# Patient Record
Sex: FEMALE | Race: WHITE | NOT HISPANIC OR LATINO | Employment: OTHER | URBAN - METROPOLITAN AREA
[De-identification: names, ages, dates, MRNs, and addresses within clinical notes are randomized per-mention and may not be internally consistent; named-entity substitution may affect disease eponyms.]

---

## 2017-10-16 ENCOUNTER — HOSPITAL ENCOUNTER (EMERGENCY)
Facility: HOSPITAL | Age: 72
Discharge: LEFT BEFORE DISCHARGE | End: 2017-10-16
Attending: EMERGENCY MEDICINE
Payer: COMMERCIAL

## 2017-10-16 ENCOUNTER — APPOINTMENT (EMERGENCY)
Dept: RADIOLOGY | Facility: HOSPITAL | Age: 72
End: 2017-10-16
Payer: COMMERCIAL

## 2017-10-16 ENCOUNTER — APPOINTMENT (EMERGENCY)
Dept: CT IMAGING | Facility: HOSPITAL | Age: 72
End: 2017-10-16
Payer: COMMERCIAL

## 2017-10-16 VITALS
SYSTOLIC BLOOD PRESSURE: 132 MMHG | HEART RATE: 84 BPM | WEIGHT: 177.69 LBS | TEMPERATURE: 98.8 F | OXYGEN SATURATION: 98 % | DIASTOLIC BLOOD PRESSURE: 67 MMHG | RESPIRATION RATE: 20 BRPM

## 2017-10-16 DIAGNOSIS — R79.89 ELEVATED LACTIC ACID LEVEL: ICD-10-CM

## 2017-10-16 DIAGNOSIS — D69.6 THROMBOCYTOPENIA (HCC): ICD-10-CM

## 2017-10-16 DIAGNOSIS — E16.2 HYPOGLYCEMIA: ICD-10-CM

## 2017-10-16 DIAGNOSIS — N39.0 UTI (URINARY TRACT INFECTION): ICD-10-CM

## 2017-10-16 DIAGNOSIS — D64.9 ANEMIA, UNSPECIFIED TYPE: Primary | ICD-10-CM

## 2017-10-16 DIAGNOSIS — R63.4 UNINTENTIONAL WEIGHT LOSS: ICD-10-CM

## 2017-10-16 LAB
ALBUMIN SERPL BCP-MCNC: 2.9 G/DL (ref 3.5–5)
ALP SERPL-CCNC: 80 U/L (ref 46–116)
ALT SERPL W P-5'-P-CCNC: 40 U/L (ref 12–78)
AMPHETAMINES SERPL QL SCN: NEGATIVE
ANION GAP SERPL CALCULATED.3IONS-SCNC: 11 MMOL/L (ref 4–13)
AST SERPL W P-5'-P-CCNC: 41 U/L (ref 5–45)
BACTERIA UR QL AUTO: ABNORMAL /HPF
BARBITURATES UR QL: NEGATIVE
BASOPHILS # BLD AUTO: 0.04 THOUSANDS/ΜL (ref 0–0.1)
BASOPHILS NFR BLD AUTO: 1 % (ref 0–1)
BENZODIAZ UR QL: NEGATIVE
BILIRUB SERPL-MCNC: 0.8 MG/DL (ref 0.2–1)
BILIRUB UR QL STRIP: NEGATIVE
BUN SERPL-MCNC: 15 MG/DL (ref 5–25)
CALCIUM SERPL-MCNC: 9.8 MG/DL (ref 8.3–10.1)
CHLORIDE SERPL-SCNC: 105 MMOL/L (ref 100–108)
CLARITY UR: ABNORMAL
CO2 SERPL-SCNC: 25 MMOL/L (ref 21–32)
COCAINE UR QL: NEGATIVE
COLOR UR: YELLOW
CREAT SERPL-MCNC: 0.78 MG/DL (ref 0.6–1.3)
EOSINOPHIL # BLD AUTO: 0.25 THOUSAND/ΜL (ref 0–0.61)
EOSINOPHIL NFR BLD AUTO: 4 % (ref 0–6)
ERYTHROCYTE [DISTWIDTH] IN BLOOD BY AUTOMATED COUNT: 18.1 % (ref 11.6–15.1)
ETHANOL SERPL-MCNC: <3 MG/DL (ref 0–3)
GFR SERPL CREATININE-BSD FRML MDRD: 76 ML/MIN/1.73SQ M
GLUCOSE SERPL-MCNC: 154 MG/DL (ref 65–140)
GLUCOSE SERPL-MCNC: 173 MG/DL (ref 65–140)
GLUCOSE SERPL-MCNC: 190 MG/DL (ref 65–140)
GLUCOSE SERPL-MCNC: 199 MG/DL (ref 65–140)
GLUCOSE SERPL-MCNC: 40 MG/DL (ref 65–140)
GLUCOSE SERPL-MCNC: 45 MG/DL (ref 65–140)
GLUCOSE UR STRIP-MCNC: NEGATIVE MG/DL
HCT VFR BLD AUTO: 30.8 % (ref 34.8–46.1)
HGB BLD-MCNC: 9.2 G/DL (ref 11.5–15.4)
HGB UR QL STRIP.AUTO: ABNORMAL
HOLD SPECIMEN: NORMAL
KETONES UR STRIP-MCNC: NEGATIVE MG/DL
LACTATE SERPL-SCNC: 3.4 MMOL/L (ref 0.5–2)
LACTATE SERPL-SCNC: 3.5 MMOL/L (ref 0.5–2)
LEUKOCYTE ESTERASE UR QL STRIP: ABNORMAL
LIPASE SERPL-CCNC: 155 U/L (ref 73–393)
LYMPHOCYTES # BLD AUTO: 1.01 THOUSANDS/ΜL (ref 0.6–4.47)
LYMPHOCYTES NFR BLD AUTO: 17 % (ref 14–44)
MCH RBC QN AUTO: 26.1 PG (ref 26.8–34.3)
MCHC RBC AUTO-ENTMCNC: 29.9 G/DL (ref 31.4–37.4)
MCV RBC AUTO: 87 FL (ref 82–98)
METHADONE UR QL: NEGATIVE
MONOCYTES # BLD AUTO: 0.65 THOUSAND/ΜL (ref 0.17–1.22)
MONOCYTES NFR BLD AUTO: 11 % (ref 4–12)
NEUTROPHILS # BLD AUTO: 4.17 THOUSANDS/ΜL (ref 1.85–7.62)
NEUTS SEG NFR BLD AUTO: 67 % (ref 43–75)
NITRITE UR QL STRIP: NEGATIVE
NON-SQ EPI CELLS URNS QL MICRO: ABNORMAL /HPF
OPIATES UR QL SCN: NEGATIVE
PCP UR QL: NEGATIVE
PH UR STRIP.AUTO: 6.5 [PH] (ref 4.5–8)
PLATELET # BLD AUTO: 101 THOUSANDS/UL (ref 149–390)
PMV BLD AUTO: 12.5 FL (ref 8.9–12.7)
POTASSIUM SERPL-SCNC: 3.7 MMOL/L (ref 3.5–5.3)
PROT SERPL-MCNC: 7.1 G/DL (ref 6.4–8.2)
PROT UR STRIP-MCNC: NEGATIVE MG/DL
RBC # BLD AUTO: 3.53 MILLION/UL (ref 3.81–5.12)
RBC #/AREA URNS AUTO: ABNORMAL /HPF
SODIUM SERPL-SCNC: 141 MMOL/L (ref 136–145)
SP GR UR STRIP.AUTO: <=1.005 (ref 1–1.03)
THC UR QL: NEGATIVE
UROBILINOGEN UR QL STRIP.AUTO: 2 E.U./DL
WBC # BLD AUTO: 6.12 THOUSAND/UL (ref 4.31–10.16)
WBC #/AREA URNS AUTO: ABNORMAL /HPF

## 2017-10-16 PROCEDURE — 83605 ASSAY OF LACTIC ACID: CPT | Performed by: EMERGENCY MEDICINE

## 2017-10-16 PROCEDURE — 96361 HYDRATE IV INFUSION ADD-ON: CPT

## 2017-10-16 PROCEDURE — 82948 REAGENT STRIP/BLOOD GLUCOSE: CPT

## 2017-10-16 PROCEDURE — 80307 DRUG TEST PRSMV CHEM ANLYZR: CPT | Performed by: EMERGENCY MEDICINE

## 2017-10-16 PROCEDURE — 80053 COMPREHEN METABOLIC PANEL: CPT | Performed by: EMERGENCY MEDICINE

## 2017-10-16 PROCEDURE — 96374 THER/PROPH/DIAG INJ IV PUSH: CPT

## 2017-10-16 PROCEDURE — 71020 HB CHEST X-RAY 2VW FRONTAL&LATL: CPT

## 2017-10-16 PROCEDURE — 99285 EMERGENCY DEPT VISIT HI MDM: CPT

## 2017-10-16 PROCEDURE — 36415 COLL VENOUS BLD VENIPUNCTURE: CPT | Performed by: EMERGENCY MEDICINE

## 2017-10-16 PROCEDURE — 81001 URINALYSIS AUTO W/SCOPE: CPT | Performed by: EMERGENCY MEDICINE

## 2017-10-16 PROCEDURE — 80320 DRUG SCREEN QUANTALCOHOLS: CPT | Performed by: EMERGENCY MEDICINE

## 2017-10-16 PROCEDURE — 93005 ELECTROCARDIOGRAM TRACING: CPT | Performed by: EMERGENCY MEDICINE

## 2017-10-16 PROCEDURE — 70450 CT HEAD/BRAIN W/O DYE: CPT

## 2017-10-16 PROCEDURE — 83690 ASSAY OF LIPASE: CPT | Performed by: EMERGENCY MEDICINE

## 2017-10-16 PROCEDURE — 85025 COMPLETE CBC W/AUTO DIFF WBC: CPT | Performed by: EMERGENCY MEDICINE

## 2017-10-16 RX ORDER — SIMVASTATIN 40 MG
40 TABLET ORAL
COMMUNITY
End: 2019-05-31 | Stop reason: HOSPADM

## 2017-10-16 RX ORDER — DEXTROSE MONOHYDRATE 25 G/50ML
25 INJECTION, SOLUTION INTRAVENOUS AS NEEDED
Status: DISCONTINUED | OUTPATIENT
Start: 2017-10-16 | End: 2017-10-16 | Stop reason: HOSPADM

## 2017-10-16 RX ORDER — ALBUTEROL SULFATE 2.5 MG/3ML
2.5 SOLUTION RESPIRATORY (INHALATION) 3 TIMES DAILY PRN
COMMUNITY
End: 2019-06-01

## 2017-10-16 RX ORDER — BUPROPION HYDROCHLORIDE 300 MG/1
300 TABLET ORAL EVERY MORNING
COMMUNITY
End: 2019-07-08 | Stop reason: HOSPADM

## 2017-10-16 RX ORDER — LISINOPRIL 2.5 MG/1
2.5 TABLET ORAL EVERY MORNING
COMMUNITY
End: 2019-05-31 | Stop reason: HOSPADM

## 2017-10-16 RX ORDER — PANTOPRAZOLE SODIUM 40 MG/1
40 TABLET, DELAYED RELEASE ORAL DAILY
COMMUNITY
End: 2019-06-01

## 2017-10-16 RX ORDER — SULFAMETHOXAZOLE AND TRIMETHOPRIM 800; 160 MG/1; MG/1
1 TABLET ORAL ONCE
Status: COMPLETED | OUTPATIENT
Start: 2017-10-16 | End: 2017-10-16

## 2017-10-16 RX ORDER — ROPINIROLE 0.25 MG/1
0.5 TABLET, FILM COATED ORAL ONCE
Status: COMPLETED | OUTPATIENT
Start: 2017-10-16 | End: 2017-10-16

## 2017-10-16 RX ORDER — SODIUM CHLORIDE 9 MG/ML
1000 INJECTION, SOLUTION INTRAVENOUS CONTINUOUS
Status: DISCONTINUED | OUTPATIENT
Start: 2017-10-16 | End: 2017-10-16 | Stop reason: HOSPADM

## 2017-10-16 RX ORDER — MONTELUKAST SODIUM 10 MG/1
10 TABLET ORAL
COMMUNITY
End: 2019-06-01

## 2017-10-16 RX ORDER — SULFAMETHOXAZOLE AND TRIMETHOPRIM 800; 160 MG/1; MG/1
1 TABLET ORAL 2 TIMES DAILY
Qty: 14 TABLET | Refills: 0 | Status: SHIPPED | OUTPATIENT
Start: 2017-10-16 | End: 2017-10-23

## 2017-10-16 RX ORDER — MELATONIN
1000 DAILY
COMMUNITY
End: 2019-05-31 | Stop reason: HOSPADM

## 2017-10-16 RX ORDER — ROPINIROLE 0.5 MG/1
0.5 TABLET, FILM COATED ORAL 2 TIMES DAILY
COMMUNITY
End: 2018-09-02 | Stop reason: HOSPADM

## 2017-10-16 RX ORDER — DEXTROSE MONOHYDRATE 25 G/50ML
INJECTION, SOLUTION INTRAVENOUS
Status: COMPLETED
Start: 2017-10-16 | End: 2017-10-16

## 2017-10-16 RX ADMIN — SODIUM CHLORIDE 1000 ML/HR: 0.9 INJECTION, SOLUTION INTRAVENOUS at 15:10

## 2017-10-16 RX ADMIN — DEXTROSE MONOHYDRATE 25 ML: 25 INJECTION, SOLUTION INTRAVENOUS at 14:46

## 2017-10-16 RX ADMIN — SULFAMETHOXAZOLE AND TRIMETHOPRIM 1 TABLET: 800; 160 TABLET ORAL at 17:54

## 2017-10-16 RX ADMIN — ROPINIROLE 0.5 MG: 0.25 TABLET, FILM COATED ORAL at 18:03

## 2017-10-16 NOTE — ED NOTES
Speech clear and pt  Skin less diaphoretic at this time  Pt  Had additional gingerale and multiple adonis crackers        Jon Powers RN  10/16/17 5909

## 2017-10-16 NOTE — DISCHARGE INSTRUCTIONS

## 2017-10-16 NOTE — ED NOTES
Pt rang call bell multiple times asking for anti-anxiety medication  Dr Culver [place] aware        Bernarda Eisenberg, RN  10/16/17 9940

## 2017-10-16 NOTE — ED NOTES
Pt  Became very diaphoretic and speech became slurred  Blood sugar checked and it was 40  Pt  Given 2 orange juices right away  Pt  Able to drink them well  IV access established and Dr Jaden Shay was soon at bedside        Joleen Salas RN  10/16/17 9929

## 2017-10-16 NOTE — ED PROVIDER NOTES
History  Chief Complaint   Patient presents with    Weakness - Generalized     Pt was on phone w/ PCP and stated "I'm going to overdose on pills if you don't find out what's wrong with me" Police was called, pt currently denies SI/HI  C/o weakness and dizziness  Lost 60lb over the past year   Psychiatric Evaluation     Patient is a 71-year-old female who presents today via ambulance after making suicidal threats over the phone to her PCPs office  Patient states that she has been losing weight unintentionally, approximately 60 lb over the last year, under family doctor told her that she needs to have testing done because she probably has cancer somewhere  She has not had these tests done, she called her family doctor today and made a comment to someone over the phone that I am going to overdose on pills if you do not tell me with wrong with me    The police and EMS were called and patient was brought into the ED  She is currently denying HI/SI  States I just said that because I was mad  She is complaining of generalized weakness but also states to me that she will not stay in the hospital for further workup and will not be admitted  History provided by:  Patient  Psychiatric Evaluation   Presenting symptoms: suicidal threats    Presenting symptoms: no suicidal thoughts    Degree of incapacity (severity): Unable to specify  Onset quality:  Gradual  Timing:  Constant  Context: stressful life event (Has been losing weight, and was told that she probably has cancer )    Relieved by:  None tried  Worsened by:  Nothing  Ineffective treatments:  None tried  Associated symptoms: fatigue, irritability and weight change ( 60 lb weight loss over 1 year    Unintentional)    Associated symptoms: no abdominal pain, no anhedonia, no anxiety, no appetite change, no chest pain, no decreased need for sleep, not distractible, no euphoric mood, no feelings of worthlessness, no headaches, no hypersomnia, no hyperventilation, no insomnia, no poor judgment and no school problems    Risk factors: hx of mental illness    Risk factors: no family hx of mental illness, no family violence (depression), no hx of suicide attempts, no neurological disease and no recent psychiatric admission        Prior to Admission Medications   Prescriptions Last Dose Informant Patient Reported? Taking?    albuterol (2 5 mg/3 mL) 0 083 % nebulizer solution   Yes Yes   Sig: Take 2 5 mg by nebulization 3 (three) times a day as needed for wheezing   aspirin 81 MG tablet   Yes Yes   Sig: Take 81 mg by mouth daily   buPROPion (WELLBUTRIN XL) 300 mg 24 hr tablet   Yes Yes   Sig: Take 300 mg by mouth every morning   cholecalciferol (VITAMIN D3) 1,000 units tablet   Yes Yes   Sig: Take 1,000 Units by mouth daily   fluticasone-salmeterol (ADVAIR HFA) 115-21 MCG/ACT inhaler   Yes Yes   Sig: Inhale 2 puffs 2 (two) times a day   insulin detemir (LEVEMIR) 100 units/mL subcutaneous injection   Yes Yes   Sig: Inject 40 Units under the skin 2 (two) times a day   insulin lispro (HUMALOG KWIKPEN) 100 Units/mL SOPN   Yes Yes   Sig: Inject 17 Units under the skin 3 (three) times a day with meals   ipratropium (ATROVENT) 0 02 % nebulizer solution   Yes Yes   Sig: Take 0 5 mg by nebulization every 6 (six) hours as needed for wheezing or shortness of breath   lisinopril (ZESTRIL) 2 5 mg tablet   Yes Yes   Sig: Take 2 5 mg by mouth every morning   metFORMIN (GLUCOPHAGE) 1000 MG tablet   Yes Yes   Sig: Take 1,000 mg by mouth 2 (two) times a day with meals   montelukast (SINGULAIR) 10 mg tablet   Yes Yes   Sig: Take 10 mg by mouth daily at bedtime   pantoprazole (PROTONIX) 40 mg tablet   Yes Yes   Sig: Take 40 mg by mouth daily   rOPINIRole (REQUIP) 0 5 mg tablet   Yes Yes   Sig: Take 0 5 mg by mouth 2 (two) times a day   simvastatin (ZOCOR) 40 mg tablet   Yes Yes   Sig: Take 40 mg by mouth daily at bedtime      Facility-Administered Medications: None       Past Medical History:   Diagnosis Date    Diabetes mellitus ([facility] [address] )     Neuropathy        Past Surgical History:   Procedure Laterality Date    BACK SURGERY      CHOLECYSTECTOMY      HERNIA REPAIR      TUMOR REMOVAL         History reviewed  No pertinent family history  I have reviewed and agree with the history as documented  Social History   Substance Use Topics    Smoking status: Never Smoker    Smokeless tobacco: Not on file    Alcohol use No        Review of Systems   Constitutional: Positive for fatigue, irritability and unexpected weight change  Negative for appetite change, chills and fever  HENT: Negative for congestion, ear pain, facial swelling, nosebleeds, rhinorrhea and sore throat  Eyes: Negative for discharge and redness  Respiratory: Negative for cough, chest tightness and shortness of breath  Cardiovascular: Negative for chest pain, palpitations and leg swelling  Gastrointestinal: Negative for abdominal pain, blood in stool, constipation, diarrhea, nausea and vomiting  Endocrine: Negative for polydipsia, polyphagia and polyuria  Genitourinary: Negative for difficulty urinating, dysuria, flank pain, frequency and hematuria  Musculoskeletal: Negative for arthralgias, myalgias and neck stiffness  Skin: Positive for pallor  Negative for rash and wound  Allergic/Immunologic: Negative for immunocompromised state  Neurological: Positive for weakness (generalized)  Negative for dizziness, speech difficulty, light-headedness, numbness and headaches  Hematological: Negative for adenopathy  Does not bruise/bleed easily  Psychiatric/Behavioral: Negative for suicidal ideas  The patient is not nervous/anxious and does not have insomnia  All other systems reviewed and are negative        Physical Exam  ED Triage Vitals   Temperature Pulse Respirations Blood Pressure SpO2   10/16/17 1418 10/16/17 1418 10/16/17 1418 10/16/17 1418 10/16/17 1418   98 8 °F (37 1 °C) 84 18 (!) 191/76 100 % Temp Source Heart Rate Source Patient Position - Orthostatic VS BP Location FiO2 (%)   10/16/17 1418 10/16/17 1418 10/16/17 1418 10/16/17 1418 --   Oral Monitor Sitting Right arm       Pain Score       10/16/17 1500       No Pain           Physical Exam   Constitutional: She is oriented to person, place, and time  She appears well-developed and well-nourished  HENT:   Head: Normocephalic and atraumatic  Eyes: Pupils are equal, round, and reactive to light  Neck: Normal range of motion  Neck supple  Cardiovascular: Normal rate and regular rhythm  Pulmonary/Chest: Effort normal and breath sounds normal    Abdominal: Soft  Bowel sounds are normal    Musculoskeletal: Normal range of motion  Neurological: She is alert and oriented to person, place, and time  Skin: Skin is warm and dry  There is pallor  Psychiatric: She has a normal mood and affect  Thought content normal    Nursing note and vitals reviewed        ED Medications  Medications   sodium chloride 0 9 % infusion (1,000 mL/hr Intravenous New Bag 10/16/17 1510)   dextrose 50 % IV solution 25 mL (25 mL Intravenous Given 10/16/17 1446)       Diagnostic Studies  Labs Reviewed   CBC AND DIFFERENTIAL - Abnormal        Result Value Ref Range Status    RBC 3 53 (*) 3 81 - 5 12 Million/uL Final    Hemoglobin 9 2 (*) 11 5 - 15 4 g/dL Final    Hematocrit 30 8 (*) 34 8 - 46 1 % Final    MCH 26 1 (*) 26 8 - 34 3 pg Final    MCHC 29 9 (*) 31 4 - 37 4 g/dL Final    RDW 18 1 (*) 11 6 - 15 1 % Final    Platelets 171 (*) 769 - 390 Thousands/uL Final    WBC 6 12  4 31 - 10 16 Thousand/uL Final    MCV 87  82 - 98 fL Final    MPV 12 5  8 9 - 12 7 fL Final    Neutrophils Relative 67  43 - 75 % Final    Lymphocytes Relative 17  14 - 44 % Final    Monocytes Relative 11  4 - 12 % Final    Eosinophils Relative 4  0 - 6 % Final    Basophils Relative 1  0 - 1 % Final    Neutrophils Absolute 4 17  1 85 - 7 62 Thousands/µL Final    Lymphocytes Absolute 1 01  0 60 - 4 47 Thousands/µL Final    Monocytes Absolute 0 65  0 17 - 1 22 Thousand/µL Final    Eosinophils Absolute 0 25  0 00 - 0 61 Thousand/µL Final    Basophils Absolute 0 04  0 00 - 0 10 Thousands/µL Final   COMPREHENSIVE METABOLIC PANEL - Abnormal     Glucose 45 (*) 65 - 140 mg/dL Final    Comment:   If the patient is fasting, the ADA then defines impaired fasting glucose as > 100 mg/dL and diabetes as > or equal to 123 mg/dL  Specimen collection should occur prior to Sulfasalazine administration due to the potential for falsely depressed results  Specimen collection should occur prior to Sulfapyridine administration due to the potential for falsely elevated results  Albumin 2 9 (*) 3 5 - 5 0 g/dL Final    Sodium 141  136 - 145 mmol/L Final    Potassium 3 7  3 5 - 5 3 mmol/L Final    Chloride 105  100 - 108 mmol/L Final    CO2 25  21 - 32 mmol/L Final    Anion Gap 11  4 - 13 mmol/L Final    BUN 15  5 - 25 mg/dL Final    Creatinine 0 78  0 60 - 1 30 mg/dL Final    Comment: Standardized to IDMS reference method    Calcium 9 8  8 3 - 10 1 mg/dL Final    AST 41  5 - 45 U/L Final    Comment:   Specimen collection should occur prior to Sulfasalazine administration due to the potential for falsely depressed results  ALT 40  12 - 78 U/L Final    Comment:   Specimen collection should occur prior to Sulfasalazine administration due to the potential for falsely depressed results  Alkaline Phosphatase 80  46 - 116 U/L Final    Total Protein 7 1  6 4 - 8 2 g/dL Final    Total Bilirubin 0 80  0 20 - 1 00 mg/dL Final    eGFR 76  ml/min/1 73sq m Final    Narrative:     National Kidney Disease Education Program recommendations are as follows:  GFR calculation is accurate only with a steady state creatinine  Chronic Kidney disease less than 60 ml/min/1 73 sq  meters  Kidney failure less than 15 ml/min/1 73 sq  meters     LACTIC ACID, PLASMA - Abnormal     LACTIC ACID 3 4 (*) 0 5 - 2 0 mmol/L Final    Narrative:     Result may be elevated if tourniquet was used during collection  POCT GLUCOSE - Abnormal     POC Glucose 40 (*) 65 - 140 mg/dl Final   POCT GLUCOSE - Abnormal     POC Glucose 190 (*) 65 - 140 mg/dl Final   POCT GLUCOSE - Abnormal     POC Glucose 173 (*) 65 - 140 mg/dl Final   LIPASE - Normal    Lipase 155  73 - 393 u/L Final   MEDICAL ALCOHOL - Normal    Ethanol Lvl <3  0 - 3 mg/dL Final   RAINBOW DRAW    Narrative: The following orders were created for panel order [place] draw  Procedure                               Abnormality         Status                     ---------                               -----------         ------                     Mary Jane Gregg Top on UUJA[[id number]]                            Final result               Green / Black tube on BHNY[[id number]]                        Final result                 Please view results for these tests on the individual orders  URINALYSIS WITH MICROSCOPIC   RAPID DRUG SCREEN, URINE   LACTIC ACID, PLASMA   LIGHT BLUE TOP   GREEN / BLACK TUBE ON HOLD    Extra Tube Hold for add-ons  Final    Comment: Auto resulted  XR chest 2 views   Final Result      No active pulmonary disease  Workstation performed: [id number]         CT head wo contrast   Final Result      No acute intracranial abnormality           Workstation performed: [id number]             Procedures  ECG 12 Lead Documentation  Date/Time: 10/16/2017 3:46 PM  Performed by: Ady Elder  Authorized by: Ady Elder     Indications / Diagnosis:  Weakness  ECG reviewed by me, the ED Provider: yes    Patient location:  ED  Previous ECG:     Previous ECG:  Unavailable  Quality:     Tracing quality:  Limited by artifact  Rate:     ECG rate:  77    ECG rate assessment: normal    Rhythm:     Rhythm: sinus rhythm    Ectopy:     Ectopy: none    QRS:     QRS axis:  Normal  Conduction:     Conduction: normal    ST segments:     ST segments:  Normal  T waves:     T waves: normal            Phone Contacts  ED Phone Contact    ED Course  ED Course                                MDM  Number of Diagnoses or Management Options  Anemia, unspecified type: new and requires workup  Hypoglycemia: new and requires workup  Unintentional weight loss: new and requires workup  Diagnosis management comments: 3:34 PM  Patient is a 76-year-old female who presents today with unintentional weight loss over 1 year, after having made suicidal threats to to someone at her PCPs office  Upon arrival her blood sugar was 40 she was slightly pale and diaphoretic improved to 190 after some juice and an amp of dextrose  Patient is being extremely difficult is currently refusing admission for workup of her unintentional weight loss for 1 year  My main concern right now with regards to her discharge is her current state of mental health after threatening to OD on pills if her PCP doesn't figure out whats wrong with her  I have ordered some lab work including a CBC CMP lactate, alcohol level, urine drug screen, UA  And imaging studies including a x-ray of the chest and a CT of the head  I have also consulted crisis to meet with the patient  3:51 PM  Collected consent to view the Care everywhere records from [facility] with the patient  She has been seen and evaluated by Neurosurgery and Neurology for the abnormality, which it appears the end result was that she was diagnosed with diabetic neuropathy  I again asked her about the suicidal threat she made today she again states that she did not mean it that she can contract for safety, that  She will not do anything to harm herself  She was just frustrated  Her blood sugar was also low (40) when she arrived here and there was no prehopsital glucose taken  Unsure if this factored into her mental state at that time    Will wait for crisis eval   UA and RUDS still pending too      4:57 PM  Signed out to Dr Dea De Los Santos for final disposition after urine and crisis eval  Amount and/or Complexity of Data Reviewed  Clinical lab tests: ordered and reviewed  Tests in the radiology section of CPT®: ordered and reviewed  Tests in the medicine section of CPT®: ordered and reviewed  Decide to obtain previous medical records or to obtain history from someone other than the patient: yes  Review and summarize past medical records: yes  Discuss the patient with other providers: yes  Independent visualization of images, tracings, or specimens: yes    Risk of Complications, Morbidity, and/or Mortality  Presenting problems: high  Diagnostic procedures: high  Management options: high      CritCare Time    Disposition  Final diagnoses:   Anemia, unspecified type   Hypoglycemia   Unintentional weight loss     ED Disposition     None      Follow-up Information    None       Patient's Medications   Discharge Prescriptions    No medications on file     No discharge procedures on file      ED Provider  Electronically Signed by       Chaz Art DO  10/16/17 6294

## 2017-10-17 NOTE — ED NOTES
Pt awake and alert, no distress noted, no other questions upon d/c     April LUCIANO Wesley, RN  10/16/17 6794

## 2017-10-18 ENCOUNTER — APPOINTMENT (INPATIENT)
Dept: CT IMAGING | Facility: HOSPITAL | Age: 72
DRG: 811 | End: 2017-10-18
Payer: COMMERCIAL

## 2017-10-18 ENCOUNTER — HOSPITAL ENCOUNTER (INPATIENT)
Facility: HOSPITAL | Age: 72
LOS: 2 days | Discharge: HOME HEALTH | DRG: 811 | End: 2017-10-20
Attending: EMERGENCY MEDICINE | Admitting: FAMILY MEDICINE
Payer: COMMERCIAL

## 2017-10-18 DIAGNOSIS — K74.60 CIRRHOSIS OF LIVER (HCC): ICD-10-CM

## 2017-10-18 DIAGNOSIS — R53.1 WEAKNESS GENERALIZED: Primary | ICD-10-CM

## 2017-10-18 DIAGNOSIS — D61.818 PANCYTOPENIA (HCC): Chronic | ICD-10-CM

## 2017-10-18 DIAGNOSIS — D64.9 SYMPTOMATIC ANEMIA: ICD-10-CM

## 2017-10-18 DIAGNOSIS — R71.8 SCHISTOCYTES ON PERIPHERAL BLOOD SMEAR: ICD-10-CM

## 2017-10-18 DIAGNOSIS — R77.8 ELEVATED TROPONIN: ICD-10-CM

## 2017-10-18 DIAGNOSIS — N39.0 URINARY TRACT INFECTION: ICD-10-CM

## 2017-10-18 PROBLEM — E87.2 LACTIC ACIDOSIS: Chronic | Status: ACTIVE | Noted: 2017-10-18

## 2017-10-18 PROBLEM — F31.9 BIPOLAR 1 DISORDER (HCC): Chronic | Status: ACTIVE | Noted: 2017-10-18

## 2017-10-18 PROBLEM — R55 NEAR SYNCOPE: Status: ACTIVE | Noted: 2017-10-18

## 2017-10-18 PROBLEM — E87.20 LACTIC ACIDOSIS: Chronic | Status: ACTIVE | Noted: 2017-10-18

## 2017-10-18 PROBLEM — I21.4 NSTEMI (NON-ST ELEVATED MYOCARDIAL INFARCTION) (HCC): Status: ACTIVE | Noted: 2017-10-18

## 2017-10-18 PROBLEM — J45.909 ASTHMA: Chronic | Status: ACTIVE | Noted: 2017-10-18

## 2017-10-18 LAB
ALBUMIN SERPL BCP-MCNC: 3 G/DL (ref 3.5–5)
ALP SERPL-CCNC: 72 U/L (ref 46–116)
ALT SERPL W P-5'-P-CCNC: 42 U/L (ref 12–78)
ANION GAP SERPL CALCULATED.3IONS-SCNC: 12 MMOL/L (ref 4–13)
APTT PPP: 39 SECONDS (ref 23–35)
AST SERPL W P-5'-P-CCNC: 38 U/L (ref 5–45)
BACTERIA UR QL AUTO: ABNORMAL /HPF
BASOPHILS # BLD AUTO: 0.06 THOUSANDS/ΜL (ref 0–0.1)
BASOPHILS NFR BLD AUTO: 2 % (ref 0–1)
BILIRUB SERPL-MCNC: 1.2 MG/DL (ref 0.2–1)
BILIRUB UR QL STRIP: NEGATIVE
BLD SMEAR INTERP: NORMAL
BUN SERPL-MCNC: 14 MG/DL (ref 5–25)
CALCIUM SERPL-MCNC: 9.2 MG/DL (ref 8.3–10.1)
CHLORIDE SERPL-SCNC: 102 MMOL/L (ref 100–108)
CLARITY UR: CLEAR
CLARITY, POC: CLEAR
CO2 SERPL-SCNC: 22 MMOL/L (ref 21–32)
COLOR UR: YELLOW
COLOR, POC: YELLOW
CREAT SERPL-MCNC: 0.95 MG/DL (ref 0.6–1.3)
DAT POLY-SP REAG RBC QL: NEGATIVE
EOSINOPHIL # BLD AUTO: 0.14 THOUSAND/ΜL (ref 0–0.61)
EOSINOPHIL NFR BLD AUTO: 4 % (ref 0–6)
ERYTHROCYTE [DISTWIDTH] IN BLOOD BY AUTOMATED COUNT: 18.2 % (ref 11.6–15.1)
EXT BILIRUBIN, UA: ABNORMAL
EXT BLOOD URINE: ABNORMAL
EXT GLUCOSE, UA: ABNORMAL
EXT KETONES: ABNORMAL
EXT NITRITE, UA: ABNORMAL
EXT PH, UA: 8
EXT PROTEIN, UA: ABNORMAL
EXT SPECIFIC GRAVITY, UA: 1
EXT UROBILINOGEN: ABNORMAL
FIBRINOGEN PPP-MCNC: 295 MG/DL (ref 227–495)
GFR SERPL CREATININE-BSD FRML MDRD: 60 ML/MIN/1.73SQ M
GLUCOSE SERPL-MCNC: 110 MG/DL (ref 65–140)
GLUCOSE SERPL-MCNC: 184 MG/DL (ref 65–140)
GLUCOSE SERPL-MCNC: 219 MG/DL (ref 65–140)
GLUCOSE SERPL-MCNC: 273 MG/DL (ref 65–140)
GLUCOSE UR STRIP-MCNC: NEGATIVE MG/DL
HCT VFR BLD AUTO: 29.2 % (ref 34.8–46.1)
HGB BLD-MCNC: 8.8 G/DL (ref 11.5–15.4)
HGB RETIC QN AUTO: 27.7 PG (ref 30–38.3)
HGB UR QL STRIP.AUTO: ABNORMAL
IMM RETICS NFR: 15.8 % (ref 0–14)
INR PPP: 1.28 (ref 0.86–1.16)
KETONES UR STRIP-MCNC: NEGATIVE MG/DL
LACTATE SERPL-SCNC: 2.3 MMOL/L (ref 0.5–2)
LACTATE SERPL-SCNC: 2.9 MMOL/L (ref 0.5–2)
LACTATE SERPL-SCNC: 3.5 MMOL/L (ref 0.5–2)
LACTATE SERPL-SCNC: 3.9 MMOL/L (ref 0.5–2)
LEUKOCYTE ESTERASE UR QL STRIP: ABNORMAL
LYMPHOCYTES # BLD AUTO: 0.82 THOUSANDS/ΜL (ref 0.6–4.47)
LYMPHOCYTES NFR BLD AUTO: 24 % (ref 14–44)
MCH RBC QN AUTO: 25.8 PG (ref 26.8–34.3)
MCHC RBC AUTO-ENTMCNC: 30.1 G/DL (ref 31.4–37.4)
MCV RBC AUTO: 86 FL (ref 82–98)
MONOCYTES # BLD AUTO: 0.31 THOUSAND/ΜL (ref 0.17–1.22)
MONOCYTES NFR BLD AUTO: 9 % (ref 4–12)
NEUTROPHILS # BLD AUTO: 2.09 THOUSANDS/ΜL (ref 1.85–7.62)
NEUTS SEG NFR BLD AUTO: 61 % (ref 43–75)
NITRITE UR QL STRIP: NEGATIVE
NON-SQ EPI CELLS URNS QL MICRO: ABNORMAL /HPF
NT-PROBNP SERPL-MCNC: 231 PG/ML
PH UR STRIP.AUTO: 7 [PH] (ref 4.5–8)
PLATELET # BLD AUTO: 84 THOUSANDS/UL (ref 149–390)
PMV BLD AUTO: 12.3 FL (ref 8.9–12.7)
POTASSIUM SERPL-SCNC: 4 MMOL/L (ref 3.5–5.3)
PROT SERPL-MCNC: 6.9 G/DL (ref 6.4–8.2)
PROT UR STRIP-MCNC: NEGATIVE MG/DL
PROTHROMBIN TIME: 16.4 SECONDS (ref 12.1–14.4)
RBC # BLD AUTO: 3.41 MILLION/UL (ref 3.81–5.12)
RBC #/AREA URNS AUTO: ABNORMAL /HPF
RETICS # AUTO: ABNORMAL 10*3/UL (ref 14097–95744)
RETICS # CALC: 2.16 % (ref 0.37–1.87)
SODIUM SERPL-SCNC: 136 MMOL/L (ref 136–145)
SP GR UR STRIP.AUTO: <=1.005 (ref 1–1.03)
TROPONIN I SERPL-MCNC: 0.1 NG/ML
TROPONIN I SERPL-MCNC: 0.13 NG/ML
TROPONIN I SERPL-MCNC: 0.15 NG/ML
UROBILINOGEN UR QL STRIP.AUTO: 1 E.U./DL
WBC # BLD AUTO: 3.42 THOUSAND/UL (ref 4.31–10.16)
WBC # BLD EST: ABNORMAL 10*3/UL
WBC #/AREA URNS AUTO: ABNORMAL /HPF

## 2017-10-18 PROCEDURE — 85384 FIBRINOGEN ACTIVITY: CPT | Performed by: PHYSICIAN ASSISTANT

## 2017-10-18 PROCEDURE — 85046 RETICYTE/HGB CONCENTRATE: CPT | Performed by: PHYSICIAN ASSISTANT

## 2017-10-18 PROCEDURE — 86038 ANTINUCLEAR ANTIBODIES: CPT | Performed by: PHYSICIAN ASSISTANT

## 2017-10-18 PROCEDURE — 83615 LACTATE (LD) (LDH) ENZYME: CPT | Performed by: PHYSICIAN ASSISTANT

## 2017-10-18 PROCEDURE — 82948 REAGENT STRIP/BLOOD GLUCOSE: CPT

## 2017-10-18 PROCEDURE — 80053 COMPREHEN METABOLIC PANEL: CPT | Performed by: PHYSICIAN ASSISTANT

## 2017-10-18 PROCEDURE — 81001 URINALYSIS AUTO W/SCOPE: CPT | Performed by: PHYSICIAN ASSISTANT

## 2017-10-18 PROCEDURE — 85610 PROTHROMBIN TIME: CPT | Performed by: PHYSICIAN ASSISTANT

## 2017-10-18 PROCEDURE — 93005 ELECTROCARDIOGRAM TRACING: CPT

## 2017-10-18 PROCEDURE — 85730 THROMBOPLASTIN TIME PARTIAL: CPT | Performed by: PHYSICIAN ASSISTANT

## 2017-10-18 PROCEDURE — 74176 CT ABD & PELVIS W/O CONTRAST: CPT

## 2017-10-18 PROCEDURE — 84484 ASSAY OF TROPONIN QUANT: CPT | Performed by: PHYSICIAN ASSISTANT

## 2017-10-18 PROCEDURE — 83880 ASSAY OF NATRIURETIC PEPTIDE: CPT | Performed by: PHYSICIAN ASSISTANT

## 2017-10-18 PROCEDURE — 83010 ASSAY OF HAPTOGLOBIN QUANT: CPT | Performed by: PHYSICIAN ASSISTANT

## 2017-10-18 PROCEDURE — 83625 ASSAY OF LDH ENZYMES: CPT | Performed by: PHYSICIAN ASSISTANT

## 2017-10-18 PROCEDURE — 87040 BLOOD CULTURE FOR BACTERIA: CPT | Performed by: PHYSICIAN ASSISTANT

## 2017-10-18 PROCEDURE — 36415 COLL VENOUS BLD VENIPUNCTURE: CPT | Performed by: PHYSICIAN ASSISTANT

## 2017-10-18 PROCEDURE — 86880 COOMBS TEST DIRECT: CPT | Performed by: PHYSICIAN ASSISTANT

## 2017-10-18 PROCEDURE — 83605 ASSAY OF LACTIC ACID: CPT | Performed by: PHYSICIAN ASSISTANT

## 2017-10-18 PROCEDURE — 93005 ELECTROCARDIOGRAM TRACING: CPT | Performed by: PHYSICIAN ASSISTANT

## 2017-10-18 PROCEDURE — 99285 EMERGENCY DEPT VISIT HI MDM: CPT

## 2017-10-18 PROCEDURE — 81002 URINALYSIS NONAUTO W/O SCOPE: CPT | Performed by: PHYSICIAN ASSISTANT

## 2017-10-18 PROCEDURE — 85025 COMPLETE CBC W/AUTO DIFF WBC: CPT | Performed by: PHYSICIAN ASSISTANT

## 2017-10-18 PROCEDURE — 96374 THER/PROPH/DIAG INJ IV PUSH: CPT

## 2017-10-18 PROCEDURE — 96361 HYDRATE IV INFUSION ADD-ON: CPT

## 2017-10-18 RX ORDER — MELATONIN
1000 DAILY
Status: DISCONTINUED | OUTPATIENT
Start: 2017-10-19 | End: 2017-10-20 | Stop reason: HOSPADM

## 2017-10-18 RX ORDER — BUPROPION HYDROCHLORIDE 150 MG/1
300 TABLET ORAL EVERY MORNING
Status: DISCONTINUED | OUTPATIENT
Start: 2017-10-19 | End: 2017-10-20 | Stop reason: HOSPADM

## 2017-10-18 RX ORDER — ALBUTEROL SULFATE 2.5 MG/3ML
2.5 SOLUTION RESPIRATORY (INHALATION) EVERY 6 HOURS PRN
Status: DISCONTINUED | OUTPATIENT
Start: 2017-10-18 | End: 2017-10-20 | Stop reason: HOSPADM

## 2017-10-18 RX ORDER — LISINOPRIL 2.5 MG/1
2.5 TABLET ORAL EVERY MORNING
Status: DISCONTINUED | OUTPATIENT
Start: 2017-10-19 | End: 2017-10-20 | Stop reason: HOSPADM

## 2017-10-18 RX ORDER — ONDANSETRON 2 MG/ML
4 INJECTION INTRAMUSCULAR; INTRAVENOUS EVERY 6 HOURS PRN
Status: DISCONTINUED | OUTPATIENT
Start: 2017-10-18 | End: 2017-10-20 | Stop reason: HOSPADM

## 2017-10-18 RX ORDER — PANTOPRAZOLE SODIUM 40 MG/1
40 TABLET, DELAYED RELEASE ORAL DAILY
Status: DISCONTINUED | OUTPATIENT
Start: 2017-10-19 | End: 2017-10-20 | Stop reason: HOSPADM

## 2017-10-18 RX ORDER — CALCIUM CARBONATE 200(500)MG
1000 TABLET,CHEWABLE ORAL DAILY PRN
Status: DISCONTINUED | OUTPATIENT
Start: 2017-10-18 | End: 2017-10-20 | Stop reason: HOSPADM

## 2017-10-18 RX ORDER — ALBUTEROL SULFATE 2.5 MG/3ML
2.5 SOLUTION RESPIRATORY (INHALATION) 3 TIMES DAILY PRN
Status: DISCONTINUED | OUTPATIENT
Start: 2017-10-18 | End: 2017-10-18

## 2017-10-18 RX ORDER — ROPINIROLE 0.25 MG/1
0.5 TABLET, FILM COATED ORAL ONCE
Status: COMPLETED | OUTPATIENT
Start: 2017-10-18 | End: 2017-10-18

## 2017-10-18 RX ORDER — ROPINIROLE 0.25 MG/1
0.5 TABLET, FILM COATED ORAL 2 TIMES DAILY
Status: DISCONTINUED | OUTPATIENT
Start: 2017-10-18 | End: 2017-10-20 | Stop reason: HOSPADM

## 2017-10-18 RX ORDER — ONDANSETRON 2 MG/ML
4 INJECTION INTRAMUSCULAR; INTRAVENOUS ONCE
Status: COMPLETED | OUTPATIENT
Start: 2017-10-18 | End: 2017-10-18

## 2017-10-18 RX ORDER — SODIUM CHLORIDE 9 MG/ML
100 INJECTION, SOLUTION INTRAVENOUS CONTINUOUS
Status: DISCONTINUED | OUTPATIENT
Start: 2017-10-18 | End: 2017-10-20 | Stop reason: HOSPADM

## 2017-10-18 RX ORDER — ASPIRIN 81 MG/1
81 TABLET, CHEWABLE ORAL DAILY
Status: DISCONTINUED | OUTPATIENT
Start: 2017-10-19 | End: 2017-10-20 | Stop reason: HOSPADM

## 2017-10-18 RX ORDER — MONTELUKAST SODIUM 10 MG/1
10 TABLET ORAL
Status: DISCONTINUED | OUTPATIENT
Start: 2017-10-18 | End: 2017-10-20 | Stop reason: HOSPADM

## 2017-10-18 RX ORDER — DOCUSATE SODIUM 100 MG/1
100 CAPSULE, LIQUID FILLED ORAL 2 TIMES DAILY
Status: DISCONTINUED | OUTPATIENT
Start: 2017-10-18 | End: 2017-10-20 | Stop reason: HOSPADM

## 2017-10-18 RX ORDER — ACETAMINOPHEN 325 MG/1
650 TABLET ORAL EVERY 6 HOURS PRN
Status: DISCONTINUED | OUTPATIENT
Start: 2017-10-18 | End: 2017-10-20 | Stop reason: HOSPADM

## 2017-10-18 RX ORDER — PRAVASTATIN SODIUM 80 MG/1
80 TABLET ORAL
Status: DISCONTINUED | OUTPATIENT
Start: 2017-10-18 | End: 2017-10-20 | Stop reason: HOSPADM

## 2017-10-18 RX ADMIN — INSULIN LISPRO 4 UNITS: 100 INJECTION, SOLUTION INTRAVENOUS; SUBCUTANEOUS at 22:19

## 2017-10-18 RX ADMIN — SODIUM CHLORIDE 1000 ML: 0.9 INJECTION, SOLUTION INTRAVENOUS at 22:11

## 2017-10-18 RX ADMIN — ONDANSETRON 4 MG: 2 INJECTION INTRAMUSCULAR; INTRAVENOUS at 15:08

## 2017-10-18 RX ADMIN — ROPINIROLE 0.5 MG: 0.25 TABLET, FILM COATED ORAL at 16:30

## 2017-10-18 RX ADMIN — IOHEXOL 50 ML: 240 INJECTION, SOLUTION INTRATHECAL; INTRAVASCULAR; INTRAVENOUS; ORAL at 21:42

## 2017-10-18 RX ADMIN — MONTELUKAST SODIUM 10 MG: 10 TABLET, FILM COATED ORAL at 22:10

## 2017-10-18 RX ADMIN — SODIUM CHLORIDE 1000 ML: 0.9 INJECTION, SOLUTION INTRAVENOUS at 16:30

## 2017-10-18 RX ADMIN — INSULIN DETEMIR 40 UNITS: 100 INJECTION, SOLUTION SUBCUTANEOUS at 22:19

## 2017-10-18 RX ADMIN — PRAVASTATIN SODIUM 80 MG: 80 TABLET ORAL at 22:10

## 2017-10-18 RX ADMIN — ROPINIROLE 0.5 MG: 0.25 TABLET, FILM COATED ORAL at 22:10

## 2017-10-18 RX ADMIN — SODIUM CHLORIDE 100 ML/HR: 0.9 INJECTION, SOLUTION INTRAVENOUS at 22:11

## 2017-10-18 RX ADMIN — FLUTICASONE PROPIONATE AND SALMETEROL 1 PUFF: 50; 250 POWDER RESPIRATORY (INHALATION) at 22:14

## 2017-10-18 NOTE — H&P
History and Physical - [facility] Internal Medicine    Patient Information: Ruth Cavanaugh 67 y o  female MRN: [id number]  Unit/Bed#: -Doc Encounter: [id number]  Admitting Physician: Genaro Sanchez PA-C  PCP: Kylah Reed MD  Date of Admission:  10/18/17    Assessment/Plan:    Hospital Problem List:     Principal Problem:    Symptomatic anemia  Active Problems:    Diabetes mellitus ([place] [address] )    Hyperlipidemia    Asthma    NSTEMI (non-ST elevated myocardial infarction) ([place] [address] )    Lactic acidosis    Pancytopenia ([place] [address] )    Bipolar 1 disorder ([place] [address] )      Plan for the Primary Problem(s):  · Symptomatic anemia-hemoglobin/hematocrit decreased from 9 2-8 8 in 2 days without evidence of active hemorrhaging concerning for gastrointestinal bleeding concerned for mesenteric ischemia given complaint of abdominal pain need to rule out hemolytic anemia possible MDS given all lines have decreased significantly  · Occult stool, iron panel, hemolysis smear, haptoglobin, LDH, LEV  · Obtain CT scan abdomen/pelvis with oral contrast for left lower abdominal tenderness to palpation rule out GI bleeding  · Telemetry, orthostatics BP, continue 1 L normal saline bolus then continue maintenance fluids at IVF normal saline at 125 mL/HR  · Consider hematology/oncology consultation if significant results return    ·   NSTEMI (non-ST elevated myocardial infarction) (HCC)-denies chest pain, shortness of breath no personal history of MI or significant family history  · Obtain echocardiogram  · Trended troponin, telemetry, EKG p r n  chest pain    ·   Lactic acidosis-3 9 persistently elevated prior to admission decreased to 2 9 following 1 L normal saline  · Initiate normal saline bolus 1 L then continue maintenance fluids at 125 mL/HR  · Trend lactic acid discontinue if less than 2 0    ·   Pancytopenia (HCC)-hemoglobin/hematocrit 8 8/29 2 leukopenia 3 42, thrombocytopenia 84,000 no previous hematological disorders no active bleeding at this time need to Patient admits to last fall being 3 months ago without cranial involvement and bilateral lower extremity weakness for which she has been seeing a neurologist [facility] Dr Jacquelin Mancilla who believed ambulation difficulties likely due to diabetic neuropathy following 3 brain MRI negative results  Patient also admits to a remote history of hematuria and was seen by outpatient nephrologist to requested patient to perform a for which she refused  Patient denied recurrence of hematuria dysuria, abdominal pain, buttock easy a, hemoptysis, hematemesis, melena  Patient admits to occasional right flank pain and nausea with a 60 lb weight loss for over 3 months  Patient denies fevers or chills, recent illnesses, shortness of breath, palpitations, chest pain, abdominal pain, nausea vomiting or diarrhea  Patient denies personal history of cancer, MI/arrhythmias, TIA or CVA  Of note, patient was recently seen in the emergency department on 10/16/2017 with hypoglycemia of 45 chest x-ray was performed negative for acute pulmonary disease and head CT was negative for acute intracranial pathology  Patient was also found to have worsening anemia was recommended for admission however patient refused and signed AMA prior to discharge from emergency department  In the emergency department, patient was found to be hemodynamically stable with worsening anemia 8 8/29 7 of thrombocytopenia of 84 and leukopenia of 3 42 elevated  total bilirubin 1 2 initial troponin slightly elevated at 0 15 lactic acid significant for 3 9 and UA positive for blood trace and trace leukocytes INR was also noted to be 1 28  The us patient was admitted to medical-surgical floor for further evaluation for symptomatic anemia    Review of Systems:    Review of Systems   Constitutional: Positive for activity change, appetite change, fatigue and unexpected weight change  Negative for chills, diaphoresis and fever     HENT: Negative for congestion, mouth sores, postnasal drip, rhinorrhea, sinus pressure, sore throat and tinnitus  Eyes: Negative for photophobia, pain, redness and visual disturbance  Respiratory: Negative for cough, choking, chest tightness, shortness of breath and wheezing  Cardiovascular: Negative for chest pain, palpitations and leg swelling  Gastrointestinal: Positive for nausea  Negative for abdominal pain, constipation, diarrhea, rectal pain and vomiting  Genitourinary: Positive for flank pain  Negative for difficulty urinating, dysuria, frequency, hematuria and urgency  Musculoskeletal: Negative for arthralgias, myalgias and neck pain  Skin: Negative for color change, pallor and wound  Neurological: Positive for dizziness, weakness and light-headedness  Negative for tremors, seizures, syncope, facial asymmetry, speech difficulty, numbness and headaches  Psychiatric/Behavioral: Positive for decreased concentration  Negative for confusion  Past Medical and Surgical History:     Past Medical History:   Diagnosis Date    Asthma     Diabetes mellitus ([facility] [address] )     Hyperlipidemia     Neuropathy        Past Surgical History:   Procedure Laterality Date    BACK SURGERY      CHOLECYSTECTOMY      HERNIA REPAIR      TUMOR REMOVAL         Meds/Allergies:    Prior to Admission medications    Medication Sig Start Date End Date Taking?  Authorizing Provider   albuterol (2 5 mg/3 mL) 0 083 % nebulizer solution Take 2 5 mg by nebulization 3 (three) times a day as needed for wheezing    Historical Provider, MD   aspirin 81 MG tablet Take 81 mg by mouth daily    Historical Provider, MD   buPROPion (WELLBUTRIN XL) 300 mg 24 hr tablet Take 300 mg by mouth every morning    Historical Provider, MD   cholecalciferol (VITAMIN D3) 1,000 units tablet Take 1,000 Units by mouth daily    Historical Provider, MD   fluticasone-salmeterol (ADVAIR HFA) 115-21 MCG/ACT inhaler Inhale 2 puffs 2 (two) times a day    Historical Provider, MD   insulin reviewed  No pertinent family history  Physical Exam:     Vitals:   Blood Pressure: 148/66 (10/18/17 1757)  Pulse: 81 (10/18/17 1757)  Temperature: 97 6 °F (36 4 °C) (10/18/17 1757)  Temp Source: Oral (10/18/17 1757)  Respirations: 22 (10/18/17 1757)  Height: 5' (152 4 cm) (10/18/17 1757)  Weight - Scale: 77 5 kg (170 lb 13 7 oz) (10/18/17 1757)  SpO2: 99 % (10/18/17 1757)    Physical Exam   Constitutional: She appears well-developed  No distress  Lying fairly comfortable in bed in no acute distress, obese   HENT:   Head: Normocephalic and atraumatic  Mouth/Throat: Oropharynx is clear and moist  No oropharyngeal exudate  Eyes: Conjunctivae and EOM are normal  Pupils are equal, round, and reactive to light  Right eye exhibits no discharge  Left eye exhibits no discharge  Neck: Normal range of motion  JVD present  No tracheal deviation present  No thyromegaly present  Abdominal: Soft  Bowel sounds are normal  She exhibits no distension and no mass  There is tenderness  There is guarding  There is no rebound  Lymphadenopathy:     She has no cervical adenopathy  Skin: She is not diaphoretic  Additional Data:     Lab Results: I have personally reviewed pertinent reports          Results from last 7 days  Lab Units 10/18/17  1517   WBC Thousand/uL 3 42*   HEMOGLOBIN g/dL 8 8*   HEMATOCRIT % 29 2*   PLATELETS Thousands/uL 84*   NEUTROS PCT % 61   LYMPHS PCT % 24   MONOS PCT % 9   EOS PCT % 4       Results from last 7 days  Lab Units 10/18/17  1517   SODIUM mmol/L 136   POTASSIUM mmol/L 4 0   CHLORIDE mmol/L 102   CO2 mmol/L 22   BUN mg/dL 14   CREATININE mg/dL 0 95   CALCIUM mg/dL 9 2   TOTAL PROTEIN g/dL 6 9   BILIRUBIN TOTAL mg/dL 1 20*   ALK PHOS U/L 72   ALT U/L 42   AST U/L 38   GLUCOSE RANDOM mg/dL 184*       Results from last 7 days  Lab Units 10/18/17  1517   INR  1 28*       Imaging: I have personally reviewed pertinent films in PACS    Xr Chest 2 Views    Result Date: 10/16/2017  Narrative: CHEST INDICATION:  Weakness  COMPARISON:  None VIEWS:  Frontal and lateral projections IMAGES:  2 FINDINGS:     Cardiomediastinal silhouette appears unremarkable  Subsegmental atelectasis is noted in the anterior right mid chest  The lungs are otherwise clear  No pneumothorax or pleural effusion  Visualized osseous structures appear within normal limits for the patient's age  Impression: No active pulmonary disease  Workstation performed: [id number]     Ct Head Wo Contrast    Result Date: 10/16/2017  Narrative: CT BRAIN - WITHOUT CONTRAST INDICATION:  Change in mental status COMPARISON:  None  TECHNIQUE:  CT examination of the brain was performed  In addition to axial images, coronal reformatted images were created and submitted for interpretation  Radiation dose length product (DLP) for this visit:  1022 mGy-cm   This examination, like all CT scans performed in the [facility], was performed utilizing techniques to minimize radiation dose exposure, including the use of iterative reconstruction and automated exposure control  IMAGE QUALITY:  Diagnostic  FINDINGS:  PARENCHYMA:  No intracranial mass, mass effect or midline shift  No CT signs of acute infarction  There is no parenchymal hemorrhage  VENTRICLES AND EXTRA-AXIAL SPACES:  Age-appropriate volume loss is noted  No hydrocephalus  VISUALIZED ORBITS AND PARANASAL SINUSES:  Unremarkable  CALVARIUM AND EXTRACRANIAL SOFT TISSUES:   Normal      Impression: No acute intracranial abnormality  Workstation performed: [id number]       EKG, Pathology, and Other Studies Reviewed on Admission:   · EKG:  Sinus rhythm without ST elevation or depression some T-wave flattening in 3 HR 84  · Chest x-ray 10/16-no acute pulmonary disease  · Head CT 10/16-no acute intracranial abnormalities    Allscripts / Epic Records Reviewed: Yes     ** Please Note: This note has been constructed using a voice recognition system   **

## 2017-10-18 NOTE — ED PROVIDER NOTES
History  Chief Complaint   Patient presents with    Weakness - Generalized     generalized weakness, pt here 2days ago for same but left AMA, pt would like to be admitted today     71-year-old female presents to the emergency department with complaints of weakness  States she has had generalized weakness over the past several days and was seen in the emergency department 2 days ago for the same  States that she sign out against medical advice divot day after being found to have a ear lyubov tract infection  States she has only been taking half of her dose of antibiotics due to nausea  Denies fevers at home  States she had 1 episode of chest pain last night which resolved on its own after a short period time  Denies recurrence of the symptoms  No shortness of breath presently  History provided by:  Patient   used: No        Prior to Admission Medications   Prescriptions Last Dose Informant Patient Reported? Taking?    albuterol (2 5 mg/3 mL) 0 083 % nebulizer solution   Yes No   Sig: Take 2 5 mg by nebulization 3 (three) times a day as needed for wheezing   aspirin 81 MG tablet   Yes No   Sig: Take 81 mg by mouth daily   buPROPion (WELLBUTRIN XL) 300 mg 24 hr tablet   Yes No   Sig: Take 300 mg by mouth every morning   cholecalciferol (VITAMIN D3) 1,000 units tablet   Yes No   Sig: Take 1,000 Units by mouth daily   fluticasone-salmeterol (ADVAIR HFA) 115-21 MCG/ACT inhaler   Yes No   Sig: Inhale 2 puffs 2 (two) times a day   insulin detemir (LEVEMIR) 100 units/mL subcutaneous injection   Yes No   Sig: Inject 40 Units under the skin 2 (two) times a day   insulin lispro (HUMALOG KWIKPEN) 100 Units/mL SOPN   Yes No   Sig: Inject 17 Units under the skin 3 (three) times a day with meals   ipratropium (ATROVENT) 0 02 % nebulizer solution   Yes No   Sig: Take 0 5 mg by nebulization every 6 (six) hours as needed for wheezing or shortness of breath   lisinopril (ZESTRIL) 2 5 mg tablet   Yes No Sig: Take 2 5 mg by mouth every morning   metFORMIN (GLUCOPHAGE) 1000 MG tablet   Yes No   Sig: Take 1,000 mg by mouth 2 (two) times a day with meals   montelukast (SINGULAIR) 10 mg tablet   Yes No   Sig: Take 10 mg by mouth daily at bedtime   pantoprazole (PROTONIX) 40 mg tablet   Yes No   Sig: Take 40 mg by mouth daily   rOPINIRole (REQUIP) 0 5 mg tablet   Yes No   Sig: Take 0 5 mg by mouth 2 (two) times a day   simvastatin (ZOCOR) 40 mg tablet   Yes No   Sig: Take 40 mg by mouth daily at bedtime   sulfamethoxazole-trimethoprim (BACTRIM DS) 800-160 mg per tablet   No No   Sig: Take 1 tablet by mouth 2 (two) times a day for 7 days      Facility-Administered Medications: None       Past Medical History:   Diagnosis Date    Asthma     Diabetes mellitus ([facility] [address] )     Hyperlipidemia     Neuropathy        Past Surgical History:   Procedure Laterality Date    BACK SURGERY      CHOLECYSTECTOMY      HERNIA REPAIR      TUMOR REMOVAL         History reviewed  No pertinent family history  I have reviewed and agree with the history as documented  Social History   Substance Use Topics    Smoking status: Never Smoker    Smokeless tobacco: Never Used    Alcohol use No        Review of Systems   Constitutional: Negative for activity change, appetite change, chills and fever  HENT: Negative for congestion, dental problem, drooling, ear discharge, ear pain, mouth sores, nosebleeds, rhinorrhea, sore throat and trouble swallowing  Eyes: Negative for pain, discharge and itching  Respiratory: Negative for cough, chest tightness, shortness of breath and wheezing  Cardiovascular: Negative for chest pain and palpitations  Gastrointestinal: Negative for abdominal pain, blood in stool, constipation, diarrhea, nausea and vomiting  Endocrine: Negative for cold intolerance and heat intolerance  Genitourinary: Negative for difficulty urinating, dysuria, flank pain, frequency and urgency     Skin: Negative for rash and wound  Allergic/Immunologic: Negative for food allergies and immunocompromised state  Neurological: Positive for weakness  Negative for dizziness, seizures, syncope, numbness and headaches  Psychiatric/Behavioral: Negative for agitation, behavioral problems and confusion  Physical Exam  ED Triage Vitals   Temperature Pulse Respirations Blood Pressure SpO2   10/18/17 1505 10/18/17 1431 10/18/17 1431 10/18/17 1431 10/18/17 1431   98 6 °F (37 °C) 85 20 136/64 97 %      Temp Source Heart Rate Source Patient Position - Orthostatic VS BP Location FiO2 (%)   10/18/17 1505 10/18/17 1431 10/18/17 1431 10/18/17 1431 --   Oral Monitor Lying Right arm       Pain Score       10/18/17 1431       No Pain           Physical Exam   Constitutional: She is oriented to person, place, and time  She appears well-developed and well-nourished  No distress  HENT:   Head: Normocephalic and atraumatic  Right Ear: External ear normal    Left Ear: External ear normal    Mouth/Throat: Oropharynx is clear and moist  No oropharyngeal exudate  Eyes: Conjunctivae and EOM are normal  Pupils are equal, round, and reactive to light  Neck: No JVD present  No tracheal deviation present  Cardiovascular: Normal rate and regular rhythm  Exam reveals no gallop and no friction rub  Murmur heard  Pulmonary/Chest: Effort normal and breath sounds normal  No respiratory distress  She has no wheezes  She has no rales  She exhibits no tenderness  Abdominal: Soft  Bowel sounds are normal  She exhibits no distension  There is no tenderness  There is no guarding  Genitourinary: Rectal exam shows guaiac negative stool  Musculoskeletal: Normal range of motion  She exhibits no edema, tenderness or deformity  1-2+ edema in the lower extremities bilaterally  Lymphadenopathy:     She has no cervical adenopathy  Neurological: She is alert and oriented to person, place, and time  Skin: Skin is warm and dry  No rash noted   She is not diaphoretic  No erythema  Psychiatric: She has a normal mood and affect  Her behavior is normal    Nursing note and vitals reviewed        ED Medications  Medications   insulin lispro (HumaLOG) 100 units/mL subcutaneous injection 17 Units (not administered)   insulin detemir (LEVEMIR) subcutaneous injection 40 Units (40 Units Subcutaneous Given 10/18/17 2219)   pantoprazole (PROTONIX) EC tablet 40 mg (not administered)   buPROPion (WELLBUTRIN XL) 24 hr tablet 300 mg (not administered)   aspirin chewable tablet 81 mg (not administered)   rOPINIRole (REQUIP) tablet 0 5 mg (0 5 mg Oral Given 10/18/17 2210)   pravastatin (PRAVACHOL) tablet 80 mg (80 mg Oral Given 10/18/17 2210)   montelukast (SINGULAIR) tablet 10 mg (10 mg Oral Given 10/18/17 2210)   cholecalciferol (VITAMIN D3) tablet 1,000 Units (not administered)   lisinopril (ZESTRIL) tablet 2 5 mg (not administered)   sodium chloride 0 9 % infusion (100 mL/hr Intravenous New Bag 10/18/17 2211)   docusate sodium (COLACE) capsule 100 mg (100 mg Oral Not Given 10/18/17 2210)   ondansetron (ZOFRAN) injection 4 mg (not administered)   calcium carbonate (TUMS) chewable tablet 1,000 mg (not administered)   insulin lispro (HumaLOG) 100 units/mL subcutaneous injection 1-6 Units (4 Units Subcutaneous Given 10/18/17 2219)   acetaminophen (TYLENOL) tablet 650 mg (not administered)   sodium chloride 0 9 % bolus 1,000 mL (1,000 mL Intravenous New Bag 10/18/17 2211)   albuterol inhalation solution 2 5 mg (not administered)   fluticasone-salmeterol (ADVAIR) 250-50 mcg/dose inhaler 1 puff (1 puff Inhalation Given 10/18/17 2214)   ondansetron (ZOFRAN) injection 4 mg (4 mg Intravenous Given 10/18/17 1508)   sodium chloride 0 9 % bolus 1,000 mL (1,000 mL Intravenous New Bag 10/18/17 1630)   rOPINIRole (REQUIP) tablet 0 5 mg (0 5 mg Oral Given 10/18/17 1630)   iohexol (OMNIPAQUE) 240 MG/ML solution 50 mL (50 mL Oral Given 10/18/17 5287)       Diagnostic Studies  Labs Reviewed   CBC AND DIFFERENTIAL - Abnormal        Result Value Ref Range Status    WBC 3 42 (*) 4 31 - 10 16 Thousand/uL Final    RBC 3 41 (*) 3 81 - 5 12 Million/uL Final    Hemoglobin 8 8 (*) 11 5 - 15 4 g/dL Final    Hematocrit 29 2 (*) 34 8 - 46 1 % Final    MCH 25 8 (*) 26 8 - 34 3 pg Final    MCHC 30 1 (*) 31 4 - 37 4 g/dL Final    RDW 18 2 (*) 11 6 - 15 1 % Final    Platelets 84 (*) 128 - 390 Thousands/uL Final    Basophils Relative 2 (*) 0 - 1 % Final    MCV 86  82 - 98 fL Final    MPV 12 3  8 9 - 12 7 fL Final    Neutrophils Relative 61  43 - 75 % Final    Lymphocytes Relative 24  14 - 44 % Final    Monocytes Relative 9  4 - 12 % Final    Eosinophils Relative 4  0 - 6 % Final    Neutrophils Absolute 2 09  1 85 - 7 62 Thousands/µL Final    Lymphocytes Absolute 0 82  0 60 - 4 47 Thousands/µL Final    Monocytes Absolute 0 31  0 17 - 1 22 Thousand/µL Final    Eosinophils Absolute 0 14  0 00 - 0 61 Thousand/µL Final    Basophils Absolute 0 06  0 00 - 0 10 Thousands/µL Final   COMPREHENSIVE METABOLIC PANEL - Abnormal     Glucose 184 (*) 65 - 140 mg/dL Final    Comment:   If the patient is fasting, the ADA then defines impaired fasting glucose as > 100 mg/dL and diabetes as > or equal to 123 mg/dL  Specimen collection should occur prior to Sulfasalazine administration due to the potential for falsely depressed results  Specimen collection should occur prior to Sulfapyridine administration due to the potential for falsely elevated results      Albumin 3 0 (*) 3 5 - 5 0 g/dL Final    Total Bilirubin 1 20 (*) 0 20 - 1 00 mg/dL Final    Comment: Verified by Repeat Analysis    Sodium 136  136 - 145 mmol/L Final    Potassium 4 0  3 5 - 5 3 mmol/L Final    Chloride 102  100 - 108 mmol/L Final    CO2 22  21 - 32 mmol/L Final    Anion Gap 12  4 - 13 mmol/L Final    BUN 14  5 - 25 mg/dL Final    Creatinine 0 95  0 60 - 1 30 mg/dL Final    Comment: Standardized to IDMS reference method    Calcium 9 2  8 3 - 10 1 mg/dL Final    AST 38  5 - 45 U/L Final    Comment:   Specimen collection should occur prior to Sulfasalazine administration due to the potential for falsely depressed results  ALT 42  12 - 78 U/L Final    Comment:   Specimen collection should occur prior to Sulfasalazine administration due to the potential for falsely depressed results  Alkaline Phosphatase 72  46 - 116 U/L Final    Total Protein 6 9  6 4 - 8 2 g/dL Final    eGFR 60  ml/min/1 73sq m Final    Narrative:     National Kidney Disease Education Program recommendations are as follows:  GFR calculation is accurate only with a steady state creatinine  Chronic Kidney disease less than 60 ml/min/1 73 sq  meters  Kidney failure less than 15 ml/min/1 73 sq  meters  TROPONIN I - Abnormal     Troponin I 0 15 (*) <=0 04 ng/mL Final    Comment:  Results indicate test should be repeated on new specimen collected within 4-6 hours of the original     Narrative:     [employer] Chemistry analyzer 99% cutoff is > 0 04 ng/mL in network labs    o cTnI 99% cutoff is useful only when applied to patients in the clinical setting of myocardial ischemia  o cTnI 99% cutoff should be interpreted in the context of clinical history, ECG findings and possibly cardiac imaging to establish correct diagnosis  o cTnI 99% cutoff may be suggestive but clearly not indicative of a coronary event without the clinical setting of myocardial ischemia  NT-BNP PRO (BRAIN NATRIURETIC PEPTIDE) - Abnormal     NT-proBNP 231 (*) <125 pg/mL Final   PROTIME-INR - Abnormal     Protime 16 4 (*) 12 1 - 14 4 seconds Final    INR 1 28 (*) 0 86 - 1 16 Final   APTT - Abnormal     PTT 39 (*) 23 - 35 seconds Final    Narrative: Therapeutic Heparin Range = 60-90 seconds   LACTIC ACID, PLASMA - Abnormal     LACTIC ACID 3 9 (*) 0 5 - 2 0 mmol/L Final    Narrative:     Result may be elevated if tourniquet was used during collection     UA W REFLEX TO MICROSCOPIC WITH REFLEX TO CULTURE - Abnormal     Leukocytes, UA Trace (*) Negative Final    Blood, UA Trace-Intact (*) Negative Final    Color, UA Yellow   Final    Clarity, UA Clear   Final    Specific Gravity, UA <=1 005  1 003 - 1 030 Final    pH, UA 7 0  4 5 - 8 0 Final    Nitrite, UA Negative  Negative Final    Protein, UA Negative  Negative mg/dl Final    Glucose, UA Negative  Negative mg/dl Final    Ketones, UA Negative  Negative mg/dl Final    Urobilinogen, UA 1 0  0 2, 1 0 E U /dl E U /dl Final    Bilirubin, UA Negative  Negative Final   URINE MICROSCOPIC - Abnormal     WBC, UA 1-2 (*) None Seen, 0-5, 5-55, 5-65 /hpf Final    RBC, UA None Seen  None Seen, 0-5 /hpf Final    Epithelial Cells Occasional  None Seen, Occasional /hpf Final    Bacteria, UA None Seen  None Seen, Occasional /hpf Final   POCT URINALYSIS DIPSTICK - Abnormal     Color, UA yellow   Final    Clarity, UA clear   Final    EXT Glucose, UA neg   Final    EXT Bilirubin, UA (Ref: Negative) neg   Final    EXT Ketones, UA (Ref: Negative) neg   Final    EXT Spec Grav, UA 1 000   Final    EXT Blood, UA (Ref: Negative) hemolyzed trace   Final    EXT pH, UA 8 0   Final    EXT Protein, UA (Ref: Negative) neg   Final    EXT Urobilinogen, UA (Ref: 0 2- 1 0) neg   Final    EXT Leukocytes, UA (Ref: Negative) trace   Final    EXT Nitrite, UA (Ref: Negative) neg   Final   POCT GLUCOSE - Abnormal     POC Glucose 219 (*) 65 - 140 mg/dl Final   BLOOD CULTURE   BLOOD CULTURE       CT abdomen pelvis wo contrast    (Results Pending)       Procedures  ECG 12 Lead Documentation  Date/Time: 10/18/2017 4:21 PM  Performed by: Manuelito Dyson  Authorized by: Idalia Flaherty     Indications / Diagnosis:  Weaknesss  ECG reviewed by me, the ED Provider: yes    Patient location:  ED  Previous ECG:     Previous ECG:  Compared to current    Comparison ECG info:  10/16/17  Interpretation:     Interpretation: normal    Rate:     ECG rate:  84    ECG rate assessment: normal    Rhythm:     Rhythm: sinus rhythm    Ectopy:     Ectopy: none    QRS: QRS axis:  Normal    QRS intervals:  Normal  Conduction:     Conduction: abnormal      Abnormal conduction comment:  Sinus arrythmia  ST segments:     ST segments:  Normal  T waves:     T waves: normal              Phone Contacts  ED Phone Contact    ED Course  ED Course                          Initial Sepsis Screening     [address] Name 10/18/17 9590                Is the patient's history suggestive of a new or worsening infection? (!)  Yes (Proceed)  -GR        Suspected source of infection urinary tract infection  -GR        Are two or more of the following signs & symptoms of infection both present and new to the patient? No  -GR        Indicate SIRS criteria Leukopenia (WBC < 4000 IJL)  -GR        If the answer is yes to both questions, suspicion of sepsis is present          If severe sepsis is present AND tissue hypoperfusion perists in the hour after fluid resuscitation or lactate > 4, the patient meets criteria for SEPTIC SHOCK          Are any of the following organ dysfunction criteria present within 6 hours of suspected infection and SIRS criteria that are NOT considered to be chronic conditions?         Organ dysfunction          Date of presentation of severe sepsis          Time of presentation of severe sepsis          Tissue hypoperfusion persists in the hour after crystalloid fluid administration, evidenced, by either:          Was hypotension present within one hour of the conclusion of crystalloid fluid administration?           Date of presentation of septic shock          Time of presentation of septic shock            User Key  (r) = Recorded By, (t) = Taken By, (c) = Cosigned By    Initials Name Provider Type    Robert Barros PA-C Physician Assistant                  MDM  Number of Diagnoses or Management Options  Elevated troponin:   Urinary tract infection:   Weakness generalized:   Diagnosis management comments: Differential diagnosis includes but not limited to:  Urinary tract infection, electrolyte abnormality, dysrhythmia, cardiomegaly, acute coronary syndrome, congestive heart failure  Doubt sepsis  Elevated lactic acid possibly due to metformin  Amount and/or Complexity of Data Reviewed  Clinical lab tests: ordered and reviewed  Tests in the radiology section of CPT®: reviewed (CT of the head and x-ray of the chest reviewed from 10/16/2017 )  Review and summarize past medical records: yes  Discuss the patient with other providers: yes      CritCare Time    Disposition  Final diagnoses:   Weakness generalized   Elevated troponin   Urinary tract infection     ED Disposition     ED Disposition Condition Comment    Admit  Case was discussed with JAKE and the patient's admission status was agreed to be Admission Status: inpatient status to the service of Dr Valeria Camarillo           Follow-up Information    None       Current Discharge Medication List      CONTINUE these medications which have NOT CHANGED    Details   albuterol (2 5 mg/3 mL) 0 083 % nebulizer solution Take 2 5 mg by nebulization 3 (three) times a day as needed for wheezing      aspirin 81 MG tablet Take 81 mg by mouth daily      buPROPion (WELLBUTRIN XL) 300 mg 24 hr tablet Take 300 mg by mouth every morning      cholecalciferol (VITAMIN D3) 1,000 units tablet Take 1,000 Units by mouth daily      fluticasone-salmeterol (ADVAIR HFA) 115-21 MCG/ACT inhaler Inhale 2 puffs 2 (two) times a day      insulin detemir (LEVEMIR) 100 units/mL subcutaneous injection Inject 40 Units under the skin 2 (two) times a day      insulin lispro (HUMALOG KWIKPEN) 100 Units/mL SOPN Inject 17 Units under the skin 3 (three) times a day with meals      ipratropium (ATROVENT) 0 02 % nebulizer solution Take 0 5 mg by nebulization every 6 (six) hours as needed for wheezing or shortness of breath      lisinopril (ZESTRIL) 2 5 mg tablet Take 2 5 mg by mouth every morning      metFORMIN (GLUCOPHAGE) 1000 MG tablet Take 1,000 mg by mouth 2 (two) times a day with meals      montelukast (SINGULAIR) 10 mg tablet Take 10 mg by mouth daily at bedtime      pantoprazole (PROTONIX) 40 mg tablet Take 40 mg by mouth daily      rOPINIRole (REQUIP) 0 5 mg tablet Take 0 5 mg by mouth 2 (two) times a day      simvastatin (ZOCOR) 40 mg tablet Take 40 mg by mouth daily at bedtime      sulfamethoxazole-trimethoprim (BACTRIM DS) 800-160 mg per tablet Take 1 tablet by mouth 2 (two) times a day for 7 days  Qty: 14 tablet, Refills: 0           No discharge procedures on file      ED Provider  Electronically Signed by       Niko Adame PA-C  10/18/17 6815

## 2017-10-19 ENCOUNTER — APPOINTMENT (INPATIENT)
Dept: NON INVASIVE DIAGNOSTICS | Facility: HOSPITAL | Age: 72
DRG: 811 | End: 2017-10-19
Payer: COMMERCIAL

## 2017-10-19 PROBLEM — K74.60 CIRRHOSIS OF LIVER (HCC): Chronic | Status: ACTIVE | Noted: 2017-10-19

## 2017-10-19 LAB
ALBUMIN SERPL BCP-MCNC: 2.4 G/DL (ref 3.5–5)
ALP SERPL-CCNC: 60 U/L (ref 46–116)
ALT SERPL W P-5'-P-CCNC: 37 U/L (ref 12–78)
ANION GAP SERPL CALCULATED.3IONS-SCNC: 7 MMOL/L (ref 4–13)
AST SERPL W P-5'-P-CCNC: 33 U/L (ref 5–45)
ATRIAL RATE: 77 BPM
ATRIAL RATE: 84 BPM
ATRIAL RATE: 87 BPM
BASOPHILS # BLD AUTO: 0.05 THOUSANDS/ΜL (ref 0–0.1)
BASOPHILS NFR BLD AUTO: 2 % (ref 0–1)
BILIRUB SERPL-MCNC: 0.7 MG/DL (ref 0.2–1)
BUN SERPL-MCNC: 13 MG/DL (ref 5–25)
CALCIUM SERPL-MCNC: 7.9 MG/DL (ref 8.3–10.1)
CHLORIDE SERPL-SCNC: 110 MMOL/L (ref 100–108)
CO2 SERPL-SCNC: 22 MMOL/L (ref 21–32)
CREAT SERPL-MCNC: 0.71 MG/DL (ref 0.6–1.3)
EOSINOPHIL # BLD AUTO: 0.18 THOUSAND/ΜL (ref 0–0.61)
EOSINOPHIL NFR BLD AUTO: 6 % (ref 0–6)
ERYTHROCYTE [DISTWIDTH] IN BLOOD BY AUTOMATED COUNT: 18.4 % (ref 11.6–15.1)
EST. AVERAGE GLUCOSE BLD GHB EST-MCNC: 148 MG/DL
FERRITIN SERPL-MCNC: 8 NG/ML (ref 8–388)
GFR SERPL CREATININE-BSD FRML MDRD: 85 ML/MIN/1.73SQ M
GLUCOSE SERPL-MCNC: 185 MG/DL (ref 65–140)
GLUCOSE SERPL-MCNC: 188 MG/DL (ref 65–140)
GLUCOSE SERPL-MCNC: 196 MG/DL (ref 65–140)
GLUCOSE SERPL-MCNC: 205 MG/DL (ref 65–140)
GLUCOSE SERPL-MCNC: 265 MG/DL (ref 65–140)
GLUCOSE SERPL-MCNC: 329 MG/DL (ref 65–140)
HBA1C MFR BLD: 6.8 % (ref 4.2–6.3)
HCT VFR BLD AUTO: 25.1 % (ref 34.8–46.1)
HGB BLD-MCNC: 7.6 G/DL (ref 11.5–15.4)
IRON SATN MFR SERPL: 8 %
IRON SERPL-MCNC: 29 UG/DL (ref 50–170)
LACTATE SERPL-SCNC: 1.7 MMOL/L (ref 0.5–2)
LYMPHOCYTES # BLD AUTO: 0.69 THOUSANDS/ΜL (ref 0.6–4.47)
LYMPHOCYTES NFR BLD AUTO: 24 % (ref 14–44)
MAGNESIUM SERPL-MCNC: 1.6 MG/DL (ref 1.6–2.6)
MCH RBC QN AUTO: 26.2 PG (ref 26.8–34.3)
MCHC RBC AUTO-ENTMCNC: 30.3 G/DL (ref 31.4–37.4)
MCV RBC AUTO: 87 FL (ref 82–98)
MONOCYTES # BLD AUTO: 0.33 THOUSAND/ΜL (ref 0.17–1.22)
MONOCYTES NFR BLD AUTO: 11 % (ref 4–12)
NEUTROPHILS # BLD AUTO: 1.65 THOUSANDS/ΜL (ref 1.85–7.62)
NEUTS SEG NFR BLD AUTO: 57 % (ref 43–75)
P AXIS: 40 DEGREES
P AXIS: 46 DEGREES
P AXIS: 65 DEGREES
PLATELET # BLD AUTO: 76 THOUSANDS/UL (ref 149–390)
PMV BLD AUTO: 12.3 FL (ref 8.9–12.7)
POTASSIUM SERPL-SCNC: 3.8 MMOL/L (ref 3.5–5.3)
PR INTERVAL: 162 MS
PR INTERVAL: 164 MS
PR INTERVAL: 170 MS
PROT SERPL-MCNC: 5.8 G/DL (ref 6.4–8.2)
QRS AXIS: 41 DEGREES
QRS AXIS: 47 DEGREES
QRS AXIS: 55 DEGREES
QRSD INTERVAL: 78 MS
QRSD INTERVAL: 82 MS
QRSD INTERVAL: 86 MS
QT INTERVAL: 370 MS
QT INTERVAL: 374 MS
QT INTERVAL: 380 MS
QTC INTERVAL: 418 MS
QTC INTERVAL: 441 MS
QTC INTERVAL: 457 MS
RBC # BLD AUTO: 2.9 MILLION/UL (ref 3.81–5.12)
SODIUM SERPL-SCNC: 139 MMOL/L (ref 136–145)
T WAVE AXIS: 42 DEGREES
T WAVE AXIS: 48 DEGREES
T WAVE AXIS: 68 DEGREES
TIBC SERPL-MCNC: 366 UG/DL (ref 250–450)
VENTRICULAR RATE: 77 BPM
VENTRICULAR RATE: 84 BPM
VENTRICULAR RATE: 87 BPM
WBC # BLD AUTO: 2.9 THOUSAND/UL (ref 4.31–10.16)

## 2017-10-19 PROCEDURE — 86705 HEP B CORE ANTIBODY IGM: CPT | Performed by: PHYSICIAN ASSISTANT

## 2017-10-19 PROCEDURE — 83550 IRON BINDING TEST: CPT | Performed by: PHYSICIAN ASSISTANT

## 2017-10-19 PROCEDURE — 83036 HEMOGLOBIN GLYCOSYLATED A1C: CPT | Performed by: PHYSICIAN ASSISTANT

## 2017-10-19 PROCEDURE — 85025 COMPLETE CBC W/AUTO DIFF WBC: CPT | Performed by: PHYSICIAN ASSISTANT

## 2017-10-19 PROCEDURE — 83605 ASSAY OF LACTIC ACID: CPT | Performed by: PHYSICIAN ASSISTANT

## 2017-10-19 PROCEDURE — 83540 ASSAY OF IRON: CPT | Performed by: PHYSICIAN ASSISTANT

## 2017-10-19 PROCEDURE — 86803 HEPATITIS C AB TEST: CPT | Performed by: PHYSICIAN ASSISTANT

## 2017-10-19 PROCEDURE — 82948 REAGENT STRIP/BLOOD GLUCOSE: CPT

## 2017-10-19 PROCEDURE — 80053 COMPREHEN METABOLIC PANEL: CPT | Performed by: PHYSICIAN ASSISTANT

## 2017-10-19 PROCEDURE — 82607 VITAMIN B-12: CPT | Performed by: PHYSICIAN ASSISTANT

## 2017-10-19 PROCEDURE — 83735 ASSAY OF MAGNESIUM: CPT | Performed by: PHYSICIAN ASSISTANT

## 2017-10-19 PROCEDURE — 87340 HEPATITIS B SURFACE AG IA: CPT | Performed by: PHYSICIAN ASSISTANT

## 2017-10-19 PROCEDURE — 82728 ASSAY OF FERRITIN: CPT | Performed by: PHYSICIAN ASSISTANT

## 2017-10-19 PROCEDURE — 86704 HEP B CORE ANTIBODY TOTAL: CPT | Performed by: PHYSICIAN ASSISTANT

## 2017-10-19 RX ORDER — DICYCLOMINE HYDROCHLORIDE 10 MG/1
10 CAPSULE ORAL
Status: DISCONTINUED | OUTPATIENT
Start: 2017-10-19 | End: 2017-10-20 | Stop reason: HOSPADM

## 2017-10-19 RX ORDER — FERROUS SULFATE 325(65) MG
325 TABLET ORAL 2 TIMES DAILY WITH MEALS
Status: DISCONTINUED | OUTPATIENT
Start: 2017-10-19 | End: 2017-10-20 | Stop reason: HOSPADM

## 2017-10-19 RX ADMIN — DICYCLOMINE HYDROCHLORIDE 10 MG: 10 CAPSULE ORAL at 16:44

## 2017-10-19 RX ADMIN — ROPINIROLE 0.5 MG: 0.25 TABLET, FILM COATED ORAL at 16:44

## 2017-10-19 RX ADMIN — INSULIN LISPRO 1 UNITS: 100 INJECTION, SOLUTION INTRAVENOUS; SUBCUTANEOUS at 08:15

## 2017-10-19 RX ADMIN — INSULIN DETEMIR 40 UNITS: 100 INJECTION, SOLUTION SUBCUTANEOUS at 21:10

## 2017-10-19 RX ADMIN — ROPINIROLE 0.5 MG: 0.25 TABLET, FILM COATED ORAL at 08:13

## 2017-10-19 RX ADMIN — INSULIN LISPRO 1 UNITS: 100 INJECTION, SOLUTION INTRAVENOUS; SUBCUTANEOUS at 21:11

## 2017-10-19 RX ADMIN — BUPROPION HYDROCHLORIDE 300 MG: 150 TABLET, FILM COATED, EXTENDED RELEASE ORAL at 08:13

## 2017-10-19 RX ADMIN — DOCUSATE SODIUM 100 MG: 100 CAPSULE, LIQUID FILLED ORAL at 16:44

## 2017-10-19 RX ADMIN — FLUTICASONE PROPIONATE AND SALMETEROL 1 PUFF: 50; 250 POWDER RESPIRATORY (INHALATION) at 21:12

## 2017-10-19 RX ADMIN — INSULIN DETEMIR 40 UNITS: 100 INJECTION, SOLUTION SUBCUTANEOUS at 08:15

## 2017-10-19 RX ADMIN — CHOLECALCIFEROL TAB 25 MCG (1000 UNIT) 1000 UNITS: 25 TAB at 08:14

## 2017-10-19 RX ADMIN — INSULIN LISPRO 17 UNITS: 100 INJECTION, SOLUTION INTRAVENOUS; SUBCUTANEOUS at 13:05

## 2017-10-19 RX ADMIN — MONTELUKAST SODIUM 10 MG: 10 TABLET, FILM COATED ORAL at 21:11

## 2017-10-19 RX ADMIN — INSULIN LISPRO 17 UNITS: 100 INJECTION, SOLUTION INTRAVENOUS; SUBCUTANEOUS at 16:45

## 2017-10-19 RX ADMIN — INSULIN LISPRO 2 UNITS: 100 INJECTION, SOLUTION INTRAVENOUS; SUBCUTANEOUS at 13:05

## 2017-10-19 RX ADMIN — ASPIRIN 81 MG 81 MG: 81 TABLET ORAL at 08:14

## 2017-10-19 RX ADMIN — PRAVASTATIN SODIUM 80 MG: 80 TABLET ORAL at 16:44

## 2017-10-19 RX ADMIN — PANTOPRAZOLE SODIUM 40 MG: 40 TABLET, DELAYED RELEASE ORAL at 08:14

## 2017-10-19 RX ADMIN — LISINOPRIL 2.5 MG: 2.5 TABLET ORAL at 08:13

## 2017-10-19 RX ADMIN — INSULIN LISPRO 17 UNITS: 100 INJECTION, SOLUTION INTRAVENOUS; SUBCUTANEOUS at 08:14

## 2017-10-19 RX ADMIN — INSULIN LISPRO 3 UNITS: 100 INJECTION, SOLUTION INTRAVENOUS; SUBCUTANEOUS at 16:45

## 2017-10-19 RX ADMIN — FLUTICASONE PROPIONATE AND SALMETEROL 1 PUFF: 50; 250 POWDER RESPIRATORY (INHALATION) at 08:14

## 2017-10-19 RX ADMIN — FERROUS SULFATE TAB 325 MG (65 MG ELEMENTAL FE) 325 MG: 325 (65 FE) TAB at 16:44

## 2017-10-19 NOTE — CASE MANAGEMENT
Initial Clinical Review    Admission: Date/Time/Statement: 10/18/17 @ 1709     Orders Placed This Encounter   Procedures    Inpatient Admission (expected length of stay for this patient is greater than two midnights)     Standing Status:   Standing     Number of Occurrences:   1     Order Specific Question:   Admitting Physician     Answer:   Candice Edward [[id number]]     Order Specific Question:   Level of Care     Answer:   Med Surg [[id number]]     Order Specific Question:   Estimated length of stay     Answer:   More than 2 Midnights     Order Specific Question:   Certification     Answer:   I certify that inpatient services are medically necessary for this patient for a duration of greater than two midnights  See H&P and MD Progress Notes for additional information about the patient's course of treatment  ED: Date/Time/Mode of Arrival:   ED Arrival Information     Expected Arrival Acuity Means of Arrival Escorted By Service Admission Type    - 10/18/2017 14:01 Urgent Ambulance [facility] General Medicine Urgent    Arrival Complaint    weakness          Chief Complaint:   Chief Complaint   Patient presents with    Weakness - Generalized     generalized weakness, pt here 2days ago for same but left AMA, pt would like to be admitted today       History of Illness: 50-year-old female presents to the emergency department with complaints of weakness  States she has had generalized weakness over the past several days and was seen in the emergency department 2 days ago for the same  States that she sign out against medical advice 2 days after being found to have a ear lyubov tract infection  States she has only been taking half of her dose of antibiotics due to nausea  Denies fevers at home  States she had 1 episode of chest pain last night which resolved on its own after a short period time  Denies recurrence of the symptoms   No shortness of breath presently        ED Vital Signs:   ED Triage Vitals Temperature Pulse Respirations Blood Pressure SpO2   10/18/17 1505 10/18/17 1431 10/18/17 1431 10/18/17 1431 10/18/17 1431   98 6 °F (37 °C) 85 20 136/64 97 %      Temp Source Heart Rate Source Patient Position - Orthostatic VS BP Location FiO2 (%)   10/18/17 1505 10/18/17 1431 10/18/17 1431 10/18/17 1431 --   Oral Monitor Lying Right arm       Pain Score       10/18/17 1431       No Pain        Wt Readings from Last 1 Encounters:   10/18/17 77 5 kg (170 lb 13 7 oz)     Abnormal Labs/Diagnostic Test Results: Lactic Acid 2 9, Troponin 0 15  WBC Thousand/uL 3 42*   HEMOGLOBIN g/dL 8 8*   HEMATOCRIT % 29 2*   PLATELETS Thousands/uL 84*     BILIRUBIN TOTAL mg/dL 1 20*   GLUCOSE RANDOM mg/dL 184*     UA w Reflex to Microscopic w Reflex to Culture [[id number]] (Abnormal)   Lab Status: Final result   10/18/2017 Specimen: Urine from Urine, Clean Catch    Color, UA   yellow    Clarity, UA   clear    Specific Gravity, UA <=1 005 1 003 - 1 030    pH, UA 7 0 4 5 - 8 0    Leukocytes, UA Trace (A) Negative    Nitrite, UA Negative Negative    Protein, UA Negative Negative mg/dl    Glucose, UA Negative Negative mg/dl    Ketones, UA Negative Negative mg/dl    Urobilinogen, UA 1 0 0 2, 1 0 E U /dl E U /dl    Bilirubin, UA Negative Negative    Blood, UA Trace-Intact (A) Negative   Urine Microscopic [[id number]] (Abnormal)   Lab Status: Final result Specimen: Urine from Urine, Clean Catch    RBC, UA None Seen None Seen, 0-5 /hpf    WBC, UA 1-2 (A) None Seen, 0-5, 5-55, 5-65 /hpf    Epithelial Cells Occasional None Seen, Occasional /hpf    Bacteria, UA None Seen          ED Treatment:   Medication Administration from 10/18/2017 1401 to 10/18/2017 1757       Date/Time Order Dose Route Action Action by Comments     10/18/2017 1508 ondansetron (ZOFRAN) injection 4 mg 4 mg Intravenous Given Laura Hoang RN      10/18/2017 1630 sodium chloride 0 9 % bolus 1,000 mL 1,000 mL Intravenous New Bag Spencer Lazo RN      10/18/2017 4997 rOPINIRole (REQUIP) tablet 0 5 mg 0 5 mg Oral Given Bulmaro Arriaza RN         Physical Exam   Constitutional: She is oriented to person, place, and time  She appears well-developed and well-nourished  No distress  Cardiovascular: Normal rate and regular rhythm  Exam reveals no gallop and no friction rub  Murmur heard  Pulmonary/Chest: Effort normal and breath sounds normal  No respiratory distress  She has no wheezes  She has no rales  She exhibits no tenderness  Musculoskeletal: Normal range of motion  She exhibits no edema, tenderness or deformity  1-2+ edema in the lower extremities bilaterally  Past Medical/Surgical History: Active Ambulatory Problems     Diagnosis Date Noted    No Active Ambulatory Problems     Resolved Ambulatory Problems     Diagnosis Date Noted    No Resolved Ambulatory Problems     Past Medical History:   Diagnosis Date    Asthma     Diabetes mellitus ([facility] )     Hyperlipidemia     Neuropathy        Admitting Diagnosis: Weakness generalized [R53 1]  Weakness [R53 1]  Urinary tract infection [N39 0]  Elevated troponin [R74 8]    Age/Sex: 67 y o  female    Assessment/Plan:     Hospital Problem List:      Principal Problem:    Symptomatic anemia  Active Problems:    Diabetes mellitus ([facility] )    Hyperlipidemia    Asthma    NSTEMI (non-ST elevated myocardial infarction) ([facility])    Lactic acidosis    Pancytopenia ([facility])    Bipolar 1 disorder ([facility])        Plan for the Primary Problem(s):  · Symptomatic anemia-hemoglobin/hematocrit decreased from 9 2-8 8 in 2 days without evidence of active hemorrhaging concerning for gastrointestinal bleeding concerned for mesenteric ischemia given complaint of abdominal pain need to rule out hemolytic anemia possible MDS given all lines have decreased significantly  ? Occult stool, iron panel, hemolysis smear, haptoglobin, LDH, LEV  ?  Obtain CT scan abdomen/pelvis with oral contrast for left lower abdominal tenderness to palpation rule out GI bleeding  ? Telemetry, orthostatics BP, continue 1 L normal saline bolus then continue maintenance fluids at IVF normal saline at 125 mL/HR  ? Consider hematology/oncology consultation if significant results return     ·   NSTEMI (non-ST elevated myocardial infarction) (HCC)-denies chest pain, shortness of breath no personal history of MI or significant family history  ? Obtain echocardiogram  ? Trended troponin, telemetry, EKG p r n  chest pain     ·   Lactic acidosis-3 9 persistently elevated prior to admission decreased to 2 9 following 1 L normal saline  ? Initiate normal saline bolus 1 L then continue maintenance fluids at 125 mL/HR  ? Trend lactic acid discontinue if less than 2 0     ·   Pancytopenia (HCC)-hemoglobin/hematocrit 8 8/29 2 leukopenia 3 42, thrombocytopenia 84,000 no previous hematological disorders no active bleeding at this time need to be concern for MDS? ? Occult stool, iron panel, hemolysis smear, haptoglobin, LDH, LEV     Plan for Additional Problems:   ·   Diabetes mellitus ([facility] ) type 2 -somewhat uncontrolled hemoglobin A1c 8 0  ? Continue home medication Levemir 40 units b i d , lispro 17 units t i d  with meals, insulin sliding scale, hypoglycemic protocol  ? Obtain hemoglobin A1c  ? Continue home medication-Requip  ·   Hyperlipidemia-fairly stable  ? Continue home medication pravastatin  ·   Asthma-stable respiratory status  ? Continue Singulair, Advair p r n  Albuterol  · Hypertension-stable BP  ? Continue home medication lisinopril  · Bipolar 1 disorder-erratic behavior denies hallucinations or delusions no psychosis noted denies SI/HI  ? Continue home medication-Wellbutrin, Requip     VTE Prophylaxis: Low VTE risk  / sequential compression device   Code Status:  Level 3-DNR/DNI-discussed the patient at the bedside    POLST: POLST information provided but not completed     Anticipated Length of Stay:  Patient will be admitted on an Inpatient basis with an anticipated length of stay of at least 2 midnights     Justification for Hospital Stay:  Near syncope with lactic acidosis and elevated troponin likely NSTEMI type 2     Admission Orders:  Inpatient/Tele  Continuous Cardiac Monitoring  Serial Cardiac Enzymes q6h x 3   Bilateral Sequential Compression Device  OT/PT Consult  Echocardiogram  Lactic Acid q2h  Orthostatic Vital Signs  SSI  NSS @ 100    Scheduled Meds:   aspirin 81 mg Oral Daily   buPROPion 300 mg Oral QAM   cholecalciferol 1,000 Units Oral Daily   docusate sodium 100 mg Oral BID   fluticasone-salmeterol 1 puff Inhalation Q12H [address]   insulin detemir 40 Units Subcutaneous Q12H [address]   insulin lispro 1-6 Units Subcutaneous 4x Daily (with meals and at bedtime)   insulin lispro 17 Units Subcutaneous TID With Meals   lisinopril 2 5 mg Oral QAM   montelukast 10 mg Oral HS   pantoprazole 40 mg Oral Daily   pravastatin 80 mg Oral Daily With Dinner   rOPINIRole 0 5 mg Oral BID

## 2017-10-19 NOTE — ASSESSMENT & PLAN NOTE
Assessment:  Patient hemoglobin 8 8 on admission and has dropped to 7 6  Further laboratory evaluation indicates the patient is iron deficient as well as there are some schistocytes noted on peripheral smear  Fecal occult blood testing is pending  At this point the patient continues to state that she only wishes to know if she has cancer and she does not want any further treatment otherwise and she does not want to treat the cancer if she does have it  At 1st she was refusing IV access as well as telemetry as well as further laboratory testing  I discussed that 1st diagnosis that she had cancer we would have to do further testing before that she would be a speak to oncologist and she appeared more able to have an IV reinserted as well as for further blood work to be performed  Plan:  Start iron supplementation with ferrous sulfate 325 mg twice a day with meals orally  Follow up with final LDH, haptoglobin, and LEV testing  Consult Hematology given the appearance of hemolytic anemia on peripheral smear  Patient refusing blood transfusion at this time therefore will hold off on blood consent  Discussed risks and benefits of not receiving blood transfusion and patient is adamantly still refusing

## 2017-10-19 NOTE — ASSESSMENT & PLAN NOTE
Assessment:  Hemoglobin continued to decrease as well as patient has leukopenia and thrombocytopenia  Unknown cause this time, however there is concern for MDS or some other type of blood dyscrasia given significant weight loss  Plan:  Hematology consultation pending  Follow up with current labs as well as trend CBC    Patient is refusing any blood transfusions

## 2017-10-19 NOTE — PLAN OF CARE
CARDIOVASCULAR - ADULT     Absence of cardiac dysrhythmias or at baseline rhythm Progressing        METABOLIC, FLUID AND ELECTROLYTES - ADULT     Electrolytes maintained within normal limits Progressing     Fluid balance maintained Progressing     Glucose maintained within target range Progressing        Nutrition/Hydration-ADULT     Nutrient/Hydration intake appropriate for improving, restoring or maintaining nutritional needs Progressing        Potential for Falls     Patient will remain free of falls Progressing

## 2017-10-19 NOTE — ASSESSMENT & PLAN NOTE
Assessment:  According the patient's new diagnosis found on CT scan of the abdomen on admission  She reports that she has not had a drink in roughly 30 years, undisclosed amount of alcohol consumption prior to that  She is unknown of any hepatitis status or testing had been done prior  Plan:  Consult Gastroenterology  Check chronic hepatitis panel  Discussed case with GI as patient is refusing colonoscopy or endoscopy, but is open to further blood testing

## 2017-10-19 NOTE — CONSULTS
Consultation -  Hematology/Oncology   Dawson Bergman 67 y o  female MRN: [id number]  Unit/Bed#: -01 Encounter: [id number]    Physician Requesting Consult: Daniele Pineda MD  Reason for Consult: Symptomatic Anemia    HPI: Dawson Bergman is a 67y o  year old female with a history of DM, HTN, HLD, asthma, Bipolar disorder who was admitted for several month history of fatigue and weakness  Assessment/Plan:   #1 Pancytopenia,unclear etiology  #2 New diagnosis cirrhosis w/ portal hypertension (this admission)  -Admission hemoglobin 8 8 with subsequent drop to 7 6 o/ 24 H    -Iron studies show Iron deficiency, recommend  200 mg IV Venofer if she is in agreement, otherwise po iron supplementation adequate   -Admission WBC 3 4 and platelets 91T  -CT shows splenomegaly and cirrhosis with portal hypertension  - GI has been consulted and work up is also in process for GIB  -Awaiting FOBT, LDH and AFP  Schistocytes on smear but meghna negative  Haptoglobin pending    -If GI work up negative, recommend bone marrow biopsy given leukopenia and thrombocytopenia for evaluation of ? MDS/leukemia   -Thrombocytopenia may be 2/2 cirrhosis and hx of ETOH use    -Transfuse to keep hemoglobin >7g/dL, platelets >02Q or sooner if symptomatic (although she stated to me she does not want a transfusion, even for symptom control)  We will continue to follow this admission  Please contact us if you have any questions regarding this consult  Thank you for this consult  Past Medical History:   Diagnosis Date    Asthma     [facility] mellitus ([facility] )     Hyperlipidemia     Neuropathy      History reviewed  No pertinent family history    Social History     Social History    Marital status: Single     Spouse name: N/A    Number of children: N/A    Years of education: N/A     Social History Main Topics    Smoking status: Never Smoker    Smokeless tobacco: Never Used    Alcohol use No    Drug use: No    Sexual activity: Not Asked Other Topics Concern    None     Social History Narrative    None     Allergies   Allergen Reactions    Abilify [Aripiprazole]     Celebrex [Celecoxib]     Codeine     Darvon [Propoxyphene]     Ibuprofen     Latuda [Lurasidone]     Nitrofurantoin     Penicillins     Ziprasidone      Subjective:    Review of Systems   Constitutional: Positive for chills and fatigue  Negative for fever  HENT:   Negative for trouble swallowing  Respiratory: Negative for chest tightness, cough, hemoptysis and shortness of breath  Gastrointestinal: Negative for abdominal distention, abdominal pain, blood in stool, constipation, nausea and vomiting  Genitourinary: Negative for hematuria  Hematological: Negative for adenopathy  Does not bruise/bleed easily       Objective:  /61   Pulse 83   Temp 98 3 °F (36 8 °C) (Oral)   Resp 18   Ht 5' (1 524 m)   Wt 77 5 kg (170 lb 13 7 oz)   SpO2 95%   BMI 33 37 kg/m²       Current Facility-Administered Medications:     acetaminophen (TYLENOL) tablet 650 mg, 650 mg, Oral, Q6H PRN, Penny Waters PA-C    albuterol inhalation solution 2 5 mg, 2 5 mg, Nebulization, Q6H PRN, Neo Norris MD    aspirin chewable tablet 81 mg, 81 mg, Oral, Daily, Penny Waters PA-C, 81 mg at 10/19/17 0814    buPROPion (WELLBUTRIN XL) 24 hr tablet 300 mg, 300 mg, Oral, QAM, SHAQ Souza-C, 300 mg at 10/19/17 0813    calcium carbonate (TUMS) chewable tablet 1,000 mg, 1,000 mg, Oral, Daily PRN, Margy Santos PA-C    cholecalciferol (VITAMIN D3) tablet 1,000 Units, 1,000 Units, Oral, Daily, Margy Santos PA-C, 1,000 Units at 10/19/17 6829    docusate sodium (COLACE) capsule 100 mg, 100 mg, Oral, BID, Penny Waters PA-C    fluticasone-salmeterol (ADVAIR) 250-50 mcg/dose inhaler 1 puff, 1 puff, Inhalation, Q12H JEREMIAH, Penny Waters PA-C, 1 puff at 10/19/17 0814    insulin detemir (LEVEMIR) subcutaneous injection 40 Units, 40 Units, Subcutaneous, Q12H [address], Penny Waters PA-C, 40 Units at 10/19/17 0815    insulin lispro (HumaLOG) 100 units/mL subcutaneous injection 1-6 Units, 1-6 Units, Subcutaneous, 4x Daily (with meals and at bedtime), 2 Units at 10/19/17 1305 **AND** Fingerstick Glucose (POCT), , , 4 times day, Penny Waters PA-C    insulin lispro (HumaLOG) 100 units/mL subcutaneous injection 17 Units, 17 Units, Subcutaneous, TID With Meals, Reed Quintero PA-C, 17 Units at 10/19/17 1305    lisinopril (ZESTRIL) tablet 2 5 mg, 2 5 mg, Oral, QAM, SHAQ Souza-C, 2 5 mg at 10/19/17 0813    montelukast (SINGULAIR) tablet 10 mg, 10 mg, Oral, HS, SHAQ Souza-C, 10 mg at 10/18/17 2210    ondansetron (ZOFRAN) injection 4 mg, 4 mg, Intravenous, Q6H PRN, SHAQ Souza-HOLLY    pantoprazole (PROTONIX) EC tablet 40 mg, 40 mg, Oral, Daily, SHAQ Souza-C, 40 mg at 10/19/17 0092    pravastatin (PRAVACHOL) tablet 80 mg, 80 mg, Oral, Daily With Dinner, SHAQ Souza-C, 80 mg at 10/18/17 2210    rOPINIRole (REQUIP) tablet 0 5 mg, 0 5 mg, Oral, BID, Penny Waters PA-C, 0 5 mg at 10/19/17 0813    sodium chloride 0 9 % infusion, 100 mL/hr, Intravenous, Continuous, Reed Quintero PA-C, Stopped at 10/19/17 1144    Recent Labs      10/16/17   1442  10/18/17   1517  10/19/17   0544   WBC  6 12  3 42*  2 90*   HGB  9 2*  8 8*  7 6*   PLT  101*  84*  76*   MCV  87  86  87   RDW  18 1*  18 2*  18 4*   CREATININE  0 78  0 95  0 71   AST  41  38  33   ALT  40  42  37     Laboratory studies were reviewed    Imaging:   -10/16 CT A/P:   Cirrhosis with portal hypertension including recanalization of the umbilical vein, mild perihepatic ascites, and splenomegaly  Midline and right-sided ventral hernias  The midline hernia contains venous collaterals  Spleen is enlarged measuring 14 5 cm craniocaudal dimension    -10/16 CT Head without contrast:   No acute intracranial abnormality  Pathology: None    Physical Exam   Constitutional: She is oriented to person, place, and time     HENT:   Mouth/Throat: Oropharynx is clear and moist    Eyes: Conjunctivae and EOM are normal  No scleral icterus  Cardiovascular: Normal rate and regular rhythm  Pulmonary/Chest: Effort normal and breath sounds normal  No respiratory distress  She has no wheezes  She exhibits no tenderness  Abdominal: Soft  She exhibits no distension  There is no tenderness  Musculoskeletal: She exhibits no edema or tenderness  Lymphadenopathy:     She has no cervical adenopathy  Neurological: She is alert and oriented to person, place, and time  Skin: Skin is warm and dry  There is pallor  Code Status: Level 3 - DNAR and DNI    Counseling / Coordination of Care  Total floor / unit time spent today 30 minutes  Greater than 50% of total time was spent with the patient and / or family counseling and / or coordination of care

## 2017-10-19 NOTE — CONSULTS
Consultation - 126 [facility]  Faizadeyisrolan Bora 67 y o  female MRN: [id number]  Unit/Bed#: -01 Encounter: [id number]        Inpatient consult to gastroenterology  Consult performed by: Jg Fall ordered by: Hai Ding          Reason for Consult / Principal Problem: symptomatic anemia, cirrhosis    ASSESSMENT and PLAN:    Principal Problem:    Symptomatic anemia  Active Problems:    Diabetes mellitus ([place] [address] )    Hyperlipidemia    Asthma    NSTEMI (non-ST elevated myocardial infarction) ([place] [address] )    Lactic acidosis    Pancytopenia ([place] [address] )    Bipolar 1 disorder ([place] [address] )    #1  Symptomatic iron deficiency anemia with significant weight loss: Hgb trending down from 9 2 to 7 6  Iron deficient at 29  BUN normal  Likely multifactorial due to underlying chronic disease, possible hematologic disorder, and cirrhosis but rule should out GI blood loss from AVM, PUD, or malignancy  -Continue to monitor Hgg  Patient stating to me that she does not want blood transfusions    -I had an extensive conversation with the patient in regards to the recommendation to have an EGD and colonoscopy performed to investigate her anemia and weight loss further  I discussed the possibility of having a colon cancer that could explain her symptoms  She is adamantly refusing an procedures at this time despite knowing the underlying risk of not having them performed to investigate her symptoms further, specifically to look for malignancy    -I asked the patient to let us know if she changes her mind  #2  Newly diagnosed cirrhosis with portal HTN as evidence by splenomegaly and mild ascites: LFTs WNL  INR elevated at 1 28  Platelets 76  Currently pancytopenic    -Check an LEV, AMA, ASMA, ceruloplasmin, alpha-1 antitrypsin, hepatitis panel  -AFP for HCC screening   -Continue to monitor MELD labs  -I discussed esophageal varices with the patient and the risk of bleeding if left untreated   She is refusing an EGD at this time despite knowing this risk    -Outpatient follow up     #3  LLQ Tenderness: CT normal, no signs of diverticulitis however it was without contrast  -Continue to monitor  -Bentyl as needed  -Consider repeat imaging with contrast    -Ultimately would also recommend colonoscopy due to pain as well however patient is refusing as above      -------------------------------------------------------------------------------------------------------------------    HPI:  This is a 13-year-old female with a history of diabetes, hyperlipidemia, asthma, and neuropathy who originally presented to the ER due to generalized weakness and a few near-syncopal episodes  Overall the patient is reporting a 60 lb weight loss in the last 3 months  She states that she has been having occasional left lower quadrant pain with palpation  She denies any nausea, vomiting, constipation, bright red blood, or black tarry stools  She reports that she has diarrhea on a frequent basis  She denies any fevers or chills at home  She admits to a remote history of significant alcohol abuse  She states that about 30 to 35 years ago there was a span of about 5 years where she would drink persistently for about 12 hours a day 7 days a week  She reports that she has not had any alcohol since that time  She denies any history of IV drug use or history of hepatitis B or C  She does report a history of hepatitis B in her sister  She reports that her last colonoscopy was at least 10 years ago  REVIEW OF SYSTEMS:    CONSTITUTIONAL: Denies any fever, chills, or rigors  Good appetite, recent weight loss  HEENT: No earache or tinnitus  Denies hearing loss or visual disturbances  CARDIOVASCULAR: No chest pain or palpitations  RESPIRATORY: Denies any cough, hemoptysis, shortness of breath or dyspnea on exertion  GASTROINTESTINAL: As noted in the History of Present Illness  GENITOURINARY: No problems with urination   Denies any hematuria or dysuria  NEUROLOGIC: No vertigo, denies headaches  +dizziness  MUSCULOSKELETAL: Denies any muscle or joint pain  SKIN: Denies skin rashes or itching  ENDOCRINE: Denies excessive thirst  Denies intolerance to heat or cold  PSYCHOSOCIAL: Denies depression or anxiety  Denies any recent memory loss  Historical Information   Past Medical History:   Diagnosis Date    Asthma     Diabetes mellitus ([facility] [address] )     Hyperlipidemia     Neuropathy      Past Surgical History:   Procedure Laterality Date    BACK SURGERY      CHOLECYSTECTOMY      HERNIA REPAIR      TUMOR REMOVAL       Social History   History   Alcohol Use No     History   Drug Use No     History   Smoking Status    Never Smoker   Smokeless Tobacco    Never Used     History reviewed  No pertinent family history      Meds/Allergies     Prescriptions Prior to Admission   Medication    albuterol (2 5 mg/3 mL) 0 083 % nebulizer solution    aspirin 81 MG tablet    buPROPion (WELLBUTRIN XL) 300 mg 24 hr tablet    cholecalciferol (VITAMIN D3) 1,000 units tablet    fluticasone-salmeterol (ADVAIR HFA) 115-21 MCG/ACT inhaler    insulin detemir (LEVEMIR) 100 units/mL subcutaneous injection    insulin lispro (HUMALOG KWIKPEN) 100 Units/mL SOPN    ipratropium (ATROVENT) 0 02 % nebulizer solution    lisinopril (ZESTRIL) 2 5 mg tablet    metFORMIN (GLUCOPHAGE) 1000 MG tablet    montelukast (SINGULAIR) 10 mg tablet    pantoprazole (PROTONIX) 40 mg tablet    rOPINIRole (REQUIP) 0 5 mg tablet    simvastatin (ZOCOR) 40 mg tablet    sulfamethoxazole-trimethoprim (BACTRIM DS) 800-160 mg per tablet     Current Facility-Administered Medications   Medication Dose Route Frequency    acetaminophen (TYLENOL) tablet 650 mg  650 mg Oral Q6H PRN    albuterol inhalation solution 2 5 mg  2 5 mg Nebulization Q6H PRN    aspirin chewable tablet 81 mg  81 mg Oral Daily    buPROPion (WELLBUTRIN XL) 24 hr tablet 300 mg  300 mg Oral QAM    calcium carbonate (TUMS) chewable tablet 1,000 mg  1,000 mg Oral Daily PRN    cholecalciferol (VITAMIN D3) tablet 1,000 Units  1,000 Units Oral Daily    docusate sodium (COLACE) capsule 100 mg  100 mg Oral BID    fluticasone-salmeterol (ADVAIR) 250-50 mcg/dose inhaler 1 puff  1 puff Inhalation Q12H [address]    insulin detemir (LEVEMIR) subcutaneous injection 40 Units  40 Units Subcutaneous Q12H [address]    insulin lispro (HumaLOG) 100 units/mL subcutaneous injection 1-6 Units  1-6 Units Subcutaneous 4x Daily (with meals and at bedtime)    insulin lispro (HumaLOG) 100 units/mL subcutaneous injection 17 Units  17 Units Subcutaneous TID With Meals    lisinopril (ZESTRIL) tablet 2 5 mg  2 5 mg Oral QAM    montelukast (SINGULAIR) tablet 10 mg  10 mg Oral HS    ondansetron (ZOFRAN) injection 4 mg  4 mg Intravenous Q6H PRN    pantoprazole (PROTONIX) EC tablet 40 mg  40 mg Oral Daily    pravastatin (PRAVACHOL) tablet 80 mg  80 mg Oral Daily With Dinner    rOPINIRole (REQUIP) tablet 0 5 mg  0 5 mg Oral BID    sodium chloride 0 9 % infusion  100 mL/hr Intravenous Continuous       Allergies   Allergen Reactions    Abilify [Aripiprazole]     Celebrex [Celecoxib]     Codeine     Darvon [Propoxyphene]     Ibuprofen     Latuda [Lurasidone]     Nitrofurantoin     Penicillins     Ziprasidone            Objective     Blood pressure 128/61, pulse 83, temperature 98 3 °F (36 8 °C), temperature source Oral, resp  rate 18, height 5' (1 524 m), weight 77 5 kg (170 lb 13 7 oz), SpO2 95 %        Intake/Output Summary (Last 24 hours) at 10/19/17 1422  Last data filed at 10/19/17 1358   Gross per 24 hour   Intake             1895 ml   Output              225 ml   Net             1670 ml         PHYSICAL EXAM:      General Appearance:   Alert, cooperative, no distress, appears stated age    HEENT:   Normocephalic, atraumatic, anicteric, no oropharyngeal thrush present, no dentition   Neck:  Supple, symmetrical, trachea midline, no adenopathy;    thyroid: no enlargement/tenderness/nodules; no carotid  bruit or JVD    Lungs:   Clear to auscultation bilaterally; no rales, rhonchi or wheezing; respirations unlabored    Heart[de-identified]   S1 and S2 normal; regular rate and rhythm; no murmur, rub, or gallop  Abdomen:   Soft, left lower quadrant tenderness, non-distended; normal bowel sounds; no masses, no organomegaly    Genitalia:   Deferred    Rectal:   Deferred    Extremities:  No cyanosis, clubbing or edema    Pulses:  2+ and symmetric all extremities    Skin:  Skin color, texture, turgor normal, no rashes or lesions    Lymph nodes:  No palpable cervical, axillary or inguinal lymphadenopathy        Lab Results:     Results from last 7 days  Lab Units 10/19/17  0544   WBC Thousand/uL 2 90*   HEMOGLOBIN g/dL 7 6*   HEMATOCRIT % 25 1*   PLATELETS Thousands/uL 76*   NEUTROS PCT % 57   LYMPHS PCT % 24   MONOS PCT % 11   EOS PCT % 6       Results from last 7 days  Lab Units 10/19/17  0544   SODIUM mmol/L 139   POTASSIUM mmol/L 3 8   CHLORIDE mmol/L 110*   CO2 mmol/L 22   BUN mg/dL 13   CREATININE mg/dL 0 71   CALCIUM mg/dL 7 9*   TOTAL PROTEIN g/dL 5 8*   BILIRUBIN TOTAL mg/dL 0 70   ALK PHOS U/L 60   ALT U/L 37   AST U/L 33   GLUCOSE RANDOM mg/dL 205*       Results from last 7 days  Lab Units 10/18/17  1517   INR  1 28*       Results from last 7 days  Lab Units 10/16/17  1442   LIPASE u/L 155       Imaging Studies: I have personally reviewed pertinent imaging studies  Ct Abdomen Pelvis Wo Contrast    Result Date: 10/19/2017  Impression: 1  Cirrhosis with portal hypertension including recanalization of the umbilical vein, mild perihepatic ascites, and splenomegaly  2   Midline and right-sided ventral hernias  The midline hernia contains venous collaterals  3   Diverticulosis coli without CT evidence of diverticulitis  ##sigslh##sigslh Workstation performed: [id number]           Patient was seen and examined by Dr Jeferson Graham  All bowers medical decisions were made by Dr Jeferson Graham   Thank you for allowing us to participate in the care of this present patient  We will follow-up with you closely

## 2017-10-19 NOTE — ASSESSMENT & PLAN NOTE
Assessment:  Blood glucose has been greater than 200 over the last 24 hours    Plan:  Continue Levemir 40 units subcutaneous every 12 hours, increase Humalog to 20 units 3 times daily with meals, continue sliding scale at current algorithm

## 2017-10-19 NOTE — PROGRESS NOTES
[facility]  Progress Note - Gregory Mike 67 y o  female MRN: [id number]    Unit/Bed#: -01 Encounter: [id number]        * Symptomatic anemia   Assessment & Plan      Assessment:  Patient hemoglobin 8 8 on admission and has dropped to 7 6  Further laboratory evaluation indicates the patient is iron deficient as well as there are some schistocytes noted on peripheral smear  Fecal occult blood testing is pending  At this point the patient continues to state that she only wishes to know if she has cancer and she does not want any further treatment otherwise and she does not want to treat the cancer if she does have it  At 1st she was refusing IV access as well as telemetry as well as further laboratory testing  I discussed that 1st diagnosis that she had cancer we would have to do further testing before that she would be a speak to oncologist and she appeared more able to have an IV reinserted as well as for further blood work to be performed  Plan:  Start iron supplementation with ferrous sulfate 325 mg twice a day with meals orally  Follow up with final LDH, haptoglobin, and LEV testing  Consult Hematology given the appearance of hemolytic anemia on peripheral smear  Patient refusing blood transfusion at this time therefore will hold off on blood consent  Discussed risks and benefits of not receiving blood transfusion and patient is adamantly still refusing  Cirrhosis of liver [facility])   Assessment & Plan      Assessment:  According the patient's new diagnosis found on CT scan of the abdomen on admission  She reports that she has not had a drink in roughly 30 years, undisclosed amount of alcohol consumption prior to that  She is unknown of any hepatitis status or testing had been done prior  Plan:  Consult Gastroenterology  Check chronic hepatitis panel  Discussed case with GI as patient is refusing colonoscopy or endoscopy, but is open to further blood testing  Pancytopenia ([facility] [address] )   Assessment & Plan      Assessment:  Hemoglobin continued to decrease as well as patient has leukopenia and thrombocytopenia  Unknown cause this time, however there is concern for MDS or some other type of blood dyscrasia given significant weight loss  Plan:  Hematology consultation pending  Follow up with current labs as well as trend CBC  Patient is refusing any blood transfusions        NSTEMI (non-ST elevated myocardial infarction) [facility])   Assessment & Plan      Assessment:  No complaints of chest pain at this time, likely secondary to anemia and possible stressors  Plan:  Echocardiogram pending  Troponin has been trending down  No events on telemetry  Patient has currently refused telemetry therefore has been removed at the patient's request   Discussed risks and benefits concerning us not being able to monitor her heart rate and rhythm real time  Diabetes mellitus ([facility] )   Assessment & Plan      Assessment:  Blood glucose has been greater than 200 over the last 24 hours    Plan:  Continue Levemir 40 units subcutaneous every 12 hours, increase Humalog to 20 units 3 times daily with meals, continue sliding scale at current algorithm        Bipolar 1 disorder [facility])   Assessment & Plan      Assessment:  Patient showing no signs of omar, however affect is slightly labile during our conversation when she would speak about her sister  from cancer roughly 1 5 years earlier  She denies any suicidal homicidal ideations  Plan:  Continue Wellbutrin at this time with close monitoring given this medication can cause omar  VTE Pharmacologic Prophylaxis:   Pharmacologic: Pharmacologic VTE Prophylaxis contraindicated due to Symptomatic anemia, questionable GI bleeding, questionable hemolytic anemia  Mechanical VTE Prophylaxis in Place: No    Patient Centered Rounds: I have performed bedside rounds with nursing staff today      Discussions with Specialists or Other Care Team Provider:  Discussed with GI, Oncology, nursing, case management    Education and Discussions with Family / Patient:  Discussed with patient    Time Spent for Care: 30 minutes  More than 50% of total time spent on counseling and coordination of care as described above  Current Length of Stay: 1 day(s)    Current Patient Status: Inpatient   Certification Statement: The patient will continue to require additional inpatient hospital stay due to Ongoing workup for anemia, GI consultation, Hematology consultation    Discharge Plan:  Ongoing at this time, will likely need extended hospital stay    Code Status: Level 3 - DNAR and DNI      Subjective:   Was alerted buddy nursing staff the patient was acutely distressed and wanting to remove her IV in telemetry therefore I went to see the patient  Patient has been stating that all I wanted to know was if I had cancer or not  She states that she does not want any heart monitor, peripheral IV site, or any further blood work  I discussed that in order to diagnose her cancer fully that we would have to do further blood testing and at 1st she was refusing and wanted to speak to a hematologist   I reached out to the Hematology/Oncology specialist on call who stated that she would need further workup to confirm a diagnosis of cancer before they would complete a consultation which was completely reasonable  I then discussed with this with the patient who was then more amicable to having an IV site as well as further blood work, however she still refused telemetry  She is refusing any treatment should she come to have a diagnosis of any type of malignancy as well as she is refusing any and all blood transfusions this time  As per my discussion with the gastroenterology physician assistant the patient is also refusing any procedures such as EGD or colonoscopy at this time    She is still reporting dizziness, however she states that she will only be dizzy due to her diabetic neuropathy throughout her whole body  Patient is opening to visiting nurses the at discharge when that is planned  Objective:     Vitals:   Temp (24hrs), Av 2 °F (36 8 °C), Min:97 6 °F (36 4 °C), Max:98 6 °F (37 °C)    HR:  [64-88] 83  Resp:  [18-22] 18  BP: (114-163)/(54-85) 128/61  SpO2:  [93 %-100 %] 95 %  Body mass index is 33 37 kg/m²  Input and Output Summary (last 24 hours): Intake/Output Summary (Last 24 hours) at 10/19/17 1501  Last data filed at 10/19/17 1358   Gross per 24 hour   Intake             1895 ml   Output              225 ml   Net             1670 ml       Physical Exam:     Physical Exam   Constitutional: She is oriented to person, place, and time  Vital signs are normal  She appears well-developed and well-nourished  Non-toxic appearance  She has a sickly appearance  No distress  Appears older than stated age   HENT:   Head: Normocephalic and atraumatic  Mouth/Throat: Abnormal dentition  Eyes: Conjunctivae and EOM are normal  Pupils are equal, round, and reactive to light  No scleral icterus  Neck: Neck supple  Cardiovascular: Normal rate, regular rhythm, S1 normal, S2 normal, normal heart sounds and intact distal pulses  Exam reveals no S3 and no S4  No murmur heard  Pulmonary/Chest: Effort normal and breath sounds normal  No accessory muscle usage  No respiratory distress  She has no decreased breath sounds  She has no wheezes  She has no rhonchi  She has no rales  She exhibits no tenderness  Abdominal: Soft  Bowel sounds are normal  She exhibits no distension and no mass  There is no tenderness  There is no rigidity, no rebound and no guarding  Neurological: She is alert and oriented to person, place, and time  Skin: Skin is warm and dry  Psychiatric: Her behavior is normal  Her mood appears anxious  Her affect is labile  Her speech is rapid and/or pressured  She expresses no homicidal and no suicidal ideation   She expresses no suicidal plans and no homicidal plans  Additional Data:     Labs:      Results from last 7 days  Lab Units 10/19/17  0544   WBC Thousand/uL 2 90*   HEMOGLOBIN g/dL 7 6*   HEMATOCRIT % 25 1*   PLATELETS Thousands/uL 76*   NEUTROS PCT % 57   LYMPHS PCT % 24   MONOS PCT % 11   EOS PCT % 6       Results from last 7 days  Lab Units 10/19/17  0544   SODIUM mmol/L 139   POTASSIUM mmol/L 3 8   CHLORIDE mmol/L 110*   CO2 mmol/L 22   BUN mg/dL 13   CREATININE mg/dL 0 71   CALCIUM mg/dL 7 9*   TOTAL PROTEIN g/dL 5 8*   BILIRUBIN TOTAL mg/dL 0 70   ALK PHOS U/L 60   ALT U/L 37   AST U/L 33   GLUCOSE RANDOM mg/dL 205*       Results from last 7 days  Lab Units 10/18/17  1517   INR  1 28*       * I Have Reviewed All Lab Data Listed Above  * Additional Pertinent Lab Tests Reviewed: [facility] Admission Reviewed    Imaging:    Imaging Reports Reviewed Today Include: CT abdomen, pelvis - cirrhosis, portal HTN  Imaging Personally Reviewed by Myself Includes:  None    Recent Cultures (last 7 days):           Last 24 Hours Medication List:     aspirin 81 mg Oral Daily   buPROPion 300 mg Oral QAM   cholecalciferol 1,000 Units Oral Daily   dicyclomine 10 mg Oral TID AC   docusate sodium 100 mg Oral BID   ferrous sulfate 325 mg Oral BID With Meals   fluticasone-salmeterol 1 puff Inhalation Q12H [address]   insulin detemir 40 Units Subcutaneous Q12H [address]   insulin lispro 1-6 Units Subcutaneous 4x Daily (with meals and at bedtime)   insulin lispro 17 Units Subcutaneous TID With Meals   lisinopril 2 5 mg Oral QAM   montelukast 10 mg Oral HS   pantoprazole 40 mg Oral Daily   pravastatin 80 mg Oral Daily With Dinner   rOPINIRole 0 5 mg Oral BID        Today, Patient Was Seen By: Kylee Flower PA-C    ** Please Note: Dictation voice to text software may have been used in the creation of this document   **

## 2017-10-19 NOTE — ASSESSMENT & PLAN NOTE
Assessment:  Patient showing no signs of omar, however affect is slightly labile during our conversation when she would speak about her sister  from cancer roughly 1 5 years earlier  She denies any suicidal homicidal ideations  Plan:  Continue Wellbutrin at this time with close monitoring given this medication can cause omar

## 2017-10-19 NOTE — ASSESSMENT & PLAN NOTE
Assessment:  No complaints of chest pain at this time, likely secondary to anemia and possible stressors  Plan:  Echocardiogram pending  Troponin has been trending down  No events on telemetry  Patient has currently refused telemetry therefore has been removed at the patient's request   Discussed risks and benefits concerning us not being able to monitor her heart rate and rhythm real time

## 2017-10-20 VITALS
SYSTOLIC BLOOD PRESSURE: 141 MMHG | BODY MASS INDEX: 33.54 KG/M2 | HEIGHT: 60 IN | WEIGHT: 170.86 LBS | RESPIRATION RATE: 18 BRPM | TEMPERATURE: 98 F | DIASTOLIC BLOOD PRESSURE: 60 MMHG | OXYGEN SATURATION: 96 % | HEART RATE: 82 BPM

## 2017-10-20 LAB
ALBUMIN SERPL BCP-MCNC: 2.5 G/DL (ref 3.5–5)
ALP SERPL-CCNC: 64 U/L (ref 46–116)
ALT SERPL W P-5'-P-CCNC: 38 U/L (ref 12–78)
ANION GAP SERPL CALCULATED.3IONS-SCNC: 7 MMOL/L (ref 4–13)
AST SERPL W P-5'-P-CCNC: 34 U/L (ref 5–45)
BASOPHILS # BLD AUTO: 0.05 THOUSANDS/ΜL (ref 0–0.1)
BASOPHILS NFR BLD AUTO: 2 % (ref 0–1)
BILIRUB SERPL-MCNC: 0.7 MG/DL (ref 0.2–1)
BUN SERPL-MCNC: 11 MG/DL (ref 5–25)
CALCIUM SERPL-MCNC: 8.3 MG/DL (ref 8.3–10.1)
CHLORIDE SERPL-SCNC: 112 MMOL/L (ref 100–108)
CO2 SERPL-SCNC: 22 MMOL/L (ref 21–32)
CREAT SERPL-MCNC: 0.59 MG/DL (ref 0.6–1.3)
EOSINOPHIL # BLD AUTO: 0.25 THOUSAND/ΜL (ref 0–0.61)
EOSINOPHIL NFR BLD AUTO: 7 % (ref 0–6)
ERYTHROCYTE [DISTWIDTH] IN BLOOD BY AUTOMATED COUNT: 18.2 % (ref 11.6–15.1)
GFR SERPL CREATININE-BSD FRML MDRD: 91 ML/MIN/1.73SQ M
GLUCOSE SERPL-MCNC: 113 MG/DL (ref 65–140)
GLUCOSE SERPL-MCNC: 123 MG/DL (ref 65–140)
GLUCOSE SERPL-MCNC: 147 MG/DL (ref 65–140)
GLUCOSE SERPL-MCNC: 230 MG/DL (ref 65–140)
GLUCOSE SERPL-MCNC: 301 MG/DL (ref 65–140)
GLUCOSE SERPL-MCNC: 352 MG/DL (ref 65–140)
HBV CORE AB SER QL: NORMAL
HBV CORE IGM SER QL: NORMAL
HBV SURFACE AG SER QL: NORMAL
HCT VFR BLD AUTO: 27.6 % (ref 34.8–46.1)
HCV AB SER QL: NORMAL
HGB BLD-MCNC: 8.1 G/DL (ref 11.5–15.4)
LACTATE SERPL-SCNC: 1.3 MMOL/L (ref 0.5–2)
LYMPHOCYTES # BLD AUTO: 0.77 THOUSANDS/ΜL (ref 0.6–4.47)
LYMPHOCYTES NFR BLD AUTO: 23 % (ref 14–44)
MCH RBC QN AUTO: 25.6 PG (ref 26.8–34.3)
MCHC RBC AUTO-ENTMCNC: 29.3 G/DL (ref 31.4–37.4)
MCV RBC AUTO: 87 FL (ref 82–98)
MONOCYTES # BLD AUTO: 0.34 THOUSAND/ΜL (ref 0.17–1.22)
MONOCYTES NFR BLD AUTO: 10 % (ref 4–12)
NEUTROPHILS # BLD AUTO: 2.01 THOUSANDS/ΜL (ref 1.85–7.62)
NEUTS SEG NFR BLD AUTO: 58 % (ref 43–75)
PLATELET # BLD AUTO: 85 THOUSANDS/UL (ref 149–390)
PMV BLD AUTO: 12 FL (ref 8.9–12.7)
POTASSIUM SERPL-SCNC: 3.7 MMOL/L (ref 3.5–5.3)
PROT SERPL-MCNC: 6.2 G/DL (ref 6.4–8.2)
RBC # BLD AUTO: 3.17 MILLION/UL (ref 3.81–5.12)
RYE IGE QN: NEGATIVE
SODIUM SERPL-SCNC: 141 MMOL/L (ref 136–145)
VIT B12 SERPL-MCNC: 506 PG/ML (ref 100–900)
WBC # BLD AUTO: 3.42 THOUSAND/UL (ref 4.31–10.16)

## 2017-10-20 PROCEDURE — 82103 ALPHA-1-ANTITRYPSIN TOTAL: CPT | Performed by: PHYSICIAN ASSISTANT

## 2017-10-20 PROCEDURE — G8978 MOBILITY CURRENT STATUS: HCPCS

## 2017-10-20 PROCEDURE — 80053 COMPREHEN METABOLIC PANEL: CPT | Performed by: PHYSICIAN ASSISTANT

## 2017-10-20 PROCEDURE — G8987 SELF CARE CURRENT STATUS: HCPCS

## 2017-10-20 PROCEDURE — 82390 ASSAY OF CERULOPLASMIN: CPT | Performed by: PHYSICIAN ASSISTANT

## 2017-10-20 PROCEDURE — 82948 REAGENT STRIP/BLOOD GLUCOSE: CPT

## 2017-10-20 PROCEDURE — 97116 GAIT TRAINING THERAPY: CPT

## 2017-10-20 PROCEDURE — 85025 COMPLETE CBC W/AUTO DIFF WBC: CPT | Performed by: PHYSICIAN ASSISTANT

## 2017-10-20 PROCEDURE — 86038 ANTINUCLEAR ANTIBODIES: CPT | Performed by: PHYSICIAN ASSISTANT

## 2017-10-20 PROCEDURE — G8979 MOBILITY GOAL STATUS: HCPCS

## 2017-10-20 PROCEDURE — 97163 PT EVAL HIGH COMPLEX 45 MIN: CPT

## 2017-10-20 PROCEDURE — 83605 ASSAY OF LACTIC ACID: CPT | Performed by: PHYSICIAN ASSISTANT

## 2017-10-20 PROCEDURE — G8988 SELF CARE GOAL STATUS: HCPCS

## 2017-10-20 PROCEDURE — 82105 ALPHA-FETOPROTEIN SERUM: CPT | Performed by: PHYSICIAN ASSISTANT

## 2017-10-20 PROCEDURE — 86235 NUCLEAR ANTIGEN ANTIBODY: CPT | Performed by: PHYSICIAN ASSISTANT

## 2017-10-20 PROCEDURE — 86256 FLUORESCENT ANTIBODY TITER: CPT | Performed by: PHYSICIAN ASSISTANT

## 2017-10-20 PROCEDURE — 97167 OT EVAL HIGH COMPLEX 60 MIN: CPT

## 2017-10-20 RX ORDER — FERROUS SULFATE 325(65) MG
325 TABLET ORAL 2 TIMES DAILY WITH MEALS
Qty: 60 TABLET | Refills: 0 | Status: SHIPPED | OUTPATIENT
Start: 2017-10-20 | End: 2018-12-27

## 2017-10-20 RX ORDER — LORAZEPAM 2 MG/ML
1 INJECTION INTRAMUSCULAR ONCE
Status: COMPLETED | OUTPATIENT
Start: 2017-10-20 | End: 2017-10-20

## 2017-10-20 RX ADMIN — INSULIN LISPRO 6 UNITS: 100 INJECTION, SOLUTION INTRAVENOUS; SUBCUTANEOUS at 16:39

## 2017-10-20 RX ADMIN — FERROUS SULFATE TAB 325 MG (65 MG ELEMENTAL FE) 325 MG: 325 (65 FE) TAB at 08:42

## 2017-10-20 RX ADMIN — ROPINIROLE 0.5 MG: 0.25 TABLET, FILM COATED ORAL at 08:30

## 2017-10-20 RX ADMIN — LORAZEPAM 1 MG: 2 INJECTION INTRAMUSCULAR; INTRAVENOUS at 02:33

## 2017-10-20 RX ADMIN — DICYCLOMINE HYDROCHLORIDE 10 MG: 10 CAPSULE ORAL at 06:20

## 2017-10-20 RX ADMIN — DOCUSATE SODIUM 100 MG: 100 CAPSULE, LIQUID FILLED ORAL at 17:26

## 2017-10-20 RX ADMIN — DICYCLOMINE HYDROCHLORIDE 10 MG: 10 CAPSULE ORAL at 16:01

## 2017-10-20 RX ADMIN — DOCUSATE SODIUM 100 MG: 100 CAPSULE, LIQUID FILLED ORAL at 08:31

## 2017-10-20 RX ADMIN — ROPINIROLE 0.5 MG: 0.25 TABLET, FILM COATED ORAL at 17:26

## 2017-10-20 RX ADMIN — INSULIN DETEMIR 40 UNITS: 100 INJECTION, SOLUTION SUBCUTANEOUS at 08:42

## 2017-10-20 RX ADMIN — BUPROPION HYDROCHLORIDE 300 MG: 150 TABLET, FILM COATED, EXTENDED RELEASE ORAL at 08:30

## 2017-10-20 RX ADMIN — FERROUS SULFATE TAB 325 MG (65 MG ELEMENTAL FE) 325 MG: 325 (65 FE) TAB at 16:01

## 2017-10-20 RX ADMIN — INSULIN LISPRO 17 UNITS: 100 INJECTION, SOLUTION INTRAVENOUS; SUBCUTANEOUS at 12:58

## 2017-10-20 RX ADMIN — PANTOPRAZOLE SODIUM 40 MG: 40 TABLET, DELAYED RELEASE ORAL at 08:31

## 2017-10-20 RX ADMIN — FLUTICASONE PROPIONATE AND SALMETEROL 1 PUFF: 50; 250 POWDER RESPIRATORY (INHALATION) at 08:46

## 2017-10-20 RX ADMIN — ACETAMINOPHEN 650 MG: 325 TABLET ORAL at 06:20

## 2017-10-20 RX ADMIN — ASPIRIN 81 MG 81 MG: 81 TABLET ORAL at 08:32

## 2017-10-20 RX ADMIN — INSULIN LISPRO 17 UNITS: 100 INJECTION, SOLUTION INTRAVENOUS; SUBCUTANEOUS at 16:39

## 2017-10-20 RX ADMIN — ONDANSETRON 4 MG: 2 INJECTION INTRAMUSCULAR; INTRAVENOUS at 08:08

## 2017-10-20 RX ADMIN — CHOLECALCIFEROL TAB 25 MCG (1000 UNIT) 1000 UNITS: 25 TAB at 08:31

## 2017-10-20 RX ADMIN — IRON SUCROSE 200 MG: 20 INJECTION, SOLUTION INTRAVENOUS at 15:58

## 2017-10-20 RX ADMIN — PRAVASTATIN SODIUM 80 MG: 80 TABLET ORAL at 16:01

## 2017-10-20 RX ADMIN — DICYCLOMINE HYDROCHLORIDE 10 MG: 10 CAPSULE ORAL at 12:55

## 2017-10-20 RX ADMIN — LISINOPRIL 2.5 MG: 2.5 TABLET ORAL at 08:30

## 2017-10-20 NOTE — PLAN OF CARE
Problem: OCCUPATIONAL THERAPY ADULT  Goal: Performs self-care activities at highest level of function for planned discharge setting  See evaluation for individualized goals  Treatment Interventions: ADL retraining, Functional transfer training, UE strengthening/ROM, Patient/family training, Equipment evaluation/education, Continued evaluation, Activityengagement          See flowsheet documentation for full assessment, interventions and recommendations  Limitation: Decreased ADL status, Decreased Safe judgement during ADL, Decreased endurance, Decreased cognition, Decreased high-level ADLs, Decreased self-care trans     Assessment: Pt is a 66 yo female [facility]18/2017  Pt presents w/ sympotatic anemia, and significant PMH impacting her occpational performance including hx B TKA (pt reports 5 years ago), bipolar disorder, DM, obesity  Pt reports living alone in senior apt on the 2nd floor w/ 9 PRISCILLA from back, and 19 PRISCILLA from front PTA  Pt reports living alone and I w/ ADL/IADL except driving  Pt reports ambulating w/ walker w/ seat inside apt, and quad cane in the community  Pt demonstrated B UE AROM AG limited end range of shoulders, but able to complete ADL w/ ROM  Pt reports right hand dominance  Pt completed functional transfers using RW chair to sofa w/ min A  Pt completed LBD w/ S after set- up while seated on sofa to don / doff socks  Pt complete grooming w/ min A standing w/ AD after set up  Assist for steaying / balance  Pt alert and cooperative, and able to provide limited social history  Pt reports limited support at home  Pt added that she has been workind w/ insurance /  to get assist at home due to difficulty getting to the grocery store for food  Pt does not drive, and reports taking the bus   Pt presents w/ decreased dynamic sitting balance, decreased standing balance, decreased standing tolerance, limited insight into deficits, decreased endurance, decreased strength, and decreased activity tolerance impacting her I w/ dressing, bathing, oral hygiene, clothing mgmt, functional mobility, community mobility, food prep / clean up  Pt would benefit from OT while in acute care to address deficits, and post acute rehab when medically stable for discharge from acute care to return to [facility] level of I  From an OT perspective, pt would benefit from [facility] / additional assistance / Dmitriy Lee following post acute rehab to return to OF   Will continue to follow pt in acute care     OT Discharge Recommendation: Other (Comment) (post acute rehab)  OT - OK to Discharge: No (unless to post acute rehab)

## 2017-10-20 NOTE — OCCUPATIONAL THERAPY NOTE
[address]  Evaluation     Patient Name: Tracy RIVERA Date: 10/20/2017  Problem List  Patient Active Problem List   Diagnosis    Diabetes mellitus ([facility] )    Hyperlipidemia    Asthma    Symptomatic anemia    NSTEMI (non-ST elevated myocardial infarction) ([facility] )    Lactic acidosis    Pancytopenia (HCC)    Bipolar 1 disorder (HCC)    Cirrhosis of liver ([facility] )     Past Medical History  Past Medical History:   Diagnosis Date    Asthma     Diabetes mellitus ([facility] )     Hyperlipidemia     Neuropathy      Past Surgical History  Past Surgical History:   Procedure Laterality Date    BACK SURGERY      CHOLECYSTECTOMY      HERNIA REPAIR      TUMOR REMOVAL        10/20/17 1030   Note Type   Note type Eval/Treat   Restrictions/Precautions   Weight Bearing Precautions Per Order No   Other Precautions Chair Alarm; Bed Alarm; Fall Risk   Pain Assessment   Pain Assessment No/denies pain   Home Living   Type of Home Apartment; Other (Comment)  (senior housing, 2nd floor apt; no elevator)   [address] [facility] to enter with rails; Able to live on main level with bedroom/bathroom; One level; Other (Comment)  (9 PRISCILLA from back; 19 PRISCILLA from front)   Bathroom Shower/Tub Tub/shower unit   [facility] Grab bars in shower; Shower chair;Grab bars around toilet;Hand-held shower   [address] Walker;Quad cane;Grab bars; Other (Comment)  (rollator (walker w/ seat); pt reports not a good one)   Additional Comments Pt reports living alone in senior 2nd floor apt     Prior Function   Level of [place] Independent with ADLs and functional mobility   Lives With Alone   Receives Help From Other (Comment)  (pt reports working w/ insurance to have HHA)   ADL Assistance Independent   IADLs Needs assistance  (does not drivel having trouble getting to grocery store)   Falls in the last 6 months 0  (pt reports not falls in past year)   Vocational On disability  (pt reports has not worked due to bipolar)   Comments Pt reports I w/ ADL PTA, and ambulating w/ walker inside her apr, and using quad cane for community mobility  Pt reports taking bus  Lifestyle   Autonomy Pt reports I w/ ADL/IADL PTA except driving  Pt reports difficulty getting to grocery store recently   Reciprocal Relationships Pt reports living alone; limited support network  Pt added that she has been working w/ her insurance to get help (HHA?) at home  Pt reports one close friend, Eugenia Dover that she trusts   Service to Others Pt reports on disability  Pt added that she has never worked due bipolar disorder, and does not tolerate people   Intrinsic Gratification Pt reports enjoying word searches, and sitting outside  Pt added that she keeps to herself   ADL   Grooming Assistance 4  Minimal Assistance   Grooming Deficit Setup;Steadying;Standing with assistive device   UB Dressing Assistance 5  Supervision/Setup    [address]  Supervision/Setup   LB Dressing Deficit Setup; Increased time to complete; Don/doff R sock; Don/doff L sock; Other (Comment)  (seated on sofa (pt reports it is height of her bed at home))   Bed Mobility   Supine to Sit Unable to assess   Sit to Supine Unable to assess   Additional Comments Pt seated OOB in chair upon therapist arrival and post eval w/ call bell in reach and chair alarm activated   Transfers   Sit to Stand 4  Minimal assistance   Additional items Assist x 1; Armrests; Verbal cues; Increased time required   Stand to Sit 5  Supervision   Additional items Assist x 1;Verbal cues;Armrests   Stand pivot 5  Supervision   Additional items Assist x 1; Increased time required  (using RW)   Additional Comments functional transfer chair to sofa using RW   Balance   Static Sitting Fair +   Dynamic Sitting Fair   Static Standing Poor +   Activity Tolerance   Activity Tolerance Patient tolerated treatment well   Medical Staff Made Aware spoke to [place], [facility]   Nurse Made Aware per RNBere to see   RUE Assessment   RUE Assessment WFL  (limited end range shoulder, able to complete ADL)   RUE Strength   RUE Overall Strength Within Functional Limits - able to perform ADL tasks with strength   LUE Assessment   LUE Assessment WFL  (limited end range shoulder, able to complete ADL)   LUE Strength   LUE Overall Strength Within Functional Limits - able to perform ADL tasks with strength   Hand Function   Fine Motor Coordination Functional  (right hand dominance)   Sensation   Light Touch No apparent deficits  (R/L UE/LE)   Vision-Basic Assessment   Current Vision Wears glasses all the time   Cognition   Overall Cognitive Status Impaired   Arousal/Participation Alert; Cooperative   Attention Within functional limits   Orientation Level Oriented to person;Oriented to place;Oriented to situation   Memory Decreased recall of precautions   Following Commands Follows one step commands with increased time or repetition   Comments Pt agreed to participate in OT eval  Pt able to provide limited social history  Pt able to report home set- up  Pt oriented to person, place, and situation  Provided pt with word searches to work on while in acute care  Assessment   Limitation Decreased ADL status; Decreased Safe judgement during ADL;Decreased endurance;Decreased cognition;Decreased high-level ADLs; Decreased self-care trans   Assessment Pt is a 66 yo female [facility]  Pt presents w/ sympotatic anemia, and significant PMH impacting her occpational performance including hx B TKA (pt reports 5 years ago), bipolar disorder, DM, obesity  Pt reports living alone in senior apt on the 2nd floor w/ 9 PRISCILLA from back, and 19 PRISCILLA from front PTA  Pt reports living alone and I w/ ADL/IADL except driving  Pt reports ambulating w/ walker w/ seat inside apt, and quad cane in the community   Pt demonstrated B UE AROM AG limited end range of shoulders, but able to complete ADL w/ ROM  Pt reports right hand dominance  Pt completed functional transfers using RW chair to sofa w/ min A  Pt completed LBD w/ S after set- up while seated on sofa to don / doff socks  Pt complete grooming w/ min A standing w/ AD after set up  Assist for steaying / balance  Pt alert and cooperative, and able to provide limited social history  Pt reports limited support at home  Pt added that she has been workind w/ insurance /  to get assist at home due to difficulty getting to the grocery store for food  Pt does not drive, and reports taking the bus  Pt presents w/ decreased dynamic sitting balance, decreased standing balance, decreased standing tolerance, limited insight into deficits, decreased endurance, decreased strength, and decreased activity tolerance impacting her I w/ dressing, bathing, oral hygiene, clothing mgmt, functional mobility, community mobility, food prep / clean up  Pt would benefit from OT while in acute care to address deficits, and post acute rehab when medically stable for discharge from acute care to return to [facility] level of I  From an OT perspective, pt would benefit from [facility] / additional assistance / Preethi Haines following post acute rehab to return to OF  Will continue to follow pt in acute care   Goals   Patient Goals to go home   Recommendation   OT Discharge Recommendation Other (Comment)  (post acute rehab)   OT - OK to Discharge No  (unless to post acute rehab)   Barthel Index   Feeding 10   Bathing 0   Grooming Score 5   Dressing Score 5   Bladder Score 10   Bowels Score 10   Toilet Use Score 5   Transfers (Bed/Chair) Score 10   Mobility (Level Surface) Score 0   Stairs Score 0   Barthel Index Score 55    Lucrecia Santo OT    Pt goals to be met in 7 days:  1  Pt will complete functional transfers to bed, chair, and toilet w/ S using AD  2  Pt will complete functional tub transfer to tub seat w/ back w/ S using AD  3   Pt will demonstrate good attention and verbalize understanding of safety precautions for ADL performance  4  Pt will consistently demonstrate understanding of safety precautions during ADL performance  5  Pt will demonstrate good attention and verbalize understanding of B UE Therex / HEP to increased strength for I w/ ADL/IADL performance  6  Pt will demonstrate understanding of B UE HEP to increase strength to 4/5 for ADL performance  7  Pt will complete UBD w/ mof I after set- up  8  Pt will complete LBD w/ mod I after set- up  9  Pt will complete grooming standing at sink w/ mod I after set- up and will tolerate standing for at least 10 minutes w/ fair + balance  10  Pt will demonstrate good attention and participation in continued evaluation to assess functional cognitive skills to assist in safe discharge planning    11  Pt will complete UB/LB bathing w/ mod I after set- up  Jose Rom, OT

## 2017-10-20 NOTE — PLAN OF CARE
Problem: PHYSICAL THERAPY ADULT  Goal: Performs mobility at highest level of function for planned discharge setting  See evaluation for individualized goals  Treatment/Interventions: Functional transfer training, LE strengthening/ROM, Therapeutic exercise, Elevations, Endurance training, Cognitive reorientation, Patient/family training, Equipment eval/education, Bed mobility, Gait training, Spoke to nursing, Spoke to advanced practitioner  Equipment Recommended: Julia Conti       See flowsheet documentation for full assessment, interventions and recommendations  Prognosis: Guarded  Problem List: Decreased endurance, Impaired balance, Decreased mobility, Decreased coordination, Decreased safety awareness, Impaired sensation, Obesity, Decreased skin integrity     Recommendation: Short-term skilled PT          See flowsheet documentation for full assessment

## 2017-10-20 NOTE — DISCHARGE SUMMARY
[facility]    Patient Information: Richard Gan 67 y o  female MRN: [id number]  Unit/Bed#: -01 Encounter: [id number]    Discharging Physician / Practitioner: Anil Garner PA-C  PCP: Shane Pabon MD  Admission Date: 10/18/2017  Discharge Date: 10/20/17    Reason for Admission: Symptomatic Anemia     Discharge Diagnoses:     Principal Problem:    Symptomatic anemia  Active Problems:    Diabetes mellitus ([place] [address] )    NSTEMI (non-ST elevated myocardial infarction) ([place] [address] )    Pancytopenia ([place] [address] )    Cirrhosis of liver (HCC)    Bipolar 1 disorder ([place] [address] )    Hyperlipidemia    Asthma    Lactic acidosis  Resolved Problems:    * No resolved hospital problems  *      Consultations During Hospital Stay:  · Gastroenterology - Dr Naqvi Him   · Hematology - Dr Genevieve Santiago     Procedures Performed:     · CT abdomen/pelvis without contast    Significant Findings:     · CT abdomen/pelvis without contrast: Cirrhosis with portal hypertension including recanalization of the umbilical vein, mild perihepatic ascites, and splenomegaly  Midline and right-sided ventral hernias  The midline hernia contains venous collaterals  Diverticulosis coli without CT evidence of diverticulitis     Incidental Findings:   · As above      Test Results Pending at Discharge (will require follow up):   · AFP  · Ceruloplasmin  · Alpha-1-Antitrypsin  · Antimitochondrial Antibody  · Anti-smooth muscle antibody, IgG  · Haptoglobin  · LDH     Outpatient Tests Requested:  · As per consulted teams     Complications:  None    Hospital Course:     Richard Gan is a 67 y o  female patient who originally presented to the hospital on 10/18/2017 due to symptomatic anemia  The patient multiple testing on admission for cause of anemia and did have some schistocytes on her peripheral smear however Stacia test was negative    On the 1st day of her hospitalization the patient was incredibly anxious and tearful and related that she only wanted to know if she had cancer or not and had 1st refused further workup and did not want to have an IV or telemetry on  After discussion with the patient she was amicable to have an IV site as well as open to a GI and Hematology evaluation as well as further blood work  Incidentally she was found have cirrhosis of liver, however her chronic hepatitis panel was negative however other liver studies are still pending at this time  Her hemoglobin had responded without any intervention and Hematology recommended she was given IV Venofer prior to discharge  Hematology stated he would follow up with her in the office to arrange for a bone biopsy if the patient would be amicable to such to rule out any type of bone or blood malignancy  After further discussion with myself and Gastroenterology patient was agreeable to follow up with GI outpatient for further scoping with an EGD and colonoscopy to rule out any gastroenterological cancers  Given her hemoglobin had responded she was discharged home with VNA and outpatient follow-up  The patient was offered short-term rehab, however she refused and does want to go home  This was discussed with the patient as well as her [place] mental or at bedside  Condition at Discharge: stable     Discharge Day Visit / Exam:     Subjective:  Patient no complaints day of discharge and happy to go home  She denies chest pain or dizziness  She denies weakness  She denies difficulty breathing  She denies abdominal pain, nausea, vomiting, diarrhea  Vitals: Blood Pressure: 138/65 (10/20/17 0700)  Pulse: 82 (10/20/17 0700)  Temperature: 98 2 °F (36 8 °C) (10/20/17 0700)  Temp Source: Oral (10/20/17 0700)  Respirations: 18 (10/20/17 0700)  Height: 5' (152 4 cm) (10/19/17 1353)  Weight - Scale: 77 5 kg (170 lb 13 7 oz) (10/18/17 0027)  SpO2: 98 % (10/20/17 0700)  Exam:   Physical Exam   Constitutional: She is oriented to person, place, and time   Vital signs are normal  She appears well-developed and well-nourished  Non-toxic appearance  No distress  HENT:   Head: Normocephalic and atraumatic  Eyes: Conjunctivae and EOM are normal  Pupils are equal, round, and reactive to light  Neck: Neck supple  Cardiovascular: Normal rate, regular rhythm, S1 normal, S2 normal, normal heart sounds and intact distal pulses  Exam reveals no S3 and no S4  No murmur heard  Pulmonary/Chest: Effort normal and breath sounds normal  No accessory muscle usage  No respiratory distress  She has no decreased breath sounds  She has no wheezes  She has no rhonchi  She has no rales  She exhibits no tenderness  Abdominal: Soft  Bowel sounds are normal  She exhibits no distension and no mass  There is no tenderness  There is no rigidity, no rebound and no guarding  Neurological: She is alert and oriented to person, place, and time  Skin: Skin is warm and dry  Discharge instructions/Information to patient and family:   See after visit summary for information provided to patient and family  Provisions for Follow-Up Care:  See after visit summary for information related to follow-up care and any pertinent home health orders  Disposition:     Home with VNA Services (Reminder: Complete face to face encounter)    For Discharges to   [address] SN[facility]:   · Not Applicable to this Patient - Not Applicable to this Patient    Planned Readmission: None     Discharge Statement:  I spent 60 minutes discharging the patient  This time was spent on the day of discharge  I had direct contact with the patient on the day of discharge  Greater than 50% of the total time was spent examining patient, answering all patient questions, arranging and discussing plan of care with patient as well as directly providing post-discharge instructions  Additional time then spent on discharge activities      Discharge Medications:  See after visit summary for reconciled discharge medications provided to patient and family  ** Please Note: Dragon 360 Dictation voice to text software may have been used in the creation of this document   **

## 2017-10-20 NOTE — PROGRESS NOTES
Progress Note -  Hematology/Oncology   Darryl Cueva 67 y o  female MRN: [id number]  Unit/Bed#: -01 Encounter: [id number]     CC: Symptomatic Anemia  HPI: Darryl Cueva is a 67y o  year old female with a history of DM, HTN, HLD, asthma, Bipolar disorder who was admitted for several month history of fatigue,weakness  She also has a significant unexplained weight loss (~60 pounds over 9 months)    Assessment/Plan:   #1 NIKOLAI, unclear etiology, poss 2/2 GI losses vs MDS/malignancy   -Admission hemoglobin 8 8 with subsequent drop to 7 6 o/ 24 H  MCV normal, RDW elevated (18 2), vitamin B12 normal  -Awaiting FOBT, LDH  Schistocytes + on smear but meghna negative  Haptoglobin pending    -She initially refused GI work up with EGD and colonoscopy but she has now agreed to outpatient work up  She will follow up with GI within 2 weeks for procedures  -Iron studies show Iron deficiency, recommend 200 mg IV Venofer this admission with daily po iron supplementation outpatient if she is amenable  -She has a follow up appointment in Hematology on 11/8/2017 at 1:30 PM at [facility]  #2 Thrombocytopenia, poss 2/2 #3  -Admission platelets 06N, have been stable since admission  -suspect thrombocytopenia 2/2 #3, however GIB remains in the differential as well until ruled out by EGD and colonoscopy which will be performed outpatient after discharge  -MDS is also in the differential, however patient is not sure she wants a bone marrow biopsy for definitive diagnosis  She wants to consider and follow up outpatient for further discussion    -Transfuse to keep hemoglobin >7g/dL, platelets >89O or sooner if symptomatic (although she stated to me she does not want a transfusion, even for symptom control)      #3 New diagnosis cirrhosis w/ portal hypertension (this admission)  -CT shows splenomegaly and cirrhosis with portal hypertension  -LFT are normal  -AFP pending  -GI following, she will follow up outpatient for pending laboratory results  Discussed with the primary team [facility] GENEVA Pacheco Dr is the on-call medical oncologist this weekend (starting 10/20 at [address]) if you have any questions  Otherwise, if she remains inpatient on Monday I will resume follow up with her at that time  Subjective:  No complaints today, she reports feeling less fatigued since admission  She is eating without difficulty or GI symptoms  No SOB, chest pain or palpitaions  Review of Systems   All other systems reviewed and are negative  Objective:  /65   Pulse 82   Temp 98 2 °F (36 8 °C) (Oral)   Resp 18   Ht 5' (1 524 m)   Wt 77 5 kg (170 lb 13 7 oz)   SpO2 98%   BMI 33 37 kg/m²     Physical Exam   Constitutional: She is oriented to person, place, and time  HENT:   Mouth/Throat: Oropharynx is clear and moist    Eyes: No scleral icterus  Neck: Normal range of motion  Cardiovascular: Normal rate and regular rhythm  Pulmonary/Chest: Effort normal and breath sounds normal  No respiratory distress  She has no wheezes  She exhibits no tenderness  Abdominal: Soft  Bowel sounds are normal  She exhibits no distension  There is no tenderness  Musculoskeletal: She exhibits no edema or tenderness  Lymphadenopathy:     She has no cervical adenopathy  Neurological: She is alert and oriented to person, place, and time  Skin: Skin is warm and dry  Recent Labs      10/18/17   1517  10/19/17   0544  10/20/17   0604   WBC  3 42*  2 90*  3 42*   HGB  8 8*  7 6*  8 1*   PLT  84*  76*  85*   MCV  86  87  87   RDW  18 2*  18 4*  18 2*   CREATININE  0 95  0 71  0 59*   AST  38  33  34   ALT  42  37  38     Laboratory studies were reviewed    Imaging Studies:        Pathology: None    Code Status: Level 3 - DNAR and DNI    Counseling / Coordination of Care  Total floor / unit time spent today 20 minutes   Greater than 50% of total time was spent with the patient and / or family counseling and / or coordination of care

## 2017-10-20 NOTE — PLAN OF CARE
Problem: PHYSICAL THERAPY ADULT  Goal: Performs mobility at highest level of function for planned discharge setting  See evaluation for individualized goals  Treatment/Interventions: Functional transfer training, LE strengthening/ROM, Therapeutic exercise, Elevations, Endurance training, Cognitive reorientation, Patient/family training, Equipment eval/education, Bed mobility, Gait training, Spoke to nursing, Spoke to advanced practitioner  Equipment Recommended: Harvey Phoenix       See flowsheet documentation for full assessment, interventions and recommendations  Outcome: Progressing  Prognosis: Guarded  Problem List: Decreased endurance, Impaired balance, Decreased mobility, Decreased coordination, Decreased safety awareness, Impaired sensation, Obesity, Decreased skin integrity  Assessment: Pt was able to amb further distances using rolling walker during treatment session, however she still displays substantial ataxia --necessitating the   Barriers to Discharge: Decreased caregiver support, Inaccessible home environment (10-19 steps to enter)     Recommendation: Short-term skilled PT          See flowsheet documentation for full assessment

## 2017-10-20 NOTE — PHYSICAL THERAPY NOTE
PHYSICAL THERAPY EVALUATION  NAME:  Batsheva Coreas  DATE: [birth year]    AGE:   67 y o  Mrn:   [id number]  ADMIT DX:  Weakness generalized [R53 1]  Weakness [R53 1]  Urinary tract infection [N39 0]  Elevated troponin [R74 8]    Past Medical History:   Diagnosis Date    Asthma     Diabetes mellitus ([facility] [address] )     Hyperlipidemia     Neuropathy      Length Of Stay: 2  PHYSICAL THERAPY EVALUATION :   10/20/17 0846   Note Type   Note type Eval/Treat   Pain Assessment   Pain Assessment No/denies pain   Home Living   Type of Home Apartment   Home Layout Stairs to enter with rails  (10 -19 steps to apt)   Home Equipment Quad cane  (small based)   Additional Comments Rollator--Pt reports is not good   Prior Function   Level of [place] Independent with ADLs and functional mobility   Lives With Alone   ADL Assistance Independent   Falls in the last 6 months 0 per pt, but H&P report pt having at least one fall in last 6 months, and Pt described near miss into walker while using walker  Vocational On disability   Restrictions/Precautions   Weight Bearing Precautions Per Order No   Other Precautions Bed Alarm; Chair Alarm; Fall Risk   General   Additional Pertinent History 67 y o  female  presents with generalized weakness near syncopal episodes X 3 months  Patient is somewhat of a poor historian in the following sequence of events were obtained by EMR review inpatient recollection    Patient admitted to persistent generalized weakness and" passing out episodes" with general malaise and lethargy   Family/Caregiver Present No   Cognition   Overall Cognitive Status Impaired   Arousal/Participation Alert   Orientation Level Oriented to person;Oriented to place;Oriented to situation   Memory Decreased recall of precautions;Decreased recall of recent events   RUE Strength   RUE Overall Strength Within Functional Limits - able to perform ADL tasks with strength   LUE Strength   LUE Overall Strength Within Functional Limits - able to perform ADL tasks with strength   RLE Assessment   RLE Assessment (pitting edema lower leg)   Strength RLE   R Hip Flexion 4+/5   R Knee Extension 4+/5   R Ankle Dorsiflexion 4+/5   LLE Assessment   LLE Assessment (pitting edema lower leg)   Strength LLE   L Hip Flexion 4+/5   L Knee Extension 4+/5   L Ankle Dorsiflexion 4+/5   Coordination   Movements are Fluid and Coordinated 0   Coordination and Movement Description ataxic with ambulation   Sensation X  (WFL vision and hearing)   Light Touch   RLE Light Touch Impaired   RLE Light Touch Comments toes/midfoot   LLE Light Touch Impaired   LLE Light Touch Comments toes/midfoot   Transfers   Sit to Stand 4  Minimal assistance   Additional items Assist x 1; Increased time required;Armrests  (wide SHARON)   Stand to Sit 4  Minimal assistance   Additional items Assist x 1; Increased time required   Ambulation/Elevation   Gait pattern Antalgic; Ataxia; Wide SHARON  (reaching for UE support with non cane hand)   Gait Assistance 3  Moderate assist   Additional items Assist x 1;Verbal cues   Assistive Device Small base quad cane  (set for use in L hand based on base, but pt uses in R hand)   Distance 1' advance retreat, unable to amb further due to loss of balance   Balance   Static Sitting Fair +   Static Standing Poor +  (1 min)   Ambulatory Poor   Endurance Deficit   Endurance Deficit Yes   Endurance Deficit Description limited amb distance and standing tolerance time   Activity Tolerance   Activity Tolerance Patient limited by fatigue   Medical Staff Made Aware spoke to [facility] from [address] spoke to RNs   Assessment   Prognosis Guarded   Problem List Decreased endurance; Impaired balance;Decreased mobility; Decreased coordination;Decreased safety awareness; Impaired sensation;Obesity; Decreased skin integrity   Barriers to Discharge Decreased caregiver support; Inaccessible home environment  (10-19 steps to enter)   Goals   Patient Goals to go home, not rehab   STG Expiration Date 11/03/17   Short Term Goal #1 Pt will perform transfers modified I, amb w/least restrictive device for > 150', perform flight of stairs w/ B rails S, bed mobility modified I, no uncorrected losses of balance with ambulation around and over obsticles  Treatment Day 0   Plan   Treatment/Interventions Functional transfer training;LE strengthening/ROM; Therapeutic exercise;Elevations; Endurance training;Cognitive reorientation;Patient/family training;Equipment eval/education; Bed mobility;Gait training;Spoke to nursing;Spoke to advanced practitioner   PT Frequency 5x/wk   Recommendation   Recommendation Short-term skilled PT   Equipment Recommended Walker   Barthel Index   Feeding 10   Bathing 0   Grooming Score 5   Dressing Score 5   Bladder Score 10   Bowels Score 10   Toilet Use Score 5   Transfers (Bed/Chair) Score 10   Mobility (Level Surface) Score 0   Stairs Score 0   Barthel Index Score 55   (Please find full objective findings from PT assessment regarding body systems outlined above)  Assessment: Pt is a 67 y o  female seen for PT evaluation s/p admit to [facility] on 10/18/2017 w/ Symptomatic anemia  Order placed for PT  Prior to admission, pt was independent w/ all functional mobility w/ intermittent use between her walker (which she dislikes) and her quad cane (set for use for L hand), lives in one floor environment and has 10-19  PRISCILLA  Upon evaluation: Pt requires min A for transfers and mod A for ambulation with her quad cane for only 1' forward and back  Impairments and limitations also listed above, especially due to  weakness, impaired balance, decreased endurance, impaired coordination, gait deviations, decreased activity tolerance, decreased functional mobility tolerance, altered sensation, impaired judgement and fall risk  The following objective measures performed on IE also reveal limitations: Barthel Index 55/100   Pt's clinical presentation is currently unstable/unpredictable seen in pt's presentation of mobility limitations compared to her baseline, coupled with risk for falls due to limited cognition and hx of falls (based on H&P--but pt declined falls), gait deviations and ataxia, plus abnormal labs and current w/o for r/o CA  Pt to benefit from continued skilled PT tx while in hospital and upon DC to address deficits as defined above and maximize level of functional mobility  From PT/mobility standpoint, recommendation at time of d/c would be IP rehab, with walker and OT consult pending progress in order to maximize pt's functional independence and consistency w/ mobility in order to facilitate return to PLOF  Recommend trial with walker next session  Comorbidities affecting pt's physical performance at time of assessment include: DM, HTN, obesity, limited cognition, neuropathy and bipolar disorder  Personal factors affecting pt at time of IE include: steps to enter environment, limited home support, behavioral pattern, inability to navigate community distances, limited insight into impairments and ? hx of falls      Evette Yarbrough, PT, DPT

## 2017-10-20 NOTE — PHYSICAL THERAPY NOTE
PHYSICAL THERAPY NOTE  Patient Name: Doug Martinez  EEKWQ'G Date: 10/20/2017     10/20/17 9221   Pain Assessment   Pain Assessment No/denies pain   Restrictions/Precautions   Weight Bearing Precautions Per Order No   Other Precautions Bed Alarm; Chair Alarm; Fall Risk   General   Chart Reviewed Yes   Response to Previous Treatment Patient reporting fatigue but able to participate  Family/Caregiver Present No   Cognition   Overall Cognitive Status Impaired   Orientation Level Oriented to person;Oriented to place;Oriented to situation   Memory Decreased recall of precautions;Decreased recall of recent events   Subjective   Subjective Pt reports that Her "walker is crap" at home, and reports not liking our walker here either, but is agreeable to trialing it as it's the only walker alternative   Transfers   Sit to Stand 4  Minimal assistance   Additional items Assist x 1; Increased time required;Armrests  (wide SHARON)   Stand to Sit 4  Minimal assistance   Additional items Assist x 1; Increased time required  (wide SHARON)   Ambulation/Elevation   Gait pattern Antalgic; Ataxia; Wide SHARON   Gait Assistance 3  Moderate assist  (for 50% of trial, min A for 50% of trial)   Additional items Assist x 1;Verbal cues   Assistive Device Rolling walker   Distance 25'x2   Balance   Static Sitting Fair +  (one episode of loss of balance while sitting)   Static Standing Poor +   Ambulatory Poor   Endurance Deficit   Endurance Deficit Yes   Endurance Deficit Description limited amb distance and standing tolerance time   Activity Tolerance   Activity Tolerance Patient limited by fatigue   Medical Staff Made Aware spoke to [facility] from [address] spoke to RNs   Assessment   Prognosis Guarded   Problem List Decreased endurance; Impaired balance;Decreased mobility; Decreased coordination;Decreased safety awareness; Impaired sensation;Obesity; Decreased skin integrity   Assessment Pt was able to amb further distances using rolling walker during treatment session, however she still displays substantial ataxia --necessitating the    Barriers to Discharge Decreased caregiver support; Inaccessible home environment  (10-19 steps to enter)   Goals   Patient Goals to go home, not rehab   STG Expiration Date 11/03/17   Plan   Treatment/Interventions Functional transfer training;LE strengthening/ROM; Therapeutic exercise;Elevations; Endurance training;Cognitive reorientation;Patient/family training;Equipment eval/education; Bed mobility;Gait training;Spoke to nursing;Spoke to advanced practitioner   Progress Slow progress, decreased activity tolerance   PT Frequency 5x/wk   Recommendation   Recommendation Short-term skilled PT   Equipment Recommended Walker   Skilled PT recommended while in hospital and upon DC to progress pt toward treatment goals     Mya Fair, PT

## 2017-10-21 LAB
A1AT SERPL-MCNC: 131 MG/DL (ref 90–200)
ACTIN IGG SERPL-ACNC: 13 UNITS (ref 0–19)
AFP-TM SERPL-MCNC: 2.2 NG/ML (ref 0–8.3)
CERULOPLASMIN SERPL-MCNC: 21.1 MG/DL (ref 19–39)
HAPTOGLOB SERPL-MCNC: 38 MG/DL (ref 34–200)
LABORATORY COMMENT REPORT: NORMAL
LDH SERPL-CCNC: 222 IU/L (ref 119–226)
LDH1 CFR SERPL ELPH: 24 % (ref 17–32)
LDH2 CFR SERPL ELPH: 35 % (ref 25–40)
LDH3 CFR SERPL ELPH: 24 % (ref 17–27)
LDH4 CFR SERPL ELPH: 8 % (ref 5–13)
LDH5 CFR SERPL ELPH: 9 % (ref 4–20)
MITOCHONDRIA M2 IGG SER-ACNC: 4.9 UNITS (ref 0–20)

## 2017-10-23 LAB
BACTERIA BLD CULT: NORMAL
BACTERIA BLD CULT: NORMAL
RYE IGE QN: NEGATIVE

## 2017-10-25 NOTE — CASE MANAGEMENT
Notification of Discharge  This is a Notification of Discharge from our facility [address]  Please be advised that this patient has been discharge from our facility  Below you will find the admission and discharge date and time including the patients disposition  PRESENTATION DATE: 10/18/2017  2:01 PM  IP ADMISSION DATE: 10/18/17 1709  DISCHARGE DATE: 10/20/2017  5:40 PM  DISPOSITION: Home with [address] [place] [address] in the [facility] by Romie Finley for 2017  Network Utilization Review Department  Phone: [phone]; Fax [phone]  ATTENTION: The Network Utilization Review Department is now centralized for our 7 Facilities  Make a note that we have a new phone and fax numbers for our Department  Please call with any questions or concerns to [phone] and carefully follow the prompts so that you are directed to the right person  All voicemails are confidential  Fax any determinations, approvals, denials, and requests for initial or continue stay review clinical to [phone]  Due to HIGH CALL volume, it would be easier if you could please send faxed requests to expedite your requests and in part, help us provide discharge notifications faster

## 2017-11-03 ENCOUNTER — HOSPITAL ENCOUNTER (OUTPATIENT)
Facility: HOSPITAL | Age: 72
Setting detail: OBSERVATION
Discharge: LEFT BEFORE DISCHARGE | End: 2017-11-04
Attending: EMERGENCY MEDICINE | Admitting: INTERNAL MEDICINE
Payer: COMMERCIAL

## 2017-11-03 ENCOUNTER — GENERIC CONVERSION - ENCOUNTER (OUTPATIENT)
Dept: OTHER | Facility: OTHER | Age: 72
End: 2017-11-03

## 2017-11-03 DIAGNOSIS — R53.1 WEAKNESS: ICD-10-CM

## 2017-11-03 DIAGNOSIS — K74.60 CIRRHOSIS OF LIVER (HCC): Chronic | ICD-10-CM

## 2017-11-03 DIAGNOSIS — R77.8 ELEVATED TROPONIN: ICD-10-CM

## 2017-11-03 DIAGNOSIS — R19.7 DIARRHEA: Primary | ICD-10-CM

## 2017-11-03 LAB
ABO GROUP BLD: NORMAL
ALBUMIN SERPL BCP-MCNC: 3.1 G/DL (ref 3.5–5)
ALP SERPL-CCNC: 75 U/L (ref 46–116)
ALT SERPL W P-5'-P-CCNC: 44 U/L (ref 12–78)
ANION GAP SERPL CALCULATED.3IONS-SCNC: 8 MMOL/L (ref 4–13)
APTT PPP: 37 SECONDS (ref 23–35)
AST SERPL W P-5'-P-CCNC: 46 U/L (ref 5–45)
BASOPHILS # BLD AUTO: 0.03 THOUSANDS/ΜL (ref 0–0.1)
BASOPHILS NFR BLD AUTO: 1 % (ref 0–1)
BILIRUB DIRECT SERPL-MCNC: 0.27 MG/DL (ref 0–0.2)
BILIRUB SERPL-MCNC: 0.7 MG/DL (ref 0.2–1)
BLD GP AB SCN SERPL QL: NEGATIVE
BUN SERPL-MCNC: 14 MG/DL (ref 5–25)
CALCIUM SERPL-MCNC: 9.9 MG/DL (ref 8.3–10.1)
CHLORIDE SERPL-SCNC: 104 MMOL/L (ref 100–108)
CO2 SERPL-SCNC: 24 MMOL/L (ref 21–32)
CREAT SERPL-MCNC: 0.66 MG/DL (ref 0.6–1.3)
EOSINOPHIL # BLD AUTO: 0.18 THOUSAND/ΜL (ref 0–0.61)
EOSINOPHIL NFR BLD AUTO: 4 % (ref 0–6)
ERYTHROCYTE [DISTWIDTH] IN BLOOD BY AUTOMATED COUNT: 19.8 % (ref 11.6–15.1)
GFR SERPL CREATININE-BSD FRML MDRD: 88 ML/MIN/1.73SQ M
GLUCOSE SERPL-MCNC: 103 MG/DL (ref 65–140)
GLUCOSE SERPL-MCNC: 103 MG/DL (ref 65–140)
GLUCOSE SERPL-MCNC: 124 MG/DL (ref 65–140)
HCT VFR BLD AUTO: 31.4 % (ref 34.8–46.1)
HGB BLD-MCNC: 9.7 G/DL (ref 11.5–15.4)
INR PPP: 1.21 (ref 0.86–1.16)
LYMPHOCYTES # BLD AUTO: 0.88 THOUSANDS/ΜL (ref 0.6–4.47)
LYMPHOCYTES NFR BLD AUTO: 22 % (ref 14–44)
MCH RBC QN AUTO: 27.2 PG (ref 26.8–34.3)
MCHC RBC AUTO-ENTMCNC: 30.9 G/DL (ref 31.4–37.4)
MCV RBC AUTO: 88 FL (ref 82–98)
MONOCYTES # BLD AUTO: 0.43 THOUSAND/ΜL (ref 0.17–1.22)
MONOCYTES NFR BLD AUTO: 11 % (ref 4–12)
NEUTROPHILS # BLD AUTO: 2.56 THOUSANDS/ΜL (ref 1.85–7.62)
NEUTS SEG NFR BLD AUTO: 62 % (ref 43–75)
PLATELET # BLD AUTO: 102 THOUSANDS/UL (ref 149–390)
PMV BLD AUTO: 11.7 FL (ref 8.9–12.7)
POTASSIUM SERPL-SCNC: 4.4 MMOL/L (ref 3.5–5.3)
PROT SERPL-MCNC: 7.3 G/DL (ref 6.4–8.2)
PROTHROMBIN TIME: 15.7 SECONDS (ref 12.1–14.4)
RBC # BLD AUTO: 3.56 MILLION/UL (ref 3.81–5.12)
RH BLD: POSITIVE
SODIUM SERPL-SCNC: 136 MMOL/L (ref 136–145)
SPECIMEN EXPIRATION DATE: NORMAL
TROPONIN I SERPL-MCNC: 0.1 NG/ML
TSH SERPL DL<=0.05 MIU/L-ACNC: 5.87 UIU/ML (ref 0.36–3.74)
WBC # BLD AUTO: 4.08 THOUSAND/UL (ref 4.31–10.16)

## 2017-11-03 PROCEDURE — 80048 BASIC METABOLIC PNL TOTAL CA: CPT | Performed by: EMERGENCY MEDICINE

## 2017-11-03 PROCEDURE — 86901 BLOOD TYPING SEROLOGIC RH(D): CPT | Performed by: EMERGENCY MEDICINE

## 2017-11-03 PROCEDURE — 85610 PROTHROMBIN TIME: CPT | Performed by: EMERGENCY MEDICINE

## 2017-11-03 PROCEDURE — 85025 COMPLETE CBC W/AUTO DIFF WBC: CPT | Performed by: EMERGENCY MEDICINE

## 2017-11-03 PROCEDURE — 86850 RBC ANTIBODY SCREEN: CPT | Performed by: EMERGENCY MEDICINE

## 2017-11-03 PROCEDURE — 36415 COLL VENOUS BLD VENIPUNCTURE: CPT | Performed by: EMERGENCY MEDICINE

## 2017-11-03 PROCEDURE — 99285 EMERGENCY DEPT VISIT HI MDM: CPT

## 2017-11-03 PROCEDURE — 86900 BLOOD TYPING SEROLOGIC ABO: CPT | Performed by: EMERGENCY MEDICINE

## 2017-11-03 PROCEDURE — 84484 ASSAY OF TROPONIN QUANT: CPT | Performed by: EMERGENCY MEDICINE

## 2017-11-03 PROCEDURE — 93005 ELECTROCARDIOGRAM TRACING: CPT | Performed by: EMERGENCY MEDICINE

## 2017-11-03 PROCEDURE — 82948 REAGENT STRIP/BLOOD GLUCOSE: CPT

## 2017-11-03 PROCEDURE — 84443 ASSAY THYROID STIM HORMONE: CPT | Performed by: EMERGENCY MEDICINE

## 2017-11-03 PROCEDURE — 85730 THROMBOPLASTIN TIME PARTIAL: CPT | Performed by: EMERGENCY MEDICINE

## 2017-11-03 PROCEDURE — 80076 HEPATIC FUNCTION PANEL: CPT | Performed by: EMERGENCY MEDICINE

## 2017-11-03 RX ORDER — MELATONIN
1000 DAILY
Status: DISCONTINUED | OUTPATIENT
Start: 2017-11-04 | End: 2017-11-04 | Stop reason: HOSPADM

## 2017-11-03 RX ORDER — ATORVASTATIN CALCIUM 40 MG/1
40 TABLET, FILM COATED ORAL
Status: DISCONTINUED | OUTPATIENT
Start: 2017-11-04 | End: 2017-11-04 | Stop reason: HOSPADM

## 2017-11-03 RX ORDER — ALBUTEROL SULFATE 2.5 MG/3ML
2.5 SOLUTION RESPIRATORY (INHALATION) 3 TIMES DAILY PRN
Status: DISCONTINUED | OUTPATIENT
Start: 2017-11-03 | End: 2017-11-04 | Stop reason: HOSPADM

## 2017-11-03 RX ORDER — BUPROPION HYDROCHLORIDE 150 MG/1
300 TABLET ORAL EVERY MORNING
Status: DISCONTINUED | OUTPATIENT
Start: 2017-11-04 | End: 2017-11-04 | Stop reason: HOSPADM

## 2017-11-03 RX ORDER — SODIUM CHLORIDE 9 MG/ML
100 INJECTION, SOLUTION INTRAVENOUS CONTINUOUS
Status: DISCONTINUED | OUTPATIENT
Start: 2017-11-03 | End: 2017-11-04 | Stop reason: HOSPADM

## 2017-11-03 RX ORDER — LISINOPRIL 2.5 MG/1
2.5 TABLET ORAL EVERY MORNING
Status: DISCONTINUED | OUTPATIENT
Start: 2017-11-04 | End: 2017-11-04 | Stop reason: HOSPADM

## 2017-11-03 RX ORDER — PANTOPRAZOLE SODIUM 40 MG/1
40 TABLET, DELAYED RELEASE ORAL
Status: DISCONTINUED | OUTPATIENT
Start: 2017-11-04 | End: 2017-11-04 | Stop reason: HOSPADM

## 2017-11-03 RX ORDER — ROPINIROLE 0.25 MG/1
0.5 TABLET, FILM COATED ORAL 2 TIMES DAILY
Status: DISCONTINUED | OUTPATIENT
Start: 2017-11-03 | End: 2017-11-04 | Stop reason: HOSPADM

## 2017-11-03 RX ORDER — MONTELUKAST SODIUM 10 MG/1
10 TABLET ORAL
Status: DISCONTINUED | OUTPATIENT
Start: 2017-11-03 | End: 2017-11-04 | Stop reason: HOSPADM

## 2017-11-03 RX ORDER — FERROUS SULFATE 325(65) MG
325 TABLET ORAL 2 TIMES DAILY WITH MEALS
Status: DISCONTINUED | OUTPATIENT
Start: 2017-11-04 | End: 2017-11-04 | Stop reason: HOSPADM

## 2017-11-03 RX ORDER — ASPIRIN 81 MG/1
81 TABLET, CHEWABLE ORAL DAILY
Status: DISCONTINUED | OUTPATIENT
Start: 2017-11-04 | End: 2017-11-04 | Stop reason: HOSPADM

## 2017-11-03 RX ORDER — ACETAMINOPHEN 325 MG/1
650 TABLET ORAL EVERY 6 HOURS PRN
Status: DISCONTINUED | OUTPATIENT
Start: 2017-11-03 | End: 2017-11-04 | Stop reason: HOSPADM

## 2017-11-03 RX ORDER — ONDANSETRON 2 MG/ML
4 INJECTION INTRAMUSCULAR; INTRAVENOUS EVERY 6 HOURS PRN
Status: DISCONTINUED | OUTPATIENT
Start: 2017-11-03 | End: 2017-11-04 | Stop reason: HOSPADM

## 2017-11-03 RX ADMIN — INSULIN DETEMIR 40 UNITS: 100 INJECTION, SOLUTION SUBCUTANEOUS at 22:45

## 2017-11-03 RX ADMIN — ROPINIROLE 0.5 MG: 0.25 TABLET, FILM COATED ORAL at 22:36

## 2017-11-03 RX ADMIN — SODIUM CHLORIDE 1000 ML: 0.9 INJECTION, SOLUTION INTRAVENOUS at 18:49

## 2017-11-03 RX ADMIN — ACETAMINOPHEN 650 MG: 325 TABLET ORAL at 23:59

## 2017-11-03 RX ADMIN — SODIUM CHLORIDE 100 ML/HR: 0.9 INJECTION, SOLUTION INTRAVENOUS at 22:43

## 2017-11-03 RX ADMIN — FLUTICASONE PROPIONATE AND SALMETEROL 1 PUFF: 50; 100 POWDER RESPIRATORY (INHALATION) at 22:36

## 2017-11-03 RX ADMIN — MONTELUKAST SODIUM 10 MG: 10 TABLET, FILM COATED ORAL at 22:36

## 2017-11-03 NOTE — H&P
History and Physical - [facility]    Patient Information: Faheem Mendez 67 y o  female MRN: [id number]  Unit/Bed#: ED 26 Encounter: [id number]  Admitting Physician: Isaac Ching PA-C  PCP: Karon Castro MD  Date of Admission:  11/03/17    Assessment/Plan:    Hospital Problem List:     Principal Problem:    Intractable diarrhea  Active Problems:    Diabetes mellitus ([place] [address] )    Pancytopenia ([place] [address] )    Cirrhosis of liver ([place] [address] )    Bipolar 1 disorder ([place] [address] )    Hyperlipidemia      Plan for the Primary Problem(s):  · Intractable Diarrhea   · Admit patient to med/surgical under observation status  · POA: Multiple episodes of diarrhea for the last 3 days with 1 week's worth of nausea, poor appetite, only able to tolerate ginger ale, abdominal exam and CT scan non-focal   · Check C  Diff PCR  · Check Stool Cx  · Check fecal leukocytes   · Hydrate patient with  cc/hr  · Clear liquid diet   · Monitor vitals   · Can add Imodium once bacterial/infectious etiology ruled out     Plan for Additional Problems:   · Type 2 Diabetes Mellitus with polyneuropathy, with long term current use of insulin   · Continue Levemir 40 U BID   · Hold meal time Humalog   · SSI TID with meals ordered by weight   · Q6 accuchecks, hypoglycemia protocol   · Cirrhosis of Liver   · Stable appearance on CT   · She has GI follow up arranged  · Pancytopenia  · Counts have improved from last admission, currently refusing work up   · BPD  · Continue Wellbutrin and Ropinirole     VTE Prophylaxis: Enoxaparin (Lovenox)  / sequential compression device   Code Status: DNR/DNI  POLST: POLST form is not discussed and not completed at this time  Anticipated Length of Stay:  Patient will be admitted on an Observation basis with an anticipated length of stay of  Less than 2 midnights  Justification for Hospital Stay: intractable diarrhea     Total Time for Visit, including Counseling / Coordination of Care: 1 hour    Greater than 50% of this total time spent on direct patient counseling and coordination of care  Chief Complaint:   "I cannot stop going to the bathroom"    History of Present Illness:    Harriet Camarillo is a 67 y o  female with a history of cirrhosis of the liver, pancytopenia, and type 2 diabetes mellitus who presents with diarrhea for the last 3 days  The patient reports that her symptoms began with nausea roughly 1 week ago and she reports that due to this she has not had a good appetite since  She reports that she gets meals on wheels and has not been eating the meals that she gets  She reports that she has only been able to tolerate ginger ale  She reports that 3 days ago she started with watery diarrhea  She states that she is going roughly 5-6 times per day  She denies blood in the stool  She reports that she had been taking imodium each day and taking the maximum safe dose of 4 tablets per day, however this has not afforded her any relief  She denies abdominal pain  She denies fevers or chills  She denies recent travel as she states that she is only able to get around her apartment with her walker  She denies recent antibiotic use  She denies chest pain or difficulty breathing  Review of Systems:    Review of Systems   Constitutional: Positive for unexpected weight change  Negative for appetite change, chills, diaphoresis, fatigue and fever  HENT: Negative for congestion, rhinorrhea and sore throat  Eyes: Negative for visual disturbance  Respiratory: Negative for cough, chest tightness, shortness of breath and wheezing  Cardiovascular: Negative for chest pain, palpitations and leg swelling  Gastrointestinal: Positive for diarrhea and nausea  Negative for abdominal pain, blood in stool, constipation and vomiting  Genitourinary: Negative for dysuria  Musculoskeletal: Negative for arthralgias and myalgias  Neurological: Negative for dizziness, syncope, weakness, light-headedness, numbness and headaches     All other systems reviewed and are negative  Past Medical and Surgical History:     Past Medical History:   Diagnosis Date    Asthma     Diabetes mellitus ([facility] [address] )     Hyperlipidemia     Neuropathy        Past Surgical History:   Procedure Laterality Date    BACK SURGERY      CHOLECYSTECTOMY      HERNIA REPAIR      TUMOR REMOVAL         Meds/Allergies:    Prior to Admission medications    Medication Sig Start Date End Date Taking?  Authorizing Provider   albuterol (2 5 mg/3 mL) 0 083 % nebulizer solution Take 2 5 mg by nebulization 3 (three) times a day as needed for wheezing    Historical Provider, MD   aspirin 81 MG tablet Take 81 mg by mouth daily    Historical Provider, MD   buPROPion (WELLBUTRIN XL) 300 mg 24 hr tablet Take 300 mg by mouth every morning    Historical Provider, MD   cholecalciferol (VITAMIN D3) 1,000 units tablet Take 1,000 Units by mouth daily    Historical Provider, MD   ferrous sulfate 325 (65 Fe) mg tablet Take 1 tablet by mouth 2 (two) times a day with meals 10/20/17   Pee Brown PA-C   fluticasone-salmeterol (ADVAIR HFA) 115-21 MCG/ACT inhaler Inhale 2 puffs 2 (two) times a day    Historical Provider, MD   insulin detemir (LEVEMIR) 100 units/mL subcutaneous injection Inject 40 Units under the skin 2 (two) times a day    Historical Provider, MD   insulin lispro (HUMALOG KWIKPEN) 100 Units/mL SOPN Inject 17 Units under the skin 3 (three) times a day with meals    Historical Provider, MD   ipratropium (ATROVENT) 0 02 % nebulizer solution Take 0 5 mg by nebulization every 6 (six) hours as needed for wheezing or shortness of breath    Historical Provider, MD   lisinopril (ZESTRIL) 2 5 mg tablet Take 2 5 mg by mouth every morning    Historical Provider, MD   metFORMIN (GLUCOPHAGE) 1000 MG tablet Take 1,000 mg by mouth 2 (two) times a day with meals    Historical Provider, MD   montelukast (SINGULAIR) 10 mg tablet Take 10 mg by mouth daily at bedtime    Historical Provider, MD   pantoprazole (PROTONIX) 40 mg tablet Take 40 mg by mouth daily    Historical Provider, MD   rOPINIRole (REQUIP) 0 5 mg tablet Take 0 5 mg by mouth 2 (two) times a day    Historical Provider, MD   simvastatin (ZOCOR) 40 mg tablet Take 40 mg by mouth daily at bedtime    Historical Provider, MD     I have reviewed home medications with a medical source (PCP, Pharmacy, other)  Allergies: Allergies   Allergen Reactions    Abilify [Aripiprazole]     Celebrex [Celecoxib]     Codeine     Darvon [Propoxyphene]     Ibuprofen     Latuda [Lurasidone]     Nitrofurantoin     Penicillins     Ziprasidone        Social History:     Marital Status: Single   Occupation: Retired  Patient Pre-hospital Living Situation: Home alone   Patient Pre-hospital Level of Mobility: Walker   Patient Pre-hospital Diet Restrictions: None  Substance Use History:   History   Alcohol Use No     History   Smoking Status    Never Smoker   Smokeless Tobacco    Never Used     History   Drug Use No       Family History:    History reviewed  No pertinent family history  Physical Exam:     Vitals:   Blood Pressure: 155/71 (11/03/17 1807)  Pulse: 94 (11/03/17 1807)  Temperature: 98 3 °F (36 8 °C) (11/03/17 1807)  Temp Source: Oral (11/03/17 1807)  Respirations: 20 (11/03/17 1807)  Weight - Scale: 76 kg (167 lb 8 8 oz) (11/03/17 1807)  SpO2: 99 % (11/03/17 1807)    Physical Exam   Constitutional: She is oriented to person, place, and time  Vital signs are normal  She appears well-developed and well-nourished  Non-toxic appearance  She appears distressed (mild)  HENT:   Head: Normocephalic and atraumatic  Mouth/Throat: Mucous membranes are dry  Abnormal dentition  No oropharyngeal exudate, posterior oropharyngeal edema, posterior oropharyngeal erythema or tonsillar abscesses  Eyes: Conjunctivae and EOM are normal  Pupils are equal, round, and reactive to light  Pupils are equal    Neck: Neck supple     Cardiovascular: Normal rate, regular rhythm, S1 normal, S2 normal, normal heart sounds and intact distal pulses  Exam reveals no S3 and no S4  No murmur heard  Pulmonary/Chest: Effort normal and breath sounds normal  No accessory muscle usage  No respiratory distress  She has no decreased breath sounds  She has no wheezes  She has no rhonchi  She has no rales  She exhibits no tenderness  Abdominal: Soft  Bowel sounds are normal  She exhibits distension  She exhibits no mass  There is no tenderness  There is no rigidity, no rebound and no guarding  A hernia is present  Hernia confirmed positive in the ventral area  Neurological: She is alert and oriented to person, place, and time  No cranial nerve deficit  GCS eye subscore is 4  GCS verbal subscore is 5  GCS motor subscore is 6  Skin: Skin is warm and dry  No cyanosis  Additional Data:     Lab Results: I have personally reviewed pertinent reports  Results from last 7 days  Lab Units 11/03/17  1828   WBC Thousand/uL 4 08*   HEMOGLOBIN g/dL 9 7*   HEMATOCRIT % 31 4*   PLATELETS Thousands/uL 102*   NEUTROS PCT % 62   LYMPHS PCT % 22   MONOS PCT % 11   EOS PCT % 4       Results from last 7 days  Lab Units 11/03/17  1828   SODIUM mmol/L 136   POTASSIUM mmol/L 4 4   CHLORIDE mmol/L 104   CO2 mmol/L 24   BUN mg/dL 14   CREATININE mg/dL 0 66   CALCIUM mg/dL 9 9   TOTAL PROTEIN g/dL 7 3   BILIRUBIN TOTAL mg/dL 0 70   ALK PHOS U/L 75   ALT U/L 44   AST U/L 46*   GLUCOSE RANDOM mg/dL 103           Imaging: I have personally reviewed pertinent reports  Ct Abdomen Pelvis Wo Contrast    Result Date: 10/19/2017  Narrative: CT ABDOMEN AND PELVIS WITHOUT IV CONTRAST INDICATION:  Left lower abdominal pain COMPARISON: None  TECHNIQUE:  CT examination of the abdomen and pelvis was performed without intravenous contrast   Reformatted images were created in axial, sagittal, and coronal planes  Radiation dose length product (DLP) for this visit:  732 mGy-cm     This examination, like all CT scans performed in the [facility], was performed utilizing techniques to minimize radiation dose exposure, including the use of iterative reconstruction and automated exposure control  Enteric contrast was administered  FINDINGS: ABDOMEN LOWER CHEST:  No significant abnormalities identified in the lower chest   There is reflux of oral contrast into the distal esophagus  LIVER/BILIARY TREE:  The liver is irregular in contour suggesting cirrhosis  There is recanalization of the umbilical vein  GALLBLADDER:  Gallbladder is surgically absent  SPLEEN:  The spleen is enlarged measuring 14 5 cm craniocaudal dimension  PANCREAS:  Unremarkable  ADRENAL GLANDS:  Unremarkable  KIDNEYS/URETERS:  Unremarkable  No hydronephrosis  STOMACH AND BOWEL:  There is diverticulosis coli without CT evidence of diverticulitis  APPENDIX:  No findings to suggest appendicitis  ABDOMINOPELVIC CAVITY:  There is mild perihepatic ascites  No free intraperitoneal air  No lymphadenopathy  VESSELS:  Unremarkable for patient's age  PELVIS REPRODUCTIVE ORGANS:  Patient is status post hysterectomy  URINARY BLADDER:  Unremarkable  ABDOMINAL WALL/INGUINAL REGIONS:  There is a right-sided fat-containing Spigelian hernia  There also appears to be a small midline ventral hernia which contains venous collaterals  OSSEOUS STRUCTURES:  No acute fracture or destructive osseous lesion  Impression: 1  Cirrhosis with portal hypertension including recanalization of the umbilical vein, mild perihepatic ascites, and splenomegaly  2   Midline and right-sided ventral hernias  The midline hernia contains venous collaterals  3   Diverticulosis coli without CT evidence of diverticulitis  ##sigslh##sigslh Workstation performed: [id number]     Xr Chest 2 Views    Result Date: 10/16/2017  Narrative: CHEST INDICATION:  Weakness  COMPARISON:  None VIEWS:  Frontal and lateral projections IMAGES:  2 FINDINGS:     Cardiomediastinal silhouette appears unremarkable  Subsegmental atelectasis is noted in the anterior right mid chest  The lungs are otherwise clear  No pneumothorax or pleural effusion  Visualized osseous structures appear within normal limits for the patient's age  Impression: No active pulmonary disease  Workstation performed: [id number]     Ct Head Wo Contrast    Result Date: 10/16/2017  Narrative: CT BRAIN - WITHOUT CONTRAST INDICATION:  Change in mental status COMPARISON:  None  TECHNIQUE:  CT examination of the brain was performed  In addition to axial images, coronal reformatted images were created and submitted for interpretation  Radiation dose length product (DLP) for this visit:  1022 mGy-cm   This examination, like all CT scans performed in the [facility], was performed utilizing techniques to minimize radiation dose exposure, including the use of iterative reconstruction and automated exposure control  IMAGE QUALITY:  Diagnostic  FINDINGS:  PARENCHYMA:  No intracranial mass, mass effect or midline shift  No CT signs of acute infarction  There is no parenchymal hemorrhage  VENTRICLES AND EXTRA-AXIAL SPACES:  Age-appropriate volume loss is noted  No hydrocephalus  VISUALIZED ORBITS AND PARANASAL SINUSES:  Unremarkable  CALVARIUM AND EXTRACRANIAL SOFT TISSUES:   Normal      Impression: No acute intracranial abnormality  Workstation performed: [id number]       EKG, Pathology, and Other Studies Reviewed on Admission:   · EKG: CT abdomen, pelvis without contrast: cirrhosis with portal HTN, ascites, and splenomegaly  Hernias with collaterals  Diverticulosis without diverticulitis    All[employer] Records Reviewed: Yes     ** Please Note: Dragon 360 Dictation voice to text software may have been used in the creation of this document   **

## 2017-11-03 NOTE — ED PROVIDER NOTES
History  Chief Complaint   Patient presents with    Diarrhea     Patient was brought home from home via ems with c/o dairrhea x3 days, nauseax1 week  C/o loss of appetite  Patient was here about 2weeks ago but refused sergo admitted, signed AMA  Patient c/o of loosing 60pounds in 8 months  History provided by:  Patient  Diarrhea   Quality:  Watery  Severity:  Moderate  Onset quality:  Gradual  Number of episodes:  Greater than 10 episodes a day,  Duration:  2 months  Timing:  Intermittent  Progression:  Worsening  Relieved by:  Nothing  Worsened by:  Nothing  Ineffective treatments: Patient is take him 7 doses of Imodium today and diarrhea as continue to worsen  Associated symptoms: no abdominal pain, no chills, no recent cough, no diaphoresis, no fever, no headaches, no myalgias, no URI and no vomiting        Prior to Admission Medications   Prescriptions Last Dose Informant Patient Reported? Taking?    albuterol (2 5 mg/3 mL) 0 083 % nebulizer solution   Yes No   Sig: Take 2 5 mg by nebulization 3 (three) times a day as needed for wheezing   aspirin 81 MG tablet   Yes No   Sig: Take 81 mg by mouth daily   buPROPion (WELLBUTRIN XL) 300 mg 24 hr tablet   Yes No   Sig: Take 300 mg by mouth every morning   cholecalciferol (VITAMIN D3) 1,000 units tablet   Yes No   Sig: Take 1,000 Units by mouth daily   ferrous sulfate 325 (65 Fe) mg tablet   No No   Sig: Take 1 tablet by mouth 2 (two) times a day with meals   fluticasone-salmeterol (ADVAIR HFA) 115-21 MCG/ACT inhaler   Yes No   Sig: Inhale 2 puffs 2 (two) times a day   insulin detemir (LEVEMIR) 100 units/mL subcutaneous injection   Yes No   Sig: Inject 40 Units under the skin 2 (two) times a day   insulin lispro (HUMALOG KWIKPEN) 100 Units/mL SOPN   Yes No   Sig: Inject 17 Units under the skin 3 (three) times a day with meals   ipratropium (ATROVENT) 0 02 % nebulizer solution   Yes No   Sig: Take 0 5 mg by nebulization every 6 (six) hours as needed for wheezing or shortness of breath   lisinopril (ZESTRIL) 2 5 mg tablet   Yes No   Sig: Take 2 5 mg by mouth every morning   metFORMIN (GLUCOPHAGE) 1000 MG tablet   Yes No   Sig: Take 1,000 mg by mouth 2 (two) times a day with meals   montelukast (SINGULAIR) 10 mg tablet   Yes No   Sig: Take 10 mg by mouth daily at bedtime   pantoprazole (PROTONIX) 40 mg tablet   Yes No   Sig: Take 40 mg by mouth daily   rOPINIRole (REQUIP) 0 5 mg tablet   Yes No   Sig: Take 0 5 mg by mouth 2 (two) times a day   simvastatin (ZOCOR) 40 mg tablet   Yes No   Sig: Take 40 mg by mouth daily at bedtime      Facility-Administered Medications: None       Past Medical History:   Diagnosis Date    Asthma     Diabetes mellitus ([facility] [address] )     Hyperlipidemia     Neuropathy        Past Surgical History:   Procedure Laterality Date    BACK SURGERY      CHOLECYSTECTOMY      HERNIA REPAIR      TUMOR REMOVAL         History reviewed  No pertinent family history  I have reviewed and agree with the history as documented  Social History   Substance Use Topics    Smoking status: Never Smoker    Smokeless tobacco: Never Used    Alcohol use No        Review of Systems   Constitutional: Negative for activity change, chills, diaphoresis and fever  HENT: Negative for congestion, sinus pressure and sore throat  Eyes: Negative for pain and visual disturbance  Respiratory: Negative for cough, chest tightness, shortness of breath, wheezing and stridor  Cardiovascular: Negative for chest pain and palpitations  Gastrointestinal: Positive for diarrhea  Negative for abdominal distention, abdominal pain, constipation, nausea and vomiting  Genitourinary: Negative for dysuria and frequency  Musculoskeletal: Negative for myalgias, neck pain and neck stiffness  Skin: Negative for rash  Neurological: Negative for dizziness, speech difficulty, light-headedness, numbness and headaches         Physical Exam  ED Triage Vitals   Temperature Pulse Respirations Blood Pressure SpO2   11/03/17 1807 11/03/17 1807 11/03/17 1807 11/03/17 1807 11/03/17 1807   98 3 °F (36 8 °C) 94 20 155/71 99 %      Temp Source Heart Rate Source Patient Position - Orthostatic VS BP Location FiO2 (%)   11/03/17 1807 11/03/17 1807 11/03/17 1807 11/03/17 1807 --   Oral Monitor Lying Right arm       Pain Score       11/03/17 1814       No Pain           Orthostatic Vital Signs  Vitals:    11/03/17 1807   BP: 155/71   Pulse: 94   Patient Position - Orthostatic VS: Lying       Physical Exam   Constitutional: She is oriented to person, place, and time  She appears well-developed  No distress  HENT:   Head: Normocephalic and atraumatic  Eyes: Pupils are equal, round, and reactive to light  Neck: Normal range of motion  Neck supple  No tracheal deviation present  Cardiovascular: Normal rate, regular rhythm, normal heart sounds and intact distal pulses  No murmur heard  Pulmonary/Chest: Effort normal and breath sounds normal  No stridor  No respiratory distress  Abdominal: Soft  She exhibits no distension  There is no tenderness  There is no rebound and no guarding  Musculoskeletal: Normal range of motion  Neurological: She is alert and oriented to person, place, and time  Skin: Skin is warm and dry  She is not diaphoretic  No erythema  No pallor  Psychiatric: She has a normal mood and affect  Vitals reviewed        ED Medications  Medications   sodium chloride 0 9 % bolus 1,000 mL (not administered)       Diagnostic Studies  Results Reviewed     Procedure Component Value Units Date/Time    Hepatic function panel [[id number]]  (Abnormal) Collected:  11/03/17 1828    Lab Status:  Final result Specimen:  Blood from Arm, Left Updated:  11/03/17 1912     Total Bilirubin 0 70 mg/dL      Bilirubin, Direct 0 27 (H) mg/dL      Alkaline Phosphatase 75 U/L      AST 46 (H) U/L      ALT 44 U/L      Total Protein 7 3 g/dL      Albumin 3 1 (L) g/dL     TSH [[id number]]  (Abnormal) Collected:  11/03/17 1828    Lab Status:  Final result Specimen:  Blood from Arm, Left Updated:  11/03/17 1911     TSH 3RD GENERATON 5 874 (H) uIU/mL     Narrative:         Patients undergoing fluorescein dye angiography may retain small amounts of fluorescein in the body for 48-72 hours post procedure  Samples containing fluorescein can produce falsely depressed TSH values  If the patient had this procedure,a specimen should be resubmitted post fluorescein clearance  The recommended reference ranges for TSH during pregnancy are as follows:  First trimester 0 1 to 2 5 uIU/mL  Second trimester  0 2 to 3 0 uIU/mL  Third trimester 0 3 to 3 0 uIU/m      Basic metabolic panel [[id number]] Collected:  11/03/17 1828    Lab Status:  Final result Specimen:  Blood from Arm, Left Updated:  11/03/17 1903     Sodium 136 mmol/L      Potassium 4 4 mmol/L      Chloride 104 mmol/L      CO2 24 mmol/L      Anion Gap 8 mmol/L      BUN 14 mg/dL      Creatinine 0 66 mg/dL      Glucose 103 mg/dL      Calcium 9 9 mg/dL      eGFR 88 ml/min/1 73sq m     Narrative:         National Kidney Disease Education Program recommendations are as follows:  GFR calculation is accurate only with a steady state creatinine  Chronic Kidney disease less than 60 ml/min/1 73 sq  meters  Kidney failure less than 15 ml/min/1 73 sq  meters  Troponin I [[id number]]  (Abnormal) Collected:  11/03/17 1828    Lab Status:  Final result Specimen:  Blood from Arm, Left Updated:  11/03/17 1900     Troponin I 0 10 (H) ng/mL     Narrative:         [employer] Chemistry analyzer 99% cutoff is > 0 04 ng/mL in network labs    o cTnI 99% cutoff is useful only when applied to patients in the clinical setting of myocardial ischemia  o cTnI 99% cutoff should be interpreted in the context of clinical history, ECG findings and possibly cardiac imaging to establish correct diagnosis    o cTnI 99% cutoff may be suggestive but clearly not indicative of a coronary event without the clinical setting of myocardial ischemia      Daniel Rock [[id number]] Updated:  11/03/17 1855    Lab Status:  No result Specimen:  Blood from Arm, Left     APTT [[id number]] Updated:  11/03/17 1855    Lab Status:  No result Specimen:  Blood from Arm, Left     CBC and differential [[id number]]  (Abnormal) Collected:  11/03/17 1828    Lab Status:  Final result Specimen:  Blood from Arm, Left Updated:  11/03/17 1843     WBC 4 08 (L) Thousand/uL      RBC 3 56 (L) Million/uL      Hemoglobin 9 7 (L) g/dL      Hematocrit 31 4 (L) %      MCV 88 fL      MCH 27 2 pg      MCHC 30 9 (L) g/dL      RDW 19 8 (H) %      MPV 11 7 fL      Platelets 170 (L) Thousands/uL      Neutrophils Relative 62 %      Lymphocytes Relative 22 %      Monocytes Relative 11 %      Eosinophils Relative 4 %      Basophils Relative 1 %      Neutrophils Absolute 2 56 Thousands/µL      Lymphocytes Absolute 0 88 Thousands/µL      Monocytes Absolute 0 43 Thousand/µL      Eosinophils Absolute 0 18 Thousand/µL      Basophils Absolute 0 03 Thousands/µL     Fingerstick Glucose (POCT) [[id number]]  (Normal) Collected:  11/03/17 1841    Lab Status:  Final result Updated:  11/03/17 1842     POC Glucose 103 mg/dl     Clostridium difficile toxin by PCR [[id number]]     Lab Status:  No result Specimen:  Stool from Per Rectum                  No orders to display              Procedures  Procedures       Phone Contacts  ED Phone Contact    ED Course  ED Course                                MDM  Number of Diagnoses or Management Options  Cirrhosis of liver ([facility] [address] ): new and requires workup  Diarrhea: new and requires workup  Elevated troponin: new and requires workup  Weakness: new and requires workup     Amount and/or Complexity of Data Reviewed  Clinical lab tests: ordered and reviewed  Review and summarize past medical records: yes  Discuss the patient with other providers: yes  Independent visualization of images, tracings, or specimens: yes      CritCare Time    Disposition  Final diagnoses:   Diarrhea   Weakness   Cirrhosis of liver (HCC)   Elevated troponin     Time reflects when diagnosis was documented in both MDM as applicable and the Disposition within this note     Time User Action Codes Description Comment    11/3/2017  7:11 PM Hina Alfaroton [R19 7] Diarrhea     11/3/2017  7:11 PM Nadege Honey Add [R53 1] Weakness     11/3/2017  7:11 PM Nadege Honey Add [K74 60] Cirrhosis of liver ([facility] [address] )     11/3/2017  7:11 PM Nadege Honey Add [R74 8] Elevated troponin       ED Disposition     ED Disposition Condition Comment    Admit  Case was discussed with Dr Keisha Argueta and the patient's admission status was agreed to be Admission Status: observation status to the service of Dr Keisha Argueta   Follow-up Information    None       Patient's Medications   Discharge Prescriptions    No medications on file     No discharge procedures on file      ED Provider  Electronically Signed by           Justin Stuart DO  11/03/17 7853

## 2017-11-04 VITALS
BODY MASS INDEX: 32.89 KG/M2 | SYSTOLIC BLOOD PRESSURE: 147 MMHG | WEIGHT: 167.55 LBS | DIASTOLIC BLOOD PRESSURE: 67 MMHG | TEMPERATURE: 97.9 F | RESPIRATION RATE: 18 BRPM | OXYGEN SATURATION: 98 % | HEIGHT: 60 IN | HEART RATE: 80 BPM

## 2017-11-04 LAB
ANION GAP SERPL CALCULATED.3IONS-SCNC: 8 MMOL/L (ref 4–13)
BUN SERPL-MCNC: 14 MG/DL (ref 5–25)
CALCIUM SERPL-MCNC: 8.6 MG/DL (ref 8.3–10.1)
CAMPYLOBACTER DNA SPEC NAA+PROBE: NORMAL
CHLORIDE SERPL-SCNC: 109 MMOL/L (ref 100–108)
CO2 SERPL-SCNC: 22 MMOL/L (ref 21–32)
CREAT SERPL-MCNC: 0.57 MG/DL (ref 0.6–1.3)
ERYTHROCYTE [DISTWIDTH] IN BLOOD BY AUTOMATED COUNT: 20.3 % (ref 11.6–15.1)
GFR SERPL CREATININE-BSD FRML MDRD: 92 ML/MIN/1.73SQ M
GLUCOSE SERPL-MCNC: 116 MG/DL (ref 65–140)
GLUCOSE SERPL-MCNC: 63 MG/DL (ref 65–140)
GLUCOSE SERPL-MCNC: 68 MG/DL (ref 65–140)
GLUCOSE SERPL-MCNC: 70 MG/DL (ref 65–140)
GLUCOSE SERPL-MCNC: 87 MG/DL (ref 65–140)
GLUCOSE SERPL-MCNC: 88 MG/DL (ref 65–140)
HCT VFR BLD AUTO: 28.3 % (ref 34.8–46.1)
HGB BLD-MCNC: 8.5 G/DL (ref 11.5–15.4)
MCH RBC QN AUTO: 26.7 PG (ref 26.8–34.3)
MCHC RBC AUTO-ENTMCNC: 30 G/DL (ref 31.4–37.4)
MCV RBC AUTO: 89 FL (ref 82–98)
PLATELET # BLD AUTO: 89 THOUSANDS/UL (ref 149–390)
PMV BLD AUTO: 12.2 FL (ref 8.9–12.7)
POTASSIUM SERPL-SCNC: 3.8 MMOL/L (ref 3.5–5.3)
RBC # BLD AUTO: 3.18 MILLION/UL (ref 3.81–5.12)
SALMONELLA DNA SPEC QL NAA+PROBE: NORMAL
SHIGA TOXIN STX GENE SPEC NAA+PROBE: NORMAL
SHIGELLA DNA SPEC QL NAA+PROBE: NORMAL
SODIUM SERPL-SCNC: 139 MMOL/L (ref 136–145)
T4 FREE SERPL-MCNC: 1.22 NG/DL (ref 0.76–1.46)
WBC # BLD AUTO: 3.92 THOUSAND/UL (ref 4.31–10.16)

## 2017-11-04 PROCEDURE — 94760 N-INVAS EAR/PLS OXIMETRY 1: CPT

## 2017-11-04 PROCEDURE — 80048 BASIC METABOLIC PNL TOTAL CA: CPT | Performed by: PHYSICIAN ASSISTANT

## 2017-11-04 PROCEDURE — 84439 ASSAY OF FREE THYROXINE: CPT | Performed by: INTERNAL MEDICINE

## 2017-11-04 PROCEDURE — 82948 REAGENT STRIP/BLOOD GLUCOSE: CPT

## 2017-11-04 PROCEDURE — 85027 COMPLETE CBC AUTOMATED: CPT | Performed by: PHYSICIAN ASSISTANT

## 2017-11-04 PROCEDURE — 87505 NFCT AGENT DETECTION GI: CPT | Performed by: PHYSICIAN ASSISTANT

## 2017-11-04 RX ORDER — ONDANSETRON 4 MG/1
4 TABLET, FILM COATED ORAL EVERY 8 HOURS PRN
Qty: 20 TABLET | Refills: 0 | Status: SHIPPED | OUTPATIENT
Start: 2017-11-04 | End: 2018-12-27

## 2017-11-04 RX ADMIN — CHOLECALCIFEROL TAB 25 MCG (1000 UNIT) 1000 UNITS: 25 TAB at 10:44

## 2017-11-04 RX ADMIN — BUPROPION HYDROCHLORIDE 300 MG: 150 TABLET, FILM COATED, EXTENDED RELEASE ORAL at 10:43

## 2017-11-04 RX ADMIN — ASPIRIN 81 MG 81 MG: 81 TABLET ORAL at 10:44

## 2017-11-04 RX ADMIN — ROPINIROLE 0.5 MG: 0.25 TABLET, FILM COATED ORAL at 07:06

## 2017-11-04 RX ADMIN — ENOXAPARIN SODIUM 40 MG: 40 INJECTION SUBCUTANEOUS at 10:44

## 2017-11-04 RX ADMIN — PANTOPRAZOLE SODIUM 40 MG: 40 TABLET, DELAYED RELEASE ORAL at 10:44

## 2017-11-04 RX ADMIN — LISINOPRIL 2.5 MG: 2.5 TABLET ORAL at 10:44

## 2017-11-04 RX ADMIN — FERROUS SULFATE TAB 325 MG (65 MG ELEMENTAL FE) 325 MG: 325 (65 FE) TAB at 10:44

## 2017-11-04 RX ADMIN — FLUTICASONE PROPIONATE AND SALMETEROL 1 PUFF: 50; 100 POWDER RESPIRATORY (INHALATION) at 10:49

## 2017-11-04 RX ADMIN — SODIUM CHLORIDE 100 ML/HR: 0.9 INJECTION, SOLUTION INTRAVENOUS at 08:29

## 2017-11-04 RX ADMIN — ACETAMINOPHEN 650 MG: 325 TABLET ORAL at 07:06

## 2017-11-04 RX ADMIN — ONDANSETRON 4 MG: 2 INJECTION INTRAMUSCULAR; INTRAVENOUS at 07:09

## 2017-11-04 NOTE — DISCHARGE SUMMARY
Discharge Summary - [facility] Internal Medicine    Patient Information: Windy Dixon 67 y o  female MRN: [id number]  Unit/Bed#: -01 Encounter: [id number]    Discharging Physician / Practitioner: Claudette Kate MD  PCP: Dmitriy Rahman MD  Admission Date: 11/3/2017  Discharge Date: 11/04/17    Reason for Admission:  Diarrhea and nausea    Discharge Diagnoses:     Principal Problem:    Intractable diarrhea  Active Problems:    Diabetes mellitus ([facility] )    Hyperlipidemia    Pancytopenia ([facility] )    Bipolar 1 disorder ([facility] )    Cirrhosis of liver ([facility] )  Resolved Problems:    * No resolved hospital problems  *      Consultations During Hospital Stay:  · None    Procedures Performed:     · none      Hospital Course:     Windy Dixon is a 67 y o  female patient who originally presented to the hospital on 11/3/2017 due to intractable diarrhea  Patient was subsequently evaluated in emergency department and admitted, she had been taking Imodium without much relief of diarrhea, she was found to be afebrile and hemodynamically stable  She was subsequent admitted on observation, was started on IV hydration  Stool samples requested  Her symptoms gradually improved  She was complaining of urinary frequency and fullness in bladder  Further testing was requested including UA but patient did not want to stay in the hospital   She wanted to go home  She signed out AMA  Condition at Discharge: fair     Discharge Day Visit / Exam:     Subjective:  Complaining of bladder fullness    Vitals: Blood Pressure: 147/67 (11/04/17 0700)  Pulse: 80 (11/04/17 0757)  Temperature: 97 9 °F (36 6 °C) (11/04/17 0700)  Temp Source: Oral (11/04/17 0700)  Respirations: 18 (11/04/17 0757)  Height: 5' (152 4 cm) (11/03/17 2147)  Weight - Scale: 76 kg (167 lb 8 8 oz) (11/03/17 2147)  SpO2: 98 % (11/04/17 0757)  Exam:   Physical Exam      Discharge instructions/Information to patient and family:   See after visit summary for information provided to patient and family  Provisions for Follow-Up Care:  See after visit summary for information related to follow-up care and any pertinent home health orders  Disposition:     Home    For Discharges to [facility] SNF:   · Not Applicable to this Patient - Not Applicable to this Patient    Planned Readmission: no     Discharge Statement:  I spent 35 minutes discharging the patient  This time was spent on the day of discharge  I had direct contact with the patient on the day of discharge  Greater than 50% of the total time was spent examining patient, answering all patient questions, arranging and discussing plan of care with patient as well as directly providing post-discharge instructions  Additional time then spent on discharge activities  Discharge Medications:  See after visit summary for reconciled discharge medications provided to patient and family        ** Please Note: This note has been constructed using a voice recognition system **

## 2017-11-04 NOTE — PROGRESS NOTES
Pt states she wants to leave, she is demanding to see a doctor, Pt refused breakfast "the food sucks," but continues to say "I have not had a food tray in 4 hours " Glucose dropped to 68  Pudding and ginger ale were given  She was on a clear liquid diet and refused to drink her meal  SLIM was agreeable to change diet to constant carb  Pt is eating lunch  Sugar is at 88 before meal  Pt continues to say she is leaving  SL aware  Call bell in reach  Will continue to monitor

## 2017-11-04 NOTE — CASE MANAGEMENT
Initial Clinical Review    Admission: Date/Time/Statement: 11/3/17 1913    Orders Placed This Encounter   Procedures    Place in Observation (expected length of stay for this patient is less than two midnights)     Standing Status:   Standing     Number of Occurrences:   1     Order Specific Question:   Admitting Physician     Answer:   Janae Black     Order Specific Question:   Level of Care     Answer:   Med Surg [16]         ED: Date/Time/Mode of Arrival:   ED Arrival Information     Expected Arrival Acuity Means of Arrival Escorted By Service Admission Type    - 11/3/2017 17:59 Urgent Ambulance [facility] Medicine Urgent    Arrival Complaint    nausea          Chief Complaint:   Chief Complaint   Patient presents with    Diarrhea     Patient was brought home from home via ems with c/o dairrhea x3 days, nauseax1 week  C/o loss of appetite  Patient was here about 2weeks ago but refused esrgo admitted, signed AMA  Patient c/o of loosing 60pounds in 8 months  History of Illness: 67 y o  female with a history of cirrhosis of the liver, pancytopenia, and type 2 diabetes mellitus who presents with diarrhea for the last 3 days  The patient reports that her symptoms began with nausea roughly 1 week ago and she reports that due to this she has not had a good appetite since  She reports that she gets meals on wheels and has not been eating the meals that she gets  She reports that she has only been able to tolerate ginger ale  She reports that 3 days ago she started with watery diarrhea  She states that she is going roughly 5-6 times per day  She denies blood in the stool  She reports that she had been taking imodium each day and taking the maximum safe dose of 4 tablets per day, however this has not afforded her any relief  She denies abdominal pain  She denies fevers or chills  She denies recent travel as she states that she is only able to get around her apartment with her walker  She denies recent antibiotic use  She denies chest pain or difficulty breathing  Abdominal: She exhibits distension  hernia is present  Hernia confirmed positive in the ventral area  ED Vital Signs:   ED Triage Vitals   Temperature Pulse Respirations Blood Pressure SpO2   11/03/17 1807 11/03/17 1807 11/03/17 1807 11/03/17 1807 11/03/17 1807   98 3 °F (36 8 °C) 94 20 155/71 99 %      Temp Source Heart Rate Source Patient Position - Orthostatic VS BP Location FiO2 (%)   11/03/17 1807 11/03/17 1807 11/03/17 1807 11/03/17 1807 --   Oral Monitor Lying Right arm       Pain Score       11/03/17 1814       No Pain        Wt Readings from Last 1 Encounters:   11/03/17 76 kg (167 lb 8 8 oz)       Abnormal Labs/Diagnostic Test Results: cl 109 cr 0 57 h/h 8 5/28 3 wc 3 92 , plt 89  Troponin I 0 10 (H) ng/mL       ED Treatment:   Medication Administration from 11/03/2017 1759 to 11/03/2017 2147       Date/Time Order Dose Route Action Action by Comments     11/03/2017 2124 sodium chloride 0 9 % bolus 1,000 mL 0 mL Intravenous Stopped Shannon Finley RN      11/03/2017 1849 sodium chloride 0 9 % bolus 1,000 mL 1,000 mL Intravenous New Bag Shannon Finley RN           Past Medical/Surgical History:    Active Ambulatory Problems     Diagnosis Date Noted    Diabetes mellitus ([facility] )     Hyperlipidemia     Asthma 10/18/2017    Symptomatic anemia 10/18/2017    NSTEMI (non-ST elevated myocardial infarction) ([facility] ) 10/18/2017    Lactic acidosis 10/18/2017    Pancytopenia ([facility] ) 10/18/2017    Bipolar 1 disorder ([facility] ) 10/18/2017    Cirrhosis of liver ([facility] ) 10/19/2017     Resolved Ambulatory Problems     Diagnosis Date Noted    No Resolved Ambulatory Problems     Past Medical History:   Diagnosis Date    Asthma     Bipolar 2 disorder ([facility] )     Chronic kidney disease     Cirrhosis of liver ([facility] )     Diabetes mellitus ([facility] )     GERD (gastroesophageal reflux disease)     Hyperlipidemia     Hypertension     Neuropathy     Renal disorder     Restless leg syndrome        Admitting Diagnosis: Diarrhea [R19 7]  Nausea [R11 0]  Weakness [R53 1]  Cirrhosis of liver (HCC) [K74 60]  Elevated troponin [R74 8]    Age/Sex: 67 y o  female    Assessment/Plan:   Principal Problem:    Intractable diarrhea  Active Problems:    Diabetes mellitus ([facility] )    Pancytopenia (HCC)    Cirrhosis of liver (HCC)    Bipolar 1 disorder ([facility] )    Hyperlipidemia  Plan for the Primary Problem(s):  · Intractable Diarrhea   ? Admit patient to med/surgical under observation status  ? POA: Multiple episodes of diarrhea for the last 3 days with 1 week's worth of nausea, poor appetite, only able to tolerate ginger ale, abdominal exam and CT scan non-focal   ? Check C  Diff PCR  ? Check Stool Cx  ? Check fecal leukocytes   ? Hydrate patient with  cc/hr  ? Clear liquid diet   ? Monitor vitals   ? Can add Imodium once bacterial/infectious etiology ruled out   Plan for Additional Problems:   · Type 2 Diabetes Mellitus with polyneuropathy, with long term current use of insulin   ? Continue Levemir 40 U BID   ? Hold meal time Humalog   ? SSI TID with meals ordered by weight   ? Q6 accuchecks, hypoglycemia protocol   · Cirrhosis of Liver   ? Stable appearance on CT   ? She has GI follow up arranged  · Pancytopenia  ? Counts have improved from last admission, currently refusing work up   · BPD  ? Continue Wellbutrin and Ropinirole   VTE Prophylaxis: Enoxaparin (Lovenox)  / sequential compression device   Code Status: DNR/DNI  POLST: POLST form is not discussed and not completed at this time  Anticipated Length of Stay:  Patient will be admitted on an Observation basis with an anticipated length of stay of  Less than 2 midnights     Justification for Hospital Stay: intractable diarrhea        Admission Orders:  Observation  Tele  mon  CONTACT ISOLATION  STOOL FECAL LEUKOCYTES PLT CT  CLEAR LIQUID DIET  Scheduled Meds:   aspirin 81 mg Oral Daily   atorvastatin 40 mg Oral Daily With Dinner   buPROPion 300 mg Oral QAM   cholecalciferol 1,000 Units Oral Daily   enoxaparin 40 mg Subcutaneous Daily   ferrous sulfate 325 mg Oral BID With Meals   fluticasone-salmeterol 1 puff Inhalation Q12H [address]   insulin detemir 40 Units Subcutaneous Q12H [address]   insulin lispro 1-6 Units Subcutaneous TID AC   lisinopril 2 5 mg Oral QAM   montelukast 10 mg Oral HS   pantoprazole 40 mg Oral Early Morning   rOPINIRole 0 5 mg Oral BID     Continuous Infusions:   sodium chloride 100 mL/hr Last Rate: 100 mL/hr (11/03/17 7706)     PRN Meds:   acetaminophen    albuterol    ipratropium    ondansetron

## 2017-11-07 LAB
ATRIAL RATE: 91 BPM
P AXIS: 44 DEGREES
PR INTERVAL: 156 MS
QRS AXIS: 52 DEGREES
QRSD INTERVAL: 78 MS
QT INTERVAL: 350 MS
QTC INTERVAL: 430 MS
T WAVE AXIS: 45 DEGREES
VENTRICULAR RATE: 91 BPM

## 2017-11-08 ENCOUNTER — GENERIC CONVERSION - ENCOUNTER (OUTPATIENT)
Dept: OTHER | Facility: OTHER | Age: 72
End: 2017-11-08

## 2017-11-08 DIAGNOSIS — D50.9 IRON DEFICIENCY ANEMIA: ICD-10-CM

## 2018-01-12 NOTE — MISCELLANEOUS
Message   Recorded as Task   Date: 11/03/2017 10:39 AM, Created By: JUAN PASCUAL   Task Name: Call Back   Assigned To: Tiffanie Ortiz   Regarding Patient: Damari Ozuna, Status: Active   Comment:    Shagufta Reef - 03 Nov 2017 10:39 AM     TASK CREATED  Pt has an upcoming appt with rigoberto next week  The casemanager for meals on wheels called to let us know the pt is feeling very nauseous and can only drink ginger ale  She would like an antinausea medication to be sent to the pharmacy  #: [phone]   Spoke with patient - she will reach out to her primary care regarding her nausea - she does have a hospital follow up scheduled next week to see Jun Blanco for her anemia  Patient will call me back if she can not get in touch with PCP      Active Problems    1   Iron deficiency anemia (280 9) (D50 9)    Signatures   Electronically signed by : Munira Vidales, ; Nov  3 2017 10:48AM EST                       (Author)

## 2018-01-22 VITALS
BODY MASS INDEX: 33.2 KG/M2 | DIASTOLIC BLOOD PRESSURE: 62 MMHG | SYSTOLIC BLOOD PRESSURE: 142 MMHG | TEMPERATURE: 99.6 F | HEART RATE: 108 BPM | WEIGHT: 170 LBS | OXYGEN SATURATION: 98 %

## 2018-08-14 ENCOUNTER — APPOINTMENT (EMERGENCY)
Dept: RADIOLOGY | Facility: HOSPITAL | Age: 73
DRG: 689 | End: 2018-08-14
Payer: COMMERCIAL

## 2018-08-14 ENCOUNTER — HOSPITAL ENCOUNTER (INPATIENT)
Facility: HOSPITAL | Age: 73
LOS: 1 days | Discharge: HOME | DRG: 689 | End: 2018-08-15
Attending: EMERGENCY MEDICINE | Admitting: INTERNAL MEDICINE
Payer: COMMERCIAL

## 2018-08-14 ENCOUNTER — APPOINTMENT (EMERGENCY)
Dept: CT IMAGING | Facility: HOSPITAL | Age: 73
DRG: 689 | End: 2018-08-14
Payer: COMMERCIAL

## 2018-08-14 DIAGNOSIS — N39.0 UTI (URINARY TRACT INFECTION): ICD-10-CM

## 2018-08-14 DIAGNOSIS — R26.2 AMBULATORY DYSFUNCTION: Primary | ICD-10-CM

## 2018-08-14 DIAGNOSIS — R42 DIZZINESS: ICD-10-CM

## 2018-08-14 PROBLEM — E87.6 HYPOKALEMIA: Status: ACTIVE | Noted: 2018-08-14

## 2018-08-14 PROBLEM — N30.00 ACUTE CYSTITIS WITHOUT HEMATURIA: Status: ACTIVE | Noted: 2018-08-14

## 2018-08-14 LAB
ALBUMIN SERPL BCP-MCNC: 2.6 G/DL (ref 3.5–5)
ALP SERPL-CCNC: 121 U/L (ref 46–116)
ALT SERPL W P-5'-P-CCNC: 41 U/L (ref 12–78)
ANION GAP SERPL CALCULATED.3IONS-SCNC: 7 MMOL/L (ref 4–13)
AST SERPL W P-5'-P-CCNC: 35 U/L (ref 5–45)
BACTERIA UR QL AUTO: ABNORMAL /HPF
BASOPHILS # BLD AUTO: 0.02 THOUSANDS/ΜL (ref 0–0.1)
BASOPHILS NFR BLD AUTO: 1 % (ref 0–1)
BILIRUB SERPL-MCNC: 1 MG/DL (ref 0.2–1)
BILIRUB UR QL STRIP: NEGATIVE
BUN SERPL-MCNC: 12 MG/DL (ref 5–25)
CALCIUM SERPL-MCNC: 9 MG/DL (ref 8.3–10.1)
CHLORIDE SERPL-SCNC: 105 MMOL/L (ref 100–108)
CLARITY UR: CLEAR
CO2 SERPL-SCNC: 26 MMOL/L (ref 21–32)
COLOR UR: YELLOW
CREAT SERPL-MCNC: 0.8 MG/DL (ref 0.6–1.3)
EOSINOPHIL # BLD AUTO: 0.1 THOUSAND/ΜL (ref 0–0.61)
EOSINOPHIL NFR BLD AUTO: 3 % (ref 0–6)
ERYTHROCYTE [DISTWIDTH] IN BLOOD BY AUTOMATED COUNT: 17.6 % (ref 11.6–15.1)
GFR SERPL CREATININE-BSD FRML MDRD: 73 ML/MIN/1.73SQ M
GLUCOSE SERPL-MCNC: 114 MG/DL (ref 65–140)
GLUCOSE SERPL-MCNC: 295 MG/DL (ref 65–140)
GLUCOSE UR STRIP-MCNC: NEGATIVE MG/DL
HCT VFR BLD AUTO: 26.8 % (ref 34.8–46.1)
HGB BLD-MCNC: 8.7 G/DL (ref 11.5–15.4)
HGB UR QL STRIP.AUTO: ABNORMAL
IMM GRANULOCYTES # BLD AUTO: 0.01 THOUSAND/UL (ref 0–0.2)
IMM GRANULOCYTES NFR BLD AUTO: 0 % (ref 0–2)
KETONES UR STRIP-MCNC: NEGATIVE MG/DL
LEUKOCYTE ESTERASE UR QL STRIP: ABNORMAL
LYMPHOCYTES # BLD AUTO: 0.67 THOUSANDS/ΜL (ref 0.6–4.47)
LYMPHOCYTES NFR BLD AUTO: 22 % (ref 14–44)
MCH RBC QN AUTO: 28.2 PG (ref 26.8–34.3)
MCHC RBC AUTO-ENTMCNC: 32.5 G/DL (ref 31.4–37.4)
MCV RBC AUTO: 87 FL (ref 82–98)
MONOCYTES # BLD AUTO: 0.4 THOUSAND/ΜL (ref 0.17–1.22)
MONOCYTES NFR BLD AUTO: 13 % (ref 4–12)
NEUTROPHILS # BLD AUTO: 1.86 THOUSANDS/ΜL (ref 1.85–7.62)
NEUTS SEG NFR BLD AUTO: 61 % (ref 43–75)
NITRITE UR QL STRIP: NEGATIVE
NON-SQ EPI CELLS URNS QL MICRO: ABNORMAL /HPF
NRBC BLD AUTO-RTO: 0 /100 WBCS
PH UR STRIP.AUTO: 8.5 [PH] (ref 4.5–8)
PLATELET # BLD AUTO: 86 THOUSANDS/UL (ref 149–390)
PMV BLD AUTO: 11.8 FL (ref 8.9–12.7)
POTASSIUM SERPL-SCNC: 3 MMOL/L (ref 3.5–5.3)
PROT SERPL-MCNC: 6.2 G/DL (ref 6.4–8.2)
PROT UR STRIP-MCNC: ABNORMAL MG/DL
RBC # BLD AUTO: 3.08 MILLION/UL (ref 3.81–5.12)
RBC #/AREA URNS AUTO: ABNORMAL /HPF
SODIUM SERPL-SCNC: 138 MMOL/L (ref 136–145)
SP GR UR STRIP.AUTO: 1.01 (ref 1–1.03)
TROPONIN I SERPL-MCNC: 0.18 NG/ML
UROBILINOGEN UR QL STRIP.AUTO: 0.2 E.U./DL
WBC # BLD AUTO: 3.06 THOUSAND/UL (ref 4.31–10.16)
WBC #/AREA URNS AUTO: ABNORMAL /HPF

## 2018-08-14 PROCEDURE — 82948 REAGENT STRIP/BLOOD GLUCOSE: CPT

## 2018-08-14 PROCEDURE — 72170 X-RAY EXAM OF PELVIS: CPT

## 2018-08-14 PROCEDURE — 93005 ELECTROCARDIOGRAM TRACING: CPT

## 2018-08-14 PROCEDURE — 80053 COMPREHEN METABOLIC PANEL: CPT | Performed by: EMERGENCY MEDICINE

## 2018-08-14 PROCEDURE — 73630 X-RAY EXAM OF FOOT: CPT

## 2018-08-14 PROCEDURE — 84484 ASSAY OF TROPONIN QUANT: CPT | Performed by: EMERGENCY MEDICINE

## 2018-08-14 PROCEDURE — 36415 COLL VENOUS BLD VENIPUNCTURE: CPT | Performed by: EMERGENCY MEDICINE

## 2018-08-14 PROCEDURE — 70450 CT HEAD/BRAIN W/O DYE: CPT

## 2018-08-14 PROCEDURE — 72220 X-RAY EXAM SACRUM TAILBONE: CPT

## 2018-08-14 PROCEDURE — 96360 HYDRATION IV INFUSION INIT: CPT

## 2018-08-14 PROCEDURE — 87040 BLOOD CULTURE FOR BACTERIA: CPT | Performed by: EMERGENCY MEDICINE

## 2018-08-14 PROCEDURE — 99285 EMERGENCY DEPT VISIT HI MDM: CPT

## 2018-08-14 PROCEDURE — 81001 URINALYSIS AUTO W/SCOPE: CPT

## 2018-08-14 PROCEDURE — 96361 HYDRATE IV INFUSION ADD-ON: CPT

## 2018-08-14 PROCEDURE — 99223 1ST HOSP IP/OBS HIGH 75: CPT | Performed by: INTERNAL MEDICINE

## 2018-08-14 PROCEDURE — 85025 COMPLETE CBC W/AUTO DIFF WBC: CPT | Performed by: EMERGENCY MEDICINE

## 2018-08-14 PROCEDURE — 71046 X-RAY EXAM CHEST 2 VIEWS: CPT

## 2018-08-14 PROCEDURE — 73030 X-RAY EXAM OF SHOULDER: CPT

## 2018-08-14 PROCEDURE — 87086 URINE CULTURE/COLONY COUNT: CPT

## 2018-08-14 RX ORDER — LIDOCAINE 50 MG/G
1 PATCH TOPICAL ONCE
Status: COMPLETED | OUTPATIENT
Start: 2018-08-14 | End: 2018-08-15

## 2018-08-14 RX ORDER — FLUTICASONE FUROATE AND VILANTEROL 200; 25 UG/1; UG/1
1 POWDER RESPIRATORY (INHALATION) DAILY
Status: DISCONTINUED | OUTPATIENT
Start: 2018-08-15 | End: 2018-08-15 | Stop reason: HOSPADM

## 2018-08-14 RX ORDER — SODIUM CHLORIDE, SODIUM LACTATE, POTASSIUM CHLORIDE, CALCIUM CHLORIDE 600; 310; 30; 20 MG/100ML; MG/100ML; MG/100ML; MG/100ML
500 INJECTION, SOLUTION INTRAVENOUS CONTINUOUS
Status: DISCONTINUED | OUTPATIENT
Start: 2018-08-14 | End: 2018-08-15 | Stop reason: HOSPADM

## 2018-08-14 RX ORDER — SODIUM CHLORIDE 9 MG/ML
125 INJECTION, SOLUTION INTRAVENOUS CONTINUOUS
Status: DISCONTINUED | OUTPATIENT
Start: 2018-08-14 | End: 2018-08-14

## 2018-08-14 RX ORDER — PANTOPRAZOLE SODIUM 40 MG/1
40 TABLET, DELAYED RELEASE ORAL
Status: DISCONTINUED | OUTPATIENT
Start: 2018-08-15 | End: 2018-08-15 | Stop reason: HOSPADM

## 2018-08-14 RX ORDER — LISINOPRIL 2.5 MG/1
2.5 TABLET ORAL EVERY MORNING
Status: DISCONTINUED | OUTPATIENT
Start: 2018-08-15 | End: 2018-08-15 | Stop reason: HOSPADM

## 2018-08-14 RX ORDER — SODIUM CHLORIDE 9 MG/ML
75 INJECTION, SOLUTION INTRAVENOUS ONCE
Status: COMPLETED | OUTPATIENT
Start: 2018-08-14 | End: 2018-08-14

## 2018-08-14 RX ORDER — MECLIZINE HYDROCHLORIDE 25 MG/1
25 TABLET ORAL EVERY 8 HOURS PRN
Status: DISCONTINUED | OUTPATIENT
Start: 2018-08-14 | End: 2018-08-15 | Stop reason: HOSPADM

## 2018-08-14 RX ORDER — ONDANSETRON 2 MG/ML
4 INJECTION INTRAMUSCULAR; INTRAVENOUS EVERY 6 HOURS PRN
Status: DISCONTINUED | OUTPATIENT
Start: 2018-08-14 | End: 2018-08-15 | Stop reason: HOSPADM

## 2018-08-14 RX ORDER — ACETAMINOPHEN 325 MG/1
650 TABLET ORAL ONCE
Status: COMPLETED | OUTPATIENT
Start: 2018-08-14 | End: 2018-08-14

## 2018-08-14 RX ORDER — ROPINIROLE 0.25 MG/1
0.5 TABLET, FILM COATED ORAL 2 TIMES DAILY
Status: DISCONTINUED | OUTPATIENT
Start: 2018-08-14 | End: 2018-08-15 | Stop reason: HOSPADM

## 2018-08-14 RX ORDER — MONTELUKAST SODIUM 10 MG/1
10 TABLET ORAL
Status: DISCONTINUED | OUTPATIENT
Start: 2018-08-14 | End: 2018-08-15 | Stop reason: HOSPADM

## 2018-08-14 RX ORDER — ASPIRIN 81 MG/1
81 TABLET, CHEWABLE ORAL DAILY
Status: DISCONTINUED | OUTPATIENT
Start: 2018-08-15 | End: 2018-08-15 | Stop reason: HOSPADM

## 2018-08-14 RX ORDER — PRAVASTATIN SODIUM 80 MG/1
80 TABLET ORAL
Status: DISCONTINUED | OUTPATIENT
Start: 2018-08-15 | End: 2018-08-15 | Stop reason: HOSPADM

## 2018-08-14 RX ORDER — MELATONIN
1000 DAILY
Status: DISCONTINUED | OUTPATIENT
Start: 2018-08-15 | End: 2018-08-15 | Stop reason: HOSPADM

## 2018-08-14 RX ORDER — LORAZEPAM 0.5 MG/1
0.5 TABLET ORAL ONCE
Status: COMPLETED | OUTPATIENT
Start: 2018-08-14 | End: 2018-08-14

## 2018-08-14 RX ORDER — HEPARIN SODIUM 5000 [USP'U]/ML
5000 INJECTION, SOLUTION INTRAVENOUS; SUBCUTANEOUS EVERY 8 HOURS SCHEDULED
Status: DISCONTINUED | OUTPATIENT
Start: 2018-08-14 | End: 2018-08-15 | Stop reason: HOSPADM

## 2018-08-14 RX ORDER — BUPROPION HYDROCHLORIDE 150 MG/1
300 TABLET ORAL EVERY MORNING
Status: DISCONTINUED | OUTPATIENT
Start: 2018-08-15 | End: 2018-08-15 | Stop reason: HOSPADM

## 2018-08-14 RX ORDER — FERROUS SULFATE 325(65) MG
325 TABLET ORAL 2 TIMES DAILY WITH MEALS
Status: DISCONTINUED | OUTPATIENT
Start: 2018-08-15 | End: 2018-08-15 | Stop reason: HOSPADM

## 2018-08-14 RX ORDER — ALBUTEROL SULFATE 2.5 MG/3ML
2.5 SOLUTION RESPIRATORY (INHALATION) 3 TIMES DAILY PRN
Status: DISCONTINUED | OUTPATIENT
Start: 2018-08-14 | End: 2018-08-15 | Stop reason: HOSPADM

## 2018-08-14 RX ORDER — ACETAMINOPHEN 325 MG/1
650 TABLET ORAL EVERY 6 HOURS PRN
Status: DISCONTINUED | OUTPATIENT
Start: 2018-08-14 | End: 2018-08-15 | Stop reason: HOSPADM

## 2018-08-14 RX ORDER — POTASSIUM CHLORIDE 20 MEQ/1
40 TABLET, EXTENDED RELEASE ORAL 2 TIMES DAILY
Status: COMPLETED | OUTPATIENT
Start: 2018-08-14 | End: 2018-08-15

## 2018-08-14 RX ADMIN — POTASSIUM CHLORIDE 40 MEQ: 1500 TABLET, EXTENDED RELEASE ORAL at 23:49

## 2018-08-14 RX ADMIN — SODIUM CHLORIDE, SODIUM LACTATE, POTASSIUM CHLORIDE, AND CALCIUM CHLORIDE 500 ML: .6; .31; .03; .02 INJECTION, SOLUTION INTRAVENOUS at 16:05

## 2018-08-14 RX ADMIN — HEPARIN SODIUM 5000 UNITS: 5000 INJECTION, SOLUTION INTRAVENOUS; SUBCUTANEOUS at 23:49

## 2018-08-14 RX ADMIN — INSULIN DETEMIR 40 UNITS: 100 INJECTION, SOLUTION SUBCUTANEOUS at 23:48

## 2018-08-14 RX ADMIN — MECLIZINE HYDROCHLORIDE 25 MG: 25 TABLET ORAL at 23:48

## 2018-08-14 RX ADMIN — CEFTRIAXONE 2000 MG: 2 INJECTION, POWDER, FOR SOLUTION INTRAMUSCULAR; INTRAVENOUS at 18:44

## 2018-08-14 RX ADMIN — MONTELUKAST SODIUM 10 MG: 10 TABLET, FILM COATED ORAL at 23:49

## 2018-08-14 RX ADMIN — LIDOCAINE 1 PATCH: 50 PATCH CUTANEOUS at 16:05

## 2018-08-14 RX ADMIN — LORAZEPAM 0.5 MG: 0.5 TABLET ORAL at 16:05

## 2018-08-14 RX ADMIN — ROPINIROLE 0.5 MG: 0.25 TABLET, FILM COATED ORAL at 23:48

## 2018-08-14 RX ADMIN — INSULIN LISPRO 4 UNITS: 100 INJECTION, SOLUTION INTRAVENOUS; SUBCUTANEOUS at 23:49

## 2018-08-14 RX ADMIN — SODIUM CHLORIDE 75 ML/HR: 0.9 INJECTION, SOLUTION INTRAVENOUS at 23:23

## 2018-08-14 RX ADMIN — ACETAMINOPHEN 650 MG: 325 TABLET, FILM COATED ORAL at 16:04

## 2018-08-14 RX ADMIN — SODIUM CHLORIDE 125 ML/HR: 0.9 INJECTION, SOLUTION INTRAVENOUS at 18:45

## 2018-08-14 NOTE — ED NOTES
at bedside     Raegan Fontanez, [facility]  08/14/18 2946

## 2018-08-14 NOTE — SEPSIS NOTE
Sepsis Note   Aysha Salinas 67 y o  female MRN: [id number]  Unit/Bed#: TR 30A Encounter: [id number]            Initial Sepsis Screening     Row Name 08/14/18 1915                Is the patient's history suggestive of a new or worsening infection? (!)  Yes (Proceed)  -MB        Suspected source of infection urinary tract infection  -MB        Are two or more of the following signs & symptoms of infection both present and new to the patient?         Indicate SIRS criteria Tachycardia > 90 bpm   initial tachypne was anxiety related-   -MB        If the answer is yes to both questions, suspicion of sepsis is present          If severe sepsis is present AND tissue hypoperfusion perists in the hour after fluid resuscitation or lactate > 4, the patient meets criteria for SEPTIC SHOCK          Are any of the following organ dysfunction criteria present within 6 hours of suspected infection and SIRS criteria that are NOT considered to be chronic conditions? No  -MB        Organ dysfunction          Date of presentation of severe sepsis          Time of presentation of severe sepsis          Tissue hypoperfusion persists in the hour after crystalloid fluid administration, evidenced, by either:          Was hypotension present within one hour of the conclusion of crystalloid fluid administration?           Date of presentation of septic shock          Time of presentation of septic shock            User Key  (r) = Recorded By, (t) = Taken By, (c) = Cosigned By    [address] Name Provider Type    LETICIA Montague MD Physician

## 2018-08-14 NOTE — H&P
[address] [facility]  H&P- Steph John [birth year], 67 y o  female MRN: [id number]    Unit/Bed#: TR 30A Encounter: [id number]    Primary Care Provider: Rowan Ac MD   Date and time admitted to hospital: 8/14/2018  3:18 PM        * Dizziness   Assessment & Plan    · POA, acute change in the last 1-2 weeks as per patient  States that she is dizzy right now at rest as well as when she changes position  Admits to not drinking a lot of fluids at home because she does not like water  Has had some recent falls at home, but no serious injuries noted and not on thinners  Neuro exam non-focal  Suspect multifactorial in the setting of mild dehydration and UTI with deconditioning underlying   · Admit patient to med/surg under inpatient status  · Hydrate patient  · Antivert 25 mg PO Q8 PRN dizziness   · Treat urinary tract infection   · PT eval         Acute cystitis without hematuria   Assessment & Plan    · POA, overall asymptomatic, but UA is grossly positive  Could be due to neuropathy that patient does not have pain   · Continue Rocephin   · Follow up urine culture         Hypokalemia   Assessment & Plan    · POA, noted to be 3 0   · KCl 40 mEq PO BID x 2 doses   · Recheck BMP        NSTEMI (non-ST elevated myocardial infarction) ([facility] [place] [facility] )   Assessment & Plan    · POA, troponin noted to be elevated however this is chronic  No anginal symptoms at all   · No further work up   · If starts having chest pain would trend troponin and consider further work up         Pancytopenia [facility])   Assessment & Plan    · Counts are noted to be stable  Patient was worked up in the past, however she refused bone marrow biopsy at that time   · Monitor CBC        Type 2 diabetes mellitus with diabetic polyneuropathy, with long-term current use of insulin ([facility] )   Assessment & Plan    Lab Results   Component Value Date    HGBA1C 6 8 (H) 10/19/2017       No results for input(s): POCGLU in the last 72 hours      Blood Sugar Average: Last 72 hrs:  · No new A1c noted  Euglycemic on admission  · Continue home insulin regimen   · Levemir 40 U BID   · Humalog 17 U TID with meals   · SSI algorithm with meals and bedtime   · QID accuchecks         Cirrhosis of liver (HCC)   Assessment & Plan    · LFTs are stable on admission   · No further management         Bipolar 1 disorder (HCC)   Assessment & Plan    · Moods stable  · Continue Wellbutrin           VTE Prophylaxis: Heparin  / sequential compression device   Code Status: DNR/DNI  POLST: POLST form is not discussed and not completed at this time  Discussion with family: No family update requested     Anticipated Length of Stay:  Patient will be admitted on an Inpatient basis with an anticipated length of stay of  Greater than 2 midnights  Justification for Hospital Stay: Dizziness and UTI requiring IV fluids and IV antibiotics, PT eval for safe discharge plan     Total Time for Visit, including Counseling / Coordination of Care: 1 hour  Greater than 50% of this total time spent on direct patient counseling and coordination of care  Chief Complaint:   Dizziness    History of Present Illness:    Moises Banks is a 67 y o  female with a history of liver cirrhosis, pancytopenia from unknown source, T2 DM on insulin, CKD, and HTN who presents with dizziness  She reports that this is an acute change over the last 1-2 weeks  She reports dizziness with rest as well as with ambulation and notes that there is an increase in her symptoms with positional change  She reports increased falls at home despite using her walker  She denies hitting her head, losing consciousness, or being on blood thinners  She reports that she does not drink a lot at home cause she does not like water  She denies fevers or chills  Dysuria, hematuria, or change in urine color or clarity  Denies abdominal pain, nausea, vomiting, or diarrhea  Denies chest pain or shortness of breath  Denies recent change in medications       Review of Systems:    Review of Systems   Constitutional: Negative for appetite change, chills, diaphoresis, fatigue and fever  HENT: Negative for congestion, rhinorrhea and sore throat  Eyes: Negative for visual disturbance  Respiratory: Negative for cough, chest tightness, shortness of breath and wheezing  Cardiovascular: Negative for chest pain, palpitations and leg swelling  Gastrointestinal: Negative for abdominal pain, constipation, diarrhea, nausea and vomiting  Genitourinary: Negative for dysuria, frequency and hematuria  Musculoskeletal: Positive for gait problem  Negative for arthralgias and myalgias  Neurological: Positive for dizziness  Negative for syncope, weakness, light-headedness, numbness and headaches  Psychiatric/Behavioral: Negative for suicidal ideas  All other systems reviewed and are negative  Past Medical and Surgical History:     Past Medical History:   Diagnosis Date    Asthma     Bipolar 2 disorder ([place] [address] )     Chronic kidney disease     Cirrhosis of liver ([place] [address] )     Diabetes mellitus ([place] [address] )     GERD (gastroesophageal reflux disease)     Hyperlipidemia     Hypertension     Neuropathy     Renal disorder     Restless leg syndrome        Past Surgical History:   Procedure Laterality Date    BACK SURGERY      CHOLECYSTECTOMY      HERNIA REPAIR      REPLACEMENT TOTAL KNEE BILATERAL      TUMOR REMOVAL         Meds/Allergies:    Prior to Admission medications    Medication Sig Start Date End Date Taking?  Authorizing Provider   albuterol (2 5 mg/3 mL) 0 083 % nebulizer solution Take 2 5 mg by nebulization 3 (three) times a day as needed for wheezing    Historical Provider, MD   aspirin 81 MG tablet Take 81 mg by mouth daily    Historical Provider, MD   buPROPion (WELLBUTRIN XL) 300 mg 24 hr tablet Take 300 mg by mouth every morning    Historical Provider, MD   cholecalciferol (VITAMIN D3) 1,000 units tablet Take 1,000 Units by mouth daily    Historical Provider, MD ferrous sulfate 325 (65 Fe) mg tablet Take 1 tablet by mouth 2 (two) times a day with meals 10/20/17   Pee Brown PA-C   fluticasone-salmeterol (ADVAIR HFA) 115-21 MCG/ACT inhaler Inhale 2 puffs 2 (two) times a day    Historical Provider, MD   insulin detemir (LEVEMIR) 100 units/mL subcutaneous injection Inject 40 Units under the skin 2 (two) times a day    Historical Provider, MD   insulin lispro (HUMALOG KWIKPEN) 100 Units/mL SOPN Inject 17 Units under the skin 3 (three) times a day with meals    Historical Provider, MD   ipratropium (ATROVENT) 0 02 % nebulizer solution Take 0 5 mg by nebulization every 6 (six) hours as needed for wheezing or shortness of breath    Historical Provider, MD   lisinopril (ZESTRIL) 2 5 mg tablet Take 2 5 mg by mouth every morning    Historical Provider, MD   metFORMIN (GLUCOPHAGE) 1000 MG tablet Take 1,000 mg by mouth 2 (two) times a day with meals    Historical Provider, MD   montelukast (SINGULAIR) 10 mg tablet Take 10 mg by mouth daily at bedtime    Historical Provider, MD   ondansetron (ZOFRAN) 4 mg tablet Take 1 tablet by mouth every 8 (eight) hours as needed for nausea or vomiting 11/4/17   Ranjit Kumar MD   pantoprazole (PROTONIX) 40 mg tablet Take 40 mg by mouth daily    Historical Provider, MD   rOPINIRole (REQUIP) 0 5 mg tablet Take 0 5 mg by mouth 2 (two) times a day    Historical Provider, MD   simvastatin (ZOCOR) 40 mg tablet Take 40 mg by mouth daily at bedtime    Historical Provider, MD ADLER have reviewed home medications with patient personally  Allergies:    Allergies   Allergen Reactions    Abilify [Aripiprazole]     Celebrex [Celecoxib]     Codeine     Darvon [Propoxyphene]     Ibuprofen     Latuda [Lurasidone]     Nitrofurantoin     Penicillins     Ziprasidone        Social History:     Marital Status: Single   Occupation: None  Patient Pre-hospital Living Situation: Home   Patient Pre-hospital Level of Mobility: Walker  Patient Pre-hospital Diet Restrictions: None  Substance Use History:   History   Alcohol Use No     History   Smoking Status    Never Smoker   Smokeless Tobacco    Never Used     History   Drug Use No       Family History:    No family history on file  Physical Exam:     Vitals:   Blood Pressure: 136/61 (08/14/18 1939)  Pulse: 94 (08/14/18 1939)  Temperature: 98 6 °F (37 °C) (08/14/18 1548)  Temp Source: Rectal (08/14/18 1548)  Respirations: 20 (08/14/18 1939)  Height: 5' (152 4 cm) (08/14/18 1532)  Weight - Scale: 76 4 kg (168 lb 6 9 oz) (08/14/18 1532)  SpO2: 98 % (08/14/18 1939)    Physical Exam   Constitutional: She is oriented to person, place, and time  Vital signs are normal  She appears well-developed and well-nourished  Non-toxic appearance  No distress  HENT:   Head: Normocephalic and atraumatic  Mouth/Throat: Mucous membranes are dry  Eyes: Conjunctivae and EOM are normal  Pupils are equal, round, and reactive to light  No scleral icterus  Pupils are equal    Neck: Neck supple  Cardiovascular: Normal rate, regular rhythm, S1 normal and intact distal pulses  Exam reveals no S3 and no S4  Murmur heard  Systolic murmur is present with a grade of 2/6   Pulmonary/Chest: Effort normal and breath sounds normal  No accessory muscle usage  No respiratory distress  She has no decreased breath sounds  She has no wheezes  She has no rhonchi  She has no rales  She exhibits no tenderness  Abdominal: Soft  Bowel sounds are normal  She exhibits no distension and no mass  There is no tenderness  There is no rigidity, no rebound and no guarding  Neurological: She is alert and oriented to person, place, and time  She is not disoriented  She displays no tremor  She displays no seizure activity  GCS eye subscore is 4  GCS verbal subscore is 5  GCS motor subscore is 6  Skin: Skin is warm and dry  Additional Data:     Lab Results: I have personally reviewed pertinent reports          Results from last 7 days  Lab Units 08/14/18  1610   WBC Thousand/uL 3 06*   HEMOGLOBIN g/dL 8 7*   HEMATOCRIT % 26 8*   PLATELETS Thousands/uL 86*   NEUTROS PCT % 61   LYMPHS PCT % 22   MONOS PCT % 13*   EOS PCT % 3       Results from last 7 days  Lab Units 08/14/18  1610   SODIUM mmol/L 138   POTASSIUM mmol/L 3 0*   CHLORIDE mmol/L 105   CO2 mmol/L 26   BUN mg/dL 12   CREATININE mg/dL 0 80   CALCIUM mg/dL 9 0   TOTAL PROTEIN g/dL 6 2*   BILIRUBIN TOTAL mg/dL 1 00   ALK PHOS U/L 121*   ALT U/L 41   AST U/L 35   GLUCOSE RANDOM mg/dL 114                   Imaging: I have personally reviewed pertinent reports  CT head without contrast   Final Result by Danyel Del Toro MD (08/14 1723)      No acute intracranial abnormality  Workstation performed: [id number]         XR chest 2 views    (Results Pending)   XR pelvis ap only 1 or 2 views    (Results Pending)   XR shoulder 2+ views LEFT    (Results Pending)   XR sacrum and coccyx    (Results Pending)   XR foot 3+ views RIGHT    (Results Pending)       EKG, Pathology, and Other Studies Reviewed on Admission:   · EKG: Sinus with PACs, 81 BPM  · CT Head: No acute intracranial abnormality   · XRays appear to be negative on my read for fractures, but formal radiology reads are pending     Allscripts / [facility] Records Reviewed: Yes     ** Please Note: This note has been constructed using a voice recognition system   **

## 2018-08-14 NOTE — ASSESSMENT & PLAN NOTE
· POA, overall asymptomatic, but UA is grossly positive   Could be due to neuropathy that patient does not have pain   · Continue Rocephin   · Follow up urine culture

## 2018-08-14 NOTE — ASSESSMENT & PLAN NOTE
Lab Results   Component Value Date    HGBA1C 6 8 (H) 10/19/2017       No results for input(s): POCGLU in the last 72 hours  Blood Sugar Average: Last 72 hrs:  · No new A1c noted   Euglycemic on admission  · Continue home insulin regimen   · Levemir 40 U BID   · Humalog 17 U TID with meals   · SSI algorithm with meals and bedtime   · QID accuchecks

## 2018-08-14 NOTE — ASSESSMENT & PLAN NOTE
· POA, acute change in the last 1-2 weeks as per patient  States that she is dizzy right now at rest as well as when she changes position  Admits to not drinking a lot of fluids at home because she does not like water  Has had some recent falls at home, but no serious injuries noted and not on thinners  Neuro exam non-focal  Suspect multifactorial in the setting of mild dehydration and UTI with deconditioning underlying   · Admit patient to med/surg under inpatient status    · Hydrate patient  · Antivert 25 mg PO Q8 PRN dizziness   · Treat urinary tract infection   · PT eval

## 2018-08-14 NOTE — ED PROVIDER NOTES
History  Chief Complaint   Patient presents with    Dizziness     [place] x1 mth ago, mutliple falls since then, has a case workers that has finally been able to convince her to come  Unable to move left shoulder since fall 1 month ago, back pain, and dizziness  67 YR FEMALE WITH MULTIPLE COMPS-- [facility] IN [facility]-- HAS - HAS BIPOLAR D/O- C/O DEPRESSION AND ANXIETY NOT NEW- NO SI/PLAN ATTEMPT- CHRONIC BACK PAIN- BLE EDEMA/ PERIPHERAL NEUROPATHY-- WITH RECENT FALLS-  ONE FALL HAS BEEN OFF TOillET 3 WEEKS AGO- WITH HEAD/ LEFT SHOULDER And r foot injury --   has been trying to convince her to seek medical attention - agreed today --  at [facility] approx 4-6 week gradual decline in home functioning - c/o mild dysuria-a no fevers- no cp/- chroncic cough is unchanged-- no brbpr/melena- no rash -- c/o dizziness- feels like she might pass out with ambulation- no vertigo- complaint with meds- states bs 400 today did not cover with extra insulin         History provided by:  Patient and EMS personnel ()   used: No    Dizziness   Associated symptoms: no shortness of breath        Prior to Admission Medications   Prescriptions Last Dose Informant Patient Reported? Taking?    albuterol (2 5 mg/3 mL) 0 083 % nebulizer solution   Yes No   Sig: Take 2 5 mg by nebulization 3 (three) times a day as needed for wheezing   aspirin 81 MG tablet   Yes No   Sig: Take 81 mg by mouth daily   buPROPion (WELLBUTRIN XL) 300 mg 24 hr tablet   Yes No   Sig: Take 300 mg by mouth every morning   cholecalciferol (VITAMIN D3) 1,000 units tablet   Yes No   Sig: Take 1,000 Units by mouth daily   ferrous sulfate 325 (65 Fe) mg tablet   No No   Sig: Take 1 tablet by mouth 2 (two) times a day with meals   fluticasone-salmeterol (ADVAIR HFA) 115-21 MCG/ACT inhaler   Yes No   Sig: Inhale 2 puffs 2 (two) times a day   insulin detemir (LEVEMIR) 100 units/mL subcutaneous injection   Yes No   Sig: Inject 40 Units under the skin 2 (two) times a day   insulin lispro (HUMALOG KWIKPEN) 100 Units/mL SOPN   Yes No   Sig: Inject 17 Units under the skin 3 (three) times a day with meals   ipratropium (ATROVENT) 0 02 % nebulizer solution   Yes No   Sig: Take 0 5 mg by nebulization every 6 (six) hours as needed for wheezing or shortness of breath   lisinopril (ZESTRIL) 2 5 mg tablet   Yes No   Sig: Take 2 5 mg by mouth every morning   metFORMIN (GLUCOPHAGE) 1000 MG tablet   Yes No   Sig: Take 1,000 mg by mouth 2 (two) times a day with meals   montelukast (SINGULAIR) 10 mg tablet   Yes No   Sig: Take 10 mg by mouth daily at bedtime   ondansetron (ZOFRAN) 4 mg tablet   No No   Sig: Take 1 tablet by mouth every 8 (eight) hours as needed for nausea or vomiting   pantoprazole (PROTONIX) 40 mg tablet   Yes No   Sig: Take 40 mg by mouth daily   rOPINIRole (REQUIP) 0 5 mg tablet   Yes No   Sig: Take 0 5 mg by mouth 2 (two) times a day   simvastatin (ZOCOR) 40 mg tablet   Yes No   Sig: Take 40 mg by mouth daily at bedtime      Facility-Administered Medications: None       Past Medical History:   Diagnosis Date    Asthma     Bipolar 2 disorder (HCC)     Chronic kidney disease     [facility]     Diabetes mellitus ([facility] )     GERD (gastroesophageal reflux disease)     Hyperlipidemia     Hypertension     Neuropathy     Renal disorder     Restless leg syndrome        Past Surgical History:   Procedure Laterality Date    BACK SURGERY      CHOLECYSTECTOMY      HERNIA REPAIR      REPLACEMENT TOTAL KNEE BILATERAL      TUMOR REMOVAL         No family history on file  I have reviewed and agree with the history as documented  Social History   Substance Use Topics    Smoking status: Never Smoker    Smokeless tobacco: Never Used    Alcohol use No        Review of Systems   Constitutional: Negative  HENT: Negative  Eyes: Negative  Respiratory: Positive for cough   Negative for apnea, choking, chest tightness, shortness of breath, wheezing and stridor  Cardiovascular: Negative  Gastrointestinal: Negative  Endocrine: Negative  Genitourinary: Positive for dysuria  Negative for decreased urine volume, difficulty urinating, dyspareunia, enuresis, flank pain, frequency, genital sores, hematuria, menstrual problem, pelvic pain, urgency, vaginal bleeding, vaginal discharge and vaginal pain  Musculoskeletal: Positive for back pain  Negative for arthralgias, gait problem, joint swelling, myalgias, neck pain and neck stiffness  Skin: Negative  Allergic/Immunologic: Negative  Neurological: Positive for dizziness  Hematological: Negative for adenopathy  Bruises/bleeds easily  Psychiatric/Behavioral: Positive for dysphoric mood  Negative for agitation, behavioral problems, confusion, decreased concentration, hallucinations, self-injury, sleep disturbance and suicidal ideas  The patient is nervous/anxious  The patient is not hyperactive  Physical Exam  Physical Exam   Constitutional: She is oriented to person, place, and time  She appears distressed  Anxious appearing- mild tachypnea- pulse ox 100 % on ra- intepretation is normal- no intervention    HENT:   Head: Normocephalic and atraumatic  Eyes: Conjunctivae and EOM are normal  Pupils are equal, round, and reactive to light  Right eye exhibits no discharge  Left eye exhibits no discharge  No scleral icterus  Mm pink   Neck: Normal range of motion  Neck supple  No JVD present  No tracheal deviation present  No thyromegaly present  No pmt c spine    Cardiovascular: Normal rate, regular rhythm and intact distal pulses  Exam reveals no gallop and no friction rub  Murmur heard  Pulmonary/Chest: No stridor  No respiratory distress  She has no wheezes  She has no rales  She exhibits no tenderness  Mild decreased bs-b   Abdominal: Soft  Bowel sounds are normal  She exhibits no distension and no mass   There is no tenderness  There is no rebound and no guarding  No hernia  obese abd-  Pos bilateral lower abd areas of ecchymosis - from inulin sq -  soft- nt- no peritoeneal signs-- no pulsatile abd mass/bruit- no cva tenderness- no panus erythema   Musculoskeletal: She exhibits edema and tenderness (nho nystatmus- neg test of sckew)  She exhibits no deformity  Left shoulder- pos distal clavicle/ ant shoulder tenderness with decreased rom at shoulder- no ecchymosis/deoromity -- pos  Mid sacral tenderess- r foot- dorsum ecchymosis- with tenderness- nt at ankle- toes-- no deformity -- 2 plus ble pretibial edema- no assym/ erythema- equal bilateral radial/dp pulses   Lymphadenopathy:     She has no cervical adenopathy  Neurological: She is alert and oriented to person, place, and time  No cranial nerve deficit or sensory deficit  She exhibits normal muscle tone  Coordination normal    Skin: Skin is warm  Capillary refill takes less than 2 seconds  No rash noted  She is not diaphoretic  No erythema  No pallor  Psychiatric:   appearing   Nursing note and vitals reviewed        Vital Signs  ED Triage Vitals   Temperature Pulse Respirations Blood Pressure SpO2   08/14/18 1548 08/14/18 1532 08/14/18 1532 08/14/18 1532 08/14/18 1532   98 6 °F (37 °C) 94 (!) 24 141/65 100 %      Temp Source Heart Rate Source Patient Position - Orthostatic VS BP Location FiO2 (%)   08/14/18 1548 08/14/18 1532 08/14/18 1532 08/14/18 1532 --   Rectal Monitor Sitting Right arm       Pain Score       08/14/18 1532       6           Vitals:    08/14/18 1532   BP: 141/65   Pulse: 94   Patient Position - Orthostatic VS: Sitting       Visual Acuity      ED Medications  Medications   lactated ringers infusion 500 mL (500 mL Intravenous New Bag 8/14/18 1605)   lidocaine (LIDODERM) 5 % patch 1 patch (1 patch Transdermal Medication Applied 8/14/18 1605)   LORazepam (ATIVAN) tablet 0 5 mg (0 5 mg Oral Given 8/14/18 1605)   acetaminophen (TYLENOL) tablet 650 mg (650 mg Oral Given 8/14/18 1604)       Diagnostic Studies  Results Reviewed     Procedure Component Value Units Date/Time    Urine Microscopic [[id number]] Collected:  08/14/18 1624    Lab Status:  No result Specimen:  Urine from Urine, Clean Catch     ED Urine Macroscopic [[id number]]  (Abnormal) Collected:  08/14/18 1624    Lab Status:  Final result Specimen:  Urine Updated:  08/14/18 1615     Color, UA Yellow     Clarity, UA Clear     pH, UA 8 5 (H)     Leukocytes, UA Large (A)     Nitrite, UA Negative     Protein, UA Trace (A) mg/dl      Glucose, UA Negative mg/dl      Ketones, UA Negative mg/dl      Urobilinogen, UA 0 2 E U /dl      Bilirubin, UA Negative     Blood, UA Trace (A)     Specific [place], UA 1 015    Narrative:       CLINITEK RESULT    CBC and differential [[id number]] Collected:  08/14/18 1610    Lab Status: In process Specimen:  Blood from Arm, Right Updated:  08/14/18 1613    Comprehensive metabolic panel [[id number]] Collected:  08/14/18 1610    Lab Status: In process Specimen:  Blood from Arm, Right Updated:  08/14/18 1613    Troponin I [[id number]] Collected:  08/14/18 1610    Lab Status:   In process Specimen:  Blood from Arm, Right Updated:  08/14/18 1613                 XR chest 2 views    (Results Pending)   CT head without contrast    (Results Pending)   XR pelvis ap only 1 or 2 views    (Results Pending)   XR shoulder 2+ views LEFT    (Results Pending)   XR sacrum and coccyx    (Results Pending)              Procedures  Procedures       Phone Contacts  ED Phone Contact    ED Course  ED Course as of Aug 14 1830   Tue Aug 14, 2018   1547 ER MD NOTE- PREVIOUS LABS/ D/C SUMMARY REVIEWED BY ER MD    [address] [facility] ER MD 98 [phone]    1548 ER MD NOTE- ANO-RECTAL EXAM -- NORMAL ANAL EXAM- SURGILUBE - HEME NEG STOOLS    1710 Left shoulder xray - no fx / separation / dislocation- ac jt djd    - carp/alt- compared to 10/17-- no change- no change in mediastinum-infiltrate- ptx- no free/sq air    1712 Coccyx/sacral xray - no evidence of fx     - pelvis- xray - no evidence of fx     1713 Er md note- elevated trop - similar to previous trops    1737 R foot ray -  no evidence of fx     1817 Er md note- pt unable to ambulate with assitance in er- will admit to slim                                 MDM  CritCare Time    Disposition  Final diagnoses:   None     ED Disposition     None      Follow-up Information    None         Patient's Medications   Discharge Prescriptions    No medications on file     No discharge procedures on file      ED Provider  Electronically Signed by           Micah Rdz MD  08/14/18 5771

## 2018-08-14 NOTE — ASSESSMENT & PLAN NOTE
· Counts are noted to be stable   Patient was worked up in the past, however she refused bone marrow biopsy at that time   · Monitor CBC

## 2018-08-14 NOTE — ED PROCEDURE NOTE
PROCEDURE  ECG 12 Lead Documentation  Date/Time: 8/14/2018 4:10 PM  Performed by: Roxie Lazo  Authorized by: Roxie Lazo     Indications / Diagnosis:  NEAR SYNCOPE/ DIZZINESS/ MULTIPLE COMPS  ECG reviewed by me, the ED Provider: yes    Patient location:  ED and bedside  Previous ECG:     Previous ECG:  Compared to current    Comparison ECG info:  11/3/17    Similarity:  No change    Comparison to cardiac monitor: Yes    Interpretation:     Interpretation: non-specific    Rate:     ECG rate:  81    ECG rate assessment: normal    Rhythm:     Rhythm: sinus rhythm    Ectopy:     Ectopy: PAC    QRS:     QRS axis:  Normal    QRS intervals:  Normal  Conduction:     Conduction: normal    ST segments:     ST segments:  Normal  T waves:     T waves: flattening      Flattening:  III and V1  Other findings:     Other findings: U wave    Comments:      NO ECG SIGNS OF ISCHEMIA/ INJURY /  [address] - NO ECG SIGNS OF LONG QTC/ /WPW/BRUGADA SYNDROME/ HCM /EPSILON WAVES         Kory Aparicio MD  08/14/18 9116

## 2018-08-14 NOTE — ASSESSMENT & PLAN NOTE
· POA, troponin noted to be elevated however this is chronic   No anginal symptoms at all   · No further work up   · If starts having chest pain would trend troponin and consider further work up

## 2018-08-15 VITALS
HEART RATE: 84 BPM | DIASTOLIC BLOOD PRESSURE: 85 MMHG | WEIGHT: 167.11 LBS | OXYGEN SATURATION: 94 % | TEMPERATURE: 98 F | HEIGHT: 58 IN | BODY MASS INDEX: 35.08 KG/M2 | SYSTOLIC BLOOD PRESSURE: 115 MMHG | RESPIRATION RATE: 18 BRPM

## 2018-08-15 LAB
ANION GAP SERPL CALCULATED.3IONS-SCNC: 9 MMOL/L (ref 4–13)
ATRIAL RATE: 84 BPM
BACTERIA UR CULT: NORMAL
BUN SERPL-MCNC: 11 MG/DL (ref 5–25)
CALCIUM SERPL-MCNC: 8.3 MG/DL (ref 8.3–10.1)
CHLORIDE SERPL-SCNC: 108 MMOL/L (ref 100–108)
CO2 SERPL-SCNC: 22 MMOL/L (ref 21–32)
CREAT SERPL-MCNC: 0.67 MG/DL (ref 0.6–1.3)
ERYTHROCYTE [DISTWIDTH] IN BLOOD BY AUTOMATED COUNT: 18.2 % (ref 11.6–15.1)
GFR SERPL CREATININE-BSD FRML MDRD: 87 ML/MIN/1.73SQ M
GLUCOSE SERPL-MCNC: 145 MG/DL (ref 65–140)
GLUCOSE SERPL-MCNC: 209 MG/DL (ref 65–140)
GLUCOSE SERPL-MCNC: 211 MG/DL (ref 65–140)
GLUCOSE SERPL-MCNC: 283 MG/DL (ref 65–140)
HCT VFR BLD AUTO: 25 % (ref 34.8–46.1)
HGB BLD-MCNC: 7.6 G/DL (ref 11.5–15.4)
MCH RBC QN AUTO: 26.7 PG (ref 26.8–34.3)
MCHC RBC AUTO-ENTMCNC: 30.4 G/DL (ref 31.4–37.4)
MCV RBC AUTO: 88 FL (ref 82–98)
P AXIS: 49 DEGREES
PLATELET # BLD AUTO: 75 THOUSANDS/UL (ref 149–390)
PMV BLD AUTO: 12.4 FL (ref 8.9–12.7)
POTASSIUM SERPL-SCNC: 3.7 MMOL/L (ref 3.5–5.3)
PR INTERVAL: 162 MS
QRS AXIS: 43 DEGREES
QRSD INTERVAL: 84 MS
QT INTERVAL: 386 MS
QTC INTERVAL: 448 MS
RBC # BLD AUTO: 2.85 MILLION/UL (ref 3.81–5.12)
SODIUM SERPL-SCNC: 139 MMOL/L (ref 136–145)
T WAVE AXIS: 34 DEGREES
VENTRICULAR RATE: 81 BPM
WBC # BLD AUTO: 2.87 THOUSAND/UL (ref 4.31–10.16)

## 2018-08-15 PROCEDURE — 80048 BASIC METABOLIC PNL TOTAL CA: CPT | Performed by: PHYSICIAN ASSISTANT

## 2018-08-15 PROCEDURE — 99239 HOSP IP/OBS DSCHRG MGMT >30: CPT | Performed by: NURSE PRACTITIONER

## 2018-08-15 PROCEDURE — 82948 REAGENT STRIP/BLOOD GLUCOSE: CPT

## 2018-08-15 PROCEDURE — 85027 COMPLETE CBC AUTOMATED: CPT | Performed by: PHYSICIAN ASSISTANT

## 2018-08-15 PROCEDURE — 93010 ELECTROCARDIOGRAM REPORT: CPT | Performed by: INTERNAL MEDICINE

## 2018-08-15 RX ORDER — MECLIZINE HYDROCHLORIDE 25 MG/1
25 TABLET ORAL EVERY 8 HOURS PRN
Qty: 30 TABLET | Refills: 0 | Status: SHIPPED | OUTPATIENT
Start: 2018-08-15 | End: 2018-08-15

## 2018-08-15 RX ORDER — CEPHALEXIN 500 MG/1
500 CAPSULE ORAL EVERY 6 HOURS SCHEDULED
Qty: 28 CAPSULE | Refills: 0 | Status: SHIPPED | OUTPATIENT
Start: 2018-08-15 | End: 2018-08-15

## 2018-08-15 RX ORDER — MECLIZINE HYDROCHLORIDE 25 MG/1
25 TABLET ORAL EVERY 8 HOURS PRN
Qty: 30 TABLET | Refills: 0 | Status: ON HOLD | OUTPATIENT
Start: 2018-08-15 | End: 2018-09-18 | Stop reason: ALTCHOICE

## 2018-08-15 RX ORDER — CEPHALEXIN 500 MG/1
500 CAPSULE ORAL EVERY 6 HOURS SCHEDULED
Qty: 28 CAPSULE | Refills: 0 | Status: SHIPPED | OUTPATIENT
Start: 2018-08-15 | End: 2018-08-22

## 2018-08-15 RX ADMIN — BUPROPION HYDROCHLORIDE 300 MG: 150 TABLET, FILM COATED, EXTENDED RELEASE ORAL at 08:30

## 2018-08-15 RX ADMIN — PANTOPRAZOLE SODIUM 40 MG: 40 TABLET, DELAYED RELEASE ORAL at 06:54

## 2018-08-15 RX ADMIN — LISINOPRIL 2.5 MG: 2.5 TABLET ORAL at 08:30

## 2018-08-15 RX ADMIN — POTASSIUM CHLORIDE 40 MEQ: 1500 TABLET, EXTENDED RELEASE ORAL at 08:43

## 2018-08-15 RX ADMIN — FERROUS SULFATE TAB 325 MG (65 MG ELEMENTAL FE) 325 MG: 325 (65 FE) TAB at 08:30

## 2018-08-15 RX ADMIN — FLUTICASONE FUROATE AND VILANTEROL TRIFENATATE 1 PUFF: 200; 25 POWDER RESPIRATORY (INHALATION) at 08:32

## 2018-08-15 RX ADMIN — INSULIN LISPRO 17 UNITS: 100 INJECTION, SOLUTION INTRAVENOUS; SUBCUTANEOUS at 13:48

## 2018-08-15 RX ADMIN — ASPIRIN 81 MG 81 MG: 81 TABLET ORAL at 08:30

## 2018-08-15 RX ADMIN — ROPINIROLE 0.5 MG: 0.25 TABLET, FILM COATED ORAL at 08:33

## 2018-08-15 RX ADMIN — INSULIN DETEMIR 40 UNITS: 100 INJECTION, SOLUTION SUBCUTANEOUS at 08:43

## 2018-08-15 RX ADMIN — INSULIN LISPRO 17 UNITS: 100 INJECTION, SOLUTION INTRAVENOUS; SUBCUTANEOUS at 16:35

## 2018-08-15 RX ADMIN — ROPINIROLE 0.5 MG: 0.25 TABLET, FILM COATED ORAL at 17:30

## 2018-08-15 RX ADMIN — CHOLECALCIFEROL TAB 25 MCG (1000 UNIT) 1000 UNITS: 25 TAB at 08:30

## 2018-08-15 RX ADMIN — INSULIN LISPRO 17 UNITS: 100 INJECTION, SOLUTION INTRAVENOUS; SUBCUTANEOUS at 08:32

## 2018-08-15 RX ADMIN — INSULIN LISPRO 2 UNITS: 100 INJECTION, SOLUTION INTRAVENOUS; SUBCUTANEOUS at 12:46

## 2018-08-15 RX ADMIN — MECLIZINE HYDROCHLORIDE 25 MG: 25 TABLET ORAL at 17:30

## 2018-08-15 RX ADMIN — INSULIN DETEMIR 40 UNITS: 100 INJECTION, SOLUTION SUBCUTANEOUS at 17:34

## 2018-08-15 RX ADMIN — PRAVASTATIN SODIUM 80 MG: 80 TABLET ORAL at 17:29

## 2018-08-15 RX ADMIN — INSULIN LISPRO 2 UNITS: 100 INJECTION, SOLUTION INTRAVENOUS; SUBCUTANEOUS at 08:32

## 2018-08-15 RX ADMIN — HEPARIN SODIUM 5000 UNITS: 5000 INJECTION, SOLUTION INTRAVENOUS; SUBCUTANEOUS at 06:55

## 2018-08-15 NOTE — ASSESSMENT & PLAN NOTE
· POA, acute change in the last 1-2 weeks as per patient  · Likely multifactorial in setting dehydration, UTI, and debility    · Overall clinically improved with p r n  meclizine IV hydration  · Patient requesting discharge home given clinical improvement will discharge home on Keflex and p r n  meclizine

## 2018-08-15 NOTE — PROGRESS NOTES
Patient continues to yell she wants to go home "she wants to be able to take her pills when she wants "

## 2018-08-15 NOTE — PROGRESS NOTES
Patient requested her Ropinirole, when patient was informed that her ropinirole was not scheduled until 1800 patient began flailing her arms and legs yelling "Give me my ropinirole " Patient was informed again that her medication is not scheduled at this time  Patient became verbally aggressive with primary RN yelling "I'm going to hit you! I'm going to hit you! Call the doctor now! I need my ropinirole " At this time Blaine is now at bedside

## 2018-08-15 NOTE — DISCHARGE SUMMARY
Discharge- [facility] [birth year], 67 y o  female MRN: [id number]    Unit/Bed#: -01 Encounter: [id number]    Primary Care Provider: Woo Marino MD   Date and time admitted to hospital: 8/14/2018  3:18 PM        * Dizziness   Assessment & Plan    · POA, acute change in the last 1-2 weeks as per patient  · Likely multifactorial in setting dehydration, UTI, and debility  · Overall clinically improved with p r n  meclizine IV hydration  · Patient requesting discharge home given clinical improvement will discharge home on Keflex and p r n  meclizine        Acute cystitis without hematuria   Assessment & Plan    · POA, overall asymptomatic, but UA is grossly positive  · Transition to oral Keflex  · Continue to trend urine culture         Bipolar 1 disorder ([place] [address] )   Assessment & Plan    · Moods stable with periodic anxiety calms with verbal discussion  · Continue Wellbutrin         NSTEMI (non-ST elevated myocardial infarction) ([facility] )   Assessment & Plan    · POA, troponin noted to be elevated however does appear chronic  No anginal symptoms at all   · No further work up patient has remained asymptomatic        Cirrhosis of liver ([facility] )   Assessment & Plan    · LFTs are stable on admission   · No further management         Hypokalemia   Assessment & Plan    · POA, noted to be 3 0   · Since replete potassium is now 3 7          Pancytopenia ([facility] )   Assessment & Plan    · Counts are noted to be stable     · Patient was worked up in the past, however she refused bone marrow biopsy at that time   · Hemoglobin currently 7 6 likely dilution his patient reports resolution of her dizziness          Type 2 diabetes mellitus with diabetic polyneuropathy, with long-term current use of insulin [facility])   Assessment & Plan    Lab Results   Component Value Date    HGBA1C 6 8 (H) 10/19/2017       Recent Labs      08/14/18   2110  08/15/18   0722  08/15/18   1112   POCGLU  295*  211*  209*       Blood Sugar Average: Last 72 hrs:  · (P) 936 [id number] new A1c noted  Euglycemic on admission  · Continue home insulin regimen   · Levemir 40 U BID   · Humalog 17 U TID with meals   · SSI algorithm with meals and bedtime   · QID accuchecks               The patient discharged earlier than expected due to overall clinical improvement  Discharging Physician / Practitioner: Guy Wells 10 Marylou Quiroz  PCP: Rowan Ac MD  Admission Date:   Admission Orders     Ordered        08/14/18 1831  Inpatient Admission (expected length of stay for this patient is greater than two midnights)  Once             Discharge Date: 08/15/18    Resolved Problems  Date Reviewed: 8/15/2018    None          Consultations During Hospital Stay:  ·  None    Procedures Performed:     · Blood cultures x2  · Urine culture x1  · UA with microscopic reflex to culture  · CT head no acute intracranial abnormalities  · X-ray right foot no acute osseous abnormality    Significant Findings / Test Results:     · Dizziness likely secondary to volume depletion/UTI  · Pancytopenia  · UTI suspected  · Bipolar    Incidental Findings:   · None     Test Results Pending at Discharge (will require follow up): · Blood cultures x2  · Urine culture x1     Outpatient Tests Requested:  · Per PCP    Complications:  None    Reason for Admission:  Dizziness/UTI    Hospital Course:     Steph Lopez is a 67 y o  female patient who originally presented to the hospital on 8/14/2018 due to signs of dizziness and not being able to ambulated home  Patient had UA that was grossly positive for high suspicion of UTI and sustained a fall at home  Patient came to the ED for further evaluation where was suspected patient had dizziness secondary to dizziness UTI and some debility  Patient was started on IV fluids p r n  meclizine overall clinically improved in 24 hours  Patient has a known history of bipolar has not seen her counselor and is due to see her    Patient became upset when she could not have a scheduled medication when when she requested at and became very upset staff long discussion with the patient and calmed with verbal discussion and stated by dizziness has resolved a do not want to stay I do not want to sign out AMA but I feel am okay to go home hyper walking in the room without difficulty without dizziness  The patient further knows that she needs to have a bone marrow biopsy however does not want to at this time until she feels that she is in a better mental place  Patient has not been to her counselor or psychiatrist and is trying to get therapeutic the dizziness was prevented her from walking the 3 blocks to her appointments  Patient reports with the IV fluids and the medication that we gave her she feels much better and would prefer to go home so that she can continue her outpatient appointments  Given patient's clinical improvement in 24 hours patient was discharged home recommended outpatient follow-up with her PCP to follow-up on her UTI and personally see her counselor as she was having ongoing anxiety while in-patient  Patient states awareness of her anxiety and understands the need to get to her appointments  Please see above list of diagnoses and related plan for additional information  Condition at Discharge: stable     Discharge Day Visit / Exam:     Subjective:  Patient yelling at the nurses upon entering the room stating I medication and wanted now nurse explained that is time and could be given at this time  Long discussion with patient appeared to, conversation patient question about her psychiatrist as outpatient patient states she has been unable to get to do the dizziness she has been experiencing and given fall at home knew that she needed to get the dizziness addressed    Patient reports improvement of her dizziness on the current medication and just wants to go home so that she can maintain her appointment with her psychiatrist   Analy Caban: Blood Pressure: 115/85 (08/15/18 7156)  Pulse: 84 (08/15/18 0718)  Temperature: 98 °F (36 7 °C) (08/15/18 0718)  Temp Source: Oral (08/15/18 0718)  Respirations: 18 (08/15/18 0718)  Height: 4' 10" (147 3 cm) (08/14/18 2055)  Weight - Scale: 75 8 kg (167 lb 1 7 oz) (pt states she cannot stand right now) (08/14/18 2055)  SpO2: 94 % (08/15/18 0718)  Exam:   Physical Exam   Constitutional: She is oriented to person, place, and time  HENT:   Head: Normocephalic and atraumatic  Eyes: Conjunctivae and EOM are normal    Neck: Normal range of motion  Cardiovascular: Normal rate, regular rhythm and normal heart sounds  Pulmonary/Chest: Effort normal and breath sounds normal    Abdominal: Soft  Bowel sounds are normal    Musculoskeletal: Normal range of motion  Neurological: She is alert and oriented to person, place, and time  Skin: Skin is warm and dry  Psychiatric: Her mood appears anxious  Her affect is angry  She is agitated and aggressive  Patient calms with verbal discussion        Discussion with Family:  Declined family call patient states the family does not speak to her any more and relies on friends for assistant  Discharge instructions/Information to patient and family:   See after visit summary for information provided to patient and family  Provisions for Follow-Up Care:  See after visit summary for information related to follow-up care and any pertinent home health orders  Disposition:     Home    For Discharges to [facility] SNF:   · Not Applicable to this Patient - Not Applicable to this Patient    Planned Readmission:  None     Discharge Statement:  I spent 35 minutes discharging the patient  This time was spent on the day of discharge  I had direct contact with the patient on the day of discharge   Greater than 50% of the total time was spent examining patient, answering all patient questions, arranging and discussing plan of care with patient as well as directly providing post-discharge instructions  Additional time then spent on discharge activities  Discharge Medications:  See after visit summary for reconciled discharge medications provided to patient and family        ** Please Note: This note has been constructed using a voice recognition system **

## 2018-08-15 NOTE — ASSESSMENT & PLAN NOTE
· POA, troponin noted to be elevated however does appear chronic   No anginal symptoms at all   · No further work up patient has remained asymptomatic

## 2018-08-15 NOTE — ASSESSMENT & PLAN NOTE
· POA, overall asymptomatic, but UA is grossly positive     · Transition to oral Keflex  · Continue to trend urine culture

## 2018-08-15 NOTE — ASSESSMENT & PLAN NOTE
Lab Results   Component Value Date    HGBA1C 6 8 (H) 10/19/2017       Recent Labs      08/14/18   2110  08/15/18   0722  08/15/18   1112   POCGLU  295*  211*  209*       Blood Sugar Average: Last 72 hrs:  · (P) 948 [id number] new A1c noted   Euglycemic on admission  · Continue home insulin regimen   · Levemir 40 U BID   · Humalog 17 U TID with meals   · SSI algorithm with meals and bedtime   · QID accuchecks

## 2018-08-15 NOTE — ASSESSMENT & PLAN NOTE
· Counts are noted to be stable     · Patient was worked up in the past, however she refused bone marrow biopsy at that time   · Hemoglobin currently 7 6 likely dilution his patient reports resolution of her dizziness

## 2018-08-15 NOTE — SOCIAL WORK
Pt in need of transport home  Not appropriate for LYFT  Pt was set up for a 530pm w/c transport via [facility] to  Her residence in [place]  Pt and nurse aware  Discussed OOP cost with patient and she is agreeable

## 2018-08-15 NOTE — CASE MANAGEMENT
Thank you,  Edan Aqq  [facility] Utilization Review Department  Phone: [phone]; Fax [phone]  ATTENTION: The Network Utilization Review Department is now centralized for our 9 Facilities  Make a note that we have a new phone and fax numbers for our Department  Please call with any questions or concerns to [phone] and carefully follow the prompts so that you are directed to the right person  All voicemails are confidential  Fax any determinations, approvals, denials, and requests for initial or continue stay review clinical to [phone]  Due to HIGH CALL volume, it would be easier if you could please send faxed requests to expedite your requests and in part, help us provide discharge notifications faster  Initial Clinical Review    Admission: Date/Time/Statement: inpatient admission  8/14/18 @ [facility] This Encounter   Procedures    Inpatient Admission (expected length of stay for this patient is greater than two midnights)     Standing Status:   Standing     Number of Occurrences:   1     Order Specific Question:   Admitting Physician     Answer:   Tio Narayan [[id number]]     Order Specific Question:   Level of Care     Answer:   Med Surg [[id number]]     Order Specific Question:   Estimated length of stay     Answer:   More than 2 Midnights     Order Specific Question:   Certification     Answer:   I certify that inpatient services are medically necessary for this patient for a duration of greater than two midnights  See H&P and MD Progress Notes for additional information about the patient's course of treatment           ED: Date/Time/Mode of Arrival:   ED Arrival Information     Expected Arrival Acuity Means of Arrival Escorted By Service Admission Type    - 8/14/2018 15:17 Urgent Ambulance [facility] General Medicine Urgent    Arrival Complaint    -          Chief Complaint:   Chief Complaint   Patient presents with    Dizziness     Desmond Sherri x1 mth ago, mutliple falls since then, has a case workers that has finally been able to convince her to come  Unable to move left shoulder since fall 1 month ago, back pain, and dizziness  History of Illness: 67 y o  female with a history of liver cirrhosis, pancytopenia from unknown source,presents with dizziness  She reports that this is an acute change over the last 1-2 weeks  She reports dizziness with rest as well as with ambulation and notes that there is an increase in her symptoms with positional change  She reports increased falls at home despite using her walker       ED Vital Signs:   ED Triage Vitals   Temperature Pulse Respirations Blood Pressure SpO2   08/14/18 1548 08/14/18 1532 08/14/18 1532 08/14/18 1532 08/14/18 1532   98 6 °F (37 °C) 94 (!) 24 141/65 100 %      Temp Source Heart Rate Source Patient Position - Orthostatic VS BP Location FiO2 (%)   08/14/18 1548 08/14/18 1532 08/14/18 1532 08/14/18 1532 --   Rectal Monitor Sitting Right arm       Pain Score       08/14/18 1532       6        Wt Readings from Last 1 Encounters:   08/14/18 75 8 kg (167 lb 1 7 oz)       Vital Signs (abnormal): 08/14: Resp 24    Abnormal Labs/Diagnostic Test Results: 08/14:  Potassium 3 0     Alkaline Phosphatase 121     Total Protein 6 2     Albumin 2 6     Trop 0 18, wbc 3 06, rbc 3 08 hgb 8 7, hct 26 8  Platelets 86  Urinalysis:  Leukocytes, UA Large     Protein, UA Trace     Blood, UA Trace     Glucose 295; blood & urine cultures pending  XR chest 2 vws Mild cardiomegaly   Mild prominence of the central parahilar bronchovascular markings   No acute consolidation  EKG:QRS axis:  Normal    QRS intervals:  Normal  Conduction:     Conduction: normal    ST segments:     ST segments:  Normal  T waves:     T waves: flattening      Flattening:  III and V1  Other findings:     Other findings: U wave    Comments:      NO ECG SIGNS OF ISCHEMIA/ INJURY / [address] - NO ECG SIGNS OF   LONG QTC/ /WPW/BRUGADA SYNDROME/ HCM /EPSILON WAVES    08/15: glucose 283,   WBC 2 87     RBC 2 85     Hemoglobin 7 6     Hematocrit 25 0     Platelets 75         ED Treatment:   Medication Administration from 08/14/2018 1517 to 08/14/2018 2046       Date/Time Order Dose Route Action Comments     08/14/2018 1605 LORazepam (ATIVAN) tablet 0 5 mg 0 5 mg Oral Given      08/14/2018 1605 lactated ringers infusion 500 mL 500 mL Intravenous New Bag      08/14/2018 1604 acetaminophen (TYLENOL) tablet 650 mg 650 mg Oral Given      08/14/2018 1605 lidocaine (LIDODERM) 5 % patch 1 patch 1 patch Transdermal Medication Applied      08/14/2018 1844 cefTRIAXone (ROCEPHIN) 2,000 mg in dextrose 5 % 50 mL IVPB 2,000 mg Intravenous New Bag      08/14/2018 1845 sodium chloride 0 9 % infusion 125 mL/hr Intravenous New Bag           Past Medical/Surgical History:    Active Ambulatory Problems     Diagnosis Date Noted    Type 2 diabetes mellitus with diabetic polyneuropathy, with long-term current use of insulin ([facility] )     Hyperlipidemia     Asthma 10/18/2017    Symptomatic anemia 10/18/2017    NSTEMI (non-ST elevated myocardial infarction) ([facility] ) 10/18/2017    Lactic acidosis 10/18/2017    Pancytopenia ([facility] ) 10/18/2017    Bipolar 1 disorder ([facility] ) 10/18/2017    Cirrhosis of liver ([facility] ) 10/19/2017    Intractable diarrhea 11/03/2017     Resolved Ambulatory Problems     Diagnosis Date Noted    No Resolved Ambulatory Problems     Past Medical History:   Diagnosis Date    Asthma     Bipolar 2 disorder ([facility] )     Chronic kidney disease     Cirrhosis of liver ([facility] )     Diabetes mellitus ([facility] )     GERD (gastroesophageal reflux disease)     Hyperlipidemia     Hypertension     Neuropathy     Renal disorder     Restless leg syndrome        Admitting Diagnosis: Dizziness [R42]  UTI (urinary tract infection) [N39 0]  Ambulatory dysfunction [R26 2]    Age/Sex: 67 y o  female    Assessment/Plan:   Dizziness   Assessment & Plan     · POA, acute change in the last 1-2 weeks as per patient  States that she is dizzy right now at rest as well as when she changes position  Admits to not drinking a lot of fluids at home because she does not like water  Has had some recent falls at home, but no serious injuries noted and not on thinners  Neuro exam non-focal  Suspect multifactorial in the setting of mild dehydration and UTI with deconditioning underlying   ? Admit patient to med/surg under inpatient status  ? Hydrate patient  ? Antivert 25 mg PO Q8 PRN dizziness   ? Treat urinary tract infection   ? PT eval           Acute cystitis without hematuria   Assessment & Plan     · POA, overall asymptomatic, but UA is grossly positive  Could be due to neuropathy that patient does not have pain   ? Continue Rocephin   ? Follow up urine culture           Hypokalemia   Assessment & Plan     · POA, noted to be 3 0   ? KCl 40 mEq PO BID x 2 doses   ? Recheck BMP          NSTEMI (non-ST elevated myocardial infarction) ([facility])   Assessment & Plan     · POA, troponin noted to be elevated however this is chronic  No anginal symptoms at all   ? No further work up   ? If starts having chest pain would trend troponin and consider further work up           Pancytopenia ([facility] )   Assessment & Plan     · Counts are noted to be stable  Patient was worked up in the past, however she refused bone marrow biopsy at that time   ? Monitor CBC          Type 2 diabetes mellitus with diabetic polyneuropathy, with long-term current use of insulin ([facility])   Assessment & Plan             Lab Results   Component Value Date     HGBA1C 6 8 (H) 10/19/2017         No results for input(s): POCGLU in the last 72 hours      Blood Sugar Average: Last 72 hrs:  · No new A1c noted  Euglycemic on admission  ? Continue home insulin regimen   § Levemir 40 U BID   § Humalog 17 U TID with meals   ? SSI algorithm with meals and bedtime   ?  QID accuchecks           Cirrhosis of liver ([facility])   Assessment & Plan     · LFTs are stable on admission ? No further management           Bipolar 1 disorder ([facility])   Assessment & Plan     · Moods stable  ?  Continue Wellbutrin        Admission Orders:  Scheduled Meds:   Current Facility-Administered Medications:  acetaminophen 650 mg Oral Q6H PRN    albuterol 2 5 mg Nebulization TID PRN    aspirin 81 mg Oral Daily    buPROPion 300 mg Oral QAM    cefTRIAXone 1,000 mg Intravenous Q24H    cholecalciferol 1,000 Units Oral Daily    ferrous sulfate 325 mg Oral BID With Meals    fluticasone-vilanterol 1 puff Inhalation Daily    heparin (porcine) 5,000 Units Subcutaneous Q8H [address]    insulin detemir 40 Units Subcutaneous BID    insulin lispro 1-6 Units Subcutaneous TID AC    insulin lispro 1-6 Units Subcutaneous HS    insulin lispro 17 Units Subcutaneous TID With Meals    ipratropium 0 5 mg Nebulization Q6H PRN    lactated ringers 500 mL Intravenous Continuous Last Rate: Stopped (08/15/18 0858)   lisinopril 2 5 mg Oral QAM    meclizine 25 mg Oral Q8H PRN    montelukast 10 mg Oral HS    ondansetron 4 mg Intravenous Q6H PRN    pantoprazole 40 mg Oral Early Morning    pravastatin 80 mg Oral Daily With Dinner    rOPINIRole 0 5 mg Oral BID      Continuous Infusions:   lactated ringers 500 mL Last Rate: Stopped (08/15/18 0858)     PRN Meds:   vitals routine  PT eval & treat  Heparin SC & platelet count

## 2018-08-19 LAB
BACTERIA BLD CULT: NORMAL
BACTERIA BLD CULT: NORMAL

## 2018-09-02 ENCOUNTER — HOSPITAL ENCOUNTER (OUTPATIENT)
Facility: HOSPITAL | Age: 73
Setting detail: OBSERVATION
Discharge: HOME | End: 2018-09-02
Attending: EMERGENCY MEDICINE | Admitting: INTERNAL MEDICINE
Payer: COMMERCIAL

## 2018-09-02 ENCOUNTER — APPOINTMENT (EMERGENCY)
Dept: CT IMAGING | Facility: HOSPITAL | Age: 73
End: 2018-09-02
Payer: COMMERCIAL

## 2018-09-02 ENCOUNTER — APPOINTMENT (EMERGENCY)
Dept: RADIOLOGY | Facility: HOSPITAL | Age: 73
End: 2018-09-02
Payer: COMMERCIAL

## 2018-09-02 VITALS
TEMPERATURE: 98 F | RESPIRATION RATE: 18 BRPM | DIASTOLIC BLOOD PRESSURE: 60 MMHG | OXYGEN SATURATION: 98 % | BODY MASS INDEX: 34.28 KG/M2 | HEART RATE: 78 BPM | WEIGHT: 164.02 LBS | SYSTOLIC BLOOD PRESSURE: 127 MMHG

## 2018-09-02 DIAGNOSIS — R55 NEAR SYNCOPE: Primary | ICD-10-CM

## 2018-09-02 DIAGNOSIS — R25.9 ABNORMAL MOVEMENTS: ICD-10-CM

## 2018-09-02 DIAGNOSIS — N39.0 UTI (URINARY TRACT INFECTION): ICD-10-CM

## 2018-09-02 PROBLEM — I21.4 NSTEMI (NON-ST ELEVATED MYOCARDIAL INFARCTION) (HCC): Status: RESOLVED | Noted: 2017-10-18 | Resolved: 2018-09-02

## 2018-09-02 PROBLEM — R79.89 ELEVATED TROPONIN: Status: ACTIVE | Noted: 2018-09-02

## 2018-09-02 PROBLEM — N30.00 ACUTE CYSTITIS WITHOUT HEMATURIA: Status: RESOLVED | Noted: 2018-08-14 | Resolved: 2018-09-02

## 2018-09-02 PROBLEM — R77.8 ELEVATED TROPONIN: Status: ACTIVE | Noted: 2018-09-02

## 2018-09-02 LAB
ALBUMIN SERPL BCP-MCNC: 2.7 G/DL (ref 3.5–5)
ALP SERPL-CCNC: 143 U/L (ref 46–116)
ALT SERPL W P-5'-P-CCNC: 47 U/L (ref 12–78)
ANION GAP SERPL CALCULATED.3IONS-SCNC: 10 MMOL/L (ref 4–13)
APTT PPP: 41 SECONDS (ref 24–36)
AST SERPL W P-5'-P-CCNC: 58 U/L (ref 5–45)
ATRIAL RATE: 86 BPM
BACTERIA UR QL AUTO: ABNORMAL /HPF
BASOPHILS # BLD AUTO: 0.05 THOUSANDS/ΜL (ref 0–0.1)
BASOPHILS NFR BLD AUTO: 1 % (ref 0–1)
BILIRUB SERPL-MCNC: 0.8 MG/DL (ref 0.2–1)
BILIRUB UR QL STRIP: NEGATIVE
BUN SERPL-MCNC: 11 MG/DL (ref 5–25)
CALCIUM SERPL-MCNC: 8.9 MG/DL (ref 8.3–10.1)
CAOX CRY URNS QL MICRO: ABNORMAL /HPF
CHLORIDE SERPL-SCNC: 106 MMOL/L (ref 100–108)
CK MB SERPL-MCNC: 2.1 % (ref 0–2.5)
CK MB SERPL-MCNC: 6.2 NG/ML (ref 0–5)
CK SERPL-CCNC: 290 U/L (ref 26–192)
CLARITY UR: ABNORMAL
CO2 SERPL-SCNC: 25 MMOL/L (ref 21–32)
COLOR UR: YELLOW
CREAT SERPL-MCNC: 0.7 MG/DL (ref 0.6–1.3)
EOSINOPHIL # BLD AUTO: 0.23 THOUSAND/ΜL (ref 0–0.61)
EOSINOPHIL NFR BLD AUTO: 6 % (ref 0–6)
ERYTHROCYTE [DISTWIDTH] IN BLOOD BY AUTOMATED COUNT: 18.4 % (ref 11.6–15.1)
GFR SERPL CREATININE-BSD FRML MDRD: 86 ML/MIN/1.73SQ M
GLUCOSE SERPL-MCNC: 177 MG/DL (ref 65–140)
GLUCOSE SERPL-MCNC: 187 MG/DL (ref 65–140)
GLUCOSE SERPL-MCNC: 220 MG/DL (ref 65–140)
GLUCOSE SERPL-MCNC: 59 MG/DL (ref 65–140)
GLUCOSE UR STRIP-MCNC: ABNORMAL MG/DL
HCT VFR BLD AUTO: 28.2 % (ref 34.8–46.1)
HGB BLD-MCNC: 8.6 G/DL (ref 11.5–15.4)
HGB UR QL STRIP.AUTO: ABNORMAL
IMM GRANULOCYTES # BLD AUTO: 0.01 THOUSAND/UL (ref 0–0.2)
IMM GRANULOCYTES NFR BLD AUTO: 0 % (ref 0–2)
INR PPP: 1.45 (ref 0.86–1.17)
KETONES UR STRIP-MCNC: NEGATIVE MG/DL
LEUKOCYTE ESTERASE UR QL STRIP: ABNORMAL
LYMPHOCYTES # BLD AUTO: 0.76 THOUSANDS/ΜL (ref 0.6–4.47)
LYMPHOCYTES NFR BLD AUTO: 21 % (ref 14–44)
MCH RBC QN AUTO: 27.7 PG (ref 26.8–34.3)
MCHC RBC AUTO-ENTMCNC: 30.5 G/DL (ref 31.4–37.4)
MCV RBC AUTO: 91 FL (ref 82–98)
MONOCYTES # BLD AUTO: 0.51 THOUSAND/ΜL (ref 0.17–1.22)
MONOCYTES NFR BLD AUTO: 14 % (ref 4–12)
MUCOUS THREADS UR QL AUTO: ABNORMAL
NEUTROPHILS # BLD AUTO: 2.13 THOUSANDS/ΜL (ref 1.85–7.62)
NEUTS SEG NFR BLD AUTO: 58 % (ref 43–75)
NITRITE UR QL STRIP: NEGATIVE
NON-SQ EPI CELLS URNS QL MICRO: ABNORMAL /HPF
NRBC BLD AUTO-RTO: 0 /100 WBCS
OTHER STN SPEC: ABNORMAL
P AXIS: 48 DEGREES
PH UR STRIP.AUTO: 6 [PH] (ref 4.5–8)
PLATELET # BLD AUTO: 81 THOUSANDS/UL (ref 149–390)
PMV BLD AUTO: 11.8 FL (ref 8.9–12.7)
POTASSIUM SERPL-SCNC: 4 MMOL/L (ref 3.5–5.3)
PR INTERVAL: 166 MS
PROT SERPL-MCNC: 6.7 G/DL (ref 6.4–8.2)
PROT UR STRIP-MCNC: ABNORMAL MG/DL
PROTHROMBIN TIME: 17.2 SECONDS (ref 11.8–14.2)
QRS AXIS: 26 DEGREES
QRSD INTERVAL: 82 MS
QT INTERVAL: 358 MS
QTC INTERVAL: 421 MS
RBC # BLD AUTO: 3.1 MILLION/UL (ref 3.81–5.12)
RBC #/AREA URNS AUTO: ABNORMAL /HPF
SODIUM SERPL-SCNC: 141 MMOL/L (ref 136–145)
SP GR UR STRIP.AUTO: 1.02 (ref 1–1.03)
T WAVE AXIS: 39 DEGREES
TROPONIN I SERPL-MCNC: 0.18 NG/ML
TSH SERPL DL<=0.05 MIU/L-ACNC: 3.1 UIU/ML (ref 0.36–3.74)
UROBILINOGEN UR QL STRIP.AUTO: 1 E.U./DL
VENTRICULAR RATE: 83 BPM
WBC # BLD AUTO: 3.69 THOUSAND/UL (ref 4.31–10.16)
WBC #/AREA URNS AUTO: ABNORMAL /HPF

## 2018-09-02 PROCEDURE — 82553 CREATINE MB FRACTION: CPT | Performed by: EMERGENCY MEDICINE

## 2018-09-02 PROCEDURE — 87077 CULTURE AEROBIC IDENTIFY: CPT

## 2018-09-02 PROCEDURE — 93010 ELECTROCARDIOGRAM REPORT: CPT | Performed by: INTERNAL MEDICINE

## 2018-09-02 PROCEDURE — 93005 ELECTROCARDIOGRAM TRACING: CPT

## 2018-09-02 PROCEDURE — 70450 CT HEAD/BRAIN W/O DYE: CPT

## 2018-09-02 PROCEDURE — 96374 THER/PROPH/DIAG INJ IV PUSH: CPT

## 2018-09-02 PROCEDURE — 84484 ASSAY OF TROPONIN QUANT: CPT | Performed by: EMERGENCY MEDICINE

## 2018-09-02 PROCEDURE — 36415 COLL VENOUS BLD VENIPUNCTURE: CPT | Performed by: EMERGENCY MEDICINE

## 2018-09-02 PROCEDURE — 99285 EMERGENCY DEPT VISIT HI MDM: CPT

## 2018-09-02 PROCEDURE — 81001 URINALYSIS AUTO W/SCOPE: CPT

## 2018-09-02 PROCEDURE — 87186 SC STD MICRODIL/AGAR DIL: CPT

## 2018-09-02 PROCEDURE — 99217 PR OBSERVATION CARE DISCHARGE MANAGEMENT: CPT | Performed by: HOSPITALIST

## 2018-09-02 PROCEDURE — 85610 PROTHROMBIN TIME: CPT | Performed by: EMERGENCY MEDICINE

## 2018-09-02 PROCEDURE — 87086 URINE CULTURE/COLONY COUNT: CPT

## 2018-09-02 PROCEDURE — 80053 COMPREHEN METABOLIC PANEL: CPT | Performed by: EMERGENCY MEDICINE

## 2018-09-02 PROCEDURE — 85730 THROMBOPLASTIN TIME PARTIAL: CPT | Performed by: EMERGENCY MEDICINE

## 2018-09-02 PROCEDURE — 84443 ASSAY THYROID STIM HORMONE: CPT | Performed by: EMERGENCY MEDICINE

## 2018-09-02 PROCEDURE — 99236 HOSP IP/OBS SAME DATE HI 85: CPT | Performed by: HOSPITALIST

## 2018-09-02 PROCEDURE — 85025 COMPLETE CBC W/AUTO DIFF WBC: CPT | Performed by: EMERGENCY MEDICINE

## 2018-09-02 PROCEDURE — 71045 X-RAY EXAM CHEST 1 VIEW: CPT

## 2018-09-02 PROCEDURE — 82948 REAGENT STRIP/BLOOD GLUCOSE: CPT

## 2018-09-02 PROCEDURE — 82550 ASSAY OF CK (CPK): CPT | Performed by: EMERGENCY MEDICINE

## 2018-09-02 RX ORDER — LORAZEPAM 0.5 MG/1
0.5 TABLET ORAL ONCE
Status: COMPLETED | OUTPATIENT
Start: 2018-09-02 | End: 2018-09-02

## 2018-09-02 RX ORDER — ONDANSETRON 2 MG/ML
4 INJECTION INTRAMUSCULAR; INTRAVENOUS EVERY 6 HOURS PRN
Status: DISCONTINUED | OUTPATIENT
Start: 2018-09-02 | End: 2018-09-02 | Stop reason: HOSPADM

## 2018-09-02 RX ORDER — FLUTICASONE FUROATE AND VILANTEROL 100; 25 UG/1; UG/1
1 POWDER RESPIRATORY (INHALATION) DAILY
Status: DISCONTINUED | OUTPATIENT
Start: 2018-09-02 | End: 2018-09-02 | Stop reason: HOSPADM

## 2018-09-02 RX ORDER — ROPINIROLE 0.25 MG/1
0.5 TABLET, FILM COATED ORAL 2 TIMES DAILY
Status: DISCONTINUED | OUTPATIENT
Start: 2018-09-02 | End: 2018-09-02 | Stop reason: HOSPADM

## 2018-09-02 RX ORDER — PRAVASTATIN SODIUM 80 MG/1
80 TABLET ORAL
Status: DISCONTINUED | OUTPATIENT
Start: 2018-09-02 | End: 2018-09-02 | Stop reason: HOSPADM

## 2018-09-02 RX ORDER — BUPROPION HYDROCHLORIDE 150 MG/1
300 TABLET ORAL EVERY MORNING
Status: DISCONTINUED | OUTPATIENT
Start: 2018-09-02 | End: 2018-09-02 | Stop reason: HOSPADM

## 2018-09-02 RX ORDER — MONTELUKAST SODIUM 10 MG/1
10 TABLET ORAL
Status: DISCONTINUED | OUTPATIENT
Start: 2018-09-02 | End: 2018-09-02 | Stop reason: HOSPADM

## 2018-09-02 RX ORDER — LISINOPRIL 2.5 MG/1
2.5 TABLET ORAL EVERY MORNING
Status: DISCONTINUED | OUTPATIENT
Start: 2018-09-02 | End: 2018-09-02 | Stop reason: HOSPADM

## 2018-09-02 RX ORDER — MECLIZINE HYDROCHLORIDE 25 MG/1
25 TABLET ORAL EVERY 8 HOURS PRN
Status: DISCONTINUED | OUTPATIENT
Start: 2018-09-02 | End: 2018-09-02 | Stop reason: HOSPADM

## 2018-09-02 RX ORDER — PANTOPRAZOLE SODIUM 40 MG/1
40 TABLET, DELAYED RELEASE ORAL DAILY
Status: DISCONTINUED | OUTPATIENT
Start: 2018-09-02 | End: 2018-09-02 | Stop reason: HOSPADM

## 2018-09-02 RX ORDER — LORAZEPAM 2 MG/ML
0.5 INJECTION INTRAMUSCULAR ONCE
Status: COMPLETED | OUTPATIENT
Start: 2018-09-02 | End: 2018-09-02

## 2018-09-02 RX ORDER — LEVOFLOXACIN 5 MG/ML
750 INJECTION, SOLUTION INTRAVENOUS ONCE
Status: COMPLETED | OUTPATIENT
Start: 2018-09-02 | End: 2018-09-02

## 2018-09-02 RX ORDER — LORAZEPAM 0.5 MG/1
0.5 TABLET ORAL EVERY 8 HOURS PRN
Qty: 10 TABLET | Refills: 0 | Status: ON HOLD | OUTPATIENT
Start: 2018-09-02 | End: 2018-09-18 | Stop reason: ALTCHOICE

## 2018-09-02 RX ORDER — ALBUTEROL SULFATE 2.5 MG/3ML
2.5 SOLUTION RESPIRATORY (INHALATION) 3 TIMES DAILY PRN
Status: DISCONTINUED | OUTPATIENT
Start: 2018-09-02 | End: 2018-09-02 | Stop reason: HOSPADM

## 2018-09-02 RX ORDER — ACETAMINOPHEN 325 MG/1
650 TABLET ORAL EVERY 6 HOURS PRN
Status: DISCONTINUED | OUTPATIENT
Start: 2018-09-02 | End: 2018-09-02 | Stop reason: HOSPADM

## 2018-09-02 RX ORDER — MELATONIN
1000 DAILY
Status: DISCONTINUED | OUTPATIENT
Start: 2018-09-02 | End: 2018-09-02 | Stop reason: HOSPADM

## 2018-09-02 RX ORDER — FERROUS SULFATE 325(65) MG
325 TABLET ORAL 2 TIMES DAILY WITH MEALS
Status: DISCONTINUED | OUTPATIENT
Start: 2018-09-02 | End: 2018-09-02 | Stop reason: HOSPADM

## 2018-09-02 RX ORDER — LORAZEPAM 0.5 MG/1
0.5 TABLET ORAL EVERY 8 HOURS PRN
Status: DISCONTINUED | OUTPATIENT
Start: 2018-09-02 | End: 2018-09-02 | Stop reason: HOSPADM

## 2018-09-02 RX ORDER — ASPIRIN 81 MG/1
81 TABLET, CHEWABLE ORAL DAILY
Status: DISCONTINUED | OUTPATIENT
Start: 2018-09-02 | End: 2018-09-02 | Stop reason: HOSPADM

## 2018-09-02 RX ADMIN — FERROUS SULFATE TAB 325 MG (65 MG ELEMENTAL FE) 325 MG: 325 (65 FE) TAB at 09:59

## 2018-09-02 RX ADMIN — VITAMIN D, TAB 1000IU (100/BT) 1000 UNITS: 25 TAB at 09:59

## 2018-09-02 RX ADMIN — LORAZEPAM 0.5 MG: 0.5 TABLET ORAL at 11:51

## 2018-09-02 RX ADMIN — PRAVASTATIN SODIUM 80 MG: 80 TABLET ORAL at 16:07

## 2018-09-02 RX ADMIN — FERROUS SULFATE TAB 325 MG (65 MG ELEMENTAL FE) 325 MG: 325 (65 FE) TAB at 16:06

## 2018-09-02 RX ADMIN — LORAZEPAM 0.5 MG: 2 INJECTION INTRAMUSCULAR; INTRAVENOUS at 02:07

## 2018-09-02 RX ADMIN — ROPINIROLE 0.5 MG: 0.25 TABLET, FILM COATED ORAL at 09:58

## 2018-09-02 RX ADMIN — ASPIRIN 81 MG 81 MG: 81 TABLET ORAL at 09:59

## 2018-09-02 RX ADMIN — BUPROPION HYDROCHLORIDE 300 MG: 150 TABLET, FILM COATED, EXTENDED RELEASE ORAL at 09:59

## 2018-09-02 RX ADMIN — LISINOPRIL 2.5 MG: 2.5 TABLET ORAL at 09:59

## 2018-09-02 RX ADMIN — LORAZEPAM 0.5 MG: 2 INJECTION INTRAMUSCULAR; INTRAVENOUS at 02:36

## 2018-09-02 RX ADMIN — PANTOPRAZOLE SODIUM 40 MG: 40 TABLET, DELAYED RELEASE ORAL at 09:59

## 2018-09-02 RX ADMIN — LEVOFLOXACIN 750 MG: 5 INJECTION, SOLUTION INTRAVENOUS at 05:07

## 2018-09-02 RX ADMIN — LORAZEPAM 0.5 MG: 0.5 TABLET ORAL at 16:06

## 2018-09-02 NOTE — SOCIAL WORK
Patient will need [facility] Rattler will call CM with time   Patient aware of cost

## 2018-09-02 NOTE — ED NOTES
Pt rang call bell and stated "I'm starting to have another episode "  Pt was wailing arms and legs around while grunting and breathing heavily  Provider made aware       Buck Ortiz RN  09/02/18 5961

## 2018-09-02 NOTE — CASE MANAGEMENT
Initial Clinical Review    Admission: Date/Time/Statement: 9/2/2018  0504 OBSERVATION    Orders Placed This Encounter   Procedures    Place in Observation (expected length of stay for this patient is less than two midnights)     Telemetry     Standing Status:   Standing     Number of Occurrences:   1     Order Specific Question:   Admitting Physician     Answer:   Nicolasa Mcknight     Order Specific Question:   Level of Care     Answer:   Med Surg [16]     Order Specific Question:   Bed request comments     Answer:   telemetry         ED: Date/Time/Mode of Arrival:   ED Arrival Information     Expected Arrival [address] [place] of Arrival Escorted By Service Admission Type    - 9/2/2018 00:32 Urgent Ambulance [place] [facility] General Medicine Urgent    Arrival Complaint    syncope          Chief Complaint:   Chief Complaint   Patient presents with    Syncope     Pt presents to ED via EMS from home after almost syncopal episode  Pt (+) HTN, DM  History of Illness:Patient is a 67year old female with near syncope tonight and diffuse shaking and jerky movements of her whole body and patient was awake  In ED patient admits to 'episode' was wailing arms and legs while grunting and breathing heavy    ED Vital Signs:   ED Triage Vitals [09/02/18 0046]   Temperature Pulse Respirations Blood Pressure SpO2   98 4 °F (36 9 °C) 88 16 135/60 98 %      Temp Source Heart Rate Source Patient Position - Orthostatic VS BP Location FiO2 (%)   Oral Monitor Lying Right arm --      Pain Score       No Pain        Wt Readings from Last 1 Encounters:   09/02/18 74 4 kg (164 lb 0 4 oz)       Vital Signs (abnormal): none    Abnormal Labs/Diagnostic Test Results: Total   Glucose 220   ast 58  Alkaline phosphatase 143  Albumin 2 7  Troponin 0 18  Wbc 3 69, hgb 8 6, hct 28 2  INR 1 45    UA small leukocytes  Trace protein  1/2% glucose  Moderate blood       Ct head - no acute intracranial abnormality    0800 glucose 59    ED Treatment:   Medication Administration from 09/02/2018 0032 to 09/02/2018 0541       Date/Time Order Dose Route Action Comments     09/02/2018 0207 LORazepam (ATIVAN) 2 mg/mL injection 0 5 mg 0 5 mg Intravenous Given      09/02/2018 0236 LORazepam (ATIVAN) 2 mg/mL injection 0 5 mg 0 5 mg Intravenous Given      09/02/2018 0507 levofloxacin (LEVAQUIN) IVPB (premix) 750 mg 750 mg Intravenous New Bag           Past Medical/Surgical History: Active Ambulatory Problems     Diagnosis Date Noted    Type 2 diabetes mellitus with diabetic polyneuropathy, with long-term current use of insulin ([facility] )     Hyperlipidemia     Asthma 10/18/2017    Symptomatic anemia 10/18/2017    NSTEMI (non-ST elevated myocardial infarction) ([facility] ) 10/18/2017    Lactic acidosis 10/18/2017    P[place]topenia ([place] [address] ) 10/18/2017    Bipolar 1 disorder ([place] [address] ) 10/18/2017    Cirrhosis of liver ([place] [address] ) 10/19/2017    Intractable diarrhea 11/03/2017    Dizziness 08/14/2018    Hypokalemia 08/14/2018    Acute cystitis without hematuria 08/14/2018    Elevated troponin 09/02/2018       Past Medical History:   Diagnosis Date    Asthma     Bipolar 2 disorder (HCC)     Chronic kidney disease     Cirrhosis of liver ([place] [address] )     Diabetes mellitus ([place] [address] )     Elevated troponin 9/2/2018    GERD (gastroesophageal reflux disease)     Hyperlipidemia     Hypertension     Neuropathy     Renal disorder     Restless leg syndrome        Admitting Diagnosis: Syncope [R55]  UTI (urinary tract infection) [N39 0]  Near syncope [R55]  Abnormal movements [R25 9]    Age/Sex: 67 y o  female    Assessment/Plan:   Near syncope   Assessment & Plan     · Recently admitted for dizziness, ongoing for 1-2 weeks prior to admission- felt to be multifactorial secondary to dehydration, UTI and overall physical deconditioning  · Returns with near-syncopal episode tonight  · CT head- unremarkable  · CXR- unremarkable    · Check orthostatics  · Telemetry  · Echo  · No neurologic deficits on examination to suggest CVA as source  ? Neuro checks  · Consider neurology consult           NSTEMI (non-ST elevated myocardial infarction) [facility])   Assessment & Plan     · Troponin 0 18 on arrival   ? This appears at baseline  · No complaints of chest pain, SOB  · EKG- NSR, rate 83  PAC  · No further work-up at this point           Acute cystitis without hematuria   Assessment & Plan     · Patient treated for UTI during prior admission, 8/14-8/15/18   ? Treated with IV Rocephin, transitioned to PO Keflex  ? Urine culture grew mixed contaminants  · UA- Negative nitrite  Innumerable WBC  2-4 RBC  Moderate bacteria  Occasional epithelial cells  · Afebrile without leukocytosis  No complaints of urinary symptoms  · Monitor off antibiotics  Follow up on urine culture           Hypertension   Assessment & Plan     · /60  · Continue Lisinopril           Type 2 diabetes mellitus with diabetic polyneuropathy, with long-term current use of insulin ([facility])   Assessment & Plan             Lab Results   Component Value Date     HGBA1C 6 8 (H) 10/19/2017         No results for input(s): POCGLU in the last 72 hours      Blood Sugar Average: Last 72 hrs:  Glucose 220 on arrival   Check A1C  Hold Metformin  Continue home Levemir, Humalog with SSI Coverage           Cirrhosis of liver (HCC)   Assessment & Plan     · LFT's stable           Pancytopenia ([facility] )   Assessment & Plan     · Chronic secondary to cirrhosis  · WBC 3 69, RBC 3 10, Hgb 8 6, Plt 81    · Monitor CBC           Hyperlipidemia   Assessment & Plan     · Continue statin           GERD (gastroesophageal reflux disease)   Assessment & Plan     · Continue Protonix           Bipolar 1 disorder (HCC)   Assessment & Plan     · Continue Wellbutrin             Admission Orders:  9/2/2018  0504 OBSERVATION  Scheduled Meds:   Current Facility-Administered Medications:  acetaminophen 650 mg Oral Q6H PRN   albuterol 2 5 mg Nebulization TID PRN   aspirin 81 mg Oral Daily   buPROPion 300 mg Oral QAM   cholecalciferol 1,000 Units Oral Daily   ferrous sulfate 325 mg Oral BID With Meals   fluticasone-salmeterol 1 puff Inhalation Q12H [facility]   insulin detemir 40 Units Subcutaneous Q12H [facility]   insulin lispro 1-6 Units Subcutaneous TID AC   insulin lispro 17 Units Subcutaneous TID With Meals   ipratropium 0 5 mg Nebulization Q6H PRN   lisinopril 2 5 mg Oral QAM   meclizine 25 mg Oral Q8H PRN   montelukast 10 mg Oral HS   ondansetron 4 mg Intravenous Q6H PRN   pantoprazole 40 mg Oral Daily   pravastatin 80 mg Oral Daily With Dinner   rOPINIRole 0 5 mg Oral BID     Continuous Infusions:    PRN Meds: not used       OTHER ORDERS: neuro checks q 4h  Fingerstick glucose qid    scds  Telemetry  Urine culture  echo

## 2018-09-02 NOTE — H&P
H&P- Aysha Salinas [birth year], 67 y o  female MRN: [id number]    Unit/Bed#: -01 Encounter: [id number]    Primary Care Provider: Elidia Giraldo MD   Date and time admitted to hospital: 9/2/2018 12:38 AM    * Near syncope   Assessment & Plan    · Recently admitted for dizziness, ongoing for 1-2 weeks prior to admission- felt to be multifactorial secondary to dehydration, UTI and overall physical deconditioning  · Returns with near-syncopal episode tonight  · She reports that this is secondary to her feeling weak from having total body shaking for 2 hours straight  · CT head- unremarkable  · CXR- unremarkable  · Check orthostatics  · Telemetry  · Echo  · No neurologic deficits on examination to suggest CVA as source  No LOC, tongue bite, loss of bowel/bladder to suggest seizure activity  · Neuro checks  · ? Psychogenic  · Consider neurology consult  NSTEMI (non-ST elevated myocardial infarction) [facility])   Assessment & Plan    · Troponin 0 18 on arrival   · This appears at baseline  · No complaints of chest pain, SOB  · EKG- NSR, rate 83  PAC  · No further work-up at this point  Acute cystitis without hematuria   Assessment & Plan    · Patient treated for UTI during prior admission, 8/14-8/15/18  · Treated with IV Rocephin, transitioned to PO Keflex  · Urine culture grew mixed contaminants  · UA- Negative nitrite  Innumerable WBC  2-4 RBC  Moderate bacteria  Occasional epithelial cells  · Afebrile without leukocytosis  No complaints of urinary symptoms  · Monitor off antibiotics  Follow up on urine culture  Hypertension   Assessment & Plan    · /60  · Continue Lisinopril  Type 2 diabetes mellitus with diabetic polyneuropathy, with long-term current use of insulin (HCC)   Assessment & Plan    Lab Results   Component Value Date    HGBA1C 6 8 (H) 10/19/2017       No results for input(s): POCGLU in the last 72 hours      Blood Sugar Average: Last 72 hrs:  Glucose 220 on arrival   Check A1C  Hold Metformin  Continue home Levemir, Humalog with SSI Coverage  Cirrhosis of liver (HCC)   Assessment & Plan    · LFT's stable  Pancytopenia ([address] )   Assessment & Plan    · Chronic secondary to cirrhosis  · WBC 3 69, RBC 3 10, Hgb 8 6, Plt 81  · Monitor CBC  Hyperlipidemia   Assessment & Plan    · Continue statin  GERD (gastroesophageal reflux disease)   Assessment & Plan    · Continue Protonix  Bipolar 1 disorder (HCC)   Assessment & Plan    · Continue Wellbutrin  VTE Prophylaxis: Pharmacologic VTE Prophylaxis contraindicated due to pancytopenia  / sequential compression device   Code Status: Level 3- DNR/DNI  POLST: POLST form is not discussed and not completed at this time  Discussion with family: Discussed with patient at bedside  Anticipated Length of Stay:  Patient will be admitted on an Observation basis with an anticipated length of stay of  Less than 2 midnights  Justification for Hospital Stay: Near-syncope  Total Time for Visit, including Counseling / Coordination of Care: 45 minutes  Greater than 50% of this total time spent on direct patient counseling and coordination of care  Chief Complaint:   Near syncope  History of Present Illness:    Hans Green is a 67 y o  female, PMHx of HTN, DM, Cirrhosis with pancytopenia, HLD, GERD, Bipolar, who presents with near syncopal episode and body shaking tonight  Patient states that she was out and about all day, felt fine  Then last night at approximately 10PM, she started having total body shaking of her arms and legs  She states that she did not lose consciousness during this time  She denies loss of bowel/bladder  She denies tongue bite stating that she does not have teeth  She states that she was so tired from the shaking for 2 hours that she then almost passed out  She denies dizziness, lightheadedness, HA, syncope   She states that she has had numerous work ups for this in the past and states that no one is able to find out what causes it  She states that she has her restless leg syndrome over her entire body  She states that she has been worked up for seizures, epilepsy and it was all normal     Denies fever, chills, diaphoresis, chest pain, SOB, cough, abdominal pain, N/V, change in BM, urinary symptoms  Review of Systems:    Review of Systems   Constitutional: Positive for fatigue  Negative for chills, diaphoresis and fever  Respiratory: Negative for cough and shortness of breath  Cardiovascular: Negative for chest pain and palpitations  Gastrointestinal: Negative for abdominal pain, constipation, diarrhea, nausea and vomiting  Genitourinary: Negative for dysuria, frequency, hematuria and urgency  Neurological: Positive for tremors  Negative for dizziness, seizures, syncope, facial asymmetry, speech difficulty, weakness, light-headedness, numbness and headaches  All other systems reviewed and are negative  Past Medical and Surgical History:     Past Medical History:   Diagnosis Date    Asthma     Bipolar 2 disorder ([facility] )     Chronic kidney disease     Cirrhosis of liver ([facility] )     Diabetes mellitus ([place] [address] )     Elevated troponin 9/2/2018    GERD (gastroesophageal reflux disease)     Hyperlipidemia     Hypertension     Neuropathy     Renal disorder     Restless leg syndrome        Past Surgical History:   Procedure Laterality Date    BACK SURGERY      CHOLECYSTECTOMY      HERNIA REPAIR      REPLACEMENT TOTAL KNEE BILATERAL      TUMOR REMOVAL         Meds/Allergies:    Prior to Admission medications    Medication Sig Start Date End Date Taking?  Authorizing Provider   albuterol (2 5 mg/3 mL) 0 083 % nebulizer solution Take 2 5 mg by nebulization 3 (three) times a day as needed for wheezing    Historical Provider, MD   aspirin 81 MG tablet Take 81 mg by mouth daily    Historical Provider, MD   buPROPion (WELLBUTRIN XL) 300 mg 24 hr tablet Take 300 mg by mouth every morning    Historical Provider, MD   cholecalciferol (VITAMIN D3) 1,000 units tablet Take 1,000 Units by mouth daily    Historical Provider, MD   ferrous sulfate 325 (65 Fe) mg tablet Take 1 tablet by mouth 2 (two) times a day with meals 10/20/17   Pee Brown PA-C   fluticasone-salmeterol (ADVAIR HFA) 115-21 MCG/ACT inhaler Inhale 2 puffs 2 (two) times a day    Historical Provider, MD   insulin detemir (LEVEMIR) 100 units/mL subcutaneous injection Inject 40 Units under the skin 2 (two) times a day    Historical Provider, MD   insulin lispro (HUMALOG KWIKPEN) 100 Units/mL SOPN Inject 17 Units under the skin 3 (three) times a day with meals    Historical Provider, MD   ipratropium (ATROVENT) 0 02 % nebulizer solution Take 0 5 mg by nebulization every 6 (six) hours as needed for wheezing or shortness of breath    Historical Provider, MD   lisinopril (ZESTRIL) 2 5 mg tablet Take 2 5 mg by mouth every morning    Historical Provider, MD   meclizine (ANTIVERT) 25 mg tablet Take 1 tablet (25 mg total) by mouth every 8 (eight) hours as needed for dizziness 8/15/18   Ortega Deiters CRNP   metFORMIN (GLUCOPHAGE) 1000 MG tablet Take 1,000 mg by mouth 2 (two) times a day with meals    Historical Provider, MD   montelukast (SINGULAIR) 10 mg tablet Take 10 mg by mouth daily at bedtime    Historical Provider, MD   ondansetron (ZOFRAN) 4 mg tablet Take 1 tablet by mouth every 8 (eight) hours as needed for nausea or vomiting 11/4/17   Ranjit Kumar MD   pantoprazole (PROTONIX) 40 mg tablet Take 40 mg by mouth daily    Historical Provider, MD   rOPINIRole (REQUIP) 0 5 mg tablet Take 0 5 mg by mouth 2 (two) times a day    Historical Provider, MD   simvastatin (ZOCOR) 40 mg tablet Take 40 mg by mouth daily at bedtime    Historical Provider, MD ADLER have reviewed home medications with patient personally  Allergies:    Allergies   Allergen Reactions    Abilify [Aripiprazole] Hallucinations    Celebrex [Celecoxib] Hallucinations    Codeine Hallucinations    Darvon [Propoxyphene] Hallucinations    Ibuprofen Hallucinations    Latuda [Lurasidone] Hallucinations    Nitrofurantoin Hallucinations    Penicillins Hallucinations    Ziprasidone Hallucinations       Social History:     Marital Status: Single   Occupation: Unknown  Patient Pre-hospital Living Situation: Home  Patient Pre-hospital Level of Mobility: Independent  Patient Pre-hospital Diet Restrictions: None  Substance Use History:   History   Alcohol Use No     History   Smoking Status    Passive Smoke Exposure - Never Smoker   Smokeless Tobacco    Never Used     History   Drug Use No       Family History:    non-contributory    Physical Exam:     Vitals:   Blood Pressure: 127/60 (09/02/18 0737)  Pulse: 78 (09/02/18 0737)  Temperature: 98 °F (36 7 °C) (09/02/18 0737)  Temp Source: Oral (09/02/18 0737)  Respirations: 18 (09/02/18 0737)  Weight - Scale: 74 4 kg (164 lb 0 4 oz) (09/02/18 0046)  SpO2: 98 % (09/02/18 0737)    Physical Exam   Constitutional: She is oriented to person, place, and time  She appears well-developed and well-nourished  She is cooperative  She does not appear ill  No distress  HENT:   Head: Normocephalic and atraumatic  Eyes: Conjunctivae, EOM and lids are normal  Pupils are equal, round, and reactive to light  Cardiovascular: Normal rate, regular rhythm and normal pulses  Murmur heard  Pulmonary/Chest: Effort normal and breath sounds normal  She has no wheezes  She has no rhonchi  She has no rales  Abdominal: Soft  Normal appearance and bowel sounds are normal  There is no tenderness  Musculoskeletal: Normal range of motion  Neurological: She is alert and oriented to person, place, and time  She has normal strength and normal reflexes  She is not disoriented  No sensory deficit  Strength 5/5 in UE/LE bilaterally  No slurred speech, no facial asymmetry  Skin: Skin is warm, dry and intact   She is not diaphoretic  Psychiatric: She has a normal mood and affect  Her speech is normal and behavior is normal  Cognition and memory are normal    Vitals reviewed  Additional Data:     Lab Results: I have personally reviewed pertinent reports  Results from last 7 days  Lab Units 09/02/18  0139   WBC Thousand/uL 3 69*   HEMOGLOBIN g/dL 8 6*   HEMATOCRIT % 28 2*   PLATELETS Thousands/uL 81*   NEUTROS PCT % 58   LYMPHS PCT % 21   MONOS PCT % 14*   EOS PCT % 6       Results from last 7 days  Lab Units 09/02/18  0139   SODIUM mmol/L 141   POTASSIUM mmol/L 4 0   CHLORIDE mmol/L 106   CO2 mmol/L 25   BUN mg/dL 11   CREATININE mg/dL 0 70   CALCIUM mg/dL 8 9   ALK PHOS U/L 143*   ALT U/L 47   AST U/L 58*       Results from last 7 days  Lab Units 09/02/18  0139   INR  1 45*       Results from last 7 days  Lab Units 09/02/18  0800   POC GLUCOSE mg/dl 59*           Imaging: I have personally reviewed pertinent reports  CT head without contrast   ED Interpretation by Rikki Llanos MD (09/02 8647)   FINDINGS:      PARENCHYMA: Decreased attenuation is noted in periventricular and subcortical white matter demonstrating an appearance that is statistically most likely to represent mild microangiopathic change  No CT signs of acute infarction   No intracranial mass, mass effect or midline shift   No acute parenchymal hemorrhage  VENTRICLES AND EXTRA-AXIAL SPACES:  Normal for the patient's age  VISUALIZED ORBITS AND PARANASAL SINUSES:  Unremarkable  CALVARIUM AND EXTRACRANIAL SOFT TISSUES:  Normal    Impression:        No acute intracranial abnormality  Workstation performed: [id number]         Final Result by Ryann Martínez MD (09/02 5546)      No acute intracranial abnormality  Workstation performed: [id number]         XR chest 1 view portable   ED Interpretation by Rikki Llanos MD (09/02 5777)   Elevated R hemidiaphragm read by me             EKG, Pathology, and Other Studies Reviewed on Admission:   · EKG: NSR, rate 83  PAC  Allscripts / Epic Records Reviewed: Yes     ** Please Note: This note has been constructed using a voice recognition system   **

## 2018-09-02 NOTE — ASSESSMENT & PLAN NOTE
· Patient treated for UTI during prior admission, 8/14-8/15/18  · Treated with IV Rocephin, transitioned to PO Keflex  · Urine culture grew mixed contaminants  · UA- Negative nitrite  Innumerable WBC  2-4 RBC  Moderate bacteria  Occasional epithelial cells  · Afebrile without leukocytosis  No complaints of urinary symptoms  · Monitor off antibiotics  Follow up on urine culture

## 2018-09-02 NOTE — ASSESSMENT & PLAN NOTE
· Recently admitted for dizziness, ongoing for 1-2 weeks prior to admission- felt to be multifactorial secondary to dehydration, UTI and overall physical deconditioning  · Returns with near-syncopal episode tonight  · She reports that this is secondary to her feeling weak from having total body shaking for 2 hours straight  · CT head- unremarkable  · CXR- unremarkable  · Check orthostatics  · Telemetry  · Echo  · No neurologic deficits on examination to suggest CVA as source  No LOC, tongue bite, loss of bowel/bladder to suggest seizure activity  · Neuro checks  · ? Psychogenic  · Consider neurology consult

## 2018-09-02 NOTE — ED NOTES
Pt rang call bell stating "I'm having another episode, I need more muscle relaxer (ativan)  "  Pt wailing arms and legs while grunting and breathing heavy  Provider aware, orders to follow        Darnell Lynch RN  09/02/18 [id number]

## 2018-09-02 NOTE — ED PROVIDER NOTES
History  Chief Complaint   Patient presents with    Syncope     Pt presents to ED via EMS from home after almost syncopal episode  Pt (+) HTN, DM  Patient is a 67year old female with near syncope tonight and diffuse shaking and jerky movements of her whole body and patient was awake  No chest pain, sob, fever, N/V  No headache  Patient refuses CT of head  Was last seen in this ED on 8/14/18 for dizziness  [facility] website checked on this patient and last Rx filled was on 7/28/18 for ativan for 14 day supply  History provided by:  Patient   used: No    Syncope   Associated symptoms: no chest pain, no fever, no headaches, no nausea, no shortness of breath and no vomiting        Prior to Admission Medications   Prescriptions Last Dose Informant Patient Reported? Taking?    albuterol (2 5 mg/3 mL) 0 083 % nebulizer solution  Self Yes No   Sig: Take 2 5 mg by nebulization 3 (three) times a day as needed for wheezing   aspirin 81 MG tablet  Self Yes No   Sig: Take 81 mg by mouth daily   buPROPion (WELLBUTRIN XL) 300 mg 24 hr tablet  Self Yes No   Sig: Take 300 mg by mouth every morning   cholecalciferol (VITAMIN D3) 1,000 units tablet  Self Yes No   Sig: Take 1,000 Units by mouth daily   ferrous sulfate 325 (65 Fe) mg tablet  Self No No   Sig: Take 1 tablet by mouth 2 (two) times a day with meals   fluticasone-salmeterol (ADVAIR HFA) 115-21 MCG/ACT inhaler  Self Yes No   Sig: Inhale 2 puffs 2 (two) times a day   insulin detemir (LEVEMIR) 100 units/mL subcutaneous injection  Self Yes No   Sig: Inject 40 Units under the skin 2 (two) times a day   insulin lispro (HUMALOG KWIKPEN) 100 Units/mL SOPN  Self Yes No   Sig: Inject 17 Units under the skin 3 (three) times a day with meals   ipratropium (ATROVENT) 0 02 % nebulizer solution  Self Yes No   Sig: Take 0 5 mg by nebulization every 6 (six) hours as needed for wheezing or shortness of breath   lisinopril (ZESTRIL) 2 5 mg tablet  Self Yes No   Sig: Take 2 5 mg by mouth every morning   meclizine (ANTIVERT) 25 mg tablet  Self No No   Sig: Take 1 tablet (25 mg total) by mouth every 8 (eight) hours as needed for dizziness   metFORMIN (GLUCOPHAGE) 1000 MG tablet  Self Yes No   Sig: Take 1,000 mg by mouth 2 (two) times a day with meals   montelukast (SINGULAIR) 10 mg tablet  Self Yes No   Sig: Take 10 mg by mouth daily at bedtime   ondansetron (ZOFRAN) 4 mg tablet  Self No No   Sig: Take 1 tablet by mouth every 8 (eight) hours as needed for nausea or vomiting   pantoprazole (PROTONIX) 40 mg tablet  Self Yes No   Sig: Take 40 mg by mouth daily   rOPINIRole (REQUIP) 0 5 mg tablet  Self Yes No   Sig: Take 0 5 mg by mouth 2 (two) times a day   simvastatin (ZOCOR) 40 mg tablet  Self Yes No   Sig: Take 40 mg by mouth daily at bedtime      Facility-Administered Medications: None       Past Medical History:   Diagnosis Date    Asthma     Bipolar 2 disorder (HCC)     Chronic kidney disease     Cirrhosis of liver ([facility] )     Diabetes mellitus ([facility] )     GERD (gastroesophageal reflux disease)     Hyperlipidemia     Hypertension     Neuropathy     Renal disorder     Restless leg syndrome        Past Surgical History:   Procedure Laterality Date    BACK SURGERY      CHOLECYSTECTOMY      HERNIA REPAIR      REPLACEMENT TOTAL KNEE BILATERAL      TUMOR REMOVAL         History reviewed  No pertinent family history  I have reviewed and agree with the history as documented  Social History   Substance Use Topics    Smoking status: Passive Smoke Exposure - Never Smoker    Smokeless tobacco: Never Used    Alcohol use No        Review of Systems   Constitutional: Negative for fever  Respiratory: Negative for shortness of breath  Cardiovascular: Positive for syncope  Negative for chest pain  Gastrointestinal: Negative for nausea and vomiting  Neurological: Positive for tremors and syncope (near syncope)  Negative for headaches     All other systems reviewed and are negative  Physical Exam  Physical Exam   Constitutional: She is oriented to person, place, and time  She appears well-developed and well-nourished  She appears distressed (mild)  HENT:   Head: Normocephalic and atraumatic  Mouth/Throat: Oropharynx is clear and moist    Eyes: EOM are normal  Pupils are equal, round, and reactive to light  No scleral icterus  Neck: Normal range of motion  Neck supple  Cardiovascular: Normal rate, regular rhythm and normal heart sounds  No murmur heard  Pulmonary/Chest: Effort normal and breath sounds normal  No stridor  No respiratory distress  Abdominal: Soft  Bowel sounds are normal  There is no tenderness  Musculoskeletal: She exhibits edema (mild bilateral LE edema)  She exhibits no tenderness (No calf tenderness) or deformity  Neurological: She is alert and oriented to person, place, and time  No cranial nerve deficit  No focal deficits  Skin: Skin is warm and dry  No rash noted  Psychiatric: She has a normal mood and affect  Nursing note and vitals reviewed        Vital Signs  ED Triage Vitals [09/02/18 0046]   Temperature Pulse Respirations Blood Pressure SpO2   98 4 °F (36 9 °C) 88 16 135/60 98 %      Temp Source Heart Rate Source Patient Position - Orthostatic VS BP Location FiO2 (%)   Oral Monitor Lying Right arm --      Pain Score       No Pain           Vitals:    09/02/18 0200 09/02/18 0230 09/02/18 0249 09/02/18 0350   BP: 126/59 156/65 156/65 121/57   Pulse: 82 86 85 91   Patient Position - Orthostatic VS:   Lying Lying       Visual Acuity      ED Medications  Medications   levofloxacin (LEVAQUIN) IVPB (premix) 750 mg (not administered)   LORazepam (ATIVAN) 2 mg/mL injection 0 5 mg (0 5 mg Intravenous Given 9/2/18 0207)   LORazepam (ATIVAN) 2 mg/mL injection 0 5 mg (0 5 mg Intravenous Given 9/2/18 0236)       Diagnostic Studies  Results Reviewed     Procedure Component Value Units Date/Time    Urine Microscopic [[id number]] (Abnormal) Collected:  09/02/18 0429    Lab Status:  Final result Specimen:  Urine from Urine, Clean Catch Updated:  09/02/18 0443     RBC, UA 2-4 (A) /hpf      WBC, UA Innumerable (A) /hpf      Epithelial Cells Occasional /hpf      Bacteria, UA Moderate (A) /hpf      Ca Oxalate Verena, UA Occasional (A) /hpf      OTHER OBSERVATIONS Yeast Cells Present     MUCOUS THREADS Occasional (A)    Urine culture [[id number]] Collected:  09/02/18 0429    Lab Status: In process Specimen:  Urine from Urine, Clean Catch Updated:  09/02/18 0443    ED Urine Macroscopic [[id number]]  (Abnormal) Collected:  09/02/18 0429    Lab Status:  Final result Specimen:  Urine Updated:  09/02/18 0418     Color, UA Yellow     Clarity, UA Cloudy     pH, UA 6 0     Leukocytes, UA Small (A)     Nitrite, UA Negative     Protein, UA Trace (A) mg/dl      Glucose,  (1/2%) (A) mg/dl      Ketones, UA Negative mg/dl      Urobilinogen, UA 1 0 E U /dl      Bilirubin, UA Negative     Blood, UA Moderate (A)     Specific [place], UA 1 025    Narrative:       CLINITEK RESULT    CKMB [[id number]]  (Abnormal) Collected:  09/02/18 0139    Lab Status:  Final result Specimen:  Blood from Arm, Right Updated:  09/02/18 0232     CK-MB Index 2 1 %      CK-MB FRACTION 6 2 (H) ng/mL     CK Total with Reflex CKMB [[id number]]  (Abnormal) Collected:  09/02/18 0139    Lab Status:  Final result Specimen:  Blood from Arm, Right Updated:  09/02/18 0231     Total  (H) U/L     TSH [[id number]]  (Normal) Collected:  09/02/18 0139    Lab Status:  Final result Specimen:  Blood from Arm, Right Updated:  09/02/18 0223     TSH 3RD GENERATON 3 095 uIU/mL     Narrative:         Patients undergoing fluorescein dye angiography may retain small amounts of fluorescein in the body for 48-72 hours post procedure  Samples containing fluorescein can produce falsely depressed TSH values  If the patient had this procedure,a specimen should be resubmitted post fluorescein clearance            The recommended reference ranges for TSH during pregnancy are as follows:  First trimester 0 1 to 2 5 uIU/mL  Second trimester  0 2 to 3 0 uIU/mL  Third trimester 0 3 to 3 0 uIU/m      Comprehensive metabolic panel [[id number]]  (Abnormal) Collected:  09/02/18 0139    Lab Status:  Final result Specimen:  Blood from Arm, Right Updated:  09/02/18 0211     Sodium 141 mmol/L      Potassium 4 0 mmol/L      Chloride 106 mmol/L      CO2 25 mmol/L      ANION GAP 10 mmol/L      BUN 11 mg/dL      Creatinine 0 70 mg/dL      Glucose 220 (H) mg/dL      Calcium 8 9 mg/dL      AST 58 (H) U/L      ALT 47 U/L      Alkaline Phosphatase 143 (H) U/L      Total Protein 6 7 g/dL      Albumin 2 7 (L) g/dL      Total Bilirubin 0 80 mg/dL      eGFR 86 ml/min/1 73sq m     Narrative:         National Kidney Disease Education Program recommendations are as follows:  GFR calculation is accurate only with a steady state creatinine  Chronic Kidney disease less than 60 ml/min/1 73 sq  meters  Kidney failure less than 15 ml/min/1 73 sq  meters      Troponin I [[id number]]  (Abnormal) Collected:  09/02/18 0139    Lab Status:  Final result Specimen:  Blood from Arm, Right Updated:  09/02/18 0206     Troponin I 0 18 (H) ng/mL     CBC and differential [[id number]]  (Abnormal) Collected:  09/02/18 0139    Lab Status:  Final result Specimen:  Blood from Arm, Right Updated:  09/02/18 0205     WBC 3 69 (L) Thousand/uL      RBC 3 10 (L) Million/uL      Hemoglobin 8 6 (L) g/dL      Hematocrit 28 2 (L) %      MCV 91 fL      MCH 27 7 pg      MCHC 30 5 (L) g/dL      RDW 18 4 (H) %      MPV 11 8 fL      Platelets 81 (L) Thousands/uL      nRBC 0 /100 WBCs      Neutrophils Relative 58 %      Immat GRANS % 0 %      Lymphocytes Relative 21 %      Monocytes Relative 14 (H) %      Eosinophils Relative 6 %      Basophils Relative 1 %      Neutrophils Absolute 2 13 Thousands/µL      Immature Grans Absolute 0 01 Thousand/uL      Lymphocytes Absolute 0 76 Thousands/µL      Monocytes Absolute 0 51 Thousand/µL      Eosinophils Absolute 0 23 Thousand/µL      Basophils Absolute 0 05 Thousands/µL     Protime-INR [[id number]]  (Abnormal) Collected:  09/02/18 0139    Lab Status:  Final result Specimen:  Blood from Arm, Right Updated:  09/02/18 0200     Protime 17 2 (H) seconds      INR 1 45 (H)    APTT [[id number]]  (Abnormal) Collected:  09/02/18 0139    Lab Status:  Final result Specimen:  Blood from Arm, Right Updated:  09/02/18 0200     PTT 41 (H) seconds                  CT head without contrast   ED Interpretation by Kimani Chávez MD (09/02 1770)   FINDINGS:      PARENCHYMA: Decreased attenuation is noted in periventricular and subcortical white matter demonstrating an appearance that is statistically most likely to represent mild microangiopathic change  No CT signs of acute infarction   No intracranial mass, mass effect or midline shift   No acute parenchymal hemorrhage  VENTRICLES AND EXTRA-AXIAL SPACES:  Normal for the patient's age  VISUALIZED ORBITS AND PARANASAL SINUSES:  Unremarkable  CALVARIUM AND EXTRACRANIAL SOFT TISSUES:  Normal    Impression:        No acute intracranial abnormality  Workstation performed: [id number]         Final Result by Nereida Dunbar MD (09/02 7430)      No acute intracranial abnormality  Workstation performed: [id number]         XR chest 1 view portable   ED Interpretation by Kimani Chávez MD (09/02 0201)   Elevated R hemidiaphragm read by me                    Procedures  ECG 12 Lead Documentation  Date/Time: 9/2/2018 1:54 AM  Performed by: Gopi Lopez  Authorized by: Gopi Lopez     Indications / Diagnosis:  Near syncope  ECG reviewed by me, the ED Provider: yes    Patient location:  ED  Previous ECG:     Previous ECG:  Compared to current    Comparison ECG info:  8/14/18    Similarity:  No change  Quality:     Tracing quality:  Limited by artifact  Rate:     ECG rate:  83    ECG rate assessment: normal    Rhythm:     Rhythm: sinus rhythm    Ectopy:     Ectopy: PAC    QRS:     QRS axis:  Normal  Conduction:     Conduction: normal    ST segments:     ST segments:  Normal  T waves:     T waves: normal             Phone Contacts  ED Phone Contact    ED Course  ED Course as of Sep 02 0504   Sun Sep 02, 2018   0205 Patient having thrashing movements but is awake and alert  Patient does not appear to be having a seizure  (+) stress  IV ativan ordered  0230 Patient improved and will accept CT Head now so this was ordered  [address], CT d/w patient  IV levaquin ordered for UTI  Initial Sepsis Screening     Row Name 09/02/18 9474                Is the patient's history suggestive of a new or worsening infection? No  -AO        Suspected source of infection          Are two or more of the following signs & symptoms of infection both present and new to the patient?         Indicate SIRS criteria          If the answer is yes to both questions, suspicion of sepsis is present          If severe sepsis is present AND tissue hypoperfusion perists in the hour after fluid resuscitation or lactate > 4, the patient meets criteria for SEPTIC SHOCK          Are any of the following organ dysfunction criteria present within 6 hours of suspected infection and SIRS criteria that are NOT considered to be chronic conditions?         Organ dysfunction          Date of presentation of severe sepsis          Time of presentation of severe sepsis          Tissue hypoperfusion persists in the hour after crystalloid fluid administration, evidenced, by either:          Was hypotension present within one hour of the conclusion of crystalloid fluid administration?           Date of presentation of septic shock          Time of presentation of septic shock            User Key  (r) = Recorded By, (t) = Taken By, (c) = Cosigned By    [address] Name Provider Type    [address] [place] [address], MD Physician                  MDM  Number of Diagnoses or Management Options  Diagnosis management comments: Differential diagnosis including but not limited to: Near syncope, cardiac arrhythmia, MI, ACS, doubt PE, metabolic abnormality, intracranial process, anemia, GI bleed, dehydration, partial seizure  Amount and/or Complexity of Data Reviewed  Clinical lab tests: ordered and reviewed  Tests in the radiology section of CPT®: reviewed and ordered  Decide to obtain previous medical records or to obtain history from someone other than the patient: yes  Obtain history from someone other than the patient: yes  Review and summarize past medical records: yes  Independent visualization of images, tracings, or specimens: yes      CritCare Time    Disposition  Final diagnoses:   Near syncope   UTI (urinary tract infection)   Abnormal movements     Time reflects when diagnosis was documented in both MDM as applicable and the Disposition within this note     Time User Action Codes Description Comment    9/2/2018  5:03 AM Anushka Mooring Add [R55] Near syncope     9/2/2018  5:03 AM Anushka Mooring Add [N39 0] UTI (urinary tract infection)     9/2/2018  5:03 AM Anushka Mooring Add [R25 9] Abnormal movements       ED Disposition     ED Disposition Condition Comment    Admit  Case was discussed with SHAQ Powell and the patient's admission status was agreed to be Admission Status: observation status to the service of Dr Maia Aviles   Follow-up Information    None         Patient's Medications   Discharge Prescriptions    No medications on file     No discharge procedures on file      ED Provider  Electronically Signed by           Radha Castle MD  09/02/18 8412

## 2018-09-02 NOTE — SOCIAL WORK
Pushing back transport time until 1:30pm however, tess is on way  CM asked if they can take lunch quick  CM will push back as far as possible

## 2018-09-02 NOTE — PROGRESS NOTES
SLIM aware of BS 59, pt is asymptomatic  Rechecked after eating and now 177  Awaiting orders from [facility]

## 2018-09-02 NOTE — PROGRESS NOTES
Pt became upset when told EMS pickup was rescheduled to 316 pm due to conflict  Became verbally aggressive and started flailing arms and legs  Pt stated she was so upset that she would, "I ll throw myself on to floor and crack head open "  Charge nurse and Supervisor bedside  SLIM aware and is also bedside calming pt down  Pt calmed down and is now okay with a 430 pm

## 2018-09-02 NOTE — ED NOTES
Pt secured 270 00$ and $235 00 money order with security  Envelope number [id number]    Secured by Tisha Galloway [facility], [place]  09/02/18 9117

## 2018-09-02 NOTE — ASSESSMENT & PLAN NOTE
Lab Results   Component Value Date    HGBA1C 6 8 (H) 10/19/2017       No results for input(s): POCGLU in the last 72 hours  Blood Sugar Average: Last 72 hrs:  Glucose 220 on arrival   Check A1C  Hold Metformin  Continue home Levemir, Humalog with [employer]SI Coverage

## 2018-09-02 NOTE — ASSESSMENT & PLAN NOTE
· Troponin 0 18 on arrival   · This appears at baseline  · No complaints of chest pain, SOB  · EKG- NSR, rate 83  PAC  · No further work-up at this point

## 2018-09-02 NOTE — DISCHARGE SUMMARY
Discharge Summary - [address] [facility]    Patient Information: Nima Hope 67 y o  female MRN: [id number]  Unit/Bed#: -01 Encounter: [id number]    Discharging Physician / Practitioner: Yoni Salazar MD  PCP: Annette Benoit MD  Admission Date: 9/2/2018  Discharge Date: 09/02/18    Disposition:     Home    Reason for Admission: home     Discharge Diagnoses:     Principal Problem (Resolved):    Near syncope  Active Problems:    Type 2 diabetes mellitus with diabetic polyneuropathy, with long-term current use of insulin (HCC)    Hyperlipidemia    Pancytopenia ([facility] )    Bipolar 1 disorder ([place] [address] )    Cirrhosis of liver ([place] [address] )    Hypertension    GERD (gastroesophageal reflux disease)  Resolved Problems:    NSTEMI (non-ST elevated myocardial infarction) ([place] [address] )    Acute cystitis without hematuria    Chronic kidney disease    Consultations During Hospital Stay:  · none    Procedures Performed:     · none    Significant Findings / Test Results:     CT head: (9/2/18)  IMPRESSION:  No acute intracranial abnormality  Incidental Findings:   · none     Test Results Pending at Discharge (will require follow up):   · none     Outpatient Tests Requested:  · F/u psychiatry     Complications:  none    Hospital Course:     Nima Hope is a 67 y o  female patient h/o bipolar disorder who originally presented to the hospital on 9/2/2018 due to an episode of lightheadedness possible near syncope  Patient states she had a shaking episode last night that lasted greater than 2 hr  Afterwards she felt very weak from shaking so much and felt like he was going to pass out  Patient observed having a shaking episode while admitted and is clearly having stress/anxiety-related shakes  These are not rhythmic  Patient is aware and can converse during the episodes  She does have a history of restless leg for which she takes ropinirole  She is not sure whether this medication is doing anything for her    During my conversation with this patient, she began to describe how stressful her situation at home is and she began hyperventilating and flailing her arms around  I was able to teach her to take deep breaths to calm herself down and her flailing stopped  Throughout the day patient had other periods of agitation and emotional lability, but was always redirectable  Once she realized she was able to go home and there was no organic cause for her symptoms she became calmer  Pt also responded to a low dose ativan  Discussed use of ativan on an as needed basis for these panic-related symptoms and she was prescribed 10 tablets to help with her symptoms  Blood pressures are stable  No LH or dizziness reported while admitted  Condition at Discharge: fair     Discharge Day Visit / Exam:     Subjective:  Feels okay  Anxious to be discharged  Vitals: Blood Pressure: 127/60 (09/02/18 0737)  Pulse: 78 (09/02/18 0737)  Temperature: 98 °F (36 7 °C) (09/02/18 0737)  Temp Source: Oral (09/02/18 0737)  Respirations: 18 (09/02/18 0737)  Weight - Scale: 74 4 kg (164 lb 0 4 oz) (09/02/18 0046)  SpO2: 98 % (09/02/18 0737)  Exam:   Physical Exam   Constitutional: She is oriented to person, place, and time  No distress  HENT:   Head: Normocephalic and atraumatic  Cardiovascular: Normal rate and regular rhythm  No murmur heard  Pulmonary/Chest: Effort normal and breath sounds normal  No respiratory distress  She has no wheezes  Abdominal: Soft  Bowel sounds are normal  She exhibits no distension  There is no tenderness  Musculoskeletal: She exhibits no edema  Neurological: She is alert and oriented to person, place, and time  Moves all extremities equally    Skin: Skin is warm and dry  No rash noted  She is not diaphoretic  No erythema  Nursing note and vitals reviewed  Discharge instructions/Information to patient and family:   See after visit summary for information provided to patient and family        Provisions for Follow-Up Care:  See after visit summary for information related to follow-up care and any pertinent home health orders  Planned Readmission: none     Discharge Statement:  I spent 45 minutes discharging the patient  This time was spent on the day of discharge  I had direct contact with the patient on the day of discharge  Greater than 50% of the total time was spent examining patient, answering all patient questions, arranging and discussing plan of care with patient as well as directly providing post-discharge instructions  Additional time then spent on discharge activities  Discharge Medications:  See after visit summary for reconciled discharge medications provided to patient and family        ** Please Note: This note has been constructed using a voice recognition system **

## 2018-09-02 NOTE — DISCHARGE INSTRUCTIONS
You were hospitalized due to shaking movements caused by stress  You were cared for on the 3rd floor under the service of Ani Rizvi MD with the [facility] who covers for Christophe Guerrero MD while you were hospitalized  If you have any questions or concerns related to this hospitalization, you may contact us at [phone]  For follow up, we recommend that you follow up with your primary care physician  We also provide the following instructions / recommendations at discharge:     · Be sure to follow-up with your primary psychiatrist   · You may take the ativan as needed if you feel one of your shaking attacks starting  · You may discontinue the ropinirole if you do not feel that it is helping your symptoms

## 2018-09-02 NOTE — PROGRESS NOTES
After telling pt EMS pickup was rescheduled to 4:30pm pt became anxious, agitated and verbally aggressive  Made multiple attempts to calm, explain and redirect  After pt continued to refuse to calm down and listen to nursing staff, I activated the bed alarm and exited the room, instructing pt to still use call bell and wait for help before standing  Pt stated "I'm not going to call for help  I'm going to get up and then fall on the floor and crack my head open  And then I'm going to heidi you and tell everyone about how all you lied to me " Charge nurse, hospital supervisor and attending MD aware  Will continue to monitor

## 2018-09-02 NOTE — PLAN OF CARE

## 2018-09-04 LAB
BACTERIA UR CULT: ABNORMAL
BACTERIA UR CULT: ABNORMAL

## 2018-09-17 ENCOUNTER — HOSPITAL ENCOUNTER (INPATIENT)
Facility: HOSPITAL | Age: 73
LOS: 1 days | Discharge: HOME HEALTH | DRG: 603 | End: 2018-09-19
Attending: EMERGENCY MEDICINE | Admitting: INTERNAL MEDICINE
Payer: COMMERCIAL

## 2018-09-17 ENCOUNTER — APPOINTMENT (EMERGENCY)
Dept: RADIOLOGY | Facility: HOSPITAL | Age: 73
DRG: 603 | End: 2018-09-17
Payer: COMMERCIAL
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
## 2018-09-17 DIAGNOSIS — D63.8 ANEMIA OF CHRONIC DISEASE: ICD-10-CM

## 2018-09-17 DIAGNOSIS — J45.20 INTERMITTENT ASTHMA, UNSPECIFIED ASTHMA SEVERITY, UNSPECIFIED WHETHER COMPLICATED: Chronic | ICD-10-CM

## 2018-09-17 DIAGNOSIS — E61.1 IRON DEFICIENCY: ICD-10-CM

## 2018-09-17 DIAGNOSIS — Z79.4 TYPE 2 DIABETES MELLITUS WITH DIABETIC POLYNEUROPATHY, WITH LONG-TERM CURRENT USE OF INSULIN (HCC): Chronic | ICD-10-CM

## 2018-09-17 DIAGNOSIS — G25.3 MYOCLONIC JERKING: ICD-10-CM

## 2018-09-17 DIAGNOSIS — L03.116 CELLULITIS OF LEFT KNEE: ICD-10-CM

## 2018-09-17 DIAGNOSIS — Z96.652 HISTORY OF LEFT KNEE REPLACEMENT: ICD-10-CM

## 2018-09-17 DIAGNOSIS — L03.90 CELLULITIS: Primary | ICD-10-CM

## 2018-09-17 DIAGNOSIS — E11.42 TYPE 2 DIABETES MELLITUS WITH DIABETIC POLYNEUROPATHY, WITH LONG-TERM CURRENT USE OF INSULIN (HCC): Chronic | ICD-10-CM

## 2018-09-17 PROCEDURE — 83735 ASSAY OF MAGNESIUM: CPT | Performed by: PHYSICIAN ASSISTANT

## 2018-09-17 PROCEDURE — 36415 COLL VENOUS BLD VENIPUNCTURE: CPT | Performed by: EMERGENCY MEDICINE

## 2018-09-17 PROCEDURE — 82550 ASSAY OF CK (CPK): CPT | Performed by: EMERGENCY MEDICINE

## 2018-09-17 PROCEDURE — 82553 CREATINE MB FRACTION: CPT | Performed by: EMERGENCY MEDICINE

## 2018-09-17 PROCEDURE — 73564 X-RAY EXAM KNEE 4 OR MORE: CPT

## 2018-09-17 PROCEDURE — 73590 X-RAY EXAM OF LOWER LEG: CPT

## 2018-09-17 PROCEDURE — 87040 BLOOD CULTURE FOR BACTERIA: CPT | Performed by: EMERGENCY MEDICINE

## 2018-09-17 PROCEDURE — 85025 COMPLETE CBC W/AUTO DIFF WBC: CPT | Performed by: EMERGENCY MEDICINE

## 2018-09-17 PROCEDURE — 83605 ASSAY OF LACTIC ACID: CPT | Performed by: EMERGENCY MEDICINE

## 2018-09-17 PROCEDURE — 80048 BASIC METABOLIC PNL TOTAL CA: CPT | Performed by: EMERGENCY MEDICINE

## 2018-09-17 PROCEDURE — 73630 X-RAY EXAM OF FOOT: CPT

## 2018-09-18 ENCOUNTER — APPOINTMENT (EMERGENCY)
Dept: CT IMAGING | Facility: HOSPITAL | Age: 73
DRG: 603 | End: 2018-09-18
Payer: COMMERCIAL

## 2018-09-18 ENCOUNTER — APPOINTMENT (INPATIENT)
Dept: ULTRASOUND IMAGING | Facility: HOSPITAL | Age: 73
DRG: 603 | End: 2018-09-18
Payer: COMMERCIAL

## 2018-09-18 PROBLEM — D63.8 ANEMIA OF CHRONIC DISEASE: Status: ACTIVE | Noted: 2017-10-18

## 2018-09-18 PROBLEM — G25.3 MYOCLONIC JERKING: Status: ACTIVE | Noted: 2018-09-18

## 2018-09-18 PROBLEM — L03.116 CELLULITIS OF LEFT KNEE: Status: ACTIVE | Noted: 2018-09-18

## 2018-09-18 LAB
AMMONIA PLAS-SCNC: 41 UMOL/L (ref 11–35)
ANION GAP SERPL CALCULATED.3IONS-SCNC: 11 MMOL/L (ref 4–13)
BASOPHILS # BLD AUTO: 0.06 THOUSANDS/ΜL (ref 0–0.1)
BASOPHILS NFR BLD AUTO: 1 % (ref 0–1)
BUN SERPL-MCNC: 8 MG/DL (ref 5–25)
CALCIUM SERPL-MCNC: 9 MG/DL (ref 8.3–10.1)
CHLORIDE SERPL-SCNC: 101 MMOL/L (ref 100–108)
CK MB SERPL-MCNC: 2 % (ref 0–2.5)
CK MB SERPL-MCNC: 4 NG/ML (ref 0–5)
CK SERPL-CCNC: 205 U/L (ref 26–192)
CO2 SERPL-SCNC: 26 MMOL/L (ref 21–32)
CREAT SERPL-MCNC: 0.82 MG/DL (ref 0.6–1.3)
EOSINOPHIL # BLD AUTO: 0.24 THOUSAND/ΜL (ref 0–0.61)
EOSINOPHIL NFR BLD AUTO: 5 % (ref 0–6)
ERYTHROCYTE [DISTWIDTH] IN BLOOD BY AUTOMATED COUNT: 16.8 % (ref 11.6–15.1)
GFR SERPL CREATININE-BSD FRML MDRD: 71 ML/MIN/1.73SQ M
GLUCOSE SERPL-MCNC: 113 MG/DL (ref 65–140)
GLUCOSE SERPL-MCNC: 204 MG/DL (ref 65–140)
GLUCOSE SERPL-MCNC: 229 MG/DL (ref 65–140)
GLUCOSE SERPL-MCNC: 66 MG/DL (ref 65–140)
GLUCOSE SERPL-MCNC: 68 MG/DL (ref 65–140)
GLUCOSE SERPL-MCNC: 94 MG/DL (ref 65–140)
HCT VFR BLD AUTO: 27 % (ref 34.8–46.1)
HGB BLD-MCNC: 8.6 G/DL (ref 11.5–15.4)
IMM GRANULOCYTES # BLD AUTO: 0.01 THOUSAND/UL (ref 0–0.2)
IMM GRANULOCYTES NFR BLD AUTO: 0 % (ref 0–2)
LACTATE SERPL-SCNC: 1.4 MMOL/L (ref 0.5–2)
LACTATE SERPL-SCNC: 2.5 MMOL/L (ref 0.5–2)
LYMPHOCYTES # BLD AUTO: 1.07 THOUSANDS/ΜL (ref 0.6–4.47)
LYMPHOCYTES NFR BLD AUTO: 23 % (ref 14–44)
MAGNESIUM SERPL-MCNC: 1.5 MG/DL (ref 1.6–2.6)
MCH RBC QN AUTO: 27.8 PG (ref 26.8–34.3)
MCHC RBC AUTO-ENTMCNC: 31.9 G/DL (ref 31.4–37.4)
MCV RBC AUTO: 87 FL (ref 82–98)
MONOCYTES # BLD AUTO: 0.56 THOUSAND/ΜL (ref 0.17–1.22)
MONOCYTES NFR BLD AUTO: 12 % (ref 4–12)
NEUTROPHILS # BLD AUTO: 2.81 THOUSANDS/ΜL (ref 1.85–7.62)
NEUTS SEG NFR BLD AUTO: 59 % (ref 43–75)
NRBC BLD AUTO-RTO: 0 /100 WBCS
PLATELET # BLD AUTO: 98 THOUSANDS/UL (ref 149–390)
PMV BLD AUTO: 12.8 FL (ref 8.9–12.7)
POTASSIUM SERPL-SCNC: 3.4 MMOL/L (ref 3.5–5.3)
RBC # BLD AUTO: 3.09 MILLION/UL (ref 3.81–5.12)
SODIUM SERPL-SCNC: 138 MMOL/L (ref 136–145)
WBC # BLD AUTO: 4.75 THOUSAND/UL (ref 4.31–10.16)

## 2018-09-18 PROCEDURE — 82948 REAGENT STRIP/BLOOD GLUCOSE: CPT

## 2018-09-18 PROCEDURE — 83605 ASSAY OF LACTIC ACID: CPT | Performed by: EMERGENCY MEDICINE

## 2018-09-18 PROCEDURE — 99222 1ST HOSP IP/OBS MODERATE 55: CPT | Performed by: PSYCHIATRY & NEUROLOGY

## 2018-09-18 PROCEDURE — 36415 COLL VENOUS BLD VENIPUNCTURE: CPT | Performed by: EMERGENCY MEDICINE

## 2018-09-18 PROCEDURE — 73701 CT LOWER EXTREMITY W/DYE: CPT

## 2018-09-18 PROCEDURE — 99285 EMERGENCY DEPT VISIT HI MDM: CPT

## 2018-09-18 PROCEDURE — 96375 TX/PRO/DX INJ NEW DRUG ADDON: CPT

## 2018-09-18 PROCEDURE — 82140 ASSAY OF AMMONIA: CPT | Performed by: INTERNAL MEDICINE

## 2018-09-18 PROCEDURE — 71260 CT THORAX DX C+: CPT

## 2018-09-18 PROCEDURE — 99223 1ST HOSP IP/OBS HIGH 75: CPT | Performed by: INTERNAL MEDICINE

## 2018-09-18 PROCEDURE — 96367 TX/PROPH/DG ADDL SEQ IV INF: CPT

## 2018-09-18 PROCEDURE — 74177 CT ABD & PELVIS W/CONTRAST: CPT

## 2018-09-18 PROCEDURE — 87040 BLOOD CULTURE FOR BACTERIA: CPT | Performed by: EMERGENCY MEDICINE

## 2018-09-18 PROCEDURE — 99221 1ST HOSP IP/OBS SF/LOW 40: CPT | Performed by: ORTHOPAEDIC SURGERY

## 2018-09-18 PROCEDURE — 93971 EXTREMITY STUDY: CPT

## 2018-09-18 PROCEDURE — 96365 THER/PROPH/DIAG IV INF INIT: CPT

## 2018-09-18 RX ORDER — FERROUS SULFATE 325(65) MG
325 TABLET ORAL 2 TIMES DAILY WITH MEALS
Status: DISCONTINUED | OUTPATIENT
Start: 2018-09-18 | End: 2018-09-19 | Stop reason: HOSPADM

## 2018-09-18 RX ORDER — VANCOMYCIN HYDROCHLORIDE 1 G/200ML
15 INJECTION, SOLUTION INTRAVENOUS ONCE
Status: COMPLETED | OUTPATIENT
Start: 2018-09-18 | End: 2018-09-18

## 2018-09-18 RX ORDER — LORAZEPAM 0.5 MG/1
0.25 TABLET ORAL EVERY 8 HOURS PRN
Status: DISCONTINUED | OUTPATIENT
Start: 2018-09-18 | End: 2018-09-19 | Stop reason: HOSPADM

## 2018-09-18 RX ORDER — PANTOPRAZOLE SODIUM 40 MG/1
40 TABLET, DELAYED RELEASE ORAL
Status: DISCONTINUED | OUTPATIENT
Start: 2018-09-18 | End: 2018-09-18

## 2018-09-18 RX ORDER — KETOROLAC TROMETHAMINE 30 MG/ML
15 INJECTION, SOLUTION INTRAMUSCULAR; INTRAVENOUS ONCE
Status: COMPLETED | OUTPATIENT
Start: 2018-09-18 | End: 2018-09-18

## 2018-09-18 RX ORDER — PRAVASTATIN SODIUM 80 MG/1
80 TABLET ORAL
Status: DISCONTINUED | OUTPATIENT
Start: 2018-09-18 | End: 2018-09-19 | Stop reason: HOSPADM

## 2018-09-18 RX ORDER — DEXTROSE MONOHYDRATE 25 G/50ML
50 INJECTION, SOLUTION INTRAVENOUS ONCE
Status: COMPLETED | OUTPATIENT
Start: 2018-09-18 | End: 2018-09-18

## 2018-09-18 RX ORDER — ASPIRIN 81 MG/1
81 TABLET, CHEWABLE ORAL DAILY
Status: DISCONTINUED | OUTPATIENT
Start: 2018-09-18 | End: 2018-09-19 | Stop reason: HOSPADM

## 2018-09-18 RX ORDER — FLUTICASONE FUROATE AND VILANTEROL 100; 25 UG/1; UG/1
1 POWDER RESPIRATORY (INHALATION)
Status: DISCONTINUED | OUTPATIENT
Start: 2018-09-18 | End: 2018-09-19 | Stop reason: HOSPADM

## 2018-09-18 RX ORDER — POTASSIUM CHLORIDE 20 MEQ/1
40 TABLET, EXTENDED RELEASE ORAL ONCE
Status: DISCONTINUED | OUTPATIENT
Start: 2018-09-18 | End: 2018-09-19 | Stop reason: HOSPADM

## 2018-09-18 RX ORDER — MONTELUKAST SODIUM 10 MG/1
10 TABLET ORAL
Status: DISCONTINUED | OUTPATIENT
Start: 2018-09-18 | End: 2018-09-19 | Stop reason: HOSPADM

## 2018-09-18 RX ORDER — ONDANSETRON 2 MG/ML
4 INJECTION INTRAMUSCULAR; INTRAVENOUS EVERY 6 HOURS PRN
Status: DISCONTINUED | OUTPATIENT
Start: 2018-09-18 | End: 2018-09-19 | Stop reason: HOSPADM

## 2018-09-18 RX ORDER — PANTOPRAZOLE SODIUM 40 MG/1
40 TABLET, DELAYED RELEASE ORAL
Status: DISCONTINUED | OUTPATIENT
Start: 2018-09-19 | End: 2018-09-19 | Stop reason: HOSPADM

## 2018-09-18 RX ORDER — BUPROPION HYDROCHLORIDE 150 MG/1
300 TABLET ORAL EVERY MORNING
Status: DISCONTINUED | OUTPATIENT
Start: 2018-09-18 | End: 2018-09-19 | Stop reason: HOSPADM

## 2018-09-18 RX ORDER — ACETAMINOPHEN 325 MG/1
650 TABLET ORAL EVERY 4 HOURS PRN
Status: DISCONTINUED | OUTPATIENT
Start: 2018-09-18 | End: 2018-09-19 | Stop reason: HOSPADM

## 2018-09-18 RX ORDER — MELATONIN
1000 DAILY
Status: DISCONTINUED | OUTPATIENT
Start: 2018-09-18 | End: 2018-09-19 | Stop reason: HOSPADM

## 2018-09-18 RX ORDER — ALBUTEROL SULFATE 90 UG/1
2 AEROSOL, METERED RESPIRATORY (INHALATION)
Status: DISCONTINUED | OUTPATIENT
Start: 2018-09-18 | End: 2018-09-19 | Stop reason: HOSPADM

## 2018-09-18 RX ORDER — ALBUTEROL SULFATE 2.5 MG/3ML
2.5 SOLUTION RESPIRATORY (INHALATION) 3 TIMES DAILY PRN
Status: DISCONTINUED | OUTPATIENT
Start: 2018-09-18 | End: 2018-09-19 | Stop reason: HOSPADM

## 2018-09-18 RX ORDER — LORAZEPAM 2 MG/ML
1 INJECTION INTRAMUSCULAR ONCE
Status: COMPLETED | OUTPATIENT
Start: 2018-09-18 | End: 2018-09-18

## 2018-09-18 RX ORDER — LISINOPRIL 2.5 MG/1
2.5 TABLET ORAL EVERY MORNING
Status: DISCONTINUED | OUTPATIENT
Start: 2018-09-18 | End: 2018-09-19 | Stop reason: HOSPADM

## 2018-09-18 RX ADMIN — IOHEXOL 100 ML: 350 INJECTION, SOLUTION INTRAVENOUS at 05:45

## 2018-09-18 RX ADMIN — DEXTROSE MONOHYDRATE 50 ML: 25 INJECTION, SOLUTION INTRAVENOUS at 05:19

## 2018-09-18 RX ADMIN — VANCOMYCIN HYDROCHLORIDE 1000 MG: 1 INJECTION, SOLUTION INTRAVENOUS at 01:09

## 2018-09-18 RX ADMIN — PRAVASTATIN SODIUM 80 MG: 80 TABLET ORAL at 16:41

## 2018-09-18 RX ADMIN — ENOXAPARIN SODIUM 40 MG: 40 INJECTION SUBCUTANEOUS at 12:06

## 2018-09-18 RX ADMIN — LORAZEPAM 1 MG: 2 INJECTION INTRAMUSCULAR; INTRAVENOUS at 01:06

## 2018-09-18 RX ADMIN — VITAMIN D, TAB 1000IU (100/BT) 1000 UNITS: 25 TAB at 12:05

## 2018-09-18 RX ADMIN — LISINOPRIL 2.5 MG: 2.5 TABLET ORAL at 12:05

## 2018-09-18 RX ADMIN — LORAZEPAM 0.25 MG: 0.5 TABLET ORAL at 17:42

## 2018-09-18 RX ADMIN — ASPIRIN 81 MG 81 MG: 81 TABLET ORAL at 12:05

## 2018-09-18 RX ADMIN — MONTELUKAST SODIUM 10 MG: 10 TABLET, FILM COATED ORAL at 21:13

## 2018-09-18 RX ADMIN — KETOROLAC TROMETHAMINE 15 MG: 30 INJECTION, SOLUTION INTRAMUSCULAR at 00:41

## 2018-09-18 RX ADMIN — INSULIN LISPRO 2 UNITS: 100 INJECTION, SOLUTION INTRAVENOUS; SUBCUTANEOUS at 17:25

## 2018-09-18 RX ADMIN — SODIUM CHLORIDE 500 ML: 0.9 INJECTION, SOLUTION INTRAVENOUS at 00:42

## 2018-09-18 RX ADMIN — ALBUTEROL SULFATE 2 PUFF: 90 AEROSOL, METERED RESPIRATORY (INHALATION) at 20:03

## 2018-09-18 RX ADMIN — INSULIN LISPRO 17 UNITS: 100 INJECTION, SOLUTION INTRAVENOUS; SUBCUTANEOUS at 12:11

## 2018-09-18 RX ADMIN — INSULIN DETEMIR 40 UNITS: 100 INJECTION, SOLUTION SUBCUTANEOUS at 12:39

## 2018-09-18 RX ADMIN — FLUTICASONE FUROATE AND VILANTEROL TRIFENATATE 1 PUFF: 100; 25 POWDER RESPIRATORY (INHALATION) at 12:07

## 2018-09-18 RX ADMIN — CEFEPIME HYDROCHLORIDE 2000 MG: 2 INJECTION, POWDER, FOR SOLUTION INTRAVENOUS at 00:41

## 2018-09-18 RX ADMIN — CEFAZOLIN SODIUM 1000 MG: 1 SOLUTION INTRAVENOUS at 20:03

## 2018-09-18 RX ADMIN — INSULIN LISPRO 17 UNITS: 100 INJECTION, SOLUTION INTRAVENOUS; SUBCUTANEOUS at 17:25

## 2018-09-18 RX ADMIN — INSULIN DETEMIR 20 UNITS: 100 INJECTION, SOLUTION SUBCUTANEOUS at 21:13

## 2018-09-18 RX ADMIN — Medication 18 MG: at 05:31

## 2018-09-18 RX ADMIN — BUPROPION HYDROCHLORIDE 300 MG: 150 TABLET, FILM COATED, EXTENDED RELEASE ORAL at 12:05

## 2018-09-18 RX ADMIN — CEFAZOLIN SODIUM 1000 MG: 1 SOLUTION INTRAVENOUS at 12:06

## 2018-09-18 NOTE — H&P
History and Physical - [facility] Internal Medicine    Patient Information: Elvi Rodriguez 67 y o  female MRN: [id number]  Unit/Bed#: -01 Encounter: [id number]  Admitting Physician: Héctor Gilman DO  PCP: Christophe Guerrero MD  Date of Admission:  09/18/18    Assessment/Plan:    Hospital Problem List:     Principal Problem:    Cellulitis of left knee  Active Problems:    Type 2 diabetes mellitus with diabetic polyneuropathy, with long-term current use of insulin (HCC)    Hyperlipidemia    Asthma    Anemia of chronic disease    Bipolar 1 disorder ([facility] [address] )    Cirrhosis of liver (HCC)    Hypokalemia    Hypertension    GERD (gastroesophageal reflux disease)    Myoclonic jerking      Plan for the Primary Problem(s):  · Cellulitis RLE with Wound -   · Antibiotic - Cefazolin  · Cultures - follow up blood cultures  · Local wound care  · Given presence of artificial knee under the area of cellulitis and pain in the knee, will consult orthopedic surgeon for evaluation  · Will also order venous doppler to evaluate for DVT, given calf tenderness  · Serial Exams  Plan for Additional Problems:   · Myoclonic Jerking  - This condition or treatment of this condition may contribute to falls  Will ask neurology to see patient  She reports not tolerating requip which she states made her sleepy and she does not want to take this anymore  Check NH3 given fatigue and the jerking activity in presence of history of cirrhosis  · Diabetes Mellitus Type 2 with Peripheral Neuropathy -   · Continue levemir, but hold the metformin  · Insulin sliding scale  · Monitor blood glucose levels  · Cirrhosis -   · Avoid hepatic encephalopathy - denies any use of rifaximin or lactulose prehospital   · Avoid Ascites - Monitor  Not on any medications for this prehospital   · Chronic thrombocytopenia - appears to be stable at baseline (80's - 90's)  · Anemia of Chronic Disease - Baseline hemoglobin seems to be chronically in the 8's  Monitor  Patient is at baseline  Additional outpatient follow up  · Essential Hypertension - Controlled  Continue home regimen  · GERD - protonix  · Asthma - no exacerbation  Continue home medication regimen  · Bipolar Disorder - home regimen  · Hypokalemia - Kdur 40 meq x 1 now  CMP and Mag in am     VTE Prophylaxis: Enoxaparin (Lovenox)  / sequential compression device   Code Status: DNR / DNI, level 3  POLST: There is no POLST form on file for this patient (pre-hospital)    Anticipated Length of Stay:  Patient will be admitted on an Inpatient basis with an anticipated length of stay of  > 2 midnights  Justification for Hospital Stay: Evaluation and treatment of cellulitis of the RLE  Total Time for Visit, including Counseling / Coordination of Care: 45 minutes  Greater than 50% of this total time spent on direct patient counseling and coordination of care  Chief Complaint:   RLE cellultiis    History of Present Illness:    Batsheva Coreas is a 67 y o  female who presents with erythema of the left knee  She states that she fell 2 weeks ago as result of "falling asleep" while trying to walk which she attributes to the ropinirole that she was placed on for myoclonic jerking activity  She feels the ropinirole made her more tired  Nonetheless the patient did not seek evaluation at that time but did come in this morning due to increasing pain in the left knee  Various scans were done showing no evidence for fracture in the left lower extremity  The patient does have an artificial knee in the left knee location  There is some erythema present but patient states this to have been present for 2 weeks  The patient also has some bruising noted in her left lower extremity but is not exactly sure how long this has been present    The patient currently appears to be uncomfortable with the myoclonic jerking activity which ends up in her being a poor historian in addition to the fact that she is also fairly groggy on my exam today       Review of Systems:    Review of Systems   Constitutional: Positive for activity change (decreased due to pain)  Negative for appetite change, chills, diaphoresis and fever  HENT: Negative for congestion, rhinorrhea, sinus pain, sinus pressure, sneezing and trouble swallowing  Eyes: Negative  Negative for visual disturbance  Respiratory: Negative for cough, shortness of breath and wheezing  Cardiovascular: Negative for chest pain and palpitations  Gastrointestinal: Negative for abdominal distention, constipation, diarrhea, nausea and vomiting  Endocrine: Negative  Genitourinary: Negative  Musculoskeletal: Positive for arthralgias  Negative for back pain  Allergic/Immunologic: Negative  Neurological: Positive for tremors (myoclonic jerks)  Negative for dizziness, syncope, light-headedness and headaches  Hematological: Negative  Psychiatric/Behavioral: Negative  All other systems reviewed and are negative  Past Medical and Surgical History:     Past Medical History:   Diagnosis Date    Asthma     Bipolar 2 disorder ([facility] )     Chronic kidney disease     Cirrhosis of liver ([facility] )     Diabetes mellitus ([place] [address] )     Elevated troponin 9/2/2018    GERD (gastroesophageal reflux disease)     Hyperlipidemia     Hypertension     Neuropathy     Renal disorder     Restless leg syndrome        Past Surgical History:   Procedure Laterality Date    BACK SURGERY      CHOLECYSTECTOMY      HERNIA REPAIR      REPLACEMENT TOTAL KNEE BILATERAL      TUMOR REMOVAL         Meds/Allergies:    Prior to Admission medications    Medication Sig Start Date End Date Taking?  Authorizing Provider   albuterol (2 5 mg/3 mL) 0 083 % nebulizer solution Take 2 5 mg by nebulization 3 (three) times a day as needed for wheezing   Yes Historical Provider, MD   aspirin 81 MG tablet Take 81 mg by mouth daily   Yes Historical Provider, MD   buPROPion (WELLBUTRIN XL) 300 mg 24 hr tablet Take 300 mg by mouth every morning   Yes Historical Provider, MD   cholecalciferol (VITAMIN D3) 1,000 units tablet Take 1,000 Units by mouth daily   Yes Historical Provider, MD   ferrous sulfate 325 (65 Fe) mg tablet Take 1 tablet by mouth 2 (two) times a day with meals 10/20/17  Yes Pee Brown PA-C   fluticasone-salmeterol (ADVAIR HFA) 115-21 MCG/ACT inhaler Inhale 2 puffs 2 (two) times a day   Yes Historical Provider, MD   insulin detemir (LEVEMIR) 100 units/mL subcutaneous injection Inject 40 Units under the skin 2 (two) times a day   Yes Historical Provider, MD   insulin lispro (HUMALOG KWIKPEN) 100 Units/mL SOPN Inject 17 Units under the skin 3 (three) times a day with meals   Yes Historical Provider, MD   ipratropium (ATROVENT) 0 02 % nebulizer solution Take 0 5 mg by nebulization every 6 (six) hours as needed for wheezing or shortness of breath   Yes Historical Provider, MD   lisinopril (ZESTRIL) 2 5 mg tablet Take 2 5 mg by mouth every morning   Yes Historical Provider, MD   montelukast (SINGULAIR) 10 mg tablet Take 10 mg by mouth daily at bedtime   Yes Historical Provider, MD   ondansetron (ZOFRAN) 4 mg tablet Take 1 tablet by mouth every 8 (eight) hours as needed for nausea or vomiting 11/4/17  Yes Ranjit Kumar MD   pantoprazole (PROTONIX) 40 mg tablet Take 40 mg by mouth daily   Yes Historical Provider, MD   simvastatin (ZOCOR) 40 mg tablet Take 40 mg by mouth daily at bedtime   Yes Historical Provider, MD   meclizine (ANTIVERT) 25 mg tablet Take 1 tablet (25 mg total) by mouth every 8 (eight) hours as needed for dizziness 8/15/18 9/18/18 Yes FRANKI Galarza   metFORMIN (GLUCOPHAGE) 1000 MG tablet Take 1,000 mg by mouth 2 (two) times a day with meals  9/18/18 Yes Historical Provider, MD   LORazepam (ATIVAN) 0 5 mg tablet Take 1 tablet (0 5 mg total) by mouth every 8 (eight) hours as needed for anxiety for up to 10 days 9/2/18 9/18/18  Eleonora Ley MD     I have reviewed home medications with patient personally  (she gave a paper with medication list)    Allergies: Allergies   Allergen Reactions    Abilify [Aripiprazole] Hallucinations    Celebrex [Celecoxib] Hallucinations    Codeine Hallucinations    Darvon [Propoxyphene] Hallucinations    Ibuprofen Hallucinations    Latuda [Lurasidone] Hallucinations    Nitrofurantoin Hallucinations    Penicillins Hallucinations    Ziprasidone Hallucinations       Social History:     Marital Status: Single     Substance Use History:   History   Alcohol Use No     History   Smoking Status    Passive Smoke Exposure - Never Smoker   Smokeless Tobacco    Never Used     History   Drug Use No       Family History:    non-contributory    Physical Exam:     Vitals:   Blood Pressure: 130/62 (09/18/18 0900)  Pulse: 87 (09/18/18 0900)  Temperature: 97 9 °F (36 6 °C) (09/18/18 0900)  Temp Source: Axillary (09/18/18 0900)  Respirations: 16 (09/18/18 0900)  Height: 4' 10" (147 3 cm) (09/17/18 2325)  Weight - Scale: 74 2 kg (163 lb 9 3 oz) (09/18/18 0900)  SpO2: 97 % (09/18/18 0900)    Physical Exam   Constitutional: She appears well-nourished  No distress  HENT:   Mouth/Throat: Oropharynx is clear and moist  No oropharyngeal exudate  Eyes: Pupils are equal, round, and reactive to light  Neck: Neck supple  No JVD present  Cardiovascular: Normal rate and regular rhythm  Murmur (4/6 systolic ) heard  Pulmonary/Chest: Effort normal and breath sounds normal  She has no wheezes  She has no rales  Abdominal: Soft  Bowel sounds are normal  She exhibits no distension  There is no tenderness  Musculoskeletal: She exhibits edema (LLE at knee and down by ankle)  Erythema present at the left knee which is with a central scabbed lesion  The patient has ecchymoses diffusely in the lower portion of the left lower extremity near tibia, ankle, and foot  Lymphadenopathy:     She has no cervical adenopathy  Neurological: No cranial nerve deficit     Intermittent myoclonic jerking  Possible mild asterixis  Patient sleeping on my arrival and wakes to voice  No jerking while sleeping  She then seems groggy saying "they messed me up in the ER", referring to the 1 mg ativan IV given to her for the jerking  Skin: Skin is warm and dry  Vitals reviewed  Additional Data:     Lab Results: I have personally reviewed pertinent reports  Results from last 7 days  Lab Units 09/17/18  2336   WBC Thousand/uL 4 75   HEMOGLOBIN g/dL 8 6*   HEMATOCRIT % 27 0*   PLATELETS Thousands/uL 98*   NEUTROS PCT % 59   LYMPHS PCT % 23   MONOS PCT % 12   EOS PCT % 5       Results from last 7 days  Lab Units 09/17/18  2336   SODIUM mmol/L 138   POTASSIUM mmol/L 3 4*   CHLORIDE mmol/L 101   CO2 mmol/L 26   BUN mg/dL 8   CREATININE mg/dL 0 82   CALCIUM mg/dL 9 0           Imaging: I have personally reviewed pertinent reports  Xr Knee 4+ Views Left Injury    Result Date: 9/18/2018  Narrative: LEFT KNEE INDICATION:   Left leg pain, recent trauma and cellulitis  Open wound  COMPARISON:  None VIEWS:  XR KNEE 4+ VW LEFT INJURY FINDINGS: Total left knee arthroplasty in normal alignment  Cemented tibial component  No evidence of loosening  Diffuse osteopenia  No fracture, lytic or blastic lesion  No clear evidence of periosteal erosive changes  No joint effusion  Soft tissue swelling surrounding the knee  No gas or collection  Impression: 1  Diffuse soft tissue swelling without evidence of gas or collection  2   No radiographic evidence of osteomyelitis  Workstation performed: [id number]     Xr Tibia Fibula 2 Views Left    Result Date: 9/18/2018  Narrative: LEFT TIBIA AND FIBULA INDICATION:   Leg pain, recent trauma and cellulitis  COMPARISON:  None VIEWS:  XR TIBIA FIBULA 2 VW LEFT FINDINGS: Normal alignment of the tibia and fibula  Diffuse osteopenia  Partially visualized knee arthroplasty  No evidence of fracture, lytic or blastic lesion    No clear evidence of erosive changes or acute periosteal reaction  Diffuse subcutaneous edema and swelling  No gas or collection  Impression: 1  Diffuse subcutaneous edema and swelling throughout the left lower leg compatible with reported cellulitis  No evidence of gas or collection  2   No clear radiographic evidence of osteomyelitis  Workstation performed: [id number]     Xr Foot 3+ Views Left    Result Date: 9/18/2018  Narrative: LEFT FOOT INDICATION:   Left leg and foot pain, cellulitis and recent trauma  COMPARISON:  None VIEWS:  XR FOOT 3+ VW LEFT FINDINGS: Normal alignment  Diffuse osteopenia  No evidence of fracture, lytic or blastic lesion  No clear evidence of periosteal reaction or erosive changes  Soft tissue swelling throughout the foot  Vascular calcifications  No evidence of gas or collection  Calcaneal and Achilles enthesopathy  Impression: 1  Soft tissue swelling throughout the foot may represent cellulitis  No evidence of gas or collection  2   No radiographic evidence of osteomyelitis in the foot  Workstation performed: [id number]     Ct Tibia Fibula Left W Contrast    Result Date: 9/18/2018  Narrative: CT left tibia and fibula with IV contrast INDICATION: cocnern for deep tissue infection COMPARISON: Left tibia and fibula radiographs 9/17/2018 TECHNIQUE: CT examination of the left tibia and fibula was performed  This examination, like all CT scans performed in the [facility], was performed utilizing techniques to minimize radiation dose exposure, including the use of iterative reconstruction and automated exposure control software  Sagittal and coronal two dimensional reconstructed images were also submitted for interpretation  IV Contrast: 100 mL of iohexol (OMNIPAQUE)  350 Rad dose  548 mGy-cm FINDINGS: OSSEOUS STRUCTURES:  Knee prosthesis in place produces streak artifact limiting evaluation of the immediate adjacent structures  Hardware is intact  No acute fracture or dislocation   No osseous destruction    Minimal proximal posterior tibial enthesopathy  Distal quadriceps enthesopathy  VISUALIZED MUSCULATURE:  Mild posterior compartment muscle fatty infiltration  Minimal noninsertional thickening of the distal Achilles tendon up to 1 cm with few punctate intrinsic calcifications  Otherwise unremarkable  SOFT TISSUES:  Diffuse circumferential minimal skin thickening and moderate subcutaneous edema  This is more confluent posterolaterally  No loculated fluid collection or soft tissue gas  OTHER PERTINENT FINDINGS:  Mild popliteal and infrapopliteal vascular calcification  Impression: Diffuse subcutaneous abnormal findings attributed to uncomplicated cellulitis in the appropriate clinical context  Differential includes sequela of venous insufficiency or lymphedema among other etiologies  No acute fracture or osseous destruction  Workstation performed: [id number]     Ct Foot Left W Contrast    Result Date: 9/18/2018  Narrative: CT left foot with IV contrast INDICATION: concern for deep tissue infection COMPARISON: Left foot radiographs 9/17/2018 TECHNIQUE: CT examination of the left foot was performed  This examination, like all CT scans performed in the [facility], was performed utilizing techniques to minimize radiation dose exposure, including the use of iterative reconstruction and automated exposure control software  Sagittal and coronal two dimensional reconstructed images were also submitted for interpretation  IV Contrast: 100 mL of iohexol (OMNIPAQUE)  350 Rad dose  38 mGy-cm FINDINGS: OSSEOUS STRUCTURES:  No fracture, dislocation or destructive osseous lesion  Chronic traction spur or sequela of old avulsion injury inferior lateral malleolus  Minimal degenerative narrowing at all mid foot and 1st MTP joints  Moderate plantar calcaneal spur  Minimal retrocalcaneal enthesopathy   VISUALIZED MUSCULATURE:  Thickening of the distal noninsertional Achilles tendon up to 1 cm with few scattered intrinsic calcifications  SOFT TISSUES:  Minimal skin thickening and subcutaneous edema predominantly anterolaterally at the level of the ankle extending to the dorsal midfoot  No loculated fluid collection or soft tissue gas  OTHER PERTINENT FINDINGS:  Minimal thickening of the proximal plantar fascia up to 0 6 cm  Distal peroneal and posterior tibialis arterial calcification  Impression: Mild diffuse subcutaneous abnormal findings attributed to uncomplicated cellulitis in the appropriate clinical context  Differential includes sequela of venous insufficiency or lymphedema among other etiologies  No acute fracture or dislocation  Small plantar calcaneal spur with chronic plantar fasciitis  Mild distal noninsertional Achilles tendinopathy  Workstation performed: [id number]     Ct Chest Abdomen Pelvis W Contrast    Result Date: 9/18/2018  Narrative: CT CHEST, ABDOMEN AND PELVIS WITH IV CONTRAST INDICATION:   trauma/falls/pain  COMPARISON:  CT abdomen and pelvis 10/18/2017 TECHNIQUE: CT examination of the chest, abdomen and pelvis was performed  Axial, sagittal, and coronal 2D reformatted images were created from the source data and submitted for interpretation  Radiation dose length product (DLP) for this visit:  1007 mGy-cm   This examination, like all CT scans performed in the [facility], was performed utilizing techniques to minimize radiation dose exposure, including the use of iterative reconstruction and automated exposure control  IV Contrast:  100 mL of iohexol (OMNIPAQUE)   350 Enteric Contrast: Enteric contrast was not administered  FINDINGS: CHEST LUNGS:  Dependent hypoventilatory changes in bilateral lower and to a lesser extent upper lobes  No lung contusion or pneumothorax  Trace linear subpleural scar right middle lobe  No acute infiltrate or pulmonary mass  PLEURA:  Unremarkable  HEART/GREAT VESSELS:  Heart is enlarged  No pericardial effusion    Aortic, mitral annular, and coronary artery calcification  MEDIASTINUM AND GALDINO:  Unremarkable  CHEST WALL AND LOWER NECK:   Unremarkable  ABDOMEN LIVER/BILIARY TREE:  The liver demonstrates cirrhotic morphology  Within the limitations of this examination there is no evidence of suspicious hepatic mass  No biliary dilatation  Portal vein is patent  GALLBLADDER:  Gallbladder is surgically absent  SPLEEN:  The spleen is enlarged, measuring 14 4 cm craniocaudal   No significant change  PANCREAS:  Marked parenchymal atrophy  Otherwise unremarkable  ADRENAL GLANDS:  Unremarkable  KIDNEYS/URETERS:  Unremarkable  No hydronephrosis  STOMACH AND BOWEL:  Sigmoid diverticulosis without immediate adjacent stranding  Moderate stool throughout the colon  No bowel obstruction  APPENDIX:  No findings to suggest appendicitis  ABDOMINOPELVIC CAVITY:  No free fluid, free gas, or enlarged lymph nodes  Stable shotty portacaval, celiac axis, and left para-aortic lymph nodes  VESSELS:  Aortoiliac calcification  No aneurysm  Retroaortic left renal vein, normal variant  Enlarged recanalized umbilical vein and small gastrohepatic, perisplenic, and ventral abdominal varices  PELVIS REPRODUCTIVE ORGANS:  Patient is status post hysterectomy  URINARY BLADDER:  Unremarkable  ABDOMINAL WALL/INGUINAL REGIONS:  Mild right anterolateral abdominal wall skin thickening and underlying subcutaneous stranding more prominent in the right lower quadrant  Moderate bilateral lower flank and lateral hip left greater than right subcutaneous stranding  This is somewhat more confluent on the left  3 cm ventral umbilical abdominal wall diastases containing fat and collateral vessels  No bowel herniation  No inguinal hernia or mass  OSSEOUS STRUCTURES:  No acute fracture or osseous destructive lesion identified  Degenerative changes of the spine, pubic symphysis, and multiple joints  Postsurgical changes lower lumbar spine    Stable grade 1 anterolisthesis L3 on L4  Chronic punctate metallic density left L5 lateral recess  Moderate spinal canal stenosis L3-4  Impression: CT chest: Dependent hypoventilatory changes  Otherwise, no acute intrathoracic process  No acute fracture  Mild cardiomegaly  No pericardial effusion  CT abdomen and pelvis: Right lateral abdominal wall and bilateral hip left greater than right subcutaneous contusion No acute intra-abdominal or intrapelvic process  No acute fracture  Hepatic cirrhosis, mild splenomegaly, and varices unchanged  Sigmoid diverticulosis  Workstation performed: [id number]       EKG, Pathology, and Other Studies Reviewed on Admission:   · EKG: None Available  Allscripts / Epic Records Reviewed: Yes     ** Please Note: This note has been constructed using a voice recognition system   **

## 2018-09-18 NOTE — CONSULTS
Consultation - Roderick Marroquin 67 y o  female MRN: [id number]  Unit/Bed#: -01 Encounter: [id number]      Assessment/Plan     Assessment:  Hematoma contusion cellulitis resolved  Plan:  Follow-up as an outpatient with Dr Kajal Sterling at [place] [address]  who had performed her total knee arthroplasty  No further orthopedic intervention here    History of Present Illness   Physician Requesting Consult: Mandi Bill DO  Reason for Consult / Principal Problem:  Leg ecchymosis  HPI: Magali Pete is a 67y o  year old female who presents with left leg ecchymosis around the proximal tibia and distal tibia/ankle and foot  Patient had a fall subsequently had contusion as well as ecchymosis  Have been concern for cellulitis  Patient reports no pain at this time  Patient also reports having surgery for bilateral total knee arthroplasty done by Dr Kajal Sterling at [place] [address]  approximately 6 years ago  No other issues with the knee except for the fact that she is unable to kneel on them and get up from a stooped position  Pain is 0 10 does not bother her with ambulation either  Inpatient consult to Orthopedic Surgery  Consult performed by: Delfin Richmond ordered by: Rolanda Lewis          Review of Systems   Constitutional: Negative  HENT: Negative  Eyes: Negative  Respiratory: Negative  Cardiovascular: Negative  Gastrointestinal: Negative  Endocrine: Negative  Genitourinary: Negative  Skin: Negative  Allergic/Immunologic: Negative  Neurological: Negative  Hematological: Negative  Psychiatric/Behavioral: Negative          Historical Information   Past Medical History:   Diagnosis Date    Asthma     [facility] )     Chronic kidney disease     Cirrhosis of liver ([facility] )     Diabetes mellitus ([place] [address] )     Elevated troponin 9/2/2018    GERD (gastroesophageal reflux disease)     Hyperlipidemia     Hypertension     Neuropathy     Renal disorder     Restless leg syndrome      Past Surgical History:   Procedure Laterality Date    BACK SURGERY      CHOLECYSTECTOMY      HERNIA REPAIR      REPLACEMENT TOTAL KNEE BILATERAL      TUMOR REMOVAL       Social History   History   Alcohol Use No     History   Drug Use No     History   Smoking Status    Passive Smoke Exposure - Never Smoker   Smokeless Tobacco    Never Used     Family History: non-contributory    Meds/Allergies   all current active meds have been reviewed  Allergies   Allergen Reactions    Abilify [Aripiprazole] Hallucinations    Celebrex [Celecoxib] Hallucinations    Codeine Hallucinations    Darvon [Propoxyphene] Hallucinations    Ibuprofen Hallucinations    Latuda [Lurasidone] Hallucinations    Nitrofurantoin Hallucinations    Penicillins Hallucinations    Ziprasidone Hallucinations       Objective   Vitals: Blood pressure 130/62, pulse 80, temperature 97 9 °F (36 6 °C), temperature source Axillary, resp  rate 14, height 4' 10" (1 473 m), weight 74 2 kg (163 lb 9 3 oz), SpO2 97 %  ,Body mass index is 34 19 kg/m²  No intake or output data in the 24 hours ending 09/18/18 1428  No intake/output data recorded  Invasive Devices     Peripheral Intravenous Line            Peripheral IV 09/17/18 Left Wrist less than 1 day                Physical Exam   Constitutional: She is oriented to person, place, and time  She appears well-developed  HENT:   Head: Normocephalic  Eyes: Pupils are equal, round, and reactive to light  Neck: Normal range of motion  Cardiovascular: Normal rate  Pulmonary/Chest: Effort normal    Abdominal: She exhibits no distension  Musculoskeletal:        Right knee: She exhibits no effusion  Left knee: She exhibits no effusion  Neurological: She is alert and oriented to person, place, and time  Skin: Skin is warm  Ecchymosis over the anterior aspect of the proximal tibia left side with a centralized eschar    There is also small eschar over lateral aspect of the right knee  No erythema surrounding just ecchymosis  Distally around the left ankle there is ecchymosis as well ankle and foot   Psychiatric: She has a normal mood and affect  Nursing note and vitals reviewed  Right Knee Exam     Range of Motion   The patient has normal right knee ROM  Other   Erythema: absent  Scars: present  Sensation: normal  Swelling: none  Other tests: no effusion present    Comments:  Small eschar lateral aspect of right knee      Left Knee Exam     Range of Motion   The patient has normal left knee ROM  Other   Erythema: absent  Scars: present  Sensation: normal  Swelling: none  Effusion: no effusion present    Comments:  10 mm diameter eschar anterior aspect of right knee midline with patella  Also ecchymosis around this area as well as distally around the ankle and foot  Mild swelling no erythema no warmth with palpation  There is tenderness with palpation around the gastrocsoleus complex but no rope like symptom  Able to wiggle all of her toes able to range her knee no pain with range of motion on either side  No pain with range of motion of the ankle either  Lab Results:   I have personally reviewed pertinent lab results  CBC:   Lab Results   Component Value Date    WBC 4 75 09/17/2018    HGB 8 6 (L) 09/17/2018    HCT 27 0 (L) 09/17/2018    MCV 87 09/17/2018    PLT 98 (L) 09/17/2018    MCH 27 8 09/17/2018    MCHC 31 9 09/17/2018    RDW 16 8 (H) 09/17/2018    MPV 12 8 (H) 09/17/2018    NRBC 0 09/17/2018     CMP:   Lab Results   Component Value Date     09/17/2018     09/17/2018    CO2 26 09/17/2018    BUN 8 09/17/2018    CREATININE 0 82 09/17/2018    CALCIUM 9 0 09/17/2018    EGFR 71 09/17/2018     Imaging Studies: I have personally reviewed pertinent reports     and I have personally reviewed pertinent films in PACS  EKG, Pathology, and Other Studies: I have personally reviewed pertinent films in PACS      Code Status: Level 3 - DNAR and DNI  Advance Directive and Living Will: Yes    Power of :    POLST:      Counseling / Coordination of Care  Total floor / unit time spent today 40 minutes  Greater than 50% of total time was spent with the patient and / or family counseling and / or coordination of care   A description of the counseling / coordination of care:  Expressing that there is no sign of infection at this time and to have her follow up with Dr Jessica Mack as an outpatient

## 2018-09-18 NOTE — ED NOTES
Pt states "I feel really hot and I usually feel like this when my sugar is low"  Checked pt's BS at 77  Dr Candace Frank, 1 AMP D50 ordered        Kasie Fajardo RN  09/18/18 [address] Jean Marei Gilbert RN  09/18/18 3270

## 2018-09-18 NOTE — ED NOTES
States she feels a lot better after ativan given  Patient resting comfortably  Continue to monitor        Rock Olmos RN  09/18/18 8227

## 2018-09-18 NOTE — CONSULTS
Consultation - Neurology   Felice Marroquin 67 y o  female MRN: [id number]  Unit/Bed#: -01 Encounter: [id number]      Assessment/Plan   Assessment:  Felice Marroquin is a 67 y  o female with a history of restless legs syndrome, blackout events, hypertension, hyperlipidemia, diabetes mellitus type 2, liver cirrhosis, chronic kidney disease, bipolar 2 disorder who presented to the ED on 09/17 secondary to left knee erythema s/p a sudden blackout event which resulted in a fall 2 weeks ago  Patient disclose frequent blackout events as well as a history of myoclonic jerking which she states has improved over the last 2 weeks since the discontinuation of ropinirole  Plan:  Myoclonic jerks  No myoclonic jerks noted on evaluation  Patient states that these have improved significantly over the last 2 weeks since her ropinirole was stopped  Continue monitoring    Recurrent episodes of loss of consciousness resulting in fall  Further evaluation is warranted  Echo ordered  EEG ordered  Patient will likely benefit from an outpatient tilt-table test  Orthostatics ordered  Iron studies and B12 pending  Mg ordered  Continue to monitor blood sugar  Patient noted to be slightly hypoglycemic in the a m     Consider cardiac evaluation  PT/OT    LLE erythema  +edema  Doppler pending  As per primary team    History of Present Illness     Reason for Consult / Principal Problem:  Myoclonic jerks, falls    HPI: Felice Marroquin is a 67 y o  female with a history of restless legs syndrome, blackout events, hypertension, hyperlipidemia, diabetes mellitus type 2, liver cirrhosis, chronic kidney disease, bipolar 2 disorder who presented to the ED on 09/17 secondary to left knee erythema  The patient stated that she was walking with a walker and blacked out falling to the ground  When she awoke she had an abrasion on the left knee    Patient notes that she has had multiple blacking out episodes including events where she is seated on a bar stool and wakes up on the floor or seated on the toilet and wakes up on the floor  Patient says she has no prodrome to these events  Patient also notes that she has a history of some myoclonic jerking activity which she attributed to her previous medication of ropinirole  She states that this jerking activity has dramatically improved over the last 2 weeks that she stop this medication  Currently, she complains of pain and erythema of her left knee and lower leg  She also acknowledges frustration with regards to her repeat blackout attacks  She currently denies chest pain, shortness of breath, headache, vision changes, or numbness of extremities  Inpatient consult to Neurology  Consult performed by: Reddy Mera ordered by: Morena Badillo        Review of Systems A 12 point ROS was completed and other than the above mentioned complaints in the HPI and those listed below, all remaining systems were negative  - Shoulder pain from a bruise secondary to previous fall  - Patient admits to hands and feet always feeling numb secondary to diabetic neuropathy    Historical Information   Past Medical History:   Diagnosis Date    Asthma     [facility] disorder [facility] )     Chronic kidney disease     [facility]rhosis of liver ([facility] )     Diabetes mellitus ([facility] )     Elevated troponin 9/2/2018    GERD (gastroesophageal reflux disease)     Hyperlipidemia     Hypertension     Neuropathy     Renal disorder     Restless leg syndrome      Past Surgical History:   Procedure Laterality Date    BACK SURGERY      CHOLECYSTECTOMY      HERNIA REPAIR      REPLACEMENT TOTAL KNEE BILATERAL      TUMOR REMOVAL       Social History   History   Alcohol Use No     History   Drug Use No     History   Smoking Status    Passive Smoke Exposure - Never Smoker   Smokeless Tobacco    Never Used     Family History: History reviewed  No pertinent family history  Review of previous medical records was completed  Meds/Allergies   all current active meds have been reviewed    Allergies   Allergen Reactions    Abilify [Aripiprazole] Hallucinations    Celebrex [Celecoxib] Hallucinations    Codeine Hallucinations    Darvon [Propoxyphene] Hallucinations    Ibuprofen Hallucinations    Latuda [Lurasidone] Hallucinations    Nitrofurantoin Hallucinations    Penicillins Hallucinations    Ziprasidone Hallucinations       Objective   Vitals:Blood pressure 123/56, pulse 90, temperature 97 9 °F (36 6 °C), temperature source Axillary, resp  rate 14, height 4' 10" (1 473 m), weight 74 2 kg (163 lb 9 3 oz), SpO2 97 %  ,Body mass index is 34 19 kg/m²  Intake/Output Summary (Last 24 hours) at 09/18/18 1719  Last data filed at 09/18/18 1240   Gross per 24 hour   Intake               50 ml   Output                0 ml   Net               50 ml       Invasive Devices: Invasive Devices     Peripheral Intravenous Line            Peripheral IV 09/17/18 Left Wrist less than 1 day                Physical Exam   Constitutional: She is oriented to person, place, and time  She appears well-developed and well-nourished  No distress  Elderly female, supine in bed   HENT:   Head: Normocephalic and atraumatic  Eyes: Conjunctivae and EOM are normal  Pupils are equal, round, and reactive to light  Right eye exhibits no discharge  Left eye exhibits no discharge  No scleral icterus  Neck: Normal range of motion  Neck supple  Cardiovascular: Normal rate and regular rhythm  Exam reveals no gallop and no friction rub  No murmur heard  Pulmonary/Chest: Effort normal and breath sounds normal  No stridor  No respiratory distress  She has no wheezes  She has no rales  She exhibits no tenderness  Musculoskeletal: Normal range of motion  She exhibits edema (LLE 2+ edema)  She exhibits no tenderness or deformity  Lymphadenopathy:     She has no cervical adenopathy  Neurological: She is alert and oriented to person, place, and time  Finger-nose-finger test: end range tremor bilaterally  Reflex Scores:       Tricep reflexes are 2+ on the right side and 2+ on the left side  Bicep reflexes are 2+ on the right side and 2+ on the left side  Brachioradialis reflexes are 2+ on the right side and 2+ on the left side  Patellar reflexes are 1+ on the right side and Left patellar reflex grade: unable to assess due to knee abrasion/pain          Achilles reflexes are 1+ on the right side and 0 on the left side  Skin: Skin is warm and dry  No rash noted  No erythema  Significant ecchymosis with knee abrasion on the left leg  Psychiatric: She has a normal mood and affect  Her behavior is normal  Judgment and thought content normal      Neurologic Exam     Mental Status   Oriented to person, place, and time  Patient is alert and oriented x4  Knows current president  Able to do calculations  Able to follow 1-2 step commands  Maintains attention and concentration  Speech is dysarthric though patient has no teeth and this appears baseline  No evidence of aphasia  Cranial Nerves     CN II   Visual acuity: normal (Grossly)  Right visual field deficit: none  Left visual field deficit: none     CN III, IV, VI   Pupils are equal, round, and reactive to light  Extraocular motions are normal    Nystagmus: none   Conjugate gaze: present    CN V   Facial sensation intact  CN VII   Facial expression full, symmetric  CN XI   Right sternocleidomastoid strength: normal  Left sternocleidomastoid strength: normal  Right trapezius strength: normal  Left trapezius strength: Unable to assess secondary to pain from bruising      CN XII   CN XII normal      Motor Exam   Muscle bulk: decreased  Overall muscle tone: normal  Right arm pronator drift: absent  Left arm pronator drift: absent (Due to shoulder pain, patient with decreased shoulder flexion however once maintaining position, no drift or pronation noted)    Strength   Strength 5/5 except as noted  RUE  Deltoids: 4  Biceps: 5  Triceps: 5  : 5    LUE  Deltoids: Unable to assess due to pain  At least a 3+  Biceps: 4+  Triceps: 5  : 5    RLE  Iliopsoas: 4+  Quadriceps: 5  Hamstrings: 5  Dorsiflexion: 5  Plantar flexion: 5    LLE  Iliopsoas: 4  Quadriceps: 5  Hamstrings: 5  Dorsiflexion: 5  Plantar flexion: 5       Sensory Exam   Light touch normal    Right arm vibration: normal  Left arm vibration: normal  Right leg vibration: decreased from ankle  Left leg vibration: decreased from knee  Right arm pinprick: normal  Left arm pinprick: normal  Right leg pinprick: decreased from toes  Left leg pinprick: decreased from ankle  Temperature decreased on the left lower extremity       Gait, Coordination, and Reflexes     Coordination   Finger-nose-finger test: end range tremor bilaterally  Reflexes   Right brachioradialis: 2+  Left brachioradialis: 2+  Right biceps: 2+  Left biceps: 2+  Right triceps: 2+  Left triceps: 2+  Right patellar: 1+  Left patellar reflex grade: unable to assess due to knee abrasion/pain    Right achilles: 1+  Left achilles: 0  Right plantar: normal  Left plantar: upgoing      Lab Results:   Recent Results (from the past 24 hour(s))   CBC and differential    Collection Time: 09/17/18 11:36 PM   Result Value Ref Range    WBC 4 75 4 31 - 10 16 Thousand/uL    RBC 3 09 (L) 3 81 - 5 12 Million/uL    Hemoglobin 8 6 (L) 11 5 - 15 4 g/dL    Hematocrit 27 0 (L) 34 8 - 46 1 %    MCV 87 82 - 98 fL    MCH 27 8 26 8 - 34 3 pg    MCHC 31 9 31 4 - 37 4 g/dL    RDW 16 8 (H) 11 6 - 15 1 %    MPV 12 8 (H) 8 9 - 12 7 fL    Platelets 98 (L) 813 - 390 Thousands/uL    nRBC 0 /100 WBCs    Neutrophils Relative 59 43 - 75 %    Immat GRANS % 0 0 - 2 %    Lymphocytes Relative 23 14 - 44 %    Monocytes Relative 12 4 - 12 %    Eosinophils Relative 5 0 - 6 %    Basophils Relative 1 0 - 1 %    Neutrophils Absolute 2 81 1 85 - 7 62 Thousands/µL    Immature Grans Absolute 0 01 0 00 - 0 20 Thousand/uL    Lymphocytes Absolute 1 07 0 60 - 4 47 Thousands/µL    Monocytes Absolute 0 56 0 17 - 1 22 Thousand/µL    Eosinophils Absolute 0 24 0 00 - 0 61 Thousand/µL    Basophils Absolute 0 06 0 00 - 0 10 Thousands/µL   Basic metabolic panel    Collection Time: 09/17/18 11:36 PM   Result Value Ref Range    Sodium 138 136 - 145 mmol/L    Potassium 3 4 (L) 3 5 - 5 3 mmol/L    Chloride 101 100 - 108 mmol/L    CO2 26 21 - 32 mmol/L    ANION GAP 11 4 - 13 mmol/L    BUN 8 5 - 25 mg/dL    Creatinine 0 82 0 60 - 1 30 mg/dL    Glucose 204 (H) 65 - 140 mg/dL    Calcium 9 0 8 3 - 10 1 mg/dL    eGFR 71 ml/min/1 73sq m   Lactic acid, plasma    Collection Time: 09/17/18 11:36 PM   Result Value Ref Range    LACTIC ACID 2 5 (HH) 0 5 - 2 0 mmol/L   CK (with reflex to MB)    Collection Time: 09/17/18 11:36 PM   Result Value Ref Range    Total  (H) 26 - 192 U/L   CKMB    Collection Time: 09/17/18 11:36 PM   Result Value Ref Range    CK-MB Index 2 0 0 0 - 2 5 %    CK-MB FRACTION 4 0 0 0 - 5 0 ng/mL   Lactic acid, plasma    Collection Time: 09/18/18  2:09 AM   Result Value Ref Range    LACTIC ACID 1 4 0 5 - 2 0 mmol/L   Fingerstick Glucose (POCT)    Collection Time: 09/18/18  5:14 AM   Result Value Ref Range    POC Glucose 66 65 - 140 mg/dl   Fingerstick Glucose (POCT)    Collection Time: 09/18/18  7:22 AM   Result Value Ref Range    POC Glucose 113 65 - 140 mg/dl   Fingerstick Glucose (POCT)    Collection Time: 09/18/18 11:18 AM   Result Value Ref Range    POC Glucose 94 65 - 140 mg/dl   Ammonia    Collection Time: 09/18/18 12:17 PM   Result Value Ref Range    Ammonia 41 (H) 11 - 35 umol/L   Fingerstick Glucose (POCT)    Collection Time: 09/18/18  4:38 PM   Result Value Ref Range    POC Glucose 229 (H) 65 - 140 mg/dl     Imaging Studies: I have personally reviewed pertinent reports  EKG, Pathology, and Other Studies: I have personally reviewed pertinent reports      VTE Prophylaxis: Sequential compression device (Venodyne) and Enoxaparin (Lovenox)    Code Status: Level 3 - DNAR and DNI  Advance Directive and Living Will: Yes

## 2018-09-18 NOTE — CASE MANAGEMENT
Initial Clinical Review    Admission: Date/Time/Statement: 9/18/18 @ 0707     Orders Placed This Encounter   Procedures    Inpatient Admission (expected length of stay for this patient is greater than two midnights)     Standing Status:   Standing     Number of Occurrences:   1     Order Specific Question:   Admitting Physician     Answer:   Sheryle Poche     Order Specific Question:   Level of Care     Answer:   Med Surg [16]     Order Specific Question:   Estimated length of stay     Answer:   More than 2 Midnights     Order Specific Question:   Certification     Answer:   I certify that inpatient services are medically necessary for this patient for a duration of greater than two midnights  See H&P and MD Progress Notes for additional information about the patient's course of treatment  ED: Date/Time/Mode of Arrival:   ED Arrival Information     Expected Arrival Acuity Means of Arrival Escorted By Service Admission Type    - 9/17/2018 23:20 Urgent Ambulance [facility] Hospitalist Urgent    Arrival Complaint    Knee pain          Chief Complaint:   Chief Complaint   Patient presents with    Fall     Patient fell 2 weeks ago, c/o left leg pain   Cellulitis     Left knee open wound, warm to touch  History of Illness:  67 y o  female who presents with erythema of the left knee  She states that she fell 2 weeks ago as result of "falling asleep" while trying to walk which she attributes to the ropinirole that she was placed on for myoclonic jerking activity  She feels the ropinirole made her more tired  Nonetheless the patient did not seek evaluation at that time but did come in this morning due to increasing pain in the left knee  Various scans were done showing no evidence for fracture in the left lower extremity  The patient does have an artificial knee in the left knee location  There is some erythema present but patient states this to have been present for 2 weeks  The patient also has some bruising noted in her left lower extremity but is not exactly sure how long this has been present  The patient currently appears to be uncomfortable with the myoclonic jerking activity which ends up in her being a poor historian in addition to the fact that she is also fairly groggy on exam today    ED Vital Signs:   ED Triage Vitals   Temperature Pulse Respirations Blood Pressure SpO2   09/18/18 0713 09/17/18 2325 09/17/18 2325 09/17/18 2325 09/17/18 2325   98 4 °F (36 9 °C) 100 20 117/56 100 %      Temp Source Heart Rate Source Patient Position - Orthostatic VS BP Location FiO2 (%)   09/18/18 0713 09/17/18 2325 09/17/18 2325 09/17/18 2325 --   Oral Monitor Sitting Right arm       Pain Score       09/17/18 2325       7        Wt Readings from Last 1 Encounters:   09/18/18 74 2 kg (163 lb 9 3 oz)       Vital Signs (abnormal): none  Exam  - systolic murmur  Edema LLE at knee and down by ankle  Erythema present left knee which is with a central scabbed lesion  ecchymosis diffusively in the lower portion of the LLE near tibia, ankle and foot  Intermittent myoclonic jerking  Mild asterixis  Abnormal Labs/Diagnostic Test Results:   K 3 4  Glucose 205  Wbc 4 75, hgb 8 6, hct 27  Platelets 98  Total ck 205  Lactic acid 2 5  Ct chest/abdomen -   Ct chest - Dependent hypoventilatory changes  Otherwise, no acute intrathoracic process  No acute fracture  Mild cardiomegaly   No pericardial effusion  CT abdomen - Right lateral abdominal wall and bilateral hip left greater than right subcutaneous contusion  No acute intra-abdominal or intrapelvic process  No acute fracture  Hepatic cirrhosis, mild splenomegaly, and varices unchanged    Sigmoid diverticulosis    ED Treatment: blood cultures   Medication Administration from 09/17/2018 2320 to 09/18/2018 0809       Date/Time Order Dose Route Action Comments     09/18/2018 0109 sodium chloride 0 9 % bolus 500 mL 0 mL Intravenous Stopped      09/18/2018 0042 sodium chloride 0 9 % bolus 500 mL 500 mL Intravenous New Bag      09/18/2018 0109 cefepime (MAXIPIME) 2 g/50 mL dextrose IVPB 0 mg Intravenous Stopped      09/18/2018 0041 cefepime (MAXIPIME) 2 g/50 mL dextrose IVPB 2,000 mg Intravenous New Bag      09/18/2018 0041 ketorolac (TORADOL) injection 15 mg 15 mg Intravenous Given      09/18/2018 0234 vancomycin (VANCOCIN) IVPB (premix) 1,000 mg 0 mg Intravenous Stopped      09/18/2018 0109 vancomycin (VANCOCIN) IVPB (premix) 1,000 mg 1,000 mg Intravenous New Bag      09/18/2018 0106 LORazepam (ATIVAN) 2 mg/mL injection 1 mg 1 mg Intravenous Given      09/18/2018 0531 ketamine (KETALAR) 10 mg/mL IV use 18 mg 18 mg Intravenous Given      09/18/2018 0519 dextrose 50 % IV solution 50 mL 50 mL Intravenous Given      09/18/2018 0545 iohexol (OMNIPAQUE) 350 MG/ML injection (MULTI-DOSE) 100 mL 100 mL Intravenous Given           Past Medical/Surgical History:   Past Medical History:   Diagnosis Date    Asthma     Bipolar 2 disorder ([facility]     Chronic kidney disease     Cirrhosis of liver [facility]     Diabetes mellitus ([facility]     Elevated troponin 9/2/2018    GERD (gastroesophageal reflux disease)     Hyperlipidemia     Hypertension     Neuropathy     Renal disorder     Restless leg syndrome        Admitting Diagnosis: Cellulitis [L03 90]  Knee pain [M25 569]    Age/Sex: 67 y o  female    · Assessment/Plan: Cellulitis RLE with Wound -   ? Antibiotic - Cefazolin  ? Cultures - follow up blood cultures  ? Local wound care  ? Given presence of artificial knee under the area of cellulitis and pain in the knee, will consult orthopedic surgeon for evaluation  ? Will also order venous doppler to evaluate for DVT, given calf tenderness  ? Serial Exams      Plan for Additional Problems:   · Myoclonic Jerking  - This condition or treatment of this condition may contribute to falls  Will ask neurology to see patient    She reports not tolerating requip which she states made her sleepy and she does not want to take this anymore  Check NH3 given fatigue and the jerking activity in presence of history of cirrhosis  · Diabetes Mellitus Type 2 with Peripheral Neuropathy -   ? Continue levemir, but hold the metformin  ? Insulin sliding scale  ? Monitor blood glucose levels  · Cirrhosis -   ? Avoid hepatic encephalopathy - denies any use of rifaximin or lactulose prehospital   ? Avoid Ascites - Monitor  Not on any medications for this prehospital   ? Chronic thrombocytopenia - appears to be stable at baseline (80's - 90's)  · Anemia of Chronic Disease - Baseline hemoglobin seems to be chronically in the 8's  Monitor  Patient is at baseline  Additional outpatient follow up  · Essential Hypertension - Controlled  Continue home regimen  · GERD - protonix  · Asthma - no exacerbation  Continue home medication regimen  · Bipolar Disorder - home regimen  Hypokalemia - Kdur 40 meq x 1 now  CMP and Mag in am     Admission Orders:  9/18/2018  0708 INPATIENT   Scheduled Meds:   Current Facility-Administered Medications:  acetaminophen 650 mg Oral Q4H PRN   albuterol 2 5 mg Nebulization TID PRN   aspirin 81 mg Oral Daily   buPROPion 300 mg Oral QAM   cefazolin 1,000 mg Intravenous Q8H   cholecalciferol 1,000 Units Oral Daily   enoxaparin 40 mg Subcutaneous Daily   ferrous sulfate 325 mg Oral BID With Meals   fluticasone-vilanterol 1 puff Inhalation Daily   insulin detemir 40 Units Subcutaneous Q12H [address]   insulin lispro 1-6 Units Subcutaneous TID AC   insulin lispro 17 Units Subcutaneous TID With Meals   ipratropium 0 5 mg Nebulization Q6H PRN   lisinopril 2 5 mg Oral QAM   montelukast 10 mg Oral HS   ondansetron 4 mg Intravenous Q6H PRN   [START ON 9/19/2018] pantoprazole 40 mg Oral Early Morning   potassium chloride 40 mEq Oral Once   pravastatin 80 mg Oral Daily With Dinner     Continuous Infusions:    PRN Meds:not used       OTHER ORDERS:  Fingerstick glucose qid    Consult orthopedics; neurology  PT/OT

## 2018-09-18 NOTE — ED NOTES
Pt given ketamine IV per order for CT scan, Pt placed on monitor, 3L oxygen via [place] and transported to CT with myself and CT Dolly kang  Will continue to monitor pt will in CT        Sumeet Malik RN  09/18/18 1654

## 2018-09-18 NOTE — ED NOTES
Pt returned from CT, VS reassessed  Will continue to monitor PT        Saulo Willingham RN  09/18/18 0843

## 2018-09-18 NOTE — ED NOTES
Pt swinging arms and legs around bilaterally while breathing heavily  Pt claims to have no control over body       Carlito Chance RN  09/18/18 0475

## 2018-09-19 ENCOUNTER — APPOINTMENT (INPATIENT)
Dept: NEUROLOGY | Facility: HOSPITAL | Age: 73
DRG: 603 | End: 2018-09-19
Payer: COMMERCIAL

## 2018-09-19 ENCOUNTER — APPOINTMENT (INPATIENT)
Dept: NON INVASIVE DIAGNOSTICS | Facility: HOSPITAL | Age: 73
DRG: 603 | End: 2018-09-19
Payer: COMMERCIAL

## 2018-09-19 VITALS
HEART RATE: 97 BPM | SYSTOLIC BLOOD PRESSURE: 146 MMHG | DIASTOLIC BLOOD PRESSURE: 63 MMHG | HEIGHT: 58 IN | WEIGHT: 163.58 LBS | TEMPERATURE: 98.4 F | BODY MASS INDEX: 34.34 KG/M2 | OXYGEN SATURATION: 99 % | RESPIRATION RATE: 18 BRPM

## 2018-09-19 LAB
ALBUMIN SERPL BCP-MCNC: 2.1 G/DL (ref 3.5–5)
ALP SERPL-CCNC: 109 U/L (ref 46–116)
ALT SERPL W P-5'-P-CCNC: 35 U/L (ref 12–78)
ANION GAP SERPL CALCULATED.3IONS-SCNC: 2 MMOL/L (ref 4–13)
AST SERPL W P-5'-P-CCNC: 42 U/L (ref 5–45)
BASOPHILS # BLD AUTO: 0.03 THOUSANDS/ΜL (ref 0–0.1)
BASOPHILS NFR BLD AUTO: 1 % (ref 0–1)
BILIRUB SERPL-MCNC: 0.7 MG/DL (ref 0.2–1)
BUN SERPL-MCNC: 9 MG/DL (ref 5–25)
CALCIUM SERPL-MCNC: 8.4 MG/DL (ref 8.3–10.1)
CHLORIDE SERPL-SCNC: 109 MMOL/L (ref 100–108)
CO2 SERPL-SCNC: 30 MMOL/L (ref 21–32)
CREAT SERPL-MCNC: 0.54 MG/DL (ref 0.6–1.3)
EOSINOPHIL # BLD AUTO: 0.23 THOUSAND/ΜL (ref 0–0.61)
EOSINOPHIL NFR BLD AUTO: 8 % (ref 0–6)
ERYTHROCYTE [DISTWIDTH] IN BLOOD BY AUTOMATED COUNT: 17.5 % (ref 11.6–15.1)
FERRITIN SERPL-MCNC: 16 NG/ML (ref 8–388)
FOLATE SERPL-MCNC: 12.3 NG/ML (ref 3.1–17.5)
GFR SERPL CREATININE-BSD FRML MDRD: 94 ML/MIN/1.73SQ M
GLUCOSE SERPL-MCNC: 104 MG/DL (ref 65–140)
GLUCOSE SERPL-MCNC: 198 MG/DL (ref 65–140)
GLUCOSE SERPL-MCNC: 243 MG/DL (ref 65–140)
GLUCOSE SERPL-MCNC: 298 MG/DL (ref 65–140)
GLUCOSE SERPL-MCNC: 50 MG/DL (ref 65–140)
GLUCOSE SERPL-MCNC: 55 MG/DL (ref 65–140)
GLUCOSE SERPL-MCNC: 92 MG/DL (ref 65–140)
HCT VFR BLD AUTO: 25.7 % (ref 34.8–46.1)
HGB BLD-MCNC: 7.8 G/DL (ref 11.5–15.4)
IMM GRANULOCYTES # BLD AUTO: 0.03 THOUSAND/UL (ref 0–0.2)
IMM GRANULOCYTES NFR BLD AUTO: 1 % (ref 0–2)
IRON SATN MFR SERPL: 7 %
IRON SERPL-MCNC: 21 UG/DL (ref 50–170)
LYMPHOCYTES # BLD AUTO: 0.72 THOUSANDS/ΜL (ref 0.6–4.47)
LYMPHOCYTES NFR BLD AUTO: 25 % (ref 14–44)
MAGNESIUM SERPL-MCNC: 1.6 MG/DL (ref 1.6–2.6)
MCH RBC QN AUTO: 27.6 PG (ref 26.8–34.3)
MCHC RBC AUTO-ENTMCNC: 30.4 G/DL (ref 31.4–37.4)
MCV RBC AUTO: 91 FL (ref 82–98)
MONOCYTES # BLD AUTO: 0.32 THOUSAND/ΜL (ref 0.17–1.22)
MONOCYTES NFR BLD AUTO: 11 % (ref 4–12)
NEUTROPHILS # BLD AUTO: 1.56 THOUSANDS/ΜL (ref 1.85–7.62)
NEUTS SEG NFR BLD AUTO: 54 % (ref 43–75)
NRBC BLD AUTO-RTO: 0 /100 WBCS
PLATELET # BLD AUTO: 72 THOUSANDS/UL (ref 149–390)
PMV BLD AUTO: 12.1 FL (ref 8.9–12.7)
POTASSIUM SERPL-SCNC: 3.4 MMOL/L (ref 3.5–5.3)
PROT SERPL-MCNC: 5.4 G/DL (ref 6.4–8.2)
RBC # BLD AUTO: 2.83 MILLION/UL (ref 3.81–5.12)
SODIUM SERPL-SCNC: 141 MMOL/L (ref 136–145)
TIBC SERPL-MCNC: 318 UG/DL (ref 250–450)
VIT B12 SERPL-MCNC: 913 PG/ML (ref 100–900)
WBC # BLD AUTO: 2.89 THOUSAND/UL (ref 4.31–10.16)

## 2018-09-19 PROCEDURE — 83550 IRON BINDING TEST: CPT | Performed by: INTERNAL MEDICINE

## 2018-09-19 PROCEDURE — 82728 ASSAY OF FERRITIN: CPT | Performed by: INTERNAL MEDICINE

## 2018-09-19 PROCEDURE — 80053 COMPREHEN METABOLIC PANEL: CPT | Performed by: INTERNAL MEDICINE

## 2018-09-19 PROCEDURE — 93971 EXTREMITY STUDY: CPT | Performed by: SURGERY

## 2018-09-19 PROCEDURE — 95816 EEG AWAKE AND DROWSY: CPT

## 2018-09-19 PROCEDURE — 85025 COMPLETE CBC W/AUTO DIFF WBC: CPT | Performed by: INTERNAL MEDICINE

## 2018-09-19 PROCEDURE — 82607 VITAMIN B-12: CPT | Performed by: INTERNAL MEDICINE

## 2018-09-19 PROCEDURE — 83735 ASSAY OF MAGNESIUM: CPT | Performed by: INTERNAL MEDICINE

## 2018-09-19 PROCEDURE — 83540 ASSAY OF IRON: CPT | Performed by: INTERNAL MEDICINE

## 2018-09-19 PROCEDURE — 93306 TTE W/DOPPLER COMPLETE: CPT | Performed by: INTERNAL MEDICINE

## 2018-09-19 PROCEDURE — 99239 HOSP IP/OBS DSCHRG MGMT >30: CPT | Performed by: INTERNAL MEDICINE

## 2018-09-19 PROCEDURE — 82948 REAGENT STRIP/BLOOD GLUCOSE: CPT

## 2018-09-19 PROCEDURE — 82746 ASSAY OF FOLIC ACID SERUM: CPT | Performed by: INTERNAL MEDICINE

## 2018-09-19 PROCEDURE — 93306 TTE W/DOPPLER COMPLETE: CPT

## 2018-09-19 PROCEDURE — 95816 EEG AWAKE AND DROWSY: CPT | Performed by: PSYCHIATRY & NEUROLOGY

## 2018-09-19 RX ORDER — POTASSIUM CHLORIDE 20 MEQ/1
40 TABLET, EXTENDED RELEASE ORAL ONCE
Status: DISCONTINUED | OUTPATIENT
Start: 2018-09-19 | End: 2018-09-19 | Stop reason: HOSPADM

## 2018-09-19 RX ORDER — CEPHALEXIN 500 MG/1
500 CAPSULE ORAL EVERY 8 HOURS SCHEDULED
Qty: 18 CAPSULE | Refills: 0 | Status: SHIPPED | OUTPATIENT
Start: 2018-09-19 | End: 2018-09-25

## 2018-09-19 RX ORDER — LORAZEPAM 0.5 MG/1
0.25 TABLET ORAL EVERY 8 HOURS PRN
Qty: 30 TABLET | Refills: 0 | Status: SHIPPED | OUTPATIENT
Start: 2018-09-19 | End: 2018-12-27

## 2018-09-19 RX ORDER — CEPHALEXIN 500 MG/1
500 CAPSULE ORAL EVERY 8 HOURS SCHEDULED
Qty: 18 CAPSULE | Refills: 0 | Status: SHIPPED | OUTPATIENT
Start: 2018-09-19 | End: 2018-09-19

## 2018-09-19 RX ORDER — FERROUS SULFATE 300 MG/5ML
300 LIQUID (ML) ORAL
Qty: 150 ML | Refills: 0 | Status: SHIPPED | OUTPATIENT
Start: 2018-09-19 | End: 2018-09-19

## 2018-09-19 RX ORDER — FERROUS SULFATE 300 MG/5ML
300 LIQUID (ML) ORAL
Qty: 150 ML | Refills: 0 | Status: SHIPPED | OUTPATIENT
Start: 2018-09-19 | End: 2019-05-31 | Stop reason: HOSPADM

## 2018-09-19 RX ORDER — ACETAMINOPHEN 325 MG/1
650 TABLET ORAL EVERY 6 HOURS PRN
Qty: 30 TABLET | Refills: 0 | Status: SHIPPED | OUTPATIENT
Start: 2018-09-19 | End: 2019-05-31 | Stop reason: HOSPADM

## 2018-09-19 RX ADMIN — INSULIN LISPRO 4 UNITS: 100 INJECTION, SOLUTION INTRAVENOUS; SUBCUTANEOUS at 13:58

## 2018-09-19 RX ADMIN — LISINOPRIL 2.5 MG: 2.5 TABLET ORAL at 09:05

## 2018-09-19 RX ADMIN — INSULIN LISPRO 3 UNITS: 100 INJECTION, SOLUTION INTRAVENOUS; SUBCUTANEOUS at 17:13

## 2018-09-19 RX ADMIN — INSULIN LISPRO 17 UNITS: 100 INJECTION, SOLUTION INTRAVENOUS; SUBCUTANEOUS at 13:58

## 2018-09-19 RX ADMIN — BUPROPION HYDROCHLORIDE 300 MG: 150 TABLET, FILM COATED, EXTENDED RELEASE ORAL at 09:04

## 2018-09-19 RX ADMIN — VITAMIN D, TAB 1000IU (100/BT) 1000 UNITS: 25 TAB at 09:05

## 2018-09-19 RX ADMIN — PRAVASTATIN SODIUM 80 MG: 80 TABLET ORAL at 17:00

## 2018-09-19 RX ADMIN — ALBUTEROL SULFATE 2 PUFF: 90 AEROSOL, METERED RESPIRATORY (INHALATION) at 09:06

## 2018-09-19 RX ADMIN — ASPIRIN 81 MG 81 MG: 81 TABLET ORAL at 09:05

## 2018-09-19 RX ADMIN — INSULIN DETEMIR 40 UNITS: 100 INJECTION, SOLUTION SUBCUTANEOUS at 09:05

## 2018-09-19 RX ADMIN — ENOXAPARIN SODIUM 40 MG: 40 INJECTION SUBCUTANEOUS at 09:05

## 2018-09-19 RX ADMIN — LORAZEPAM 0.25 MG: 0.5 TABLET ORAL at 04:08

## 2018-09-19 RX ADMIN — INSULIN LISPRO 17 UNITS: 100 INJECTION, SOLUTION INTRAVENOUS; SUBCUTANEOUS at 17:13

## 2018-09-19 RX ADMIN — INSULIN LISPRO 17 UNITS: 100 INJECTION, SOLUTION INTRAVENOUS; SUBCUTANEOUS at 09:10

## 2018-09-19 RX ADMIN — CEFAZOLIN SODIUM 1000 MG: 1 SOLUTION INTRAVENOUS at 03:53

## 2018-09-19 RX ADMIN — LORAZEPAM 0.25 MG: 0.5 TABLET ORAL at 17:00

## 2018-09-19 RX ADMIN — CEFAZOLIN SODIUM 1000 MG: 1 SOLUTION INTRAVENOUS at 12:36

## 2018-09-19 RX ADMIN — PANTOPRAZOLE SODIUM 40 MG: 40 TABLET, DELAYED RELEASE ORAL at 07:00

## 2018-09-19 NOTE — SOCIAL WORK
Patient is from [address]  Per Pattie at [facility], Pt's baseline was assist of 1 with a walker for ambulation and ADL's  CM made PT/OT aware

## 2018-09-19 NOTE — PLAN OF CARE
Problem: DISCHARGE PLANNING - CARE MANAGEMENT  Goal: Discharge to post-acute care or home with appropriate resources  INTERVENTIONS:  - Conduct assessment to determine patient/family and health care team treatment goals, and need for post-acute services based on payer coverage, community resources, and patient preferences, and barriers to discharge  - Address psychosocial, clinical, and financial barriers to discharge as identified in assessment in conjunction with the patient/family and health care team  - Arrange appropriate level of post-acute services according to patients   needs and preference and payer coverage in collaboration with the physician and health care team  - Communicate with and update the patient/family, physician, and health care team regarding progress on the discharge plan  - Arrange appropriate transportation to post-acute venues  Outcome: Completed Date Met: 09/19/18  CM met with Pt at bedside  Pt does not want to wait for PT/OT to evaluate her to determine their recommendation  Pt reports she has a meeting tomorrow morning with her mentor and for waiver services  Pt is agreeable to [facility] at d/c, CM submitted referral  Pt states she has nobody that can transport her home  CM discussed WCV and Pt is agreeable to [facility] and cost, states that is how she has gotten home before  CM called [facility] to arrange WCV, per Hannah Clayton at [facility] will  Pt at 1800  CM made Pt, Dr Chidi Rosenthal, and nurse Roylene Romberg aware of  time  Pt declines CM offer to contact anyone

## 2018-09-19 NOTE — SOCIAL WORK
CM met with Pt at bedside  Pt does not want to wait for PT/OT to evaluate her to determine their recommendation  Pt reports she has a meeting tomorrow morning with her mentor and for waiver services  Pt is agreeable to [facility] at d/c, CM submitted referral  Pt states she has nobody that can transport her home  CM discussed WCV and Pt is agreeable to [facility] and cost, states that is how she has gotten home before  CM called [facility] to arrange WCV, per Lavonne Tijerina at [facility] will  Pt at 1800  CM made Pt, Dr Zay Corea, and nurse Jimmie Laws aware of  time  Pt declines CM offer to contact anyone

## 2018-09-19 NOTE — PLAN OF CARE
DISCHARGE PLANNING     Discharge to home or other facility with appropriate resources Progressing        Knowledge Deficit     Patient/family/caregiver demonstrates understanding of disease process, treatment plan, medications, and discharge instructions Progressing        MUSCULOSKELETAL - ADULT     Maintain or return mobility to safest level of function Progressing     Maintain proper alignment of affected body part Progressing        Nutrition/Hydration-ADULT     Nutrient/Hydration intake appropriate for improving, restoring or maintaining nutritional needs Progressing        PAIN - ADULT     Verbalizes/displays adequate comfort level or baseline comfort level Progressing        Potential for Falls     Patient will remain free of falls Progressing        Prexisting or High Potential for Compromised Skin Integrity     Skin integrity is maintained or improved Progressing        SAFETY ADULT     Maintain or return to baseline ADL function Progressing     Maintain or return mobility status to optimal level Progressing        SKIN/TISSUE INTEGRITY - ADULT     Skin integrity remains intact Progressing

## 2018-09-19 NOTE — DISCHARGE SUMMARY
Transition of Care Discharge Summary - [facility]    Patient Information: Yanira Hayr 67 y o  female MRN: [id number]  Unit/Bed#: -01 Encounter: [id number]    Discharging Physician / Practitioner: Dimas Rosas DO  PCP: Nubia Rahman MD  Admission Date: 9/17/2018  Discharge Date: 09/19/18    Disposition:      Short Term Rehab or SNF at [place] [address] (see below)    For Discharges to [facility] SNF:   · [address] - I did not contact outpatient physician    Reason for Admission: redness / pain of the left knee    Discharge Diagnoses:     Principal Problem:    Cellulitis of left knee  Active Problems:    Type 2 diabetes mellitus with diabetic polyneuropathy, with long-term current use of insulin (HCC)    Hyperlipidemia    Asthma    Anemia of chronic disease    Bipolar 1 disorder ([facility] )    Cirrhosis of liver (HCC)    Hypokalemia    Hypertension    GERD (gastroesophageal reflux disease)    Myoclonic jerking  Resolved Problems:    * No resolved hospital problems  *      Consultations During Hospital Stay:  · Neurology - Dr Vasquez Life  · Orthopedics - Dr [facility]    Procedures Performed:     · None    Medication Adjustments and Discharge Medications:  · Summary of Medication Adjustments made as a result of this hospitalization: See discharge medication list   · Medication Dosing Tapers - Please refer to Discharge Medication List for details on any medication dosing tapers (if applicable to patient)  · Medications being temporarily held (include recommended restart time): None  · Discharge Medication List: See after visit summary for reconciled discharge medications  Wound Care Recommendations:  When applicable, please see wound care section of After Visit Summary      Diet Recommendations at Discharge:  Diet -        Diet Orders            Start     Ordered    09/18/18 8595  Diet Parviz/CHO Controlled; Consistent Carbohydrate Diet Level 2 (5 carb servings/75 grams CHO/meal); Dysphagia 2-Mechanical Soft; Thin Liquid  Diet effective now     Question Answer Comment   Diet Type Parviz/CHO Controlled    Parviz/CHO Controlled Consistent Carbohydrate Diet Level 2 (5 carb servings/75 grams CHO/meal)    Other Restriction(s): Dysphagia 2-Mechanical Soft    Liquid Modifier Thin Liquid    RD to adjust diet per protocol? Yes        09/18/18 1655    09/18/18 0813  Room Service  Once     Question:  Type of Service  Answer:  Room Service - Appropriate with Assistance    09/18/18 0812        Fluid Restriction - No Fluid Restriction at Discharge  Instructions for any Catheters / Lines Present at Discharge (including removal date, if applicable): N/A    Significant Findings / Test Results:     · X-ray of the left foot, left tibia / fibula, and left knee on  09/18/2018 - soft tissue swelling throughout the foot  No evidence of gas or other collection  No osteomyelitis of the foot  · CT scan left foot and tibia / fibula with contrast on 09/18/2018 - mild diffuse subcutaneous abnormal findings which could be attributed to cellulitis in the appropriate contact  Differential also includes sequela of venous insufficiency or lymphedema among other etiologies  No acute fracture or dislocation  Small plantar calcaneal spur with chronic plantar fasciitis  Mild distal non insertional Achilles tendinopathy  · CT of the chest abdomen and pelvis on 09/18/2018 - dependent hypoventilatory changes  Otherwise no acute intrathoracic process  No acute fracture  Mild cardiomegaly  No pericardial effusion  Right lateral abdominal wall and bilateral hip left greater than right subcutaneous contusion  No acute intra-abdominal or intrapelvic process  No acute fracture  Hepatic cirrhosis with mild splenomegaly and unchanged varices  Sigmoid diverticulosis without diverticulitis  · Blood cultures negative x2  · Metabolic profile shows a potassium of 3 4  Attempted to give replacement but patient refused  Other components of the metabolic profile are grossly within normal limits  · Iron is 21 ferritin is 16 TIBC is 318, folate is 12 3, and vitamin B12 is 913  · Hemoglobin on admission is 8 6 with hematocrit 27 0  At the time of discharge hemoglobin is 7 8 with hematocrit 25 7  The patient has been in the 7 and 8 range previously  · The white blood cell count on admission was 4 75 and 2 89 on the day of discharge  · Ammonia level early in the admission was 41  · Magnesium on admission was 1 5 and on the day of discharge is up slightly to 1 6  Incidental Findings:   ·  None         Test Results Pending at Discharge (will require follow up): · Final results for blood cultures     Outpatient Tests Requested:  · CBC this Friday 83/86/8051    Complications:  None    Hospital Course:     Windy Dixon is a 67 y o  female patient who originally presented to the hospital on 9/17/2018 due to erythema and some pain at the left knee where she has had a prior knee replacement in the more distant past   The patient has been having falls recently and had attributed that to side effects of the repair neural that she was placed on for reported myoclonic jerking activity  The patient stated that 2 weeks ago she had fallen and banged up her knee and left foot  The patient does have bruising on the left foot but she states that this is actually much better than it was previously when her toes were completely purple/black  Currently there is evidence for resolving bruising in the left lower extremity  The patient did however have evidence of warmth and erythema at the left knee with a scabbed lesion in the center of that  The patient was admitted into the hospital and placed on cefazolin and today the erythema is slightly better than yesterday    The patient was seen by Orthopedics given the presence of the knee replacement underlying and the pain in the knee but no further evaluation was needed aside from the CT scans and x-rays that were done and mentioned above  Orthopedics recommended that the patient follow up with her outpatient orthopedic doctor at [facility], Dr Dawkins Comp, after discharge  The patient has been afebrile and her white blood cell counts have not been elevated  The patient is nontoxic-appearing in general   In terms of the cellulitis as it is improving we do feel that we can switch her to Keflex and continue 6 more days of treatment to finish a full 7 day treatment course  The patient will follow up with her primary care physician for additional follow-up  The patient does have some ecchymoses on the lower extremity but no obvious large hematoma  The patient's hemoglobin is low but in looking at other prior labs she has been in the 8 range mostly occasionally going into the low 9 some work agent going as low as the high sevens  Hemoglobin did show a little bit but dropped from admission and we did talk to the patient about remaining in the hospital 1 additional day to monitor the hemoglobin and also have 1 more day to see more consistent improvement in the cellulitis  However, the patient was concerned about a meeting that she is supposed to be having tomorrow to obtain several additional services in the outpatient setting and requested discharge from the hospital today stating that if she was not discharged she would signed the paper to leave against medical advice  However, the patient was agreeable to having her hemoglobin checked again on Friday to make sure that it is remaining stable and with this and additional short-term outpatient follow ups, I do feel that we can safely discharge the patient today  The patient was seen by Neurology for tremors that she had reported and recommendation was made to stay off the Requip and instead, to use low doses of lorazepam on an as-needed basis for any tremor that were to occur    The patient is given the contact information for the [facility] office where she can follow up for additional treatment  Additionally, the patient was recommended to take iron supplementation but has refused the oral pills stating that these give her diarrhea  I did explain that this is not a typical symptom that we get with the iron pills and that actually constipation tends to be more common  However, she stated that she would not take the pills but was open to taking a liquid form of the medication  Therefore, a liquid form of the medication has been prescribed  Condition at Discharge: stable     Discharge Day Visit / Exam:     Subjective: The patient is adamant that she wants to be discharged today  She states that she has an important meeting that she needs to be at tomorrow  She feels much better she states and denies any dizziness, shortness of breath, chest pain / pressure, or other symptoms of anemia  Vitals: Blood Pressure: 146/63 (09/19/18 1518)  Pulse: 97 (09/19/18 1518)  Temperature: 98 4 °F (36 9 °C) (09/19/18 1518)  Temp Source: Oral (09/19/18 1518)  Respirations: 18 (09/19/18 1518)  Height: 4' 10" (147 3 cm) (09/17/18 2325)  Weight - Scale: 74 2 kg (163 lb 9 3 oz) (09/18/18 0900)  SpO2: 99 % (09/19/18 1518)  Exam:   Physical Exam   Constitutional: She is oriented to person, place, and time  HENT:   Mouth/Throat: Oropharynx is clear and moist  No oropharyngeal exudate  Eyes: Conjunctivae are normal  Pupils are equal, round, and reactive to light  Cardiovascular: Normal rate, regular rhythm and intact distal pulses  No murmur heard  Pulmonary/Chest: Effort normal  She has no wheezes  She has no rales  Abdominal: Soft  Bowel sounds are normal  She exhibits no distension  There is no tenderness  Musculoskeletal:   Erythema at the right knee with a central scabbed lesion noted  There is some improvement in the erythema in the last 24 hours    The patient has improving ecchymosis pattern of the left lower extremity with bruising at various different stages  No tenderness on palpation of the LLE  Small scabbed lesion also present on the right knee with tiny surrounding erythema present  Neurological: She is alert and oriented to person, place, and time  No cranial nerve deficit  Skin: Skin is warm and dry  Psychiatric: She has a normal mood and affect  Discussion with Family: Patient only  Declines update to patient's mentor (friend listed as primary contact)  Goals of Care Discussions:  · Code Status at Discharge: Level 3 - DNAR and DNI  · Were there any Goals of Care Discussions during Hospitalization?: Yes - DNR / DNI but accepts all other care  · Results of any General Goals of Care Discussions: N/A   · POLST Completed: No   · If POLST Completed, Summary of POLST Agreement Provided Here: N/A   · OK to Rehospitalize if Needed? Yes    Discharge instructions/Information to patient and family:   See after visit summary section titled Discharge Instructions for information provided to patient and family  Planned Readmission: None     Discharge Statement:  I spent 35 minutes discharging the patient  This time was spent on the day of discharge  I had direct contact with the patient on the day of discharge  Greater than 50% of the total time was spent examining patient, answering all patient questions, arranging and discussing plan of care with patient as well as directly providing post-discharge instructions  Additional time then spent on discharge activities      ** Please Note: This note has been constructed using a voice recognition system **

## 2018-09-20 NOTE — ED PROVIDER NOTES
History  Chief Complaint   Patient presents with    Fall     Patient fell 2 weeks ago, c/o left leg pain   Cellulitis     Left knee open wound, warm to touch  History provided by:  Patient and EMS personnel   used: No    Fall   Associated symptoms: abdominal pain    Associated symptoms: no chest pain, no headaches, no nausea, no neck pain and no vomiting      Patient is a 67-year-old female presenting to emergency department with left leg pain  Has been falling  No fevers  No chest pain  No shortness of breath  Has pain over the ribs in abdomen  Leg is swollen  Erythema  Consistent with cellulitis  No nausea vomiting  No diarrhea  MDM septic workup,, evaluation, admit to hospital        Prior to Admission Medications   Prescriptions Last Dose Informant Patient Reported? Taking?    albuterol (2 5 mg/3 mL) 0 083 % nebulizer solution 9/18/2018 at Unknown time Self Yes Yes   Sig: Take 2 5 mg by nebulization 3 (three) times a day as needed for wheezing   aspirin 81 MG tablet 9/18/2018 at Unknown time Self Yes Yes   Sig: Take 81 mg by mouth daily   buPROPion (WELLBUTRIN XL) 300 mg 24 hr tablet 9/18/2018 at Unknown time Self Yes Yes   Sig: Take 300 mg by mouth every morning   cholecalciferol (VITAMIN D3) 1,000 units tablet 9/18/2018 at Unknown time Self Yes Yes   Sig: Take 1,000 Units by mouth daily   ferrous sulfate 325 (65 Fe) mg tablet 9/18/2018 at Unknown time Self No Yes   Sig: Take 1 tablet by mouth 2 (two) times a day with meals   fluticasone-salmeterol (ADVAIR HFA) 115-21 MCG/ACT inhaler 9/18/2018 at Unknown time Self Yes Yes   Sig: Inhale 2 puffs 2 (two) times a day   insulin detemir (LEVEMIR) 100 units/mL subcutaneous injection 9/18/2018 at Unknown time Self Yes Yes   Sig: Inject 40 Units under the skin 2 (two) times a day   insulin lispro (HUMALOG KWIKPEN) 100 Units/mL SOPN 9/18/2018 at Unknown time Self Yes Yes   Sig: Inject 17 Units under the skin 3 (three) times a day with meals   ipratropium (ATROVENT) 0 02 % nebulizer solution 9/18/2018 at Unknown time Self Yes Yes   Sig: Take 0 5 mg by nebulization every 6 (six) hours as needed for wheezing or shortness of breath   lisinopril (ZESTRIL) 2 5 mg tablet 9/18/2018 at Unknown time Self Yes Yes   Sig: Take 2 5 mg by mouth every morning   montelukast (SINGULAIR) 10 mg tablet 9/18/2018 at Unknown time Self Yes Yes   Sig: Take 10 mg by mouth daily at bedtime   ondansetron (ZOFRAN) 4 mg tablet 9/18/2018 at Unknown time Self No Yes   Sig: Take 1 tablet by mouth every 8 (eight) hours as needed for nausea or vomiting   pantoprazole (PROTONIX) 40 mg tablet 9/18/2018 at Unknown time Self Yes Yes   Sig: Take 40 mg by mouth daily   simvastatin (ZOCOR) 40 mg tablet 9/18/2018 at Unknown time Self Yes Yes   Sig: Take 40 mg by mouth daily at bedtime      Facility-Administered Medications: None       Past Medical History:   Diagnosis Date    Asthma     Bipolar 2 disorder (HCC)     Chronic kidney disease     [facility]     Diabetes mellitus [facility]     Elevated troponin 9/2/2018    GERD (gastroesophageal reflux disease)     Hyperlipidemia     Hypertension     Neuropathy     Renal disorder     Restless leg syndrome        Past Surgical History:   Procedure Laterality Date    BACK SURGERY      CHOLECYSTECTOMY      HERNIA REPAIR      REPLACEMENT TOTAL KNEE BILATERAL      TUMOR REMOVAL         History reviewed  No pertinent family history  I have reviewed and agree with the history as documented  Social History   Substance Use Topics    Smoking status: Passive Smoke Exposure - Never Smoker    Smokeless tobacco: Never Used    Alcohol use No        Review of Systems   Constitutional: Negative for chills, diaphoresis and fever  Respiratory: Negative for cough, shortness of breath, wheezing and stridor  Cardiovascular: Negative for chest pain, palpitations and leg swelling     Gastrointestinal: Positive for abdominal pain  Negative for blood in stool, diarrhea, nausea and vomiting  Genitourinary: Negative for dysuria, frequency and urgency  Musculoskeletal: Negative for neck pain and neck stiffness  Skin: Positive for wound  Negative for pallor and rash  Neurological: Negative for dizziness, syncope, weakness, light-headedness and headaches  All other systems reviewed and are negative  Physical Exam  Physical Exam   Constitutional: She is oriented to person, place, and time  She appears well-developed and well-nourished  HENT:   Head: Normocephalic and atraumatic  Eyes: Pupils are equal, round, and reactive to light  Neck: Neck supple  Cardiovascular: Normal rate, regular rhythm, normal heart sounds and intact distal pulses  Pulmonary/Chest: Effort normal and breath sounds normal  No respiratory distress  Abdominal: Soft  There is tenderness  Tenderness and bruising over the left side of the abdomen  Tenderness over the left lower chest    Musculoskeletal:   Decreased range of motion of the left lower extremity due to pain  Left lower extremity swelling, erythema, edema   Neurological: She is alert and oriented to person, place, and time  Skin: Skin is warm and dry  Capillary refill takes less than 2 seconds  Rash noted  There is erythema  Vitals reviewed        Vital Signs  ED Triage Vitals   Temperature Pulse Respirations Blood Pressure SpO2   09/18/18 0713 09/17/18 2325 09/17/18 2325 09/17/18 2325 09/17/18 2325   98 4 °F (36 9 °C) 100 20 117/56 100 %      Temp Source Heart Rate Source Patient Position - Orthostatic VS BP Location FiO2 (%)   09/18/18 0713 09/17/18 2325 09/17/18 2325 09/17/18 2325 --   Oral Monitor Sitting Right arm       Pain Score       09/17/18 2325       7           Vitals:    09/18/18 1832 09/18/18 2300 09/19/18 0718 09/19/18 1518   BP: 141/63 128/60 135/65 146/63   Pulse:  86 91 97   Patient Position - Orthostatic VS: Standing for 3 minutes - Orthostatic VS Lying Lying Sitting       Visual Acuity      ED Medications  Medications   sodium chloride 0 9 % bolus 500 mL (0 mL Intravenous Stopped 9/18/18 0109)   cefepime (MAXIPIME) 2 g/50 mL dextrose IVPB (0 mg Intravenous Stopped 9/18/18 0109)   ketorolac (TORADOL) injection 15 mg (15 mg Intravenous Given 9/18/18 0041)   vancomycin (VANCOCIN) IVPB (premix) 1,000 mg (0 mg Intravenous Stopped 9/18/18 0234)   LORazepam (ATIVAN) 2 mg/mL injection 1 mg (1 mg Intravenous Given 9/18/18 0106)   ketamine (KETALAR) 10 mg/mL IV use 18 mg (18 mg Intravenous Given 9/18/18 0531)   dextrose 50 % IV solution 50 mL (50 mL Intravenous Given 9/18/18 0519)   iohexol (OMNIPAQUE) 350 MG/ML injection (MULTI-DOSE) 100 mL (100 mL Intravenous Given 9/18/18 0545)       Diagnostic Studies  Results Reviewed     Procedure Component Value Units Date/Time    Blood culture #1 [[id number]] Collected:  09/18/18 0036    Lab Status:  Preliminary result Specimen:  Blood from Hand, Right Updated:  09/20/18 0901     Blood Culture No Growth at 48 hrs  Blood culture #2 [[id number]] Collected:  09/17/18 2336    Lab Status:  Preliminary result Specimen:  Blood from Arm, Left Updated:  09/20/18 0901     Blood Culture No Growth at 48 hrs      Magnesium [[id number]]  (Abnormal) Collected:  09/17/18 2336    Lab Status:  Final result Specimen:  Blood from Arm, Left Updated:  09/18/18 1808     Magnesium 1 5 (L) mg/dL     Fingerstick Glucose (POCT) [[id number]]  (Normal) Collected:  09/18/18 0722    Lab Status:  Final result Updated:  09/18/18 0723     POC Glucose 113 mg/dl     Fingerstick Glucose (POCT) [[id number]]  (Normal) Collected:  09/18/18 0514    Lab Status:  Final result Updated:  09/18/18 0517     POC Glucose 66 mg/dl     Lactic acid, plasma [[id number]]  (Normal) Collected:  09/18/18 0209    Lab Status:  Final result Specimen:  Blood from Arm, Right Updated:  09/18/18 0235     LACTIC ACID 1 4 mmol/L     Narrative:         Result may be elevated if tourniquet was used during collection  Basic metabolic panel [[id number]]  (Abnormal) Collected:  09/17/18 2336    Lab Status:  Final result Specimen:  Blood from Arm, Left Updated:  09/18/18 0029     Sodium 138 mmol/L      Potassium 3 4 (L) mmol/L      Chloride 101 mmol/L      CO2 26 mmol/L      ANION GAP 11 mmol/L      BUN 8 mg/dL      Creatinine 0 82 mg/dL      Glucose 204 (H) mg/dL      Calcium 9 0 mg/dL      eGFR 71 ml/min/1 73sq m     Narrative:         National Kidney Disease Education Program recommendations are as follows:  GFR calculation is accurate only with a steady state creatinine  Chronic Kidney disease less than 60 ml/min/1 73 sq  meters  Kidney failure less than 15 ml/min/1 73 sq  meters      CKMB [[id number]]  (Normal) Collected:  09/17/18 2336    Lab Status:  Final result Specimen:  Blood from Arm, Left Updated:  09/18/18 0029     CK-MB Index 2 0 %      CK-MB FRACTION 4 0 ng/mL     CBC and differential [[id number]]  (Abnormal) Collected:  09/17/18 2336    Lab Status:  Final result Specimen:  Blood from Arm, Left Updated:  09/18/18 0029     WBC 4 75 Thousand/uL      RBC 3 09 (L) Million/uL      Hemoglobin 8 6 (L) g/dL      Hematocrit 27 0 (L) %      MCV 87 fL      MCH 27 8 pg      MCHC 31 9 g/dL      RDW 16 8 (H) %      MPV 12 8 (H) fL      Platelets 98 (L) Thousands/uL      nRBC 0 /100 WBCs      Neutrophils Relative 59 %      Immat GRANS % 0 %      Lymphocytes Relative 23 %      Monocytes Relative 12 %      Eosinophils Relative 5 %      Basophils Relative 1 %      Neutrophils Absolute 2 81 Thousands/µL      Immature Grans Absolute 0 01 Thousand/uL      Lymphocytes Absolute 1 07 Thousands/µL      Monocytes Absolute 0 56 Thousand/µL      Eosinophils Absolute 0 24 Thousand/µL      Basophils Absolute 0 06 Thousands/µL     CK (with reflex to MB) [[id number]]  (Abnormal) Collected:  09/17/18 2336    Lab Status:  Final result Specimen:  Blood from Arm, Left Updated:  09/18/18 0028     Total  (H) U/L     Lactic acid, plasma [[id number]]  (Abnormal) Collected:  09/17/18 6725    Lab Status:  Final result Specimen:  Blood from Arm, Left Updated:  09/18/18 0017     LACTIC ACID 2 5 (HH) mmol/L     Narrative:         Result may be elevated if tourniquet was used during collection  VAS lower limb venous duplex study, unilateral/limited   Final Result by Brenden Gonzales DO (09/19 1902)      CT foot left w contrast   Final Result by Shivani Low MD (09/18 1112)      Mild diffuse subcutaneous abnormal findings attributed to uncomplicated cellulitis in the appropriate clinical context  Differential includes sequela of venous insufficiency or lymphedema among other etiologies  No acute fracture or dislocation  Small plantar calcaneal spur with chronic plantar fasciitis  Mild distal noninsertional Achilles tendinopathy  Workstation performed: [id number]         CT tibia fibula left w contrast   Final Result by Shivani Low MD (09/18 1435)      Diffuse subcutaneous abnormal findings attributed to uncomplicated cellulitis in the appropriate clinical context  Differential includes sequela of venous insufficiency or lymphedema among other etiologies  No acute fracture or osseous destruction  Workstation performed: [id number]         CT chest abdomen pelvis w contrast   Final Result by Shivani Low MD (16/47 0938)      CT chest:      Dependent hypoventilatory changes  Otherwise, no acute intrathoracic process  No acute fracture  Mild cardiomegaly  No pericardial effusion  CT abdomen and pelvis:      Right lateral abdominal wall and bilateral hip left greater than right subcutaneous contusion      No acute intra-abdominal or intrapelvic process  No acute fracture  Hepatic cirrhosis, mild splenomegaly, and varices unchanged  Sigmoid diverticulosis                          Workstation performed: [id number]         XR knee 4+ views left injury   Final Result by Gerry Marte MD (09/18 6953)         1  Diffuse soft tissue swelling without evidence of gas or collection  2   No radiographic evidence of osteomyelitis  Workstation performed: [id number]         XR tibia fibula 2 views LEFT   Final Result by Gerry Marte MD (09/18 0037)         1  Diffuse subcutaneous edema and swelling throughout the left lower leg compatible with reported cellulitis  No evidence of gas or collection  2   No clear radiographic evidence of osteomyelitis  Workstation performed: [id number]         XR foot 3+ views LEFT   Final Result by Gerry Marte MD (09/18 0034)         1  Soft tissue swelling throughout the foot may represent cellulitis  No evidence of gas or collection  2   No radiographic evidence of osteomyelitis in the foot  Workstation performed: [id number]                    Procedures  Procedures       Phone Contacts  ED Phone Contact    ED Course            killing getting CAT scan need to patient not cooperating  Small dose of IV ketamine given  CT scan done  MDM  CritCare Time    Disposition  Final diagnoses:   Cellulitis     Time reflects when diagnosis was documented in both MDM as applicable and the Disposition within this note     Time User Action Codes Description Comment    9/18/2018  7:07 AM Twyla Beckham Add [L03 90] Cellulitis     9/18/2018 11:46 AM Siddharth BARRERA Add [G25 3] Myoclonic jerking     9/18/2018 11:47 AM Shadi Rush Add [T46  XXXA] Falls     9/18/2018 11:48 AM Siddharth BARRERA Add [S11 739] Cellulitis of left knee     9/18/2018 11:48 AM Shadi Rush Add [C35 319] History of left knee replacement     9/19/2018  6:14 PM Shadi Rush Add [E61 1] Iron deficiency     9/19/2018  6:14 PM Shadi Rush Add [E11 42,  Z79 4] Type 2 diabetes mellitus with diabetic polyneuropathy, with long-term current use of insulin ([facility] [address] )     9/19/2018  6:14 PM Nicolle Mccray Manuel BARRERA Add [J45 20] Intermittent asthma, unspecified asthma severity, unspecified whether complicated     5/17/0647  6:14 PM Shadi Rush Add [D63 8] Anemia of chronic disease       ED Disposition     ED Disposition Condition Comment    Admit  Case was discussed with medicine and the patient's admission status was agreed to be Admission Status: inpatient status to the service of Dr Sally De           Follow-up Information     Follow up With Specialties Details Why [address] [facility]  Follow up  [address] [facility] [address] [facility]  [phone]            Discharge Medication List as of 9/19/2018  6:32 PM      START taking these medications    Details   acetaminophen (TYLENOL) 325 mg tablet Take 2 tablets (650 mg total) by mouth every 6 (six) hours as needed for mild pain, headaches or fever, Starting Wed 9/19/2018, Print      LORazepam (ATIVAN) 0 5 mg tablet Take 0 5 tablets (0 25 mg total) by mouth every 8 (eight) hours as needed for anxiety for up to 10 days, Starting Wed 9/19/2018, Until Sat 9/29/2018, Print      cephalexin (KEFLEX) 500 mg capsule Take 1 capsule (500 mg total) by mouth every 8 (eight) hours for 6 days, Starting Wed 9/19/2018, Until Tue 9/25/2018, Print      ferrous sulfate 300 (60 Fe) mg/5 mL syrup Take 5 mL (300 mg total) by mouth 2 (two) times a day before meals, Starting Wed 9/19/2018, Print         CONTINUE these medications which have NOT CHANGED    Details   albuterol (2 5 mg/3 mL) 0 083 % nebulizer solution Take 2 5 mg by nebulization 3 (three) times a day as needed for wheezing, Historical Med      aspirin 81 MG tablet Take 81 mg by mouth daily, Historical Med      buPROPion (WELLBUTRIN XL) 300 mg 24 hr tablet Take 300 mg by mouth every morning, Historical Med      cholecalciferol (VITAMIN D3) 1,000 units tablet Take 1,000 Units by mouth daily, Historical Med      ferrous sulfate 325 (65 Fe) mg tablet Take 1 tablet by mouth 2 (two) times a day with meals, Starting Fri 10/20/2017, Print      fluticasone-salmeterol (ADVAIR HFA) 115-21 MCG/ACT inhaler Inhale 2 puffs 2 (two) times a day, Historical Med      insulin detemir (LEVEMIR) 100 units/mL subcutaneous injection Inject 40 Units under the skin 2 (two) times a day, Historical Med      insulin lispro (HUMALOG KWIKPEN) 100 Units/mL SOPN Inject 17 Units under the skin 3 (three) times a day with meals, Historical Med      ipratropium (ATROVENT) 0 02 % nebulizer solution Take 0 5 mg by nebulization every 6 (six) hours as needed for wheezing or shortness of breath, Historical Med      lisinopril (ZESTRIL) 2 5 mg tablet Take 2 5 mg by mouth every morning, Historical Med      montelukast (SINGULAIR) 10 mg tablet Take 10 mg by mouth daily at bedtime, Historical Med      ondansetron (ZOFRAN) 4 mg tablet Take 1 tablet by mouth every 8 (eight) hours as needed for nausea or vomiting, Starting Sat 11/4/2017, Print      pantoprazole (PROTONIX) 40 mg tablet Take 40 mg by mouth daily, Historical Med      simvastatin (ZOCOR) 40 mg tablet Take 40 mg by mouth daily at bedtime, Historical Med             Outpatient Discharge Orders  CBC and Platelet   Standing Status: Future  Standing Exp  Date: 09/19/19     Ambulatory Referral to Home Health   Standing Status: Future  Standing Exp   Date: 03/19/19     Discharge Diet     Activity as tolerated         ED Provider  Electronically Signed by           Francesca Zarate MD  09/20/18 3047

## 2018-09-23 LAB
BACTERIA BLD CULT: NORMAL
BACTERIA BLD CULT: NORMAL

## 2018-12-27 ENCOUNTER — HOSPITAL ENCOUNTER (OUTPATIENT)
Facility: HOSPITAL | Age: 73
Setting detail: OBSERVATION
Discharge: HOME HEALTH | End: 2018-12-28
Attending: EMERGENCY MEDICINE | Admitting: INTERNAL MEDICINE
Payer: COMMERCIAL

## 2018-12-27 DIAGNOSIS — R60.9 EDEMA: ICD-10-CM

## 2018-12-27 DIAGNOSIS — L03.90 CELLULITIS: Primary | ICD-10-CM

## 2018-12-27 DIAGNOSIS — E11.42 TYPE 2 DIABETES MELLITUS WITH DIABETIC POLYNEUROPATHY, WITH LONG-TERM CURRENT USE OF INSULIN (HCC): Chronic | ICD-10-CM

## 2018-12-27 DIAGNOSIS — Z79.4 TYPE 2 DIABETES MELLITUS WITH DIABETIC POLYNEUROPATHY, WITH LONG-TERM CURRENT USE OF INSULIN (HCC): Chronic | ICD-10-CM

## 2018-12-27 DIAGNOSIS — S81.809A WOUND, OPEN, LEG: ICD-10-CM

## 2018-12-27 DIAGNOSIS — L29.9 ITCHING: ICD-10-CM

## 2018-12-27 PROBLEM — L03.119 CELLULITIS OF LOWER EXTREMITY: Status: ACTIVE | Noted: 2018-09-18

## 2018-12-27 LAB
ALBUMIN SERPL BCP-MCNC: 2.6 G/DL (ref 3.5–5)
ALP SERPL-CCNC: 142 U/L (ref 46–116)
ALT SERPL W P-5'-P-CCNC: 45 U/L (ref 12–78)
ANION GAP SERPL CALCULATED.3IONS-SCNC: 7 MMOL/L (ref 4–13)
ANISOCYTOSIS BLD QL SMEAR: PRESENT
AST SERPL W P-5'-P-CCNC: 52 U/L (ref 5–45)
BASOPHILS # BLD MANUAL: 0 THOUSAND/UL (ref 0–0.1)
BASOPHILS NFR MAR MANUAL: 0 % (ref 0–1)
BILIRUB DIRECT SERPL-MCNC: 0.31 MG/DL (ref 0–0.2)
BILIRUB SERPL-MCNC: 0.9 MG/DL (ref 0.2–1)
BUN SERPL-MCNC: 6 MG/DL (ref 5–25)
CALCIUM SERPL-MCNC: 9 MG/DL (ref 8.3–10.1)
CHLORIDE SERPL-SCNC: 105 MMOL/L (ref 100–108)
CO2 SERPL-SCNC: 27 MMOL/L (ref 21–32)
CREAT SERPL-MCNC: 0.67 MG/DL (ref 0.6–1.3)
EOSINOPHIL # BLD MANUAL: 0.11 THOUSAND/UL (ref 0–0.4)
EOSINOPHIL NFR BLD MANUAL: 4 % (ref 0–6)
ERYTHROCYTE [DISTWIDTH] IN BLOOD BY AUTOMATED COUNT: 17.7 % (ref 11.6–15.1)
GFR SERPL CREATININE-BSD FRML MDRD: 87 ML/MIN/1.73SQ M
GLUCOSE SERPL-MCNC: 133 MG/DL (ref 65–140)
GLUCOSE SERPL-MCNC: 191 MG/DL (ref 65–140)
GLUCOSE SERPL-MCNC: 193 MG/DL (ref 65–140)
GLUCOSE SERPL-MCNC: 237 MG/DL (ref 65–140)
GLUCOSE SERPL-MCNC: 49 MG/DL (ref 65–140)
GLUCOSE SERPL-MCNC: 56 MG/DL (ref 65–140)
GLUCOSE SERPL-MCNC: 85 MG/DL (ref 65–140)
HCT VFR BLD AUTO: 29.9 % (ref 34.8–46.1)
HGB BLD-MCNC: 9.1 G/DL (ref 11.5–15.4)
INR PPP: 1.56 (ref 0.86–1.17)
LYMPHOCYTES # BLD AUTO: 1 THOUSAND/UL (ref 0.6–4.47)
LYMPHOCYTES # BLD AUTO: 38 % (ref 14–44)
MCH RBC QN AUTO: 26.3 PG (ref 26.8–34.3)
MCHC RBC AUTO-ENTMCNC: 30.4 G/DL (ref 31.4–37.4)
MCV RBC AUTO: 86 FL (ref 82–98)
MONOCYTES # BLD AUTO: 0.08 THOUSAND/UL (ref 0–1.22)
MONOCYTES NFR BLD: 3 % (ref 4–12)
NEUTROPHILS # BLD MANUAL: 1.45 THOUSAND/UL (ref 1.85–7.62)
NEUTS BAND NFR BLD MANUAL: 2 % (ref 0–8)
NEUTS SEG NFR BLD AUTO: 53 % (ref 43–75)
NRBC BLD AUTO-RTO: 0 /100 WBCS
PLATELET # BLD AUTO: 63 THOUSANDS/UL (ref 149–390)
PLATELET BLD QL SMEAR: ABNORMAL
PMV BLD AUTO: 11.7 FL (ref 8.9–12.7)
POIKILOCYTOSIS BLD QL SMEAR: PRESENT
POTASSIUM SERPL-SCNC: 3.4 MMOL/L (ref 3.5–5.3)
PROT SERPL-MCNC: 6.5 G/DL (ref 6.4–8.2)
PROTHROMBIN TIME: 18.2 SECONDS (ref 11.8–14.2)
RBC # BLD AUTO: 3.46 MILLION/UL (ref 3.81–5.12)
SODIUM SERPL-SCNC: 139 MMOL/L (ref 136–145)
TOTAL CELLS COUNTED SPEC: 100
WBC # BLD AUTO: 2.63 THOUSAND/UL (ref 4.31–10.16)

## 2018-12-27 PROCEDURE — 85007 BL SMEAR W/DIFF WBC COUNT: CPT | Performed by: EMERGENCY MEDICINE

## 2018-12-27 PROCEDURE — 96365 THER/PROPH/DIAG IV INF INIT: CPT

## 2018-12-27 PROCEDURE — 99222 1ST HOSP IP/OBS MODERATE 55: CPT | Performed by: INTERNAL MEDICINE

## 2018-12-27 PROCEDURE — 85610 PROTHROMBIN TIME: CPT | Performed by: PHYSICIAN ASSISTANT

## 2018-12-27 PROCEDURE — 82948 REAGENT STRIP/BLOOD GLUCOSE: CPT

## 2018-12-27 PROCEDURE — 85027 COMPLETE CBC AUTOMATED: CPT | Performed by: EMERGENCY MEDICINE

## 2018-12-27 PROCEDURE — 80076 HEPATIC FUNCTION PANEL: CPT | Performed by: PHYSICIAN ASSISTANT

## 2018-12-27 PROCEDURE — 36415 COLL VENOUS BLD VENIPUNCTURE: CPT | Performed by: EMERGENCY MEDICINE

## 2018-12-27 PROCEDURE — 99219 PR INITIAL OBSERVATION CARE/DAY 50 MINUTES: CPT | Performed by: INTERNAL MEDICINE

## 2018-12-27 PROCEDURE — 96375 TX/PRO/DX INJ NEW DRUG ADDON: CPT

## 2018-12-27 PROCEDURE — 80048 BASIC METABOLIC PNL TOTAL CA: CPT | Performed by: EMERGENCY MEDICINE

## 2018-12-27 PROCEDURE — 99285 EMERGENCY DEPT VISIT HI MDM: CPT

## 2018-12-27 RX ORDER — BUPROPION HYDROCHLORIDE 150 MG/1
300 TABLET ORAL EVERY MORNING
Status: DISCONTINUED | OUTPATIENT
Start: 2018-12-27 | End: 2018-12-28 | Stop reason: HOSPADM

## 2018-12-27 RX ORDER — LISINOPRIL 2.5 MG/1
2.5 TABLET ORAL EVERY MORNING
Status: DISCONTINUED | OUTPATIENT
Start: 2018-12-27 | End: 2018-12-28 | Stop reason: HOSPADM

## 2018-12-27 RX ORDER — LORAZEPAM 2 MG/ML
1 INJECTION INTRAMUSCULAR ONCE
Status: COMPLETED | OUTPATIENT
Start: 2018-12-27 | End: 2018-12-27

## 2018-12-27 RX ORDER — DIAZEPAM 5 MG/ML
5 INJECTION, SOLUTION INTRAMUSCULAR; INTRAVENOUS ONCE
Status: COMPLETED | OUTPATIENT
Start: 2018-12-27 | End: 2018-12-27

## 2018-12-27 RX ORDER — MELATONIN
1000 DAILY
Status: DISCONTINUED | OUTPATIENT
Start: 2018-12-27 | End: 2018-12-28 | Stop reason: HOSPADM

## 2018-12-27 RX ORDER — POTASSIUM CHLORIDE 20 MEQ/1
40 TABLET, EXTENDED RELEASE ORAL ONCE
Status: COMPLETED | OUTPATIENT
Start: 2018-12-27 | End: 2018-12-27

## 2018-12-27 RX ORDER — CEFAZOLIN SODIUM 2 G/50ML
2000 SOLUTION INTRAVENOUS EVERY 8 HOURS
Status: DISCONTINUED | OUTPATIENT
Start: 2018-12-27 | End: 2018-12-27

## 2018-12-27 RX ORDER — ONDANSETRON 4 MG/1
4 TABLET, FILM COATED ORAL EVERY 4 HOURS PRN
COMMUNITY
End: 2019-06-01

## 2018-12-27 RX ORDER — LORAZEPAM 1 MG/1
1 TABLET ORAL ONCE
Status: COMPLETED | OUTPATIENT
Start: 2018-12-27 | End: 2018-12-27

## 2018-12-27 RX ORDER — PRAVASTATIN SODIUM 80 MG/1
80 TABLET ORAL
Status: DISCONTINUED | OUTPATIENT
Start: 2018-12-27 | End: 2018-12-28 | Stop reason: HOSPADM

## 2018-12-27 RX ORDER — PANTOPRAZOLE SODIUM 40 MG/1
40 TABLET, DELAYED RELEASE ORAL
Status: DISCONTINUED | OUTPATIENT
Start: 2018-12-27 | End: 2018-12-28 | Stop reason: HOSPADM

## 2018-12-27 RX ORDER — MONTELUKAST SODIUM 10 MG/1
10 TABLET ORAL
Status: DISCONTINUED | OUTPATIENT
Start: 2018-12-27 | End: 2018-12-28 | Stop reason: HOSPADM

## 2018-12-27 RX ORDER — ASPIRIN 81 MG/1
81 TABLET, CHEWABLE ORAL DAILY
Status: DISCONTINUED | OUTPATIENT
Start: 2018-12-27 | End: 2018-12-28 | Stop reason: HOSPADM

## 2018-12-27 RX ORDER — DEXTROSE MONOHYDRATE 25 G/50ML
25 INJECTION, SOLUTION INTRAVENOUS ONCE
Status: COMPLETED | OUTPATIENT
Start: 2018-12-27 | End: 2018-12-27

## 2018-12-27 RX ORDER — ONDANSETRON 2 MG/ML
4 INJECTION INTRAMUSCULAR; INTRAVENOUS EVERY 6 HOURS PRN
Status: DISCONTINUED | OUTPATIENT
Start: 2018-12-27 | End: 2018-12-28 | Stop reason: HOSPADM

## 2018-12-27 RX ORDER — POTASSIUM CHLORIDE 20 MEQ/1
20 TABLET, EXTENDED RELEASE ORAL ONCE
Status: COMPLETED | OUTPATIENT
Start: 2018-12-27 | End: 2018-12-27

## 2018-12-27 RX ORDER — CLINDAMYCIN PHOSPHATE 600 MG/50ML
600 INJECTION INTRAVENOUS ONCE
Status: COMPLETED | OUTPATIENT
Start: 2018-12-27 | End: 2018-12-27

## 2018-12-27 RX ORDER — FUROSEMIDE 10 MG/ML
20 SOLUTION ORAL DAILY
Status: DISCONTINUED | OUTPATIENT
Start: 2018-12-27 | End: 2018-12-28 | Stop reason: HOSPADM

## 2018-12-27 RX ORDER — ACETAMINOPHEN 325 MG/1
650 TABLET ORAL EVERY 6 HOURS PRN
Status: DISCONTINUED | OUTPATIENT
Start: 2018-12-27 | End: 2018-12-28 | Stop reason: HOSPADM

## 2018-12-27 RX ORDER — FLUTICASONE FUROATE AND VILANTEROL 100; 25 UG/1; UG/1
1 POWDER RESPIRATORY (INHALATION) DAILY
Status: DISCONTINUED | OUTPATIENT
Start: 2018-12-27 | End: 2018-12-28 | Stop reason: HOSPADM

## 2018-12-27 RX ORDER — LORAZEPAM 0.5 MG/1
0.5 TABLET ORAL EVERY 8 HOURS PRN
COMMUNITY
End: 2019-03-05 | Stop reason: HOSPADM

## 2018-12-27 RX ORDER — ALBUTEROL SULFATE 2.5 MG/3ML
2.5 SOLUTION RESPIRATORY (INHALATION) 3 TIMES DAILY PRN
Status: DISCONTINUED | OUTPATIENT
Start: 2018-12-27 | End: 2018-12-28 | Stop reason: HOSPADM

## 2018-12-27 RX ORDER — FERROUS SULFATE 300 MG/5ML
300 LIQUID (ML) ORAL
Status: DISCONTINUED | OUTPATIENT
Start: 2018-12-27 | End: 2018-12-28 | Stop reason: HOSPADM

## 2018-12-27 RX ADMIN — CLINDAMYCIN PHOSPHATE 600 MG: 12 INJECTION, SOLUTION INTRAMUSCULAR; INTRAVENOUS at 05:05

## 2018-12-27 RX ADMIN — PANTOPRAZOLE SODIUM 40 MG: 40 TABLET, DELAYED RELEASE ORAL at 09:06

## 2018-12-27 RX ADMIN — INSULIN LISPRO 17 UNITS: 100 INJECTION, SOLUTION INTRAVENOUS; SUBCUTANEOUS at 09:00

## 2018-12-27 RX ADMIN — ASPIRIN 81 MG 81 MG: 81 TABLET ORAL at 08:58

## 2018-12-27 RX ADMIN — LORAZEPAM 1 MG: 1 TABLET ORAL at 22:38

## 2018-12-27 RX ADMIN — CEFAZOLIN SODIUM 2000 MG: 2 SOLUTION INTRAVENOUS at 15:42

## 2018-12-27 RX ADMIN — MINERAL SUPPLEMENT IRON 300 MG / 5 ML STRENGTH LIQUID 100 PER BOX UNFLAVORED 300 MG: at 15:42

## 2018-12-27 RX ADMIN — VITAMIN D, TAB 1000IU (100/BT) 1000 UNITS: 25 TAB at 08:58

## 2018-12-27 RX ADMIN — INSULIN DETEMIR 20 UNITS: 100 INJECTION, SOLUTION SUBCUTANEOUS at 21:23

## 2018-12-27 RX ADMIN — FLUTICASONE FUROATE AND VILANTEROL TRIFENATATE 1 PUFF: 100; 25 POWDER RESPIRATORY (INHALATION) at 09:00

## 2018-12-27 RX ADMIN — ONDANSETRON 4 MG: 2 INJECTION INTRAMUSCULAR; INTRAVENOUS at 16:54

## 2018-12-27 RX ADMIN — POTASSIUM CHLORIDE 20 MEQ: 1500 TABLET, EXTENDED RELEASE ORAL at 12:15

## 2018-12-27 RX ADMIN — FUROSEMIDE 20 MG: 10 SOLUTION ORAL at 18:23

## 2018-12-27 RX ADMIN — BUPROPION HYDROCHLORIDE 300 MG: 150 TABLET, FILM COATED, EXTENDED RELEASE ORAL at 08:58

## 2018-12-27 RX ADMIN — CEFAZOLIN SODIUM 2000 MG: 2 SOLUTION INTRAVENOUS at 08:59

## 2018-12-27 RX ADMIN — POTASSIUM CHLORIDE 40 MEQ: 1500 TABLET, EXTENDED RELEASE ORAL at 15:42

## 2018-12-27 RX ADMIN — LORAZEPAM 1 MG: 2 INJECTION INTRAMUSCULAR; INTRAVENOUS at 05:53

## 2018-12-27 RX ADMIN — MONTELUKAST SODIUM 10 MG: 10 TABLET, FILM COATED ORAL at 21:23

## 2018-12-27 RX ADMIN — ACETAMINOPHEN 650 MG: 325 TABLET, FILM COATED ORAL at 12:15

## 2018-12-27 RX ADMIN — LISINOPRIL 2.5 MG: 2.5 TABLET ORAL at 08:58

## 2018-12-27 RX ADMIN — ENOXAPARIN SODIUM 40 MG: 40 INJECTION SUBCUTANEOUS at 08:59

## 2018-12-27 RX ADMIN — DEXTROSE MONOHYDRATE 25 ML: 25 INJECTION, SOLUTION INTRAVENOUS at 16:54

## 2018-12-27 RX ADMIN — INSULIN LISPRO 17 UNITS: 100 INJECTION, SOLUTION INTRAVENOUS; SUBCUTANEOUS at 13:42

## 2018-12-27 RX ADMIN — PRAVASTATIN SODIUM 80 MG: 80 TABLET ORAL at 15:42

## 2018-12-27 RX ADMIN — INSULIN DETEMIR 40 UNITS: 100 INJECTION, SOLUTION SUBCUTANEOUS at 08:59

## 2018-12-27 RX ADMIN — Medication 5 MG: at 12:22

## 2018-12-27 RX ADMIN — MINERAL SUPPLEMENT IRON 300 MG / 5 ML STRENGTH LIQUID 100 PER BOX UNFLAVORED 300 MG: at 07:47

## 2018-12-27 NOTE — CONSULTS
Consultation - Infectious Disease   Iman Arana 75 y o  female MRN: [id number]  Unit/Bed#: -01 Encounter: [id number]    IMPRESSION & RECOMMENDATIONS:   See attending attestation above for formal assessment and plan    HISTORY OF PRESENT ILLNESS:  Reason for Consult:  Cellulitis  HPI: Iman Arana is a 75y o  year old female who presents to the [facility] Emergency Department on 12/27/2018 with weeping leg wounds wounds and bilateral lower extremity redness  Patient reports that she has had multiple excoriations and scabs to her lower legs for the past few months  She states she has had multiple episodes of cellulitis  She recently saw in outpatient provider who put her on a course of antibiotics approximately two weeks ago  She cannot recall the name of the doctor or the name of the medication that she took  She reports that the wounds are causing her lot of pain which is making it difficult for her to walk  Reports she gets occasional shooting pains in her legs  Upon arrival to the ED the patient's temperature was 97 9° with a heart rate of 68  Patient's white blood cell count was 2 63  Her creatinine was 0 67 with a GFR of 87  Patient was given a dose of IV clindamycin and admitted for additional medical management  Since her admission her antibiotic regimen has been transitioned to cefazolin  Internal medicine providers were concerned patient may have received excoriations from bed bug bites  Patient has cirrhosis and her CMP will be monitored  She has a history of significant pancytopenia but has refused bone marrow biopsy in the past   A podiatry consult is pending for her leg wounds  We have been asked for formal consult for cellulitis      Patient has a past medical and surgical history significant for cirrhosis, neuropathy, asthma, cystitis, type 2 diabetes mellitus, bipolar disorder, GERD, NSTEMI,, elevated troponin, restless leg syndrome, hypertension, CKD, hyperlipidemia, renal disorder, tremors, syncope, pancytopenia, bilateral total knee replacements, tumor removal, cholecystectomy, hernia repair, and back surgery  The patient has allergies to Abilify, Celebrex, codeine, Darvon, ibuprofen, Latuda, Macrobid, penicillin, and ziprasidone  REVIEW OF SYSTEMS:  Patient reports that she feels fine today other than she gets shooting pains in the side of her legs  States they start on the outside of her calf and shoot in to the center of her leg and then up into her knee  Happens on both legs  Reports that yesterday the scabs on her legs were pouring out water and a few of them looked like they were bleeding  She is concerned about bed bugs because several of her neighbors had them  She reports the landlord has checked her apartment multiple times and he assures her that she doesn't has them  She has never seen a bug on her, in her bed, or on her furniture  Patient denies nausea, vomiting, diarrhea, chest pain, cough, shortness of breath, dysuria  Reports she has a headache sometimes, likes to keep the lights off  A complete 12 point system-based review of systems is otherwise negative      PAST MEDICAL HISTORY:  Past Medical History:   Diagnosis Date    Asthma     [facility]     Chronic kidney disease     [facility]     [facility] mellitus ([facility]     Elevated troponin 9/2/2018    GERD (gastroesophageal reflux disease)     Hyperlipidemia     Hypertension     Neuropathy     Renal disorder     Restless leg syndrome      Past Surgical History:   Procedure Laterality Date    BACK SURGERY      CHOLECYSTECTOMY      HERNIA REPAIR      REPLACEMENT TOTAL KNEE BILATERAL      TUMOR REMOVAL       FAMILY HISTORY:  Non-contributory    SOCIAL HISTORY:  Social History   History   Alcohol Use No     History   Drug Use No     History   Smoking Status    Passive Smoke Exposure - Never Smoker   Smokeless Tobacco    Never Used ALLERGIES:  Allergies   Allergen Reactions    Abilify [Aripiprazole] Hallucinations    Celebrex [Celecoxib] Hallucinations    Codeine Hallucinations    Darvon [Propoxyphene] Hallucinations    Ibuprofen Hallucinations    Latuda [Lurasidone] Hallucinations    Nitrofurantoin Hallucinations    Penicillins Hallucinations    Ziprasidone Hallucinations     MEDICATIONS:  All current active medications have been reviewed      ANTIBIOTICS:  Cefazolin 1 - stop  Antibiotics 1 - stop    PHYSICAL EXAM:  Temp:  [97 6 °F (36 4 °C)-98 1 °F (36 7 °C)] 97 6 °F (36 4 °C)  HR:  [67-80] 78  Resp:  [18-22] 18  BP: (131-174)/(56-70) 133/61  SpO2:  [100 %] 100 %  Temp (24hrs), Av 8 °F (36 6 °C), Min:97 6 °F (36 4 °C), Max:98 1 °F (36 7 °C)  Current: Temperature: 97 6 °F (36 4 °C)    Intake/Output Summary (Last 24 hours) at 18 1537  Last data filed at 18 1300   Gross per 24 hour   Intake              290 ml   Output                0 ml   Net              290 ml     General Appearance:  Appearing well, in no distress, legs are very restless in bed   Head:  Normocephalic, without obvious abnormality, atraumatic   Eyes:  Conjunctiva pink and sclera anicteric, both eyes   Nose: Nares normal, mucosa normal, no drainage   Throat: Oropharynx moist without lesions   Neck: Supple, symmetrical, no adenopathy, no tenderness/mass/nodules   Back:   Symmetric, no curvature, ROM normal, no CVA tenderness   Lungs:   Clear to auscultation bilaterally, respirations unlabored   Chest Wall:  No tenderness or deformity   Heart:  RRR; no murmur, rub or gallop   Abdomen:   Soft, non-tender, non-distended, positive bowel sounds    Extremities: No cyanosis or clubbing, +2 lower extremity edema noted in the distal shins and ankles, wearing foot pumps, is unable to tolerate venodynes   Skin: Scattered scabs circumferentially scattered on the B/L lower legs from the ankles to the upper shin/calf region, no surrounding erythema or warmth, no streaks up the legs/thighs; healed B/L knee incisions without erythema; dry skin on B/L feet, heels rough   Lymph nodes: Cervical, supraclavicular nodes normal   Neurologic: Alert and oriented times 3, follows commands; makes lip smacking motion     LABS, IMAGING, & OTHER STUDIES:  Lab Results:  I have personally reviewed pertinent labs      Results from last 7 days  Lab Units 12/27/18  0503   WBC Thousand/uL 2 63*   HEMOGLOBIN g/dL 9 1*   PLATELETS Thousands/uL 63*       Results from last 7 days  Lab Units 12/27/18  0503   POTASSIUM mmol/L 3 4*   CHLORIDE mmol/L 105   CO2 mmol/L 27   BUN mg/dL 6   CREATININE mg/dL 0 67   EGFR ml/min/1 73sq m 87   CALCIUM mg/dL 9 0   AST U/L 52*   ALT U/L 45   ALK PHOS U/L 142*     Imaging Studies:   No imaging so far during this hospitalization    Other Studies:   No pending cultures

## 2018-12-27 NOTE — ASSESSMENT & PLAN NOTE
· Patient presented to ED with increasing bilateral lower extremity pain, erythema, open wounds with drainage  Reports she saw a physician as an outpatient two weeks ago; reportedly given a PO antibiotic but cannot recall the name  · Vital signs stable on admission  · CBC with chronic pancytopenia which overall appears stable; platelets on the lower side (60's when baseline is 80-90's) - need to monitor closely  BMP notable for mild hypokalemia of 3 4  · Started on IV clindamycin in ED  Will cover with ancef for now for possible infection however suspicious for excoriation secondary possible bug bites?   · Consult podiatry

## 2018-12-27 NOTE — PROGRESS NOTES
Pt noted to have blood glucose of 49  Pt awake, alert and oriented, offers co complaints  Dr Tricia Seo notified and ordered to replace orally  Pt given 8 oz orange juice  Will continue to monitor and recheck in 15 min

## 2018-12-27 NOTE — ED NOTES
Patient aware she will be going to the floor for admission     [facility]  12/27/18 1123

## 2018-12-27 NOTE — UTILIZATION REVIEW
Initial Clinical Review    Admission: Date/Time/Statement: 12/27/2018  0554 OBSERVATION     Orders Placed This Encounter   Procedures    Place in Observation (expected length of stay for this patient is less than two midnights)     Standing Status:   Standing     Number of Occurrences:   1     Order Specific Question:   Admitting Physician     Answer:   Lynn Doctor     Order Specific Question:   Level of Care     Answer:   Med Surg [16]         ED: Date/Time/Mode of Arrival:   ED Arrival Information     Expected Arrival Acuity Means of Arrival Escorted By Service Admission Type    - 12/27/2018 04:34 Urgent Ambulance [facility] Hospitalist Urgent    Arrival Complaint    Leg pain          Chief Complaint:   Chief Complaint   Patient presents with    Wound Check     Presents via EMS from home for ongoing B/L leg pain, evaluation of sores, and newly increased weeping  Patient reports taking prescribed antibiotics at home (LD due 12/28) w/ no improvement of appearance to legs  Patient states "I'm numb from the ankles up, but they look worse "     Leg Pain       History of Illness: Eyad Avila is a 75 y o  female who presents with leg pain and wounds on legs bilaterally  Past history significant for cirrhosis, hypertension, diabetes type 2 mellitus on insulin, pancytopenia, GERD  Presented to ED with worsening bilateral lower extremity pain and edema, with multiple open oozing wounds  Patient states that this has been going for approximately 2 weeks and did see a new PCP, and was reportedly prescribed an oral antibiotic over does not know the name and cannot obtain records  Patient also reports intermittent itching all over"        12/28- patient with hypoglycemia last evening, given orange juice    ED Vital Signs:   ED Triage Vitals [12/27/18 0439]   Temperature Pulse Respirations Blood Pressure SpO2   97 9 °F (36 6 °C) 68 22 (!) 174/68 100 %      Temp Source Heart Rate Source Patient Position - Orthostatic VS BP Location FiO2 (%)   Oral Monitor Sitting Right arm --      Pain Score       5        Wt Readings from Last 1 Encounters:   12/27/18 74 3 kg (163 lb 12 8 oz)       Vital Signs (abnormal): maximum /68  Exam - bilateral lower extremities with multiple ulcerated lesions with surrounding erythema some with weeping clear discharge  Tenderness to palpation  Abnormal Labs/Diagnostic Test Results:   Wbc 2 63, hgb 9 1, hct 29 9, platelets 63  K 3 4  Glucose 191    1612 glucose 49  1641 glucose 56  1701 glucoe 133    12/28-  Cl 110  Bun 6  Creatinine 0 58  Wbc 2 43   hgb 7 6, hct 25 1  Platelets 55    ED Treatment:   Medication Administration from 12/27/2018 0434 to 12/27/2018 0809       Date/Time Order Dose Route Action Comments     12/27/2018 0535 clindamycin (CLEOCIN) IVPB (premix) 600 mg 0 mg Intravenous Stopped      12/27/2018 0505 clindamycin (CLEOCIN) IVPB (premix) 600 mg 600 mg Intravenous New Bag      12/27/2018 0553 LORazepam (ATIVAN) 2 mg/mL injection 1 mg 1 mg Intravenous Given      12/27/2018 0747 ferrous sulfate oral syrup 300 mg 300 mg Oral Given           Past Medical/Surgical History:   Past Medical History:   Diagnosis Date    Asthma     Bipolar 2 disorder ([address] )     Chronic kidney disease     Cirrhosis of liver ([address] )     Diabetes mellitus ([address] )     Elevated troponin 9/2/2018    GERD (gastroesophageal reflux disease)     Hyperlipidemia     Hypertension     Neuropathy     Renal disorder     Restless leg syndrome        Admitting Diagnosis: Visit for wound check [Z51 89]  Leg pain [M79 606]    Age/Sex: 75 y o  female    Assessment/Plan: This is a 75year old female from home with past medical history of cirrhosis, hypertension, diabetes type 2 on insulin, pancytopenia, GERD who presented to ED via EMS with worsening bilateral lower extremity pain and edema with multiple open wounds    She did see PCP @ 2 weeks ago  and prescribed antibiotic, but no better  In the ED found with low WBC of 2 63 and multiple ulcerated/weeping areas with erythema  Patient is admitted to observation with cellulitis, plan includes IV antibiotic, consult podiatry, follow CBC/BMP, check culture, continue home insulin and SSI     Admission Orders:  12/27/2018  0554 OBSERVATION   Scheduled Meds:   Current Facility-Administered Medications:  acetaminophen 650 mg Oral Q6H PRN   albuterol 2 5 mg Nebulization TID PRN   aspirin 81 mg Oral Daily   buPROPion 300 mg Oral QAM   cefazolin 2,000 mg Intravenous Q8H   cholecalciferol 1,000 Units Oral Daily   enoxaparin 40 mg Subcutaneous Daily   ferrous sulfate 300 mg Oral BID AC   fluticasone-vilanterol 1 puff Inhalation Daily   insulin detemir 40 Units Subcutaneous Q12H [address]   insulin lispro 17 Units Subcutaneous TID With Meals   ipratropium 0 5 mg Nebulization Q6H PRN   lisinopril 2 5 mg Oral QAM   montelukast 10 mg Oral HS   ondansetron 4 mg Intravenous Q6H PRN   pantoprazole 40 mg Oral Early Morning   pravastatin 80 mg Oral Daily With Dinner     Continuous Infusions:    PRN Meds: not used  OTHER ORDERS: scds  Wound culture   Consult podiatry  ID    Per ID - Lower extremity venous stasis with superficial ulcerations   Consider due to #2   No evidence of purulence or cellulitis   Clinically stable without sepsis   No evidence for bed bug bites on exam   2   Cirrhosis  3   Chronic pancytopenia      Discontinue antibiotics and follow closely   Consider diuresis    Per podiatry - B/L venous insufficiency with superficial stasis ulcerations  2  B/L pruritis with excoriation  3  Onychomycosis   Plan:  1  Agree with Dr Suyapa Acosta, no evidence of cellulitis or infection  Compression stockings applied, encourage elevation  She admittedly picks at the scabs which is clearly irritating the skin on her legs  Recommend benadryl QHS  2  Nails are severely elongated  Will need to bring nail nippers   Plan to debride toenails Friday or Saturday if still in house  If ready for discharge, this can be done as outpatient        [address]  Utilization Review Department  Phone: [phone]; Fax [phone]  [email or web address]  ATTENTION: Please call with any questions or concerns to [phone]  and carefully listen to the prompts so that you are directed to the right person  Send all requests for admission clinical reviews, approved or denied determinations and any other requests to fax [phone]  All voicemails are confidential   [address] [place] Utilization Review Department  Phone: [phone]; Fax [phone]  [email or web address]  ATTENTION: Please call with any questions or concerns to [phone]  and carefully listen to the prompts so that you are directed to the right person  Send all requests for admission clinical reviews, approved or denied determinations and any other requests to fax [phone]   All voicemails are confidential

## 2018-12-27 NOTE — ASSESSMENT & PLAN NOTE
Lab Results   Component Value Date    HGBA1C 6 8 (H) 10/19/2017       No results for input(s): POCGLU in the last 72 hours      Blood Sugar Average: Last 72 hrs:  · Will repeat HgbA1c as last one is from 2017  · Continue home insulin regimen  · SSI  · Diabetic diet

## 2018-12-27 NOTE — PROGRESS NOTES
16-year-old female with history of bipolar disorder, diabetes and cirrhosis with pancytopenia  She was brought to the emergency department today for bilateral lower extremity pain, swelling, and wounds  She was recently treated with antibiotics as an outpatient  Upon presentation she was noted to be afebrile with a normal heart rate  Labs revealed chronic pancytopenia  She was given a dose of clindamycin and started on cefazolin  Of note there was a concern for bed bugs  The patient states her apartment was checked and she was not found to have any  She denies any lesions on her arms  Somewhat disheveled appearing female in no obvious distress  2+ pitting edema of the lower extremities with multiple 1-2 cm superficial ulcerations with scabs over the anterior calves  There is no cellulitis or purulence  WBC 2 63    Impression/recommendations:  1  Lower extremity venous stasis with superficial ulcerations  Consider due to #2  No evidence of purulence or cellulitis  Clinically stable without sepsis  No evidence for bed bug bites on exam   2   Cirrhosis  3   Chronic pancytopenia  Discontinue antibiotics and follow closely  Consider diuresis but defer to primary service  Continue with leg elevation  Patient may benefit from compression wrapping  The patient is stable from an ID standpoint with no active infectious issues  We will sign off for now  Please call with new questions

## 2018-12-27 NOTE — PLAN OF CARE
Problem: Potential for Falls  Goal: Patient will remain free of falls  INTERVENTIONS:  - Assess patient frequently for physical needs  -  Identify cognitive and physical deficits and behaviors that affect risk of falls    -  Banning fall precautions as indicated by assessment   - Educate patient/family on patient safety including physical limitations  - Instruct patient to call for assistance with activity based on assessment  - Modify environment to reduce risk of injury  - Consider OT/PT consult to assist with strengthening/mobility   Outcome: Progressing      Problem: Prexisting or High Potential for Compromised Skin Integrity  Goal: Skin integrity is maintained or improved  INTERVENTIONS:  - Identify patients at risk for skin breakdown  - Assess and monitor skin integrity  - Assess and monitor nutrition and hydration status  - Monitor labs (i e  albumin)  - Assess for incontinence   - Turn and reposition patient  - Assist with mobility/ambulation  - Relieve pressure over bony prominences  - Avoid friction and shearing  - Provide appropriate hygiene as needed including keeping skin clean and dry  - Evaluate need for skin moisturizer/barrier cream  - Collaborate with interdisciplinary team (i e  Nutrition, Rehabilitation, etc )   - Patient/family teaching   Outcome: Progressing

## 2018-12-27 NOTE — ASSESSMENT & PLAN NOTE
· Appears stable when compared to baseline; has had prior workup but refused bone marrow biopsy  · Continue to monitor CBC

## 2018-12-27 NOTE — ED NOTES
Spoke with Sandy Morgan in pharmacy aware patient going to 2nd floor       Syd Johnson RN  12/27/18 3222

## 2018-12-27 NOTE — H&P
H&P- Lexie Fisher [birth year], 75 y o  female MRN: [id number]    Unit/Bed#: ED 11 Encounter: [id number]    Primary Care Provider: Remo Barthel, MD   Date and time admitted to hospital: 12/27/2018  4:34 AM    * Cellulitis of lower extremity   Assessment & Plan    · Patient presented to ED with increasing bilateral lower extremity pain, erythema, open wounds with drainage  Reports she saw a physician as an outpatient two weeks ago; reportedly given a PO antibiotic but cannot recall the name  · Vital signs stable on admission  · CBC with chronic pancytopenia which overall appears stable; platelets on the lower side (60's when baseline is 80-90's) - need to monitor closely  BMP notable for mild hypokalemia of 3 4  · Started on IV clindamycin in ED  Will cover with ancef for now for possible infection however suspicious for excoriation secondary possible bug bites? · Consult podiatry         GERD (gastroesophageal reflux disease)   Assessment & Plan    · Continue PPI     Essential hypertension   Assessment & Plan    · Currently stable  · Continue lisinopril     Hypokalemia   Assessment & Plan    · Noted to be 3 4 on admission  · Will replete with 40 mEq PO   · Re-check BMP     Cirrhosis of liver (HCC)   Assessment & Plan    · Does not appear acutely decompensated  · Will order CMP for AM   · Low sodium diet     Bipolar 1 disorder (HCC)   Assessment & Plan    · Continue wellbutrin     Pancytopenia ([address] )   Assessment & Plan    · Appears stable when compared to baseline; has had prior workup but refused bone marrow biopsy  · Continue to monitor CBC     Anemia of chronic disease   Assessment & Plan    · Overall appears stable  · Continue iron supplementation     Type 2 diabetes mellitus with diabetic polyneuropathy, with long-term current use of insulin (HCC)   Assessment & Plan    Lab Results   Component Value Date    HGBA1C 6 8 (H) 10/19/2017       No results for input(s): POCGLU in the last 72 hours      Blood Sugar Average: Last 72 hrs:  · Will repeat HgbA1c as last one is from 2017  · Continue home insulin regimen  · SSI  · Diabetic diet       VTE Prophylaxis: Pharmacologic VTE Prophylaxis contraindicated due to liver coagulopathy  / foot pump applied   Code Status: Level 3 - DNR/DNI  POLST: There is no POLST form on file for this patient (pre-hospital)  Discussion with family: No family present at bedside    Anticipated Length of Stay:  Patient will be admitted on an Observation basis with an anticipated length of stay of  Less than 2 midnights  Justification for Hospital Stay: cellulitis    Total Time for Visit, including Counseling / Coordination of Care: 30 minutes  Greater than 50% of this total time spent on direct patient counseling and coordination of care  Chief Complaint:   Leg pain and wounds on legs    History of Present Illness:    Nima Campo is a 75 y o  female who presents with leg pain and wounds on legs bilaterally  Past history significant for cirrhosis, hypertension, diabetes type 2 mellitus on insulin, pancytopenia, GERD  Presented to ED with worsening bilateral lower extremity pain and edema, with multiple open oozing wounds  Patient states that this has been going for approximately 2 weeks and did see a new PCP, and was reportedly prescribed an oral antibiotic over does not know the name and cannot obtain records  Patient also reports intermittent itching all over"  Denies any fever/chills, chest pain/palpitations or shortness of breath  No nausea/vomiting or abdominal pain  Review of Systems:    Review of Systems   Constitutional: Negative for chills, fatigue, fever and unexpected weight change  HENT: Negative for congestion, sinus pain, sore throat and trouble swallowing  Eyes: Negative for photophobia, pain and visual disturbance  Respiratory: Negative for cough, shortness of breath and wheezing  Cardiovascular: Negative for chest pain, palpitations and leg swelling  Gastrointestinal: Negative for abdominal pain, constipation, diarrhea and rectal pain  Endocrine: Negative for polyuria  Genitourinary: Negative for difficulty urinating, dysuria, flank pain, hematuria and urgency  Musculoskeletal: Positive for gait problem  Negative for back pain, myalgias, neck pain and neck stiffness  Skin: Positive for wound  Neurological: Negative for dizziness, syncope, weakness, light-headedness, numbness and headaches  Hematological: Bruises/bleeds easily  Psychiatric/Behavioral: Negative for agitation and confusion  Past Medical and Surgical History:     Past Medical History:   Diagnosis Date    Asthma     Bipolar 2 disorder ([place] [address] )     Chronic kidney disease     Cirrhosis of liver ([place] [address] )     Diabetes mellitus ([place] [address] )     Elevated troponin 9/2/2018    GERD (gastroesophageal reflux disease)     Hyperlipidemia     Hypertension     Neuropathy     Renal disorder     Restless leg syndrome        Past Surgical History:   Procedure Laterality Date    BACK SURGERY      CHOLECYSTECTOMY      HERNIA REPAIR      REPLACEMENT TOTAL KNEE BILATERAL      TUMOR REMOVAL         Meds/Allergies:    Prior to Admission medications    Medication Sig Start Date End Date Taking?  Authorizing Provider   acetaminophen (TYLENOL) 325 mg tablet Take 2 tablets (650 mg total) by mouth every 6 (six) hours as needed for mild pain, headaches or fever 9/19/18  Yes Kris Azar DO   albuterol (2 5 mg/3 mL) 0 083 % nebulizer solution Take 2 5 mg by nebulization 3 (three) times a day as needed for wheezing   Yes Historical Provider, MD   aspirin 81 MG tablet Take 81 mg by mouth daily   Yes Historical Provider, MD   buPROPion (WELLBUTRIN XL) 300 mg 24 hr tablet Take 300 mg by mouth every morning   Yes Historical Provider, MD   cholecalciferol (VITAMIN D3) 1,000 units tablet Take 1,000 Units by mouth daily   Yes Historical Provider, MD   fluticasone-salmeterol (ADVAIR HFA) 990-50 MCG/ACT inhaler Inhale 2 puffs 2 (two) times a day   Yes Historical Provider, MD   insulin detemir (LEVEMIR) 100 units/mL subcutaneous injection Inject 40 Units under the skin 2 (two) times a day   Yes Historical Provider, MD   insulin lispro (HUMALOG KWIKPEN) 100 Units/mL SOPN Inject 17 Units under the skin 3 (three) times a day with meals   Yes Historical Provider, MD   ipratropium (ATROVENT) 0 02 % nebulizer solution Take 0 5 mg by nebulization every 6 (six) hours as needed for wheezing or shortness of breath   Yes Historical Provider, MD   lisinopril (ZESTRIL) 2 5 mg tablet Take 2 5 mg by mouth every morning   Yes Historical Provider, MD   montelukast (SINGULAIR) 10 mg tablet Take 10 mg by mouth daily at bedtime   Yes Historical Provider, MD   ondansetron (ZOFRAN) 4 mg tablet Take 4 mg by mouth every 8 (eight) hours as needed for nausea or vomiting   Yes Historical Provider, MD   pantoprazole (PROTONIX) 40 mg tablet Take 40 mg by mouth daily   Yes Historical Provider, MD   simvastatin (ZOCOR) 40 mg tablet Take 40 mg by mouth daily at bedtime   Yes Historical Provider, MD   ondansetron (ZOFRAN) 4 mg tablet Take 1 tablet by mouth every 8 (eight) hours as needed for nausea or vomiting 11/4/17 12/27/18 Yes Ranjit Kumar MD   ferrous sulfate 300 (60 Fe) mg/5 mL syrup Take 5 mL (300 mg total) by mouth 2 (two) times a day before meals 9/19/18   Yaneli Agustin DO   ferrous sulfate 325 (65 Fe) mg tablet Take 1 tablet by mouth 2 (two) times a day with meals 10/20/17 12/27/18  Pee Brown PA-C   LORazepam (ATIVAN) 0 5 mg tablet Take 0 5 tablets (0 25 mg total) by mouth every 8 (eight) hours as needed for anxiety for up to 10 days 9/19/18 12/27/18  Yaneli Agustin DO     I have reviewed home medications with patient personally  Allergies:    Allergies   Allergen Reactions    Abilify [Aripiprazole] Hallucinations    Celebrex [Celecoxib] Hallucinations    Codeine Hallucinations    Darvon [Propoxyphene] Hallucinations    Ibuprofen Hallucinations    Latuda [Lurasidone] Hallucinations    Nitrofurantoin Hallucinations    Penicillins Hallucinations    Ziprasidone Hallucinations       Social History:     Marital Status: Single   Occupation: Retired  Patient Pre-hospital Living Situation: Lives at home  Patient Pre-hospital Level of Mobility: Requires walker to ambulate  Patient Pre-hospital Diet Restrictions: Diabetic  Substance Use History:   History   Alcohol Use No     History   Smoking Status    Passive Smoke Exposure - Never Smoker   Smokeless Tobacco    Never Used     History   Drug Use No       Family History:    History reviewed  No pertinent family history  Physical Exam:     Vitals:   Blood Pressure: 150/70 (12/27/18 0659)  Pulse: 77 (12/27/18 0659)  Temperature: 98 1 °F (36 7 °C) (12/27/18 0659)  Temp Source: Oral (12/27/18 0659)  Respirations: 20 (12/27/18 0659)  Height: 4' 10" (147 3 cm) (12/27/18 0439)  Weight - Scale: 74 3 kg (163 lb 12 8 oz) (12/27/18 0439)  SpO2: 100 % (12/27/18 0659)    Physical Exam   Constitutional: She is oriented to person, place, and time  Vital signs are normal  She appears well-developed  HENT:   Head: Normocephalic  Eyes: Pupils are equal, round, and reactive to light  EOM are normal  No scleral icterus  Neck: Normal range of motion  Cardiovascular: Normal rate and regular rhythm  Murmur heard  Pulmonary/Chest: Effort normal and breath sounds normal  No respiratory distress  Abdominal: Soft  Bowel sounds are normal  There is no tenderness  Musculoskeletal: She exhibits no edema  Neurological: She is alert and oriented to person, place, and time  Skin: Skin is warm and dry  Bilateral lower extremity pitting edema, +2  Multiple circular ulcerations in varying sizes; notable excoration marks  No overt drainage noted, some evidently had been bleeding but stopped without intervention  Psychiatric: She has a normal mood and affect   Her speech is normal and behavior is normal  Cognition and memory are impaired  Nursing note and vitals reviewed  Additional Data:     Lab Results: I have personally reviewed pertinent reports  Results from last 7 days  Lab Units 12/27/18  0503   WBC Thousand/uL 2 63*   HEMOGLOBIN g/dL 9 1*   HEMATOCRIT % 29 9*   PLATELETS Thousands/uL 63*   BANDS PCT % 2   LYMPHO PCT % 38   MONO PCT % 3*   EOS PCT % 4       Results from last 7 days  Lab Units 12/27/18  0503   SODIUM mmol/L 139   POTASSIUM mmol/L 3 4*   CHLORIDE mmol/L 105   CO2 mmol/L 27   BUN mg/dL 6   CREATININE mg/dL 0 67   ANION GAP mmol/L 7   CALCIUM mg/dL 9 0   GLUCOSE RANDOM mg/dL 191*       Results from last 7 days  Lab Units 12/27/18  0503   INR  1 56*                   Imaging: I have personally reviewed pertinent reports  No orders to display       EKG, Pathology, and Other Studies Reviewed on Admission:   · No imaging completed thus far    Allscripts / Epic Records Reviewed: Yes     ** Please Note: This note has been constructed using a voice recognition system   **

## 2018-12-27 NOTE — ED PROVIDER NOTES
History  Chief Complaint   Patient presents with    Wound Check     Presents via EMS from home for ongoing B/L leg pain, evaluation of sores, and newly increased weeping  Patient reports taking prescribed antibiotics at home (LD due 12/28) w/ no improvement of appearance to legs  Patient states "I'm numb from the ankles up, but they look worse "     Leg Pain     Patient presents to the emergency department via ambulance for evaluation of bilateral lower extremity redness and weeping wounds  Patient is a poor historian but allegedly was recently put on an unknown oral antibiotic by an unknown physician without improvement  Patient denies fever chills  She is having trouble ambulating secondary to pain  Prior to Admission Medications   Prescriptions Last Dose Informant Patient Reported?  Taking?   acetaminophen (TYLENOL) 325 mg tablet   No Yes   Sig: Take 2 tablets (650 mg total) by mouth every 6 (six) hours as needed for mild pain, headaches or fever   albuterol (2 5 mg/3 mL) 0 083 % nebulizer solution  Self Yes Yes   Sig: Take 2 5 mg by nebulization 3 (three) times a day as needed for wheezing   aspirin 81 MG tablet  Self Yes Yes   Sig: Take 81 mg by mouth daily   buPROPion (WELLBUTRIN XL) 300 mg 24 hr tablet  Self Yes Yes   Sig: Take 300 mg by mouth every morning   cholecalciferol (VITAMIN D3) 1,000 units tablet  Self Yes Yes   Sig: Take 1,000 Units by mouth daily   ferrous sulfate 300 (60 Fe) mg/5 mL syrup   No No   Sig: Take 5 mL (300 mg total) by mouth 2 (two) times a day before meals   fluticasone-salmeterol (ADVAIR HFA) 115-21 MCG/ACT inhaler  Self Yes Yes   Sig: Inhale 2 puffs 2 (two) times a day   insulin detemir (LEVEMIR) 100 units/mL subcutaneous injection  Self Yes Yes   Sig: Inject 40 Units under the skin 2 (two) times a day   insulin lispro (HUMALOG KWIKPEN) 100 Units/mL SOPN  Self Yes Yes   Sig: Inject 17 Units under the skin 3 (three) times a day with meals   ipratropium (ATROVENT) 0 02 % nebulizer solution  Self Yes Yes   Sig: Take 0 5 mg by nebulization every 6 (six) hours as needed for wheezing or shortness of breath   lisinopril (ZESTRIL) 2 5 mg tablet  Self Yes Yes   Sig: Take 2 5 mg by mouth every morning   montelukast (SINGULAIR) 10 mg tablet  Self Yes Yes   Sig: Take 10 mg by mouth daily at bedtime   ondansetron (ZOFRAN) 4 mg tablet   Yes Yes   Sig: Take 4 mg by mouth every 8 (eight) hours as needed for nausea or vomiting   pantoprazole (PROTONIX) 40 mg tablet  Self Yes Yes   Sig: Take 40 mg by mouth daily   simvastatin (ZOCOR) 40 mg tablet  Self Yes Yes   Sig: Take 40 mg by mouth daily at bedtime      Facility-Administered Medications: None       Past Medical History:   Diagnosis Date    Asthma     Bipolar 2 disorder (HCC)     Chronic kidney disease     [facility]     [facility]     Elevated troponin 9/2/2018    GERD (gastroesophageal reflux disease)     Hyperlipidemia     Hypertension     Neuropathy     Renal disorder     Restless leg syndrome        Past Surgical History:   Procedure Laterality Date    BACK SURGERY      CHOLECYSTECTOMY      HERNIA REPAIR      REPLACEMENT TOTAL KNEE BILATERAL      TUMOR REMOVAL         History reviewed  No pertinent family history  I have reviewed and agree with the history as documented  Social History   Substance Use Topics    Smoking status: Passive Smoke Exposure - Never Smoker    Smokeless tobacco: Never Used    Alcohol use No        Review of Systems   Constitutional: Negative  Negative for activity change, appetite change, chills, diaphoresis and fatigue  HENT: Negative  Negative for congestion  Eyes: Negative  Negative for photophobia and visual disturbance  Respiratory: Negative  Negative for chest tightness, shortness of breath, wheezing and stridor  Cardiovascular: Negative  Negative for chest pain, palpitations and leg swelling  Gastrointestinal: Negative    Negative for abdominal pain, nausea and vomiting  Endocrine: Negative  Genitourinary: Negative  Negative for difficulty urinating, dysuria, flank pain, hematuria and urgency  Musculoskeletal: Negative  Negative for back pain, neck pain and neck stiffness  Skin: Positive for wound  Allergic/Immunologic: Negative  Neurological: Negative  Negative for dizziness, tremors, syncope, speech difficulty, weakness, numbness and headaches  Hematological: Negative  Does not bruise/bleed easily  Psychiatric/Behavioral: Negative  Negative for confusion  Physical Exam  Physical Exam   Constitutional: She is oriented to person, place, and time  She appears well-developed and well-nourished  Nontoxic appearance  No respiratory distress  Patient does not look uncomfortable sitting upright on the stretcher  HENT:   Head: Normocephalic and atraumatic  Right Ear: External ear normal    Left Ear: External ear normal    Mouth/Throat: Oropharynx is clear and moist    Eyes: Pupils are equal, round, and reactive to light  Conjunctivae and EOM are normal    Neck: Normal range of motion  Neck supple  Cardiovascular: Normal rate, regular rhythm, normal heart sounds and intact distal pulses  Pulmonary/Chest: Effort normal and breath sounds normal  No respiratory distress  She has no wheezes  She has no rales  She exhibits no tenderness  Abdominal: Soft  Bowel sounds are normal  She exhibits no distension and no mass  There is no tenderness  There is no rebound and no guarding  No hernia  Musculoskeletal: Normal range of motion  She exhibits tenderness  She exhibits no edema or deformity  Bilateral lower extremities with multiple ulcerated lesions with surrounding erythema some with weeping clear discharge  Tenderness to palpation  No calf tenderness  Neurological: She is alert and oriented to person, place, and time  She has normal reflexes  She displays normal reflexes   No cranial nerve deficit or sensory deficit  She exhibits normal muscle tone  Coordination normal    Skin: Skin is warm and dry  No rash noted  No erythema  There is pallor  Psychiatric: She has a normal mood and affect  Her behavior is normal  Judgment and thought content normal    Nursing note and vitals reviewed        Vital Signs  ED Triage Vitals [12/27/18 0439]   Temperature Pulse Respirations Blood Pressure SpO2   97 9 °F (36 6 °C) 68 22 (!) 174/68 100 %      Temp Source Heart Rate Source Patient Position - Orthostatic VS BP Location FiO2 (%)   Oral Monitor Sitting Right arm --      Pain Score       5           Vitals:    12/27/18 0439 12/27/18 0447 12/27/18 0515   BP: (!) 174/68 160/70 144/63   Pulse: 68 67 70   Patient Position - Orthostatic VS: Sitting Sitting Sitting       Visual Acuity      ED Medications  Medications   clindamycin (CLEOCIN) IVPB (premix) 600 mg (0 mg Intravenous Stopped 12/27/18 0535)   LORazepam (ATIVAN) 2 mg/mL injection 1 mg (1 mg Intravenous Given 12/27/18 0553)       Diagnostic Studies  Results Reviewed     Procedure Component Value Units Date/Time    CBC and differential [[id number]]  (Abnormal) Collected:  12/27/18 0503    Lab Status:  Final result Specimen:  Blood from Arm, Left Updated:  12/27/18 0543     WBC 2 63 (L) Thousand/uL      RBC 3 46 (L) Million/uL      Hemoglobin 9 1 (L) g/dL      Hematocrit 29 9 (L) %      MCV 86 fL      MCH 26 3 (L) pg      MCHC 30 4 (L) g/dL      RDW 17 7 (H) %      MPV 11 7 fL      Platelets 63 (L) Thousands/uL      nRBC 0 /100 WBCs     Basic metabolic panel [[id number]]  (Abnormal) Collected:  12/27/18 0503    Lab Status:  Final result Specimen:  Blood from Arm, Left Updated:  12/27/18 0527     Sodium 139 mmol/L      Potassium 3 4 (L) mmol/L      Chloride 105 mmol/L      CO2 27 mmol/L      ANION GAP 7 mmol/L      BUN 6 mg/dL      Creatinine 0 67 mg/dL      Glucose 191 (H) mg/dL      Calcium 9 0 mg/dL      eGFR 87 ml/min/1 73sq m     Narrative:         National Kidney Disease Education Program recommendations are as follows:  GFR calculation is accurate only with a steady state creatinine  Chronic Kidney disease less than 60 ml/min/1 73 sq  meters  Kidney failure less than 15 ml/min/1 73 sq  meters  No orders to display              Procedures  Procedures       Phone Contacts  ED Phone Contact    ED Course  ED Course as of Dec 27 0554   Thu Dec 27, 2018   7485 This case was discussed with Bubba Morgan the physician assistant covering the hospitalist service  She will be admitted  IV clindamycin ordered along with lorazepam for her restless leg syndrome  MDM  CritCare Time    Disposition  Final diagnoses:   Cellulitis     Time reflects when diagnosis was documented in both MDM as applicable and the Disposition within this note     Time User Action Codes Description Comment    12/27/2018  5:02 AM Roselia Singh Add [L03 90] Cellulitis       ED Disposition     ED Disposition Condition Comment    Admit  Case was discussed with Bubba Morgan  and the patient's admission status was agreed to be Admission Status: observation status to the service of Dr Raeann White    None         Patient's Medications   Discharge Prescriptions    No medications on file     No discharge procedures on file      ED Provider  Electronically Signed by           Andreas Terry MD  12/27/18 7981

## 2018-12-28 VITALS
HEIGHT: 58 IN | OXYGEN SATURATION: 97 % | DIASTOLIC BLOOD PRESSURE: 69 MMHG | HEART RATE: 70 BPM | BODY MASS INDEX: 34.38 KG/M2 | WEIGHT: 163.8 LBS | RESPIRATION RATE: 18 BRPM | SYSTOLIC BLOOD PRESSURE: 156 MMHG | TEMPERATURE: 97.5 F

## 2018-12-28 PROBLEM — G25.81 RESTLESS LEG SYNDROME: Chronic | Status: ACTIVE | Noted: 2018-12-28

## 2018-12-28 PROBLEM — S81.809A WOUND, OPEN, LEG: Chronic | Status: ACTIVE | Noted: 2018-12-28

## 2018-12-28 PROBLEM — I87.8 VENOUS STASIS: Chronic | Status: ACTIVE | Noted: 2018-12-28

## 2018-12-28 LAB
ANION GAP SERPL CALCULATED.3IONS-SCNC: 6 MMOL/L (ref 4–13)
BUN SERPL-MCNC: 6 MG/DL (ref 5–25)
CALCIUM SERPL-MCNC: 8.5 MG/DL (ref 8.3–10.1)
CHLORIDE SERPL-SCNC: 110 MMOL/L (ref 100–108)
CO2 SERPL-SCNC: 27 MMOL/L (ref 21–32)
CREAT SERPL-MCNC: 0.58 MG/DL (ref 0.6–1.3)
ERYTHROCYTE [DISTWIDTH] IN BLOOD BY AUTOMATED COUNT: 17.9 % (ref 11.6–15.1)
ERYTHROCYTE [DISTWIDTH] IN BLOOD BY AUTOMATED COUNT: 18.4 % (ref 11.6–15.1)
GFR SERPL CREATININE-BSD FRML MDRD: 91 ML/MIN/1.73SQ M
GLUCOSE SERPL-MCNC: 106 MG/DL (ref 65–140)
GLUCOSE SERPL-MCNC: 129 MG/DL (ref 65–140)
GLUCOSE SERPL-MCNC: 130 MG/DL (ref 65–140)
GLUCOSE SERPL-MCNC: 140 MG/DL (ref 65–140)
GLUCOSE SERPL-MCNC: 189 MG/DL (ref 65–140)
GLUCOSE SERPL-MCNC: 59 MG/DL (ref 65–140)
GLUCOSE SERPL-MCNC: 66 MG/DL (ref 65–140)
GLUCOSE SERPL-MCNC: 89 MG/DL (ref 65–140)
HCT VFR BLD AUTO: 25.1 % (ref 34.8–46.1)
HCT VFR BLD AUTO: 26.8 % (ref 34.8–46.1)
HGB BLD-MCNC: 7.6 G/DL (ref 11.5–15.4)
HGB BLD-MCNC: 8.2 G/DL (ref 11.5–15.4)
MCH RBC QN AUTO: 26 PG (ref 26.8–34.3)
MCH RBC QN AUTO: 26.5 PG (ref 26.8–34.3)
MCHC RBC AUTO-ENTMCNC: 30.3 G/DL (ref 31.4–37.4)
MCHC RBC AUTO-ENTMCNC: 30.6 G/DL (ref 31.4–37.4)
MCV RBC AUTO: 86 FL (ref 82–98)
MCV RBC AUTO: 87 FL (ref 82–98)
PLATELET # BLD AUTO: 51 THOUSANDS/UL (ref 149–390)
PLATELET # BLD AUTO: 55 THOUSANDS/UL (ref 149–390)
PMV BLD AUTO: 12.2 FL (ref 8.9–12.7)
PMV BLD AUTO: 12.7 FL (ref 8.9–12.7)
POTASSIUM SERPL-SCNC: 3.8 MMOL/L (ref 3.5–5.3)
RBC # BLD AUTO: 2.92 MILLION/UL (ref 3.81–5.12)
RBC # BLD AUTO: 3.09 MILLION/UL (ref 3.81–5.12)
SODIUM SERPL-SCNC: 143 MMOL/L (ref 136–145)
WBC # BLD AUTO: 2.43 THOUSAND/UL (ref 4.31–10.16)
WBC # BLD AUTO: 2.48 THOUSAND/UL (ref 4.31–10.16)

## 2018-12-28 PROCEDURE — 99217 PR OBSERVATION CARE DISCHARGE MANAGEMENT: CPT | Performed by: INTERNAL MEDICINE

## 2018-12-28 PROCEDURE — 85027 COMPLETE CBC AUTOMATED: CPT | Performed by: INTERNAL MEDICINE

## 2018-12-28 PROCEDURE — 82948 REAGENT STRIP/BLOOD GLUCOSE: CPT

## 2018-12-28 PROCEDURE — 80048 BASIC METABOLIC PNL TOTAL CA: CPT | Performed by: PHYSICIAN ASSISTANT

## 2018-12-28 RX ORDER — FUROSEMIDE 20 MG/1
20 TABLET ORAL DAILY
Qty: 30 TABLET | Refills: 0 | Status: SHIPPED | OUTPATIENT
Start: 2018-12-28 | End: 2019-06-01

## 2018-12-28 RX ORDER — POTASSIUM CHLORIDE 20 MEQ/1
20 TABLET, EXTENDED RELEASE ORAL 2 TIMES DAILY
Qty: 30 TABLET | Refills: 0 | Status: SHIPPED | OUTPATIENT
Start: 2018-12-28 | End: 2018-12-28

## 2018-12-28 RX ORDER — DIPHENHYDRAMINE HCL 25 MG
25 TABLET ORAL
Qty: 30 TABLET | Refills: 0
Start: 2018-12-28 | End: 2019-02-07

## 2018-12-28 RX ORDER — DEXTROSE MONOHYDRATE 25 G/50ML
INJECTION, SOLUTION INTRAVENOUS
Status: COMPLETED
Start: 2018-12-28 | End: 2018-12-28

## 2018-12-28 RX ORDER — FUROSEMIDE 20 MG/1
20 TABLET ORAL 2 TIMES DAILY
Qty: 30 TABLET | Refills: 0 | Status: SHIPPED | OUTPATIENT
Start: 2018-12-28 | End: 2018-12-28

## 2018-12-28 RX ORDER — ROPINIROLE 0.25 MG/1
0.25 TABLET, FILM COATED ORAL DAILY
Status: DISCONTINUED | OUTPATIENT
Start: 2018-12-28 | End: 2018-12-28 | Stop reason: HOSPADM

## 2018-12-28 RX ORDER — POTASSIUM CHLORIDE 20 MEQ/1
20 TABLET, EXTENDED RELEASE ORAL DAILY
Qty: 30 TABLET | Refills: 0 | Status: SHIPPED | OUTPATIENT
Start: 2018-12-28 | End: 2019-02-07

## 2018-12-28 RX ADMIN — LISINOPRIL 2.5 MG: 2.5 TABLET ORAL at 09:00

## 2018-12-28 RX ADMIN — DEXTROSE MONOHYDRATE 25 ML: 25 INJECTION, SOLUTION INTRAVENOUS at 08:37

## 2018-12-28 RX ADMIN — PANTOPRAZOLE SODIUM 40 MG: 40 TABLET, DELAYED RELEASE ORAL at 05:28

## 2018-12-28 RX ADMIN — BUPROPION HYDROCHLORIDE 300 MG: 150 TABLET, FILM COATED, EXTENDED RELEASE ORAL at 09:00

## 2018-12-28 RX ADMIN — ROPINIROLE 0.25 MG: 0.25 TABLET, FILM COATED ORAL at 13:33

## 2018-12-28 RX ADMIN — ASPIRIN 81 MG 81 MG: 81 TABLET ORAL at 09:00

## 2018-12-28 RX ADMIN — VITAMIN D, TAB 1000IU (100/BT) 1000 UNITS: 25 TAB at 09:00

## 2018-12-28 RX ADMIN — PRAVASTATIN SODIUM 80 MG: 80 TABLET ORAL at 16:52

## 2018-12-28 NOTE — ASSESSMENT & PLAN NOTE
· Patient claims that she has history of restless leg syndrome  · Patient told me that she has a medication for this at home, that was prescribed before, but she forgot the name  · Patient was given a dose of Requip here  Patient was instructed that she may go back to her prescribed medication  · Outpatient follow-up with primary care physician

## 2018-12-28 NOTE — ASSESSMENT & PLAN NOTE
· Overall appears stable  · Continue iron supplementation  · From my review of patient's previous CBC, patient's baseline hemoglobin is in the 7 level  · No active bleeding  · Outpatient follow-up with primary care physician  I am recommending a repeat CBC in 1 week    Patient's primary care physician may facilitate patient having this test

## 2018-12-28 NOTE — ASSESSMENT & PLAN NOTE
· Presently compensated  · Low sodium diet  · Outpatient follow-up with primary care physician  I am recommending a repeat CMP in the outpatient    Patient's primary care physician may facilitate patient having this test

## 2018-12-28 NOTE — ASSESSMENT & PLAN NOTE
Lab Results   Component Value Date    HGBA1C 6 8 (H) 10/19/2017       Recent Labs      12/28/18   0829  12/28/18   0842  12/28/18   1030  12/28/18   1113   POCGLU  59*  129  106  89       Blood Sugar Average: Last 72 hrs:  · (P) 115 [id number]   · Patient has been having hypoglycemic episodes here  · Thus, patient's insulin doses were cut back extensively  · On SSI here  · Diabetic diet  · On discharge, patient will be on a significantly lower dose of her insulin doses  Patient will be on Levemir at 10 units at bedtime and continue with short-acting insulin at 2 units with meals  · Patient's blood sugar should be monitored at home and in the outpatient  VNA will be set up  · Outpatient follow-up with primary care physician

## 2018-12-28 NOTE — ASSESSMENT & PLAN NOTE
· Appears stable when compared to baseline; has had prior workup but refused bone marrow biopsy  · Continue to monitor CBC in the outpatient  · No active bleeding  · Outpatient follow-up with primary care physician  I am recommending a repeat CBC in 1 week    Patient's primary care physician may facilitate patient having this test

## 2018-12-28 NOTE — PROGRESS NOTES
Made aware by PCA patients blood sugar was 59  Patient disoriented and lethargic on assessment  25 ml Dextrose 50% given IVP  Will recheck blood sugar in 15 minutes  SLIM notified

## 2018-12-28 NOTE — ASSESSMENT & PLAN NOTE
· Resolved  · Status post replacement  · Patient will be prescribed with K Dur  · Outpatient follow-up with primary care physician  I am recommending a repeat BMP in 1 week    Patient's primary care physician may facilitate patient having this test

## 2018-12-28 NOTE — ASSESSMENT & PLAN NOTE
· Stable  · Was found to have multiple wounds on the lower extremities likely due to bilateral venous insufficiency with superficial stasis ulcerations  · Initially, in the emergency room and on admission, patient was thought to having an infection  Thus patient was given IV antibiotics  · Further evaluation, including evaluation by both Infectious Disease and Podiatry, revealed that patient does not have any active infection  Thus antibiotics were discontinued  · Patient was instructed to elevate legs  Compression stockings were recommended, however, patient refused  She is okay with leg wraps with Ace bandage  Patient was also instructed that she may apply skin lotion to prevent skin dryness  This was explained to the patient  · With edema on both legs, low-dose furosemide was prescribed  This was explained to the patient  · Podiatry recommended Benadryl at bedtime for the itchiness  Thus this was prescribed  · Patient will follow-up with Podiatry  · Outpatient follow-up with primary care physician

## 2018-12-28 NOTE — PLAN OF CARE
Problem: DISCHARGE PLANNING - CARE MANAGEMENT  Goal: Discharge to post-acute care or home with appropriate resources  INTERVENTIONS:  - Conduct assessment to determine patient/family and health care team treatment goals, and need for post-acute services based on payer coverage, community resources, and patient preferences, and barriers to discharge  - Address psychosocial, clinical, and financial barriers to discharge as identified in assessment in conjunction with the patient/family and health care team  - Arrange appropriate level of post-acute services according to patients   needs and preference and payer coverage in collaboration with the physician and health care team  - Communicate with and update the patient/family, physician, and health care team regarding progress on the discharge plan  - Arrange appropriate transportation to post-acute venues  Outcome: Completed Date Met: 12/28/18  CM placed referral to [address] [place] [address]  for nursing/wound care  They have accepted  CM also called and spoke with George at [facility] to arrange for [address] transport at patient's request  [facility] will transport at 7 pm with stretcher billed as [address]  CM discussed with patient at bedside and she is happy with VNA and transport  Patient requesting to be seated upright in stretcher as she doesn't want to lay down and SLETS as well as RN also made aware  Patient stated that in the future, her [facility] [employer] can help provide transportation and she is always working with a  Sandra through her insurance to arrange transport for future doctor's appointments  Patient does not appear to have any other needs at this time

## 2018-12-28 NOTE — PROGRESS NOTES
Patients blood sugar recheck 129  Lethargic but responsive on assessment  Alert and oriented to person place but disoriented to time and situation  See MAR for new orders  Will continue to monitor

## 2018-12-28 NOTE — SOCIAL WORK
S/w pt at bedside to discuss dcp  Pt was on the phone with her  Sandra from her insurance at the time  Per pat, [facility] has been helping her to set up assistance at home  She states that she set up rides for her to go to her appointments but that the first and only time so far, the car never came  She states that [facility] got her approved for waiver services and was set to start but then received a letter stating she no longer qualified  Pt states she has attempted suicide 4 times in the past  When CM asked patient if patient was having thoughts of suicide or hurting herself she stated "No, 2 years ago I found God and suicide goes against my beliefs in God  If I off myself I won't go to [facility] " She states that she does not want to hurt herself she's just ready to die  I offered to set up home care service for patient including SN, PT, and HHA  Pt is agreeable and would like [facility]  Pt also requesting w/c transport home and is agreeable to [facility]  She is requesting not to be transported by [facility] because last time they brought a stretcher and she doesn't want to lay down  This CM s/w with DARYL Oh who agrees to set up w/c transport for patient and placed [facility] referral     Dr Atul Maldonado aware of above

## 2018-12-28 NOTE — SOCIAL WORK
CM placed referral to [facility] for nursing/wound care  They have accepted  CM also called and spoke with George at [facility] to arrange for [facility] transport at patient's request  [facility] will transport at 7 pm with stretcher billed as [facility]  CM discussed with patient at bedside and she is happy with VNA and transport  Patient requesting to be seated upright in stretcher as she doesn't want to lay down and SLETS as well as RN also made aware  Patient stated that in the future, her [facility] can help provide transportation and she is always working with a  Sandra through her insurance to arrange transport for future doctor's appointments  Patient does not appear to have any other needs at this time

## 2018-12-28 NOTE — CONSULTS
Consult - [id number] 75 y o  female MRN: [id number]  Unit/Bed#: -01 Encounter: [id number]    Assessment/Plan     Assessment:  1  B/L venous insufficiency with superficial stasis ulcerations  2  B/L pruritis with excoriation  3  Onychomycosis    Plan:  1  Agree with Dr Nga Peña, no evidence of cellulitis or infection  Compression stockings applied, encourage elevation  She admittedly picks at the scabs which is clearly irritating the skin on her legs  Recommend benadryl QHS  2  Nails are severely elongated  Will need to bring nail nippers  Plan to debride toenails Friday or Saturday if still in house  If ready for discharge, this can be done as outpatient  3  Please call if new concerns arise  History of Present Illness     HPI:  Paige Hdz is a 75 y o  female who presents with wounds to both of her legs  She states her legs got very swollen and a clear liquid began to drain out of the wounds  She picks at the scabs and scratches which she has been told she should not be doing  She states "I know I am dying an I am ok with it so I don't want anything done " She denies pain to her legs  She does state her toenails are painful and tear her socks and sheets  Consults  Review of Systems   Constitutional: Negative  HENT: Negative  Eyes: Negative  Respiratory: Negative  Cardiovascular: Negative  Gastrointestinal: Negative  Musculoskeletal: swelling   Skin:leg wounds   Neurological: Negative      Psych: negative      Historical Information   Past Medical History:   Diagnosis Date    Asthma     Bipolar 2 disorder ([facility] [place] [facility] )     Chronic kidney disease     Cirrhosis of liver ([facility] [place] [facility] )     Diabetes mellitus ([facility] [place] [facility] )     Elevated troponin 9/2/2018    GERD (gastroesophageal reflux disease)     Hyperlipidemia     Hypertension     Neuropathy     Renal disorder     Restless leg syndrome      Past Surgical History:   Procedure Laterality Date    BACK SURGERY      CHOLECYSTECTOMY      HERNIA REPAIR      REPLACEMENT TOTAL KNEE BILATERAL      TUMOR REMOVAL       Social History   History   Alcohol Use No     History   Drug Use No     History   Smoking Status    Passive Smoke Exposure - Never Smoker   Smokeless Tobacco    Never Used     Family History: History reviewed  No pertinent family history      Meds/Allergies   Prescriptions Prior to Admission   Medication    acetaminophen (TYLENOL) 325 mg tablet    albuterol (2 5 mg/3 mL) 0 083 % nebulizer solution    aspirin 81 MG tablet    buPROPion (WELLBUTRIN XL) 300 mg 24 hr tablet    cholecalciferol (VITAMIN D3) 1,000 units tablet    fluticasone-salmeterol (ADVAIR HFA) 115-21 MCG/ACT inhaler    insulin detemir (LEVEMIR) 100 units/mL subcutaneous injection    insulin lispro (HUMALOG KWIKPEN) 100 Units/mL SOPN    ipratropium (ATROVENT) 0 02 % nebulizer solution    lisinopril (ZESTRIL) 2 5 mg tablet    LORazepam (ATIVAN) 0 5 mg tablet    montelukast (SINGULAIR) 10 mg tablet    ondansetron (ZOFRAN) 4 mg tablet    pantoprazole (PROTONIX) 40 mg tablet    simvastatin (ZOCOR) 40 mg tablet    ferrous sulfate 300 (60 Fe) mg/5 mL syrup     Allergies   Allergen Reactions    Abilify [Aripiprazole] Hallucinations    Celebrex [Celecoxib] Hallucinations    Codeine Hallucinations    Darvon [Propoxyphene] Hallucinations    Ibuprofen Hallucinations    Latuda [Lurasidone] Hallucinations    Nitrofurantoin Hallucinations    Penicillins Hallucinations    Ziprasidone Hallucinations       Objective   First Vitals:   Blood Pressure: (!) 174/68 (12/27/18 0439)  Pulse: 68 (12/27/18 0439)  Temperature: 97 9 °F (36 6 °C) (12/27/18 0439)  Temp Source: Oral (12/27/18 0439)  Respirations: 22 (12/27/18 0439)  Height: 4' 10" (147 3 cm) (12/27/18 0439)  Weight - Scale: 74 3 kg (163 lb 12 8 oz) (12/27/18 0439)  SpO2: 100 % (12/27/18 0439)    Current Vitals:   Blood Pressure: 137/61 (12/27/18 1824)  Pulse: 78 (12/27/18 1824)  Temperature: 97 6 °F (36 4 °C) (12/27/18 1533)  Temp Source: Oral (12/27/18 1533)  Respirations: 18 (12/27/18 1533)  Height: 4' 10" (147 3 cm) (12/27/18 0439)  Weight - Scale: 74 3 kg (163 lb 12 8 oz) (12/27/18 0439)  SpO2: 100 % (12/27/18 1533)        /61 (BP Location: Left arm)   Pulse 78   Temp 97 6 °F (36 4 °C) (Oral)   Resp 18   Ht 4' 10" (1 473 m)   Wt 74 3 kg (163 lb 12 8 oz)   SpO2 100%   BMI 34 23 kg/m²   Physical Exam:  General:    Alert, cooperative, no distress   Head:    Normocephalic, without obvious abnormality, atraumatic   Eyes:    PERRL, conjunctiva/corneas clear, EOM's intact            Nose:   Moist mucous membranes, no drainage or sinus tenderness   Throat:   No tenderness, no exudates   Neck:   Supple, symmetrical, trachea midline, no JVD   Back:     Symmetric, no CVA tenderness   Lungs:     Clear to auscultation bilaterally, respirations unlabored   Chest wall:    No tenderness or deformity   Heart:    Regular rate and rhythm, S1 and S2 normal   Abdomen:     Distended, active bowel sounds           Extremities:   Faint pedal pulses  CRT brisk  Toes are warm  Nails are severely dystrophic, discolored and mycotic  B/L superficial leg wounds with no active drainage  No cellulitis  Mild inflammation to periwound area noted  NO calf pain  MMT is age appropriate  Diminished protective sensation to feet  Neurologic:   CNII-XII intact        Lab Results:   Admission on 12/27/2018   Component Date Value    Sodium 12/27/2018 139     Potassium 12/27/2018 3 4*    Chloride 12/27/2018 105     CO2 12/27/2018 27     ANION GAP 12/27/2018 7     BUN 12/27/2018 6     Creatinine 12/27/2018 0 67     Glucose 12/27/2018 191*    Calcium 12/27/2018 9 0     eGFR 12/27/2018 87     WBC 12/27/2018 2 63*    RBC 12/27/2018 3 46*    Hemoglobin 12/27/2018 9 1*    Hematocrit 12/27/2018 29 9*    MCV 12/27/2018 86     MCH 12/27/2018 26 3*    MCHC 12/27/2018 30 4*    RDW 12/27/2018 17 7*    MPV 12/27/2018 11 7     Platelets 98/14/9544 63*    nRBC 12/27/2018 0     Segmented % 12/27/2018 53     Bands % 12/27/2018 2     Lymphocytes % 12/27/2018 38     Monocytes % 12/27/2018 3*    Eosinophils, % 12/27/2018 4     Basophils % 12/27/2018 0     Absolute Neutrophils 12/27/2018 1 45*    Lymphocytes Absolute 12/27/2018 1 00     Monocytes Absolute 12/27/2018 0 08     Eosinophils Absolute 12/27/2018 0 11     Basophils Absolute 12/27/2018 0 00     Total Counted 12/27/2018 100     Anisocytosis 12/27/2018 Present     Poikilocytes 12/27/2018 Present     Platelet Estimate 71/22/0722 Decreased*    Protime 12/27/2018 18 2*    INR 12/27/2018 1 56*    Total Bilirubin 12/27/2018 0 90     Bilirubin, Direct 12/27/2018 0 31*    Alkaline Phosphatase 12/27/2018 142*    AST 12/27/2018 52*    ALT 12/27/2018 45     Total Protein 12/27/2018 6 5     Albumin 12/27/2018 2 6*    POC Glucose 12/27/2018 237*    POC Glucose 12/27/2018 193*    POC Glucose 12/27/2018 49*    POC Glucose 12/27/2018 56*    POC Glucose 12/27/2018 133                  Invalid input(s): LABAEARO              Imaging: I have personally reviewed pertinent films in PACS  EKG, Pathology, and Other Studies: I have personally reviewed pertinent reports  Code Status: Level 3 - DNAR and DNI  Advance Directive and Living Will: Yes    Power of :    POLST:        Portions of the record may have been created with voice recognition software  Occasional wrong word or "sound a like" substitutions may have occurred due to the inherent limitations of voice recognition software  Read the chart carefully and recognize, using context, where substitutions have occurred

## 2019-02-07 ENCOUNTER — HOSPITAL ENCOUNTER (EMERGENCY)
Facility: HOSPITAL | Age: 74
Discharge: HOME | End: 2019-02-07
Attending: EMERGENCY MEDICINE
Payer: COMMERCIAL

## 2019-02-07 VITALS
DIASTOLIC BLOOD PRESSURE: 50 MMHG | BODY MASS INDEX: 32.02 KG/M2 | RESPIRATION RATE: 18 BRPM | SYSTOLIC BLOOD PRESSURE: 107 MMHG | TEMPERATURE: 98 F | WEIGHT: 153.22 LBS | HEART RATE: 69 BPM | OXYGEN SATURATION: 99 %

## 2019-02-07 DIAGNOSIS — G40.822: ICD-10-CM

## 2019-02-07 DIAGNOSIS — E83.42 HYPOMAGNESEMIA: ICD-10-CM

## 2019-02-07 DIAGNOSIS — M54.31 SCIATIC PAIN, RIGHT: Primary | ICD-10-CM

## 2019-02-07 LAB
ALBUMIN SERPL BCP-MCNC: 2.5 G/DL (ref 3.5–5)
ALP SERPL-CCNC: 117 U/L (ref 46–116)
ALT SERPL W P-5'-P-CCNC: 37 U/L (ref 12–78)
ANION GAP SERPL CALCULATED.3IONS-SCNC: 9 MMOL/L (ref 4–13)
APTT PPP: 35 SECONDS (ref 26–38)
AST SERPL W P-5'-P-CCNC: 29 U/L (ref 5–45)
BASOPHILS # BLD AUTO: 0.04 THOUSANDS/ΜL (ref 0–0.1)
BASOPHILS NFR BLD AUTO: 1 % (ref 0–1)
BILIRUB SERPL-MCNC: 1 MG/DL (ref 0.2–1)
BUN SERPL-MCNC: 12 MG/DL (ref 5–25)
CALCIUM SERPL-MCNC: 9.3 MG/DL (ref 8.3–10.1)
CHLORIDE SERPL-SCNC: 100 MMOL/L (ref 100–108)
CO2 SERPL-SCNC: 25 MMOL/L (ref 21–32)
CREAT SERPL-MCNC: 0.86 MG/DL (ref 0.6–1.3)
EOSINOPHIL # BLD AUTO: 0.17 THOUSAND/ΜL (ref 0–0.61)
EOSINOPHIL NFR BLD AUTO: 5 % (ref 0–6)
ERYTHROCYTE [DISTWIDTH] IN BLOOD BY AUTOMATED COUNT: 21.7 % (ref 11.6–15.1)
GFR SERPL CREATININE-BSD FRML MDRD: 67 ML/MIN/1.73SQ M
GLUCOSE SERPL-MCNC: 369 MG/DL (ref 65–140)
HCT VFR BLD AUTO: 31.8 % (ref 34.8–46.1)
HGB BLD-MCNC: 10.5 G/DL (ref 11.5–15.4)
IMM GRANULOCYTES # BLD AUTO: 0.01 THOUSAND/UL (ref 0–0.2)
IMM GRANULOCYTES NFR BLD AUTO: 0 % (ref 0–2)
INR PPP: 1.4 (ref 0.86–1.17)
LYMPHOCYTES # BLD AUTO: 0.91 THOUSANDS/ΜL (ref 0.6–4.47)
LYMPHOCYTES NFR BLD AUTO: 25 % (ref 14–44)
MAGNESIUM SERPL-MCNC: 1.5 MG/DL (ref 1.6–2.6)
MCH RBC QN AUTO: 30.1 PG (ref 26.8–34.3)
MCHC RBC AUTO-ENTMCNC: 33 G/DL (ref 31.4–37.4)
MCV RBC AUTO: 91 FL (ref 82–98)
MONOCYTES # BLD AUTO: 0.43 THOUSAND/ΜL (ref 0.17–1.22)
MONOCYTES NFR BLD AUTO: 12 % (ref 4–12)
NEUTROPHILS # BLD AUTO: 2.02 THOUSANDS/ΜL (ref 1.85–7.62)
NEUTS SEG NFR BLD AUTO: 57 % (ref 43–75)
NRBC BLD AUTO-RTO: 0 /100 WBCS
NT-PROBNP SERPL-MCNC: 147 PG/ML
PLATELET # BLD AUTO: 59 THOUSANDS/UL (ref 149–390)
PMV BLD AUTO: 12.3 FL (ref 8.9–12.7)
POTASSIUM SERPL-SCNC: 4 MMOL/L (ref 3.5–5.3)
PROT SERPL-MCNC: 6.2 G/DL (ref 6.4–8.2)
PROTHROMBIN TIME: 16.7 SECONDS (ref 11.8–14.2)
RBC # BLD AUTO: 3.49 MILLION/UL (ref 3.81–5.12)
SODIUM SERPL-SCNC: 134 MMOL/L (ref 136–145)
WBC # BLD AUTO: 3.58 THOUSAND/UL (ref 4.31–10.16)

## 2019-02-07 PROCEDURE — 85730 THROMBOPLASTIN TIME PARTIAL: CPT | Performed by: EMERGENCY MEDICINE

## 2019-02-07 PROCEDURE — 83880 ASSAY OF NATRIURETIC PEPTIDE: CPT | Performed by: EMERGENCY MEDICINE

## 2019-02-07 PROCEDURE — 85610 PROTHROMBIN TIME: CPT | Performed by: EMERGENCY MEDICINE

## 2019-02-07 PROCEDURE — 36415 COLL VENOUS BLD VENIPUNCTURE: CPT | Performed by: EMERGENCY MEDICINE

## 2019-02-07 PROCEDURE — 96374 THER/PROPH/DIAG INJ IV PUSH: CPT

## 2019-02-07 PROCEDURE — 85025 COMPLETE CBC W/AUTO DIFF WBC: CPT | Performed by: EMERGENCY MEDICINE

## 2019-02-07 PROCEDURE — 80053 COMPREHEN METABOLIC PANEL: CPT | Performed by: EMERGENCY MEDICINE

## 2019-02-07 PROCEDURE — 96361 HYDRATE IV INFUSION ADD-ON: CPT

## 2019-02-07 PROCEDURE — 99284 EMERGENCY DEPT VISIT MOD MDM: CPT

## 2019-02-07 PROCEDURE — 83735 ASSAY OF MAGNESIUM: CPT | Performed by: EMERGENCY MEDICINE

## 2019-02-07 RX ORDER — METHOCARBAMOL 500 MG/1
1000 TABLET, FILM COATED ORAL ONCE
Status: COMPLETED | OUTPATIENT
Start: 2019-02-07 | End: 2019-02-07

## 2019-02-07 RX ORDER — ONDANSETRON 2 MG/ML
4 INJECTION INTRAMUSCULAR; INTRAVENOUS ONCE
Status: COMPLETED | OUTPATIENT
Start: 2019-02-07 | End: 2019-02-07

## 2019-02-07 RX ORDER — LORAZEPAM 0.5 MG/1
0.5 TABLET ORAL ONCE
Status: COMPLETED | OUTPATIENT
Start: 2019-02-07 | End: 2019-02-07

## 2019-02-07 RX ORDER — NAPROXEN 250 MG/1
250 TABLET ORAL 2 TIMES DAILY WITH MEALS
Qty: 15 TABLET | Refills: 0 | Status: SHIPPED | OUTPATIENT
Start: 2019-02-07 | End: 2019-05-27

## 2019-02-07 RX ORDER — ACETAMINOPHEN 325 MG/1
975 TABLET ORAL ONCE
Status: COMPLETED | OUTPATIENT
Start: 2019-02-07 | End: 2019-02-07

## 2019-02-07 RX ADMIN — ACETAMINOPHEN 975 MG: 325 TABLET, FILM COATED ORAL at 07:03

## 2019-02-07 RX ADMIN — Medication 800 MG: at 03:39

## 2019-02-07 RX ADMIN — ONDANSETRON 4 MG: 2 INJECTION INTRAMUSCULAR; INTRAVENOUS at 02:57

## 2019-02-07 RX ADMIN — LORAZEPAM 0.5 MG: 0.5 TABLET ORAL at 01:55

## 2019-02-07 RX ADMIN — METHOCARBAMOL TABLETS 1000 MG: 500 TABLET, COATED ORAL at 02:24

## 2019-02-07 RX ADMIN — SODIUM CHLORIDE 1000 ML: 0.9 INJECTION, SOLUTION INTRAVENOUS at 02:57

## 2019-02-07 NOTE — ED NOTES
Pt  Left via [facility] to home  Belongings sent with pt  Pt  Awake and alert, no distress on d/c        Alec Serrato RN  02/07/19 [id number]

## 2019-02-07 NOTE — DISCHARGE INSTRUCTIONS
Sciatica   WHAT YOU NEED TO KNOW:   Sciatica is a condition that causes pain along your sciatic nerve  The sciatic nerve runs from your spine through both sides of your buttocks  It then runs down the back of your thigh, into your lower leg and foot  Your sciatic nerve may be compressed, inflamed, irritated, or stretched  DISCHARGE INSTRUCTIONS:   Medicines:   · NSAIDs:  These medicines decrease swelling and pain  NSAIDs are available without a doctor's order  Ask your healthcare provider which medicine is right for you  Ask how much to take and when to take it  Take as directed  NSAIDs can cause stomach bleeding or kidney problems if not taken correctly  · Acetaminophen: This medicine decreases pain  Acetaminophen is available without a doctor's order  Ask how much to take and when to take it  Follow directions  Acetaminophen can cause liver damage if not taken correctly  · Muscle relaxers  help decrease pain and muscle spasms  · Take your medicine as directed  Contact your healthcare provider if you think your medicine is not helping or if you have side effects  Tell him of her if you are allergic to any medicine  Keep a list of the medicines, vitamins, and herbs you take  Include the amounts, and when and why you take them  Bring the list or the pill bottles to follow-up visits  Carry your medicine list with you in case of an emergency  Follow up with your healthcare provider as directed:  Write down your questions so you remember to ask them during your visits  Manage your symptoms:   · Activity:  Decrease your activity  Do not lift heavy objects or twist your back for at least 6 weeks  Slowly return to your usual activity  · Ice:  Ice helps decrease swelling and pain  Ice may also help prevent tissue damage  Use an ice pack, or put crushed ice in a plastic bag  Cover it with a towel and place it on your low back or leg for 15 to 20 minutes every hour or as directed      · Heat:  Heat helps decrease pain and muscle spasms  Apply heat on the area for 20 to 30 minutes every 2 hours for as many days as directed  · Physical therapy:  You may need to see physical therapist to teach you exercises to help improve movement and strength, and to decrease pain  An occupational therapist teaches you skills to help with your daily activities  · Use assistive devices if directed: You may need to wear back support, such as a back brace  You may need crutches, a cane, or a walker to decrease stress on your lower back and leg muscles  Ask your healthcare provider for more information about assistive devices and how to use them correctly  Self-care:   · Avoid pressure on your back and legs:  Do not  lift heavy objects, or stand or sit for long periods of time  · Lift objects safely:  Keep your back straight and bend your knees when you  an object  Do not bend or twist your back when you lift  · Maintain a healthy weight:  Ask your healthcare provider how much you should weigh  Ask him to help you create a weight loss plan if you are overweight  · Exercise:  Ask your healthcare provider about the best stretching, warmup, and exercise plan for you  Contact your healthcare provider if:   · You have pain in your lower back at night or when resting  · You have pain in your lower back with numbness below the knee  · You have weakness in one leg only  · You have questions or concerns about your condition or care  Return to the emergency department if:   · You have trouble holding back your urine or bowel movements  · You have weakness in both legs  · You have numbness in your groin or buttocks  © 2017 [employer] Information is for End User's use only and may not be sold, redistributed or otherwise used for commercial purposes  All illustrations and images included in CareNotes® are the copyrighted property of [employer]  or Romie Finley    The above information is an  only  It is not intended as medical advice for individual conditions or treatments  Talk to your doctor, nurse or pharmacist before following any medical regimen to see if it is safe and effective for you

## 2019-02-07 NOTE — ED NOTES
Placed a call to [facility] for wheelchair [place], right now earliest pickup is 07:45am but is calling around for earlier time and will call back to finalize time       Terrie Barone RN  02/07/19 [id number]

## 2019-02-07 NOTE — ED PROVIDER NOTES
History  Chief Complaint   Patient presents with    Leg Pain     pt "complaining of right leg pain that begins on top of her right butt cheek and goes down her leg "       History provided by:  Patient   used: No     50-year-old female presented for evaluation of right-sided sciatic pain  States that it starts in her right buttock and goes down the right leg  She has had for some time but has been worse lately  Pain moderate  Has been taking Tylenol at home for pain control  Also reports having chronic jumping in the legs and the arms which she typically had taken Ativan for but notes that her last family doctor had discontinued  Requesting Ativan today  No fever, chills  No weakness or paresthesias  No difficulty urinating  She reports that she has had some difficulty walking secondary to the pain  Usually uses a walker  Lives alone  On exam here she has some pain with motion of the hip  Negative straight leg raise  Will plan pain control, labs, re-evaluate  Prior to Admission Medications   Prescriptions Last Dose Informant Patient Reported? Taking?    LORazepam (ATIVAN) 0 5 mg tablet   Yes No   Sig: Take 0 5 mg by mouth every 8 (eight) hours as needed for anxiety   acetaminophen (TYLENOL) 325 mg tablet   No No   Sig: Take 2 tablets (650 mg total) by mouth every 6 (six) hours as needed for mild pain, headaches or fever   Patient not taking: Reported on 2/12/2019   albuterol (2 5 mg/3 mL) 0 083 % nebulizer solution  Self Yes Yes   Sig: Take 2 5 mg by nebulization 3 (three) times a day as needed for wheezing   aspirin 81 MG tablet  Self Yes Yes   Sig: Take 81 mg by mouth daily   buPROPion (WELLBUTRIN XL) 300 mg 24 hr tablet  Self Yes Yes   Sig: Take 300 mg by mouth every morning   cholecalciferol (VITAMIN D3) 1,000 units tablet  Self Yes Yes   Sig: Take 1,000 Units by mouth daily   ferrous sulfate 300 (60 Fe) mg/5 mL syrup   No Yes   Sig: Take 5 mL (300 mg total) by mouth 2 (two) times a day before meals   fluticasone-salmeterol (ADVAIR HFA) 115-21 MCG/ACT inhaler  Self Yes Yes   Sig: Inhale 2 puffs 2 (two) times a day   furosemide (LASIX) 20 mg tablet   No Yes   Sig: Take 1 tablet (20 mg total) by mouth daily   Patient not taking: Reported on 2/12/2019   insulin detemir (LEVEMIR) 100 units/mL subcutaneous injection   No Yes   Sig: Inject 10 Units under the skin daily at bedtime   insulin lispro (HUMALOG KWIKPEN) 100 units/mL injection pen   No Yes   Sig: Inject 2 Units under the skin 3 (three) times a day with meals   ipratropium (ATROVENT) 0 02 % nebulizer solution  Self Yes Yes   Sig: Take 0 5 mg by nebulization every 6 (six) hours as needed for wheezing or shortness of breath   lisinopril (ZESTRIL) 2 5 mg tablet  Self Yes Yes   Sig: Take 2 5 mg by mouth every morning   montelukast (SINGULAIR) 10 mg tablet  Self Yes Yes   Sig: Take 10 mg by mouth daily at bedtime   ondansetron (ZOFRAN) 4 mg tablet   Yes Yes   Sig: Take 4 mg by mouth every 8 (eight) hours as needed for nausea or vomiting   pantoprazole (PROTONIX) 40 mg tablet  Self Yes Yes   Sig: Take 40 mg by mouth daily   simvastatin (ZOCOR) 40 mg tablet  Self Yes Yes   Sig: Take 40 mg by mouth daily at bedtime      Facility-Administered Medications: None       Past Medical History:   Diagnosis Date    Asthma     Bipolar 2 disorder (HCC)     Chronic kidney disease     [facility]osis of liver ([facility] [address] [facility]     [facility]     Elevated troponin 9/2/2018    GERD (gastroesophageal reflux disease)     Hyperlipidemia     Hypertension     Neuropathy     Renal disorder     Restless leg syndrome        Past Surgical History:   Procedure Laterality Date    BACK SURGERY      CHOLECYSTECTOMY      HERNIA REPAIR      REPLACEMENT TOTAL KNEE BILATERAL      TUMOR REMOVAL         History reviewed  No pertinent family history  I have reviewed and agree with the history as documented      Social History     Tobacco Use    Smoking status: Passive Smoke Exposure - Never Smoker    Smokeless tobacco: Never Used   Substance Use Topics    Alcohol use: No    Drug use: No        Review of Systems   Constitutional: Negative for activity change, appetite change and fever  Respiratory: Negative for chest tightness and shortness of breath  Cardiovascular: Negative for chest pain and leg swelling  Gastrointestinal: Negative for abdominal pain  Musculoskeletal: Positive for gait problem  Negative for back pain and neck pain  Skin: Negative for color change and rash  Neurological: Negative for dizziness and weakness  All other systems reviewed and are negative  Physical Exam  Physical Exam   Constitutional: She is oriented to person, place, and time  She appears well-developed and well-nourished  HENT:   Head: Normocephalic  Mouth/Throat: Oropharynx is clear and moist    Neck: Normal range of motion  Neck supple  Cardiovascular: Normal rate and regular rhythm  Pulmonary/Chest: Effort normal and breath sounds normal    Abdominal: Soft  She exhibits no distension  Musculoskeletal: She exhibits no deformity  Some pain with motion of the right hip  Negative straight leg raise  Neurological: She is alert and oriented to person, place, and time  Psychiatric: She has a normal mood and affect  Her behavior is normal    Nursing note and vitals reviewed        Vital Signs  ED Triage Vitals [02/07/19 0020]   Temperature Pulse Respirations Blood Pressure SpO2   98 °F (36 7 °C) 91 18 149/66 97 %      Temp Source Heart Rate Source Patient Position - Orthostatic VS BP Location FiO2 (%)   Oral Monitor Sitting Right arm --      Pain Score       Worst Possible Pain           Vitals:    02/07/19 0020 02/07/19 0230 02/07/19 0419 02/07/19 0705   BP: 149/66 145/61 139/60 107/50   Pulse: 91 87 81 69   Patient Position - Orthostatic VS: Sitting Sitting Lying Lying       Visual Acuity      ED Medications  Medications   LORazepam (ATIVAN) tablet 0 5 mg (0 5 mg Oral Given 2/7/19 0155)   methocarbamol (ROBAXIN) tablet 1,000 mg (1,000 mg Oral Given 2/7/19 0224)   magnesium oxide (MAG-OX) tablet 800 mg (800 mg Oral Given 2/7/19 0339)   sodium chloride 0 9 % bolus 1,000 mL (0 mL Intravenous Stopped 2/7/19 0402)   ondansetron (ZOFRAN) injection 4 mg (4 mg Intravenous Given 2/7/19 0257)   acetaminophen (TYLENOL) tablet 975 mg (975 mg Oral Given 2/7/19 0703)       Diagnostic Studies  Results Reviewed     Procedure Component Value Units Date/Time    B-type natriuretic peptide [[id number]]  (Abnormal) Collected:  02/07/19 0207    Lab Status:  Final result Specimen:  Blood from Arm, Left Updated:  02/07/19 0238     NT-proBNP 147 pg/mL     Magnesium [[id number]]  (Abnormal) Collected:  02/07/19 0207    Lab Status:  Final result Specimen:  Blood from Arm, Left Updated:  02/07/19 0238     Magnesium 1 5 mg/dL     Comprehensive metabolic panel [[id number]]  (Abnormal) Collected:  02/07/19 0207    Lab Status:  Final result Specimen:  Blood from Arm, Left Updated:  02/07/19 0621     Sodium 134 mmol/L      Potassium 4 0 mmol/L      Chloride 100 mmol/L      CO2 25 mmol/L      ANION GAP 9 mmol/L      BUN 12 mg/dL      Creatinine 0 86 mg/dL      Glucose 369 mg/dL      Calcium 9 3 mg/dL      AST 29 U/L      ALT 37 U/L      Alkaline Phosphatase 117 U/L      Total Protein 6 2 g/dL      Albumin 2 5 g/dL      Total Bilirubin 1 00 mg/dL      eGFR 67 ml/min/1 73sq m     Narrative:         National Kidney Disease Education Program recommendations are as follows:  GFR calculation is accurate only with a steady state creatinine  Chronic Kidney disease less than 60 ml/min/1 73 sq  meters  Kidney failure less than 15 ml/min/1 73 sq  meters      CBC and differential [[id number]]  (Abnormal) Collected:  02/07/19 0207    Lab Status:  Final result Specimen:  Blood from Arm, Left Updated:  02/07/19 0233     WBC 3 58 Thousand/uL      RBC 3 49 Million/uL      Hemoglobin 10 5 g/dL      Hematocrit 31 8 % MCV 91 fL      MCH 30 1 pg      MCHC 33 0 g/dL      RDW 21 7 %      MPV 12 3 fL      Platelets 59 Thousands/uL      nRBC 0 /100 WBCs      Neutrophils Relative 57 %      Immat GRANS % 0 %      Lymphocytes Relative 25 %      Monocytes Relative 12 %      Eosinophils Relative 5 %      Basophils Relative 1 %      Neutrophils Absolute 2 02 Thousands/µL      Immature Grans Absolute 0 01 Thousand/uL      Lymphocytes Absolute 0 91 Thousands/µL      Monocytes Absolute 0 43 Thousand/µL      Eosinophils Absolute 0 17 Thousand/µL      Basophils Absolute 0 04 Thousands/µL     APTT [[id number]]  (Normal) Collected:  02/07/19 0207    Lab Status:  Final result Specimen:  Blood from Arm, Left Updated:  02/07/19 0226     PTT 35 seconds     Protime-INR [[id number]]  (Abnormal) Collected:  02/07/19 0207    Lab Status:  Final result Specimen:  Blood from Arm, Left Updated:  02/07/19 0226     Protime 16 7 seconds      INR 1 40                 No orders to display              Procedures  Procedures       Phone Contacts  ED Phone Contact    ED Course  ED Course as of Feb 12 2234   Thu Feb 07, 2019   8240 Patient ambulated to bathroom with a walker as usual                                   MDM  Number of Diagnoses or Management Options  Hypomagnesemia: new and requires workup  Lightning spasms ([facility] [address] ): established and worsening  Sciatic pain, right: established and worsening  Diagnosis management comments: 19-year-old female with right-sided sciatic pain likely stemming from the buttocks/piriformis presented with worsening pain  Over the last week  Pain was controlled in the emergency department  Normal neurologic exam   Ambulation with walker assessed and she was stable  Advised continued pain control with Tylenol home  She has an allergy to NSAIDs  Will avoid steroids due to hyperglycemia  She has follow-up with a new physician next week  To return with worsening symptoms         Amount and/or Complexity of Data Reviewed  Clinical lab tests: ordered and reviewed    Patient Progress  Patient progress: improved      Disposition  Final diagnoses:   Sciatic pain, right   Hypomagnesemia   Lightning spasms ([address] )     Time reflects when diagnosis was documented in both MDM as applicable and the Disposition within this note     Time User Action Codes Description Comment    2/7/2019  3:54 AM Griggs Daily J Add [M54 31] Sciatic pain, right     2/7/2019  3:54 AM Griggs Daily J Add [E83 42] Hypomagnesemia     2/7/2019  3:55 AM Gennystephanie Arzola Add [G40 822] Lightning spasms [facility])       ED Disposition     ED Disposition Condition Date/Time Comment    Discharge  Thu Feb 7, 2986  3:15 AM Odell Pac discharge to home/self care      Condition at discharge: Good        Follow-up Information     Follow up With Specialties Details Why Contact Info    Your family doctor              Discharge Medication List as of 2/7/2019  3:57 AM      CONTINUE these medications which have NOT CHANGED    Details   albuterol (2 5 mg/3 mL) 0 083 % nebulizer solution Take 2 5 mg by nebulization 3 (three) times a day as needed for wheezing, Historical Med      aspirin 81 MG tablet Take 81 mg by mouth daily, Historical Med      buPROPion (WELLBUTRIN XL) 300 mg 24 hr tablet Take 300 mg by mouth every morning, Historical Med      cholecalciferol (VITAMIN D3) 1,000 units tablet Take 1,000 Units by mouth daily, Historical Med      fluticasone-salmeterol (ADVAIR HFA) 115-21 MCG/ACT inhaler Inhale 2 puffs 2 (two) times a day, Historical Med      insulin detemir (LEVEMIR) 100 units/mL subcutaneous injection Inject 10 Units under the skin daily at bedtime, Starting Fri 12/28/2018, No Print      insulin lispro (HUMALOG KWIKPEN) 100 units/mL injection pen Inject 2 Units under the skin 3 (three) times a day with meals, Starting Fri 12/28/2018, No Print      lisinopril (ZESTRIL) 2 5 mg tablet Take 2 5 mg by mouth every morning, Historical Med      montelukast (SINGULAIR) 10 mg tablet Take 10 mg by mouth daily at bedtime, Historical Med      ondansetron (ZOFRAN) 4 mg tablet Take 4 mg by mouth every 8 (eight) hours as needed for nausea or vomiting, Historical Med      pantoprazole (PROTONIX) 40 mg tablet Take 40 mg by mouth daily, Historical Med      simvastatin (ZOCOR) 40 mg tablet Take 40 mg by mouth daily at bedtime, Historical Med      acetaminophen (TYLENOL) 325 mg tablet Take 2 tablets (650 mg total) by mouth every 6 (six) hours as needed for mild pain, headaches or fever, Starting Wed 9/19/2018, Print      ferrous sulfate 300 (60 Fe) mg/5 mL syrup Take 5 mL (300 mg total) by mouth 2 (two) times a day before meals, Starting Wed 9/19/2018, Normal      furosemide (LASIX) 20 mg tablet Take 1 tablet (20 mg total) by mouth daily, Starting Fri 12/28/2018, Print      ipratropium (ATROVENT) 0 02 % nebulizer solution Take 0 5 mg by nebulization every 6 (six) hours as needed for wheezing or shortness of breath, Historical Med      LORazepam (ATIVAN) 0 5 mg tablet Take 0 5 mg by mouth every 8 (eight) hours as needed for anxiety, Historical Med           No discharge procedures on file      ED Provider  Electronically Signed by           Yashira Herrera MD  02/12/19 2343

## 2019-02-12 ENCOUNTER — APPOINTMENT (EMERGENCY)
Dept: CT IMAGING | Facility: HOSPITAL | Age: 74
End: 2019-02-12
Payer: COMMERCIAL

## 2019-02-12 ENCOUNTER — HOSPITAL ENCOUNTER (OUTPATIENT)
Facility: HOSPITAL | Age: 74
Setting detail: OBSERVATION
Discharge: HOME HEALTH | End: 2019-02-13
Attending: EMERGENCY MEDICINE | Admitting: INTERNAL MEDICINE
Payer: COMMERCIAL

## 2019-02-12 DIAGNOSIS — S00.03XA CONTUSION OF SCALP, INITIAL ENCOUNTER: Primary | ICD-10-CM

## 2019-02-12 DIAGNOSIS — R73.9 HYPERGLYCEMIA: ICD-10-CM

## 2019-02-12 DIAGNOSIS — R26.2 AMBULATORY DYSFUNCTION: ICD-10-CM

## 2019-02-12 LAB
ANION GAP SERPL CALCULATED.3IONS-SCNC: 4 MMOL/L (ref 4–13)
APTT PPP: 38 SECONDS (ref 26–38)
BASOPHILS # BLD AUTO: 0.03 THOUSANDS/ΜL (ref 0–0.1)
BASOPHILS NFR BLD AUTO: 1 % (ref 0–1)
BUN SERPL-MCNC: 14 MG/DL (ref 5–25)
CALCIUM SERPL-MCNC: 9 MG/DL (ref 8.3–10.1)
CHLORIDE SERPL-SCNC: 102 MMOL/L (ref 100–108)
CK MB SERPL-MCNC: 2.9 % (ref 0–2.5)
CK MB SERPL-MCNC: 5.5 NG/ML (ref 0–5)
CK SERPL-CCNC: 188 U/L (ref 26–192)
CO2 SERPL-SCNC: 28 MMOL/L (ref 21–32)
CREAT SERPL-MCNC: 0.81 MG/DL (ref 0.6–1.3)
EOSINOPHIL # BLD AUTO: 0.15 THOUSAND/ΜL (ref 0–0.61)
EOSINOPHIL NFR BLD AUTO: 5 % (ref 0–6)
ERYTHROCYTE [DISTWIDTH] IN BLOOD BY AUTOMATED COUNT: 20.2 % (ref 11.6–15.1)
GFR SERPL CREATININE-BSD FRML MDRD: 72 ML/MIN/1.73SQ M
GLUCOSE SERPL-MCNC: 408 MG/DL (ref 65–140)
HCT VFR BLD AUTO: 33.1 % (ref 34.8–46.1)
HGB BLD-MCNC: 10.9 G/DL (ref 11.5–15.4)
IMM GRANULOCYTES # BLD AUTO: 0.01 THOUSAND/UL (ref 0–0.2)
IMM GRANULOCYTES NFR BLD AUTO: 0 % (ref 0–2)
INR PPP: 1.45 (ref 0.86–1.17)
LYMPHOCYTES # BLD AUTO: 0.87 THOUSANDS/ΜL (ref 0.6–4.47)
LYMPHOCYTES NFR BLD AUTO: 28 % (ref 14–44)
MCH RBC QN AUTO: 30.4 PG (ref 26.8–34.3)
MCHC RBC AUTO-ENTMCNC: 32.9 G/DL (ref 31.4–37.4)
MCV RBC AUTO: 92 FL (ref 82–98)
MONOCYTES # BLD AUTO: 0.44 THOUSAND/ΜL (ref 0.17–1.22)
MONOCYTES NFR BLD AUTO: 14 % (ref 4–12)
NEUTROPHILS # BLD AUTO: 1.62 THOUSANDS/ΜL (ref 1.85–7.62)
NEUTS SEG NFR BLD AUTO: 52 % (ref 43–75)
NRBC BLD AUTO-RTO: 0 /100 WBCS
PLATELET # BLD AUTO: 68 THOUSANDS/UL (ref 149–390)
POTASSIUM SERPL-SCNC: 4.2 MMOL/L (ref 3.5–5.3)
PROTHROMBIN TIME: 17.2 SECONDS (ref 11.8–14.2)
RBC # BLD AUTO: 3.59 MILLION/UL (ref 3.81–5.12)
SODIUM SERPL-SCNC: 134 MMOL/L (ref 136–145)
WBC # BLD AUTO: 3.12 THOUSAND/UL (ref 4.31–10.16)

## 2019-02-12 PROCEDURE — 82553 CREATINE MB FRACTION: CPT | Performed by: EMERGENCY MEDICINE

## 2019-02-12 PROCEDURE — 99219 PR INITIAL OBSERVATION CARE/DAY 50 MINUTES: CPT | Performed by: PHYSICIAN ASSISTANT

## 2019-02-12 PROCEDURE — 82550 ASSAY OF CK (CPK): CPT | Performed by: EMERGENCY MEDICINE

## 2019-02-12 PROCEDURE — 80048 BASIC METABOLIC PNL TOTAL CA: CPT | Performed by: EMERGENCY MEDICINE

## 2019-02-12 PROCEDURE — 85025 COMPLETE CBC W/AUTO DIFF WBC: CPT | Performed by: EMERGENCY MEDICINE

## 2019-02-12 PROCEDURE — 99285 EMERGENCY DEPT VISIT HI MDM: CPT

## 2019-02-12 PROCEDURE — 36415 COLL VENOUS BLD VENIPUNCTURE: CPT | Performed by: EMERGENCY MEDICINE

## 2019-02-12 PROCEDURE — 70450 CT HEAD/BRAIN W/O DYE: CPT

## 2019-02-12 PROCEDURE — 85610 PROTHROMBIN TIME: CPT | Performed by: EMERGENCY MEDICINE

## 2019-02-12 PROCEDURE — 85730 THROMBOPLASTIN TIME PARTIAL: CPT | Performed by: EMERGENCY MEDICINE

## 2019-02-12 RX ADMIN — SODIUM CHLORIDE 1000 ML: 0.9 INJECTION, SOLUTION INTRAVENOUS at 23:40

## 2019-02-13 ENCOUNTER — APPOINTMENT (OUTPATIENT)
Dept: CT IMAGING | Facility: HOSPITAL | Age: 74
End: 2019-02-13
Payer: COMMERCIAL

## 2019-02-13 VITALS
OXYGEN SATURATION: 98 % | HEIGHT: 60 IN | BODY MASS INDEX: 29.99 KG/M2 | HEART RATE: 91 BPM | SYSTOLIC BLOOD PRESSURE: 116 MMHG | TEMPERATURE: 98 F | DIASTOLIC BLOOD PRESSURE: 62 MMHG | WEIGHT: 152.78 LBS | RESPIRATION RATE: 18 BRPM

## 2019-02-13 PROBLEM — R60.0 LEG EDEMA: Status: ACTIVE | Noted: 2019-02-13

## 2019-02-13 PROBLEM — R63.4 UNINTENTIONAL WEIGHT LOSS: Status: ACTIVE | Noted: 2019-02-13

## 2019-02-13 LAB
ANION GAP SERPL CALCULATED.3IONS-SCNC: 10 MMOL/L (ref 4–13)
BACTERIA UR QL AUTO: ABNORMAL /HPF
BASOPHILS # BLD AUTO: 0.06 THOUSANDS/ΜL (ref 0–0.1)
BASOPHILS NFR BLD AUTO: 2 % (ref 0–1)
BILIRUB UR QL STRIP: NEGATIVE
BUN SERPL-MCNC: 12 MG/DL (ref 5–25)
CALCIUM SERPL-MCNC: 8.9 MG/DL (ref 8.3–10.1)
CHLORIDE SERPL-SCNC: 105 MMOL/L (ref 100–108)
CLARITY UR: ABNORMAL
CO2 SERPL-SCNC: 22 MMOL/L (ref 21–32)
COLOR UR: YELLOW
CREAT SERPL-MCNC: 0.63 MG/DL (ref 0.6–1.3)
EOSINOPHIL # BLD AUTO: 0.14 THOUSAND/ΜL (ref 0–0.61)
EOSINOPHIL NFR BLD AUTO: 5 % (ref 0–6)
ERYTHROCYTE [DISTWIDTH] IN BLOOD BY AUTOMATED COUNT: 20.4 % (ref 11.6–15.1)
EST. AVERAGE GLUCOSE BLD GHB EST-MCNC: 243 MG/DL
GFR SERPL CREATININE-BSD FRML MDRD: 89 ML/MIN/1.73SQ M
GLUCOSE P FAST SERPL-MCNC: 236 MG/DL (ref 65–99)
GLUCOSE SERPL-MCNC: 179 MG/DL (ref 65–140)
GLUCOSE SERPL-MCNC: 208 MG/DL (ref 65–140)
GLUCOSE SERPL-MCNC: 236 MG/DL (ref 65–140)
GLUCOSE SERPL-MCNC: 330 MG/DL (ref 65–140)
GLUCOSE UR STRIP-MCNC: ABNORMAL MG/DL
HBA1C MFR BLD: 10.1 % (ref 4.2–6.3)
HCT VFR BLD AUTO: 31.7 % (ref 34.8–46.1)
HGB BLD-MCNC: 10.6 G/DL (ref 11.5–15.4)
HGB UR QL STRIP.AUTO: ABNORMAL
IMM GRANULOCYTES # BLD AUTO: 0.05 THOUSAND/UL (ref 0–0.2)
IMM GRANULOCYTES NFR BLD AUTO: 2 % (ref 0–2)
KETONES UR STRIP-MCNC: NEGATIVE MG/DL
LEUKOCYTE ESTERASE UR QL STRIP: ABNORMAL
LYMPHOCYTES # BLD AUTO: 0.95 THOUSANDS/ΜL (ref 0.6–4.47)
LYMPHOCYTES NFR BLD AUTO: 34 % (ref 14–44)
MCH RBC QN AUTO: 30.5 PG (ref 26.8–34.3)
MCHC RBC AUTO-ENTMCNC: 33.4 G/DL (ref 31.4–37.4)
MCV RBC AUTO: 91 FL (ref 82–98)
MONOCYTES # BLD AUTO: 0.37 THOUSAND/ΜL (ref 0.17–1.22)
MONOCYTES NFR BLD AUTO: 13 % (ref 4–12)
NEUTROPHILS # BLD AUTO: 1.23 THOUSANDS/ΜL (ref 1.85–7.62)
NEUTS SEG NFR BLD AUTO: 44 % (ref 43–75)
NITRITE UR QL STRIP: NEGATIVE
NON-SQ EPI CELLS URNS QL MICRO: ABNORMAL /HPF
NRBC BLD AUTO-RTO: 0 /100 WBCS
PH UR STRIP.AUTO: 7 [PH] (ref 4.5–8)
PLATELET # BLD AUTO: 50 THOUSANDS/UL (ref 149–390)
POTASSIUM SERPL-SCNC: 4 MMOL/L (ref 3.5–5.3)
PROT UR STRIP-MCNC: ABNORMAL MG/DL
RBC # BLD AUTO: 3.47 MILLION/UL (ref 3.81–5.12)
RBC #/AREA URNS AUTO: ABNORMAL /HPF
SODIUM SERPL-SCNC: 137 MMOL/L (ref 136–145)
SP GR UR STRIP.AUTO: 1.01 (ref 1–1.03)
UROBILINOGEN UR QL STRIP.AUTO: 0.2 E.U./DL
WBC # BLD AUTO: 2.8 THOUSAND/UL (ref 4.31–10.16)
WBC #/AREA URNS AUTO: ABNORMAL /HPF

## 2019-02-13 PROCEDURE — 99217 PR OBSERVATION CARE DISCHARGE MANAGEMENT: CPT | Performed by: HOSPITALIST

## 2019-02-13 PROCEDURE — 80048 BASIC METABOLIC PNL TOTAL CA: CPT | Performed by: PHYSICIAN ASSISTANT

## 2019-02-13 PROCEDURE — 87147 CULTURE TYPE IMMUNOLOGIC: CPT | Performed by: PHYSICIAN ASSISTANT

## 2019-02-13 PROCEDURE — 87186 SC STD MICRODIL/AGAR DIL: CPT | Performed by: PHYSICIAN ASSISTANT

## 2019-02-13 PROCEDURE — 81001 URINALYSIS AUTO W/SCOPE: CPT | Performed by: PHYSICIAN ASSISTANT

## 2019-02-13 PROCEDURE — 82948 REAGENT STRIP/BLOOD GLUCOSE: CPT

## 2019-02-13 PROCEDURE — 87077 CULTURE AEROBIC IDENTIFY: CPT | Performed by: PHYSICIAN ASSISTANT

## 2019-02-13 PROCEDURE — 83036 HEMOGLOBIN GLYCOSYLATED A1C: CPT | Performed by: PHYSICIAN ASSISTANT

## 2019-02-13 PROCEDURE — 87086 URINE CULTURE/COLONY COUNT: CPT | Performed by: PHYSICIAN ASSISTANT

## 2019-02-13 PROCEDURE — 85025 COMPLETE CBC W/AUTO DIFF WBC: CPT | Performed by: PHYSICIAN ASSISTANT

## 2019-02-13 RX ORDER — LORAZEPAM 0.5 MG/1
0.5 TABLET ORAL EVERY 8 HOURS PRN
Status: DISCONTINUED | OUTPATIENT
Start: 2019-02-13 | End: 2019-02-13 | Stop reason: HOSPADM

## 2019-02-13 RX ORDER — BUPROPION HYDROCHLORIDE 150 MG/1
300 TABLET ORAL EVERY MORNING
Status: DISCONTINUED | OUTPATIENT
Start: 2019-02-13 | End: 2019-02-13 | Stop reason: HOSPADM

## 2019-02-13 RX ORDER — NAPROXEN 250 MG/1
250 TABLET ORAL 2 TIMES DAILY WITH MEALS
Status: DISCONTINUED | OUTPATIENT
Start: 2019-02-13 | End: 2019-02-13

## 2019-02-13 RX ORDER — NAPROXEN 250 MG/1
500 TABLET ORAL 2 TIMES DAILY PRN
Status: DISCONTINUED | OUTPATIENT
Start: 2019-02-13 | End: 2019-02-13 | Stop reason: HOSPADM

## 2019-02-13 RX ORDER — PRAVASTATIN SODIUM 80 MG/1
80 TABLET ORAL
Status: DISCONTINUED | OUTPATIENT
Start: 2019-02-13 | End: 2019-02-13 | Stop reason: HOSPADM

## 2019-02-13 RX ORDER — LIDOCAINE 50 MG/G
1 PATCH TOPICAL ONCE
Status: COMPLETED | OUTPATIENT
Start: 2019-02-13 | End: 2019-02-13

## 2019-02-13 RX ORDER — HEPARIN SODIUM 5000 [USP'U]/ML
5000 INJECTION, SOLUTION INTRAVENOUS; SUBCUTANEOUS EVERY 8 HOURS SCHEDULED
Status: DISCONTINUED | OUTPATIENT
Start: 2019-02-13 | End: 2019-02-13 | Stop reason: HOSPADM

## 2019-02-13 RX ORDER — LIDOCAINE 50 MG/G
1 PATCH TOPICAL ONCE
Qty: 30 PATCH | Refills: 0 | Status: SHIPPED | OUTPATIENT
Start: 2019-02-13 | End: 2019-05-31 | Stop reason: HOSPADM

## 2019-02-13 RX ORDER — PANTOPRAZOLE SODIUM 40 MG/1
40 TABLET, DELAYED RELEASE ORAL
Status: DISCONTINUED | OUTPATIENT
Start: 2019-02-13 | End: 2019-02-13 | Stop reason: HOSPADM

## 2019-02-13 RX ORDER — MELATONIN
1000 DAILY
Status: DISCONTINUED | OUTPATIENT
Start: 2019-02-13 | End: 2019-02-13 | Stop reason: HOSPADM

## 2019-02-13 RX ORDER — FERROUS SULFATE 300 MG/5ML
300 LIQUID (ML) ORAL
Status: DISCONTINUED | OUTPATIENT
Start: 2019-02-13 | End: 2019-02-13 | Stop reason: HOSPADM

## 2019-02-13 RX ORDER — ALBUTEROL SULFATE 2.5 MG/3ML
2.5 SOLUTION RESPIRATORY (INHALATION) 3 TIMES DAILY PRN
Status: DISCONTINUED | OUTPATIENT
Start: 2019-02-13 | End: 2019-02-13 | Stop reason: HOSPADM

## 2019-02-13 RX ORDER — LISINOPRIL 2.5 MG/1
2.5 TABLET ORAL EVERY MORNING
Status: DISCONTINUED | OUTPATIENT
Start: 2019-02-13 | End: 2019-02-13 | Stop reason: HOSPADM

## 2019-02-13 RX ORDER — MONTELUKAST SODIUM 10 MG/1
10 TABLET ORAL
Status: DISCONTINUED | OUTPATIENT
Start: 2019-02-13 | End: 2019-02-13 | Stop reason: HOSPADM

## 2019-02-13 RX ORDER — ACETAMINOPHEN 325 MG/1
650 TABLET ORAL EVERY 6 HOURS PRN
Status: DISCONTINUED | OUTPATIENT
Start: 2019-02-13 | End: 2019-02-13 | Stop reason: HOSPADM

## 2019-02-13 RX ORDER — OXYCODONE HYDROCHLORIDE 5 MG/1
5 TABLET ORAL ONCE
Status: COMPLETED | OUTPATIENT
Start: 2019-02-13 | End: 2019-02-13

## 2019-02-13 RX ORDER — LIDOCAINE 50 MG/G
1 PATCH TOPICAL DAILY
Status: DISCONTINUED | OUTPATIENT
Start: 2019-02-13 | End: 2019-02-13

## 2019-02-13 RX ORDER — FUROSEMIDE 20 MG/1
20 TABLET ORAL DAILY
Status: DISCONTINUED | OUTPATIENT
Start: 2019-02-13 | End: 2019-02-13 | Stop reason: HOSPADM

## 2019-02-13 RX ORDER — ASPIRIN 81 MG/1
81 TABLET, CHEWABLE ORAL DAILY
Status: DISCONTINUED | OUTPATIENT
Start: 2019-02-13 | End: 2019-02-13 | Stop reason: HOSPADM

## 2019-02-13 RX ORDER — KETOROLAC TROMETHAMINE 30 MG/ML
15 INJECTION, SOLUTION INTRAMUSCULAR; INTRAVENOUS ONCE
Status: COMPLETED | OUTPATIENT
Start: 2019-02-13 | End: 2019-02-13

## 2019-02-13 RX ORDER — FLUTICASONE FUROATE AND VILANTEROL 100; 25 UG/1; UG/1
1 POWDER RESPIRATORY (INHALATION) DAILY
Status: DISCONTINUED | OUTPATIENT
Start: 2019-02-13 | End: 2019-02-13 | Stop reason: HOSPADM

## 2019-02-13 RX ORDER — LIDOCAINE 50 MG/G
1 PATCH TOPICAL DAILY
Status: DISCONTINUED | OUTPATIENT
Start: 2019-02-14 | End: 2019-02-13 | Stop reason: HOSPADM

## 2019-02-13 RX ORDER — ONDANSETRON 2 MG/ML
4 INJECTION INTRAMUSCULAR; INTRAVENOUS EVERY 8 HOURS PRN
Status: DISCONTINUED | OUTPATIENT
Start: 2019-02-13 | End: 2019-02-13 | Stop reason: HOSPADM

## 2019-02-13 RX ADMIN — MONTELUKAST SODIUM 10 MG: 10 TABLET, FILM COATED ORAL at 02:01

## 2019-02-13 RX ADMIN — KETOROLAC TROMETHAMINE 15 MG: 30 INJECTION, SOLUTION INTRAMUSCULAR at 04:28

## 2019-02-13 RX ADMIN — INSULIN LISPRO 4 UNITS: 100 INJECTION, SOLUTION INTRAVENOUS; SUBCUTANEOUS at 02:00

## 2019-02-13 RX ADMIN — LIDOCAINE 1 PATCH: 50 PATCH TOPICAL at 02:01

## 2019-02-13 RX ADMIN — OXYCODONE HYDROCHLORIDE 5 MG: 5 TABLET ORAL at 04:28

## 2019-02-13 RX ADMIN — ONDANSETRON 4 MG: 2 INJECTION INTRAMUSCULAR; INTRAVENOUS at 07:23

## 2019-02-13 RX ADMIN — PANTOPRAZOLE SODIUM 40 MG: 40 TABLET, DELAYED RELEASE ORAL at 05:01

## 2019-02-13 NOTE — ASSESSMENT & PLAN NOTE
· Pt reports 40 lb pound weight loss, states doctors are "concerned for cancer"  · This aligns with note I review from 2017, screening CTAP without signs of malignancy at that time  · Pt states she wants no further workup regarding potential malignancy

## 2019-02-13 NOTE — SOCIAL WORK
Addendum:  CM spoke with patient regarding [facility] services  Patient agreeable,  Referral placed with [facility] through [facility], patient accepted

## 2019-02-13 NOTE — ED PROVIDER NOTES
History  Chief Complaint   Patient presents with    Fall     Per EMS patient was sitting on the toilet at 5am when she fell asleep and fell off of the toilet striking her head on the wall  Patient was on the floor for 2 hours  EMS was called and patient refused transport at that time  Called this evening due to increased bruising on left forehead  History provided by:  Patient   used: No     50-year-old female presented via EMS for evaluation of a fall with head injury earlier today  She had been sitting on the toilet early in the morning and had fallen asleep, fell forwards and struck her head on the wall and tile floor  Reports that she was unable to get up for about 2 hours at that point had called EMS  When EMS arrived to the house they helped her up but she refused transport to the hospital at that time  Later on this evening she looked herself in the mirror and noted significant ecchymosis on her head and called EMS again for evaluation at the hospital   Patient denies any ongoing headache, dizziness, visual changes, nausea, vomiting  She takes aspirin but no anticoagulants  She has tenderness of the forehead and scalp with a large area of ecchymosis on the left forehead into the left frontal/temporal scalp  Denies any other pain or injuries  No cervical spine tenderness  Typically walks with a walker  She had been seen in the emergency department last week for evaluation of some sciatic pain which has been ongoing  Lives alone  Given her propensity for falls and difficulty getting up will likely admit for full PT eval before clearing for discharge  Will check CT head, labs and re-evaluate here  Prior to Admission Medications   Prescriptions Last Dose Informant Patient Reported? Taking?    LORazepam (ATIVAN) 0 5 mg tablet   Yes No   Sig: Take 0 5 mg by mouth every 8 (eight) hours as needed for anxiety   acetaminophen (TYLENOL) 325 mg tablet Not Taking at Unknown time No No   Sig: Take 2 tablets (650 mg total) by mouth every 6 (six) hours as needed for mild pain, headaches or fever   Patient not taking: Reported on 2/12/2019   albuterol (2 5 mg/3 mL) 0 083 % nebulizer solution  Self Yes Yes   Sig: Take 2 5 mg by nebulization 3 (three) times a day as needed for wheezing   aspirin 81 MG tablet  Self Yes Yes   Sig: Take 81 mg by mouth daily   buPROPion (WELLBUTRIN XL) 300 mg 24 hr tablet  Self Yes Yes   Sig: Take 300 mg by mouth every morning   cholecalciferol (VITAMIN D3) 1,000 units tablet  Self Yes Yes   Sig: Take 1,000 Units by mouth daily   ferrous sulfate 300 (60 Fe) mg/5 mL syrup Not Taking at Unknown time  No No   Sig: Take 5 mL (300 mg total) by mouth 2 (two) times a day before meals   Patient not taking: Reported on 2/12/2019   fluticasone-salmeterol (ADVAIR HFA) 115-21 MCG/ACT inhaler  Self Yes Yes   Sig: Inhale 2 puffs 2 (two) times a day   furosemide (LASIX) 20 mg tablet Not Taking at Unknown time  No No   Sig: Take 1 tablet (20 mg total) by mouth daily   Patient not taking: Reported on 2/12/2019   insulin detemir (LEVEMIR) 100 units/mL subcutaneous injection   No Yes   Sig: Inject 10 Units under the skin daily at bedtime   insulin lispro (HUMALOG KWIKPEN) 100 units/mL injection pen   No Yes   Sig: Inject 2 Units under the skin 3 (three) times a day with meals   ipratropium (ATROVENT) 0 02 % nebulizer solution Not Taking at Unknown time Self Yes No   Sig: Take 0 5 mg by nebulization every 6 (six) hours as needed for wheezing or shortness of breath   lisinopril (ZESTRIL) 2 5 mg tablet  Self Yes Yes   Sig: Take 2 5 mg by mouth every morning   montelukast (SINGULAIR) 10 mg tablet  Self Yes Yes   Sig: Take 10 mg by mouth daily at bedtime   naproxen (NAPROSYN) 250 mg tablet Not Taking at Unknown time  No No   Sig: Take 1 tablet (250 mg total) by mouth 2 (two) times a day with meals   Patient not taking: Reported on 2/12/2019   ondansetron (ZOFRAN) 4 mg tablet   Yes Yes Sig: Take 4 mg by mouth every 8 (eight) hours as needed for nausea or vomiting   pantoprazole (PROTONIX) 40 mg tablet  Self Yes Yes   Sig: Take 40 mg by mouth daily   simvastatin (ZOCOR) 40 mg tablet  Self Yes Yes   Sig: Take 40 mg by mouth daily at bedtime      Facility-Administered Medications: None       Past Medical History:   Diagnosis Date    Asthma     Bipolar 2 disorder (HCC)     Chronic kidney disease     Cirrhosis of liver (HCC)     Diabetes mellitus ([facility] [address] )     Elevated troponin 9/2/2018    GERD (gastroesophageal reflux disease)     Hyperlipidemia     Hypertension     Neuropathy     Renal disorder     Restless leg syndrome        Past Surgical History:   Procedure Laterality Date    BACK SURGERY      CHOLECYSTECTOMY      HERNIA REPAIR      REPLACEMENT TOTAL KNEE BILATERAL      TUMOR REMOVAL         History reviewed  No pertinent family history  I have reviewed and agree with the history as documented  Social History     Tobacco Use    Smoking status: Passive Smoke Exposure - Never Smoker    Smokeless tobacco: Never Used   Substance Use Topics    Alcohol use: No    Drug use: No        Review of Systems   Constitutional: Negative for activity change, appetite change, fatigue and fever  Respiratory: Negative for shortness of breath  Cardiovascular: Negative for chest pain  Gastrointestinal: Negative for abdominal pain, nausea and vomiting  Musculoskeletal: Negative for back pain and neck pain  Skin: Positive for color change  Neurological: Negative for dizziness, weakness and headaches  All other systems reviewed and are negative  Physical Exam  Physical Exam   Constitutional: She is oriented to person, place, and time  She appears well-developed and well-nourished  No distress  HENT:   Head: Normocephalic  Mouth/Throat: Oropharynx is clear and moist    Ecchymosis over the left forehead and orbit and left frontal/temporal scalp     Eyes: Pupils are equal, round, and reactive to light  EOM are normal    Neck: Normal range of motion  Neck supple  No cervical spine tenderness  Cardiovascular: Normal rate, regular rhythm and intact distal pulses  Pulmonary/Chest: Effort normal  She exhibits no tenderness  Abdominal: Soft  She exhibits no distension  There is no tenderness  Musculoskeletal: Normal range of motion  She exhibits no deformity  Some pain with range of motion of both hips  Neurological: She is alert and oriented to person, place, and time  No cranial nerve deficit or sensory deficit  She exhibits normal muscle tone  Coordination normal    Skin: Skin is warm and dry  Psychiatric: She has a normal mood and affect  Her behavior is normal    Nursing note and vitals reviewed        Vital Signs  ED Triage Vitals [02/12/19 2149]   Temperature Pulse Respirations Blood Pressure SpO2   98 3 °F (36 8 °C) 89 18 138/66 100 %      Temp Source Heart Rate Source Patient Position - Orthostatic VS BP Location FiO2 (%)   Oral Monitor Lying Right arm --      Pain Score       --           Vitals:    02/12/19 2149   BP: 138/66   Pulse: 89   Patient Position - Orthostatic VS: Lying       Visual Acuity      ED Medications  Medications   sodium chloride 0 9 % bolus 1,000 mL (has no administration in time range)       Diagnostic Studies  Results Reviewed     Procedure Component Value Units Date/Time    Basic metabolic panel [[id number]]  (Abnormal) Collected:  02/12/19 2210    Lab Status:  Final result Specimen:  Blood from Hand, Right Updated:  02/12/19 2255     Sodium 134 mmol/L      Potassium 4 2 mmol/L      Chloride 102 mmol/L      CO2 28 mmol/L      ANION GAP 4 mmol/L      BUN 14 mg/dL      Creatinine 0 81 mg/dL      Glucose 408 mg/dL      Calcium 9 0 mg/dL      eGFR 72 ml/min/1 73sq m     Narrative:       National Kidney Disease Education Program recommendations are as follows:  GFR calculation is accurate only with a steady state creatinine  Chronic Kidney disease less than 60 ml/min/1 73 sq  meters  Kidney failure less than 15 ml/min/1 73 sq  meters  CKMB [[id number]]  (Abnormal) Collected:  02/12/19 2210    Lab Status:  Final result Specimen:  Blood from Hand, Right Updated:  02/12/19 2255     CK-MB Index 2 9 %      CK-MB 5 5 ng/mL     CK Total with Reflex CKMB [[id number]]  (Normal) Collected:  02/12/19 2210    Lab Status:  Final result Specimen:  Blood from Hand, Right Updated:  02/12/19 2254     Total  U/L     Protime-INR [[id number]]  (Abnormal) Collected:  02/12/19 2210    Lab Status:  Final result Specimen:  Blood from Arm, Right Updated:  02/12/19 2231     Protime 17 2 seconds      INR 1 45    APTT [[id number]]  (Normal) Collected:  02/12/19 2210    Lab Status:  Final result Specimen:  Blood from Arm, Right Updated:  02/12/19 2231     PTT 38 seconds     CBC and differential [[id number]]  (Abnormal) Collected:  02/12/19 2210    Lab Status:  Final result Specimen:  Blood from Arm, Right Updated:  02/12/19 2224     WBC 3 12 Thousand/uL      RBC 3 59 Million/uL      Hemoglobin 10 9 g/dL      Hematocrit 33 1 %      MCV 92 fL      MCH 30 4 pg      MCHC 32 9 g/dL      RDW 20 2 %      Platelets 68 Thousands/uL      nRBC 0 /100 WBCs      Neutrophils Relative 52 %      Immat GRANS % 0 %      Lymphocytes Relative 28 %      Monocytes Relative 14 %      Eosinophils Relative 5 %      Basophils Relative 1 %      Neutrophils Absolute 1 62 Thousands/µL      Immature Grans Absolute 0 01 Thousand/uL      Lymphocytes Absolute 0 87 Thousands/µL      Monocytes Absolute 0 44 Thousand/µL      Eosinophils Absolute 0 15 Thousand/µL      Basophils Absolute 0 03 Thousands/µL                  CT head without contrast   Final Result by Terrie Peck DO (02/12 2242)      No acute intracranial abnormality                    Workstation performed: [id number]                    Procedures  Procedures       Phone Contacts  ED Phone Contact    ED Course                               MDM  Number of Diagnoses or Management Options  Ambulatory dysfunction: established and worsening  Contusion of scalp, initial encounter: new and requires workup  Hyperglycemia: established and worsening  Diagnosis management comments: 65-year-old female presents for evaluation after falling asleep on the toilet and falling off, striking her head on the floor causing a large forehead and scalp contusion  Normal mental status and neurologic exam   CT head unremarkable for any intracranial injuries  Had been lying on the floor for about 2 hours unable to get up  CK is normal   Renal function normal   Glucose elevated  Currently not treated  Concerns for gait dysfunction, instability at home  Will admit for a PT eval and also glycemic control  Amount and/or Complexity of Data Reviewed  Clinical lab tests: ordered and reviewed  Tests in the radiology section of CPT®: ordered and reviewed  Discuss the patient with other providers: yes    Patient Progress  Patient progress: stable      Disposition  Final diagnoses:   Contusion of scalp, initial encounter   Ambulatory dysfunction   Hyperglycemia     Time reflects when diagnosis was documented in both MDM as applicable and the Disposition within this note     Time User Action Codes Description Comment    2/12/2019 11:03 PM Gretel Cruz Add [S00 03XA] Contusion of scalp, initial encounter     2/12/2019 11:03 PM Susanne BARDALES Add [R26 2] Ambulatory dysfunction     2/12/2019 11:03 PM Susanne BARDALES Add [R73 9] Hyperglycemia       ED Disposition     ED Disposition Condition Date/Time Comment    Admit Stable Tue Feb 12, 2019 11:03 PM Case was discussed with Jonathan Manrique and the patient's admission status was agreed to be Admission Status: observation status to the service of Dr Norman Matthews  Follow-up Information    None         Patient's Medications   Discharge Prescriptions    No medications on file     No discharge procedures on file      ED Provider  Electronically Signed by           Jorgito Gibbs MD  02/12/19 9473

## 2019-02-13 NOTE — PROGRESS NOTES
Went in to pts room to do morning assessment and meds  Pt immediately asking to get transportation set up to go home  Refusing any medications and assessment at this time  Asked to be left alone  Explained that it may not be safe to send her home at this time and pt did not care  SLIM MD aware and will come to see pt  Bed alarm on  Safety measures in place  Will continue to monitor

## 2019-02-13 NOTE — PLAN OF CARE
Patient is under OBS status  CM met with patient at bedside to discuss OBS notification  Copy provided  Patient states that she just wants to go home and states that she will refuse care; nursing reports that she is resisting all efforts to care for her other than eating meals  Case discussed with SLIM provider  Patient is alert and oriented x3  Patient reports the following:  She lives alone in [facility], uses a walker for assistance with ambulation  Reports that she and her friend Enrique Kent help each other out  Patient reports that she is part of the [facility] and has an aide, patient provided CM with the contact information for her aide, who is through [employer]  Patient also reports that she has a  through [employer], along with a  from the [facility]  CM contacted the number provided from [employer], spoke to Tiara  Per Joanne Sahu ([phone]), patient was just recently approved for the [facility] and has an aide every day for 4 hours from [employer] Bob of [employer] provided CM with the name and number of the patient's  (case mananger) with the [facility], [facility], who is with [facility] ([phone])  Per [facility] for the [facility], patient has 4 hours per day of aide service and declined anything more than that  [facility] reports that the patient was just recently approved for services this past Saturday  Both patient and [facility] report that patient was given a Life Alert as well (a personal emergency alert system)  Both patient and [facility] report that patient has a PCP appointment scheduled tomorrow at the Clinic with Dr Ivania Cole and the Waiver program has arranged her transportation to this appointment tomorrow  Patient asked for transportation back to her apartment today via [facility],  CM discussed the out of pocket cost for this service and patient verbalized understanding    CM contacted [facility], pickup time is 1400 via [address]  Notified nursing, notified patient, notified SLIM  CM will continue to follow and assist through discharge

## 2019-02-13 NOTE — PLAN OF CARE
Addendum:  CM spoke with patient regarding [facility]NA services  Patient agreeable,  Referral placed with [facility] through [employer], patient accepted

## 2019-02-13 NOTE — ASSESSMENT & PLAN NOTE
Lab Results   Component Value Date    HGBA1C 6 8 (H) 10/19/2017       Recent Labs     02/13/19  0149   POCGLU 330*       Blood Sugar Average: Last 72 hrs:  · With significant hyperglycemia,  on arrival   · Poor OP f/u, questionable compliance   · Continue Levemir 40U BID with 17U lispro ac TID  · Add on SSI ac and hs for additional coverage, adjust algorithm as necessary  · Obtain A1c  · Accuchecks  · If not improving consider Endo consult, otherwise encourage OP f/u  (P) 330

## 2019-02-13 NOTE — ASSESSMENT & PLAN NOTE
· Seen in ED recently for sciatica, now with worsening generalized weakness sp fall today  · (reports overall improvement of her sciatica after taking OTC naproxen)  · CBC at baseline, afebrile  · CT head negative, CK wnl  · Suspect generalized deconditioning, pt states 2/2 weight loss and diarrhea  · Chart review as far back as 2017 seems to report "mucousy" stools/diarrhea as a chronic issue as is her reported GIB  · Will obtain PT eval and tx, OT consult   · Case management for discharge planning   · Lido patch and pain control for sciatica

## 2019-02-13 NOTE — ASSESSMENT & PLAN NOTE
· Echo Sep 2018 with EF 06%, grade 1 diastolic dysfunction  · Mild AS, mild TVR, mild pulmonic regurgitation, mild MVR   · Report taking lasix prn leg swelling

## 2019-02-13 NOTE — SOCIAL WORK
Patient is under OBS status  CM met with patient at bedside to discuss OBS notification  Copy provided  Patient states that she just wants to go home and states that she will refuse care; nursing reports that she is resisting all efforts to care for her other than eating meals  Case discussed with SL[facility] provider  Patient is alert and oriented x3  Patient reports the following:  She lives alone in [facility], uses a walker for assistance with ambulation  Reports that she and her friend Edilia Miss help each other out  Patient reports that she is part of the [facility] and has an aide, patient provided CM with the contact information for her aide, who is through [employer]  Patient also reports that she has a  through [employer], along with a  from the [facility]  CM contacted the number provided from [employer], spoke to Ryan Ken  Per Patricia De Santiago ([phone]), patient was just recently approved for the [facility] and has an aide every day for 4 hours from [employer]  Lisbeth of [employer] provided CM with the name and number of the patient's  (case mananger) with the [facility], [facility], who is with [employer] ([phone])  Per [address] [place] [address] for the [facility], patient has 4 hours per day of aide service and declined anything more than that  [facility] reports that the patient was just recently approved for services this past Saturday  Both patient and [facility] report that patient was given a Life Alert as well (a personal emergency alert system)  Both patient and [facility] report that patient has a PCP appointment scheduled tomorrow at the Clinic with Dr Luda Srivastava and the Waiver program has arranged her transportation to this appointment tomorrow  Patient asked for transportation back to her apartment today via [facility],  CM discussed the out of pocket cost for this service and patient verbalized understanding    CM contacted [facility], pickup time is 1400 via [address]  Notified nursing, notified patient, notified [facility]  CM will continue to follow and assist through discharge

## 2019-02-13 NOTE — PLAN OF CARE
Problem: Potential for Falls  Goal: Patient will remain free of falls  Description  INTERVENTIONS:  - Assess patient frequently for physical needs  -  Identify cognitive and physical deficits and behaviors that affect risk of falls  -  [place] fall precautions as indicated by assessment   - Educate patient/family on patient safety including physical limitations  - Instruct patient to call for assistance with activity based on assessment  - Modify environment to reduce risk of injury  - Consider OT/PT consult to assist with strengthening/mobility  Outcome: Progressing     Problem: Prexisting or High Potential for Compromised Skin Integrity  Goal: Skin integrity is maintained or improved  Description  INTERVENTIONS:  - Identify patients at risk for skin breakdown  - Assess and monitor skin integrity  - Assess and monitor nutrition and hydration status  - Monitor labs (i e  albumin)  - Assess for incontinence   - Turn and reposition patient  - Assist with mobility/ambulation  - Relieve pressure over bony prominences  - Avoid friction and shearing  - Provide appropriate hygiene as needed including keeping skin clean and dry  - Evaluate need for skin moisturizer/barrier cream  - Collaborate with interdisciplinary team (i e  Nutrition, Rehabilitation, etc )   - Patient/family teaching  Outcome: Progressing     Problem: Nutrition/Hydration-ADULT  Goal: Nutrient/Hydration intake appropriate for improving, restoring or maintaining nutritional needs  Description  Monitor and assess patient's nutrition/hydration status for malnutrition (ex- brittle hair, bruises, dry skin, pale skin and conjunctiva, muscle wasting, smooth red tongue, and disorientation)  Collaborate with interdisciplinary team and initiate plan and interventions as ordered  Monitor patient's weight and dietary intake as ordered or per policy  Utilize nutrition screening tool and intervene per policy   Determine patient's food preferences and provide high-protein, high-caloric foods as appropriate       INTERVENTIONS:  - Monitor oral intake, urinary output, labs, and treatment plans  - Assess nutrition and hydration status and recommend course of action  - Evaluate amount of meals eaten  - Assist patient with eating if necessary   - Allow adequate time for meals  - Recommend/ encourage appropriate diets, oral nutritional supplements, and vitamin/mineral supplements  - Order, calculate, and assess calorie counts as needed  - Recommend, monitor, and adjust tube feedings and TPN/PPN based on assessed needs  - Assess need for intravenous fluids  - Provide specific nutrition/hydration education as appropriate  - Include patient/family/caregiver in decisions related to nutrition  Outcome: Progressing     Problem: PAIN - ADULT  Goal: Verbalizes/displays adequate comfort level or baseline comfort level  Description  Interventions:  - Encourage patient to monitor pain and request assistance  - Assess pain using appropriate pain scale  - Administer analgesics based on type and severity of pain and evaluate response  - Implement non-pharmacological measures as appropriate and evaluate response  - Consider cultural and social influences on pain and pain management  - Notify physician/advanced practitioner if interventions unsuccessful or patient reports new pain  Outcome: Progressing     Problem: INFECTION - ADULT  Goal: Absence or prevention of progression during hospitalization  Description  INTERVENTIONS:  - Assess and monitor for signs and symptoms of infection  - Monitor lab/diagnostic results  - Monitor all insertion sites, i e  indwelling lines, tubes, and drains  - Monitor endotracheal (as able) and nasal secretions for changes in amount and color  - [place] appropriate cooling/warming therapies per order  - Administer medications as ordered  - Instruct and encourage patient and family to use good hand hygiene technique  - Identify and instruct in appropriate isolation precautions for identified infection/condition  Outcome: Progressing  Goal: Absence of fever/infection during neutropenic period  Description  INTERVENTIONS:  - Monitor WBC  - Implement neutropenic guidelines  Outcome: Progressing     Problem: SAFETY ADULT  Goal: Maintain or return to baseline ADL function  Description  INTERVENTIONS:  -  Assess patient's ability to carry out ADLs; assess patient's baseline for ADL function and identify physical deficits which impact ability to perform ADLs (bathing, care of mouth/teeth, toileting, grooming, dressing, etc )  - Assess/evaluate cause of self-care deficits   - Assess range of motion  - Assess patient's mobility; develop plan if impaired  - Assess patient's need for assistive devices and provide as appropriate  - Encourage maximum independence but intervene and supervise when necessary  ¯ Involve family in performance of ADLs  ¯ Assess for home care needs following discharge   ¯ Request OT consult to assist with ADL evaluation and planning for discharge  ¯ Provide patient education as appropriate  Outcome: Progressing  Goal: Maintain or return mobility status to optimal level  Description  INTERVENTIONS:  - Assess patient's baseline mobility status (ambulation, transfers, stairs, etc )    - Identify cognitive and physical deficits and behaviors that affect mobility  - Identify mobility aids required to assist with transfers and/or ambulation (gait belt, sit-to-stand, lift, walker, cane, etc )  - [place] fall precautions as indicated by assessment  - Record patient progress and toleration of activity level on Mobility SBAR; progress patient to next Phase/Stage  - Instruct patient to call for assistance with activity based on assessment  - Request Rehabilitation consult to assist with strengthening/weightbearing, etc   Outcome: Progressing     Problem: DISCHARGE PLANNING  Goal: Discharge to home or other facility with appropriate resources  Description  INTERVENTIONS:  - Identify barriers to discharge w/patient and caregiver  - Arrange for needed discharge resources and transportation as appropriate  - Identify discharge learning needs (meds, wound care, etc )  - Arrange for interpretive services to assist at discharge as needed  - Refer to Case Management Department for coordinating discharge planning if the patient needs post-hospital services based on physician/advanced practitioner order or complex needs related to functional status, cognitive ability, or social support system  Outcome: Progressing     Problem: Knowledge Deficit  Goal: Patient/family/caregiver demonstrates understanding of disease process, treatment plan, medications, and discharge instructions  Description  Complete learning assessment and assess knowledge base    Interventions:  - Provide teaching at level of understanding  - Provide teaching via preferred learning methods  Outcome: Progressing     Problem: MUSCULOSKELETAL - ADULT  Goal: Maintain or return mobility to safest level of function  Description  INTERVENTIONS:  - Assess patient's ability to carry out ADLs; assess patient's baseline for ADL function and identify physical deficits which impact ability to perform ADLs (bathing, care of mouth/teeth, toileting, grooming, dressing, etc )  - Assess/evaluate cause of self-care deficits   - Assess range of motion  - Assess patient's mobility; develop plan if impaired  - Assess patient's need for assistive devices and provide as appropriate  - Encourage maximum independence but intervene and supervise when necessary  - Involve family in performance of ADLs  - Assess for home care needs following discharge   - Request OT consult to assist with ADL evaluation and planning for discharge  - Provide patient education as appropriate  Outcome: Progressing  Goal: Maintain proper alignment of affected body part  Description  INTERVENTIONS:  - Support, maintain and protect limb and body alignment  - Provide pt/fam with appropriate education  Outcome: Progressing

## 2019-02-13 NOTE — ASSESSMENT & PLAN NOTE
Lab Results   Component Value Date    HGBA1C 6 8 (H) 10/19/2017       No results for input(s): POCGLU in the last 72 hours      Blood Sugar Average: Last 72 hrs:  · With significant hyperglycemia,  on arrival   · Poor OP f/u, questionable compliance   · Continue Levemir 10U hs with 2U lispro ac TID  · Add on SSI ac and hs for additional coverage, adjust algorithm as necessary  · Obtain A1c  · Accuchecks  · If not improving consider Endo consult, otherwise encourage OP f/u

## 2019-02-13 NOTE — DISCHARGE SUMMARY
Discharge Summary - [address] [facility]    Patient Information: Faith Gan 75 y o  female MRN: [id number]  Unit/Bed#: -01 Encounter: [id number]    Discharging Physician / Practitioner: Irving Maddox MD  PCP: No primary care provider on file  Admission Date: 2/12/2019  Discharge Date: 02/13/19    Disposition:     Home with VNA Services (Reminder: Complete face to face encounter)    Reason for Admission: ambulatory dysfunction     Discharge Diagnoses:     Principal Problem:    Ambulatory dysfunction  Active Problems:    Type 2 diabetes mellitus with diabetic polyneuropathy, with long-term current use of insulin (HCC)    Hyperlipidemia    Asthma    Pancytopenia ([address] )    Essential hypertension    Unintentional weight loss    Leg edema  Resolved Problems:    * No resolved hospital problems  *      Consultations During Hospital Stay:    None     Procedures Performed:     None     Significant Findings / Test Results:     CT head: (2/12/19)  IMPRESSION:  No acute intracranial abnormality  Incidental Findings:   · None      Test Results Pending at Discharge (will require follow up): · None      Outpatient Tests Requested:  · None     Complications:  None     Hospital Course:     Faith Gan is a 75 y o  female patient who originally presented to the hospital on 2/12/2019 due to several falls at home  Pt had been complaining of worsening low back pain and as a result had been less active  She apparently fell asleep on the toilet and struck the side of her head  She contacted EMS but declined evaluation of the ED  Pt then saw the size of her ecchymosis and contacted EMS again as she was concerned she may have hurt herself  ED evaluation was unremarkable for acute fracture  Pt did complain of significant pain with ambulation so pt was brought in for observation and PT eval  The following day pt asked to be discharged immediately and did not want any additional evaluation   She felt like her pain had resolved to a point where she was comfortable going home  Case management was able to make arrangements and coordinate with her outpatient care coordinators and she was resumed with her home services  Pt's pain was well-controlled with lidocaine  Condition at Discharge: fair     Discharge Day Visit / Exam:     Subjective:  No complaints  Anxious to be discharged   Vitals: Blood Pressure: 116/62 (02/13/19 0717)  Pulse: 91 (02/13/19 0717)  Temperature: 98 °F (36 7 °C) (02/13/19 0717)  Temp Source: Oral (02/13/19 0717)  Respirations: 18 (02/13/19 0717)  Height: 5' (152 4 cm) (02/12/19 2356)  Weight - Scale: 69 3 kg (152 lb 12 5 oz) (02/13/19 0600)  SpO2: 98 % (02/13/19 0717)  Exam:   Physical Exam   Constitutional: No distress  HENT:   Head: Normocephalic and atraumatic  Large ecchymosis around eye    Cardiovascular: Normal rate and regular rhythm  Exam reveals no friction rub  No murmur heard  Pulmonary/Chest: No stridor  No respiratory distress  Abdominal: Soft  Bowel sounds are normal    Neurological: She is alert  No cranial nerve deficit  Coordination normal    Skin: No rash noted  She is not diaphoretic  No erythema  Nursing note and vitals reviewed  Discharge instructions/Information to patient and family:   See after visit summary for information provided to patient and family  Provisions for Follow-Up Care:  See after visit summary for information related to follow-up care and any pertinent home health orders  Planned Readmission: none     Discharge Statement:  I spent 40 minutes discharging the patient  This time was spent on the day of discharge  I had direct contact with the patient on the day of discharge  Greater than 50% of the total time was spent examining patient, answering all patient questions, arranging and discussing plan of care with patient as well as directly providing post-discharge instructions  Additional time then spent on discharge activities      Discharge Medications:  See after visit summary for reconciled discharge medications provided to patient and family        ** Please Note: This note has been constructed using a voice recognition system **

## 2019-02-13 NOTE — ASSESSMENT & PLAN NOTE
· Leukopenic at baseline around 2-3, WBC 3 12 today  · Hgb stable at 10 9  · Will monitor CBC  · Cont iron supplement

## 2019-02-13 NOTE — UTILIZATION REVIEW
Initial Clinical Review    Admission: Date/Time/Statement: observation 02/12/19 2304    Orders Placed This Encounter   Procedures    Place in Observation     Standing Status:   Standing     Number of Occurrences:   1     Order Specific Question:   Admitting Physician     Answer:   Moe Latham     Order Specific Question:   Level of Care     Answer:   Med Surg [16]     ED: Date/Time/Mode of Arrival:   ED Arrival Information     Expected Arrival Acuity Means of Arrival Escorted By Service Admission Type    - 2/12/2019 21:47 Urgent Ambulance [place] [facility] Hospitalist Urgent    Arrival Complaint    fall        Chief Complaint:   Chief Complaint   Patient presents with   [place] Fall     Per EMS patient was sitting on the toilet at 5am when she fell asleep and fell off of the toilet striking her head on the wall  Patient was on the floor for 2 hours  EMS was called and patient refused transport at that time  Called this evening due to increased bruising on left forehead  History of Illness: 75 y o  female with PMHx of DM, HTN, HLD who presents following fall  Patient states she was seen in the ED recently for sciatic pain in her right buttock radiating down the back of her right leg  She states her sciatica improved greatly after she started taking naproxen that she bought over the counter  However this morning she went to sit on the toilet and dozed off, subsequently falling onto the ground after striking her head on the wall and then onto the floor  She says she never lost consciousness and does not have a headache, visual disturbance, dizziness, paresthesias, numbness, tingling  She was unable to get up for 2hrs and at that point had called EMS       ED Vital Signs:   ED Triage Vitals   Temperature Pulse Respirations Blood Pressure SpO2   02/12/19 2149 02/12/19 2149 02/12/19 2149 02/12/19 2149 02/12/19 2149   98 3 °F (36 8 °C) 89 18 138/66 100 %      Temp Source Heart Rate Source Patient Position - Orthostatic VS BP Location FiO2 (%)   02/12/19 2149 02/12/19 2149 02/12/19 2149 02/12/19 2149 --   Oral Monitor Lying Right arm       Pain Score       02/13/19 0026       9        Wt Readings from Last 1 Encounters:   02/13/19 69 3 kg (152 lb 12 5 oz)     Vital Signs (abnormal): none    Pertinent Labs/Diagnostic Test Results: 2/12/2019 Na 134, glucose 408, creatinine kinase MB fraction 5 5, creatinine kinase MB index: 2 9,   WBC 3  12Low     RBC 3 59Low     Hemoglobin 10 9Low     Hematocrit 33 1Low     Platelets 67DAT     protime 17 2, INR 1 45,     2/13/2019 glucose 236, wbc 2 8, rbc 3 47, hgb 10 6, hct 31 7, platelet 50  Urinalysis:   Leukocytes, UA SmallAbnormal     Nitrite, UA Negative    Protein, UA 30 (1+)Abnormal     Glucose, UA >=1000 (1%)Abnormal     Ketones, UA Negative    Urobilinogen, UA 0 2    Bilirubin, UA Negative    Blood, UA LargeAbnormal           ED Treatment:   Medication Administration from 02/12/2019 2147 to 02/12/2019 2356       Date/Time Order Dose Route Action Action by Comments     02/12/2019 2340 sodium chloride 0 9 % bolus 1,000 mL 1,000 mL Intravenous New Letty Ayala RN         Past Medical/Surgical History:    Active Ambulatory Problems     Diagnosis Date Noted    Type 2 diabetes mellitus with diabetic polyneuropathy, with long-term current use of insulin ([facility] )     Hyperlipidemia     Asthma 10/18/2017    Anemia of chronic disease 10/18/2017    Lactic acidosis 10/18/2017    Pancytopenia ([facility] ) 10/18/2017    B[facility] 1 disorder ([facility] ) 10/18/2017    Cirrhosis of liver ([facility] ) 10/19/2017    Intractable diarrhea 11/03/2017    Dizziness 08/14/2018    Hypokalemia 08/14/2018    Essential hypertension     GERD (gastroesophageal reflux disease)     Elevated troponin 09/02/2018    Myoclonic jerking 09/18/2018    Venous stasis 12/28/2018    Wound, open, leg 12/28/2018    Restless leg syndrome 12/28/2018     Resolved Ambulatory Problems     Diagnosis Date Noted    NSTEMI (non-ST elevated myocardial infarction) ([facility] ) 10/18/2017    Acute cystitis without hematuria 08/14/2018    Near syncope 09/02/2018    Chronic kidney disease      Past Medical History:   Diagnosis Date    Asthma     Bipolar 2 disorder ([place] [address] )     Chronic kidney disease     Cirrhosis of liver ([facility] )     Diabetes mellitus ([place] [address] )     Elevated troponin 9/2/2018    GERD (gastroesophageal reflux disease)     Hyperlipidemia     Hypertension     Neuropathy     Renal disorder     Restless leg syndrome      Admitting Diagnosis: Head injury [S09 90XA]  Hyperglycemia [R73 9]  Contusion of scalp, initial encounter [S00 03XA]  Ambulatory dysfunction [R26 2]     Age/Sex: 75 y o  female     Assessment/Plan: 2/12/2019 attending note:   Ambulatory dysfunction  Assessment & Plan  · Seen in ED recently for sciatica, now with worsening generalized weakness sp fall today  ? (reports overall improvement of her sciatica after taking OTC naproxen)  ? CBC at baseline, afebrile  ? CT head negative, CK wnl  ? Suspect generalized deconditioning, pt states 2/2 weight loss and diarrhea  § Chart review as far back as 2017 seems to report "mucousy" stools/diarrhea as a chronic issue as is her reported GIB  · Will obtain PT eval and tx, OT consult   · Case management for discharge planning   · Lido patch and pain control for sciatica  Unintentional weight loss  Assessment & Plan  · Pt reports 40 lb pound weight loss, states doctors are "concerned for cancer"  ?  This aligns with note I review from 2017, screening CTAP without signs of malignancy at that time  · Pt states she wants no further workup regarding potential malignancy   Type 2 diabetes mellitus with diabetic polyneuropathy, with long-term current use of insulin [facility])  Assessment & Plan        Lab Results   Component Value Date     HGBA1C 6 8 (H) 10/19/2017          Recent Labs     02/13/19  0149   POCGLU 330*      Blood Sugar Average: Last 72 hrs:  · With significant hyperglycemia,  on arrival   · Poor OP f/u, questionable compliance   · Continue Levemir 40U BID with 17U lispro ac TID  · Add on SSI ac and hs for additional coverage, adjust algorithm as necessary  · Obtain A1c  · Accuchecks  · If not improving consider Endo consult, otherwise encourage OP f/u  (P) 330  Leg edema  Assessment & Plan  · Echo Sep 2018 with EF 64%, grade 1 diastolic dysfunction  ?  Mild AS, mild TVR, mild pulmonic regurgitation, mild MVR   · Report taking lasix prn leg swelling  Essential hypertension  Assessment & Plan  · Stable, continue lisinopril   Hyperlipidemia  Assessment & Plan  · Continue statin  · Pancytopenia (HCC)  Assessment & Plan  · Leukopenic at baseline around 2-3, WBC 3 12 today  · Hgb stable at 10 9  · Will monitor CBC  · Cont iron supplement     Admission Orders:  Peripheral IV  OT/PT eval & treat  CT spine lumbar wo contrast  Daily wt  Diet kuldeep /cho controlled      Scheduled Meds:   Current Facility-Administered Medications:  acetaminophen 650 mg Oral Q6H PRN   albuterol 2 5 mg Nebulization TID PRN   aspirin 81 mg Oral Daily   buPROPion 300 mg Oral QAM   cholecalciferol 1,000 Units Oral Daily   ferrous sulfate 300 mg Oral BID AC   fluticasone-vilanterol 1 puff Inhalation Daily   furosemide 20 mg Oral Daily   heparin (porcine) 5,000 Units Subcutaneous Q8H [facility]   insulin detemir 40 Units Subcutaneous Q12H [facility]   insulin lispro 1-5 Units Subcutaneous TID With Meals   insulin lispro 1-5 Units Subcutaneous HS   insulin lispro 17 Units Subcutaneous TID With Meals   ipratropium 0 5 mg Nebulization Q6H PRN   lidocaine 1 patch Topical Once   [START ON 2/14/2019] lidocaine 1 patch Topical Daily   lisinopril 2 5 mg Oral QAM   LORazepam 0 5 mg Oral Q8H PRN   montelukast 10 mg Oral HS   naproxen 500 mg Oral BID PRN   ondansetron 4 mg Intravenous Q8H PRN   pantoprazole 40 mg Oral Early Morning   pravastatin 80 mg Oral Daily With Dinner     Continuous Infusions:    PRN Meds:     Network Utilization Review Department  Phone: [phone]; Fax [phone]  [email or web address]  ATTENTION: Please call with any questions or concerns to [phone]  and carefully listen to the prompts so that you are directed to the right person  Send all requests for admission clinical reviews, approved or denied determinations and any other requests to fax [phone]   All voicemails are confidential

## 2019-02-13 NOTE — H&P
H&P- [facility] [birth year], 75 y o  female MRN: [id number]    Unit/Bed#: -01 Encounter: [id number]    Primary Care Provider: No primary care provider on file     Date and time admitted to hospital: 2/12/2019  9:47 PM    * Ambulatory dysfunction  Assessment & Plan  · Seen in ED recently for sciatica, now with worsening generalized weakness sp fall today  · (reports overall improvement of her sciatica after taking OTC naproxen)  · CBC at baseline, afebrile  · CT head negative, CK wnl  · Suspect generalized deconditioning, pt states 2/2 weight loss and diarrhea  · Chart review as far back as 2017 seems to report "mucousy" stools/diarrhea as a chronic issue as is her reported GIB  · Will obtain PT eval and tx, OT consult   · Case management for discharge planning   · Lido patch and pain control for sciatica    Unintentional weight loss  Assessment & Plan  · Pt reports 40 lb pound weight loss, states doctors are "concerned for cancer"  · This aligns with note I review from 2017, screening CTAP without signs of malignancy at that time  · Pt states she wants no further workup regarding potential malignancy     Type 2 diabetes mellitus with diabetic polyneuropathy, with long-term current use of insulin ([facility] )  Assessment & Plan  Lab Results   Component Value Date    HGBA1C 6 8 (H) 10/19/2017       Recent Labs     02/13/19  0149   POCGLU 330*       Blood Sugar Average: Last 72 hrs:  · With significant hyperglycemia,  on arrival   · Poor OP f/u, questionable compliance   · Continue Levemir 40U BID with 17U lispro ac TID  · Add on SSI ac and hs for additional coverage, adjust algorithm as necessary  · Obtain A1c  · Accuchecks  · If not improving consider Endo consult, otherwise encourage OP f/u  (P) 330    Leg edema  Assessment & Plan  · Echo Sep 2018 with EF 87%, grade 1 diastolic dysfunction  · Mild AS, mild TVR, mild pulmonic regurgitation, mild MVR   · Report taking lasix prn leg swelling    Essential hypertension  Assessment & Plan  · Stable, continue lisinopril     Hyperlipidemia  Assessment & Plan  · Continue statin    Asthma  Assessment & Plan  · No acute exacerbation, continue home inhaler regimen     Pancytopenia (HCC)  Assessment & Plan  · Leukopenic at baseline around 2-3, WBC 3 12 today  · Hgb stable at 10 9  · Will monitor CBC  · Cont iron supplement     VTE Prophylaxis: Heparin  / reason for no mechanical VTE prophylaxis : ambulate   Code Status: DNR/DNI  POLST: POLST form is not discussed and not completed at this time  Discussion with family: none    Anticipated Length of Stay:  Patient will be admitted on an Observation basis with an anticipated length of stay of  < 2 midnights  Justification for Hospital Stay: per plan above    Total Time for Visit, including Counseling / Coordination of Care: 30 minutes  Greater than 50% of this total time spent on direct patient counseling and coordination of care  Chief Complaint:   fall    History of Present Illness:    Sandra Pan is a 75 y o  female with PMHx of DM, HTN, HLD who presents following fall  Patient states she was seen in the ED recently for sciatic pain in her right buttock radiating down the back of her right leg  She states her sciatica improved greatly after she started taking naproxen that she bought over the counter  However this morning she went to sit on the toilet and dozed off, subsequently falling onto the ground after striking her head on the wall and then onto the floor  She says she never lost consciousness and does not have a headache, visual disturbance, dizziness, paresthesias, numbness, tingling  She does admit to generalized weakness a she reports recent weight loss  She states the doctors were concerned she has cancer but she says she refuses any further workup stating that it is up to God"    She also reports chronic episodes of mucousy diarrhea as well as longstanding lower GI bleed for which again she says she is now on further workup  She does report a decent appetite and good p o  Intake however states it goes right through her  Denies fever chills, denies abdominal pain, denies nausea or vomiting  Denies chest pain, shortness of breath  Reports productive cough, states this is chronic  Reports a longstanding dysuria, denies urgency or frequency  Denies hematuria or low back pain  She states she has not taken her Lasix for a while as her legs have been pencil thin  She says she does not take this for her heart and only takes it for lower extremity edema  She does state that she takes her Lasix when her legs become edematous again  Also states she has not been taking insulin as her doctors told her not to take it when she has poor p o  Intake  However she states her sugars recently have been in the 400s, she still has not taken her insulin regardless  She states after the ED staff moved her around abruptly, her sciatic pain has returned  Furthermore, patient states she is leaving tomorrow no matter what because she has found a doctor to do all her refills"  Review of Systems:    Review of Systems   Constitutional: Negative  HENT: Negative  Eyes: Negative  Respiratory: Positive for cough  Cardiovascular: Negative  Gastrointestinal: Positive for diarrhea  Endocrine: Negative  Genitourinary: Positive for dysuria  Musculoskeletal: Positive for arthralgias and myalgias  Skin: Negative  Allergic/Immunologic: Negative  Neurological: Positive for weakness  Hematological: Negative  Psychiatric/Behavioral: Negative          Past Medical and Surgical History:     Past Medical History:   Diagnosis Date    Asthma     Bipolar 2 disorder ([facility] )     Chronic kidney disease     Cirrhosis of liver ([facility] )     Diabetes mellitus ([place] [address] )     Elevated troponin 9/2/2018    GERD (gastroesophageal reflux disease)     Hyperlipidemia     Hypertension     Neuropathy     Renal disorder  Restless leg syndrome        Past Surgical History:   Procedure Laterality Date    BACK SURGERY      CHOLECYSTECTOMY      HERNIA REPAIR      REPLACEMENT TOTAL KNEE BILATERAL      TUMOR REMOVAL         Meds/Allergies:    Prior to Admission medications    Medication Sig Start Date End Date Taking?  Authorizing Provider   albuterol (2 5 mg/3 mL) 0 083 % nebulizer solution Take 2 5 mg by nebulization 3 (three) times a day as needed for wheezing   Yes Historical Provider, MD   aspirin 81 MG tablet Take 81 mg by mouth daily   Yes Historical Provider, MD   buPROPion (WELLBUTRIN XL) 300 mg 24 hr tablet Take 300 mg by mouth every morning   Yes Historical Provider, MD   cholecalciferol (VITAMIN D3) 1,000 units tablet Take 1,000 Units by mouth daily   Yes Historical Provider, MD   fluticasone-salmeterol (ADVAIR HFA) 115-21 MCG/ACT inhaler Inhale 2 puffs 2 (two) times a day   Yes Historical Provider, MD   insulin detemir (LEVEMIR) 100 units/mL subcutaneous injection Inject 10 Units under the skin daily at bedtime 12/28/18  Yes Gucci Garcia MD   insulin lispro (HUMALOG KWIKPEN) 100 units/mL injection pen Inject 2 Units under the skin 3 (three) times a day with meals 12/28/18  Yes Gucci Garcia MD   lisinopril (ZESTRIL) 2 5 mg tablet Take 2 5 mg by mouth every morning   Yes Historical Provider, MD   montelukast (SINGULAIR) 10 mg tablet Take 10 mg by mouth daily at bedtime   Yes Historical Provider, MD   ondansetron (ZOFRAN) 4 mg tablet Take 4 mg by mouth every 8 (eight) hours as needed for nausea or vomiting   Yes Historical Provider, MD   pantoprazole (PROTONIX) 40 mg tablet Take 40 mg by mouth daily   Yes Historical Provider, MD   simvastatin (ZOCOR) 40 mg tablet Take 40 mg by mouth daily at bedtime   Yes Historical Provider, MD   acetaminophen (TYLENOL) 325 mg tablet Take 2 tablets (650 mg total) by mouth every 6 (six) hours as needed for mild pain, headaches or fever  Patient not taking: Reported on 2/12/2019 9/19/18   Argenis Cast DO   ferrous sulfate 300 (60 Fe) mg/5 mL syrup Take 5 mL (300 mg total) by mouth 2 (two) times a day before meals  Patient not taking: Reported on 2/12/2019 9/19/18   Argenis Cast DO   furosemide (LASIX) 20 mg tablet Take 1 tablet (20 mg total) by mouth daily  Patient not taking: Reported on 2/12/2019 12/28/18   Gucci Garcia MD   ipratropium (ATROVENT) 0 02 % nebulizer solution Take 0 5 mg by nebulization every 6 (six) hours as needed for wheezing or shortness of breath    Historical Provider, MD   LORazepam (ATIVAN) 0 5 mg tablet Take 0 5 mg by mouth every 8 (eight) hours as needed for anxiety    Historical Provider, MD   naproxen (NAPROSYN) 250 mg tablet Take 1 tablet (250 mg total) by mouth 2 (two) times a day with meals  Patient not taking: Reported on 2/12/2019 2/7/19   Aries Cerda MD     I have reviewed home medications with patient personally  Allergies:    Allergies   Allergen Reactions    Abilify [Aripiprazole] Hallucinations    Celebrex [Celecoxib] Hallucinations    Codeine Hallucinations    Darvon [Propoxyphene] Hallucinations    Ibuprofen Hallucinations    Latuda [Lurasidone] Hallucinations    Nitrofurantoin Hallucinations    Penicillins Hallucinations    Ziprasidone Hallucinations       Social History:     Marital Status: Single   Occupation: retired  Patient Pre-hospital Living Situation: home alone  Patient Pre-hospital Level of Mobility: not discussed  Patient Pre-hospital Diet Restrictions: none  Substance Use History:   Social History     Substance and Sexual Activity   Alcohol Use No     Social History     Tobacco Use   Smoking Status Passive Smoke Exposure - Never Smoker   Smokeless Tobacco Never Used     Social History     Substance and Sexual Activity   Drug Use No       Family History:    non-contributory    Physical Exam:     Vitals:   Blood Pressure: 158/64 (02/12/19 2356)  Pulse: 85 (02/12/19 2356)  Temperature: 97 9 °F (36 6 °C) (02/12/19 2356)  Temp Source: Oral (02/12/19 2356)  Respirations: 18 (02/12/19 2356)  Height: 5' (152 4 cm) (02/12/19 2356)  Weight - Scale: 71 2 kg (156 lb 15 5 oz) (02/12/19 2356)  SpO2: 99 % (02/12/19 2356)    Physical Exam   Constitutional: She is oriented to person, place, and time  She appears well-developed and well-nourished  No distress  HENT:   Head: Normocephalic  Eyes: Conjunctivae are normal    Cardiovascular: Normal rate, regular rhythm and intact distal pulses  Exam reveals no gallop and no friction rub  Murmur heard  Pulmonary/Chest: Effort normal and breath sounds normal  No stridor  No respiratory distress  She has no wheezes  She has no rales  She exhibits no tenderness  Abdominal: Soft  Bowel sounds are normal  She exhibits no distension and no mass  There is no tenderness  There is no rebound and no guarding  No hernia  Musculoskeletal: She exhibits edema (2+ pitting edema bilateral lower extremities, symmetric, no erythema/warmth/calf tenderness)  She exhibits no tenderness  Neurological: She is alert and oriented to person, place, and time  A sensory deficit ( reports baseline numbness secondary to diabetic peripheral neuropathy) is present  Coordination normal    Skin: Skin is warm and dry  No rash noted  She is not diaphoretic  No erythema  Nursing note and vitals reviewed  Additional Data:     Lab Results: I have personally reviewed pertinent reports        Results from last 7 days   Lab Units 02/12/19  2210   WBC Thousand/uL 3 12*   HEMOGLOBIN g/dL 10 9*   HEMATOCRIT % 33 1*   PLATELETS Thousands/uL 68*   NEUTROS PCT % 52   LYMPHS PCT % 28   MONOS PCT % 14*   EOS PCT % 5     Results from last 7 days   Lab Units 02/12/19  2210 02/07/19  0207   SODIUM mmol/L 134* 134*   POTASSIUM mmol/L 4 2 4 0   CHLORIDE mmol/L 102 100   CO2 mmol/L 28 25   BUN mg/dL 14 12   CREATININE mg/dL 0 81 0 86   ANION GAP mmol/L 4 9   CALCIUM mg/dL 9 0 9 3   ALBUMIN g/dL  --  2 5*   TOTAL BILIRUBIN mg/dL  --  1 00   ALK PHOS U/L  --  117*   ALT U/L  --  37   AST U/L  --  29   GLUCOSE RANDOM mg/dL 408* 369*     Results from last 7 days   Lab Units 02/12/19  2210   INR  1 45*     Results from last 7 days   Lab Units 02/13/19  0149   POC GLUCOSE mg/dl 330*               Imaging: I have personally reviewed pertinent reports  CT head without contrast   Final Result by Rina Terrell DO (02/12 2242)      No acute intracranial abnormality  Workstation performed: [id number]             Allscripts / Epic Records Reviewed: Yes     ** Please Note: This note has been constructed using a voice recognition system   **

## 2019-02-13 NOTE — PROGRESS NOTES
RN reporting worsening low back pain/sciatica overnight and increasing restlessness of pt  Given recent unwitnessed fall (pt denied direct trauma to low back), will obtain CT lumbar spine  Will also obtain UA given reports of dysuria in the setting of new urinary retention, consider renal US pending results of UA  Denies saddle anaesthesia, strength of bilateral LE's 5/5 and symmetric on prior exam   Remains afebrile, f/u AM CBC  Pain control ordered

## 2019-02-16 LAB
BACTERIA UR CULT: ABNORMAL
BACTERIA UR CULT: ABNORMAL

## 2019-03-03 ENCOUNTER — APPOINTMENT (EMERGENCY)
Dept: CT IMAGING | Facility: HOSPITAL | Age: 74
End: 2019-03-03
Payer: COMMERCIAL

## 2019-03-03 ENCOUNTER — HOSPITAL ENCOUNTER (INPATIENT)
Facility: HOSPITAL | Age: 74
LOS: 2 days | Discharge: HOSPICE | DRG: 552 | End: 2019-03-05
Attending: SURGERY | Admitting: SURGERY
Payer: COMMERCIAL

## 2019-03-03 ENCOUNTER — HOSPITAL ENCOUNTER (EMERGENCY)
Facility: HOSPITAL | Age: 74
End: 2019-03-03
Attending: EMERGENCY MEDICINE | Admitting: EMERGENCY MEDICINE
Payer: COMMERCIAL

## 2019-03-03 ENCOUNTER — APPOINTMENT (EMERGENCY)
Dept: RADIOLOGY | Facility: HOSPITAL | Age: 74
DRG: 552 | End: 2019-03-03
Payer: COMMERCIAL

## 2019-03-03 ENCOUNTER — APPOINTMENT (EMERGENCY)
Dept: RADIOLOGY | Facility: HOSPITAL | Age: 74
End: 2019-03-03
Payer: COMMERCIAL

## 2019-03-03 VITALS
HEIGHT: 60 IN | RESPIRATION RATE: 18 BRPM | TEMPERATURE: 98.2 F | DIASTOLIC BLOOD PRESSURE: 60 MMHG | SYSTOLIC BLOOD PRESSURE: 104 MMHG | HEART RATE: 87 BPM | BODY MASS INDEX: 29.84 KG/M2 | OXYGEN SATURATION: 98 %

## 2019-03-03 DIAGNOSIS — S32.009A LUMBAR TRANSVERSE PROCESS FRACTURE (HCC): ICD-10-CM

## 2019-03-03 DIAGNOSIS — M51.26 POSTERIOR HERNIATION OF LUMBAR DISC: ICD-10-CM

## 2019-03-03 DIAGNOSIS — S32.000A LUMBAR COMPRESSION FRACTURE (HCC): Primary | ICD-10-CM

## 2019-03-03 DIAGNOSIS — R26.2 AMBULATORY DYSFUNCTION: ICD-10-CM

## 2019-03-03 DIAGNOSIS — T78.40XA ALLERGIC REACTION TO DRUG, INITIAL ENCOUNTER: ICD-10-CM

## 2019-03-03 DIAGNOSIS — N39.0 UTI (URINARY TRACT INFECTION): ICD-10-CM

## 2019-03-03 DIAGNOSIS — S32.009A CLOSED FRACTURE OF LUMBAR VERTEBRA, UNSPECIFIED FRACTURE MORPHOLOGY, INITIAL ENCOUNTER (HCC): Primary | ICD-10-CM

## 2019-03-03 DIAGNOSIS — R73.9 HYPERGLYCEMIA: ICD-10-CM

## 2019-03-03 DIAGNOSIS — R31.9 HEMATURIA, UNSPECIFIED TYPE: ICD-10-CM

## 2019-03-03 DIAGNOSIS — E11.42 TYPE 2 DIABETES MELLITUS WITH DIABETIC POLYNEUROPATHY, WITH LONG-TERM CURRENT USE OF INSULIN (HCC): Chronic | ICD-10-CM

## 2019-03-03 DIAGNOSIS — Z79.4 TYPE 2 DIABETES MELLITUS WITH DIABETIC POLYNEUROPATHY, WITH LONG-TERM CURRENT USE OF INSULIN (HCC): Chronic | ICD-10-CM

## 2019-03-03 DIAGNOSIS — S32.030A CLOSED COMPRESSION FRACTURE OF THIRD LUMBAR VERTEBRA, INITIAL ENCOUNTER: ICD-10-CM

## 2019-03-03 PROBLEM — G25.81 RESTLESS LEG SYNDROME: Chronic | Status: RESOLVED | Noted: 2018-12-28 | Resolved: 2019-03-03

## 2019-03-03 PROBLEM — E87.6 HYPOKALEMIA: Status: RESOLVED | Noted: 2018-08-14 | Resolved: 2019-03-03

## 2019-03-03 PROBLEM — R19.7 INTRACTABLE DIARRHEA: Status: RESOLVED | Noted: 2017-11-03 | Resolved: 2019-03-03

## 2019-03-03 PROBLEM — R60.0 LEG EDEMA: Status: RESOLVED | Noted: 2019-02-13 | Resolved: 2019-03-03

## 2019-03-03 PROBLEM — S33.120A: Status: RESOLVED | Noted: 2019-03-03 | Resolved: 2019-03-03

## 2019-03-03 PROBLEM — G25.3 MYOCLONIC JERKING: Status: RESOLVED | Noted: 2018-09-18 | Resolved: 2019-03-03

## 2019-03-03 PROBLEM — S81.809A WOUND, OPEN, LEG: Chronic | Status: RESOLVED | Noted: 2018-12-28 | Resolved: 2019-03-03

## 2019-03-03 PROBLEM — R63.4 UNINTENTIONAL WEIGHT LOSS: Status: RESOLVED | Noted: 2019-02-13 | Resolved: 2019-03-03

## 2019-03-03 PROBLEM — E87.2 LACTIC ACIDOSIS: Chronic | Status: RESOLVED | Noted: 2017-10-18 | Resolved: 2019-03-03

## 2019-03-03 PROBLEM — S33.120A: Status: ACTIVE | Noted: 2019-03-03

## 2019-03-03 PROBLEM — R42 DIZZINESS: Status: RESOLVED | Noted: 2018-08-14 | Resolved: 2019-03-03

## 2019-03-03 PROBLEM — R79.89 ELEVATED TROPONIN: Status: RESOLVED | Noted: 2018-09-02 | Resolved: 2019-03-03

## 2019-03-03 PROBLEM — R77.8 ELEVATED TROPONIN: Status: RESOLVED | Noted: 2018-09-02 | Resolved: 2019-03-03

## 2019-03-03 PROBLEM — E87.20 LACTIC ACIDOSIS: Chronic | Status: RESOLVED | Noted: 2017-10-18 | Resolved: 2019-03-03

## 2019-03-03 LAB
ACETONE SERPL-MCNC: NEGATIVE MG/DL
ALBUMIN SERPL BCP-MCNC: 2.3 G/DL (ref 3.5–5)
ALP SERPL-CCNC: 113 U/L (ref 46–116)
ALT SERPL W P-5'-P-CCNC: 33 U/L (ref 12–78)
ANION GAP SERPL CALCULATED.3IONS-SCNC: 9 MMOL/L (ref 4–13)
APTT PPP: 42 SECONDS (ref 26–38)
AST SERPL W P-5'-P-CCNC: 35 U/L (ref 5–45)
ATRIAL RATE: 79 BPM
BACTERIA UR QL AUTO: ABNORMAL /HPF
BACTERIA UR QL AUTO: ABNORMAL /HPF
BASOPHILS # BLD AUTO: 0.02 THOUSANDS/ΜL (ref 0–0.1)
BASOPHILS NFR BLD AUTO: 1 % (ref 0–1)
BILIRUB SERPL-MCNC: 1 MG/DL (ref 0.2–1)
BILIRUB UR QL STRIP: NEGATIVE
BILIRUB UR QL STRIP: NEGATIVE
BUN SERPL-MCNC: 9 MG/DL (ref 5–25)
CALCIUM SERPL-MCNC: 9.3 MG/DL (ref 8.3–10.1)
CHLORIDE SERPL-SCNC: 101 MMOL/L (ref 100–108)
CK MB SERPL-MCNC: 2.5 % (ref 0–2.5)
CK MB SERPL-MCNC: 3.9 NG/ML (ref 0–5)
CK SERPL-CCNC: 159 U/L (ref 26–192)
CLARITY UR: ABNORMAL
CLARITY UR: CLEAR
CO2 SERPL-SCNC: 27 MMOL/L (ref 21–32)
COLOR UR: ABNORMAL
COLOR UR: YELLOW
CREAT SERPL-MCNC: 0.79 MG/DL (ref 0.6–1.3)
DEPRECATED D DIMER PPP: 669 NG/ML (FEU)
EOSINOPHIL # BLD AUTO: 0.12 THOUSAND/ΜL (ref 0–0.61)
EOSINOPHIL NFR BLD AUTO: 4 % (ref 0–6)
ERYTHROCYTE [DISTWIDTH] IN BLOOD BY AUTOMATED COUNT: 16.8 % (ref 11.6–15.1)
GFR SERPL CREATININE-BSD FRML MDRD: 74 ML/MIN/1.73SQ M
GLUCOSE SERPL-MCNC: 184 MG/DL (ref 65–140)
GLUCOSE SERPL-MCNC: 281 MG/DL (ref 65–140)
GLUCOSE SERPL-MCNC: 296 MG/DL (ref 65–140)
GLUCOSE SERPL-MCNC: 379 MG/DL (ref 65–140)
GLUCOSE UR STRIP-MCNC: ABNORMAL MG/DL
GLUCOSE UR STRIP-MCNC: ABNORMAL MG/DL
HCT VFR BLD AUTO: 28.5 % (ref 34.8–46.1)
HGB BLD-MCNC: 9.6 G/DL (ref 11.5–15.4)
HGB UR QL STRIP.AUTO: ABNORMAL
HGB UR QL STRIP.AUTO: ABNORMAL
HYALINE CASTS #/AREA URNS LPF: ABNORMAL /LPF
IMM GRANULOCYTES # BLD AUTO: 0.01 THOUSAND/UL (ref 0–0.2)
IMM GRANULOCYTES NFR BLD AUTO: 0 % (ref 0–2)
INR PPP: 1.48 (ref 0.86–1.17)
KETONES UR STRIP-MCNC: NEGATIVE MG/DL
KETONES UR STRIP-MCNC: NEGATIVE MG/DL
LEUKOCYTE ESTERASE UR QL STRIP: ABNORMAL
LEUKOCYTE ESTERASE UR QL STRIP: NEGATIVE
LYMPHOCYTES # BLD AUTO: 0.65 THOUSANDS/ΜL (ref 0.6–4.47)
LYMPHOCYTES NFR BLD AUTO: 24 % (ref 14–44)
MCH RBC QN AUTO: 31.1 PG (ref 26.8–34.3)
MCHC RBC AUTO-ENTMCNC: 33.7 G/DL (ref 31.4–37.4)
MCV RBC AUTO: 92 FL (ref 82–98)
MONOCYTES # BLD AUTO: 0.37 THOUSAND/ΜL (ref 0.17–1.22)
MONOCYTES NFR BLD AUTO: 13 % (ref 4–12)
NEUTROPHILS # BLD AUTO: 1.6 THOUSANDS/ΜL (ref 1.85–7.62)
NEUTS SEG NFR BLD AUTO: 58 % (ref 43–75)
NITRITE UR QL STRIP: NEGATIVE
NITRITE UR QL STRIP: NEGATIVE
NON-SQ EPI CELLS URNS QL MICRO: ABNORMAL /HPF
NON-SQ EPI CELLS URNS QL MICRO: ABNORMAL /HPF
NRBC BLD AUTO-RTO: 0 /100 WBCS
NT-PROBNP SERPL-MCNC: 176 PG/ML
P AXIS: 78 DEGREES
PH UR STRIP.AUTO: 6.5 [PH] (ref 4.5–8)
PH UR STRIP.AUTO: 6.5 [PH] (ref 4.5–8)
PLATELET # BLD AUTO: 66 THOUSANDS/UL (ref 149–390)
PMV BLD AUTO: 12.4 FL (ref 8.9–12.7)
POTASSIUM SERPL-SCNC: 3.8 MMOL/L (ref 3.5–5.3)
PR INTERVAL: 172 MS
PROT SERPL-MCNC: 5.8 G/DL (ref 6.4–8.2)
PROT UR STRIP-MCNC: ABNORMAL MG/DL
PROT UR STRIP-MCNC: NEGATIVE MG/DL
PROTHROMBIN TIME: 17.5 SECONDS (ref 11.8–14.2)
QRS AXIS: 74 DEGREES
QRSD INTERVAL: 76 MS
QT INTERVAL: 358 MS
QTC INTERVAL: 410 MS
RBC # BLD AUTO: 3.09 MILLION/UL (ref 3.81–5.12)
RBC #/AREA URNS AUTO: ABNORMAL /HPF
RBC #/AREA URNS AUTO: ABNORMAL /HPF
SODIUM SERPL-SCNC: 137 MMOL/L (ref 136–145)
SP GR UR STRIP.AUTO: 1.01 (ref 1–1.03)
SP GR UR STRIP.AUTO: 1.01 (ref 1–1.03)
T WAVE AXIS: 47 DEGREES
TROPONIN I SERPL-MCNC: 0.19 NG/ML
TSH SERPL DL<=0.05 MIU/L-ACNC: 1.54 UIU/ML (ref 0.36–3.74)
UROBILINOGEN UR QL STRIP.AUTO: 0.2 E.U./DL
UROBILINOGEN UR QL STRIP.AUTO: 0.2 E.U./DL
VENTRICULAR RATE: 79 BPM
WBC # BLD AUTO: 2.77 THOUSAND/UL (ref 4.31–10.16)
WBC #/AREA URNS AUTO: ABNORMAL /HPF
WBC #/AREA URNS AUTO: ABNORMAL /HPF

## 2019-03-03 PROCEDURE — 85730 THROMBOPLASTIN TIME PARTIAL: CPT | Performed by: EMERGENCY MEDICINE

## 2019-03-03 PROCEDURE — 94760 N-INVAS EAR/PLS OXIMETRY 1: CPT

## 2019-03-03 PROCEDURE — 72148 MRI LUMBAR SPINE W/O DYE: CPT

## 2019-03-03 PROCEDURE — 82009 KETONE BODYS QUAL: CPT | Performed by: EMERGENCY MEDICINE

## 2019-03-03 PROCEDURE — 87077 CULTURE AEROBIC IDENTIFY: CPT

## 2019-03-03 PROCEDURE — 72131 CT LUMBAR SPINE W/O DYE: CPT

## 2019-03-03 PROCEDURE — 36415 COLL VENOUS BLD VENIPUNCTURE: CPT | Performed by: EMERGENCY MEDICINE

## 2019-03-03 PROCEDURE — 80053 COMPREHEN METABOLIC PANEL: CPT | Performed by: EMERGENCY MEDICINE

## 2019-03-03 PROCEDURE — 87186 SC STD MICRODIL/AGAR DIL: CPT

## 2019-03-03 PROCEDURE — 84443 ASSAY THYROID STIM HORMONE: CPT | Performed by: EMERGENCY MEDICINE

## 2019-03-03 PROCEDURE — 87086 URINE CULTURE/COLONY COUNT: CPT

## 2019-03-03 PROCEDURE — 87077 CULTURE AEROBIC IDENTIFY: CPT | Performed by: STUDENT IN AN ORGANIZED HEALTH CARE EDUCATION/TRAINING PROGRAM

## 2019-03-03 PROCEDURE — 84484 ASSAY OF TROPONIN QUANT: CPT | Performed by: EMERGENCY MEDICINE

## 2019-03-03 PROCEDURE — 96367 TX/PROPH/DG ADDL SEQ IV INF: CPT

## 2019-03-03 PROCEDURE — 87186 SC STD MICRODIL/AGAR DIL: CPT | Performed by: STUDENT IN AN ORGANIZED HEALTH CARE EDUCATION/TRAINING PROGRAM

## 2019-03-03 PROCEDURE — 85025 COMPLETE CBC W/AUTO DIFF WBC: CPT | Performed by: EMERGENCY MEDICINE

## 2019-03-03 PROCEDURE — 85610 PROTHROMBIN TIME: CPT | Performed by: EMERGENCY MEDICINE

## 2019-03-03 PROCEDURE — 83880 ASSAY OF NATRIURETIC PEPTIDE: CPT | Performed by: EMERGENCY MEDICINE

## 2019-03-03 PROCEDURE — 96365 THER/PROPH/DIAG IV INF INIT: CPT

## 2019-03-03 PROCEDURE — 93010 ELECTROCARDIOGRAM REPORT: CPT | Performed by: INTERNAL MEDICINE

## 2019-03-03 PROCEDURE — 87086 URINE CULTURE/COLONY COUNT: CPT | Performed by: STUDENT IN AN ORGANIZED HEALTH CARE EDUCATION/TRAINING PROGRAM

## 2019-03-03 PROCEDURE — 82553 CREATINE MB FRACTION: CPT | Performed by: EMERGENCY MEDICINE

## 2019-03-03 PROCEDURE — 96375 TX/PRO/DX INJ NEW DRUG ADDON: CPT

## 2019-03-03 PROCEDURE — 93005 ELECTROCARDIOGRAM TRACING: CPT

## 2019-03-03 PROCEDURE — 81001 URINALYSIS AUTO W/SCOPE: CPT

## 2019-03-03 PROCEDURE — 99285 EMERGENCY DEPT VISIT HI MDM: CPT

## 2019-03-03 PROCEDURE — 81003 URINALYSIS AUTO W/O SCOPE: CPT

## 2019-03-03 PROCEDURE — 82550 ASSAY OF CK (CPK): CPT | Performed by: EMERGENCY MEDICINE

## 2019-03-03 PROCEDURE — 81001 URINALYSIS AUTO W/SCOPE: CPT | Performed by: STUDENT IN AN ORGANIZED HEALTH CARE EDUCATION/TRAINING PROGRAM

## 2019-03-03 PROCEDURE — 99222 1ST HOSP IP/OBS MODERATE 55: CPT | Performed by: SURGERY

## 2019-03-03 PROCEDURE — 96376 TX/PRO/DX INJ SAME DRUG ADON: CPT

## 2019-03-03 PROCEDURE — 85379 FIBRIN DEGRADATION QUANT: CPT | Performed by: EMERGENCY MEDICINE

## 2019-03-03 PROCEDURE — 71045 X-RAY EXAM CHEST 1 VIEW: CPT

## 2019-03-03 PROCEDURE — 87147 CULTURE TYPE IMMUNOLOGIC: CPT | Performed by: STUDENT IN AN ORGANIZED HEALTH CARE EDUCATION/TRAINING PROGRAM

## 2019-03-03 PROCEDURE — 82948 REAGENT STRIP/BLOOD GLUCOSE: CPT

## 2019-03-03 RX ORDER — VANCOMYCIN HYDROCHLORIDE 1 G/200ML
15 INJECTION, SOLUTION INTRAVENOUS ONCE
Status: DISCONTINUED | OUTPATIENT
Start: 2019-03-03 | End: 2019-03-03 | Stop reason: HOSPADM

## 2019-03-03 RX ORDER — LEVOFLOXACIN 5 MG/ML
750 INJECTION, SOLUTION INTRAVENOUS ONCE
Status: COMPLETED | OUTPATIENT
Start: 2019-03-03 | End: 2019-03-03

## 2019-03-03 RX ORDER — ACETAMINOPHEN 325 MG/1
650 TABLET ORAL EVERY 6 HOURS PRN
Status: DISCONTINUED | OUTPATIENT
Start: 2019-03-03 | End: 2019-03-03

## 2019-03-03 RX ORDER — DIPHENHYDRAMINE HYDROCHLORIDE 50 MG/ML
25 INJECTION INTRAMUSCULAR; INTRAVENOUS ONCE
Status: COMPLETED | OUTPATIENT
Start: 2019-03-03 | End: 2019-03-03

## 2019-03-03 RX ORDER — PANTOPRAZOLE SODIUM 40 MG/1
40 TABLET, DELAYED RELEASE ORAL
Status: DISCONTINUED | OUTPATIENT
Start: 2019-03-03 | End: 2019-03-05 | Stop reason: HOSPADM

## 2019-03-03 RX ORDER — BUPROPION HYDROCHLORIDE 150 MG/1
300 TABLET ORAL EVERY MORNING
Status: DISCONTINUED | OUTPATIENT
Start: 2019-03-03 | End: 2019-03-05 | Stop reason: HOSPADM

## 2019-03-03 RX ORDER — MELATONIN
1000 DAILY
Status: DISCONTINUED | OUTPATIENT
Start: 2019-03-03 | End: 2019-03-05 | Stop reason: HOSPADM

## 2019-03-03 RX ORDER — PRAVASTATIN SODIUM 80 MG/1
80 TABLET ORAL
Status: DISCONTINUED | OUTPATIENT
Start: 2019-03-03 | End: 2019-03-05 | Stop reason: HOSPADM

## 2019-03-03 RX ORDER — HYDROMORPHONE HCL/PF 1 MG/ML
0.2 SYRINGE (ML) INJECTION ONCE
Status: COMPLETED | OUTPATIENT
Start: 2019-03-03 | End: 2019-03-03

## 2019-03-03 RX ORDER — MIDAZOLAM HYDROCHLORIDE 1 MG/ML
1 INJECTION INTRAMUSCULAR; INTRAVENOUS ONCE
Status: DISCONTINUED | OUTPATIENT
Start: 2019-03-03 | End: 2019-03-03

## 2019-03-03 RX ORDER — FERROUS SULFATE 300 MG/5ML
300 LIQUID (ML) ORAL
Status: DISCONTINUED | OUTPATIENT
Start: 2019-03-03 | End: 2019-03-05 | Stop reason: HOSPADM

## 2019-03-03 RX ORDER — ONDANSETRON 2 MG/ML
4 INJECTION INTRAMUSCULAR; INTRAVENOUS ONCE
Status: COMPLETED | OUTPATIENT
Start: 2019-03-03 | End: 2019-03-03

## 2019-03-03 RX ORDER — LISINOPRIL 2.5 MG/1
2.5 TABLET ORAL EVERY MORNING
Status: DISCONTINUED | OUTPATIENT
Start: 2019-03-03 | End: 2019-03-05 | Stop reason: HOSPADM

## 2019-03-03 RX ORDER — MONTELUKAST SODIUM 10 MG/1
10 TABLET ORAL
Status: DISCONTINUED | OUTPATIENT
Start: 2019-03-03 | End: 2019-03-05 | Stop reason: HOSPADM

## 2019-03-03 RX ORDER — METHOCARBAMOL 500 MG/1
250 TABLET, FILM COATED ORAL EVERY 8 HOURS SCHEDULED
Status: DISCONTINUED | OUTPATIENT
Start: 2019-03-03 | End: 2019-03-05 | Stop reason: HOSPADM

## 2019-03-03 RX ORDER — ACETAMINOPHEN 325 MG/1
975 TABLET ORAL EVERY 8 HOURS SCHEDULED
Status: DISCONTINUED | OUTPATIENT
Start: 2019-03-03 | End: 2019-03-05 | Stop reason: HOSPADM

## 2019-03-03 RX ORDER — LORAZEPAM 0.5 MG/1
0.5 TABLET ORAL 2 TIMES DAILY
Status: DISCONTINUED | OUTPATIENT
Start: 2019-03-03 | End: 2019-03-05 | Stop reason: HOSPADM

## 2019-03-03 RX ORDER — SODIUM CHLORIDE, SODIUM GLUCONATE, SODIUM ACETATE, POTASSIUM CHLORIDE, MAGNESIUM CHLORIDE, SODIUM PHOSPHATE, DIBASIC, AND POTASSIUM PHOSPHATE .53; .5; .37; .037; .03; .012; .00082 G/100ML; G/100ML; G/100ML; G/100ML; G/100ML; G/100ML; G/100ML
75 INJECTION, SOLUTION INTRAVENOUS CONTINUOUS
Status: DISCONTINUED | OUTPATIENT
Start: 2019-03-03 | End: 2019-03-04

## 2019-03-03 RX ORDER — IPRATROPIUM BROMIDE AND ALBUTEROL SULFATE 2.5; .5 MG/3ML; MG/3ML
3 SOLUTION RESPIRATORY (INHALATION) EVERY 6 HOURS PRN
Status: DISCONTINUED | OUTPATIENT
Start: 2019-03-03 | End: 2019-03-05 | Stop reason: HOSPADM

## 2019-03-03 RX ORDER — FUROSEMIDE 20 MG/1
20 TABLET ORAL DAILY
Status: DISCONTINUED | OUTPATIENT
Start: 2019-03-03 | End: 2019-03-05 | Stop reason: HOSPADM

## 2019-03-03 RX ORDER — LINEZOLID 2 MG/ML
600 INJECTION, SOLUTION INTRAVENOUS ONCE
Status: COMPLETED | OUTPATIENT
Start: 2019-03-03 | End: 2019-03-03

## 2019-03-03 RX ADMIN — MONTELUKAST SODIUM 10 MG: 10 TABLET, FILM COATED ORAL at 21:26

## 2019-03-03 RX ADMIN — PANTOPRAZOLE SODIUM 40 MG: 40 TABLET, DELAYED RELEASE ORAL at 09:59

## 2019-03-03 RX ADMIN — FUROSEMIDE 20 MG: 20 TABLET ORAL at 09:58

## 2019-03-03 RX ADMIN — ACETAMINOPHEN 975 MG: 325 TABLET ORAL at 13:45

## 2019-03-03 RX ADMIN — SODIUM CHLORIDE 500 ML: 0.9 INJECTION, SOLUTION INTRAVENOUS at 07:02

## 2019-03-03 RX ADMIN — PRAVASTATIN SODIUM 80 MG: 80 TABLET ORAL at 17:03

## 2019-03-03 RX ADMIN — ACETAMINOPHEN 975 MG: 325 TABLET ORAL at 21:26

## 2019-03-03 RX ADMIN — MINERAL SUPPLEMENT IRON 300 MG / 5 ML STRENGTH LIQUID 100 PER BOX UNFLAVORED 300 MG: at 09:59

## 2019-03-03 RX ADMIN — METHOCARBAMOL 250 MG: 500 TABLET, FILM COATED ORAL at 21:25

## 2019-03-03 RX ADMIN — METHOCARBAMOL 250 MG: 500 TABLET, FILM COATED ORAL at 13:45

## 2019-03-03 RX ADMIN — VITAMIN D, TAB 1000IU (100/BT) 1000 UNITS: 25 TAB at 09:58

## 2019-03-03 RX ADMIN — ONDANSETRON 4 MG: 2 INJECTION INTRAMUSCULAR; INTRAVENOUS at 02:55

## 2019-03-03 RX ADMIN — INSULIN DETEMIR 10 UNITS: 100 INJECTION, SOLUTION SUBCUTANEOUS at 21:26

## 2019-03-03 RX ADMIN — HYDROMORPHONE HYDROCHLORIDE 0.2 MG: 1 INJECTION, SOLUTION INTRAMUSCULAR; INTRAVENOUS; SUBCUTANEOUS at 05:23

## 2019-03-03 RX ADMIN — HYDROMORPHONE HYDROCHLORIDE 0.2 MG: 1 INJECTION, SOLUTION INTRAMUSCULAR; INTRAVENOUS; SUBCUTANEOUS at 05:38

## 2019-03-03 RX ADMIN — LINEZOLID 600 MG: 600 INJECTION, SOLUTION INTRAVENOUS at 04:29

## 2019-03-03 RX ADMIN — INSULIN HUMAN 4 UNITS: 100 INJECTION, SOLUTION PARENTERAL at 03:59

## 2019-03-03 RX ADMIN — HYDROMORPHONE HYDROCHLORIDE 0.2 MG: 1 INJECTION, SOLUTION INTRAMUSCULAR; INTRAVENOUS; SUBCUTANEOUS at 02:59

## 2019-03-03 RX ADMIN — LORAZEPAM 0.5 MG: 0.5 TABLET ORAL at 09:57

## 2019-03-03 RX ADMIN — MINERAL SUPPLEMENT IRON 300 MG / 5 ML STRENGTH LIQUID 100 PER BOX UNFLAVORED 300 MG: at 17:03

## 2019-03-03 RX ADMIN — INSULIN LISPRO 6 UNITS: 100 INJECTION, SOLUTION INTRAVENOUS; SUBCUTANEOUS at 17:03

## 2019-03-03 RX ADMIN — LORAZEPAM 0.5 MG: 0.5 TABLET ORAL at 17:03

## 2019-03-03 RX ADMIN — SODIUM CHLORIDE, SODIUM GLUCONATE, SODIUM ACETATE, POTASSIUM CHLORIDE, MAGNESIUM CHLORIDE, SODIUM PHOSPHATE, DIBASIC, AND POTASSIUM PHOSPHATE 75 ML/HR: .53; .5; .37; .037; .03; .012; .00082 INJECTION, SOLUTION INTRAVENOUS at 11:01

## 2019-03-03 RX ADMIN — LISINOPRIL 2.5 MG: 2.5 TABLET ORAL at 09:58

## 2019-03-03 RX ADMIN — LEVOFLOXACIN 750 MG: 5 INJECTION, SOLUTION INTRAVENOUS at 03:59

## 2019-03-03 RX ADMIN — DIPHENHYDRAMINE HYDROCHLORIDE 25 MG: 50 INJECTION, SOLUTION INTRAMUSCULAR; INTRAVENOUS at 04:28

## 2019-03-03 RX ADMIN — BUPROPION HYDROCHLORIDE 300 MG: 150 TABLET, FILM COATED, EXTENDED RELEASE ORAL at 09:57

## 2019-03-03 NOTE — PLAN OF CARE
Problem: Potential for Falls  Goal: Patient will remain free of falls  Description  INTERVENTIONS:  - Assess patient frequently for physical needs  -  Identify cognitive and physical deficits and behaviors that affect risk of falls    -  [place] fall precautions as indicated by assessment   - Educate patient/family on patient safety including physical limitations  - Instruct patient to call for assistance with activity based on assessment  - Modify environment to reduce risk of injury  - Consider OT/PT consult to assist with strengthening/mobility  Outcome: Progressing     Problem: Prexisting or High Potential for Compromised Skin Integrity  Goal: Skin integrity is maintained or improved  Description  INTERVENTIONS:  - Identify patients at risk for skin breakdown  - Assess and monitor skin integrity  - Assess and monitor nutrition and hydration status  - Monitor labs (i e  albumin)  - Assess for incontinence   - Turn and reposition patient  - Assist with mobility/ambulation  - Relieve pressure over bony prominences  - Avoid friction and shearing  - Provide appropriate hygiene as needed including keeping skin clean and dry  - Evaluate need for skin moisturizer/barrier cream  - Collaborate with interdisciplinary team (i e  Nutrition, Rehabilitation, etc )   - Patient/family teaching  Outcome: Progressing     Problem: PAIN - ADULT  Goal: Verbalizes/displays adequate comfort level or baseline comfort level  Description  Interventions:  - Encourage patient to monitor pain and request assistance  - Assess pain using appropriate pain scale  - Administer analgesics based on type and severity of pain and evaluate response  - Implement non-pharmacological measures as appropriate and evaluate response  - Consider cultural and social influences on pain and pain management  - Notify physician/advanced practitioner if interventions unsuccessful or patient reports new pain  Outcome: Progressing     Problem: INFECTION - ADULT  Goal: Absence or prevention of progression during hospitalization  Description  INTERVENTIONS:  - Assess and monitor for signs and symptoms of infection  - Monitor lab/diagnostic results  - Monitor all insertion sites, i e  indwelling lines, tubes, and drains  - Monitor endotracheal (as able) and nasal secretions for changes in amount and color  - [place] appropriate cooling/warming therapies per order  - Administer medications as ordered  - Instruct and encourage patient and family to use good hand hygiene technique  - Identify and instruct in appropriate isolation precautions for identified infection/condition  Outcome: Progressing     Problem: SAFETY ADULT  Goal: Patient will remain free of falls  Description  INTERVENTIONS:  - Assess patient frequently for physical needs  -  Identify cognitive and physical deficits and behaviors that affect risk of falls    -  [place] fall precautions as indicated by assessment   - Educate patient/family on patient safety including physical limitations  - Instruct patient to call for assistance with activity based on assessment  - Modify environment to reduce risk of injury  - Consider OT/PT consult to assist with strengthening/mobility  Outcome: Progressing  Goal: Maintain or return to baseline ADL function  Description  INTERVENTIONS:  -  Assess patient's ability to carry out ADLs; assess patient's baseline for ADL function and identify physical deficits which impact ability to perform ADLs (bathing, care of mouth/teeth, toileting, grooming, dressing, etc )  - Assess/evaluate cause of self-care deficits   - Assess range of motion  - Assess patient's mobility; develop plan if impaired  - Assess patient's need for assistive devices and provide as appropriate  - Encourage maximum independence but intervene and supervise when necessary  ¯ Involve family in performance of ADLs  ¯ Assess for home care needs following discharge   ¯ Request OT consult to assist with ADL evaluation and planning for discharge  ¯ Provide patient education as appropriate  Outcome: Progressing  Goal: Maintain or return mobility status to optimal level  Description  INTERVENTIONS:  - Assess patient's baseline mobility status (ambulation, transfers, stairs, etc )    - Identify cognitive and physical deficits and behaviors that affect mobility  - Identify mobility aids required to assist with transfers and/or ambulation (gait belt, sit-to-stand, lift, walker, cane, etc )  - [place] fall precautions as indicated by assessment  - Record patient progress and toleration of activity level on Mobility SBAR; progress patient to next Phase/Stage  - Instruct patient to call for assistance with activity based on assessment  - Request Rehabilitation consult to assist with strengthening/weightbearing, etc   Outcome: Progressing     Problem: DISCHARGE PLANNING  Goal: Discharge to home or other facility with appropriate resources  Description  INTERVENTIONS:  - Identify barriers to discharge w/patient and caregiver  - Arrange for needed discharge resources and transportation as appropriate  - Identify discharge learning needs (meds, wound care, etc )  - Arrange for interpretive services to assist at discharge as needed  - Refer to Case Management Department for coordinating discharge planning if the patient needs post-hospital services based on physician/advanced practitioner order or complex needs related to functional status, cognitive ability, or social support system  Outcome: Progressing     Problem: Knowledge Deficit  Goal: Patient/family/caregiver demonstrates understanding of disease process, treatment plan, medications, and discharge instructions  Description  Complete learning assessment and assess knowledge base    Interventions:  - Provide teaching at level of understanding  - Provide teaching via preferred learning methods  Outcome: Progressing     Problem: Nutrition/Hydration-ADULT  Goal: Nutrient/Hydration intake appropriate for improving, restoring or maintaining nutritional needs  Description  Monitor and assess patient's nutrition/hydration status for malnutrition (ex- brittle hair, bruises, dry skin, pale skin and conjunctiva, muscle wasting, smooth red tongue, and disorientation)  Collaborate with interdisciplinary team and initiate plan and interventions as ordered  Monitor patient's weight and dietary intake as ordered or per policy  Utilize nutrition screening tool and intervene per policy  Determine patient's food preferences and provide high-protein, high-caloric foods as appropriate       INTERVENTIONS:  - Monitor oral intake, urinary output, labs, and treatment plans  - Assess nutrition and hydration status and recommend course of action  - Evaluate amount of meals eaten  - Assist patient with eating if necessary   - Allow adequate time for meals  - Recommend/ encourage appropriate diets, oral nutritional supplements, and vitamin/mineral supplements  - Order, calculate, and assess calorie counts as needed  - Recommend, monitor, and adjust tube feedings and TPN/PPN based on assessed needs  - Assess need for intravenous fluids  - Provide specific nutrition/hydration education as appropriate  - Include patient/family/caregiver in decisions related to nutrition  Outcome: Progressing

## 2019-03-03 NOTE — EMTALA/ACUTE CARE TRANSFER
[id number] [address] [address]  Dept: [phone]      EMTALA TRANSFER CONSENT    NAME Dawson Limon                                         1945                              MRN [id number]    I have been informed of my rights regarding examination, treatment, and transfer   by Dr Shyanne Adam MD    Benefits: Specialized equipment and/or services available at the receiving facility (Include comment)________________________(Trauma, neurosurgical services)    Risks: Potential deterioration of medical condition, Increased discomfort during transfer, Loss of IV      Consent for Transfer:  I acknowledge that my medical condition has been evaluated and explained to me by the emergency department physician or other qualified medical person and/or my attending physician, who has recommended that I be transferred to the service of  Accepting Physician: Dr Kelley Vaughn at [address] Name, [address] : [facility] Trauma ED; [place], [place]  The above potential benefits of such transfer, the potential risks associated with such transfer, and the probable risks of not being transferred have been explained to me, and I fully understand them  The doctor has explained that, in my case, the benefits of transfer outweigh the risks  I agree to be transferred  I authorize the performance of emergency medical procedures and treatments upon me in both transit and upon arrival at the receiving facility  Additionally, I authorize the release of any and all medical records to the receiving facility and request they be transported with me, if possible  I understand that the safest mode of transportation during a medical emergency is an ambulance and that the Hospital advocates the use of this mode of transport   Risks of traveling to the receiving facility by car, including absence of medical control, life sustaining equipment, such as oxygen, and medical personnel has been explained to me and I fully understand them  (KACEY CORRECT BOX BELOW)  [ X ]  I consent to the stated transfer and to be transported by ambulance/helicopter  [  ]  I consent to the stated transfer, but refuse transportation by ambulance and accept full responsibility for my transportation by car  I understand the risks of non-ambulance transfers and I exonerate the Hospital and its staff from any deterioration in my condition that results from this refusal     X___________________________________________    DATE  19  TIME________  Signature of patient or legally responsible individual signing on patient behalf           RELATIONSHIP TO PATIENT___Self______________________          Provider Certification    NAME Hector Ovalle                                         1945                              MRN [id number]    A medical screening exam was performed on the above named patient  Based on the examination:    Condition Necessitating Transfer The primary encounter diagnosis was Lumbar compression fracture ([address] )  Diagnoses of Lumbar transverse process fracture [facility]), Posterior herniation of lumbar disc, Hyperglycemia, and UTI (urinary tract infection) were also pertinent to this visit  Patient Condition: The patient has been stabilized such that within reasonable medical probability, no material deterioration of the patient condition or the condition of the unborn child(shantelle) is likely to result from the transfer    Reason for Transfer: Level of Care needed not available at this facility(No trauma or neurosurgery here)    Transfer Requirements: [facility] Trauma ED;  [place], [place]   · Space available and qualified personnel available for treatment as acknowledged by Terry  · Agreed to accept transfer and to provide appropriate medical treatment as acknowledged by       Dr Jose Mena  · Appropriate medical records of the examination and treatment of the patient are provided at the time of transfer   [facility], [address] _______  · Transfer will be performed by qualified personnel from [facility]  and appropriate transfer equipment as required, including the use of necessary and appropriate life support measures  Provider Certification: I have examined the patient and explained the following risks and benefits of being transferred/refusing transfer to the patient/family:  General risk, such as traffic hazards, adverse weather conditions, rough terrain or turbulence, possible failure of equipment (including vehicle or aircraft), or consequences of actions of persons outside the control of the transport personnel      Based on these reasonable risks and benefits to the patient and/or the unborn child(shantelle), and based upon the information available at the time of the patients examination, I certify that the medical benefits reasonably to be expected from the provision of appropriate medical treatments at another medical facility outweigh the increasing risks, if any, to the individuals medical condition, and in the case of labor to the unborn child, from effecting the transfer      X____________________________________________ DATE 03/03/19        [id number]______  Abhijit Zaidi MD    ORIGINAL - SEND TO MEDICAL RECORDS   COPY - SEND WITH PATIENT DURING TRANSFER

## 2019-03-03 NOTE — PLAN OF CARE
Problem: Potential for Falls  Goal: Patient will remain free of falls  Description  INTERVENTIONS:  - Assess patient frequently for physical needs  -  Identify cognitive and physical deficits and behaviors that affect risk of falls    -  [place] fall precautions as indicated by assessment   - Educate patient/family on patient safety including physical limitations  - Instruct patient to call for assistance with activity based on assessment  - Modify environment to reduce risk of injury  - Consider OT/PT consult to assist with strengthening/mobility  3/3/2019 1550 by Alessandro Scott RN  Outcome: Progressing  3/3/2019 1459 by Alessandro Scott RN  Outcome: Progressing     Problem: Prexisting or High Potential for Compromised Skin Integrity  Goal: Skin integrity is maintained or improved  Description  INTERVENTIONS:  - Identify patients at risk for skin breakdown  - Assess and monitor skin integrity  - Assess and monitor nutrition and hydration status  - Monitor labs (i e  albumin)  - Assess for incontinence   - Turn and reposition patient  - Assist with mobility/ambulation  - Relieve pressure over bony prominences  - Avoid friction and shearing  - Provide appropriate hygiene as needed including keeping skin clean and dry  - Evaluate need for skin moisturizer/barrier cream  - Collaborate with interdisciplinary team (i e  Nutrition, Rehabilitation, etc )   - Patient/family teaching  3/3/2019 1550 by Alessandro Scott RN  Outcome: Progressing  3/3/2019 1459 by Alessandro Scott RN  Outcome: Progressing     Problem: PAIN - ADULT  Goal: Verbalizes/displays adequate comfort level or baseline comfort level  Description  Interventions:  - Encourage patient to monitor pain and request assistance  - Assess pain using appropriate pain scale  - Administer analgesics based on type and severity of pain and evaluate response  - Implement non-pharmacological measures as appropriate and evaluate response  - Consider cultural and social influences on pain and pain management  - Notify physician/advanced practitioner if interventions unsuccessful or patient reports new pain  3/3/2019 1550 by Briseida Arce RN  Outcome: Progressing  3/3/2019 1459 by Briseida Arce RN  Outcome: Progressing     Problem: INFECTION - ADULT  Goal: Absence or prevention of progression during hospitalization  Description  INTERVENTIONS:  - Assess and monitor for signs and symptoms of infection  - Monitor lab/diagnostic results  - Monitor all insertion sites, i e  indwelling lines, tubes, and drains  - Monitor endotracheal (as able) and nasal secretions for changes in amount and color  - [place] appropriate cooling/warming therapies per order  - Administer medications as ordered  - Instruct and encourage patient and family to use good hand hygiene technique  - Identify and instruct in appropriate isolation precautions for identified infection/condition  3/3/2019 1550 by Briseida Arce RN  Outcome: Progressing  3/3/2019 1459 by Briseida Arce RN  Outcome: Progressing     Problem: SAFETY ADULT  Goal: Patient will remain free of falls  Description  INTERVENTIONS:  - Assess patient frequently for physical needs  -  Identify cognitive and physical deficits and behaviors that affect risk of falls    -  [place] fall precautions as indicated by assessment   - Educate patient/family on patient safety including physical limitations  - Instruct patient to call for assistance with activity based on assessment  - Modify environment to reduce risk of injury  - Consider OT/PT consult to assist with strengthening/mobility  3/3/2019 1550 by Briseida Arce RN  Outcome: Progressing  3/3/2019 1459 by Briseida Arce RN  Outcome: Progressing  Goal: Maintain or return to baseline ADL function  Description  INTERVENTIONS:  -  Assess patient's ability to carry out ADLs; assess patient's baseline for ADL function and identify physical deficits which impact ability to perform ADLs (bathing, care of mouth/teeth, toileting, grooming, dressing, etc )  - Assess/evaluate cause of self-care deficits   - Assess range of motion  - Assess patient's mobility; develop plan if impaired  - Assess patient's need for assistive devices and provide as appropriate  - Encourage maximum independence but intervene and supervise when necessary  ¯ Involve family in performance of ADLs  ¯ Assess for home care needs following discharge   ¯ Request OT consult to assist with ADL evaluation and planning for discharge  ¯ Provide patient education as appropriate  3/3/2019 1550 by Kristina Low RN  Outcome: Progressing  3/3/2019 1459 by Kristina Low RN  Outcome: Progressing  Goal: Maintain or return mobility status to optimal level  Description  INTERVENTIONS:  - Assess patient's baseline mobility status (ambulation, transfers, stairs, etc )    - Identify cognitive and physical deficits and behaviors that affect mobility  - Identify mobility aids required to assist with transfers and/or ambulation (gait belt, sit-to-stand, lift, walker, cane, etc )  - [place] fall precautions as indicated by assessment  - Record patient progress and toleration of activity level on Mobility SBAR; progress patient to next Phase/Stage  - Instruct patient to call for assistance with activity based on assessment  - Request Rehabilitation consult to assist with strengthening/weightbearing, etc   3/3/2019 1550 by Kristina Low RN  Outcome: Progressing  3/3/2019 1459 by Kristina Low RN  Outcome: Progressing     Problem: DISCHARGE PLANNING  Goal: Discharge to home or other facility with appropriate resources  Description  INTERVENTIONS:  - Identify barriers to discharge w/patient and caregiver  - Arrange for needed discharge resources and transportation as appropriate  - Identify discharge learning needs (meds, wound care, etc )  - Arrange for interpretive services to assist at discharge as needed  - Refer to Case Management Department for coordinating discharge planning if the patient needs post-hospital services based on physician/advanced practitioner order or complex needs related to functional status, cognitive ability, or social support system  3/3/2019 1550 by Ambrose Olsen RN  Outcome: Progressing  3/3/2019 1459 by Ambrose Olsen RN  Outcome: Progressing     Problem: Knowledge Deficit  Goal: Patient/family/caregiver demonstrates understanding of disease process, treatment plan, medications, and discharge instructions  Description  Complete learning assessment and assess knowledge base  Interventions:  - Provide teaching at level of understanding  - Provide teaching via preferred learning methods  3/3/2019 1550 by Ambrose Olsen RN  Outcome: Progressing  3/3/2019 1459 by Ambrose Olsen RN  Outcome: Progressing     Problem: Nutrition/Hydration-ADULT  Goal: Nutrient/Hydration intake appropriate for improving, restoring or maintaining nutritional needs  Description  Monitor and assess patient's nutrition/hydration status for malnutrition (ex- brittle hair, bruises, dry skin, pale skin and conjunctiva, muscle wasting, smooth red tongue, and disorientation)  Collaborate with interdisciplinary team and initiate plan and interventions as ordered  Monitor patient's weight and dietary intake as ordered or per policy  Utilize nutrition screening tool and intervene per policy  Determine patient's food preferences and provide high-protein, high-caloric foods as appropriate       INTERVENTIONS:  - Monitor oral intake, urinary output, labs, and treatment plans  - Assess nutrition and hydration status and recommend course of action  - Evaluate amount of meals eaten  - Assist patient with eating if necessary   - Allow adequate time for meals  - Recommend/ encourage appropriate diets, oral nutritional supplements, and vitamin/mineral supplements  - Order, calculate, and assess calorie counts as needed  - Recommend, monitor, and adjust tube feedings and TPN/PPN based on assessed needs  - Assess need for intravenous fluids  - Provide specific nutrition/hydration education as appropriate  - Include patient/family/caregiver in decisions related to nutrition  Outcome: Progressing

## 2019-03-03 NOTE — H&P
H&P Exam - Trauma   Ilda Choudhary 75 y o  female MRN: [id number]  Unit/Bed#: ED 09 Encounter: [id number]    Assessment/Plan   Trauma Alert: Other Transfer from [facility]: Ambulance  Trauma Team: Attending Zbigniew Zapien and Residents [address] [place] [address]  Consultants: Neurosurgery: [facility]  Time Called 7643 Discussed case with Dr Guzmán Reading Active Problems:   Patient Active Problem List   Diagnosis    Type 2 diabetes mellitus with diabetic polyneuropathy, with long-term current use of insulin ([facility] )    Hyperlipidemia    Asthma    Anemia of chronic disease    Pancytopenia ([facility] )    Bipolar 1 disorder ([facility] )    Cirrhosis of liver ([facility] )    Essential hypertension    GERD (gastroesophageal reflux disease)    Venous stasis    Ambulatory dysfunction    Traumatic compression fracture of L3 lumbar vertebra ([facility] )         Trauma Plan:   CT lumbar spine-Mild anterior wedge compression deformity of L3 is noted suggesting age-indeterminate fracture  There is some displacement of L2 to the left regard to L3 of approximately 1 cm which is new when compared to the prior and may suggest a component of instability at this level  Neuro surgical consultation from the ER recommended stat MRI, Radiology aware  Patient will receive stat MRI from the ER  Patient admitted to level to step-down  Q 1 hour neuro checks  Lumbar spine precautions  PT OT  Reviewed home medication restart as appropriate        Chief Complaint: "My butt hurts"    History of Present Illness   HPI:  Ilda Choudhary is a 75 y o  female who presents with as a transfer from [facility]  Patient was diagnosed with an L2-L3 L3 compression fracture with L2-L3 displacement  Patient states she fell last week  Patient states she has ambulatory dysfunction  Patient fell off toilet  Patient presented [facility] for difficulty walking and right lower extremity weakness    Patient states that her lower back pain is worsening over last 1 week  Patient also admits to buttocks pain  Currently pain is a 7/10 with any motion at her lower back becomes a 9/10  Patient states the pain radiates to her bilateral legs  Patient does take at home benzodiazepine and opiates  Patient denies numbness or tingling but does admit to motor weakness or right lower extremity  Patient denies fecal urinary incontinence  Patient denies urinary retention  Patient denies saddle anesthesias  Mechanism:Fall    Review of Systems    Historical Information   History is unobtainable from the patient due to none  Efforts to obtain history included the following sources: family member    Past Medical History:   Diagnosis Date    Asthma     Bipolar 2 disorder ([facility] )     Chronic kidney disease     Cirrhosis of liver ([facility] )     Diabetes mellitus ([facility] )     Elevated troponin 9/2/2018    GERD (gastroesophageal reflux disease)     Hyperlipidemia     Hypertension     Neuropathy     Renal disorder     Restless leg syndrome      Past Surgical History:   Procedure Laterality Date    BACK SURGERY      CHOLECYSTECTOMY      HERNIA REPAIR      REPLACEMENT TOTAL KNEE BILATERAL      TUMOR REMOVAL       Social History   Social History     Substance and Sexual Activity   Alcohol Use No     Social History     Substance and Sexual Activity   Drug Use No     Social History     Tobacco Use   Smoking Status Passive Smoke Exposure - Never Smoker   Smokeless Tobacco Never Used       There is no immunization history on file for this patient    Last Tetanus: unknown   Family History: Non-contributory  Unable to obtain/limited by none      Meds/Allergies   all current active meds have been reviewed    Allergies   Allergen Reactions    Abilify [Aripiprazole] Hallucinations    Celebrex [Celecoxib] Hallucinations    Codeine Hallucinations    Darvon [Propoxyphene] Hallucinations    Ibuprofen Hallucinations    Latuda [Lurasidone] Hallucinations    Nitrofurantoin Hallucinations    Penicillins Hallucinations    Ziprasidone Hallucinations    Levaquin [Levofloxacin] Itching and Rash     From IV levaquin           PHYSICAL EXAM    PE limited by: none    Objective   Vitals:   First set: Temperature: 97 8 °F (36 6 °C) (03/03/19 0609)  Pulse: 94 (03/03/19 0609)  Respirations: 20 (03/03/19 0609)  Blood Pressure: (!) 140/107 (03/03/19 2350)    Primary Survey:   (A) Airway: intact  (B) Breathing:  Bilateral breath sounds  (C) Circulation: Pulses:   carotid  2/4, pedal  2/4, radial  2/4 and femoral  2/4  (D) Disabliity:  GCS Total:  15, Eye Opening:   Spontaneous = 4, Motor Response: Obeys commands = 6 and Verbal Response:  Oriented = 5  (E) Expose:  Completed    Secondary Survey: (Click on Physical Exam tab above)  Physical Exam   Constitutional: She is oriented to person, place, and time  She appears well-developed and well-nourished  No distress  HENT:   Head: Normocephalic and atraumatic  Right Ear: External ear normal    Left Ear: External ear normal    Nose: Nose normal    Mouth/Throat: Oropharynx is clear and moist  No oropharyngeal exudate  Eyes: Pupils are equal, round, and reactive to light  Conjunctivae and EOM are normal  Right eye exhibits no discharge  Left eye exhibits no discharge  No scleral icterus  Neck: Normal range of motion  Neck supple  No JVD present  No tracheal deviation present  No thyromegaly present  Cardiovascular: Normal rate, regular rhythm, normal heart sounds and intact distal pulses  Exam reveals no gallop and no friction rub  No murmur heard  Pulmonary/Chest: Effort normal and breath sounds normal  No stridor  No respiratory distress  She has no wheezes  She has no rales  She exhibits no tenderness  Abdominal: Soft  Bowel sounds are normal  She exhibits no distension and no mass  There is no tenderness  There is no rebound and no guarding  No hernia  Musculoskeletal: Normal range of motion  She exhibits no edema or deformity  Lumbar back: She exhibits tenderness and bony tenderness  Back:    Lymphadenopathy:     She has no cervical adenopathy  Neurological: She is oriented to person, place, and time  GCS eye subscore is 4  GCS verbal subscore is 5  GCS motor subscore is 6  Reflex Scores:       Patellar reflexes are 2+ on the right side and 2+ on the left side  Achilles reflexes are 2+ on the right side and 2+ on the left side  4/5 strength in the right lower extremity at ankle, otherwise 5/5 strength in right lower extremities, 5/5 strength in left lower extremities, sensation intact throughout, sensation intact to perineum  Cranial nerves 2-12 intact  Speech is articulate visual fields are intact  Skin: Capillary refill takes less than 2 seconds  She is not diaphoretic  Psychiatric: She has a normal mood and affect  Nursing note and vitals reviewed  Invasive Devices     Peripheral Intravenous Line            Peripheral IV 03/03/19 Right Antecubital less than 1 day                Lab Results:   Results: I have personally reviewed pertinent reports   , BMP/CMP:   Lab Results   Component Value Date    SODIUM 137 03/03/2019    K 3 8 03/03/2019     03/03/2019    CO2 27 03/03/2019    BUN 9 03/03/2019    CREATININE 0 79 03/03/2019    CALCIUM 9 3 03/03/2019    AST 35 03/03/2019    ALT 33 03/03/2019    ALKPHOS 113 03/03/2019    EGFR 74 03/03/2019   , CBC:   Lab Results   Component Value Date    WBC 2 77 (L) 03/03/2019    HGB 9 6 (L) 03/03/2019    HCT 28 5 (L) 03/03/2019    MCV 92 03/03/2019    PLT 66 (L) 03/03/2019    MCH 31 1 03/03/2019    MCHC 33 7 03/03/2019    RDW 16 8 (H) 03/03/2019    MPV 12 4 03/03/2019    NRBC 0 03/03/2019   , Lipase: No results found for: LIPASE, AST:   Lab Results   Component Value Date    AST 35 03/03/2019   , ALT:   Lab Results   Component Value Date    ALT 33 03/03/2019    and CK:   Lab Results   Component Value Date    CKTOTAL 159 03/03/2019     Imaging/EKG Studies: Results:  I have personally reviewed pertinent reports  Other Studies: Ct Lumbar Spine Without Contrast    Result Date: 3/3/2019  Impression: Fracture of the L2 transverse process appears acute  Mild anterior wedge compression deformity of L3 is noted suggesting age-indeterminate fracture  There is some displacement of L2 to the left regard to L3 of approximately 1 cm which is new when compared to the prior and may suggest a component of instability at this level  Multilevel degenerative changes as described  Most prominent at L2/L3 and L3/L4  At L2/L3:  There is a large posterior disc herniation at this level with severe narrowing of the spinal canal   The disc herniation extends into the right subarticular zone and laterally and there is moderate narrowing of the right neural foramen  At L3/L4: There is multifactorial severe narrowing of the spinal canal  Other findings as above  Findings discussed with Dr Анна Franco by Dr Alanis Miranda at 4:25 AM on 3/3/2019   Workstation performed: [id number]     Code Status: Level 3 - DNAR and DNI  Advance Directive and Living Will: Yes    Power of :    POLST:

## 2019-03-03 NOTE — ED NOTES
Pt to be transported to [facility] ED via [facility] between 0530 to 0545  Accepting MD Dr Summer Moreno    Call [phone] for report     Stevie Brower RN  03/03/19 7205

## 2019-03-03 NOTE — ED NOTES
Pt laying on right side for comfort  Resting quietly  levoquin d/c'd due to c/o itching and redness to iv site  Benadryl given for same  Pt tolerating zyvox without complaint   Will continue to monitor     Roberto Bell RN  03/03/19 5519

## 2019-03-03 NOTE — PROGRESS NOTES
Spoke w/ Lianet from Trauma to notify of UA results d/t WBC being elevated  Will wait for further orders if needed and continue to monitor pt

## 2019-03-03 NOTE — ED PROVIDER NOTES
History  Chief Complaint   Patient presents with    Leg Pain     Pt arrives to ED with complaint of pain in the lower legs  From "the bottom of my butt to my toes" that started last week  Patient is a 75year old female with 1 week of worsening lower back and buttock pain with radiation down both legs  States her medications at home do not help  States she fell a week ago but not since then  States she could not get an MRI of her back  Was last seen in this ED on 2/12/19 for ambulatory dysfunction  [facility] website checked on this patient and last Rxs filled were on 2/19/19 for tramadol for 7 day supply and 2/14/19 for xanax for 30 day supply  No fever  (+) bilateral leg swelling  Denies recent numbness or incontinence or weakness  History provided by:  Patient and EMS personnel   used: No    Leg Pain   Associated symptoms: back pain    Associated symptoms: no fever        Prior to Admission Medications   Prescriptions Last Dose Informant Patient Reported? Taking?    LORazepam (ATIVAN) 0 5 mg tablet   Yes No   Sig: Take 0 5 mg by mouth every 8 (eight) hours as needed for anxiety   acetaminophen (TYLENOL) 325 mg tablet   No No   Sig: Take 2 tablets (650 mg total) by mouth every 6 (six) hours as needed for mild pain, headaches or fever   Patient not taking: Reported on 2/12/2019   albuterol (2 5 mg/3 mL) 0 083 % nebulizer solution  Self Yes No   Sig: Take 2 5 mg by nebulization 3 (three) times a day as needed for wheezing   aspirin 81 MG tablet  Self Yes No   Sig: Take 81 mg by mouth daily   buPROPion (WELLBUTRIN XL) 300 mg 24 hr tablet  Self Yes No   Sig: Take 300 mg by mouth every morning   cholecalciferol (VITAMIN D3) 1,000 units tablet  Self Yes No   Sig: Take 1,000 Units by mouth daily   ferrous sulfate 300 (60 Fe) mg/5 mL syrup   No No   Sig: Take 5 mL (300 mg total) by mouth 2 (two) times a day before meals   Patient not taking: Reported on 2/12/2019   fluticasone-salmeterol (ADVAIR HFA) 115-21 MCG/ACT inhaler  Self Yes No   Sig: Inhale 2 puffs 2 (two) times a day   furosemide (LASIX) 20 mg tablet   No No   Sig: Take 1 tablet (20 mg total) by mouth daily   Patient not taking: Reported on 2/12/2019   insulin detemir (LEVEMIR) 100 units/mL subcutaneous injection   No No   Sig: Inject 10 Units under the skin daily at bedtime   Patient taking differently: Inject 40 Units under the skin 2 (two) times a day    insulin lispro (HUMALOG KWIKPEN) 100 units/mL injection pen   No No   Sig: Inject 2 Units under the skin 3 (three) times a day with meals   ipratropium (ATROVENT) 0 02 % nebulizer solution  Self Yes No   Sig: Take 0 5 mg by nebulization every 6 (six) hours as needed for wheezing or shortness of breath   lidocaine (LIDODERM) 5 %   No No   Sig: Apply 1 patch topically once for 1 dose Remove & Discard patch within 12 hours or as directed by MD   lisinopril (ZESTRIL) 2 5 mg tablet  Self Yes No   Sig: Take 2 5 mg by mouth every morning   montelukast (SINGULAIR) 10 mg tablet  Self Yes No   Sig: Take 10 mg by mouth daily at bedtime   naproxen (NAPROSYN) 250 mg tablet   No No   Sig: Take 1 tablet (250 mg total) by mouth 2 (two) times a day with meals   Patient not taking: Reported on 2/12/2019   ondansetron (ZOFRAN) 4 mg tablet   Yes No   Sig: Take 4 mg by mouth every 8 (eight) hours as needed for nausea or vomiting   pantoprazole (PROTONIX) 40 mg tablet  Self Yes No   Sig: Take 40 mg by mouth daily   simvastatin (ZOCOR) 40 mg tablet  Self Yes No   Sig: Take 40 mg by mouth daily at bedtime      Facility-Administered Medications: None       Past Medical History:   Diagnosis Date    Asthma     Bipolar 2 disorder (HCC)     Chronic kidney disease     Cirrhosis of liver (HCC)     Diabetes mellitus ([facility] )     Elevated troponin 9/2/2018    GERD (gastroesophageal reflux disease)     Hyperlipidemia     Hypertension     Neuropathy     Renal disorder     Restless leg syndrome        Past Surgical History:   Procedure Laterality Date    BACK SURGERY      CHOLECYSTECTOMY      HERNIA REPAIR      REPLACEMENT TOTAL KNEE BILATERAL      TUMOR REMOVAL         History reviewed  No pertinent family history  I have reviewed and agree with the history as documented  Social History     Tobacco Use    Smoking status: Passive Smoke Exposure - Never Smoker    Smokeless tobacco: Never Used   Substance Use Topics    Alcohol use: No    Drug use: No        Review of Systems   Constitutional: Negative for fever  Cardiovascular: Positive for leg swelling  Gastrointestinal: Negative for nausea and vomiting  Musculoskeletal: Positive for back pain and myalgias  All other systems reviewed and are negative  Physical Exam  Physical Exam   Constitutional: She is oriented to person, place, and time  She appears distressed (moderate)  HENT:   Head: Normocephalic and atraumatic  Mucous membranes somewhat moist     Eyes: No scleral icterus  Neck: Normal range of motion  Neck supple  No JVD present  No tracheal deviation present  Cardiovascular: Normal rate, regular rhythm and normal heart sounds  No murmur heard  Pulmonary/Chest: Effort normal and breath sounds normal  No respiratory distress  Abdominal: Soft  Bowel sounds are normal  There is no tenderness  Musculoskeletal: She exhibits edema (bilateral LE edema) and tenderness (+) paravertebral/buttocks tenderness bilaterally  She exhibits no deformity  Neurological: She is alert and oriented to person, place, and time  No focal deficits  Skin: Skin is warm and dry  No rash noted  No erythema  Psychiatric:   Anxious  Nursing note and vitals reviewed        Vital Signs  ED Triage Vitals [03/03/19 0203]   Temperature Pulse Respirations Blood Pressure SpO2   98 1 °F (36 7 °C) 81 16 135/60 97 %      Temp Source Heart Rate Source Patient Position - Orthostatic VS BP Location FiO2 (%)   Oral Monitor Lying Right arm --      Pain Score 5           Vitals:    03/03/19 0203 03/03/19 0405 03/03/19 0426   BP: 135/60 124/55 104/60   Pulse: 81 83 87   Patient Position - Orthostatic VS: Lying Lying Lying       Visual Acuity      ED Medications  Medications   linezolid (ZYVOX) IVPB (premix) 600 mg (600 mg Intravenous New Bag 3/3/19 0429)   vancomycin (VANCOCIN) IVPB (premix) 1,000 mg (has no administration in time range)   HYDROmorphone (DILAUDID) injection 0 2 mg (has no administration in time range)   ondansetron (ZOFRAN) injection 4 mg (4 mg Intravenous Given 3/3/19 0255)   HYDROmorphone (DILAUDID) injection 0 2 mg (0 2 mg Intravenous Given 3/3/19 0259)   insulin regular (HumuLIN R,NovoLIN R) injection 4 Units (4 Units Intravenous Given 3/3/19 0359)   levofloxacin (LEVAQUIN) IVPB (premix) 750 mg (0 mg Intravenous Stopped 3/3/19 0420)   diphenhydrAMINE (BENADRYL) injection 25 mg (25 mg Intravenous Given 3/3/19 0428)       Diagnostic Studies  Results Reviewed     Procedure Component Value Units Date/Time    Urine Microscopic [[id number]]  (Abnormal) Collected:  03/03/19 0329    Lab Status:  Final result Specimen:  Urine, Clean Catch Updated:  03/03/19 0340     RBC, UA 10-20 /hpf      WBC, UA 10-20 /hpf      Epithelial Cells None Seen /hpf      Bacteria, UA Innumerable /hpf     Urine culture [[id number]] Collected:  03/03/19 0329    Lab Status:   In process Specimen:  Urine, Clean Catch Updated:  03/03/19 0340    Acetone [[id number]]  (Normal) Collected:  03/03/19 0241    Lab Status:  Final result Specimen:  Blood Updated:  03/03/19 0326     Acetone, Bld Negative    ED Urine Macroscopic [[id number]]  (Abnormal) Collected:  03/03/19 0329    Lab Status:  Final result Specimen:  Urine Updated:  03/03/19 0325     Color, UA Yellow     Clarity, UA Clear     pH, UA 6 5     Leukocytes, UA Negative     Nitrite, UA Negative     Protein, UA Negative mg/dl      Glucose,  (1/2%) mg/dl      Ketones, UA Negative mg/dl      Urobilinogen, UA 0 2 E U /dl Bilirubin, UA Negative     Blood, UA Large     Specific [place], UA 1 010    Narrative:       CLINITEK RESULT    TSH [[id number]]  (Normal) Collected:  03/03/19 0241    Lab Status:  Final result Specimen:  Blood from Arm, Right Updated:  03/03/19 0324     TSH 3RD GENERATON 1 541 uIU/mL     Narrative:       Patients undergoing fluorescein dye angiography may retain small amounts of fluorescein in the body for 48-72 hours post procedure  Samples containing fluorescein can produce falsely depressed TSH values  If the patient had this procedure,a specimen should be resubmitted post fluorescein clearance      B-type natriuretic peptide [[id number]]  (Abnormal) Collected:  03/03/19 0241    Lab Status:  Final result Specimen:  Blood from Arm, Right Updated:  03/03/19 0324     NT-proBNP 176 pg/mL     CKMB [[id number]]  (Normal) Collected:  03/03/19 0241    Lab Status:  Final result Specimen:  Blood from Arm, Right Updated:  03/03/19 0324     CK-MB Index 2 5 %      CK-MB 3 9 ng/mL     CK Total with Reflex CKMB [[id number]]  (Normal) Collected:  03/03/19 0241    Lab Status:  Final result Specimen:  Blood from Arm, Right Updated:  03/03/19 0323     Total  U/L     Troponin I [[id number]]  (Abnormal) Collected:  03/03/19 0241    Lab Status:  Final result Specimen:  Blood from Arm, Right Updated:  03/03/19 0306     Troponin I 0 19 ng/mL     Comprehensive metabolic panel [[id number]]  (Abnormal) Collected:  03/03/19 0241    Lab Status:  Final result Specimen:  Blood from Arm, Right Updated:  03/03/19 0304     Sodium 137 mmol/L      Potassium 3 8 mmol/L      Chloride 101 mmol/L      CO2 27 mmol/L      ANION GAP 9 mmol/L      BUN 9 mg/dL      Creatinine 0 79 mg/dL      Glucose 379 mg/dL      Calcium 9 3 mg/dL      AST 35 U/L      ALT 33 U/L      Alkaline Phosphatase 113 U/L      Total Protein 5 8 g/dL      Albumin 2 3 g/dL      Total Bilirubin 1 00 mg/dL      eGFR 74 ml/min/1 73sq m     Narrative:       National Kidney Disease Education Program recommendations are as follows:  GFR calculation is accurate only with a steady state creatinine  Chronic Kidney disease less than 60 ml/min/1 73 sq  meters  Kidney failure less than 15 ml/min/1 73 sq  meters  D-Dimer [[id number]]  (Abnormal) Collected:  03/03/19 0241    Lab Status:  Final result Specimen:  Blood from Arm, Right Updated:  03/03/19 0302     D-Dimer, Quant 669 ng/ml (FEU)     Protime-INR [[id number]]  (Abnormal) Collected:  03/03/19 0241    Lab Status:  Final result Specimen:  Blood from Arm, Right Updated:  03/03/19 0300     Protime 17 5 seconds      INR 1 48    APTT [[id number]]  (Abnormal) Collected:  03/03/19 0241    Lab Status:  Final result Specimen:  Blood from Arm, Right Updated:  03/03/19 0300     PTT 42 seconds     CBC and differential [[id number]]  (Abnormal) Collected:  03/03/19 0241    Lab Status:  Final result Specimen:  Blood from Arm, Right Updated:  03/03/19 0253     WBC 2 77 Thousand/uL      RBC 3 09 Million/uL      Hemoglobin 9 6 g/dL      Hematocrit 28 5 %      MCV 92 fL      MCH 31 1 pg      MCHC 33 7 g/dL      RDW 16 8 %      MPV 12 4 fL      Platelets 66 Thousands/uL      nRBC 0 /100 WBCs      Neutrophils Relative 58 %      Immat GRANS % 0 %      Lymphocytes Relative 24 %      Monocytes Relative 13 %      Eosinophils Relative 4 %      Basophils Relative 1 %      Neutrophils Absolute 1 60 Thousands/µL      Immature Grans Absolute 0 01 Thousand/uL      Lymphocytes Absolute 0 65 Thousands/µL      Monocytes Absolute 0 37 Thousand/µL      Eosinophils Absolute 0 12 Thousand/µL      Basophils Absolute 0 02 Thousands/µL                  XR chest 1 view portable   ED Interpretation by Shyanne Adam MD (03/03 0352)   Elevated R hemidiaphragm, rotated read by me         CT lumbar spine without contrast   ED Interpretation by Shyanne Adam MD (03/03 1135)   FINDINGS:      ALIGNMENT:  Mild curvature of the thoracolumbar spine, apex to the right  Fly Plush is some displacement of L2 to the left regard to L3 of approximately 1 cm which is new when compared to the prior   Spinal alignment otherwise appears maintained  VERTEBRAL BODIES:  Mild anterior wedge compression deformity of L3 is noted suggesting age-indeterminate fracture  Reynold Wakefield may also be a fracture involving the marginal facets of the inferior endplate of L2 on the right  Reynold Wakefield is also a fracture of the    L2 transverse process on the left, this appears acute   Bones otherwise appear intact  DEGENERATIVE CHANGES:      Lower Thoracic spine:  Some narrowing of the intervertebral disc space T10/T11 is noted with anterior and marginal osteophytosis  Reynold Wakefield is also anterior and marginal osteophytosis of T12/L1   A broad-based posterior disc bulge is noted at this level    which is eccentric to the right   This mildly narrows the spinal canal   There is multifactorial moderate neural foraminal narrowing on the right ]      L1-2:  Mild posterior osteophytosis but normal disc height   No significant herniation   Normal facet joints   No canal or foraminal stenosis  L2-3: Reynold Wakefield is a large posterior disc herniation at this level with severe narrowing of the spinal canal   The disc herniation extends into the right subarticular zone and laterally and there is moderate narrowing of the right neural foramen  L3-4:  Loss of height of the intervertebral disc with vacuum disc phenomenon   Mild posterior anterior, and marginal osteophytosis   Facet joint arthropathy is seen at this level   There is multifactorial severe narrowing of the spinal canal at this    level   Mild bilateral neural foraminal narrowing  L4-5:  Significant loss of height of the intervertebral disc   No significant spinal canal narrowing or neural foraminal narrowing   Facet joint arthropathy is noted with some partial ankylosis of the facets        L5-S1:  Significant loss of height of the intervertebral disc   No significant spinal canal or neural foraminal narrowing   Metallic densities are redemonstrated in the left L5 subarticular zone, as before   Facet joint arthropathy is noted with some    ankylosis of the facets and heterotopic ossification  PARASPINAL SOFT TISSUES:  Some mild body wall edema is noted   Colonic diverticulosis is partially imaged  Impression:        Fracture of the L2 transverse process appears acute  Mild anterior wedge compression deformity of L3 is noted suggesting age-indeterminate fracture   There is some displacement of L2 to the left regard to L3 of approximately 1 cm which is new when compared to the prior and may suggest a component of    instability at this level  Multilevel degenerative changes as described   Most prominent at L2/L3 and L3/L4  At L2/L3: Marisol Climes is a large posterior disc herniation at this level with severe narrowing of the spinal canal   The disc herniation extends into the right subarticular zone and laterally and there is moderate narrowing of the right neural foramen  At L3/L4: There is multifactorial severe narrowing of the spinal canal       Other findings as above  Findings discussed with Dr Gonzalez [place] by Dr Lon Boyce at 4:25 AM on 3/3/2019  Workstation performed: [id number]         Final Result by Amadou Meléndez DO (03/03 8266)      Fracture of the L2 transverse process appears acute  Mild anterior wedge compression deformity of L3 is noted suggesting age-indeterminate fracture  There is some displacement of L2 to the left regard to L3 of approximately 1 cm which is new when compared to the prior and may suggest a component of    instability at this level  Multilevel degenerative changes as described  Most prominent at L2/L3 and L3/L4     At L2/L3:  There is a large posterior disc herniation at this level with severe narrowing of the spinal canal   The disc herniation extends into the right subarticular zone and laterally and there is moderate narrowing of the right neural foramen  At L3/L4: There is multifactorial severe narrowing of the spinal canal       Other findings as above  Findings discussed with Dr Chavez Varela by Dr Sun Mora at 4:25 AM on 3/3/2019                 Workstation performed: [id number]                    Procedures  ECG 12 Lead Documentation  Date/Time: 3/3/2019 2:31 AM  Performed by: Arnold Han MD  Authorized by: Arnold Han MD     Indications / Diagnosis:  Back pain  ECG reviewed by me, the ED Provider: yes    Patient location:  ED  Previous ECG:     Previous ECG:  Compared to current    Comparison ECG info:  9/2/18    Similarity:  No change  Quality:     Tracing quality:  Limited by artifact  Rate:     ECG rate:  79    ECG rate assessment: normal    Rhythm:     Rhythm: sinus rhythm    Ectopy:     Ectopy: PAC    QRS:     QRS axis:  Normal    QRS intervals:  Normal  Conduction:     Conduction: normal    ST segments:     ST segments:  Normal  T waves:     T waves: normal      CriticalCare Time  Performed by: Arnold Han MD  Authorized by: Arnold Han MD     Critical care provider statement:     Critical care time (minutes):  35    Critical care time was exclusive of:  Separately billable procedures and treating other patients    Critical care was necessary to treat or prevent imminent or life-threatening deterioration of the following conditions:  Trauma    Critical care was time spent personally by me on the following activities:  Obtaining history from patient or surrogate, development of treatment plan with patient or surrogate, evaluation of patient's response to treatment, examination of patient, ordering and performing treatments and interventions, ordering and review of laboratory studies, ordering and review of radiographic studies, re-evaluation of patient's condition, review of old charts and discussions with consultants    I assumed direction of critical care for this patient from another provider in my specialty: no             Phone Contacts  ED Phone Contact    ED Course  ED Course as of Mar 03 0517   Sun Mar 03, 2019   9631 IV linezolid and IV levaquin ordered for UTI  Has a recent h/o e  Faecalis sensitive to levaquin and VRE sensitive to linezolid  9411 D-dimer elevated so patient will need inpatient doppler US of legs  I doubt PE since no sob or chest pain  0353 Labs, EKG d/w patient  Patient states pain is better if she does not move        0422 IV benadryl ordered for reaction to IV levaquin with redness and pruritis at IV site so IV levaquin was stopped  0448 CT d/w patient and patient does not want surgery if this can be avoided  0501 D/w Dr Casimiro Ansari who wants patient transferred to [facility] as a trauma admission  Michelle Han D/w PACS who d/w Dr Jose Mena who will accept patient at [address] [facility] [place] [place] ED  HEART Risk Score      Most Recent Value   History  0 Filed at: 03/03/2019 0343   ECG  0 Filed at: 03/03/2019 0343   Age  2 Filed at: 03/03/2019 0343   Risk Factors  2 Filed at: 03/03/2019 0343   Troponin  2 Filed at: 03/03/2019 0343   Heart Score Risk Calculator   History  0 Filed at: 03/03/2019 0343   ECG  0 Filed at: 03/03/2019 0343   Age  2 Filed at: 03/03/2019 0343   Risk Factors  2 Filed at: 03/03/2019 0343   Troponin  2 Filed at: 03/03/2019 0343   HEART Score  6 Filed at: 03/03/2019 0343   HEART Score  6 Filed at: 03/03/2019 0343                Initial Sepsis Screening     Row Name 03/03/19 0343                Is the patient's history suggestive of a new or worsening infection? No  -AO        Suspected source of infection          Are two or more of the following signs & symptoms of infection both present and new to the patient?           Indicate SIRS criteria          If the answer is yes to both questions, suspicion of sepsis is present          If severe sepsis is present AND tissue hypoperfusion perists in the hour after fluid resuscitation or lactate > 4, the patient meets criteria for SEPTIC SHOCK          Are any of the following organ dysfunction criteria present within 6 hours of suspected infection and SIRS criteria that are NOT considered to be chronic conditions?         Organ dysfunction          Date of presentation of severe sepsis          Time of presentation of severe sepsis          Tissue hypoperfusion persists in the hour after crystalloid fluid administration, evidenced, by either:          Was hypotension present within one hour of the conclusion of crystalloid fluid administration?         Date of presentation of septic shock          Time of presentation of septic shock            User Key  (r) = Recorded By, (t) = Taken By, (c) = Cosigned By    [address] Name Provider Julio Allen MD Physician                  MDM  Number of Diagnoses or Management Options  Hyperglycemia:   Lumbar compression fracture [facility]):   Lumbar transverse process fracture [facility]):   Posterior herniation of lumbar disc:   UTI (urinary tract infection):   Diagnosis management comments: DDx including but not limited to: sciatica, herniated disc, arthritis, spinal stenosis, strain, sprain; doubt fracture, cauda equina syndrome, epidural abscess, AAA  DDx including but not limited to: metabolic abnormality, renal failure, thyroid disease, CHF, DVT          Amount and/or Complexity of Data Reviewed  Clinical lab tests: ordered and reviewed  Tests in the radiology section of CPT®: ordered and reviewed  Discussion of test results with the performing providers: yes  Decide to obtain previous medical records or to obtain history from someone other than the patient: yes  Obtain history from someone other than the patient: yes  Review and summarize past medical records: yes  Discuss the patient with other providers: yes  Independent visualization of images, tracings, or specimens: yes        Disposition  Final diagnoses:   Lumbar transverse process fracture (HCC) - L2   Lumbar compression fracture (HCC) - L3; unstable fracture   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   Hyperglycemia   UTI (urinary tract infection)   Allergic reaction to drug, initial encounter - levaquin     Time reflects when diagnosis was documented in both MDM as applicable and the Disposition within this note     Time User Action Codes Description Comment    3/3/2019  5:05 AM Oralee Louis Add [S32 009A] Lumbar transverse process fracture ([address] )     3/3/2019  5:05 AM Oralee Louis Add [S32 000A] Lumbar compression fracture ([address] )     3/3/2019  5:06 AM Oralee Louis Add [M51 26] Posterior herniation of lumbar disc     3/3/2019  5:06 AM Oralee Louis Modify [M51 26] Posterior herniation of lumbar disc L2-3    3/3/2019  5:06 AM Oralee Louis Modify [S32 009A] Lumbar transverse process fracture ([address] ) L2    3/3/2019  5:06 AM Oralee Louis Modify [S32 000A] Lumbar compression fracture ([address] ) L3    3/3/2019  5:06 AM Oralee Louis Modify [S32 009A] Lumbar transverse process fracture ([address] ) L2    3/3/2019  5:06 AM Oralee Louis Modify [S32 000A] Lumbar compression fracture ([address] ) L3    3/3/2019  5:06 AM Oralee Louis Add [R73 9] Hyperglycemia     3/3/2019  5:06 AM Oralee Louis Add [N39 0] UTI (urinary tract infection)     3/3/2019  5:07 AM Oralee Louis Modify [S32 000A] Lumbar compression fracture ([address] ) L3; unstable fracture    3/3/2019  5:16 AM Oralee Louis Add [T78 40XA] Allergic reaction to drug, initial encounter     3/3/2019  5:16 AM Oralee Louis Modify [T78 40XA] Allergic reaction to drug, initial encounter levaquin      ED Disposition     ED Disposition Condition Date/Time Comment    Transfer to Another Facility-In [facility] Mar 3, 0641  2:31 AM Toddperry Lopez should be transferred out to [facility] Trauma ED          MD Documentation      Most Recent Value   Patient Condition  The patient has been stabilized such that within reasonable medical probability, no material deterioration of the patient condition or the condition of the unborn child(shantelle) is likely to result from the transfer   Reason for Transfer  Level of Care needed not available at this facility [No trauma or neurosurgery here]   Benefits of Transfer  Specialized equipment and/or services available at the receiving facility (Include comment)________________________ Jil Tom, neurosurgical services]   Risks of Transfer  Potential deterioration of medical condition, Increased discomfort during transfer, Loss of IV   Accepting Physician  Dr Roseline Kim Name, 11 [facility],  [place], [place]    (Name & Tel number)  [facility] [employer] and Unit #)  Burton Avendaño MD   Provider Certification  General risk, such as traffic hazards, adverse weather conditions, rough terrain or turbulence, possible failure of equipment (including vehicle or aircraft), or consequences of actions of persons outside the control of the transport personnel      RN Documentation      [facility] Name, 11 [facility],  [place], [place]    (Name & Tel number)  [facility] [employer] and Unit #)  SLENELSON      Follow-up Information    None         Patient's Medications   Discharge Prescriptions    No medications on file     No discharge procedures on file      ED Provider  Electronically Signed by           Stacy Lara MD  03/03/19 7047       Stacy Lara MD  03/03/19 [address], [place]  03/03/19 8151

## 2019-03-03 NOTE — CONSULTS
Consultation - Neurosurgery   Bradly Stewart 75 y o  female MRN: [id number]  Unit/Bed#: ED 09 Encounter: [id number]        Assessment/Plan     Assessment:  L2-3 fracture dislocation with lateral displacement of L2 on 3  There is severe spinal stenosis at this level secondary to a herniated disc in trilateral spondylitic disease  At L3-4 there is spinal stenosis secondary to facet hypertrophy and ligamentous hypertrophy  Frequent falls secondary to above  Remote history of traumatic quadrant paresis with resolution over months    Plan:  Patient refuses any surgical intervention currently  I have recommended they she therefore use a reinforce corsett style brace  She has no pain currently  Consults    History of Present Illness   HPI: Bradly Stewart is a 75y o  year old female who is in a senior center  She tells me that she is in the process of being evicted by her landlord  She has fallen on multiple occasions  She relates this to being started on different antihypertensive medications and pain reduction medications  She does not recall the fall which resulted in her current problems but says that she can no longer walk  She does have severe pain in her back currently  She denies radiculopathic pain  Patient tells me that under no circumstances that she wants surgery to correct her problems  She was advised that she would likely get worse with the ambulation given the L2-3 fracture and the degree of spinal stenosis present  She was also told that the degree of spinal stenosis was likely the cause of her frequent falls        Review of Systems  History obtained from chart review and the patient  General ROS:  Patient is gregarious  Psychological ROS: negative  Ophthalmic ROS: negative  ENT ROS: negative  Allergy and Immunology ROS: negative  Hematological and Lymphatic ROS: negative  Endocrine ROS: negative  Respiratory ROS: no cough, shortness of breath, or wheezing  Cardiovascular ROS: no chest pain or dyspnea on exertion  Gastrointestinal ROS: no abdominal pain, change in bowel habits, or black or bloody stools  Genito-Urinary ROS: negative  Musculoskeletal ROS:  Pain in the low back reproduced by movement of the legs  Neurological ROS:  Paresis of the right lower extremity to dorsiflexion and plantar flexion    Historical Information   Past Medical History:   Diagnosis Date    Asthma     Bipolar 2 disorder ([facility] )     Chronic kidney disease     Cirrhosis of liver (HCC)     Diabetes mellitus ([facility] )     Elevated troponin 9/2/2018    GERD (gastroesophageal reflux disease)     Hyperlipidemia     Hypertension     Neuropathy     Renal disorder     Restless leg syndrome      Past Surgical History:   Procedure Laterality Date    BACK SURGERY      CHOLECYSTECTOMY      HERNIA REPAIR      REPLACEMENT TOTAL KNEE BILATERAL      TUMOR REMOVAL       Social History     Substance and Sexual Activity   Alcohol Use No     Social History     Substance and Sexual Activity   Drug Use No     Social History     Tobacco Use   Smoking Status Passive Smoke Exposure - Never Smoker   Smokeless Tobacco Never Used     History reviewed  No pertinent family history      Meds/Allergies   Current Facility-Administered Medications   Medication Dose Route Frequency    acetaminophen (TYLENOL) tablet 650 mg  650 mg Oral Q6H PRN    buPROPion (WELLBUTRIN XL) 24 hr tablet 300 mg  300 mg Oral QAM    cholecalciferol (VITAMIN D3) tablet 1,000 Units  1,000 Units Oral Daily    ferrous sulfate oral syrup 300 mg  300 mg Oral BID AC    furosemide (LASIX) tablet 20 mg  20 mg Oral Daily    insulin detemir (LEVEMIR) subcutaneous injection 10 Units  10 Units Subcutaneous HS    insulin lispro (HumaLOG) 100 units/mL subcutaneous injection 2-12 Units  2-12 Units Subcutaneous TID AC    ipratropium-albuterol (DUO-NEB) 0 5-2 5 mg/3 mL inhalation solution 3 mL  3 mL Nebulization Q6H PRN    lisinopril (ZESTRIL) tablet 2 5 mg  2 5 mg Oral QAM    LORazepam (ATIVAN) tablet 0 5 mg  0 5 mg Oral BID    montelukast (SINGULAIR) tablet 10 mg  10 mg Oral HS    multi-electrolyte (ISOLYTE-S PH 7 4 equivalent) IV solution  75 mL/hr Intravenous Continuous    pantoprazole (PROTONIX) EC tablet 40 mg  40 mg Oral Early Morning    pravastatin (PRAVACHOL) tablet 80 mg  80 mg Oral Daily With Dinner       (Not in a hospital admission)      Allergies   Allergen Reactions    Abilify [Aripiprazole] Hallucinations    Celebrex [Celecoxib] Hallucinations    Codeine Hallucinations    Darvon [Propoxyphene] Hallucinations    Ibuprofen Hallucinations    Latuda [Lurasidone] Hallucinations    Nitrofurantoin Hallucinations    Penicillins Hallucinations    Ziprasidone Hallucinations    Levaquin [Levofloxacin] Itching and Rash     From IV levaquin         Objective     PHYSICAL EXAMINATION  General appearance: alert, appears stated age, cooperative and no distress  Head: Normocephalic, without obvious abnormality, atraumatic, sinuses nontender to percussion  Eyes:  Extraocular movements intact  Neck:  No pain to palpation in the posterior cervical spine  Back:  Patient has pain to palpation of the mid and lower LS spine  Extremities: extremities normal, atraumatic, no cyanosis or edema  Neurologic:  Awake, alert, oriented x3  speech: normal in context and clarity  memory:  Memory is poor but intact to conversational speech  motor strength: Motor strength demonstrates paresis of the right lower extremity especially for dorsiflexion and plantar flexion  Detailed examination of the proximal musculature is not possible because the pain inhibition    She has a high-frequency low-amplitude tremor in the upper extremities  sensation:  She does not extinguish to double simultaneous stimulation  cerebellar: finger-to-nose intact  gait:  Not ambulated  reflexes:  Reflexes are absent in the lower extremities  plantar responses:  None    Vitals:Blood pressure 129/60, pulse 84, temperature 97 8 °F (36 6 °C), temperature source Oral, resp  rate 18, weight 75 8 kg (167 lb), SpO2 98 %  ,Body mass index is 32 61 kg/m²  Intake/Output Summary (Last 24 hours) at 3/3/2019 1038  Last data filed at 3/3/2019 0955  Gross per 24 hour   Intake 500 ml   Output    Net 500 ml       Imaging Studies:  CT scan of the LS spine demonstrates a lateral subluxation of L2 on L3  There is a large disc herniation at this level  STIR sequence MRI demonstrates an acute fracture of the L2 articular process  There is trilateral disease producing spinal stenosis at the L3-4 level  EKG, Pathology, and Other Studies: I have personally reviewed pertinent reports  Code Status: Level 3 - DNAR and DNI  Advance Directive and Living Will: Yes    Power of :    POLST:      Counseling / Coordination of Care  I spent 45 minutes with the patient

## 2019-03-03 NOTE — RESPIRATORY THERAPY NOTE
RT Protocol Note  Jose Ramon Carlos 75 y o  female MRN: [id number]  Unit/Bed#: ED 09 Encounter: [id number]    Assessment    Active Problems:    Type 2 diabetes mellitus with diabetic polyneuropathy, with long-term current use of insulin (HCC)    Hyperlipidemia    Asthma    Anemia of chronic disease    Pancytopenia (HCC)    Bipolar 1 disorder (HCC)    Cirrhosis of liver (HCC)    Essential hypertension    GERD (gastroesophageal reflux disease)    Venous stasis    Ambulatory dysfunction    Traumatic compression fracture of L3 lumbar vertebra (HCC)      Home Pulmonary Medications:  Advair; albuterol/atrovent udn prn       Past Medical History:   Diagnosis Date    Asthma     Bipolar 2 disorder (HCC)     Chronic kidney disease     Cirrhosis of liver (HCC)     Diabetes mellitus ([facility] )     Elevated troponin 9/2/2018    GERD (gastroesophageal reflux disease)     Hyperlipidemia     Hypertension     Neuropathy     Renal disorder     Restless leg syndrome      Social History     Socioeconomic History    Marital status: Single     Spouse name: None    Number of children: None    Years of education: None    Highest education level: None   Occupational History    None   Social Needs    Financial resource strain: None    Food insecurity:     Worry: None     Inability: None    Transportation needs:     Medical: None     Non-medical: None   Tobacco Use    Smoking status: Passive Smoke Exposure - Never Smoker    Smokeless tobacco: Never Used   Substance and Sexual Activity    Alcohol use: No    Drug use: No    Sexual activity: None   Lifestyle    Physical activity:     Days per week: None     Minutes per session: None    Stress: None   Relationships    Social connections:     Talks on phone: None     Gets together: None     Attends Jainism service: None     Active member of club or organization: None     Attends meetings of clubs or organizations: None     Relationship status: None    Intimate partner violence:     Fear of current or ex partner: None     Emotionally abused: None     Physically abused: None     Forced sexual activity: None   Other Topics Concern    None   Social History Narrative    None       Subjective         Objective    Physical Exam:   Assessment Type: Assess only  General Appearance: Alert, Awake  Respiratory Pattern: Normal  Chest Assessment: Chest expansion symmetrical  Bilateral Breath Sounds: Clear  Cough: None    Vitals:  Blood pressure 129/61, pulse 82, temperature 97 8 °F (36 6 °C), temperature source Oral, resp  rate 18, weight 75 8 kg (167 lb), SpO2 98 %  Imaging and other studies: I have personally reviewed pertinent reports              Plan    Respiratory Plan: Home Bronchodilator Patient pathway        Resp Comments: assessed per respiratory protocol; admitted for back pain,s/p fall last week;has hx asthma; uses prn ipratroprium /albuterol udn prn and advair; BS clear; breathing nonlabored; will cont prn nebs

## 2019-03-04 LAB
ANION GAP SERPL CALCULATED.3IONS-SCNC: 6 MMOL/L (ref 4–13)
BUN SERPL-MCNC: 11 MG/DL (ref 5–25)
CALCIUM SERPL-MCNC: 8.8 MG/DL (ref 8.3–10.1)
CHLORIDE SERPL-SCNC: 104 MMOL/L (ref 100–108)
CO2 SERPL-SCNC: 28 MMOL/L (ref 21–32)
CREAT SERPL-MCNC: 0.69 MG/DL (ref 0.6–1.3)
GFR SERPL CREATININE-BSD FRML MDRD: 86 ML/MIN/1.73SQ M
GLUCOSE SERPL-MCNC: 187 MG/DL (ref 65–140)
GLUCOSE SERPL-MCNC: 282 MG/DL (ref 65–140)
GLUCOSE SERPL-MCNC: 318 MG/DL (ref 65–140)
GLUCOSE SERPL-MCNC: 376 MG/DL (ref 65–140)
GLUCOSE SERPL-MCNC: >500 MG/DL (ref 65–140)
POTASSIUM SERPL-SCNC: 3.5 MMOL/L (ref 3.5–5.3)
SODIUM SERPL-SCNC: 138 MMOL/L (ref 136–145)

## 2019-03-04 PROCEDURE — 97163 PT EVAL HIGH COMPLEX 45 MIN: CPT

## 2019-03-04 PROCEDURE — 99232 SBSQ HOSP IP/OBS MODERATE 35: CPT | Performed by: SURGERY

## 2019-03-04 PROCEDURE — 99223 1ST HOSP IP/OBS HIGH 75: CPT | Performed by: INTERNAL MEDICINE

## 2019-03-04 PROCEDURE — 97535 SELF CARE MNGMENT TRAINING: CPT

## 2019-03-04 PROCEDURE — G8979 MOBILITY GOAL STATUS: HCPCS

## 2019-03-04 PROCEDURE — G8988 SELF CARE GOAL STATUS: HCPCS

## 2019-03-04 PROCEDURE — 82948 REAGENT STRIP/BLOOD GLUCOSE: CPT

## 2019-03-04 PROCEDURE — 80048 BASIC METABOLIC PNL TOTAL CA: CPT | Performed by: ORTHOPAEDIC SURGERY

## 2019-03-04 PROCEDURE — 94760 N-INVAS EAR/PLS OXIMETRY 1: CPT

## 2019-03-04 PROCEDURE — 99232 SBSQ HOSP IP/OBS MODERATE 35: CPT | Performed by: NEUROLOGICAL SURGERY

## 2019-03-04 PROCEDURE — 97167 OT EVAL HIGH COMPLEX 60 MIN: CPT

## 2019-03-04 PROCEDURE — G8987 SELF CARE CURRENT STATUS: HCPCS

## 2019-03-04 PROCEDURE — G8978 MOBILITY CURRENT STATUS: HCPCS

## 2019-03-04 RX ORDER — SENNOSIDES 8.6 MG
1 TABLET ORAL
Status: DISCONTINUED | OUTPATIENT
Start: 2019-03-04 | End: 2019-03-05 | Stop reason: HOSPADM

## 2019-03-04 RX ORDER — OXYCODONE HYDROCHLORIDE 5 MG/1
2.5 TABLET ORAL EVERY 6 HOURS
Status: DISCONTINUED | OUTPATIENT
Start: 2019-03-04 | End: 2019-03-05 | Stop reason: HOSPADM

## 2019-03-04 RX ORDER — LIDOCAINE 50 MG/G
1 PATCH TOPICAL DAILY
Status: DISCONTINUED | OUTPATIENT
Start: 2019-03-04 | End: 2019-03-05 | Stop reason: HOSPADM

## 2019-03-04 RX ORDER — OXYCODONE HYDROCHLORIDE 5 MG/1
5 TABLET ORAL EVERY 4 HOURS PRN
Status: DISCONTINUED | OUTPATIENT
Start: 2019-03-04 | End: 2019-03-05 | Stop reason: HOSPADM

## 2019-03-04 RX ORDER — OXYCODONE HYDROCHLORIDE 5 MG/1
2.5 TABLET ORAL EVERY 4 HOURS PRN
Status: DISCONTINUED | OUTPATIENT
Start: 2019-03-04 | End: 2019-03-04

## 2019-03-04 RX ADMIN — MINERAL SUPPLEMENT IRON 300 MG / 5 ML STRENGTH LIQUID 100 PER BOX UNFLAVORED 300 MG: at 06:05

## 2019-03-04 RX ADMIN — ACETAMINOPHEN 975 MG: 325 TABLET ORAL at 21:00

## 2019-03-04 RX ADMIN — METHOCARBAMOL 250 MG: 500 TABLET, FILM COATED ORAL at 13:57

## 2019-03-04 RX ADMIN — PRAVASTATIN SODIUM 80 MG: 80 TABLET ORAL at 17:25

## 2019-03-04 RX ADMIN — OXYCODONE HYDROCHLORIDE 2.5 MG: 5 TABLET ORAL at 21:00

## 2019-03-04 RX ADMIN — VITAMIN D, TAB 1000IU (100/BT) 1000 UNITS: 25 TAB at 08:52

## 2019-03-04 RX ADMIN — LORAZEPAM 0.5 MG: 0.5 TABLET ORAL at 08:52

## 2019-03-04 RX ADMIN — LORAZEPAM 0.5 MG: 0.5 TABLET ORAL at 17:25

## 2019-03-04 RX ADMIN — ACETAMINOPHEN 975 MG: 325 TABLET ORAL at 13:56

## 2019-03-04 RX ADMIN — INSULIN LISPRO 12 UNITS: 100 INJECTION, SOLUTION INTRAVENOUS; SUBCUTANEOUS at 17:17

## 2019-03-04 RX ADMIN — OXYCODONE HYDROCHLORIDE 2.5 MG: 5 TABLET ORAL at 13:56

## 2019-03-04 RX ADMIN — MONTELUKAST SODIUM 10 MG: 10 TABLET, FILM COATED ORAL at 21:01

## 2019-03-04 RX ADMIN — INSULIN DETEMIR 10 UNITS: 100 INJECTION, SOLUTION SUBCUTANEOUS at 21:01

## 2019-03-04 RX ADMIN — SODIUM CHLORIDE, SODIUM GLUCONATE, SODIUM ACETATE, POTASSIUM CHLORIDE, MAGNESIUM CHLORIDE, SODIUM PHOSPHATE, DIBASIC, AND POTASSIUM PHOSPHATE 75 ML/HR: .53; .5; .37; .037; .03; .012; .00082 INJECTION, SOLUTION INTRAVENOUS at 02:49

## 2019-03-04 RX ADMIN — MINERAL SUPPLEMENT IRON 300 MG / 5 ML STRENGTH LIQUID 100 PER BOX UNFLAVORED 300 MG: at 17:25

## 2019-03-04 RX ADMIN — METHOCARBAMOL 250 MG: 500 TABLET, FILM COATED ORAL at 21:00

## 2019-03-04 RX ADMIN — INSULIN LISPRO 2 UNITS: 100 INJECTION, SOLUTION INTRAVENOUS; SUBCUTANEOUS at 08:53

## 2019-03-04 RX ADMIN — LIDOCAINE 1 PATCH: 50 PATCH CUTANEOUS at 12:00

## 2019-03-04 RX ADMIN — ACETAMINOPHEN 975 MG: 325 TABLET ORAL at 06:06

## 2019-03-04 RX ADMIN — PANTOPRAZOLE SODIUM 40 MG: 40 TABLET, DELAYED RELEASE ORAL at 06:06

## 2019-03-04 RX ADMIN — OXYCODONE HYDROCHLORIDE 5 MG: 5 TABLET ORAL at 11:36

## 2019-03-04 RX ADMIN — BUPROPION HYDROCHLORIDE 300 MG: 150 TABLET, FILM COATED, EXTENDED RELEASE ORAL at 08:52

## 2019-03-04 RX ADMIN — METHOCARBAMOL 250 MG: 500 TABLET, FILM COATED ORAL at 06:05

## 2019-03-04 NOTE — PLAN OF CARE
Problem: Potential for Falls  Goal: Patient will remain free of falls  Description  INTERVENTIONS:  - Assess patient frequently for physical needs  -  Identify cognitive and physical deficits and behaviors that affect risk of falls    -  [place] fall precautions as indicated by assessment   - Educate patient/family on patient safety including physical limitations  - Instruct patient to call for assistance with activity based on assessment  - Modify environment to reduce risk of injury  - Consider OT/PT consult to assist with strengthening/mobility  Outcome: Progressing     Problem: Prexisting or High Potential for Compromised Skin Integrity  Goal: Skin integrity is maintained or improved  Description  INTERVENTIONS:  - Identify patients at risk for skin breakdown  - Assess and monitor skin integrity  - Assess and monitor nutrition and hydration status  - Monitor labs (i e  albumin)  - Assess for incontinence   - Turn and reposition patient  - Assist with mobility/ambulation  - Relieve pressure over bony prominences  - Avoid friction and shearing  - Provide appropriate hygiene as needed including keeping skin clean and dry  - Evaluate need for skin moisturizer/barrier cream  - Collaborate with interdisciplinary team (i e  Nutrition, Rehabilitation, etc )   - Patient/family teaching  Outcome: Progressing     Problem: PAIN - ADULT  Goal: Verbalizes/displays adequate comfort level or baseline comfort level  Description  Interventions:  - Encourage patient to monitor pain and request assistance  - Assess pain using appropriate pain scale  - Administer analgesics based on type and severity of pain and evaluate response  - Implement non-pharmacological measures as appropriate and evaluate response  - Consider cultural and social influences on pain and pain management  - Notify physician/advanced practitioner if interventions unsuccessful or patient reports new pain  Outcome: Progressing     Problem: INFECTION - ADULT  Goal: Absence or prevention of progression during hospitalization  Description  INTERVENTIONS:  - Assess and monitor for signs and symptoms of infection  - Monitor lab/diagnostic results  - Monitor all insertion sites, i e  indwelling lines, tubes, and drains  - Monitor endotracheal (as able) and nasal secretions for changes in amount and color  - [place] appropriate cooling/warming therapies per order  - Administer medications as ordered  - Instruct and encourage patient and family to use good hand hygiene technique  - Identify and instruct in appropriate isolation precautions for identified infection/condition  Outcome: Progressing     Problem: SAFETY ADULT  Goal: Patient will remain free of falls  Description  INTERVENTIONS:  - Assess patient frequently for physical needs  -  Identify cognitive and physical deficits and behaviors that affect risk of falls    -  [place] fall precautions as indicated by assessment   - Educate patient/family on patient safety including physical limitations  - Instruct patient to call for assistance with activity based on assessment  - Modify environment to reduce risk of injury  - Consider OT/PT consult to assist with strengthening/mobility  Outcome: Progressing  Goal: Maintain or return to baseline ADL function  Description  INTERVENTIONS:  -  Assess patient's ability to carry out ADLs; assess patient's baseline for ADL function and identify physical deficits which impact ability to perform ADLs (bathing, care of mouth/teeth, toileting, grooming, dressing, etc )  - Assess/evaluate cause of self-care deficits   - Assess range of motion  - Assess patient's mobility; develop plan if impaired  - Assess patient's need for assistive devices and provide as appropriate  - Encourage maximum independence but intervene and supervise when necessary  ¯ Involve family in performance of ADLs  ¯ Assess for home care needs following discharge   ¯ Request OT consult to assist with ADL evaluation and planning for discharge  ¯ Provide patient education as appropriate  Outcome: Progressing  Goal: Maintain or return mobility status to optimal level  Description  INTERVENTIONS:  - Assess patient's baseline mobility status (ambulation, transfers, stairs, etc )    - Identify cognitive and physical deficits and behaviors that affect mobility  - Identify mobility aids required to assist with transfers and/or ambulation (gait belt, sit-to-stand, lift, walker, cane, etc )  - [place] fall precautions as indicated by assessment  - Record patient progress and toleration of activity level on Mobility SBAR; progress patient to next Phase/Stage  - Instruct patient to call for assistance with activity based on assessment  - Request Rehabilitation consult to assist with strengthening/weightbearing, etc   Outcome: Progressing     Problem: DISCHARGE PLANNING  Goal: Discharge to home or other facility with appropriate resources  Description  INTERVENTIONS:  - Identify barriers to discharge w/patient and caregiver  - Arrange for needed discharge resources and transportation as appropriate  - Identify discharge learning needs (meds, wound care, etc )  - Arrange for interpretive services to assist at discharge as needed  - Refer to Case Management Department for coordinating discharge planning if the patient needs post-hospital services based on physician/advanced practitioner order or complex needs related to functional status, cognitive ability, or social support system  Outcome: Progressing     Problem: Knowledge Deficit  Goal: Patient/family/caregiver demonstrates understanding of disease process, treatment plan, medications, and discharge instructions  Description  Complete learning assessment and assess knowledge base    Interventions:  - Provide teaching at level of understanding  - Provide teaching via preferred learning methods  Outcome: Progressing     Problem: Nutrition/Hydration-ADULT  Goal: Nutrient/Hydration intake appropriate for improving, restoring or maintaining nutritional needs  Description  Monitor and assess patient's nutrition/hydration status for malnutrition (ex- brittle hair, bruises, dry skin, pale skin and conjunctiva, muscle wasting, smooth red tongue, and disorientation)  Collaborate with interdisciplinary team and initiate plan and interventions as ordered  Monitor patient's weight and dietary intake as ordered or per policy  Utilize nutrition screening tool and intervene per policy  Determine patient's food preferences and provide high-protein, high-caloric foods as appropriate       INTERVENTIONS:  - Monitor oral intake, urinary output, labs, and treatment plans  - Assess nutrition and hydration status and recommend course of action  - Evaluate amount of meals eaten  - Assist patient with eating if necessary   - Allow adequate time for meals  - Recommend/ encourage appropriate diets, oral nutritional supplements, and vitamin/mineral supplements  - Order, calculate, and assess calorie counts as needed  - Recommend, monitor, and adjust tube feedings and TPN/PPN based on assessed needs  - Assess need for intravenous fluids  - Provide specific nutrition/hydration education as appropriate  - Include patient/family/caregiver in decisions related to nutrition  Outcome: Progressing

## 2019-03-04 NOTE — PLAN OF CARE
Problem: OCCUPATIONAL THERAPY ADULT  Goal: Performs self-care activities at highest level of function for planned discharge setting  See evaluation for individualized goals  Description             See flowsheet documentation for full assessment, interventions and recommendations  Note:   Limitation: Decreased ADL status, Decreased Safe judgement during ADL, Decreased cognition, Decreased endurance, Decreased high-level ADLs, Decreased self-care trans  Prognosis: Fair  Assessment: Pt is a 76 YO  Female admitted to [facility] on 3/3/19 w/ closed compression fx of third lumbar vertebra  Pt w/ hx of multiple falls and currently dx w/ L2-L3 fx dislocation w/ lateral displacement  Comorbidities include a h/o DM type 2, HLD, bipolar 1 disorder, HTN, cirrhosis of liver, ambulatory dysfxn, and neuropathy   Pt with active OT orders and up with assistance  orders  Pt in quick draw brace for comfort   Pt resides in a senior living apt alone  PT reports very limited support    Pt was I w/  ADLS and IADLS, and used rollator  Currently pt is Mod A for bed mobility and UB ADLs and Max A for LB ADLs  Pt is limited at this time 2*: pain, endurance, activity tolerance, functional mobility, balance, trunk control, functional standing tolerance, unsupportive home environment, decreased I w/ ADLS/IADLS, decreased safety awareness and decreased insight into deficits  The following Occupational Performance Areas to address include: bathing/shower, toilet hygiene, dressing, functional mobility, community mobility, clothing management and household maintenance  Pt scored overall  25/100 on the Barthel Index  Based on the aforementioned OT evaluation, functional performance deficits, and assessments, pt has been identified as a high complexity evaluation  From OT standpoint, anticipate d/c STR  Pt to continue to benefit from acute immediate OT services to address the following goals 3-5x/week to  w/in 7-10 days:        OT Discharge Recommendation: Short Term Rehab  OT - OK to Discharge:  Yes

## 2019-03-04 NOTE — PLAN OF CARE
Problem: PHYSICAL THERAPY ADULT  Goal: Performs mobility at highest level of function for planned discharge setting  See evaluation for individualized goals  Description  Treatment/Interventions: Functional transfer training, LE strengthening/ROM, Elevations, Therapeutic exercise, Endurance training, Cognitive reorientation, Patient/family training, Equipment eval/education, Bed mobility, Gait training, Compensatory technique education, Continued evaluation, Spoke to nursing, Spoke to case management, Spoke to advanced practitioner, OT  Equipment Recommended: (pt has a rollator)       See flowsheet documentation for full assessment, interventions and recommendations  Note:   Prognosis: Good  Problem List: Decreased strength, Decreased range of motion, Decreased endurance, Impaired balance, Decreased mobility, Decreased coordination, Impaired judgement, Decreased safety awareness, Orthopedic restrictions, Pain  Assessment: Pt is a 75 y o  female admitted to [facility] on 3/3/2019 w/ Closed compression fracture of third lumbar vertebra (HCC) pt has hx of multiple falls and currently dx w L2-L3 fracture dislocation with lateral displacement        Recommendation: (home w assist for mob v IP rehab)     PT - OK to Discharge: Yes    See flowsheet documentation for full assessment

## 2019-03-04 NOTE — PHYSICAL THERAPY NOTE
Physical Therapy Evaluation    Patient's Name:Kristina Romo    Today's Date:03/04/19    Patient Active Problem List   Diagnosis    Type 2 diabetes mellitus with diabetic polyneuropathy, with long-term current use of insulin (HCC)    Hyperlipidemia    Asthma    Anemia of chronic disease    Pancytopenia (HCC)    [facility]    [facility]osis of liver ([facility] )    Essential hypertension    GERD (gastroesophageal reflux disease)    Venous stasis    Ambulatory dysfunction    Closed compression fracture of L3 lumbar vertebra (HCC)    Compression fracture of L3 lumbar vertebra (HCC)    Closed compression fracture of third lumbar vertebra (HCC)       Past Medical History:   Diagnosis Date    Asthma     [facility]     Chronic kidney disease     Cirrhosis of liver (HCC)     Diabetes mellitus ([facility] )     Elevated troponin 9/2/2018    GERD (gastroesophageal reflux disease)     Hyperlipidemia     Hypertension     Neuropathy     Renal disorder     Restless leg syndrome        Past Surgical History:   Procedure Laterality Date    BACK SURGERY      CHOLECYSTECTOMY      HERNIA REPAIR      REPLACEMENT TOTAL KNEE BILATERAL      TUMOR REMOVAL            03/04/19 1345   Pain Assessment   Pain Assessment 0-10   Pain Score 8   Pain Type Acute pain;Chronic pain   Pain Location Leg   Pain Orientation [facility]   Hospital Pain Intervention(s) Ambulation/increased activity   Response to Interventions tolerated   Home Living   Type of Home House  (senior living 1 flight to access)   Home Layout Able to live on main level with bedroom/bathroom   Prior Function   Level of [place]   (ambulatory w RW)   Lives With Alone   Receives Help From   (limited support)   Restrictions/Precautions   Braces or Orthoses   (quick draw for comfort)   General   Family/Caregiver Present No   Cognition   Arousal/Participation Alert   Orientation Level Oriented to person;Oriented to place   Following Commands Follows one step commands with increased time or repetition   RUE Assessment   RUE Assessment   (funct propulsion of RW)   LUE Assessment   LUE Assessment   (funct propulsion of RW)   RLE Assessment   RLE Assessment X   Strength RLE   RLE Overall Strength 3+/5   LLE Assessment   LLE Assessment X   Strength LLE   LLE Overall Strength 3+/5   Coordination   Movements are Fluid and Coordinated 0   Coordination and Movement Description LE instability   Bed Mobility   Supine to Sit 5  Supervision   Additional items HOB elevated; Increased time required   Transfers   Sit to Stand 5  Supervision   Additional items Increased time required   Stand to Sit 5  Supervision   Additional items Increased time required   Stand pivot 4  Minimal assistance   Additional items Verbal cues; Increased time required  (RW)   Toilet transfer 5  Supervision   Additional items Increased time required   Ambulation/Elevation   Gait pattern Improper Weight shift; Antalgic; Short stride; Excessively slow; Foward flexed   Gait Assistance 4  Minimal assist   Additional items Verbal cues; Tactile cues   Assistive Device Rolling walker   Distance 7 ft x2    Balance   Dynamic Sitting Fair  (forward reach)   Static Standing Fair  (RW)   Dynamic Standing Poor +  (RW)   Endurance Deficit   Endurance Deficit Yes   Endurance Deficit Description antalgic LE instability   Activity Tolerance   Activity Tolerance Patient limited by pain   Nurse Made Aware yes   Assessment   Prognosis Good   Problem List Decreased strength;Decreased range of motion;Decreased endurance; Impaired balance;Decreased mobility; Decreased coordination; Impaired judgement;Decreased safety awareness;Orthopedic restrictions;Pain   Assessment Pt is a 75 y o  female [facility]/2019 w/ Closed compression fracture of third lumbar vertebra (HCC) pt has hx of multiple falls and currently dx w L2-L3 fracture dislocation with lateral displacement   Goals   Patient Goals go home   STG Expiration Date 03/14/19   Short Term Goal #1 1  Modified Independent with Bed Mobility Rolling Right and Left     2  Modified Independent with Bed Mobility Supine-Sit     3  Modified Independent with Transfer Bed-Chair     4  Increase Dynamic Sitting Balance at least 1 Grade for improved stability with functional reach activities     5  Increase Dynamic Standing Balance at least 1 Grade for improved ease with Activities of Daily Living     6  Increase Lower Extremity Strength at least 1 Grade for improved ease mobility tasks     7  Modified Independent with  Ambulation 150 feet using AD quick draw brace for comfort to facilitate home and community mobility 8  Minimum assist with Ascending/Descending 14 steps to facilitate home and community accessibility  Plan   Treatment/Interventions Functional transfer training;LE strengthening/ROM; Elevations; Therapeutic exercise; Endurance training;Cognitive reorientation;Patient/family training;Equipment eval/education; Bed mobility;Gait training; Compensatory technique education;Continued evaluation;Spoke to nursing;Spoke to case management;Spoke to advanced practitioner;OT   PT Frequency 2-3x/wk   Recommendation   Recommendation   (home w assist for mob v IP rehab)   Equipment Recommended   (pt has a rollator)   PT - OK to Discharge Yes   Barthel Index   Feeding 10   Bathing 0   Grooming Score 0   Dressing Score 5   Bladder Score 0   Bowels Score 0   Toilet Use Score 5   Transfers (Bed/Chair) Score 10   Mobility (Level Surface) Score 0   Stairs Score 0   Barthel Index Score 30

## 2019-03-04 NOTE — CONSULTS
I called and conferred with the patient by way of the nursing staff asking whether not she would want a urology consult given her preference for comfort and palliative care  The patient states that she would not want a urology consultation as she would not consider any further workup or treatment at this time      Please re-consult as necessary

## 2019-03-04 NOTE — PLAN OF CARE
Problem: Potential for Falls  Goal: Patient will remain free of falls  Description  INTERVENTIONS:  - Assess patient frequently for physical needs  -  Identify cognitive and physical deficits and behaviors that affect risk of falls    -  [place] fall precautions as indicated by assessment   - Educate patient/family on patient safety including physical limitations  - Instruct patient to call for assistance with activity based on assessment  - Modify environment to reduce risk of injury  - Consider OT/PT consult to assist with strengthening/mobility  Outcome: Progressing     Problem: Prexisting or High Potential for Compromised Skin Integrity  Goal: Skin integrity is maintained or improved  Description  INTERVENTIONS:  - Identify patients at risk for skin breakdown  - Assess and monitor skin integrity  - Assess and monitor nutrition and hydration status  - Monitor labs (i e  albumin)  - Assess for incontinence   - Turn and reposition patient  - Assist with mobility/ambulation  - Relieve pressure over bony prominences  - Avoid friction and shearing  - Provide appropriate hygiene as needed including keeping skin clean and dry  - Evaluate need for skin moisturizer/barrier cream  - Collaborate with interdisciplinary team (i e  Nutrition, Rehabilitation, etc )   - Patient/family teaching  Outcome: Progressing     Problem: PAIN - ADULT  Goal: Verbalizes/displays adequate comfort level or baseline comfort level  Description  Interventions:  - Encourage patient to monitor pain and request assistance  - Assess pain using appropriate pain scale  - Administer analgesics based on type and severity of pain and evaluate response  - Implement non-pharmacological measures as appropriate and evaluate response  - Consider cultural and social influences on pain and pain management  - Notify physician/advanced practitioner if interventions unsuccessful or patient reports new pain  Outcome: Progressing     Problem: INFECTION - ADULT  Goal: Absence or prevention of progression during hospitalization  Description  INTERVENTIONS:  - Assess and monitor for signs and symptoms of infection  - Monitor lab/diagnostic results  - Monitor all insertion sites, i e  indwelling lines, tubes, and drains  - Monitor endotracheal (as able) and nasal secretions for changes in amount and color  - [place] appropriate cooling/warming therapies per order  - Administer medications as ordered  - Instruct and encourage patient and family to use good hand hygiene technique  - Identify and instruct in appropriate isolation precautions for identified infection/condition  Outcome: Progressing     Problem: SAFETY ADULT  Goal: Patient will remain free of falls  Description  INTERVENTIONS:  - Assess patient frequently for physical needs  -  Identify cognitive and physical deficits and behaviors that affect risk of falls    -  [place] fall precautions as indicated by assessment   - Educate patient/family on patient safety including physical limitations  - Instruct patient to call for assistance with activity based on assessment  - Modify environment to reduce risk of injury  - Consider OT/PT consult to assist with strengthening/mobility  Outcome: Progressing  Goal: Maintain or return to baseline ADL function  Description  INTERVENTIONS:  -  Assess patient's ability to carry out ADLs; assess patient's baseline for ADL function and identify physical deficits which impact ability to perform ADLs (bathing, care of mouth/teeth, toileting, grooming, dressing, etc )  - Assess/evaluate cause of self-care deficits   - Assess range of motion  - Assess patient's mobility; develop plan if impaired  - Assess patient's need for assistive devices and provide as appropriate  - Encourage maximum independence but intervene and supervise when necessary  ¯ Involve family in performance of ADLs  ¯ Assess for home care needs following discharge   ¯ Request OT consult to assist with ADL evaluation and planning for discharge  ¯ Provide patient education as appropriate  Outcome: Progressing  Goal: Maintain or return mobility status to optimal level  Description  INTERVENTIONS:  - Assess patient's baseline mobility status (ambulation, transfers, stairs, etc )    - Identify cognitive and physical deficits and behaviors that affect mobility  - Identify mobility aids required to assist with transfers and/or ambulation (gait belt, sit-to-stand, lift, walker, cane, etc )  - [place] fall precautions as indicated by assessment  - Record patient progress and toleration of activity level on Mobility SBAR; progress patient to next Phase/Stage  - Instruct patient to call for assistance with activity based on assessment  - Request Rehabilitation consult to assist with strengthening/weightbearing, etc   Outcome: Progressing     Problem: DISCHARGE PLANNING  Goal: Discharge to home or other facility with appropriate resources  Description  INTERVENTIONS:  - Identify barriers to discharge w/patient and caregiver  - Arrange for needed discharge resources and transportation as appropriate  - Identify discharge learning needs (meds, wound care, etc )  - Arrange for interpretive services to assist at discharge as needed  - Refer to Case Management Department for coordinating discharge planning if the patient needs post-hospital services based on physician/advanced practitioner order or complex needs related to functional status, cognitive ability, or social support system  Outcome: Progressing     Problem: Knowledge Deficit  Goal: Patient/family/caregiver demonstrates understanding of disease process, treatment plan, medications, and discharge instructions  Description  Complete learning assessment and assess knowledge base    Interventions:  - Provide teaching at level of understanding  - Provide teaching via preferred learning methods  Outcome: Progressing     Problem: Nutrition/Hydration-ADULT  Goal: Nutrient/Hydration intake appropriate for improving, restoring or maintaining nutritional needs  Description  Monitor and assess patient's nutrition/hydration status for malnutrition (ex- brittle hair, bruises, dry skin, pale skin and conjunctiva, muscle wasting, smooth red tongue, and disorientation)  Collaborate with interdisciplinary team and initiate plan and interventions as ordered  Monitor patient's weight and dietary intake as ordered or per policy  Utilize nutrition screening tool and intervene per policy  Determine patient's food preferences and provide high-protein, high-caloric foods as appropriate       INTERVENTIONS:  - Monitor oral intake, urinary output, labs, and treatment plans  - Assess nutrition and hydration status and recommend course of action  - Evaluate amount of meals eaten  - Assist patient with eating if necessary   - Allow adequate time for meals  - Recommend/ encourage appropriate diets, oral nutritional supplements, and vitamin/mineral supplements  - Order, calculate, and assess calorie counts as needed  - Recommend, monitor, and adjust tube feedings and TPN/PPN based on assessed needs  - Assess need for intravenous fluids  - Provide specific nutrition/hydration education as appropriate  - Include patient/family/caregiver in decisions related to nutrition  Outcome: Progressing

## 2019-03-04 NOTE — PROGRESS NOTES
Spoke w/ Chiquis Peoples from trauma regarding pts dinner BS check being >500  Administered 12 units of scheduled insulin and rechecked half hour later  BS result was 376  Paged trauma and spoke w/ Rylee regarding second result  Verbal order made for BMP  Will draw and f/u as needed  Will continue to monitor pt

## 2019-03-04 NOTE — CONSULTS
Consultation - 7 Maryanne Morales 75 y o  female MRN: [id number]  Unit/Bed#: [facility] 602-01 Encounter: [id number]      Assessment/Plan     Assessment:  Patient Active Problem List   Diagnosis    Type 2 diabetes mellitus with diabetic polyneuropathy, with long-term current use of insulin ([facility]    Hyperlipidemia    Asthma    Anemia of chronic disease    [facility]ancytopenia ([facility]    Bipolar 1 disorder (HCC)    Cir[facility]osis of liver ([facility]    Essential hypertension    GERD (gastroesophageal reflux disease)    Venous stasis    Ambulatory dysfunction    Closed compression fracture of L3 lumbar vertebra (HCC)    Compression fracture of L3 lumbar vertebra (HCC)    Closed compression fracture of third lumbar vertebra ([facility] )       Plan:  1  Add low dose of schedule oxycodone at 2 5 mg PO q6H ATC with hold parameters  2  Continue PRN oxycodone  3  Continue Tylenol and other adjuncts of Robaxin, Lidoderm patch  4  Goals- patient is very clear that she does not want any disease directed cares and wants to be discharged home with a focus on comfort  She does live alone in a [facility] and is estranged from her children with no other living family  She is adamant about returning home  Agrees to allow CHRISTO Roca to evaluate further for safe home transition and please see her noted for more details  History of Present Illness   Physician Requesting Consult: Garcia Solomon MD  Reason for Consult / Principal Problem: goals  Hx and PE limited by: NA  HPI: Windy Dixon is a 75y o  year old female who presented about one week after a fall when she was found to have a compression fracture of L2, L3  She was developing worsening symptoms so presented to the hospital again  Decision made to transfer [facility] for neurosurgery evaluation  After discussion with neurosurgery she has refused any surgical intervention   She had severe pain when she underwent care upon my initial evaluation but had calmed when I returned later  She is very clear in that her goals are to return home with comfort focused cares  She tells me that she has been "ready" to die for 2 years and that she is "not afraid of dying, but afraid of living and suffering"  Patient is estranged from family and lives independently with the help of aides in a [facility]  Inpatient consult to Palliative Care  Consult performed by: Harvey Padilla DO  Consult ordered by: FRANKI Mclaughlin          Review of Systems   Constitutional: Positive for unexpected weight change  Musculoskeletal: Positive for back pain and gait problem  All other systems reviewed and are negative        Historical Information   Past Medical History:   Diagnosis Date    Asthma     [facility]     Chronic kidney disease     Cirrhosis of liver (HCC)     Diabetes mellitus ([facility]     Elevated troponin 9/2/2018    GERD (gastroesophageal reflux disease)     Hyperlipidemia     Hypertension     Neuropathy     Renal disorder     Restless leg syndrome      Past Surgical History:   Procedure Laterality Date    BACK SURGERY      CHOLECYSTECTOMY      HERNIA REPAIR      REPLACEMENT TOTAL KNEE BILATERAL      TUMOR REMOVAL       Social History     Socioeconomic History    Marital status: Single     Spouse name: None    Number of children: None    Years of education: None    Highest education level: None   Occupational History    None   Social Needs    Financial resource strain: None    Food insecurity:     Worry: None     Inability: None    Transportation needs:     Medical: None     Non-medical: None   Tobacco Use    Smoking status: Passive Smoke Exposure - Never Smoker    Smokeless tobacco: Never Used   Substance and Sexual Activity    Alcohol use: No    Drug use: No    Sexual activity: None   Lifestyle    Physical activity:     Days per week: None     Minutes per session: None    Stress: None   Relationships    Social connections:     Talks on phone: None     Gets together: None     Attends Quaker service: None     Active member of club or organization: None     Attends meetings of clubs or organizations: None     Relationship status: None    Intimate partner violence:     Fear of current or ex partner: None     Emotionally abused: None     Physically abused: None     Forced sexual activity: None   Other Topics Concern    None   Social History Narrative    None     History reviewed  No pertinent family history      Meds/Allergies   all current active meds have been reviewed and current meds:   Current Facility-Administered Medications   Medication Dose Route Frequency    acetaminophen (TYLENOL) tablet 975 mg  975 mg Oral Q8H [address]    buPROPion (WELLBUTRIN XL) 24 hr tablet 300 mg  300 mg Oral QAM    cholecalciferol (VITAMIN D3) tablet 1,000 Units  1,000 Units Oral Daily    ferrous sulfate oral syrup 300 mg  300 mg Oral BID AC    furosemide (LASIX) tablet 20 mg  20 mg Oral Daily    insulin detemir (LEVEMIR) subcutaneous injection 10 Units  10 Units Subcutaneous HS    insulin lispro (HumaLOG) 100 units/mL subcutaneous injection 2-12 Units  2-12 Units Subcutaneous TID AC    ipratropium-albuterol (DUO-NEB) 0 5-2 5 mg/3 mL inhalation solution 3 mL  3 mL Nebulization Q6H PRN    lidocaine (LIDODERM) 5 % patch 1 patch  1 patch Topical Daily    lisinopril (ZESTRIL) tablet 2 5 mg  2 5 mg Oral QAM    LORazepam (ATIVAN) tablet 0 5 mg  0 5 mg Oral BID    methocarbamol (ROBAXIN) tablet 250 mg  250 mg Oral Q8H [address]    montelukast (SINGULAIR) tablet 10 mg  10 mg Oral HS    oxyCODONE (ROXICODONE) IR tablet 2 5 mg  2 5 mg Oral Q6H    oxyCODONE (ROXICODONE) IR tablet 5 mg  5 mg Oral Q4H PRN    pantoprazole (PROTONIX) EC tablet 40 mg  40 mg Oral Early Morning    pravastatin (PRAVACHOL) tablet 80 mg  80 mg Oral Daily With Dinner    senna (SENOKOT) tablet 8 6 mg  1 tablet Oral HS PRN       Palliative Care Medications: NA    Allergies   Allergen Reactions  Abilify [Aripiprazole] Hallucinations    Celebrex [Celecoxib] Hallucinations    Codeine Hallucinations    Darvon [Propoxyphene] Hallucinations    Ibuprofen Hallucinations    Latuda [Lurasidone] Hallucinations    Nitrofurantoin Hallucinations    Penicillins Hallucinations    Ziprasidone Hallucinations    Levaquin [Levofloxacin] Itching and Rash     From IV levaquin         Objective     Physical Exam   Constitutional: She is oriented to person, place, and time  She appears well-nourished  No distress  Chronically ill appearing   HENT:   Head: Normocephalic and atraumatic  Right Ear: External ear normal    Left Ear: External ear normal    Nose: Nose normal    Mouth/Throat: Oropharynx is clear and moist    Eyes: EOM are normal  Right eye exhibits no discharge  Left eye exhibits no discharge  No scleral icterus  Neck: Neck supple  No JVD present  No tracheal deviation present  Cardiovascular: Normal rate, regular rhythm and intact distal pulses  Pulmonary/Chest: Effort normal and breath sounds normal  No respiratory distress  She has no wheezes  She has no rales  Abdominal: Bowel sounds are normal  She exhibits no distension and no mass  There is no tenderness  Musculoskeletal: She exhibits tenderness and deformity  She exhibits no edema  Neurological: She is alert and oriented to person, place, and time  No cranial nerve deficit  Coordination normal    Skin: Skin is warm and dry  She is not diaphoretic  Psychiatric: She has a normal mood and affect  Her behavior is normal  Judgment and thought content normal    Nursing note and vitals reviewed  Lab Results: I have personally reviewed pertinent labs  , CBC: No results found for: WBC, HGB, HCT, MCV, PLT, ADJUSTEDWBC, MCH, MCHC, RDW, MPV, NRBC, CMP: No results found for: SODIUM, K, CL, CO2, ANIONGAP, BUN, CREATININE, GLUCOSE, CALCIUM, AST, ALT, ALKPHOS, PROT, BILITOT, EGFR  Imaging Studies: I have personally reviewed pertinent reports  EKG, Pathology, and Other Studies: I have personally reviewed pertinent reports  Code Status: Level 3 - DNAR and DNI  Advance Directive and Living Will: Yes    Power of :    POLST:      Counseling / Coordination of Care  Total floor / unit time spent today 75+ minutes  Greater than 50% of total time was spent with the patient and / or family counseling and / or coordination of care  A description of the counseling / coordination of care: symptom assessment, medication adjustments, goals of care

## 2019-03-04 NOTE — PROGRESS NOTES
Progress Note - Neurosurgery   Larry Daniels 75 y o  female MRN: [id number]  Unit/Bed#: [facility] [place] 602-01 Encounter: [id number]    Assessment:  1  L2-L3 fracture dislocation with left lateral displacement  2  Frequent falls  3  Spinal stenosis  4  DM w/ neuropathy  5  GERD  6  CKD  7  HTN   8  HLD  9  Bipolar disorder    Plan:  · Exam alert and oriented to person and place  Full strength with some pain inhibition about HF in RLE  Sensation intact except chronic neuropathy present in b/l hands and feet  · Imaging reviewed personally and by attending  Final results as below  · MRI lumbar spine W/O contrast 03/03/2019:  L2-3 herniated disc causing severe central canal stenosis marrow edema within the out posing endplates at this level with acute anterior superior L3 fracture  Mild displacement of L2 on L3 to the left  · CT lumbar spine W/O contrast 03/03/2019:  Fracture of L2 transverse processes with wedge compression deformity of superior L3 endplate  Some displacement L2 to the left in regard to L3  L2-L3 large posterior disc herniation with severe narrowing of the spinal cord  As well as severe spinal canal narrowing at L3-4  · Pain control and medical management per primary team  · Mobilize as tolerated with assistance  · DVT PPX:  S the   Clear did initiate DVT prophylaxis from a neurosurgical standpoint  · Discussed goals of care with patient  She is still denying surgery at this time  She states the brace is very uncomfortable and causes her pain to be worse when it gets tightened  · Requested the patient to try to wear her brace when OOB or sitting upright  Discussed risks associated with not wearing brace including risk of paralysis and further displacement of fractures, bowel or bladder incontinence  · Patient continues to deny wanting to wear brace  Would continue to recommend it to be worn  · Palliative consulted  · Patient will not go for XR and wishes to be discharged     · Patient can follow up as needed with Dr Farhat Medrano in the outpatient setting  No further neurosurgical intervention at this time  Will sign off and see as needed during remainder of hospitalization  Subjective/Objective   Chief Complaint: "its a bad morning"    Subjective: patient states she tried to put the brace on this morning and it caused her more pain than it was worth  She does not like it and continues to be firm on her views of not having surgery at this time  She only wishes to be discharged home even if she can only sit in  A chair for the rest of her life  She admits to back pain that is worse with movement  Denies any bowel or bladder incontinence  Admits to pain with movement of right lower extremity  Sensation changes consistent to patients diabetic neuropathy otherwise no complaints  Mild lateral right ankle pain, but patient does not wish to have ankle x-rayed  Objective: laying in bed in NAD  I/O       03/02 0701 - 03/03 0700 03/03 0701 - 03/04 0700 03/04 0701 - 03/05 0700    P  O   180 160    I V  (mL/kg)  1185 (15 7) 556 3 (7 3)    IV Piggyback  500     Total Intake(mL/kg)  1865 (24 6) 716 3 (9 5)    Urine (mL/kg/hr)  1550 (0 9) 350 (1 4)    Stool  0     Total Output  1550 350    Net  +315 +366 3           Unmeasured Stool Occurrence  1 x           Invasive Devices     Peripheral Intravenous Line            Peripheral IV 03/03/19 Right Antecubital 1 day          Drain            External Urinary Catheter less than 1 day                Physical Exam:  Vitals: Blood pressure (!) 97/47, pulse 78, temperature 97 9 °F (36 6 °C), resp  rate 18, height 5' (1 524 m), weight 75 8 kg (167 lb), SpO2 98 %  ,Body mass index is 32 61 kg/m²      General appearance: alert, appears stated age, cooperative and no distress  Head: Normocephalic, without obvious abnormality, atraumatic  Eyes: EOMI, PERRL  Neck: supple, symmetrical, trachea midline and NT  Back: no kyphosis present, midline and paraspinal tenderness to palpation  Lungs: non labored breathing  Heart: regular heart rate  Neurologic:   Mental status: Alert, oriented to person and place, thought content appropriate  Cranial nerves: grossly intact (Cranial nerves II-XII)  Sensory: normal to light touch  Numbness in b/l hands and feet chronic in nature  Motor: moving all extremities without focal weakness  Mild decrease in strength with KF, HF in RLE secondary to pain inhibition  Reflexes: 2+ and symmetric in BUE  No patellar, but history of knee replacement  1+ achilles  Lab Results:  Results from last 7 days   Lab Units 03/03/19  0241   WBC Thousand/uL 2 77*   HEMOGLOBIN g/dL 9 6*   HEMATOCRIT % 28 5*   PLATELETS Thousands/uL 66*   NEUTROS PCT % 58   MONOS PCT % 13*     Results from last 7 days   Lab Units 03/03/19  0241   POTASSIUM mmol/L 3 8   CHLORIDE mmol/L 101   CO2 mmol/L 27   BUN mg/dL 9   CREATININE mg/dL 0 79   CALCIUM mg/dL 9 3   ALK PHOS U/L 113   ALT U/L 33   AST U/L 35             Results from last 7 days   Lab Units 03/03/19  0241   INR  1 48*   PTT seconds 42*     No results found for: TROPONINT  ABG:No results found for: PHART, BML6FNL, PO2ART, HRB8TXX, S4QHRCVP, BEART, SOURCE    Imaging Studies: I have personally reviewed pertinent reports  and I have personally reviewed pertinent films in PACS  Xr Chest 1 View Portable    Result Date: 3/3/2019  Impression: No active pulmonary disease  Workstation performed: [id number]     Ct Lumbar Spine Without Contrast    Result Date: 3/3/2019  Impression: Fracture of the L2 transverse process appears acute  Mild anterior wedge compression deformity of L3 is noted suggesting age-indeterminate fracture  There is some displacement of L2 to the left regard to L3 of approximately 1 cm which is new when compared to the prior and may suggest a component of instability at this level  Multilevel degenerative changes as described  Most prominent at L2/L3 and L3/L4   At L2/L3:  There is a large posterior disc herniation at this level with severe narrowing of the spinal canal   The disc herniation extends into the right subarticular zone and laterally and there is moderate narrowing of the right neural foramen  At L3/L4: There is multifactorial severe narrowing of the spinal canal  Other findings as above  Findings discussed with Dr Maura Smiley by Dr Soren Del Real at 4:25 AM on 3/3/2019  Workstation performed: [id number]     Mri Lumbar Spine Wo Contrast    Result Date: 3/3/2019  Impression: Severe multilevel spondylosis and osteoarthritis with footprint of remote L4-S1 bony fusion, arachnoiditis distal thecal sac  Severe spondylosis and osteoarthritis L3-L4 level  Significant spinal stenosis  Correlate for bilateral L4 radiculitis and signs and symptoms of spinal stenosis  Ventral soft tissue mass extending above and below the disc space at the L2-L3 level, likely representing herniated extruded disc producing severe canal stenosis  Marrow edema within the opposing endplates at this level which may represent a combination  of reactive degenerative changes but also, acute anterior superior L3 fracture is noted on recent CT  Workstation performed: [id number]       EKG, Pathology, and Other Studies: I have personally reviewed pertinent reports  VTE Pharmacologic Prophylaxis: cleared for DVT ppx from a neurosurgical standpoint       VTE Mechanical Prophylaxis: sequential compression device

## 2019-03-04 NOTE — RESTORATIVE TECHNICIAN NOTE
Restorative Specialist Mobility Note       Activity: Ambulate in room, [address] privileges, Chair(Assisted PT, please refer to PT notes for all information )     Assistive Device: Front wheel walker     Ambulation Response: Tolerated fairly well  Repositioned: Sitting, Up in chair           Range of Motion: Active, All extremities      Patient left resting comfortably in bedside recliner, with chair alarm activated and call bell/table within reach

## 2019-03-04 NOTE — PROGRESS NOTES
Spoke with Urology, they are cancelling consult due to patient not wanting any kind of invasive intervention

## 2019-03-04 NOTE — SOCIAL WORK
CM met with pt to discuss the role of CM  Pt lives alone in a 2nd floor apartment  Pt doesn't drive but considers herself fully independent PTA  Pt owns a walker  Pt doesn't have a living will  [facility]'s [facility]  Pt reports having Bi-polar and having "lots" of Psych admissions but nothing in the past 3 years  Pt states no hx of substance abuse  Pt's friend Ashia Mcghee will transport home  Pt wants to d/c home on hospice  Pt was accepted by -Hospice  Their nurse can't come until Wed  Pt can d/c home on that day  CM reviewed d/c planning process including the following: identifying help at home, patient preference for d/c planning needs, Discharge Lounge, [facility] Meds to Bed program, availability of treatment team to discuss questions or concerns patient and/or family may have regarding understanding medications and recognizing signs and symptoms once discharged  CM also encouraged patient to follow up with all recommended appointments after discharge  Patient advised of importance for patient and family to participate in managing patients medical well being

## 2019-03-04 NOTE — SOCIAL WORK
LSW and Dr Vanessa Mello met with patient to introduce Palliative Care support  Patient informs us that she has 2 children that she is estranged from and hasn't spoken to in years  Patient doesn't identify any other family or supports other than the two caseworkers below  Patient states that she lives in a high rise elderly apartment complex        [place] [phone] ([address])   - concerned about her returning home   - agrees with a hospice referral  [address] [facility] x [phone] ([place])   - she will make sure her rent check is delivered to easy Kristina's worry about housing  - currently 4 hours of waiver services  - script for 24 hour care  - script for adult diapers and wipes  - agrees with hospice referral

## 2019-03-04 NOTE — OCCUPATIONAL THERAPY NOTE
[address] Evaluation     Patient Name: Thalia Sahni  JADNP'F Date: 3/4/2019  Problem List  Patient Active Problem List   Diagnosis    Type 2 diabetes mellitus with diabetic polyneuropathy, with long-term current use of insulin [facility]    Hyperlipidemia    Asthma    Anemia of chronic disease    [facility]topenia [facility]    Bipolar 1 disorder (HCC)    Cirrhosis of liver ([facility]    Essential hypertension    GERD (gastroesophageal reflux disease)    Venous stasis    Ambulatory dysfunction    Closed compression fracture of L3 lumbar vertebra (HCC)    Compression fracture of L3 lumbar vertebra (HCC)    Closed compression fracture of third lumbar vertebra (HCC)     Past Medical History  Past Medical History:   Diagnosis Date    Asthma     Bipolar 2 disorder ([facility]     Chronic kidney disease     Cirrhosis of liver [facility]     Diabetes mellitus ([facility]     Elevated troponin 9/2/2018    GERD (gastroesophageal reflux disease)     Hyperlipidemia     Hypertension     Neuropathy     Renal disorder     Restless leg syndrome      Past Surgical History  Past Surgical History:   Procedure Laterality Date    BACK SURGERY      CHOLECYSTECTOMY      HERNIA REPAIR      REPLACEMENT TOTAL KNEE BILATERAL      TUMOR REMOVAL           03/04/19 1113   Note Type   Note type Eval/Treat   Restrictions/Precautions   Weight Bearing Precautions Per Order No   Braces or Orthoses   (Quick Draw LSO)   Other Precautions Cognitive;Pain;Telemetry;Contact/isolation;Spinal precautions   Pain Assessment   Pain Assessment 0-10   Pain Score Worst Possible Pain   Pain Type Acute pain   Pain Location Leg   Pain Orientation Bilateral   Hospital Pain Intervention(s) Repositioned; Emotional support   Response to Interventions tolerated   Home Living   Type of [address] level;Stairs to enter with rails  (Full flight of stairs to enter)   Bathroom Shower/Tub Tub/shower unit   [address] Walker  (rollator)   Prior Function   Level of [place] Independent with ADLs and functional mobility   Lives With Alone   Receives Help From [employer]  (Very limited support)   ADL Assistance Independent   IADLs Independent   Falls in the last 6 months 1 to 4   Vocational Retired   Lifestyle   Autonomy pta pt reports I in ADLs/IADLs/functional mobility   Reciprocal Relationships very limited support   Service to Others retired   [address] enjoys large print crosswords   Psychosocial   Psychosocial (WDL) X   Patient Behaviors/Mood Anxious; Tearful   Subjective   Subjective "IT ONLY HURTS WHEN I MOVE"   ADL   Where Assessed Supine, bed   Eating Assistance 5  Supervision/Setup   Grooming Assistance 5  Supervision/Setup   UB Bathing Assistance 3  Moderate Assistance   LB Bathing Assistance 2  Maximal Assistance   UB Dressing Assistance 3  Moderate Assistance   UB Dressing Deficit   (don/doff LSO)   LB Dressing Assistance 2  Maximal Assistance   Toileting Assistance  2  Maximal Assistance   Toileting Deficit Perineal hygiene   Bed Mobility   Rolling R 3  Moderate assistance   Additional items Assist x 1; Increased time required;Verbal cues   Rolling L 3  Moderate assistance   Additional items Assist x 1; Increased time required;Verbal cues   Supine to Sit 3  Moderate assistance   Additional items Assist x 2; Increased time required;Verbal cues  (upon sitting EOB pt reported pain and immediately layed down)   Transfers   Additional Comments pt not agreeable to OOB mobility   Functional Mobility   Additional Comments pt not agreeable to OOB mobility   Balance   Static Sitting Poor -   Activity Tolerance   Activity Tolerance Patient limited by pain   Medical Staff [address] present   Nurse Made Aware RN present   RUE Assessment   RUE Assessment WFL   LUE Assessment   LUE Assessment WFL   Hand Function   Gross Motor Coordination Functional   Fine Motor Coordination Functional   Cognition Overall Cognitive Status Impaired   Arousal/Participation Responsive   Attention Difficulty attending to directions   Orientation Level Oriented X4   Memory Decreased recall of precautions;Decreased recall of recent events;Decreased short term memory   Following Commands Follows one step commands with increased time or repetition   Comments pt impulsive and easily agitated   Assessment   Limitation Decreased ADL status; Decreased Safe judgement during ADL;Decreased cognition;Decreased endurance;Decreased high-level ADLs; Decreased self-care trans   Prognosis Fair   Assessment Pt is a 74 YO  Female admitted to B on 3/3/19 w/ closed compression fx of third lumbar vertebra  Pt w/ hx of multiple falls and currently dx w/ L2-L3 fx dislocation w/ lateral displacement  Comorbidities include a h/o DM type 2, HLD, bipolar 1 disorder, HTN, cirrhosis of liver, ambulatory dysfxn, and neuropathy   Pt with active OT orders and up with assistance  orders  Pt in quick draw brace for comfort   Pt resides in a senior living apt alone  PT reports very limited support    Pt was I w/  ADLS and IADLS, and used rollator  Currently pt is Mod A for bed mobility and UB ADLs and Max A for LB ADLs  Pt is limited at this time 2*: pain, endurance, activity tolerance, functional mobility, balance, trunk control, functional standing tolerance, unsupportive home environment, decreased I w/ ADLS/IADLS, decreased safety awareness and decreased insight into deficits  The following Occupational Performance Areas to address include: bathing/shower, toilet hygiene, dressing, functional mobility, community mobility, clothing management and household maintenance  Pt scored overall  25/100 on the Barthel Index  Based on the aforementioned OT evaluation, functional performance deficits, and assessments, pt has been identified as a high complexity evaluation  From OT standpoint, anticipate d/c STR   Pt to continue to benefit from acute immediate OT services to address the following goals 3-5x/week to  w/in 7-10 days:   Additional Treatment Session   Start Time 1050   End Time 1113   Treatment Assessment Additional tx session required to clean incontience x 2 times  Pt required Mod A for rolling and total assist for perineal hygiene  Pt required frequent breaks for pain management  Upon boosting pt in bed w/ Max A x 2, pt reported another bowel movement  Pt continues to be limited 2* pain, limited home support, decreased activity tolerance  OT to recommend STR  OT will continue to follow to see as able  Recommendation   OT Discharge Recommendation Short Term Rehab   OT - OK to Discharge Yes   Barthel Index   Feeding 10   Bathing 0   Grooming Score 0   Dressing Score 5   Bladder Score 0   Bowels Score 0   Toilet Use Score 5   Transfers (Bed/Chair) Score 5   Mobility (Level Surface) Score 0   Stairs Score 0   Barthel Index Score 25   Modified Langlade Scale   Modified Langlade Scale 4     GOALS    1) Pt will increase activity tolerance to G for 30 min txment sessions    2) Pt will complete UB/LB dressing/self care w/ mod I using adaptive device and DME as needed    3) Pt will complete bathing w/ Mod I w/ use of AE and DME as needed    4) Pt will complete toileting w/ mod I w/ G hygiene/thoroughness using DME as needed    5) Functional mobility and transfers to be assessed by OTR  7) Pt will participate in simulated IADL management task to increase independence to  w/ G safety and endurance    8) Pt will engage in ongoing cognitive assessment w/ G participation to assist w/ safe d/c planning/recommendations    9) Pt will demonstrate G carryover of pt/caregiver education and training as appropriate          Bruce Henderson MS, OTR/L

## 2019-03-04 NOTE — ORTHOTIC NOTE
Orthotic Note            Date: 3/4/2019      Patient Name: Joseluis Read        60 mins     Reason for Consult:  Patient Active Problem List   Diagnosis    Type 2 diabetes mellitus with diabetic polyneuropathy, with long-term current use of insulin ([facility] )    Hyperlipidemia    Asthma    Anemia of chronic disease    Pancytopenia [facility]    Bipolar 1 disorder ([facility]    Cirrhosis of liver ([facility]    Essential hypertension    GERD (gastroesophageal reflux disease)    Venous stasis    Ambulatory dysfunction    Closed compression fracture of L3 lumbar vertebra ([facility]    Compression fracture of L3 lumbar vertebra (HCC)    Closed compression fracture of third lumbar vertebra ([facility] )   [address] [place] [address] delivered, fit, and donned [employer] onto pt while supine in bed by rolling  Sized and measured pt to a size XL for optimal fit and comfort  Pt did not want to keep brace on after fitting, [facility] it at [facility]  RN aware and will follow up with pt to educate on importance of brace as well as donning, doffing, and cleaning instructions  Pt left supine in bed with call bell, phone, and tray within reach  Recommendations:  Please call [employer] ext [phone] in regards to any bracing instructions and/or adjustments      [address] Restorative Technician, BS

## 2019-03-04 NOTE — UTILIZATION REVIEW
Initial Clinical Review    Admission: Date/Time/Statement: 3/3/19 @ 0715     3/3 Transfer from [address] [place] [address] ED    Orders Placed This Encounter   Procedures    Inpatient Admission     Standing Status:   Standing     Number of Occurrences:   1     Order Specific Question:   Admitting Physician     Answer:   Tom Ortiz     Order Specific Question:   Level of Care     Answer:   Level 2 Stepdown / HOT [14]     Order Specific Question:   Estimated length of stay     Answer:   More than 2 Midnights     Order Specific Question:   Certification     Answer:   I certify that inpatient services are medically necessary for this patient for a duration of greater than two midnights  See H&P and MD Progress Notes for additional information about the patient's course of treatment  ED: Date/Time/Mode of Arrival:   ED Arrival Information     Expected Arrival Acuity Means of Arrival Escorted By Service Admission Type    3/3/2019  3/3/2019 06:04 Immediate Ambulance [facility]) Trauma Urgent    Arrival Complaint    L-2 FX        Chief Complaint:   Chief Complaint   Patient presents with    Back Pain     trauma transfer from [facility], fall 1 week ago, tonight c/o low back pain and b/l leg pain     History of Illness: 75year old female who reports falling last week      ED Vital Signs:   ED Triage Vitals   Temperature Pulse Respirations Blood Pressure SpO2   03/03/19 0609 03/03/19 0609 03/03/19 0609 03/03/19 0609 03/03/19 0609   97 8 °F (36 6 °C) 94 20 (!) 140/107 99 %      Temp Source Heart Rate Source Patient Position - Orthostatic VS BP Location FiO2 (%)   03/03/19 0609 03/03/19 0609 03/03/19 0700 03/03/19 1529 --   Oral Monitor Lying Left arm       Pain Score       03/03/19 0619       7        Wt Readings from Last 1 Encounters:   03/03/19 75 8 kg (167 lb)     Vital Signs (abnormal): B/P = 112/40, 117/47    Pertinent Labs/Diagnostic Test Results:   MRI Lumbar Spine - Severe multilevel spondylosis and osteoarthritis with footprint of remote L4-S1 bony fusion, arachnoiditis distal thecal sac      Severe spondylosis and osteoarthritis L3-L4 level  Significant spinal stenosis  Correlate for bilateral L4 radiculitis and signs and symptoms of spinal stenosis      Ventral soft tissue mass extending above and below the disc space at the L2-L3 level, likely representing herniated extruded disc producing severe canal stenosis  Marrow edema within the opposing endplates at this level which may represent a combination   of reactive degenerative changes but also, acute anterior superior L3 fracture is noted on recent CT  Glucose = 379  Wbc = 2 77  H/H = 9 6/28 5    ED Treatment:   Medication Administration from 03/03/2019 0549 to 03/03/2019 1309       Date/Time Order Dose Route Action     03/03/2019 0702 sodium chloride 0 9 % bolus 500 mL 500 mL Intravenous New Bag     03/03/2019 1101 multi-electrolyte (ISOLYTE-S PH 7 4 equivalent) IV solution 75 mL/hr Intravenous New Bag     03/03/2019 0957 buPROPion (WELLBUTRIN XL) 24 hr tablet 300 mg 300 mg Oral Given     03/03/2019 0958 cholecalciferol (VITAMIN D3) tablet 1,000 Units 1,000 Units Oral Given     03/03/2019 0959 ferrous sulfate oral syrup 300 mg 300 mg Oral Given     03/03/2019 0958 furosemide (LASIX) tablet 20 mg 20 mg Oral Given     03/03/2019 0958 lisinopril (ZESTRIL) tablet 2 5 mg 2 5 mg Oral Given     03/03/2019 0957 LORazepam (ATIVAN) tablet 0 5 mg 0 5 mg Oral Given     03/03/2019 0959 pantoprazole (PROTONIX) EC tablet 40 mg 40 mg Oral Given        Past Medical/Surgical History:    Active Ambulatory Problems     Diagnosis Date Noted    Type 2 diabetes mellitus with diabetic polyneuropathy, with long-term current use of insulin ([facility] )     Hyperlipidemia     Asthma 10/18/2017    Anemia of chronic disease 10/18/2017    Pancytopenia ([facility] ) 10/18/2017    Bipolar 1 disorder ([facility] ) 10/18/2017    [facility] 10/19/2017    Essential hypertension     GERD (gastroesophageal reflux disease)     Venous stasis 12/28/2018    Ambulatory dysfunction 02/12/2019     Resolved Ambulatory Problems     Diagnosis Date Noted    NSTEMI (non-ST elevated myocardial infarction) ([facility] ) 10/18/2017    Lactic acidosis 10/18/2017    Intractable diarrhea 11/03/2017    Dizziness 08/14/2018    Hypokalemia 08/14/2018    Acute cystitis without hematuria 08/14/2018    Near syncope 09/02/2018    Chronic kidney disease     Elevated troponin 09/02/2018    Myoclonic jerking 09/18/2018    Wound, open, leg 12/28/2018    Restless leg syndrome 12/28/2018    Unintentional weight loss 02/13/2019    Leg edema 02/13/2019     Past Medical History:   Diagnosis Date    Asthma     Bipolar 2 disorder ([place] [address] )     Chronic kidney disease     Cirrhosis of liver ([place] [address] )     Diabetes mellitus ([place] [address] )     Elevated troponin 9/2/2018    GERD (gastroesophageal reflux disease)     Hyperlipidemia     Hypertension     Neuropathy     Renal disorder     Restless leg syndrome      Admitting Diagnosis: Back pain [M54 9]  Closed fracture of lumbar vertebra, unspecified fracture morphology, initial encounter ([place] [address] ) [S32 009A]     Age/Sex: 75 y o  female     Assessment/Plan:   68y Female, transfer from [facility] S[facility]P Fall last week  Patient was diagnosed with an L2-L3 L3 compression fracture with L2-L3 displacement  patient states she has ambulatory dysfunction  Patient fell off toilet  Patient presented [place] for difficulty walking and right lower extremity weakness  She states that her lower back pain is worsening over last 1 week  Patient also admits to buttocks pain  Pain radiates to her bilateral legs  She does take at home benzodiazepine and opiates  Patient with motor weakness or right lower extremity  Remote history of traumatic quadrant paresis with resolution over months      Trauma active problems  Traumatic compression fracture of L3 lumbar vertebra/ Ambulatory dysfunction/ Frequent Falls    Plan:  CT lumbar spine-Mild anterior wedge compression deformity of L3 is noted suggesting age-indeterminate fracture   There is some displacement of L2 to the left regard to L3 of approximately 1 cm which is new when compared to the prior and may suggest a component of instability at this level  Neuro surgical consultation from the ER recommended stat MRI,  Step-down level or care  Neurological checks q1h  Lumbar spine precautions  PT/OT eval      3/3 Per Neurosurgery:  L2-3 fracture dislocation with lateral displacement of L2 on 3  There is severe spinal stenosis at this level secondary to a herniated disc in trilateral spondylitic disease  At L3-4 there is spinal stenosis secondary to facet hypertrophy and ligamentous hypertrophy    Paresis of the right lower extremity to dorsiflexion and plantar flexion  Frequent falls   Pt refusing any surgical intervention at this time        Admission Orders:  Neurological checks q4h  Palliative Care cons  Urology cons  Neurosurgery cons  PT/OT eval and treat    Scheduled Meds:   Current Facility-Administered Medications:  acetaminophen 975 mg Oral Q8H [address]   buPROPion 300 mg Oral QAM   cholecalciferol 1,000 Units Oral Daily   ferrous sulfate 300 mg Oral BID AC   furosemide 20 mg Oral Daily   insulin detemir 10 Units Subcutaneous HS   insulin lispro 2-12 Units Subcutaneous TID AC   lidocaine 1 patch Topical Daily   lisinopril 2 5 mg Oral QAM   LORazepam 0 5 mg Oral BID   methocarbamol 250 mg Oral Q8H [address]   montelukast 10 mg Oral HS   pantoprazole 40 mg Oral Early Morning   pravastatin 80 mg Oral Daily With Dinner     Continuous Infusions:     multi-electrolyte (ISOLYTE-S PH 7 4 equivalent) IV solution   Rate: 75 mL/hr Dose: 75 mL/hr  Freq: Continuous Route: IV    PRN Meds: ipratropium-albuterol    oxyCODONE po x1

## 2019-03-04 NOTE — PROGRESS NOTES
Progress Note - Tertiary Trauma Survery   Sp Fonseca 75 y o  female MRN: [id number]  Unit/Bed#: [facility] 602-01 Encounter: [id number]    Summary of Diagnosed Injuries:   1) L2-L3 fracture dislocation with lateral displacement  2) frequent falls    Clinical Plan:   - patient seen by neurosurgery, refusing surgical intervention; will use corsett style brace and follow up outpatient with neurosurgery  - prn pain control  - PT/OT/CM  - geriatrics consult  - f/u urine cx      Prophylaxis: Sequential compression device [place])     Disposition: pending PT and OT recommendations    Code status:  Level 3 - DNAR and DNI    Consultants: Neurosurgery, Geriatrics    Is the patient 65 years or older?: YES:    1  Before the illness or injury that brought you to the Emergency, did you need someone to help you on a regular basis? 1=Yes   2  Since the illness or injury that brought you to the Emergency, have you needed more help than usual to take care of yourself? 0=No   3  Have you been hospitalized for one or more nights during the past 6 months (excluding a stay in the Emergency Department)? 1=Yes   4  In general, do you see well? 0=Yes   5  In general, do you have serious problems with your memory? 0=No   6  Do you take more than three different medications everyday? 1=Yes   TOTAL   3     Did you order a geriatric consult if the score was 2 or greater?: yes    SUBJECTIVE:     Transfer from: [facility]  Outside Films Received: yes  Tertiary Exam Due on: 3/4/2019    Mechanism of Injury:Fall    Details related to Injury: frequent falls    Chief Complaint: buttock pain    HPI/Last 24 hour events: "Sp Fonseca is a 75 y o  female who presents with as a transfer from [facility]  Patient was diagnosed with an L2-L3 L3 compression fracture with L2-L3 displacement  Patient states she fell last week  Patient states she has ambulatory dysfunction  Patient fell off toilet    Patient presented [facility] for difficulty walking and right lower extremity weakness  Patient states that her lower back pain is worsening over last 1 week  Patient also admits to buttocks pain  Currently pain is a 7/10 with any motion at her lower back becomes a 9/10  Patient states the pain radiates to her bilateral legs  Patient does take at home benzodiazepine and opiates  Patient denies numbness or tingling but does admit to motor weakness or right lower extremity  Patient denies fecal urinary incontinence  Patient denies urinary retention    Patient denies saddle anesthesias "    Active medications:           Current Facility-Administered Medications:     acetaminophen (TYLENOL) tablet 975 mg, 975 mg, Oral, Q8H [facility], 975 mg at 03/04/19 0606    buPROPion (WELLBUTRIN XL) 24 hr tablet 300 mg, 300 mg, Oral, QAM, 300 mg at 03/03/19 0957    cholecalciferol (VITAMIN D3) tablet 1,000 Units, 1,000 Units, Oral, Daily, 1,000 Units at 03/03/19 0958    ferrous sulfate oral syrup 300 mg, 300 mg, Oral, BID AC, 300 mg at 03/04/19 0605    furosemide (LASIX) tablet 20 mg, 20 mg, Oral, Daily, 20 mg at 03/03/19 0958    insulin detemir (LEVEMIR) subcutaneous injection 10 Units, 10 Units, Subcutaneous, HS, 10 Units at 03/03/19 2126    insulin lispro (HumaLOG) 100 units/mL subcutaneous injection 2-12 Units, 2-12 Units, Subcutaneous, TID AC, 6 Units at 03/03/19 1703 **AND** Fingerstick Glucose (POCT), , , TID AC    ipratropium-albuterol (DUO-NEB) 0 5-2 5 mg/3 mL inhalation solution 3 mL, 3 mL, Nebulization, Q6H PRN    lisinopril (ZESTRIL) tablet 2 5 mg, 2 5 mg, Oral, QAM, 2 5 mg at 03/03/19 0958    LORazepam (ATIVAN) tablet 0 5 mg, 0 5 mg, Oral, BID, 0 5 mg at 03/03/19 1703    methocarbamol (ROBAXIN) tablet 250 mg, 250 mg, Oral, Q8H [place], 250 mg at 03/04/19 0605    montelukast (SINGULAIR) tablet 10 mg, 10 mg, Oral, HS, 10 mg at 03/03/19 0395    multi-electrolyte (ISOLYTE-S PH 7 4 equivalent) IV solution, 75 mL/hr, Intravenous, Continuous, 75 mL/hr at 03/04/19 0249    pantoprazole (PROTONIX) EC tablet 40 mg, 40 mg, Oral, Early Morning, 40 mg at 03/04/19 0606    pravastatin (PRAVACHOL) tablet 80 mg, 80 mg, Oral, Daily With Dinner, 80 mg at 03/03/19 1703      OBJECTIVE:     Vitals:   Vitals:    03/04/19 0717   BP:    Pulse:    Resp:    Temp:    SpO2: 98%       Physical Exam:   NAD, alert and oriented x3  Normocephalic, atraumatic  MMM, EOMI, PERRLA  Norm resp effort on RA  RRR  Abd soft, NT/ND  No calf tenderness or peripheral edema  Motor/sensation intact in distal extremities  4/5 strength ankle flexion on R, 5/5 strength throughout rest BLE  CN grossly intact  -rash/lesions        I/O:   I/O       03/02 0701 - 03/03 0700 03/03 0701 - 03/04 0700 03/04 0701 - 03/05 0700    P  O   180     I V  (mL/kg)  1185 (15 7)     IV Piggyback  500     Total Intake(mL/kg)  1865 (24 6)     Urine (mL/kg/hr)  1550 (0 9)     Stool  0     Total Output  1550     Net  +315            Unmeasured Stool Occurrence  1 x           Invasive Devices: Invasive Devices     Peripheral Intravenous Line            Peripheral IV 03/03/19 Right Antecubital 1 day          Drain            External Urinary Catheter less than 1 day                  Imaging:   Xr Chest 1 View Portable    Result Date: 3/3/2019  Impression: No active pulmonary disease  Workstation performed: [id number]     Ct Lumbar Spine Without Contrast    Result Date: 3/3/2019  Impression: Fracture of the L2 transverse process appears acute  Mild anterior wedge compression deformity of L3 is noted suggesting age-indeterminate fracture  There is some displacement of L2 to the left regard to L3 of approximately 1 cm which is new when compared to the prior and may suggest a component of instability at this level  Multilevel degenerative changes as described  Most prominent at L2/L3 and L3/L4   At L2/L3:  There is a large posterior disc herniation at this level with severe narrowing of the spinal canal   The disc herniation extends into the right subarticular zone and laterally and there is moderate narrowing of the right neural foramen  At L3/L4: There is multifactorial severe narrowing of the spinal canal  Other findings as above  Findings discussed with Dr Vidal Stephen by Dr Alanna Adames at 4:25 AM on 3/3/2019  Workstation performed: [id number]     Mri Lumbar Spine Wo Contrast    Result Date: 3/3/2019  Impression: Severe multilevel spondylosis and osteoarthritis with footprint of remote L4-S1 bony fusion, arachnoiditis distal thecal sac  Severe spondylosis and osteoarthritis L3-L4 level  Significant spinal stenosis  Correlate for bilateral L4 radiculitis and signs and symptoms of spinal stenosis  Ventral soft tissue mass extending above and below the disc space at the L2-L3 level, likely representing herniated extruded disc producing severe canal stenosis  Marrow edema within the opposing endplates at this level which may represent a combination  of reactive degenerative changes but also, acute anterior superior L3 fracture is noted on recent CT   Workstation performed: [id number]       Labs: CBC: No results found for: WBC, HGB, HCT, MCV, PLT, ADJUSTEDWBC, MCH, MCHC, RDW, MPV, NRBC  CMP: No results found for: NA, CL, CO2, ANIONGAP, BUN, CREATININE, GLUCOSE, CALCIUM, AST, ALT, ALKPHOS, PROT, BILITOT, EGFR

## 2019-03-05 ENCOUNTER — APPOINTMENT (INPATIENT)
Dept: RADIOLOGY | Facility: HOSPITAL | Age: 74
DRG: 552 | End: 2019-03-05
Payer: COMMERCIAL

## 2019-03-05 VITALS
BODY MASS INDEX: 32.79 KG/M2 | RESPIRATION RATE: 18 BRPM | HEIGHT: 60 IN | SYSTOLIC BLOOD PRESSURE: 119 MMHG | DIASTOLIC BLOOD PRESSURE: 52 MMHG | WEIGHT: 167 LBS | OXYGEN SATURATION: 98 % | HEART RATE: 83 BPM | TEMPERATURE: 97.3 F
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
## 2019-03-05 LAB
BACTERIA UR CULT: ABNORMAL
BACTERIA UR CULT: ABNORMAL
GLUCOSE SERPL-MCNC: 257 MG/DL (ref 65–140)
GLUCOSE SERPL-MCNC: 381 MG/DL (ref 65–140)

## 2019-03-05 PROCEDURE — 82948 REAGENT STRIP/BLOOD GLUCOSE: CPT

## 2019-03-05 PROCEDURE — 99239 HOSP IP/OBS DSCHRG MGMT >30: CPT | Performed by: SURGERY

## 2019-03-05 PROCEDURE — 99233 SBSQ HOSP IP/OBS HIGH 50: CPT | Performed by: SURGERY

## 2019-03-05 RX ORDER — SENNOSIDES 8.6 MG
1 TABLET ORAL
Qty: 120 EACH | Refills: 0
Start: 2019-03-05 | End: 2019-05-31 | Stop reason: HOSPADM

## 2019-03-05 RX ORDER — LORAZEPAM 0.5 MG/1
0.5 TABLET ORAL 2 TIMES DAILY
Qty: 10 TABLET | Refills: 0 | Status: SHIPPED | OUTPATIENT
Start: 2019-03-05 | End: 2019-05-31 | Stop reason: HOSPADM

## 2019-03-05 RX ORDER — OXYCODONE HYDROCHLORIDE 5 MG/1
2.5 TABLET ORAL EVERY 6 HOURS
Qty: 20 TABLET | Refills: 0 | Status: SHIPPED | OUTPATIENT
Start: 2019-03-05 | End: 2019-03-15

## 2019-03-05 RX ADMIN — PRAVASTATIN SODIUM 80 MG: 80 TABLET ORAL at 18:26

## 2019-03-05 RX ADMIN — INSULIN LISPRO 6 UNITS: 100 INJECTION, SOLUTION INTRAVENOUS; SUBCUTANEOUS at 08:13

## 2019-03-05 RX ADMIN — OXYCODONE HYDROCHLORIDE 2.5 MG: 5 TABLET ORAL at 08:11

## 2019-03-05 RX ADMIN — FUROSEMIDE 20 MG: 20 TABLET ORAL at 08:12

## 2019-03-05 RX ADMIN — OXYCODONE HYDROCHLORIDE 2.5 MG: 5 TABLET ORAL at 02:09

## 2019-03-05 RX ADMIN — ACETAMINOPHEN 975 MG: 325 TABLET ORAL at 05:43

## 2019-03-05 RX ADMIN — METHOCARBAMOL 250 MG: 500 TABLET, FILM COATED ORAL at 05:43

## 2019-03-05 RX ADMIN — LIDOCAINE 1 PATCH: 50 PATCH CUTANEOUS at 08:12

## 2019-03-05 RX ADMIN — VITAMIN D, TAB 1000IU (100/BT) 1000 UNITS: 25 TAB at 08:11

## 2019-03-05 RX ADMIN — LISINOPRIL 2.5 MG: 2.5 TABLET ORAL at 08:11

## 2019-03-05 RX ADMIN — INSULIN LISPRO 10 UNITS: 100 INJECTION, SOLUTION INTRAVENOUS; SUBCUTANEOUS at 12:56

## 2019-03-05 RX ADMIN — MINERAL SUPPLEMENT IRON 300 MG / 5 ML STRENGTH LIQUID 100 PER BOX UNFLAVORED 300 MG: at 08:12

## 2019-03-05 RX ADMIN — OXYCODONE HYDROCHLORIDE 2.5 MG: 5 TABLET ORAL at 13:56

## 2019-03-05 RX ADMIN — LORAZEPAM 0.5 MG: 0.5 TABLET ORAL at 18:26

## 2019-03-05 RX ADMIN — METHOCARBAMOL 250 MG: 500 TABLET, FILM COATED ORAL at 13:56

## 2019-03-05 RX ADMIN — PANTOPRAZOLE SODIUM 40 MG: 40 TABLET, DELAYED RELEASE ORAL at 05:43

## 2019-03-05 RX ADMIN — ACETAMINOPHEN 975 MG: 325 TABLET ORAL at 13:55

## 2019-03-05 RX ADMIN — BUPROPION HYDROCHLORIDE 300 MG: 150 TABLET, FILM COATED, EXTENDED RELEASE ORAL at 08:12

## 2019-03-05 RX ADMIN — LORAZEPAM 0.5 MG: 0.5 TABLET ORAL at 08:11

## 2019-03-05 NOTE — PLAN OF CARE
Problem: Potential for Falls  Goal: Patient will remain free of falls  Description  INTERVENTIONS:  - Assess patient frequently for physical needs  -  Identify cognitive and physical deficits and behaviors that affect risk of falls    -  [place] fall precautions as indicated by assessment   - Educate patient/family on patient safety including physical limitations  - Instruct patient to call for assistance with activity based on assessment  - Modify environment to reduce risk of injury  - Consider OT/PT consult to assist with strengthening/mobility  Outcome: Progressing     Problem: Prexisting or High Potential for Compromised Skin Integrity  Goal: Skin integrity is maintained or improved  Description  INTERVENTIONS:  - Identify patients at risk for skin breakdown  - Assess and monitor skin integrity  - Assess and monitor nutrition and hydration status  - Monitor labs (i e  albumin)  - Assess for incontinence   - Turn and reposition patient  - Assist with mobility/ambulation  - Relieve pressure over bony prominences  - Avoid friction and shearing  - Provide appropriate hygiene as needed including keeping skin clean and dry  - Evaluate need for skin moisturizer/barrier cream  - Collaborate with interdisciplinary team (i e  Nutrition, Rehabilitation, etc )   - Patient/family teaching  Outcome: Progressing     Problem: PAIN - ADULT  Goal: Verbalizes/displays adequate comfort level or baseline comfort level  Description  Interventions:  - Encourage patient to monitor pain and request assistance  - Assess pain using appropriate pain scale  - Administer analgesics based on type and severity of pain and evaluate response  - Implement non-pharmacological measures as appropriate and evaluate response  - Consider cultural and social influences on pain and pain management  - Notify physician/advanced practitioner if interventions unsuccessful or patient reports new pain  Outcome: Progressing     Problem: INFECTION - ADULT  Goal: Absence or prevention of progression during hospitalization  Description  INTERVENTIONS:  - Assess and monitor for signs and symptoms of infection  - Monitor lab/diagnostic results  - Monitor all insertion sites, i e  indwelling lines, tubes, and drains  - Monitor endotracheal (as able) and nasal secretions for changes in amount and color  - [place] appropriate cooling/warming therapies per order  - Administer medications as ordered  - Instruct and encourage patient and family to use good hand hygiene technique  - Identify and instruct in appropriate isolation precautions for identified infection/condition  Outcome: Progressing     Problem: SAFETY ADULT  Goal: Patient will remain free of falls  Description  INTERVENTIONS:  - Assess patient frequently for physical needs  -  Identify cognitive and physical deficits and behaviors that affect risk of falls    -  [place] fall precautions as indicated by assessment   - Educate patient/family on patient safety including physical limitations  - Instruct patient to call for assistance with activity based on assessment  - Modify environment to reduce risk of injury  - Consider OT/PT consult to assist with strengthening/mobility  Outcome: Progressing  Goal: Maintain or return to baseline ADL function  Description  INTERVENTIONS:  -  Assess patient's ability to carry out ADLs; assess patient's baseline for ADL function and identify physical deficits which impact ability to perform ADLs (bathing, care of mouth/teeth, toileting, grooming, dressing, etc )  - Assess/evaluate cause of self-care deficits   - Assess range of motion  - Assess patient's mobility; develop plan if impaired  - Assess patient's need for assistive devices and provide as appropriate  - Encourage maximum independence but intervene and supervise when necessary  ¯ Involve family in performance of ADLs  ¯ Assess for home care needs following discharge   ¯ Request OT consult to assist with ADL evaluation and planning for discharge  ¯ Provide patient education as appropriate  Outcome: Progressing  Goal: Maintain or return mobility status to optimal level  Description  INTERVENTIONS:  - Assess patient's baseline mobility status (ambulation, transfers, stairs, etc )    - Identify cognitive and physical deficits and behaviors that affect mobility  - Identify mobility aids required to assist with transfers and/or ambulation (gait belt, sit-to-stand, lift, walker, cane, etc )  - [place] fall precautions as indicated by assessment  - Record patient progress and toleration of activity level on Mobility SBAR; progress patient to next Phase/Stage  - Instruct patient to call for assistance with activity based on assessment  - Request Rehabilitation consult to assist with strengthening/weightbearing, etc   Outcome: Progressing     Problem: DISCHARGE PLANNING  Goal: Discharge to home or other facility with appropriate resources  Description  INTERVENTIONS:  - Identify barriers to discharge w/patient and caregiver  - Arrange for needed discharge resources and transportation as appropriate  - Identify discharge learning needs (meds, wound care, etc )  - Arrange for interpretive services to assist at discharge as needed  - Refer to Case Management Department for coordinating discharge planning if the patient needs post-hospital services based on physician/advanced practitioner order or complex needs related to functional status, cognitive ability, or social support system  Outcome: Progressing     Problem: Knowledge Deficit  Goal: Patient/family/caregiver demonstrates understanding of disease process, treatment plan, medications, and discharge instructions  Description  Complete learning assessment and assess knowledge base    Interventions:  - Provide teaching at level of understanding  - Provide teaching via preferred learning methods  Outcome: Progressing     Problem: Nutrition/Hydration-ADULT  Goal: Nutrient/Hydration intake appropriate for improving, restoring or maintaining nutritional needs  Description  Monitor and assess patient's nutrition/hydration status for malnutrition (ex- brittle hair, bruises, dry skin, pale skin and conjunctiva, muscle wasting, smooth red tongue, and disorientation)  Collaborate with interdisciplinary team and initiate plan and interventions as ordered  Monitor patient's weight and dietary intake as ordered or per policy  Utilize nutrition screening tool and intervene per policy  Determine patient's food preferences and provide high-protein, high-caloric foods as appropriate       INTERVENTIONS:  - Monitor oral intake, urinary output, labs, and treatment plans  - Assess nutrition and hydration status and recommend course of action  - Evaluate amount of meals eaten  - Assist patient with eating if necessary   - Allow adequate time for meals  - Recommend/ encourage appropriate diets, oral nutritional supplements, and vitamin/mineral supplements  - Order, calculate, and assess calorie counts as needed  - Recommend, monitor, and adjust tube feedings and TPN/PPN based on assessed needs  - Assess need for intravenous fluids  - Provide specific nutrition/hydration education as appropriate  - Include patient/family/caregiver in decisions related to nutrition  Outcome: Progressing     Problem: DISCHARGE PLANNING - CARE MANAGEMENT  Goal: Discharge to post-acute care or home with appropriate resources  Description  INTERVENTIONS:  - Conduct assessment to determine patient/family and health care team treatment goals, and need for post-acute services based on payer coverage, community resources, and patient preferences, and barriers to discharge  - Address psychosocial, clinical, and financial barriers to discharge as identified in assessment in conjunction with the patient/family and health care team  - Arrange appropriate level of post-acute services according to patient?s   needs and preference and payer coverage in collaboration with the physician and health care team  - Communicate with and update the patient/family, physician, and health care team regarding progress on the discharge plan  - Arrange appropriate transportation to post-acute venues  Outcome: Progressing

## 2019-03-05 NOTE — SOCIAL WORK
Pt will d/c home today on [facility] care via [address] [place] [address] sent pt's medications to [facility] wheelchair [facility] will transport at [place]  Pt states she will ambulate the 7 stairs to her apartment

## 2019-03-05 NOTE — PROGRESS NOTES
03/05/19 1400   Psychosocial   Patient Behaviors/Mood Calm   Plan of Care   Comments Pt  declined  support until another time, cultivated relationship of care and support   Assessment Completed by: Unit visit

## 2019-03-05 NOTE — PROGRESS NOTES
Progress Note - Manolo Chinchilla  75 y o   female  PPHP 602/PPHP 602-01   MRN: [id number]  Encounter: [id number]     Assessment/Plan:  1  Pain secondary to L3 compression fx   -improved with new brace and addition of low dose oxycodone 2 5mg po q6h atc with prn oxycodone  Rx for oxycodone 2 5mg po q6h given to CM for home hospice   -continue adjuncts of Robaxin and Lidoderm patch  2  Anxiety-situational   -patient does not want to be in the hospital any longer  She is ready to go home    -continue ativan 0 5mg po bid  Rx given to CM for home hospice d/c    3  Sever Calorie Protein Malnutrition   - patient with unexplained weight loss, patient declines any further diagnostics and wishes to focus on comfort; patient to be d/c with home hospice support  Goals of Care: comfort  Level 3 code status    Subjective:  Patient seen and examined OOB in chair  Desires to get home today  Had an issue with having to get to the bathroom and assistance did not arrive in a timely manner per patient  She feels that she has adequate help from a neighbor to get home today with hospice coming to her apartment tomorrow  She does not want to remain in the hospital any longer      Review of Systems: Constitutional: positive for fatigue and weight loss  Gastrointestinal: positive for diarrhea  Genitourinary:positive for dysuria  Musculoskeletal:positive for edema    Medications    Current Facility-Administered Medications:     acetaminophen (TYLENOL) tablet 975 mg, 975 mg, Oral, Q8H [facility], BRITTANYNP, 975 mg at 03/05/19 0543    buPROPion (WELLBUTRIN XL) 24 hr tablet 300 mg, 300 mg, Oral, QAM, Kash P Rodericker, DO, 300 mg at 03/05/19 0105    cholecalciferol (VITAMIN D3) tablet 1,000 Units, 1,000 Units, Oral, Daily, Kash P Dotzler, DO, 1,000 Units at 03/05/19 0811    ferrous sulfate oral syrup 300 mg, 300 mg, Oral, BID AC, Kash P Dotzler, DO, 300 mg at 03/05/19 0812    furosemide (LASIX) tablet 20 mg, 20 mg, Oral, Daily, Kash P Dotzler, DO, 20 mg at 03/05/19 0812    insulin detemir (LEVEMIR) subcutaneous injection 10 Units, 10 Units, Subcutaneous, HS, Kash P Dotzler, DO, 10 Units at 03/04/19 2101    insulin lispro (HumaLOG) 100 units/mL subcutaneous injection 2-12 Units, 2-12 Units, Subcutaneous, TID AC, 6 Units at 03/05/19 0813 **AND** Fingerstick Glucose (POCT), , , TID AC, Kash P Dotzler, DO    ipratropium-albuterol (DUO-NEB) 0 5-2 5 mg/3 mL inhalation solution 3 mL, 3 mL, Nebulization, Q6H PRN, Annalise Matta MD    lidocaine (LIDODERM) 5 % patch 1 patch, 1 patch, Topical, Daily, FRANKI Sharma, 1 patch at 03/05/19 0812    lisinopril (ZESTRIL) tablet 2 5 mg, 2 5 mg, Oral, QAM, Kash P Rodericker, DO, 2 5 mg at 03/05/19 0811    LORazepam (ATIVAN) tablet 0 5 mg, 0 5 mg, Oral, BID, Kash P Rodericker, DO, 0 5 mg at 03/05/19 4848    methocarbamol (ROBAXIN) tablet 250 mg, 250 mg, Oral, Q8H [facility], CandelariaAnn Manzo, FRANKI, 250 mg at 03/05/19 0543    montelukast (SINGULAIR) tablet 10 mg, 10 mg, Oral, HS, Kash P Dotzler, DO, 10 mg at 03/04/19 2101    oxyCODONE (ROXICODONE) IR tablet 2 5 mg, 2 5 mg, Oral, Q6H, Bita Sanders DO, 2 5 mg at 03/05/19 0811    oxyCODONE (ROXICODONE) IR tablet 5 mg, 5 mg, Oral, Q4H PRN, FRANKI Sharma, 5 mg at 03/04/19 1136    pantoprazole (PROTONIX) EC tablet 40 mg, 40 mg, Oral, Early Morning, Kash Mcconnell DO, 40 mg at 03/05/19 0543    pravastatin (PRAVACHOL) tablet 80 mg, 80 mg, Oral, Daily With Dinner, Kash Mcconnell DO, 80 mg at 03/04/19 1725    senna (SENOKOT) tablet 8 6 mg, 1 tablet, Oral, HS PRN, Bita Sanders, DO    Objective  /55   Pulse 89   Temp 98 2 °F (36 8 °C)   Resp 18   Ht 5' (1 524 m)   Wt 75 8 kg (167 lb)   SpO2 96%   BMI 32 61 kg/m²   Physical Exam: General appearance: alert and oriented, in no acute distress  Constitutional: In no acute distress  Head: Normocephalic and atraumatic     Eyes: EOM are normal  No scleral icterus  Respiratory: Effort normal  No stridor  No respiratory distress  Gastrointestinal: Soft, nt, nd  Musculoskeletal: +pitting edema  Neurological: Alert, oriented and appropriately conversant  Skin: Dry, no diaphoresis  Psychiatric: Displays a normal mood and affect  Behavior, judgement and thought content appear normal      Lab Results:   CBC: No results found for: WBC, HGB, HCT, MCV, PLT, ADJUSTEDWBC, MCH, MCHC, RDW, MPV, NRBC, CMP:   Lab Results   Component Value Date    SODIUM 138 03/04/2019    K 3 5 03/04/2019     03/04/2019    CO2 28 03/04/2019    BUN 11 03/04/2019    CREATININE 0 69 03/04/2019    CALCIUM 8 8 03/04/2019    EGFR 86 03/04/2019       Counseling / Coordination of Care  Total floor / unit time spent today 25 minutes  Greater than 50% of total time was spent with the patient and / or family counseling and / or coordinating of care   A description of the counseling / coordination of care: focus on comfort with home hospice, hospice RN to meet pt at apt tomorrow, continue brace at home    [address] [facility]

## 2019-03-05 NOTE — DISCHARGE SUMMARY
Discharge- Batsheva [facility] [birth year], 75 y o  female MRN: [id number]    [facility]counter: [id number]    Primary Care Provider: Sarah Guerin MD   Date and time admitted to hospital: 3/3/2019  6:04 AM              Nurse-patient-provider rounds completed today with the patient's nurse, Yasemin Melchor  Disposition:  Anticipate discharge home with hospice in the next 24-48 hours pending coordination of care by the case management hospice services  Subjective:  Patient reports that she just wants to go home  She wishes that she could be left alone except to be given pain medicine  Currently her pain is controlled while lying still in bed, and notes that it is still uncontrolled when moving and/or being moved unless she receives pain medicine  She denies any numbness/weakness/tingling in any of her extremities  She is tolerating her diet  Aside from her back pain, she offers no complaints this morning      Objective:    Meds/Allergies   Medications Prior to Admission   Medication Sig Dispense Refill Last Dose    acetaminophen (TYLENOL) 325 mg tablet Take 2 tablets (650 mg total) by mouth every 6 (six) hours as needed for mild pain, headaches or fever (Patient not taking: Reported on 2/12/2019) 30 tablet 0 Not Taking at Unknown time    albuterol (2 5 mg/3 mL) 0 083 % nebulizer solution Take 2 5 mg by nebulization 3 (three) times a day as needed for wheezing   9/18/2018 at Unknown time    aspirin 81 MG tablet Take 81 mg by mouth daily   9/18/2018 at Unknown time    buPROPion (WELLBUTRIN XL) 300 mg 24 hr tablet Take 300 mg by mouth every morning   9/18/2018 at Unknown time    cholecalciferol (VITAMIN D3) 1,000 units tablet Take 1,000 Units by mouth daily   9/18/2018 at Unknown time    ferrous sulfate 300 (60 Fe) mg/5 mL syrup Take 5 mL (300 mg total) by mouth 2 (two) times a day before meals (Patient not taking: Reported on 2/12/2019) 150 mL 0 Not Taking at Unknown time    fluticasone-salmeterol (ADVAIR HFA) 115-21 MCG/ACT inhaler Inhale 2 puffs 2 (two) times a day   9/18/2018 at Unknown time    furosemide (LASIX) 20 mg tablet Take 1 tablet (20 mg total) by mouth daily (Patient not taking: Reported on 2/12/2019) 30 tablet 0 Not Taking at Unknown time    insulin lispro (HUMALOG KWIKPEN) 100 units/mL injection pen Inject 2 Units under the skin 3 (three) times a day with meals 5 pen 0     ipratropium (ATROVENT) 0 02 % nebulizer solution Take 0 5 mg by nebulization every 6 (six) hours as needed for wheezing or shortness of breath   Not Taking at Unknown time    lidocaine (LIDODERM) 5 % Apply 1 patch topically once for 1 dose Remove & Discard patch within 12 hours or as directed by MD 30 patch 0     lisinopril (ZESTRIL) 2 5 mg tablet Take 2 5 mg by mouth every morning   9/18/2018 at Unknown time    LORazepam (ATIVAN) 0 5 mg tablet Take 0 5 mg by mouth every 8 (eight) hours as needed for anxiety       montelukast (SINGULAIR) 10 mg tablet Take 10 mg by mouth daily at bedtime   9/18/2018 at Unknown time    naproxen (NAPROSYN) 250 mg tablet Take 1 tablet (250 mg total) by mouth 2 (two) times a day with meals (Patient not taking: Reported on 2/12/2019) 15 tablet 0 Not Taking at Unknown time    ondansetron (ZOFRAN) 4 mg tablet Take 4 mg by mouth every 8 (eight) hours as needed for nausea or vomiting       pantoprazole (PROTONIX) 40 mg tablet Take 40 mg by mouth daily   9/18/2018 at Unknown time    simvastatin (ZOCOR) 40 mg tablet Take 40 mg by mouth daily at bedtime   9/18/2018 at Unknown time    [DISCONTINUED] insulin detemir (LEVEMIR) 100 units/mL subcutaneous injection Inject 10 Units under the skin daily at bedtime (Patient taking differently: Inject 40 Units under the skin 2 (two) times a day ) 10 mL 0        Current Facility-Administered Medications:     acetaminophen (TYLENOL) tablet 975 mg, 975 mg, Oral, Q8H Daya DANIELS CRNP, 975 mg at 03/05/19 0543    buPROPion (WELLBUTRIN XL) 24 hr tablet 300 mg, 300 mg, Oral, QAM, Kash P Dotzler, DO, 300 mg at 03/05/19 0812    cholecalciferol (VITAMIN D3) tablet 1,000 Units, 1,000 Units, Oral, Daily, Kash P Dotzler, DO, 1,000 Units at 03/05/19 0811    ferrous sulfate oral syrup 300 mg, 300 mg, Oral, BID AC, Kash P Dotzler, DO, 300 mg at 03/05/19 0812    furosemide (LASIX) tablet 20 mg, 20 mg, Oral, Daily, Kash P Dotzler, DO, 20 mg at 03/05/19 0812    insulin detemir (LEVEMIR) subcutaneous injection 10 Units, 10 Units, Subcutaneous, HS, Kash P Dotzler, DO, 10 Units at 03/04/19 2101    insulin lispro (HumaLOG) 100 units/mL subcutaneous injection 2-12 Units, 2-12 Units, Subcutaneous, TID AC, 6 Units at 03/05/19 0813 **AND** Fingerstick Glucose (POCT), , , TID AC, Kash P Dotzler, DO    ipratropium-albuterol (DUO-NEB) 0 5-2 5 mg/3 mL inhalation solution 3 mL, 3 mL, Nebulization, Q6H PRN, Pushpa Horne MD    lidocaine (LIDODERM) 5 % patch 1 patch, 1 patch, Topical, Daily, FRANKI Sharma, 1 patch at 03/05/19 8794    lisinopril (ZESTRIL) tablet 2 5 mg, 2 5 mg, Oral, QAM, Kash P Dotzler, DO, 2 5 mg at 03/05/19 0811    LORazepam (ATIVAN) tablet 0 5 mg, 0 5 mg, Oral, BID, Kash P Dotzler, DO, 0 5 mg at 03/05/19 4907    methocarbamol (ROBAXIN) tablet 250 mg, 250 mg, Oral, Q8H [address], FRANKI Kauffman, 250 mg at 03/05/19 0543    montelukast (SINGULAIR) tablet 10 mg, 10 mg, Oral, HS, Kash P Dotzler, DO, 10 mg at 03/04/19 2101    oxyCODONE (ROXICODONE) IR tablet 2 5 mg, 2 5 mg, Oral, Q6H, Bita Sanders DO, 2 5 mg at 03/05/19 0811    oxyCODONE (ROXICODONE) IR tablet 5 mg, 5 mg, Oral, Q4H PRN, FRANKI Sharma, 5 mg at 03/04/19 1136    pantoprazole (PROTONIX) EC tablet 40 mg, 40 mg, Oral, Early Morning, Kash Mcconnell DO, 40 mg at 03/05/19 0543    pravastatin (PRAVACHOL) tablet 80 mg, 80 mg, Oral, Daily With Dinner, Kash Mcconnell DO, 80 mg at 03/04/19 1725    senna (SENOKOT) tablet 8 6 mg, 1 tablet, Oral, HS SANTOSH, Paola Sanders DO    Vitals: Blood pressure 120/55, pulse 89, temperature 98 2 °F (36 8 °C), resp  rate 18, height 5' (1 524 m), weight 75 8 kg (167 lb), SpO2 96 %  Body mass index is 32 61 kg/m²  SpO2: SpO2: 98 %  Temp (24hrs), Av 9 °F (36 6 °C), Min:97 7 °F (36 5 °C), Max:98 2 °F (36 8 °C)  Current: Temperature: 98 2 °F (36 8 °C)    ABG: No results found for: PHART, MSM4LUW, PO2ART, AHP5VIU, O0FXAPVA, BEART, SOURCE      Intake/Output Summary (Last 24 hours) at 3/5/2019 1115  Last data filed at 3/5/2019 2829  Gross per 24 hour   Intake 1270 ml   Output 382 ml   Net 888 ml       Invasive Devices     Peripheral Intravenous Line            Peripheral IV 19 Right Antecubital 2 days          Drain            External Urinary Catheter 1 day                          Nutrition/GI Proph/Bowel Reg:  Level 2 constant carbohydrate diet with Ensure 3 times daily; Protonix; senna    Physical Exam:     GENERAL APPEARANCE: Patient in no acute distress  HEENT: NCAT; PERRL, EOMs intact; Mucous membranes moist  NECK / BACK:  Moderate to severe tenderness over the lumbar spine without step-off or deformity  CV: Regular rate and rhythm; + S1, S2; no murmur/gallops/rubs appreciated  CHEST / LUNGS: Clear to auscultation; no wheezes/rales/rhonci  ABD: NABS; soft; non-distended; non-tender  EXT: +2 pulses bilaterally upper & lower extremities; no clubbing/cyanosis/edema  NEURO: GCS 15; no focal neurologic deficits; neurovascularly intact  SKIN: Warm, dry and well perfused; no rash; no jaundice  Lab Results: Results: I have personally reviewed pertinent reports  Imaging/EKG Studies: Results: I have personally reviewed pertinent reports      Other Studies: N/A  VTE Prophylaxis:  SCDs    Discharge Summary - Trauma Service   Batsheva Coreas 75 y o  female MRN: [id number]  Unit/Bed#: [facility] 045-51 Encounter: [id number]    Admission Date: 3/3/2019     Discharge Date: 3/5/2019    Admitting Diagnosis: Back pain [M54 9]  Closed fracture of lumbar vertebra, unspecified fracture morphology, initial encounter ([facility] [address] ) [S32 009A]    Discharge Diagnosis: See above  Attending and Service: Dr Man Reed  Consulting Physician(s):     1  [facility]  2  Palliative Care  3  Urology  Imaging and Procedures Performed:     Xr Chest 1 View Portable    Result Date: 3/3/2019  Impression: No active pulmonary disease  Workstation performed: [id number]     Ct Lumbar Spine Without Contrast    Result Date: 3/3/2019  Impression: Fracture of the L2 transverse process appears acute  Mild anterior wedge compression deformity of L3 is noted suggesting age-indeterminate fracture  There is some displacement of L2 to the left regard to L3 of approximately 1 cm which is new when compared to the prior and may suggest a component of instability at this level  Multilevel degenerative changes as described  Most prominent at L2/L3 and L3/L4  At L2/L3:  There is a large posterior disc herniation at this level with severe narrowing of the spinal canal   The disc herniation extends into the right subarticular zone and laterally and there is moderate narrowing of the right neural foramen  At L3/L4: There is multifactorial severe narrowing of the spinal canal  Other findings as above  Findings discussed with Dr Joanne Moore by Dr Judith Love at 4:25 AM on 3/3/2019  Workstation performed: [id number]     Mri Lumbar Spine Wo Contrast    Result Date: 3/3/2019  Impression: Severe multilevel spondylosis and osteoarthritis with footprint of remote L4-S1 bony fusion, arachnoiditis distal thecal sac  Severe spondylosis and osteoarthritis L3-L4 level  Significant spinal stenosis  Correlate for bilateral L4 radiculitis and signs and symptoms of spinal stenosis  Ventral soft tissue mass extending above and below the disc space at the L2-L3 level, likely representing herniated extruded disc producing severe canal stenosis    Marrow edema within the opposing endplates at this level which may represent a combination  of reactive degenerative changes but also, acute anterior superior L3 fracture is noted on recent CT  Workstation performed: Maryanne Fallon 386 Course: Hans Green is a 18-NJRW-DKW female presented [facility] a [facility] after being diagnosed with lumbar spine compression fractures and associated displacement following a fall approximately 1 week prior  She notes that she does have ambulatory dysfunction at baseline and that she fell off the toilet  She had been having difficulty walking and increasing weakness in her right lower extremity as well as persistent lower back pain since the fall  The pain radiated to her buttock  Her pain significantly worsened with movement  On her initial trauma evaluation [facility], her primary survey was unremarkable  On secondary, she had tenderness over the lumbar spine without any step-off or deformity; she had decreased strength in her right lower extremity at the ankle (4-5) with normal strength throughout the remaining extremities; the remainder of her secondary survey was unremarkable  Her initial work up and imaging studies were reviewed  She was admitted to the trauma service with L2 and L3 compression fractures  [facility] was consulted, the patient was kept NPO and placed on a multimodal analgesic regimen with spinal precautions, and a stat MRI was ordered at the recommendation of the Neurosurgery service  After the additional imaging studies were obtained, Neurosurgery attempted to discuss the patient's findings and offer treatment options  Bracing and upright imaging was recommended, but the patient declined ago for any additional imaging studies and wished to be discharged home and be made comfortable    She also declined wanting to wear any brace before eventually agreeing to wearing a quick draw brace  She also declined a Urology consult  As the patient wished to receive comfort measures only, palliative care consultation was placed  The palliative care service review the patient's goals and clarify that she did not want any disease directed care and would only want to be discharged home with a focus on comfort  A hospice referral was made and the hospice service as well as case management assisted in arranging for discharge back to home with hospice services on 03/05/2019  On discharge, the patient is instructed to follow-up with the patient's primary care provider to review the events of the patient's recent hospitalization  The patient is instructed to follow-up in the Trauma Clinic as needed  The patient should follow the provided discharge instructions  Condition at Discharge: good     Discharge instructions/Information to patient and family:   See after visit summary for information provided to patient and family  Provisions for Follow-Up Care:  See after visit summary for information related to follow-up care and any pertinent home health orders  Disposition: See After Visit Summary for discharge disposition information  Planned Readmission: No    Discharge Statement   I spent 43 minutes discharging the patient  This time was spent on the day of discharge  I had direct contact with the patient on the day of discharge  Additional documentation is required if more than 30 minutes were spent on discharge  Additional time required for this patient's discharge due to her day re-evaluation and complex coordination of care to arrange discharge home with hospice  Discharge Medications:  See after visit summary for reconciled discharge medications provided to patient and family        Jose Ruiz PA-C  3/5/2019  11:15 AM

## 2019-03-05 NOTE — ORTHOTIC NOTE
Orthotic Note            Date: 3/5/2019      Patient Name: Gregory Mike        20 mins    Reason for Consult:  Patient Active Problem List   Diagnosis    Type 2 diabetes mellitus with diabetic polyneuropathy, with long-term current use of insulin ([facility] [address] )    Hyperlipidemia    Asthma    Anemia of chronic disease    Pancytopenia ([facility] [address] )    Bipolar 1 disorder ([facility] [address] )    [facility]    Essential hypertension    GERD (gastroesophageal reflux disease)    Venous stasis    Ambulatory dysfunction    Closed compression fracture of L3 lumbar vertebra (HCC)    Compression fracture of L3 lumbar vertebra (HCC)    Closed compression fracture of third lumbar vertebra [facility])    Fall   [place] Quick Draw Brace    [employer] followed up with patient in regards to Quick Draw brace  Per nursing, patient was refusing to wear the brace, stating that it did not fit right and she would not wear it  Upon entrance, [employer] educated patient on the importance of wearing the brace when out of bed  [employer] then readjusted the brace, changing the left waist band to a size Large while keep the left band a size X-Large  After that, [employer] educated patient on how to correctly fit brace over hips by demonstrating slanting bilateral waist straps downward  This allows the brace to hug hips and fit better  Patient was very gracious and satisfied with this new knowledge  Patient agreed to wear the brace, now that she better understands how to don it  [employer] left business card with bracing phone extension with patient  Salvador Contreras will continue to follow up as needed  Recommendations:  Please call  in regards to bracing instructions and/or adjustments    Nayeli HERNANDEZ, Restorative Technician, [employer]

## 2019-03-05 NOTE — DISCHARGE INSTRUCTIONS
Trauma Discharge Instructions:    Please follow-up as instructed  If you need a follow-up appointment, please call the office when you leave to schedule an appointment  Activity:  - Recommend continued use of the Quickdraw back brace for support / comfort  Recommend use of assistive device (walker) for ambulation   - No driving unless cleared by a physician  Diet:    - You may resume your normal (diabetic) diet  Medications:  - You may resume all of your regular medications, including blood thinners and aspirin, after going home unless otherwise instructed  Please refer to your discharge medication list for further details  - Please take the pain medications as directed by the Hospice team   - You may become constipated, especially if taking pain medications  You may take any over the counter stool softeners or laxatives as needed  Examples: Milk of Magnesia, Colace, Senna  Additional Instructions:  - May shower daily   - If you have any questions or concerns after discharge please call the Hospice team/service

## 2019-03-05 NOTE — SOCIAL WORK
CHRISTO met with patient and despite hospice not being able to open her till Wednesday she demands to go home today  She does request a wheelchair [facility] to transport her home

## 2019-03-06 LAB
BACTERIA UR CULT: ABNORMAL
BACTERIA UR CULT: ABNORMAL

## 2019-03-15 ENCOUNTER — HOSPITAL ENCOUNTER (EMERGENCY)
Facility: HOSPITAL | Age: 74
Discharge: HOME | End: 2019-03-15
Attending: EMERGENCY MEDICINE
Payer: MEDICARE

## 2019-03-15 VITALS
RESPIRATION RATE: 20 BRPM | SYSTOLIC BLOOD PRESSURE: 174 MMHG | TEMPERATURE: 98 F | DIASTOLIC BLOOD PRESSURE: 74 MMHG | WEIGHT: 146.83 LBS | BODY MASS INDEX: 28.68 KG/M2 | OXYGEN SATURATION: 97 % | HEART RATE: 100 BPM
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
## 2019-03-15 DIAGNOSIS — Z51.5 HOSPICE CARE PATIENT: ICD-10-CM

## 2019-03-15 PROCEDURE — 99283 EMERGENCY DEPT VISIT LOW MDM: CPT

## 2019-03-15 RX ORDER — LORAZEPAM 0.5 MG/1
0.5 TABLET ORAL ONCE
Status: COMPLETED | OUTPATIENT
Start: 2019-03-15 | End: 2019-03-15

## 2019-03-15 RX ORDER — OXYCODONE HYDROCHLORIDE 5 MG/1
2.5 TABLET ORAL ONCE
Status: COMPLETED | OUTPATIENT
Start: 2019-03-15 | End: 2019-03-15

## 2019-03-15 RX ADMIN — LORAZEPAM 0.5 MG: 0.5 TABLET ORAL at 08:48

## 2019-03-15 RX ADMIN — OXYCODONE HYDROCHLORIDE 2.5 MG: 5 TABLET ORAL at 05:47

## 2019-03-15 NOTE — ED NOTES
Pt rolled herself in bed while being changed into fresh sheets and a new brief        Day Gagnon RN  03/15/19 8771

## 2019-03-15 NOTE — ED NOTES
Case reviewed again with NP at bedside, plan to discharge home via EMS with home hospice services       Gisela Schuster RN  03/15/19 0108

## 2019-03-15 NOTE — ED NOTES
Pt refuses all testing  Pt states " I don't want to know   I will not get treatment anyway's "      Karo Wing, RN  03/15/19 3394

## 2019-03-15 NOTE — ED PROVIDER NOTES
History  Chief Complaint   Patient presents with    Fall     Pt arrives via EMS from home after falling  Pt fell walking to bathroom with walker  Pt perviously broke back 1 week ago  Pt is in a lot of pain  Pt reports "doctors wanting to do surgery on me but I dont want it " Pt reports she hit left side of head  This is a 75year old female who was admitted to [facility] trauma service on 3/3/19 and discharged 3/5/19, seen by palliative care and pt states is currently on hospice  Pt was sitting on the toilet tonight attempted to get up and pull up her pants and fell, hitting the right side of her head, right shoulder  She has a L2-L3 compression fractures that are currently being treated with medication pain control through [facility]  Pt states that she did fall hit the right side of her head, right shoulder and is currently refusing at xrays  She has refused PT/OT and brace in the past     I have placed a call to hospital to decide on POC  She notes that she does have ambulatory dysfunction at baseline and that she fell off the toilet  She had been having difficulty walking and increasing weakness in her right lower extremity as well as persistent lower back pain since the fall  The pain radiated to her buttock  Her pain significantly worsened with movement  History provided by:  Medical records and patient   used: No        Prior to Admission Medications   Prescriptions Last Dose Informant Patient Reported? Taking?    LORazepam (ATIVAN) 0 5 mg tablet   No No   Sig: Take 1 tablet (0 5 mg total) by mouth 2 (two) times a day for 10 days   acetaminophen (TYLENOL) 325 mg tablet   No No   Sig: Take 2 tablets (650 mg total) by mouth every 6 (six) hours as needed for mild pain, headaches or fever   Patient not taking: Reported on 2/12/2019   albuterol (2 5 mg/3 mL) 0 083 % nebulizer solution  Self Yes No   Sig: Take 2 5 mg by nebulization 3 (three) times a day as needed for wheezing aspirin 81 MG tablet  Self Yes No   Sig: Take 81 mg by mouth daily   buPROPion (WELLBUTRIN XL) 300 mg 24 hr tablet  Self Yes No   Sig: Take 300 mg by mouth every morning   cholecalciferol (VITAMIN D3) 1,000 units tablet  Self Yes No   Sig: Take 1,000 Units by mouth daily   ferrous sulfate 300 (60 Fe) mg/5 mL syrup   No No   Sig: Take 5 mL (300 mg total) by mouth 2 (two) times a day before meals   Patient not taking: Reported on 2/12/2019   fluticasone-salmeterol (ADVAIR HFA) 115-21 MCG/ACT inhaler  Self Yes No   Sig: Inhale 2 puffs 2 (two) times a day   furosemide (LASIX) 20 mg tablet   No No   Sig: Take 1 tablet (20 mg total) by mouth daily   Patient not taking: Reported on 2/12/2019   insulin detemir (LEVEMIR) 100 units/mL subcutaneous injection   No No   Sig: Inject 40 Units under the skin 2 (two) times a day   insulin lispro (HUMALOG KWIKPEN) 100 units/mL injection pen   No No   Sig: Inject 2 Units under the skin 3 (three) times a day with meals   ipratropium (ATROVENT) 0 02 % nebulizer solution  Self Yes No   Sig: Take 0 5 mg by nebulization every 6 (six) hours as needed for wheezing or shortness of breath   lidocaine (LIDODERM) 5 %   No No   Sig: Apply 1 patch topically once for 1 dose Remove & Discard patch within 12 hours or as directed by MD   lisinopril (ZESTRIL) 2 5 mg tablet  Self Yes No   Sig: Take 2 5 mg by mouth every morning   montelukast (SINGULAIR) 10 mg tablet  Self Yes No   Sig: Take 10 mg by mouth daily at bedtime   naproxen (NAPROSYN) 250 mg tablet   No No   Sig: Take 1 tablet (250 mg total) by mouth 2 (two) times a day with meals   Patient not taking: Reported on 2/12/2019   ondansetron (ZOFRAN) 4 mg tablet   Yes No   Sig: Take 4 mg by mouth every 8 (eight) hours as needed for nausea or vomiting   oxyCODONE (ROXICODONE) 5 mg immediate release tablet   No No   Sig: Take 0 5 tablets (2 5 mg total) by mouth every 6 (six) hours for 10 daysMax Daily Amount: 10 mg   pantoprazole (PROTONIX) 40 mg tablet  Self Yes No   Sig: Take 40 mg by mouth daily   senna (SENOKOT) 8 6 mg   No No   Sig: Take 1 tablet (8 6 mg total) by mouth daily at bedtime as needed for constipation   simvastatin (ZOCOR) 40 mg tablet  Self Yes No   Sig: Take 40 mg by mouth daily at bedtime      Facility-Administered Medications: None       Past Medical History:   Diagnosis Date    Asthma     Bipolar 2 disorder ([facility] 75 )     Chronic kidney disease     Cirrhosis of liver ([place] [address] )     Diabetes mellitus ([place] [address] )     Elevated troponin 9/2/2018    GERD (gastroesophageal reflux disease)     Hyperlipidemia     Hypertension     Neuropathy     Renal disorder     Restless leg syndrome        Past Surgical History:   Procedure Laterality Date    BACK SURGERY      CHOLECYSTECTOMY      HERNIA REPAIR      REPLACEMENT TOTAL KNEE BILATERAL      TUMOR REMOVAL         History reviewed  No pertinent family history  I have reviewed and agree with the history as documented  Social History     Tobacco Use    Smoking status: Passive Smoke Exposure - Never Smoker    Smokeless tobacco: Never Used   Substance Use Topics    Alcohol use: Never     Frequency: Never    Drug use: No        Review of Systems   Constitutional: Negative  HENT: Negative  Eyes: Negative  Respiratory: Negative  Cardiovascular: Negative  Endocrine: Negative  Genitourinary: Negative  Skin: Negative  Allergic/Immunologic: Negative  Neurological: Negative  Hematological: Negative  Psychiatric/Behavioral: Negative  Physical Exam  Physical Exam   Constitutional: She is oriented to person, place, and time  Frail disheveled appearing elderly female   Pt is lying on her right side with legs in fetal position    HENT:   Head: Normocephalic and atraumatic  Eyes: Pupils are equal, round, and reactive to light  EOM are normal    Neck: Normal range of motion  Neck supple  Cardiovascular: Normal rate and regular rhythm  Murmur heard    B/L LE edema      Pulmonary/Chest: Effort normal and breath sounds normal    Abdominal: Soft  Bowel sounds are normal  She exhibits no distension  There is no tenderness  Musculoskeletal: Normal range of motion  She exhibits edema and tenderness  + TTP lumbar area with edema  Pt refuses to move on exam bed due to pain     Neurological: She is alert and oriented to person, place, and time  She displays normal reflexes  No cranial nerve deficit or sensory deficit  She exhibits normal muscle tone  Coordination normal    Skin: Skin is warm and dry  Capillary refill takes less than 2 seconds  Feet are dry and scaly     Psychiatric: She has a normal mood and affect  Her behavior is normal  Judgment and thought content normal    Nursing note and vitals reviewed  Vital Signs  ED Triage Vitals   Temperature Pulse Respirations Blood Pressure SpO2   03/15/19 0445 03/15/19 0438 03/15/19 0438 03/15/19 0438 03/15/19 0438   98 °F (36 7 °C) 98 22 149/81 99 %      Temp Source Heart Rate Source Patient Position - Orthostatic VS BP Location FiO2 (%)   03/15/19 0445 03/15/19 0438 03/15/19 0438 03/15/19 0438 --   Oral Monitor Lying Left arm       Pain Score       03/15/19 0438       Worst Possible Pain           Vitals:    03/15/19 0438   BP: 149/81   Pulse: 98   Patient Position - Orthostatic VS: Lying       qSOFA     Row Name 03/15/19 0438                Altered mental status GCS < 15  --        Respiratory Rate > / =60  1        Systolic BP < / =364  0        Q Sofa Score  1              Visual Acuity      ED Medications  Medications   oxyCODONE (ROXICODONE) IR tablet 2 5 mg (2 5 mg Oral Given 3/15/19 0547)       Diagnostic Studies  Results Reviewed     None                 No orders to display              Procedures  Procedures       Phone Contacts  ED Phone Contact    ED Course  ED Course as of Mar 15 0623   Fri Mar 15, 2019   0551 Pt continues to refuse radiology and labs  Medicated with oxycodone    Pt is able to go from lying on right side to lying on her back but it is painful for her  Assessing for either w/c transport or stretcher  0615 Pt has 8 stairs to get into home  It will be impossible for pt to access stairs independently  Pt will need to go home with stretcher transport  MDM  Number of Diagnoses or Management Options  Diagnosis management comments: Fall    Pt is refusing any radiology or lab work  I have spoken with Toi Garzon from [facility] who states pt is hospice pt    Pt states that she wants to return home and die at home           Amount and/or Complexity of Data Reviewed  Review and summarize past medical records: yes  Discuss the patient with other providers: ([facility] - [facility] [place] )        Disposition  Final diagnoses:   Fall, subsequent encounter   Hospice care patient     Time reflects when diagnosis was documented in both MDM as applicable and the Disposition within this note     Time User Action Codes Description Comment    3/15/2019  5:36 AM Corazon Muñiz [employer]ksander Sports  XXXD] Fall, subsequent encounter     3/15/2019  5:36 AM Corazon Muñiz [Z51 5] Hospice care patient       ED Disposition     ED Disposition Condition Date/Time Comment    Discharge Stable Fri Mar 15, 3590  6:59 AM Preethi Bergman discharge to home/self care              Follow-up Information     Follow up With Specialties Details Why Contact Info Additional Information    Brook Perry MD Internal Medicine Schedule an appointment as soon as possible for a visit in 2 days  [address]  [address] [place] [address] Emergency Department Emergency Medicine  If symptoms worsen [address] [facility] [address], [address], [place], [place], [address]          Patient's Medications   Discharge Prescriptions    No medications on file     No discharge procedures on file     ED Provider  Electronically Signed by           Jojo Alegria  03/15/19 7835

## 2019-03-15 NOTE — ED NOTES
Pt reports severe anxiety over situation and delay in transport home, requesting home morning med - ativan        Jen Sullivan, RN  03/15/19 3490

## 2019-03-15 NOTE — DISCHARGE INSTRUCTIONS
You have refused radiology testing and lab work today  You are currently on hospice and verbalize that you want to go home  I have spoken with hospice and they will contact you today

## 2019-03-27 ENCOUNTER — APPOINTMENT (EMERGENCY)
Dept: RADIOLOGY | Facility: HOSPITAL | Age: 74
End: 2019-03-27
Payer: MEDICARE

## 2019-03-27 ENCOUNTER — HOSPITAL ENCOUNTER (EMERGENCY)
Facility: HOSPITAL | Age: 74
Discharge: HOME | End: 2019-03-27
Attending: EMERGENCY MEDICINE | Admitting: EMERGENCY MEDICINE
Payer: MEDICARE

## 2019-03-27 VITALS
WEIGHT: 135.58 LBS | OXYGEN SATURATION: 100 % | SYSTOLIC BLOOD PRESSURE: 141 MMHG | RESPIRATION RATE: 16 BRPM | BODY MASS INDEX: 26.48 KG/M2 | HEART RATE: 77 BPM | TEMPERATURE: 97.2 F | DIASTOLIC BLOOD PRESSURE: 66 MMHG
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
## 2019-03-27 DIAGNOSIS — M54.9 BACK PAIN: ICD-10-CM

## 2019-03-27 LAB
ANION GAP SERPL CALCULATED.3IONS-SCNC: 6 MMOL/L (ref 4–13)
BASOPHILS # BLD AUTO: 0.03 THOUSANDS/ΜL (ref 0–0.1)
BASOPHILS NFR BLD AUTO: 1 % (ref 0–1)
BUN SERPL-MCNC: 10 MG/DL (ref 5–25)
CALCIUM SERPL-MCNC: 8.9 MG/DL (ref 8.3–10.1)
CHLORIDE SERPL-SCNC: 97 MMOL/L (ref 100–108)
CO2 SERPL-SCNC: 29 MMOL/L (ref 21–32)
CREAT SERPL-MCNC: 0.81 MG/DL (ref 0.6–1.3)
EOSINOPHIL # BLD AUTO: 0.12 THOUSAND/ΜL (ref 0–0.61)
EOSINOPHIL NFR BLD AUTO: 4 % (ref 0–6)
ERYTHROCYTE [DISTWIDTH] IN BLOOD BY AUTOMATED COUNT: 16 % (ref 11.6–15.1)
GFR SERPL CREATININE-BSD FRML MDRD: 72 ML/MIN/1.73SQ M
GLUCOSE SERPL-MCNC: 436 MG/DL (ref 65–140)
HCT VFR BLD AUTO: 35.1 % (ref 34.8–46.1)
HGB BLD-MCNC: 11.8 G/DL (ref 11.5–15.4)
IMM GRANULOCYTES # BLD AUTO: 0.01 THOUSAND/UL (ref 0–0.2)
IMM GRANULOCYTES NFR BLD AUTO: 0 % (ref 0–2)
LYMPHOCYTES # BLD AUTO: 0.75 THOUSANDS/ΜL (ref 0.6–4.47)
LYMPHOCYTES NFR BLD AUTO: 24 % (ref 14–44)
MCH RBC QN AUTO: 31.6 PG (ref 26.8–34.3)
MCHC RBC AUTO-ENTMCNC: 33.6 G/DL (ref 31.4–37.4)
MCV RBC AUTO: 94 FL (ref 82–98)
MONOCYTES # BLD AUTO: 0.34 THOUSAND/ΜL (ref 0.17–1.22)
MONOCYTES NFR BLD AUTO: 11 % (ref 4–12)
NEUTROPHILS # BLD AUTO: 1.93 THOUSANDS/ΜL (ref 1.85–7.62)
NEUTS SEG NFR BLD AUTO: 60 % (ref 43–75)
NRBC BLD AUTO-RTO: 0 /100 WBCS
PLATELET # BLD AUTO: 75 THOUSANDS/UL (ref 149–390)
PMV BLD AUTO: 12.3 FL (ref 8.9–12.7)
POTASSIUM SERPL-SCNC: 3.6 MMOL/L (ref 3.5–5.3)
RBC # BLD AUTO: 3.74 MILLION/UL (ref 3.81–5.12)
SODIUM SERPL-SCNC: 132 MMOL/L (ref 136–145)
WBC # BLD AUTO: 3.18 THOUSAND/UL (ref 4.31–10.16)

## 2019-03-27 PROCEDURE — 36415 COLL VENOUS BLD VENIPUNCTURE: CPT | Performed by: PHYSICIAN ASSISTANT

## 2019-03-27 PROCEDURE — 73502 X-RAY EXAM HIP UNI 2-3 VIEWS: CPT

## 2019-03-27 PROCEDURE — 85025 COMPLETE CBC W/AUTO DIFF WBC: CPT | Performed by: PHYSICIAN ASSISTANT

## 2019-03-27 PROCEDURE — 80048 BASIC METABOLIC PNL TOTAL CA: CPT | Performed by: PHYSICIAN ASSISTANT

## 2019-03-27 PROCEDURE — 99284 EMERGENCY DEPT VISIT MOD MDM: CPT

## 2019-03-27 PROCEDURE — 96374 THER/PROPH/DIAG INJ IV PUSH: CPT

## 2019-03-27 PROCEDURE — 96376 TX/PRO/DX INJ SAME DRUG ADON: CPT

## 2019-03-27 RX ORDER — PANTOPRAZOLE SODIUM 40 MG/1
40 TABLET, DELAYED RELEASE ORAL ONCE
Status: COMPLETED | OUTPATIENT
Start: 2019-03-27 | End: 2019-03-27

## 2019-03-27 RX ORDER — OXYCODONE HYDROCHLORIDE 5 MG/1
10 CAPSULE ORAL EVERY 2 HOUR PRN
COMMUNITY
End: 2019-05-31 | Stop reason: HOSPADM

## 2019-03-27 RX ORDER — LORAZEPAM 0.5 MG/1
0.5 TABLET ORAL ONCE
Status: COMPLETED | OUTPATIENT
Start: 2019-03-27 | End: 2019-03-27

## 2019-03-27 RX ORDER — HALOPERIDOL 2 MG/ML
2 SOLUTION ORAL EVERY 6 HOURS PRN
COMMUNITY
End: 2019-05-31 | Stop reason: HOSPADM

## 2019-03-27 RX ORDER — OXYCODONE HYDROCHLORIDE 5 MG/1
5 TABLET ORAL ONCE
Status: COMPLETED | OUTPATIENT
Start: 2019-03-27 | End: 2019-03-27

## 2019-03-27 RX ORDER — FENTANYL CITRATE 50 UG/ML
25 INJECTION, SOLUTION INTRAMUSCULAR; INTRAVENOUS ONCE
Status: COMPLETED | OUTPATIENT
Start: 2019-03-27 | End: 2019-03-27

## 2019-03-27 RX ORDER — OXYCODONE HYDROCHLORIDE 5 MG/1
2.5 TABLET ORAL ONCE
Status: COMPLETED | OUTPATIENT
Start: 2019-03-27 | End: 2019-03-27

## 2019-03-27 RX ORDER — BUPROPION HYDROCHLORIDE 150 MG/1
300 TABLET ORAL DAILY
Status: DISCONTINUED | OUTPATIENT
Start: 2019-03-27 | End: 2019-03-27 | Stop reason: HOSPADM

## 2019-03-27 RX ADMIN — FENTANYL CITRATE 25 MCG: 50 INJECTION INTRAMUSCULAR; INTRAVENOUS at 12:04

## 2019-03-27 RX ADMIN — LORAZEPAM 0.5 MG: 0.5 TABLET ORAL at 18:31

## 2019-03-27 RX ADMIN — OXYCODONE HYDROCHLORIDE 2.5 MG: 5 TABLET ORAL at 08:37

## 2019-03-27 RX ADMIN — FENTANYL CITRATE 25 MCG: 50 INJECTION, SOLUTION INTRAMUSCULAR; INTRAVENOUS at 09:08

## 2019-03-27 RX ADMIN — LORAZEPAM 0.5 MG: 0.5 TABLET ORAL at 15:09

## 2019-03-27 RX ADMIN — PANTOPRAZOLE SODIUM 40 MG: 40 TABLET, DELAYED RELEASE ORAL at 15:09

## 2019-03-27 RX ADMIN — BUPROPION HYDROCHLORIDE 300 MG: 150 TABLET, FILM COATED, EXTENDED RELEASE ORAL at 15:08

## 2019-03-27 RX ADMIN — OXYCODONE HYDROCHLORIDE 5 MG: 5 TABLET ORAL at 15:09

## 2019-03-27 RX ADMIN — OXYCODONE HYDROCHLORIDE 5 MG: 5 TABLET ORAL at 18:31

## 2019-03-27 NOTE — SOCIAL WORK
Patient is eventually interested in LTC and would like to explore [email or web address] as an option  Referral sent to [email or web address] and liaaaron Curiel met with patient briefly at bedside  Veena stated that she would review with her  but the process of admitting under [facility] could take a day or two  As per ED provider, patient does not have criteria for admission and will be discharging back home  CM spoke with [address] [place] [address] who stated she is coordinating with [facility] to resume 24 hour caregivers while Hospice team continues to explore LTC options for patient  Home care services to resume at 11 pm tonight  CM called [facility] to coordinate transport home  Patient has [address] is not needed as long as in [employer] is utilized  BLS arranged with [facility] at 9 pm  Medical necessity and facesheet faxed to [facility] and placed on chart  ED provider, RN, Hospice SW and patient aware  CM reviewed discharge plans with patient and she understands and promises she will retain her 24 hour caregivers while Hospice works on [address] arrangements for her  [address] aware of transport time and will try and coordinate with patient's neighbor Simi Madrigal to see if she can sit with patient until her caregiver arrives but patient should be ok alone until that time and patient agreeable  ED provider also in agreement  Patient to discharge home with 24 hour caregivers while [address] with Chhaya from [email or web address]

## 2019-03-27 NOTE — ED PROVIDER NOTES
History  Chief Complaint   Patient presents with   Janett Randolph     brought in by ems from home after falling  pt lives alone and falls frequently  multiple complaints although pt states "she's not answering anymore questions"  78-year-old female presents to the emergency department via EMS after a fall  States that she fell at home this morning in her bathroom and was unable to get up  Notes that she had her medical alert button was transported to the ER  Presently complains of pain all over her body which seems to be worse in the back and shoulders  Reports she may have hit her head but had no loss of consciousness  She is currently on hospice and has visiting home health aides  States that she is currently waiting on 24 hour in-home care through [facility]  History provided by:  Patient and EMS personnel   used: No    Fall   Associated symptoms: back pain    Associated symptoms: no abdominal pain, no chest pain, no headaches, no nausea, no seizures and no vomiting        Prior to Admission Medications   Prescriptions Last Dose Informant Patient Reported? Taking?    LORazepam (ATIVAN) 0 5 mg tablet   No Yes   Sig: Take 1 tablet (0 5 mg total) by mouth 2 (two) times a day for 10 days   Patient taking differently: Take 0 5 mg by mouth every 2 (two) hours as needed    acetaminophen (TYLENOL) 325 mg tablet   No Yes   Sig: Take 2 tablets (650 mg total) by mouth every 6 (six) hours as needed for mild pain, headaches or fever   albuterol (2 5 mg/3 mL) 0 083 % nebulizer solution  Self Yes No   Sig: Take 2 5 mg by nebulization 3 (three) times a day as needed for wheezing   aspirin 81 MG tablet  Self Yes No   Sig: Take 81 mg by mouth daily   bisacodyl (DULCOLAX) 5 mg EC tablet   Yes Yes   Sig: Take 10 mg by mouth daily as needed for constipation   buPROPion (WELLBUTRIN XL) 300 mg 24 hr tablet  Self Yes Yes   Sig: Take 300 mg by mouth every morning   cholecalciferol (VITAMIN D3) 1,000 units tablet  Self Yes No   Sig: Take 1,000 Units by mouth daily   ferrous sulfate 300 (60 Fe) mg/5 mL syrup   No No   Sig: Take 5 mL (300 mg total) by mouth 2 (two) times a day before meals   Patient not taking: Reported on 2/12/2019   fluticasone-salmeterol (ADVAIR HFA) 115-21 MCG/ACT inhaler  Self Yes No   Sig: Inhale 2 puffs 2 (two) times a day   furosemide (LASIX) 20 mg tablet   No Yes   Sig: Take 1 tablet (20 mg total) by mouth daily   haloperidol (HALDOL) 1 mg/0 5 mL oral concentrated solution   Yes Yes   Sig: Take 2 mg by mouth every 6 (six) hours as needed for agitation   hyoscyamine (LEVSIN/SL) 0 125 mg SL tablet   Yes Yes   Sig: Take 0 125 mg by mouth every 4 (four) hours as needed for cramping   insulin detemir (LEVEMIR) 100 units/mL subcutaneous injection   No No   Sig: Inject 40 Units under the skin 2 (two) times a day   insulin lispro (HUMALOG KWIKPEN) 100 units/mL injection pen   No No   Sig: Inject 2 Units under the skin 3 (three) times a day with meals   ipratropium (ATROVENT) 0 02 % nebulizer solution  Self Yes No   Sig: Take 0 5 mg by nebulization every 6 (six) hours as needed for wheezing or shortness of breath   lidocaine (LIDODERM) 5 %   No No   Sig: Apply 1 patch topically once for 1 dose Remove & Discard patch within 12 hours or as directed by MD   lisinopril (ZESTRIL) 2 5 mg tablet  Self Yes No   Sig: Take 2 5 mg by mouth every morning   montelukast (SINGULAIR) 10 mg tablet  Self Yes No   Sig: Take 10 mg by mouth daily at bedtime   naproxen (NAPROSYN) 250 mg tablet   No No   Sig: Take 1 tablet (250 mg total) by mouth 2 (two) times a day with meals   Patient not taking: Reported on 2/12/2019   ondansetron (ZOFRAN) 4 mg tablet   Yes Yes   Sig: Take 4 mg by mouth every 4 (four) hours as needed for nausea or vomiting    oxyCODONE (OXY-IR) 5 MG capsule   Yes Yes   Sig: Take 10 mg by mouth every 2 (two) hours as needed for moderate pain   pantoprazole (PROTONIX) 40 mg tablet  Self Yes Yes   Sig: Take 40 mg by mouth daily   senna (SENOKOT) 8 6 mg   No Yes   Sig: Take 1 tablet (8 6 mg total) by mouth daily at bedtime as needed for constipation   Patient taking differently: Take 1 tablet by mouth as needed for constipation    simvastatin (ZOCOR) 40 mg tablet  Self Yes No   Sig: Take 40 mg by mouth daily at bedtime      Facility-Administered Medications: None       Past Medical History:   Diagnosis Date    Asthma     Bipolar 2 disorder ([place] [address] )     Chronic kidney disease     Cirrhosis of liver ([place] [address] )     Diabetes mellitus ([place] [address] )     Elevated troponin 9/2/2018    GERD (gastroesophageal reflux disease)     Hyperlipidemia     Hypertension     Neuropathy     Renal disorder     Restless leg syndrome        Past Surgical History:   Procedure Laterality Date    BACK SURGERY      CHOLECYSTECTOMY      HERNIA REPAIR      REPLACEMENT TOTAL KNEE BILATERAL      TUMOR REMOVAL         History reviewed  No pertinent family history  I have reviewed and agree with the history as documented  Social History     Tobacco Use    Smoking status: Passive Smoke Exposure - Never Smoker    Smokeless tobacco: Never Used   Substance Use Topics    Alcohol use: Never     Frequency: Never    Drug use: No        Review of Systems   Constitutional: Negative for activity change, appetite change, chills and fever  HENT: Negative for congestion, dental problem, drooling, ear discharge, ear pain, mouth sores, nosebleeds, rhinorrhea, sore throat and trouble swallowing  Eyes: Negative for pain, discharge and itching  Respiratory: Negative for cough, chest tightness, shortness of breath and wheezing  Cardiovascular: Negative for chest pain and palpitations  Gastrointestinal: Negative for abdominal pain, blood in stool, constipation, diarrhea, nausea and vomiting  Endocrine: Negative for cold intolerance and heat intolerance  Genitourinary: Negative for difficulty urinating, dysuria, flank pain, frequency and urgency  Musculoskeletal: Positive for arthralgias and back pain  Skin: Positive for wound  Negative for rash  Allergic/Immunologic: Negative for food allergies and immunocompromised state  Neurological: Negative for dizziness, seizures, syncope, weakness, numbness and headaches  Psychiatric/Behavioral: Negative for agitation, behavioral problems and confusion  Physical Exam  Physical Exam   Constitutional: She is oriented to person, place, and time  She appears well-developed and well-nourished  No distress  HENT:   Head: Normocephalic and atraumatic  Right Ear: External ear normal    Left Ear: External ear normal    Mouth/Throat: Oropharynx is clear and moist  No oropharyngeal exudate  Eyes: Pupils are equal, round, and reactive to light  Conjunctivae and EOM are normal    Neck: No JVD present  No tracheal deviation present  Cardiovascular: Normal rate, regular rhythm and normal heart sounds  Exam reveals no gallop and no friction rub  No murmur heard  Pulmonary/Chest: Effort normal and breath sounds normal  No respiratory distress  She has no wheezes  She has no rales  She exhibits no tenderness  Abdominal: Soft  Bowel sounds are normal  She exhibits no distension  There is no tenderness  There is no guarding  Musculoskeletal: Normal range of motion  She exhibits no edema, tenderness or deformity  Lymphadenopathy:     She has no cervical adenopathy  Neurological: She is alert and oriented to person, place, and time  Skin: Skin is warm and dry  No rash noted  She is not diaphoretic  No erythema  Psychiatric: Her affect is angry  She is agitated  Nursing note and vitals reviewed        Vital Signs  ED Triage Vitals   Temperature Pulse Respirations Blood Pressure SpO2   03/27/19 0802 03/27/19 0802 03/27/19 0802 03/27/19 0802 03/27/19 0802   (!) 97 2 °F (36 2 °C) 87 18 143/65 100 %      Temp Source Heart Rate Source Patient Position - Orthostatic VS BP Location FiO2 (%)   03/27/19 0802 03/27/19 0802 03/27/19 1537 03/27/19 1537 --   Oral Monitor Lying Right arm       Pain Score       03/27/19 1204       Worst Possible Pain           Vitals:    03/27/19 0802 03/27/19 1220 03/27/19 1537   BP: 143/65 133/60 141/66   Pulse: 87 88 77   Patient Position - Orthostatic VS:   Lying         Visual Acuity      ED Medications  Medications   buPROPion (WELLBUTRIN XL) 24 hr tablet 300 mg (300 mg Oral Given 3/27/19 1508)   oxyCODONE (ROXICODONE) IR tablet 2 5 mg (2 5 mg Oral Given 3/27/19 0837)   fentanyl citrate (PF) 100 MCG/2ML 25 mcg (25 mcg Intravenous Given 3/27/19 0908)   fentanyl citrate (PF) 100 MCG/2ML 25 mcg (25 mcg Intravenous Given 3/27/19 1204)   LORazepam (ATIVAN) tablet 0 5 mg (0 5 mg Oral Given 3/27/19 1509)   oxyCODONE (ROXICODONE) IR tablet 5 mg (5 mg Oral Given 3/27/19 1509)   pantoprazole (PROTONIX) EC tablet 40 mg (40 mg Oral Given 3/27/19 1509)       Diagnostic Studies  Results Reviewed     Procedure Component Value Units Date/Time    CBC and differential [[id number]]  (Abnormal) Collected:  03/27/19 0909    Lab Status:  Final result Specimen:  Blood from Arm, Left Updated:  03/27/19 0943     WBC 3 18 Thousand/uL      RBC 3 74 Million/uL      Hemoglobin 11 8 g/dL      Hematocrit 35 1 %      MCV 94 fL      MCH 31 6 pg      MCHC 33 6 g/dL      RDW 16 0 %      MPV 12 3 fL      Platelets 75 Thousands/uL      nRBC 0 /100 WBCs      Neutrophils Relative 60 %      Immat GRANS % 0 %      Lymphocytes Relative 24 %      Monocytes Relative 11 %      Eosinophils Relative 4 %      Basophils Relative 1 %      Neutrophils Absolute 1 93 Thousands/µL      Immature Grans Absolute 0 01 Thousand/uL      Lymphocytes Absolute 0 75 Thousands/µL      Monocytes Absolute 0 34 Thousand/µL      Eosinophils Absolute 0 12 Thousand/µL      Basophils Absolute 0 03 Thousands/µL     Basic metabolic panel [[id number]]  (Abnormal) Collected:  03/27/19 0909    Lab Status:  Final result Specimen:  Blood from Arm, Left Updated: 03/27/19 0925     Sodium 132 mmol/L      Potassium 3 6 mmol/L      Chloride 97 mmol/L      CO2 29 mmol/L      ANION GAP 6 mmol/L      BUN 10 mg/dL      Creatinine 0 81 mg/dL      Glucose 436 mg/dL      Calcium 8 9 mg/dL      eGFR 72 ml/min/1 73sq m     Narrative:       National Kidney Disease Education Program recommendations are as follows:  GFR calculation is accurate only with a steady state creatinine  Chronic Kidney disease less than 60 ml/min/1 73 sq  meters  Kidney failure less than 15 ml/min/1 73 sq  meters  XR hip/pelv 2-3 vws right   Final Result by Magdi Horn MD (03/27 1115)      No acute osseous abnormality  Mild osteoarthritic degenerative changes of the right hip joint  Workstation performed: [id number]                    Procedures  Procedures       Phone Contacts  ED Phone Contact    ED Course  ED Course as of Mar 27 1553   Wed Mar 27, 2019   1013 Spoke with patient regarding disposition  Agreeable to going to inpatient PT/OT  Aware that she may need to stay in the hospital for several days prior to this being arranged  [phone] with case management  Will review case  Asking to have PT/OT consult ordered in ED to possibly place in facility today  [phone] with case management  States that the patient threw the hospice aid out of her house yesterday  Notes that she has spoke with the hospice liaison  States that the patient may qualify for additional services within the home since she is essentially unable to ambulate without assistance  Will cancel PT OT eval       1029 X-rays reviewed  No hip fracture identified  Derrek Cueto 44 at bedside  Will review case with ER case management  [phone] with case management  Patient will be discharged home with 24 care starting at 11:00 p m     Will continue to work at an outpatient goal of long-term placement                                    MDM  Number of Diagnoses or Management Options  Back pain:   Fall, initial encounter:   Diagnosis management comments: Differential diagnosis includes but not limited to:  Ambulatory dysfunction, failure to thrive, fracture  Amount and/or Complexity of Data Reviewed  Clinical lab tests: ordered and reviewed  Tests in the radiology section of CPT®: ordered and reviewed  Discuss the patient with other providers: yes  Independent visualization of images, tracings, or specimens: yes        Disposition  Final diagnoses:   Fall, initial encounter   Back pain     Time reflects when diagnosis was documented in both MDM as applicable and the Disposition within this note     Time User Action Codes Description Comment    3/27/2019  3:52 PM Pranav Pacheco 26 [S05  XXXA] Fall, initial encounter     3/27/2019  3:52 PM Miki Pacheco Add [M54 9] Back pain       ED Disposition     ED Disposition Condition Date/Time Comment    Discharge Stable Wed Mar 27, 7411  3:15 PM [address] discharge to home/self care  Follow-up Information     Follow up With Specialties Details Why [address], MD Internal Medicine   [address]  [address] [place] [address]  [phone]            Patient's Medications   Discharge Prescriptions    No medications on file     No discharge procedures on file      ED Provider  Electronically Signed by           Carlene Crowley PA-C  03/27/19 4892

## 2019-03-27 NOTE — SOCIAL WORK
CM received call from ED PA who explained patient is currently active with home hospice but has been having increased falls at home  Patient is waiting on 24 hr caregivers being arranged at home but states that this hasn't kicked in yet  CM called Hospice office and spoke with Abril Guillen  CM waiting call back from Hospice SW to discuss safe discharge planning  CM will continue to follow patient

## 2019-03-27 NOTE — SOCIAL WORK
CM has been in communication with [address] [place] [address] as well as Hospice RN Kye Mccormick who are both familiar with patient  Kye Mccormick explained that patient actually was approved for 24 hour caregivers at home but that she frequently sends them out of the home  Patient's landlord has expressed she is not able to return without 24 hour caregivers in place and is at risk for eviction  Kye Mccormick will discuss options with patient including return home with 24 hour caregivers, which she is already approved for, or long term are at a SNF  Kye Mccormick and Cali Banda are helping coordinate patient's discharge plan and will keep CM informed

## 2019-03-27 NOTE — ED NOTES
Patient is requesting to have IV catheter removed and states: "get somebody quick or I will remove it myself", I will just pull it out"     Eleanor Moseley  03/27/19 9812

## 2019-03-27 NOTE — SOCIAL WORK
DARYL spoke with hospice SW Анна Roberto ([phone]) re: safe discharge planning  Mary Dorado stated that patient just dismissed her aides at home yesterday but is unclear as to why  Aides are provided through [facility] through the [facility]  [facility] [place] stated that they've been working on 24 hr caregivers but this is yet to be approved, however, if patient is basically unable to get out of bed, she believes that these services can be put into place  Mary Dorado will be calling patient's hospice nurse as well as her  with [facility] to coordinate services  Mary Dorado will meet with patient in ED and will call CM back with updated plans  CM will continue to follow patient  ED provider aware

## 2019-03-28 NOTE — ED NOTES
Call to [facility] to have someone unlock the door  Waiting for a return call        Bennett Saba RN  03/27/19 2039

## 2019-03-28 NOTE — ED NOTES
Return call from [address] [place] [address] at [employer]  He will be at the patients residence to open the door at [phone]  Called Juanita from Hospice to advise patient will be home at that approximately that time        Willa Guzman RN  03/27/19 2056

## 2019-04-06 ENCOUNTER — HOSPITAL ENCOUNTER (EMERGENCY)
Facility: HOSPITAL | Age: 74
Discharge: HOME | End: 2019-04-06
Attending: EMERGENCY MEDICINE | Admitting: EMERGENCY MEDICINE
Payer: MEDICARE

## 2019-04-06 VITALS
BODY MASS INDEX: 25.58 KG/M2 | HEART RATE: 87 BPM | DIASTOLIC BLOOD PRESSURE: 56 MMHG | SYSTOLIC BLOOD PRESSURE: 117 MMHG | RESPIRATION RATE: 22 BRPM | WEIGHT: 130.95 LBS | OXYGEN SATURATION: 100 %

## 2019-04-06 DIAGNOSIS — Z00.00 WELL ADULT HEALTH CHECK: Primary | ICD-10-CM

## 2019-04-06 PROCEDURE — 99283 EMERGENCY DEPT VISIT LOW MDM: CPT

## 2019-04-06 PROCEDURE — 99282 EMERGENCY DEPT VISIT SF MDM: CPT | Performed by: EMERGENCY MEDICINE

## 2019-04-06 RX ORDER — LORAZEPAM 1 MG/1
1 TABLET ORAL ONCE
Status: COMPLETED | OUTPATIENT
Start: 2019-04-06 | End: 2019-04-06

## 2019-04-06 RX ADMIN — LORAZEPAM 1 MG: 1 TABLET ORAL at 18:06

## 2019-04-24 NOTE — PROGRESS NOTES
[employer] Vit D3   4630 IU daily  Folic acid or Folate 102 mcg daily  Increase potassium in diet with tomatoes, potatoes    Hypoglycemia (Low Blood Sugar)     Fast-acting sugar includes a cup of nonfat milk.    Too little sugar (glucose) in your blood is Primary RN entered patients room to inform her she would be discharged  The patient began screaming and became verbally aggressive with primary RN again  "Get out of here! Get out of here! I don't want you, you didn't give me my medicine! Ill call security!"    Patient was informed she will be leaving shortly  Patient screamed "I know get out of my room you make me stressed! I will call the  on you! I never want to see you again! You wouldn't give me my medicine!  I want you to die!" target range, eat a snack or meal.  Preventing low blood sugar  Things you can do include the following:   · If your condition needs a strict treatment plan, eat your meals and snacks at the same times each day. Don’t skip meals!   · If your treatment plan

## 2019-05-27 ENCOUNTER — APPOINTMENT (EMERGENCY)
Dept: RADIOLOGY | Facility: HOSPITAL | Age: 74
End: 2019-05-27
Payer: MEDICARE

## 2019-05-27 ENCOUNTER — HOSPITAL ENCOUNTER (OUTPATIENT)
Facility: HOSPITAL | Age: 74
Setting detail: OBSERVATION
End: 2019-05-31
Attending: EMERGENCY MEDICINE | Admitting: INTERNAL MEDICINE
Payer: MEDICARE

## 2019-05-27 DIAGNOSIS — E11.42 TYPE 2 DIABETES MELLITUS WITH DIABETIC POLYNEUROPATHY, WITH LONG-TERM CURRENT USE OF INSULIN (HCC): Chronic | ICD-10-CM

## 2019-05-27 DIAGNOSIS — R73.9 HYPERGLYCEMIA: ICD-10-CM

## 2019-05-27 DIAGNOSIS — F31.9 BIPOLAR 1 DISORDER (HCC): Chronic | ICD-10-CM

## 2019-05-27 DIAGNOSIS — Z01.89 ENCOUNTER FOR COMPETENCY EVALUATION: ICD-10-CM
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
## 2019-05-27 DIAGNOSIS — Z79.4 TYPE 2 DIABETES MELLITUS WITH DIABETIC POLYNEUROPATHY, WITH LONG-TERM CURRENT USE OF INSULIN (HCC): Chronic | ICD-10-CM

## 2019-05-27 DIAGNOSIS — M25.551 RIGHT HIP PAIN: ICD-10-CM

## 2019-05-27 PROBLEM — D50.9 IRON DEFICIENCY ANEMIA: Status: ACTIVE | Noted: 2017-10-20

## 2019-05-27 LAB
ATRIAL RATE: 95 BPM
GLUCOSE SERPL-MCNC: >500 MG/DL (ref 65–140)
P AXIS: 54 DEGREES
QRS AXIS: 42 DEGREES
QRSD INTERVAL: 78 MS
QT INTERVAL: 382 MS
QTC INTERVAL: 448 MS
T WAVE AXIS: 40 DEGREES
VENTRICULAR RATE: 83 BPM

## 2019-05-27 PROCEDURE — 99218 PR INITIAL OBSERVATION CARE/DAY 30 MINUTES: CPT | Performed by: INTERNAL MEDICINE

## 2019-05-27 PROCEDURE — 93005 ELECTROCARDIOGRAM TRACING: CPT

## 2019-05-27 PROCEDURE — 99285 EMERGENCY DEPT VISIT HI MDM: CPT

## 2019-05-27 PROCEDURE — 96374 THER/PROPH/DIAG INJ IV PUSH: CPT

## 2019-05-27 PROCEDURE — 93010 ELECTROCARDIOGRAM REPORT: CPT | Performed by: INTERNAL MEDICINE

## 2019-05-27 PROCEDURE — 73502 X-RAY EXAM HIP UNI 2-3 VIEWS: CPT

## 2019-05-27 PROCEDURE — 99285 EMERGENCY DEPT VISIT HI MDM: CPT | Performed by: EMERGENCY MEDICINE

## 2019-05-27 PROCEDURE — 73552 X-RAY EXAM OF FEMUR 2/>: CPT

## 2019-05-27 PROCEDURE — 82948 REAGENT STRIP/BLOOD GLUCOSE: CPT

## 2019-05-27 RX ORDER — FENTANYL CITRATE 50 UG/ML
25 INJECTION, SOLUTION INTRAMUSCULAR; INTRAVENOUS ONCE
Status: COMPLETED | OUTPATIENT
Start: 2019-05-27 | End: 2019-05-27

## 2019-05-27 RX ORDER — MONTELUKAST SODIUM 10 MG/1
10 TABLET ORAL
Status: DISCONTINUED | OUTPATIENT
Start: 2019-05-27 | End: 2019-05-31 | Stop reason: HOSPADM

## 2019-05-27 RX ORDER — ONDANSETRON 4 MG/1
4 TABLET, ORALLY DISINTEGRATING ORAL EVERY 4 HOURS PRN
Status: DISCONTINUED | OUTPATIENT
Start: 2019-05-27 | End: 2019-05-31 | Stop reason: HOSPADM

## 2019-05-27 RX ORDER — ACETAMINOPHEN 325 MG/1
650 TABLET ORAL EVERY 6 HOURS PRN
Status: DISCONTINUED | OUTPATIENT
Start: 2019-05-27 | End: 2019-05-31 | Stop reason: HOSPADM

## 2019-05-27 RX ORDER — PRAVASTATIN SODIUM 20 MG
20 TABLET ORAL
Status: DISCONTINUED | OUTPATIENT
Start: 2019-05-27 | End: 2019-05-27

## 2019-05-27 RX ORDER — PANTOPRAZOLE SODIUM 40 MG/1
40 TABLET, DELAYED RELEASE ORAL DAILY
Status: DISCONTINUED | OUTPATIENT
Start: 2019-05-28 | End: 2019-05-31 | Stop reason: HOSPADM

## 2019-05-27 RX ORDER — MECLIZINE HYDROCHLORIDE 25 MG/1
25 TABLET ORAL ONCE
Status: COMPLETED | OUTPATIENT
Start: 2019-05-27 | End: 2019-05-27

## 2019-05-27 RX ORDER — MECLIZINE HYDROCHLORIDE 25 MG/1
25 TABLET ORAL EVERY 8 HOURS PRN
Status: DISCONTINUED | OUTPATIENT
Start: 2019-05-27 | End: 2019-05-31 | Stop reason: HOSPADM

## 2019-05-27 RX ORDER — FUROSEMIDE 20 MG/1
20 TABLET ORAL DAILY
Status: DISCONTINUED | OUTPATIENT
Start: 2019-05-28 | End: 2019-05-31 | Stop reason: HOSPADM

## 2019-05-27 RX ORDER — LISINOPRIL 2.5 MG/1
2.5 TABLET ORAL EVERY MORNING
Status: DISCONTINUED | OUTPATIENT
Start: 2019-05-28 | End: 2019-05-28

## 2019-05-27 RX ORDER — OXYCODONE HYDROCHLORIDE 10 MG/1
10 TABLET ORAL EVERY 4 HOURS PRN
Status: DISCONTINUED | OUTPATIENT
Start: 2019-05-27 | End: 2019-05-31 | Stop reason: HOSPADM

## 2019-05-27 RX ORDER — ALBUTEROL SULFATE 2.5 MG/3ML
2.5 SOLUTION RESPIRATORY (INHALATION) 3 TIMES DAILY PRN
Status: DISCONTINUED | OUTPATIENT
Start: 2019-05-27 | End: 2019-05-31 | Stop reason: HOSPADM

## 2019-05-27 RX ORDER — METOPROLOL SUCCINATE 25 MG/1
25 TABLET, EXTENDED RELEASE ORAL DAILY
COMMUNITY
End: 2019-05-31 | Stop reason: HOSPADM

## 2019-05-27 RX ORDER — METOPROLOL SUCCINATE 25 MG/1
25 TABLET, EXTENDED RELEASE ORAL DAILY
Status: DISCONTINUED | OUTPATIENT
Start: 2019-05-28 | End: 2019-05-28

## 2019-05-27 RX ORDER — MECLIZINE HYDROCHLORIDE 25 MG/1
25 TABLET ORAL EVERY 8 HOURS PRN
Status: DISCONTINUED | OUTPATIENT
Start: 2019-05-27 | End: 2019-05-27

## 2019-05-27 RX ORDER — HALOPERIDOL 2 MG/ML
2 SOLUTION ORAL EVERY 6 HOURS PRN
Status: DISCONTINUED | OUTPATIENT
Start: 2019-05-27 | End: 2019-05-31 | Stop reason: HOSPADM

## 2019-05-27 RX ORDER — LORAZEPAM 0.5 MG/1
0.5 TABLET ORAL EVERY 2 HOUR PRN
Status: DISCONTINUED | OUTPATIENT
Start: 2019-05-27 | End: 2019-05-31 | Stop reason: HOSPADM

## 2019-05-27 RX ORDER — BUPROPION HYDROCHLORIDE 150 MG/1
300 TABLET ORAL EVERY MORNING
Status: DISCONTINUED | OUTPATIENT
Start: 2019-05-28 | End: 2019-05-31 | Stop reason: HOSPADM

## 2019-05-27 RX ORDER — SENNOSIDES 8.6 MG
1 TABLET ORAL
Status: DISCONTINUED | OUTPATIENT
Start: 2019-05-27 | End: 2019-05-31 | Stop reason: HOSPADM

## 2019-05-27 RX ORDER — FLUTICASONE FUROATE AND VILANTEROL 200; 25 UG/1; UG/1
1 POWDER RESPIRATORY (INHALATION) DAILY
Status: DISCONTINUED | OUTPATIENT
Start: 2019-05-28 | End: 2019-05-31 | Stop reason: HOSPADM

## 2019-05-27 RX ADMIN — MECLIZINE HYDROCHLORIDE 25 MG: 25 TABLET ORAL at 12:36

## 2019-05-27 RX ADMIN — OXYCODONE HYDROCHLORIDE 10 MG: 10 TABLET ORAL at 17:00

## 2019-05-27 RX ADMIN — LORAZEPAM 0.5 MG: 0.5 TABLET ORAL at 21:15

## 2019-05-27 RX ADMIN — MONTELUKAST SODIUM 10 MG: 10 TABLET, FILM COATED ORAL at 21:15

## 2019-05-27 RX ADMIN — OXYCODONE HYDROCHLORIDE 10 MG: 10 TABLET ORAL at 23:40

## 2019-05-27 RX ADMIN — LORAZEPAM 0.5 MG: 0.5 TABLET ORAL at 23:17

## 2019-05-27 RX ADMIN — FENTANYL CITRATE 25 MCG: 50 INJECTION, SOLUTION INTRAMUSCULAR; INTRAVENOUS at 12:36

## 2019-05-27 RX ADMIN — LORAZEPAM 0.5 MG: 0.5 TABLET ORAL at 18:03

## 2019-05-28 PROBLEM — Z91.81 STATUS POST FALL: Status: ACTIVE | Noted: 2019-05-28

## 2019-05-28 PROCEDURE — 99225 PR SBSQ OBSERVATION CARE/DAY 25 MINUTES: CPT | Performed by: INTERNAL MEDICINE

## 2019-05-28 PROCEDURE — 94760 N-INVAS EAR/PLS OXIMETRY 1: CPT

## 2019-05-28 RX ADMIN — LORAZEPAM 0.5 MG: 0.5 TABLET ORAL at 01:18

## 2019-05-28 RX ADMIN — LISINOPRIL 2.5 MG: 2.5 TABLET ORAL at 08:52

## 2019-05-28 RX ADMIN — FUROSEMIDE 20 MG: 20 TABLET ORAL at 08:51

## 2019-05-28 RX ADMIN — ACETAMINOPHEN 650 MG: 325 TABLET ORAL at 19:48

## 2019-05-28 RX ADMIN — PANTOPRAZOLE SODIUM 40 MG: 40 TABLET, DELAYED RELEASE ORAL at 08:51

## 2019-05-28 RX ADMIN — LORAZEPAM 0.5 MG: 0.5 TABLET ORAL at 08:51

## 2019-05-28 RX ADMIN — BUPROPION HYDROCHLORIDE 300 MG: 150 TABLET, FILM COATED, EXTENDED RELEASE ORAL at 11:45

## 2019-05-28 RX ADMIN — LORAZEPAM 0.5 MG: 0.5 TABLET ORAL at 19:49

## 2019-05-28 RX ADMIN — METOPROLOL SUCCINATE 25 MG: 25 TABLET, EXTENDED RELEASE ORAL at 08:52

## 2019-05-28 RX ADMIN — LORAZEPAM 0.5 MG: 0.5 TABLET ORAL at 05:19

## 2019-05-28 RX ADMIN — FLUTICASONE FUROATE AND VILANTEROL TRIFENATATE 1 PUFF: 200; 25 POWDER RESPIRATORY (INHALATION) at 08:53

## 2019-05-28 RX ADMIN — LORAZEPAM 0.5 MG: 0.5 TABLET ORAL at 23:42

## 2019-05-28 RX ADMIN — HALOPERIDOL 2 MG: 2 SOLUTION ORAL at 00:35

## 2019-05-29 LAB
GLUCOSE SERPL-MCNC: 428 MG/DL (ref 65–140)
GLUCOSE SERPL-MCNC: 491 MG/DL (ref 65–140)
GLUCOSE SERPL-MCNC: >500 MG/DL (ref 65–140)

## 2019-05-29 PROCEDURE — 82948 REAGENT STRIP/BLOOD GLUCOSE: CPT

## 2019-05-29 PROCEDURE — 99225 PR SBSQ OBSERVATION CARE/DAY 25 MINUTES: CPT | Performed by: PHYSICIAN ASSISTANT

## 2019-05-29 RX ORDER — INSULIN GLARGINE 100 [IU]/ML
10 INJECTION, SOLUTION SUBCUTANEOUS
Status: DISCONTINUED | OUTPATIENT
Start: 2019-05-29 | End: 2019-05-31 | Stop reason: HOSPADM

## 2019-05-29 RX ADMIN — LORAZEPAM 0.5 MG: 0.5 TABLET ORAL at 12:29

## 2019-05-29 RX ADMIN — MONTELUKAST SODIUM 10 MG: 10 TABLET, FILM COATED ORAL at 22:40

## 2019-05-29 RX ADMIN — LORAZEPAM 0.5 MG: 0.5 TABLET ORAL at 02:24

## 2019-05-29 RX ADMIN — INSULIN LISPRO 10 UNITS: 100 INJECTION, SOLUTION INTRAVENOUS; SUBCUTANEOUS at 12:29

## 2019-05-29 RX ADMIN — HALOPERIDOL 2 MG: 2 SOLUTION ORAL at 01:10

## 2019-05-29 RX ADMIN — INSULIN LISPRO 4 UNITS: 100 INJECTION, SOLUTION INTRAVENOUS; SUBCUTANEOUS at 23:30

## 2019-05-29 RX ADMIN — LORAZEPAM 0.5 MG: 0.5 TABLET ORAL at 14:47

## 2019-05-29 RX ADMIN — OXYCODONE HYDROCHLORIDE 10 MG: 10 TABLET ORAL at 22:32

## 2019-05-29 RX ADMIN — INSULIN LISPRO 5 UNITS: 100 INJECTION, SOLUTION INTRAVENOUS; SUBCUTANEOUS at 19:48

## 2019-05-29 RX ADMIN — INSULIN GLARGINE 10 UNITS: 100 INJECTION, SOLUTION SUBCUTANEOUS at 22:40

## 2019-05-29 RX ADMIN — LORAZEPAM 0.5 MG: 0.5 TABLET ORAL at 21:31

## 2019-05-29 RX ADMIN — OXYCODONE HYDROCHLORIDE 10 MG: 10 TABLET ORAL at 15:42

## 2019-05-29 RX ADMIN — BUPROPION HYDROCHLORIDE 300 MG: 150 TABLET, FILM COATED, EXTENDED RELEASE ORAL at 12:30

## 2019-05-30 LAB
GLUCOSE SERPL-MCNC: 324 MG/DL (ref 65–140)
GLUCOSE SERPL-MCNC: 392 MG/DL (ref 65–140)
GLUCOSE SERPL-MCNC: 454 MG/DL (ref 65–140)
GLUCOSE SERPL-MCNC: 474 MG/DL (ref 65–140)

## 2019-05-30 PROCEDURE — 99225 PR SBSQ OBSERVATION CARE/DAY 25 MINUTES: CPT | Performed by: PHYSICIAN ASSISTANT

## 2019-05-30 PROCEDURE — 82948 REAGENT STRIP/BLOOD GLUCOSE: CPT

## 2019-05-30 RX ADMIN — OXYCODONE HYDROCHLORIDE 10 MG: 10 TABLET ORAL at 06:28

## 2019-05-30 RX ADMIN — FUROSEMIDE 20 MG: 20 TABLET ORAL at 08:48

## 2019-05-30 RX ADMIN — LORAZEPAM 0.5 MG: 0.5 TABLET ORAL at 14:32

## 2019-05-30 RX ADMIN — INSULIN LISPRO 5 UNITS: 100 INJECTION, SOLUTION INTRAVENOUS; SUBCUTANEOUS at 17:18

## 2019-05-30 RX ADMIN — LORAZEPAM 0.5 MG: 0.5 TABLET ORAL at 06:28

## 2019-05-30 RX ADMIN — FLUTICASONE FUROATE AND VILANTEROL TRIFENATATE 1 PUFF: 200; 25 POWDER RESPIRATORY (INHALATION) at 08:48

## 2019-05-30 RX ADMIN — INSULIN LISPRO 5 UNITS: 100 INJECTION, SOLUTION INTRAVENOUS; SUBCUTANEOUS at 11:39

## 2019-05-30 RX ADMIN — BUPROPION HYDROCHLORIDE 300 MG: 150 TABLET, FILM COATED, EXTENDED RELEASE ORAL at 11:30

## 2019-05-30 RX ADMIN — MONTELUKAST SODIUM 10 MG: 10 TABLET, FILM COATED ORAL at 21:52

## 2019-05-30 RX ADMIN — LORAZEPAM 0.5 MG: 0.5 TABLET ORAL at 21:52

## 2019-05-30 RX ADMIN — PANTOPRAZOLE SODIUM 40 MG: 40 TABLET, DELAYED RELEASE ORAL at 08:49

## 2019-05-30 RX ADMIN — LORAZEPAM 0.5 MG: 0.5 TABLET ORAL at 11:05

## 2019-05-30 RX ADMIN — INSULIN LISPRO 3 UNITS: 100 INJECTION, SOLUTION INTRAVENOUS; SUBCUTANEOUS at 08:00

## 2019-05-31 VITALS
BODY MASS INDEX: 25.71 KG/M2 | WEIGHT: 130.95 LBS | SYSTOLIC BLOOD PRESSURE: 106 MMHG | DIASTOLIC BLOOD PRESSURE: 53 MMHG | HEIGHT: 60 IN | OXYGEN SATURATION: 99 % | TEMPERATURE: 98.2 F | HEART RATE: 77 BPM | RESPIRATION RATE: 20 BRPM

## 2019-05-31 LAB
GLUCOSE SERPL-MCNC: 476 MG/DL (ref 65–140)
GLUCOSE SERPL-MCNC: >500 MG/DL (ref 65–140)

## 2019-05-31 PROCEDURE — 82948 REAGENT STRIP/BLOOD GLUCOSE: CPT

## 2019-05-31 PROCEDURE — 94760 N-INVAS EAR/PLS OXIMETRY 1: CPT

## 2019-05-31 PROCEDURE — 99217 PR OBSERVATION CARE DISCHARGE MANAGEMENT: CPT | Performed by: PHYSICIAN ASSISTANT

## 2019-05-31 RX ORDER — LORAZEPAM 0.5 MG/1
0.5 TABLET ORAL EVERY 2 HOUR PRN
Qty: 20 TABLET | Refills: 0 | Status: SHIPPED | OUTPATIENT
Start: 2019-05-31 | End: 2019-05-31

## 2019-05-31 RX ORDER — LORAZEPAM 0.5 MG/1
0.5 TABLET ORAL EVERY 2 HOUR PRN
Qty: 20 TABLET | Refills: 0 | Status: SHIPPED | OUTPATIENT
Start: 2019-05-31 | End: 2019-07-08 | Stop reason: HOSPADM

## 2019-05-31 RX ORDER — INSULIN GLARGINE 100 [IU]/ML
20 INJECTION, SOLUTION SUBCUTANEOUS
Refills: 0
Start: 2019-05-31 | End: 2019-06-01

## 2019-05-31 RX ADMIN — OXYCODONE HYDROCHLORIDE 10 MG: 10 TABLET ORAL at 09:46

## 2019-05-31 RX ADMIN — INSULIN LISPRO 5 UNITS: 100 INJECTION, SOLUTION INTRAVENOUS; SUBCUTANEOUS at 09:51

## 2019-05-31 RX ADMIN — PANTOPRAZOLE SODIUM 40 MG: 40 TABLET, DELAYED RELEASE ORAL at 09:46

## 2019-05-31 RX ADMIN — BUPROPION HYDROCHLORIDE 300 MG: 150 TABLET, FILM COATED, EXTENDED RELEASE ORAL at 09:50

## 2019-05-31 RX ADMIN — LORAZEPAM 0.5 MG: 0.5 TABLET ORAL at 09:46

## 2019-05-31 RX ADMIN — OXYCODONE HYDROCHLORIDE 10 MG: 10 TABLET ORAL at 14:00

## 2019-05-31 RX ADMIN — LORAZEPAM 0.5 MG: 0.5 TABLET ORAL at 14:00

## 2019-05-31 RX ADMIN — FUROSEMIDE 20 MG: 20 TABLET ORAL at 09:46

## 2019-05-31 RX ADMIN — INSULIN LISPRO 5 UNITS: 100 INJECTION, SOLUTION INTRAVENOUS; SUBCUTANEOUS at 11:03

## 2019-05-31 RX ADMIN — OXYCODONE HYDROCHLORIDE 10 MG: 10 TABLET ORAL at 03:01

## 2019-05-31 RX ADMIN — LORAZEPAM 0.5 MG: 0.5 TABLET ORAL at 01:55

## 2019-05-31 RX ADMIN — LORAZEPAM 0.5 MG: 0.5 TABLET ORAL at 06:25

## 2019-05-31 RX ADMIN — FLUTICASONE FUROATE AND VILANTEROL TRIFENATATE 1 PUFF: 200; 25 POWDER RESPIRATORY (INHALATION) at 09:56

## 2019-06-01 ENCOUNTER — HOSPITAL ENCOUNTER (INPATIENT)
Facility: HOSPITAL | Age: 74
LOS: 3 days | DRG: 947 | End: 2019-06-04
Attending: EMERGENCY MEDICINE | Admitting: FAMILY MEDICINE
Payer: MEDICARE

## 2019-06-01 ENCOUNTER — TELEPHONE (OUTPATIENT)
Dept: PALLIATIVE MEDICINE | Facility: HOSPITAL | Age: 74
End: 2019-06-01

## 2019-06-01 DIAGNOSIS — Z51.5 HOSPICE CARE PATIENT: ICD-10-CM

## 2019-06-01 DIAGNOSIS — R73.9 HYPERGLYCEMIA: Primary | ICD-10-CM

## 2019-06-01 DIAGNOSIS — F31.9 BIPOLAR 1 DISORDER (HCC): Chronic | ICD-10-CM

## 2019-06-01 DIAGNOSIS — R62.7 ADULT FAILURE TO THRIVE SYNDROME: ICD-10-CM

## 2019-06-01 DIAGNOSIS — E86.0 DEHYDRATION: ICD-10-CM

## 2019-06-01 DIAGNOSIS — Z91.19 NONCOMPLIANCE BY REFUSING INTERVENTION OR SUPPORT: ICD-10-CM

## 2019-06-01 DIAGNOSIS — F31.9 BIPOLAR 1 DISORDER (HCC): Primary | Chronic | ICD-10-CM

## 2019-06-01 PROBLEM — N18.6 ESRD (END STAGE RENAL DISEASE) (HCC): Status: ACTIVE | Noted: 2019-06-01

## 2019-06-01 PROBLEM — Z78.9 MEDICALLY COMPLEX PATIENT: Status: ACTIVE | Noted: 2019-06-01

## 2019-06-01 LAB
ALBUMIN SERPL BCP-MCNC: 2.7 G/DL (ref 3–5.2)
ALP SERPL-CCNC: 117 U/L (ref 43–122)
ALT SERPL W P-5'-P-CCNC: 27 U/L (ref 9–52)
ANION GAP SERPL CALCULATED.3IONS-SCNC: 5 MMOL/L (ref 5–14)
AST SERPL W P-5'-P-CCNC: 30 U/L (ref 14–36)
BASOPHILS # BLD AUTO: 0 THOUSANDS/ΜL (ref 0–0.1)
BASOPHILS NFR BLD AUTO: 1 % (ref 0–1)
BILIRUB SERPL-MCNC: 2 MG/DL
BUN SERPL-MCNC: 9 MG/DL (ref 5–25)
CALCIUM SERPL-MCNC: 8.8 MG/DL (ref 8.4–10.2)
CHLORIDE SERPL-SCNC: 100 MMOL/L (ref 97–108)
CO2 SERPL-SCNC: 29 MMOL/L (ref 22–30)
CREAT SERPL-MCNC: 0.45 MG/DL (ref 0.6–1.2)
EOSINOPHIL # BLD AUTO: 0.1 THOUSAND/ΜL (ref 0–0.4)
EOSINOPHIL NFR BLD AUTO: 2 % (ref 0–6)
ERYTHROCYTE [DISTWIDTH] IN BLOOD BY AUTOMATED COUNT: 17.2 %
ETHANOL SERPL-MCNC: <10 MG/DL (ref 0–10)
GFR SERPL CREATININE-BSD FRML MDRD: 99 ML/MIN/1.73SQ M
GLUCOSE SERPL-MCNC: 311 MG/DL (ref 70–99)
GLUCOSE SERPL-MCNC: >500 MG/DL (ref 65–140)
HCT VFR BLD AUTO: 34.5 % (ref 36–46)
HGB BLD-MCNC: 11.5 G/DL (ref 12–16)
LYMPHOCYTES # BLD AUTO: 0.5 THOUSANDS/ΜL (ref 0.5–4)
LYMPHOCYTES NFR BLD AUTO: 18 % (ref 25–45)
MCH RBC QN AUTO: 31.4 PG (ref 26–34)
MCHC RBC AUTO-ENTMCNC: 33.3 G/DL (ref 31–36)
MCV RBC AUTO: 94 FL (ref 80–100)
MONOCYTES # BLD AUTO: 0.3 THOUSAND/ΜL (ref 0.2–0.9)
MONOCYTES NFR BLD AUTO: 10 % (ref 1–10)
NEUTROPHILS # BLD AUTO: 2.1 THOUSANDS/ΜL (ref 1.8–7.8)
NEUTS SEG NFR BLD AUTO: 69 % (ref 45–65)
PLATELET # BLD AUTO: 71 THOUSANDS/UL (ref 150–450)
PLATELET BLD QL SMEAR: ABNORMAL
PMV BLD AUTO: 9.7 FL (ref 8.9–12.7)
POTASSIUM SERPL-SCNC: 3.8 MMOL/L (ref 3.6–5)
PROT SERPL-MCNC: 6.1 G/DL (ref 5.9–8.4)
RBC # BLD AUTO: 3.67 MILLION/UL (ref 4–5.2)
RBC MORPH BLD: NORMAL
SODIUM SERPL-SCNC: 134 MMOL/L (ref 137–147)
TROPONIN I SERPL-MCNC: 0.03 NG/ML (ref 0–0.03)
TSH SERPL DL<=0.05 MIU/L-ACNC: 0.77 UIU/ML (ref 0.47–4.68)
WBC # BLD AUTO: 3 THOUSAND/UL (ref 4.5–11)

## 2019-06-01 PROCEDURE — 84484 ASSAY OF TROPONIN QUANT: CPT | Performed by: EMERGENCY MEDICINE

## 2019-06-01 PROCEDURE — 80053 COMPREHEN METABOLIC PANEL: CPT | Performed by: EMERGENCY MEDICINE

## 2019-06-01 PROCEDURE — 82948 REAGENT STRIP/BLOOD GLUCOSE: CPT

## 2019-06-01 PROCEDURE — 80320 DRUG SCREEN QUANTALCOHOLS: CPT | Performed by: EMERGENCY MEDICINE

## 2019-06-01 PROCEDURE — 84443 ASSAY THYROID STIM HORMONE: CPT | Performed by: EMERGENCY MEDICINE

## 2019-06-01 PROCEDURE — 99223 1ST HOSP IP/OBS HIGH 75: CPT | Performed by: FAMILY MEDICINE

## 2019-06-01 PROCEDURE — 99285 EMERGENCY DEPT VISIT HI MDM: CPT

## 2019-06-01 PROCEDURE — 99284 EMERGENCY DEPT VISIT MOD MDM: CPT | Performed by: EMERGENCY MEDICINE

## 2019-06-01 PROCEDURE — 36415 COLL VENOUS BLD VENIPUNCTURE: CPT | Performed by: EMERGENCY MEDICINE

## 2019-06-01 PROCEDURE — 85025 COMPLETE CBC W/AUTO DIFF WBC: CPT | Performed by: EMERGENCY MEDICINE

## 2019-06-01 RX ORDER — DOCUSATE SODIUM 100 MG/1
100 CAPSULE, LIQUID FILLED ORAL 2 TIMES DAILY
Status: DISCONTINUED | OUTPATIENT
Start: 2019-06-01 | End: 2019-06-04 | Stop reason: HOSPADM

## 2019-06-01 RX ORDER — BUPROPION HYDROCHLORIDE 300 MG/1
300 TABLET ORAL EVERY MORNING
Status: DISCONTINUED | OUTPATIENT
Start: 2019-06-02 | End: 2019-06-04 | Stop reason: HOSPADM

## 2019-06-01 RX ORDER — ACETAMINOPHEN 325 MG/1
650 TABLET ORAL EVERY 6 HOURS PRN
Status: DISCONTINUED | OUTPATIENT
Start: 2019-06-01 | End: 2019-06-04 | Stop reason: HOSPADM

## 2019-06-01 RX ORDER — LORAZEPAM 0.5 MG/1
1 TABLET ORAL ONCE
Status: COMPLETED | OUTPATIENT
Start: 2019-06-01 | End: 2019-06-01

## 2019-06-01 RX ORDER — BUPROPION HYDROCHLORIDE 150 MG/1
150 TABLET, EXTENDED RELEASE ORAL ONCE
Status: COMPLETED | OUTPATIENT
Start: 2019-06-01 | End: 2019-06-01

## 2019-06-01 RX ORDER — ONDANSETRON 2 MG/ML
4 INJECTION INTRAMUSCULAR; INTRAVENOUS EVERY 6 HOURS PRN
Status: DISCONTINUED | OUTPATIENT
Start: 2019-06-01 | End: 2019-06-02

## 2019-06-01 RX ORDER — LORAZEPAM 0.5 MG/1
0.5 TABLET ORAL EVERY 2 HOUR PRN
Status: DISCONTINUED | OUTPATIENT
Start: 2019-06-01 | End: 2019-06-04 | Stop reason: HOSPADM

## 2019-06-01 RX ADMIN — LORAZEPAM 0.5 MG: 0.5 TABLET ORAL at 22:15

## 2019-06-01 RX ADMIN — BUPROPION HYDROCHLORIDE 150 MG: 150 TABLET, EXTENDED RELEASE ORAL at 22:15

## 2019-06-01 RX ADMIN — LORAZEPAM 0.5 MG: 0.5 TABLET ORAL at 19:28

## 2019-06-01 RX ADMIN — INSULIN DETEMIR 40 UNITS: 100 INJECTION, SOLUTION SUBCUTANEOUS at 20:24

## 2019-06-01 RX ADMIN — INSULIN LISPRO 8 UNITS: 100 INJECTION, SOLUTION INTRAVENOUS; SUBCUTANEOUS at 21:55

## 2019-06-01 RX ADMIN — LORAZEPAM 1 MG: 0.5 TABLET ORAL at 16:03

## 2019-06-01 RX ADMIN — ACETAMINOPHEN 650 MG: 325 TABLET ORAL at 19:28

## 2019-06-02 PROBLEM — R18.8 CIRRHOSIS OF LIVER WITH ASCITES (HCC): Chronic | Status: ACTIVE | Noted: 2017-10-19

## 2019-06-02 LAB
GLUCOSE SERPL-MCNC: 167 MG/DL (ref 65–140)
GLUCOSE SERPL-MCNC: 197 MG/DL (ref 65–140)
GLUCOSE SERPL-MCNC: 242 MG/DL (ref 65–140)
GLUCOSE SERPL-MCNC: 329 MG/DL (ref 65–140)
GLUCOSE SERPL-MCNC: 341 MG/DL (ref 65–140)

## 2019-06-02 PROCEDURE — 82948 REAGENT STRIP/BLOOD GLUCOSE: CPT

## 2019-06-02 PROCEDURE — 99232 SBSQ HOSP IP/OBS MODERATE 35: CPT | Performed by: FAMILY MEDICINE

## 2019-06-02 RX ORDER — ONDANSETRON 4 MG/1
4 TABLET, ORALLY DISINTEGRATING ORAL EVERY 6 HOURS PRN
Status: DISCONTINUED | OUTPATIENT
Start: 2019-06-02 | End: 2019-06-04 | Stop reason: HOSPADM

## 2019-06-02 RX ORDER — OLANZAPINE 2.5 MG/1
2.5 TABLET ORAL
Status: DISCONTINUED | OUTPATIENT
Start: 2019-06-02 | End: 2019-06-04 | Stop reason: HOSPADM

## 2019-06-02 RX ORDER — SPIRONOLACTONE 25 MG/1
25 TABLET ORAL DAILY
Status: DISCONTINUED | OUTPATIENT
Start: 2019-06-02 | End: 2019-06-04 | Stop reason: HOSPADM

## 2019-06-02 RX ORDER — POTASSIUM CHLORIDE 20 MEQ/1
20 TABLET, EXTENDED RELEASE ORAL ONCE
Status: COMPLETED | OUTPATIENT
Start: 2019-06-02 | End: 2019-06-02

## 2019-06-02 RX ORDER — TRAMADOL HYDROCHLORIDE 50 MG/1
50 TABLET ORAL ONCE
Status: COMPLETED | OUTPATIENT
Start: 2019-06-02 | End: 2019-06-02

## 2019-06-02 RX ADMIN — LORAZEPAM 0.5 MG: 0.5 TABLET ORAL at 00:27

## 2019-06-02 RX ADMIN — ACETAMINOPHEN 650 MG: 325 TABLET ORAL at 21:21

## 2019-06-02 RX ADMIN — POTASSIUM CHLORIDE 20 MEQ: 20 TABLET, EXTENDED RELEASE ORAL at 00:27

## 2019-06-02 RX ADMIN — INSULIN DETEMIR 20 UNITS: 100 INJECTION, SOLUTION SUBCUTANEOUS at 20:31

## 2019-06-02 RX ADMIN — BUPROPION HYDROCHLORIDE 300 MG: 300 TABLET, FILM COATED, EXTENDED RELEASE ORAL at 09:33

## 2019-06-02 RX ADMIN — LORAZEPAM 0.5 MG: 0.5 TABLET ORAL at 22:35

## 2019-06-02 RX ADMIN — TRAMADOL HYDROCHLORIDE 50 MG: 50 TABLET, COATED ORAL at 00:27

## 2019-06-02 RX ADMIN — ACETAMINOPHEN 650 MG: 325 TABLET ORAL at 15:45

## 2019-06-02 RX ADMIN — OLANZAPINE 2.5 MG: 2.5 TABLET, FILM COATED ORAL at 21:21

## 2019-06-02 RX ADMIN — LORAZEPAM 0.5 MG: 0.5 TABLET ORAL at 12:13

## 2019-06-02 RX ADMIN — LORAZEPAM 0.5 MG: 0.5 TABLET ORAL at 04:20

## 2019-06-02 RX ADMIN — DOCUSATE SODIUM 100 MG: 100 CAPSULE, LIQUID FILLED ORAL at 17:20

## 2019-06-02 RX ADMIN — SPIRONOLACTONE 25 MG: 25 TABLET ORAL at 13:26

## 2019-06-02 RX ADMIN — LORAZEPAM 0.5 MG: 0.5 TABLET ORAL at 15:45

## 2019-06-02 RX ADMIN — ONDANSETRON 4 MG: 4 TABLET, ORALLY DISINTEGRATING ORAL at 15:31

## 2019-06-02 RX ADMIN — INSULIN LISPRO 2 UNITS: 100 INJECTION, SOLUTION INTRAVENOUS; SUBCUTANEOUS at 13:26

## 2019-06-02 RX ADMIN — LORAZEPAM 0.5 MG: 0.5 TABLET ORAL at 09:32

## 2019-06-02 RX ADMIN — INSULIN LISPRO 4 UNITS: 100 INJECTION, SOLUTION INTRAVENOUS; SUBCUTANEOUS at 16:44

## 2019-06-03 ENCOUNTER — APPOINTMENT (INPATIENT)
Dept: NON INVASIVE DIAGNOSTICS | Facility: HOSPITAL | Age: 74
DRG: 947 | End: 2019-06-03
Payer: MEDICARE

## 2019-06-03 LAB
GLUCOSE SERPL-MCNC: 173 MG/DL (ref 65–140)
GLUCOSE SERPL-MCNC: 178 MG/DL (ref 65–140)
GLUCOSE SERPL-MCNC: 180 MG/DL (ref 65–140)
GLUCOSE SERPL-MCNC: 269 MG/DL (ref 65–140)
GLUCOSE SERPL-MCNC: 294 MG/DL (ref 65–140)

## 2019-06-03 PROCEDURE — 93970 EXTREMITY STUDY: CPT

## 2019-06-03 PROCEDURE — 82948 REAGENT STRIP/BLOOD GLUCOSE: CPT

## 2019-06-03 PROCEDURE — 93970 EXTREMITY STUDY: CPT | Performed by: SURGERY

## 2019-06-03 PROCEDURE — 99232 SBSQ HOSP IP/OBS MODERATE 35: CPT | Performed by: FAMILY MEDICINE

## 2019-06-03 RX ORDER — TRAMADOL HYDROCHLORIDE 50 MG/1
50 TABLET ORAL ONCE
Status: COMPLETED | OUTPATIENT
Start: 2019-06-03 | End: 2019-06-03

## 2019-06-03 RX ADMIN — LORAZEPAM 0.5 MG: 0.5 TABLET ORAL at 23:28

## 2019-06-03 RX ADMIN — SPIRONOLACTONE 25 MG: 25 TABLET ORAL at 13:32

## 2019-06-03 RX ADMIN — BUPROPION HYDROCHLORIDE 300 MG: 300 TABLET, FILM COATED, EXTENDED RELEASE ORAL at 13:40

## 2019-06-03 RX ADMIN — ACETAMINOPHEN 650 MG: 325 TABLET ORAL at 13:32

## 2019-06-03 RX ADMIN — OLANZAPINE 2.5 MG: 2.5 TABLET, FILM COATED ORAL at 21:44

## 2019-06-03 RX ADMIN — LORAZEPAM 0.5 MG: 0.5 TABLET ORAL at 01:19

## 2019-06-03 RX ADMIN — INSULIN DETEMIR 20 UNITS: 100 INJECTION, SOLUTION SUBCUTANEOUS at 13:35

## 2019-06-03 RX ADMIN — INSULIN LISPRO 2 UNITS: 100 INJECTION, SOLUTION INTRAVENOUS; SUBCUTANEOUS at 13:37

## 2019-06-03 RX ADMIN — LORAZEPAM 0.5 MG: 0.5 TABLET ORAL at 14:30

## 2019-06-03 RX ADMIN — INSULIN DETEMIR 20 UNITS: 100 INJECTION, SOLUTION SUBCUTANEOUS at 21:44

## 2019-06-03 RX ADMIN — TRAMADOL HYDROCHLORIDE 50 MG: 50 TABLET, COATED ORAL at 01:43

## 2019-06-03 RX ADMIN — DOCUSATE SODIUM 100 MG: 100 CAPSULE, LIQUID FILLED ORAL at 17:39

## 2019-06-03 RX ADMIN — INSULIN LISPRO 2 UNITS: 100 INJECTION, SOLUTION INTRAVENOUS; SUBCUTANEOUS at 17:39

## 2019-06-04 ENCOUNTER — HOSPITAL ENCOUNTER (INPATIENT)
Facility: HOSPITAL | Age: 74
LOS: 32 days | Discharge: NURSING, REHAB OR LONG-TERM CARE | DRG: 885 | End: 2019-07-08
Attending: PSYCHIATRY & NEUROLOGY | Admitting: PSYCHIATRY & NEUROLOGY
Payer: COMMERCIAL

## 2019-06-04 VITALS
BODY MASS INDEX: 32.34 KG/M2 | DIASTOLIC BLOOD PRESSURE: 64 MMHG | WEIGHT: 149.91 LBS | SYSTOLIC BLOOD PRESSURE: 117 MMHG | RESPIRATION RATE: 18 BRPM | OXYGEN SATURATION: 100 % | HEIGHT: 57 IN | TEMPERATURE: 97.3 F | HEART RATE: 77 BPM

## 2019-06-04 DIAGNOSIS — E11.65 TYPE 2 DIABETES MELLITUS WITH HYPERGLYCEMIA, WITH LONG-TERM CURRENT USE OF INSULIN (HCC): ICD-10-CM

## 2019-06-04 DIAGNOSIS — K74.60 CIRRHOSIS OF LIVER WITH ASCITES, UNSPECIFIED HEPATIC CIRRHOSIS TYPE (HCC): Chronic | ICD-10-CM

## 2019-06-04 DIAGNOSIS — E78.5 HYPERLIPIDEMIA, UNSPECIFIED HYPERLIPIDEMIA TYPE: ICD-10-CM

## 2019-06-04 DIAGNOSIS — R18.8 CIRRHOSIS OF LIVER WITH ASCITES, UNSPECIFIED HEPATIC CIRRHOSIS TYPE (HCC): Chronic | ICD-10-CM

## 2019-06-04 DIAGNOSIS — Z79.4 TYPE 2 DIABETES MELLITUS WITH HYPERGLYCEMIA, WITH LONG-TERM CURRENT USE OF INSULIN (HCC): ICD-10-CM

## 2019-06-04 DIAGNOSIS — Z79.899 MEDICATION MANAGEMENT: ICD-10-CM

## 2019-06-04 DIAGNOSIS — F31.9 BIPOLAR 1 DISORDER (HCC): Primary | Chronic | ICD-10-CM

## 2019-06-04 LAB
ALBUMIN SERPL BCP-MCNC: 2.5 G/DL (ref 3–5.2)
ALP SERPL-CCNC: 101 U/L (ref 43–122)
ALT SERPL W P-5'-P-CCNC: 25 U/L (ref 9–52)
ANION GAP SERPL CALCULATED.3IONS-SCNC: 4 MMOL/L (ref 5–14)
ANISOCYTOSIS BLD QL SMEAR: PRESENT
AST SERPL W P-5'-P-CCNC: 44 U/L (ref 14–36)
BASOPHILS # BLD AUTO: 0.1 THOUSAND/UL (ref 0–0.1)
BASOPHILS NFR MAR MANUAL: 3 % (ref 0–1)
BILIRUB SERPL-MCNC: 1.2 MG/DL
BUN SERPL-MCNC: 9 MG/DL (ref 5–25)
CALCIUM SERPL-MCNC: 8.6 MG/DL (ref 8.4–10.2)
CHLORIDE SERPL-SCNC: 107 MMOL/L (ref 97–108)
CO2 SERPL-SCNC: 27 MMOL/L (ref 22–30)
CREAT SERPL-MCNC: 0.47 MG/DL (ref 0.6–1.2)
EOSINOPHIL # BLD AUTO: 0.31 THOUSAND/UL (ref 0–0.4)
EOSINOPHIL NFR BLD MANUAL: 9 % (ref 0–6)
ERYTHROCYTE [DISTWIDTH] IN BLOOD BY AUTOMATED COUNT: 17.4 %
GFR SERPL CREATININE-BSD FRML MDRD: 98 ML/MIN/1.73SQ M
GLUCOSE SERPL-MCNC: 102 MG/DL (ref 70–99)
GLUCOSE SERPL-MCNC: 112 MG/DL (ref 65–140)
GLUCOSE SERPL-MCNC: 231 MG/DL (ref 65–140)
GLUCOSE SERPL-MCNC: 261 MG/DL (ref 65–140)
GLUCOSE SERPL-MCNC: 273 MG/DL (ref 65–140)
HCT VFR BLD AUTO: 33.6 % (ref 36–46)
HGB BLD-MCNC: 11 G/DL (ref 12–16)
INR PPP: 1.26 (ref 0.89–1.1)
LYMPHOCYTES # BLD AUTO: 0.95 THOUSAND/UL (ref 0.5–4)
LYMPHOCYTES # BLD AUTO: 28 % (ref 25–45)
MCH RBC QN AUTO: 31.3 PG (ref 26–34)
MCHC RBC AUTO-ENTMCNC: 32.9 G/DL (ref 31–36)
MCV RBC AUTO: 95 FL (ref 80–100)
MONOCYTES # BLD AUTO: 0.37 THOUSAND/UL (ref 0.2–0.9)
MONOCYTES NFR BLD AUTO: 11 % (ref 1–10)
NEUTS BAND NFR BLD MANUAL: 1 % (ref 0–8)
NEUTS SEG # BLD: 1.67 THOUSAND/UL (ref 1.8–7.8)
NEUTS SEG NFR BLD AUTO: 48 %
PLATELET # BLD AUTO: 76 THOUSANDS/UL (ref 150–450)
PLATELET BLD QL SMEAR: ABNORMAL
PMV BLD AUTO: 9.4 FL (ref 8.9–12.7)
POTASSIUM SERPL-SCNC: 3.6 MMOL/L (ref 3.6–5)
PROT SERPL-MCNC: 5.7 G/DL (ref 5.9–8.4)
PROTHROMBIN TIME: 13.2 SECONDS (ref 9.5–11.6)
RBC # BLD AUTO: 3.53 MILLION/UL (ref 4–5.2)
RBC MORPH BLD: ABNORMAL
SODIUM SERPL-SCNC: 138 MMOL/L (ref 137–147)
TOTAL CELLS COUNTED SPEC: 100
WBC # BLD AUTO: 3.4 THOUSAND/UL (ref 4.5–11)

## 2019-06-04 PROCEDURE — 82948 REAGENT STRIP/BLOOD GLUCOSE: CPT

## 2019-06-04 PROCEDURE — 80053 COMPREHEN METABOLIC PANEL: CPT | Performed by: FAMILY MEDICINE

## 2019-06-04 PROCEDURE — 85610 PROTHROMBIN TIME: CPT | Performed by: FAMILY MEDICINE

## 2019-06-04 PROCEDURE — 85007 BL SMEAR W/DIFF WBC COUNT: CPT | Performed by: FAMILY MEDICINE

## 2019-06-04 PROCEDURE — 85027 COMPLETE CBC AUTOMATED: CPT | Performed by: FAMILY MEDICINE

## 2019-06-04 PROCEDURE — 99239 HOSP IP/OBS DSCHRG MGMT >30: CPT | Performed by: FAMILY MEDICINE

## 2019-06-04 RX ORDER — OLANZAPINE 10 MG/1
5 INJECTION, POWDER, LYOPHILIZED, FOR SOLUTION INTRAMUSCULAR EVERY 4 HOURS PRN
Status: DISCONTINUED | OUTPATIENT
Start: 2019-06-04 | End: 2019-06-19

## 2019-06-04 RX ORDER — CALCITONIN SALMON 200 [IU]/.09ML
1 SPRAY, METERED NASAL DAILY
Status: DISCONTINUED | OUTPATIENT
Start: 2019-06-04 | End: 2019-06-04 | Stop reason: HOSPADM

## 2019-06-04 RX ORDER — CALCITONIN SALMON 200 [IU]/.09ML
1 SPRAY, METERED NASAL DAILY
Status: CANCELLED | OUTPATIENT
Start: 2019-06-05

## 2019-06-04 RX ORDER — ACETAMINOPHEN 325 MG/1
650 TABLET ORAL EVERY 6 HOURS PRN
Status: DISCONTINUED | OUTPATIENT
Start: 2019-06-04 | End: 2019-06-09

## 2019-06-04 RX ORDER — ONDANSETRON 4 MG/1
4 TABLET, ORALLY DISINTEGRATING ORAL EVERY 6 HOURS PRN
Status: CANCELLED | OUTPATIENT
Start: 2019-06-04

## 2019-06-04 RX ORDER — LIDOCAINE 50 MG/G
1 PATCH TOPICAL DAILY
Status: CANCELLED | OUTPATIENT
Start: 2019-06-04 | End: 2019-06-05

## 2019-06-04 RX ORDER — NICOTINE 21 MG/24HR
1 PATCH, TRANSDERMAL 24 HOURS TRANSDERMAL DAILY
Status: DISCONTINUED | OUTPATIENT
Start: 2019-06-04 | End: 2019-06-04 | Stop reason: HOSPADM

## 2019-06-04 RX ORDER — FUROSEMIDE 20 MG/1
20 TABLET ORAL DAILY
Status: CANCELLED | OUTPATIENT
Start: 2019-06-05

## 2019-06-04 RX ORDER — DOCUSATE SODIUM 100 MG/1
100 CAPSULE, LIQUID FILLED ORAL 2 TIMES DAILY
Status: CANCELLED | OUTPATIENT
Start: 2019-06-04

## 2019-06-04 RX ORDER — FUROSEMIDE 20 MG/1
20 TABLET ORAL DAILY
Status: DISCONTINUED | OUTPATIENT
Start: 2019-06-04 | End: 2019-06-04 | Stop reason: HOSPADM

## 2019-06-04 RX ORDER — OLANZAPINE 2.5 MG/1
2.5 TABLET ORAL
Status: DISCONTINUED | OUTPATIENT
Start: 2019-06-04 | End: 2019-06-05

## 2019-06-04 RX ORDER — SPIRONOLACTONE 25 MG/1
25 TABLET ORAL DAILY
Status: CANCELLED | OUTPATIENT
Start: 2019-06-05

## 2019-06-04 RX ORDER — FUROSEMIDE 20 MG/1
20 TABLET ORAL DAILY
Status: DISCONTINUED | OUTPATIENT
Start: 2019-06-05 | End: 2019-06-14

## 2019-06-04 RX ORDER — LIDOCAINE 50 MG/G
1 PATCH TOPICAL DAILY
Status: COMPLETED | OUTPATIENT
Start: 2019-06-04 | End: 2019-06-05

## 2019-06-04 RX ORDER — NICOTINE 21 MG/24HR
1 PATCH, TRANSDERMAL 24 HOURS TRANSDERMAL DAILY
Status: CANCELLED | OUTPATIENT
Start: 2019-06-05

## 2019-06-04 RX ORDER — NICOTINE 21 MG/24HR
1 PATCH, TRANSDERMAL 24 HOURS TRANSDERMAL DAILY
Status: DISCONTINUED | OUTPATIENT
Start: 2019-06-05 | End: 2019-06-06

## 2019-06-04 RX ORDER — CALCITONIN SALMON 200 [IU]/.09ML
1 SPRAY, METERED NASAL DAILY
Status: DISCONTINUED | OUTPATIENT
Start: 2019-06-05 | End: 2019-06-06

## 2019-06-04 RX ORDER — LORAZEPAM 2 MG/ML
1 INJECTION INTRAMUSCULAR EVERY 6 HOURS PRN
Status: DISCONTINUED | OUTPATIENT
Start: 2019-06-04 | End: 2019-06-19

## 2019-06-04 RX ORDER — OLANZAPINE 5 MG/1
5 TABLET ORAL EVERY 4 HOURS PRN
Status: DISCONTINUED | OUTPATIENT
Start: 2019-06-04 | End: 2019-07-08 | Stop reason: HOSPADM

## 2019-06-04 RX ORDER — DOCUSATE SODIUM 100 MG/1
100 CAPSULE, LIQUID FILLED ORAL 2 TIMES DAILY
Status: DISCONTINUED | OUTPATIENT
Start: 2019-06-04 | End: 2019-07-08 | Stop reason: HOSPADM

## 2019-06-04 RX ORDER — ACETAMINOPHEN 325 MG/1
650 TABLET ORAL EVERY 6 HOURS PRN
Status: CANCELLED | OUTPATIENT
Start: 2019-06-04

## 2019-06-04 RX ORDER — LIDOCAINE 50 MG/G
1 PATCH TOPICAL DAILY
Status: DISCONTINUED | OUTPATIENT
Start: 2019-06-04 | End: 2019-06-04 | Stop reason: HOSPADM

## 2019-06-04 RX ORDER — SPIRONOLACTONE 25 MG/1
25 TABLET ORAL DAILY
Status: DISCONTINUED | OUTPATIENT
Start: 2019-06-05 | End: 2019-06-19

## 2019-06-04 RX ORDER — BUPROPION HYDROCHLORIDE 300 MG/1
300 TABLET ORAL DAILY
Status: DISCONTINUED | OUTPATIENT
Start: 2019-06-05 | End: 2019-06-05

## 2019-06-04 RX ORDER — ALBUMIN (HUMAN) 12.5 G/50ML
25 SOLUTION INTRAVENOUS ONCE
Status: DISCONTINUED | OUTPATIENT
Start: 2019-06-04 | End: 2019-06-04 | Stop reason: HOSPADM

## 2019-06-04 RX ORDER — ONDANSETRON 4 MG/1
4 TABLET, ORALLY DISINTEGRATING ORAL EVERY 6 HOURS PRN
Status: DISCONTINUED | OUTPATIENT
Start: 2019-06-04 | End: 2019-07-08 | Stop reason: HOSPADM

## 2019-06-04 RX ORDER — LORAZEPAM 1 MG/1
1 TABLET ORAL EVERY 6 HOURS PRN
Status: DISCONTINUED | OUTPATIENT
Start: 2019-06-04 | End: 2019-07-06

## 2019-06-04 RX ADMIN — ACETAMINOPHEN 650 MG: 325 TABLET ORAL at 14:25

## 2019-06-04 RX ADMIN — LORAZEPAM 0.5 MG: 0.5 TABLET ORAL at 02:50

## 2019-06-04 RX ADMIN — INSULIN DETEMIR 20 UNITS: 100 INJECTION, SOLUTION SUBCUTANEOUS at 21:56

## 2019-06-04 RX ADMIN — INSULIN DETEMIR 20 UNITS: 100 INJECTION, SOLUTION SUBCUTANEOUS at 08:53

## 2019-06-04 RX ADMIN — ACETAMINOPHEN 650 MG: 325 TABLET ORAL at 02:50

## 2019-06-04 RX ADMIN — ACETAMINOPHEN 650 MG: 325 TABLET ORAL at 20:03

## 2019-06-04 RX ADMIN — OLANZAPINE 2.5 MG: 2.5 TABLET, FILM COATED ORAL at 21:56

## 2019-06-04 RX ADMIN — INSULIN LISPRO 6 UNITS: 100 INJECTION, SOLUTION INTRAVENOUS; SUBCUTANEOUS at 19:00

## 2019-06-04 RX ADMIN — SPIRONOLACTONE 25 MG: 25 TABLET ORAL at 08:59

## 2019-06-04 RX ADMIN — BUPROPION HYDROCHLORIDE 300 MG: 300 TABLET, FILM COATED, EXTENDED RELEASE ORAL at 08:52

## 2019-06-04 RX ADMIN — LIDOCAINE 1 PATCH: 50 PATCH TOPICAL at 15:45

## 2019-06-04 RX ADMIN — LORAZEPAM 1 MG: 1 TABLET ORAL at 20:04

## 2019-06-05 LAB
GLUCOSE SERPL-MCNC: 176 MG/DL (ref 65–140)
GLUCOSE SERPL-MCNC: 207 MG/DL (ref 65–140)
GLUCOSE SERPL-MCNC: 224 MG/DL (ref 65–140)
GLUCOSE SERPL-MCNC: 55 MG/DL (ref 65–140)
GLUCOSE SERPL-MCNC: 90 MG/DL (ref 65–140)

## 2019-06-05 PROCEDURE — 99255 IP/OBS CONSLTJ NEW/EST HI 80: CPT | Performed by: PHYSICIAN ASSISTANT

## 2019-06-05 PROCEDURE — 97167 OT EVAL HIGH COMPLEX 60 MIN: CPT

## 2019-06-05 PROCEDURE — 82948 REAGENT STRIP/BLOOD GLUCOSE: CPT

## 2019-06-05 RX ORDER — BUPROPION HYDROCHLORIDE 150 MG/1
150 TABLET ORAL DAILY
Status: DISCONTINUED | OUTPATIENT
Start: 2019-06-05 | End: 2019-06-24

## 2019-06-05 RX ORDER — OLANZAPINE 5 MG/1
5 TABLET ORAL
Status: DISCONTINUED | OUTPATIENT
Start: 2019-06-05 | End: 2019-06-06

## 2019-06-05 RX ADMIN — DOCUSATE SODIUM 100 MG: 100 CAPSULE, LIQUID FILLED ORAL at 17:22

## 2019-06-05 RX ADMIN — INSULIN DETEMIR 17 UNITS: 100 INJECTION, SOLUTION SUBCUTANEOUS at 20:44

## 2019-06-05 RX ADMIN — INSULIN DETEMIR 20 UNITS: 100 INJECTION, SOLUTION SUBCUTANEOUS at 09:32

## 2019-06-05 RX ADMIN — BUPROPION HYDROCHLORIDE 150 MG: 150 TABLET, FILM COATED, EXTENDED RELEASE ORAL at 11:41

## 2019-06-05 RX ADMIN — LORAZEPAM 1 MG: 1 TABLET ORAL at 02:33

## 2019-06-05 RX ADMIN — INSULIN LISPRO 4 UNITS: 100 INJECTION, SOLUTION INTRAVENOUS; SUBCUTANEOUS at 12:59

## 2019-06-05 RX ADMIN — WATER 10 ML: 1 INJECTION INTRAMUSCULAR; INTRAVENOUS; SUBCUTANEOUS at 20:34

## 2019-06-05 RX ADMIN — LORAZEPAM 1 MG: 1 TABLET ORAL at 11:41

## 2019-06-05 RX ADMIN — OLANZAPINE 5 MG: 10 INJECTION, POWDER, FOR SOLUTION INTRAMUSCULAR at 20:33

## 2019-06-05 NOTE — OCCUPATIONAL THERAPY NOTE
Occupational Therapy Evaluation      Steph Lopez    [birth year]    Patient Active Problem List   Diagnosis    Type 2 diabetes mellitus, with long-term current use of insulin ([facility]    Hyperlipidemia    Asthma    Anemia of chronic disease    Pancytopenia ([facility]    Bipolar 1 disorder ([facility]    Cirrhosis of liver with ascites ([facility]    Essential hypertension    GERD (gastroesophageal reflux disease)    Venous stasis    Ambulatory dysfunction    Closed compression fracture of L3 lumbar vertebra    Compression fracture of L3 lumbar vertebra    Closed compression fracture of third lumbar vertebra (HCC)    Fall    Diabetic polyneuropathy associated with type 2 diabetes mellitus ([facility]    Iron deficiency anemia    Status post fall - Ambulatory dysfunction    Discharged from Hospice for Suicidal Ideation, Medically Complex    ESRD (end stage renal disease) ([facility] )       Past Medical History:   Diagnosis Date    Asthma     Bipolar 2 disorder ([facility]     Chronic kidney disease     Cirrhosis of liver ([facility]     Diabetes mellitus ([facility]     Elevated troponin 9/2/2018    GERD (gastroesophageal reflux disease)     Hyperlipidemia     Hypertension     Neuropathy     Renal disorder     Restless leg syndrome        Past Surgical History:   Procedure Laterality Date    BACK SURGERY      CHOLECYSTECTOMY      HERNIA REPAIR      REPLACEMENT TOTAL KNEE BILATERAL      TUMOR REMOVAL         Subjective:RE; reason for hospitalization: "I have no idea! They said I fell " She stated that she was living at [facility] prior, but she also admitted that she had been at [place] when asked (she stated that she was there for 2 days)  She stated that she wanted to return to her own home  Prior Level of Function:  She stated that prior to hospitalization, she was living on her own   She stated that she did do her personal care, she walked with a walker (rollator), and that she was doing her own cooking, money management, laundry, housekeeping  She did talk about an aide from [facility] who would also come to see her  Per her record, she was noted to have been at [facility], she attempted to drink shampoo as a suicide attempt, was being aggressive with caregiver from [facility]  She was also noted to have been scratching nurses, expressed thoughts that she believed others were trying to kill her  She also threatened to hang herself with a shoelace at [facility]  She was noted to not be taking medicine or eating consistently  She was noted to have lost 100 lbs in the last year unintentionally  She is oriented to herself, place  She thought that it was July 2019, was surprised when told it is June  She did admit to several falls over the past 6 months, but did not state how many  She stated that her hobby is word find activity  She stated that her support is Cathy Paiz, that she had worked with him for 2 1/2 months  She stated that all others, I e , family, have passed  She added, "I live alone, I'll die alone!"        Current Level of Function:  She is currently on contact precautions  She has been ambulating with stand by assistance, she completes personal care with minimal assistance  When this writer met with her in her room, she was struggling to don her sock stating that she was trying to put this on for the past half hour  She did require moderate assistance to don sock  She did mention concern for her DNR bracelet that was locked up for her (she had a hospital DNR bracelet on)  She did want me to mention this to others, nursing was informed  She was reminded, however, that this bracelet is in the safe for safe keeping  She did state that she had glasses, that these were taken from her  She was not wearing glasses at time of interview  Work Task Skills:   Task Investment she did complete simple writing task independently, she did require some encouragement and assistance with lace task  Problem Solving Unable to recognize errors on novel tasks without assistance she became upset about her bracelet at this time, declined to finish task   Concentration she was attentive to interview, but her focus was limited to approximately 15-20 minutes before she became upset about bracelet              Follows Direction she was able to carry out 1 step direction (written large); she did carry out 1 step gross motor tasks via verbal directions   Frustration Tolerance Becomes so frustrated that he/she refuses or is unable to function    Social Skills:   Dyadic Interaction (eye contact, makes needs known, goal directed conversation)    Eye contact: Fair    Quality of Response: Clear and Concise    Goal Directed: Yes    False Beliefs: Yes she did verbalize various suspicions, she stated that different staff stole from her when coming into her home prior to her coming to [place]  She talked about a man named Jackie Graham who she said worked with her in the community, that some people thought they were boyfriend, girlfriend  Pain:   She denies any current pain    Assessment performed:    Pt is a 75 y o  female seen for OT evaluation s/p admit to [facility] on 6/4/2019 w/ Bipolar 1 disorder ([facility] )  Comorbidities affecting pt's functional performance at time of assessment include: HTN and anemia of chronic disease, cirrhosis of liver with ascites, ambulatory dysfunction, compression of fracture of L3 lumbar vertebra  Personal factors affecting pt at time of IE include:limited home support, behavioral pattern, difficulty performing ADLS, difficulty performing IADLS , limited insight into deficits, compliance, financial barriers, health management , environment and decreased coping skills, decreased life management, impaired reality focus  Prior to admission, pt was residing at [facility]   Upon evaluation: Pt requires treatment with consideration of the following deficits impacting occupational performance: impaired memory, impaired problem solving, decreased safety awareness, impaired interpersonal skills, decreased coping skills and decreased life management, impaired reality focus  Pt to benefit from continued skilled OT tx while in the hospital to address deficits as defined above and maximize level of functional independence w ADL's and functional mobility  Occupational Performance areas to address include: socialization, health maintenance, social participation and coping skills instruction, life management, reality focus  From OT standpoint, recommendation at time of d/c would be to return to SNF when stable  Patient Goal: "I just want my ID bracelet!"    Discharge Recommendation:   Return to SNF setting when stable  Frequency:  2-3 X/ week    Goals:  1  Attend/ participate in 2 OT/ activity groups offered on the unit to offset admitting behaviors  2  Identify and explore strategies to promote improved functioning and wellness  3 Identify 3 positive qualities of self  4  Consistently utilize positive coping strategies to deal with daily stressors without interference from thoughts/ attempts at self harm 100% of the time  5  Consistently cooperate with caregiver assistance without interference from resistive, aggressive behaviors 100% of the time  6  Consistently interact with others in a reality based, give and take manner without interference from suspiciousness and paranoia 100% of the time      Goal Expiration:   30 days    Pineda Mixon OT

## 2019-06-05 NOTE — PROGRESS NOTES
Patient is in her room  Patient is stating "she just wants to die  She states she is going to stop taking medications, stop eating and go live [place] a box under the bridge " she currently awake  Denies any needs  Shows no signs of distress  Will continue to monitor on q 7 minute checks along with bed/chair alarm in place  Call bell is in reach

## 2019-06-05 NOTE — H&P
Initial Psychiatric Evaluation    Medical Record Number: [id number]  Encounter: [id number]      History:     José Miguel Client is an 75 y o , female, admitted to the psychiatric unit under a 302 status to Dr Juvenal Patiño' service with the chief complaint of  increased depression, threats of suicide noncompliance with her treatment including not taking medications  A loss of appetite with 100 lb weight loss over the last year  Patient was living her own home with [address] care but continued to deteriorate emotionally and physically not taking all her medications she periods of agitation and noncompliance  She was having frequent falls and unable to care for self so she was sent to [facility] where she became increasingly more upset and depressed and she drank shampoo  bottle to kill herself ;she said she want to die     She also said she thought someone was trying to kill her and she made of threats that she was going to stop taking medications stop eating so she could live under the bridge    Her conversation was a inappropriate and hard to follow  She had no insight into her illness and need for treatment and became angry and agitated at times and hitting and scratching nurses to try to help her on the unit  Patient is reported to have a bipolar disorder has seen a psychiatrist an outpatient but was unable to name him and was receiving Wellbutrin 300 mg for a period of time  She was not on a mood stabilizer  In the hospital Zyprexa was started at 2 5 mg q h s  A 302 was instituted because of the danger to self and others and she was transferred to the psych unit        Past Medical History:   Diagnosis Date    Asthma     [facility] 2 [facility]     Chronic kidney disease     Cirrhosis of liver (HCC)     Diabetes mellitus ([facility]     Elevated troponin 9/2/2018    GERD (gastroesophageal reflux disease)     Hyperlipidemia     Hypertension     Neuropathy     Renal disorder     Restless leg syndrome Past surgical history:  Past Surgical History:   Procedure Laterality Date    BACK SURGERY      CHOLECYSTECTOMY      HERNIA REPAIR      REPLACEMENT TOTAL KNEE BILATERAL      TUMOR REMOVAL         Family history:  History reviewed  No pertinent family history      Current medications:    Current Facility-Administered Medications:     acetaminophen (TYLENOL) tablet 650 mg, 650 mg, Oral, Q6H PRN, Aaron Allison MD, 650 mg at 06/04/19 2003    buPROPion (WELLBUTRIN XL) 24 hr tablet 150 mg, 150 mg, Oral, Daily, Saima Melton PA-C    calcitonin (salmon) (MIACALCIN) 200 units/act nasal spray 1 spray, 1 spray, Alternating Nares, Daily, Aaron Allison MD    docusate sodium (COLACE) capsule 100 mg, 100 mg, Oral, BID, Aaron Allison MD    furosemide (LASIX) tablet 20 mg, 20 mg, Oral, Daily, Aaron Allison MD    insulin detemir (LEVEMIR) subcutaneous injection 20 Units, 20 Units, Subcutaneous, Q12H [address], Aaron Allison MD, 20 Units at 06/05/19 0932    insulin lispro (HumaLOG) 100 units/mL subcutaneous injection 2-12 Units, 2-12 Units, Subcutaneous, TID AC, 6 Units at 06/04/19 1900 **AND** Fingerstick Glucose (POCT), , , TID AC, Aaron Allison MD    LORazepam (ATIVAN) 2 mg/mL injection 1 mg, 1 mg, Intramuscular, Q6H PRN, Saima Melton PA-C    LORazepam (ATIVAN) tablet 1 mg, 1 mg, Oral, Q6H PRN, Saima Melton PA-C, 1 mg at 06/05/19 0233    nicotine (NICODERM CQ) 14 mg/24hr TD 24 hr patch 1 patch, 1 patch, Transdermal, Daily, Aaron Allison MD    OLANZapine (ZyPREXA) IM injection 5 mg, 5 mg, Intramuscular, Q4H PRN, Saima Melton PA-C    OLANZapine (ZyPREXA) tablet 5 mg, 5 mg, Oral, Q4H PRN, Saima Melton PA-C    OLANZapine (ZyPREXA) tablet 5 mg, 5 mg, Oral, HS, Patricia Reihc PA-C    ondansetron (ZOFRAN-ODT) dispersible tablet 4 mg, 4 mg, Oral, Q6H PRN, Aaron Allison MD    spironolactone (ALDACTONE) tablet 25 mg, 25 mg, Oral, Daily, Aaron Allison MD    Psychiatric Review Of Systems:  sleep: no  appetite changes: yes  weight changes: yes  energy/anergy: no  interest/pleasure/anhedonia: yes  somatic symptoms: no  anxiety/panic: yes  oamr: yes  guilty/hopeless: no  self injurious behavior/risky behavior: yes    Past Psychiatric History:   Therapy, Out Patient yes  Currently in treatment with psychiatrist as in [place]  Past Suicide attempts:  Not known  Past Violent behavior:  Aggressive behavior during this current episode  Past Psychiatric medication trial:  No  Past Psychiatric Hospitalizations:  No    Allergies:   Allergies   Allergen Reactions    Abilify [Aripiprazole] Hallucinations    Celebrex [Celecoxib] Hallucinations    Codeine Hallucinations    Darvon [Propoxyphene] Hallucinations    Ibuprofen Hallucinations    Latuda [Lurasidone] Hallucinations    Nitrofurantoin Hallucinations    Penicillins Hallucinations    Ziprasidone Hallucinations    Levaquin [Levofloxacin] Itching and Rash     From IV levaquin         Social History:  Social History     Socioeconomic History    Marital status: Single     Spouse name: Not on file    Number of children: Not on file    Years of education: Not on file    Highest education level: Not on file   Occupational History    Not on file   Social Needs    Financial resource strain: Not on file    Food insecurity:     Worry: Not on file     Inability: Not on file    Transportation needs:     Medical: Not on file     Non-medical: Not on file   Tobacco Use    Smoking status: Passive Smoke Exposure - Never Smoker    Smokeless tobacco: Never Used   Substance and Sexual Activity    Alcohol use: Never     Frequency: Never    Drug use: Never    Sexual activity: Not Currently   Lifestyle    Physical activity:     Days per week: Not on file     Minutes per session: Not on file    Stress: Not on file   Relationships    Social connections:     Talks on phone: Not on file     Gets together: Not on file     Attends Gnosticism service: Not on file     Active member of club or organization: Not on file     Attends meetings of clubs or organizations: Not on file     Relationship status: Not on file    Intimate partner violence:     Fear of current or ex partner: Not on file     Emotionally abused: Not on file     Physically abused: Not on file     Forced sexual activity: Not on file   Other Topics Concern    Not on file   Social History Narrative    Not on file       Trauma/ Abuse/ Neglect none reported    Physical Examination:     Vital Signs:  Vitals:    06/04/19 1544 06/04/19 2100 06/05/19 0710   BP: 116/72 139/63 130/60   BP Location: Left arm Left arm Right arm   Pulse: 97 86 78   Resp: 18 16 16   Temp: (!) 97 4 °F (36 3 °C) 97 8 °F (36 6 °C) (!) 97 °F (36 1 °C)   TempSrc: Oral Tympanic Tympanic   SpO2: 98% 99% 98%   Weight: 72 4 kg (159 lb 9 8 oz)     Height: 4' 9" (1 448 m)           Appearance:  disheveled   Behavior:  deviant and psychomotor agitation   Speech:  normal volume   Mood:  anxious, depressed, irritable and labile   Affect:  constricted   Thought Process:  disorganized and tangential   Thought Content:  delusions  persecutory   Perceptual Disturbances: None   Risk Potential: Suicidal Ideations without plan   Sensorium:  person, place, situation and time   Cognition:  intact   Consciousness:  alert and awake    Attention: attention span and concentration were age appropriate   Intellect: average   Insight:  poor   Judgment: poor      Motor Activity: no abnormal movements           Diagnostic Studies:     Recent Labs:  Results Reviewed     None          I/O Past 24 hours:  I/O last 3 completed shifts: In: 300 [P O :300]  Out: -   I/O this shift:  In: 180 [P O :180]  Out: -         Impression / Plan:     Bipolar 1 disorder ([facility]    Recommended Treatment:      Medications  1) will work with the Zyprexa increasing dose from 2 5 mg to 5 mg q h s      Non-pharmacological treatments  1) Continue with group therapy, milieu therapy and occupational therapy  2) Medical will be consulted to help manage comorbid conditions    Safety  1) Safety/communication plan established targeting dynamic risk factors above  Counseling / Coordination of Care    Total floor / unit time spent today 50 minutes  Greater than 50% of total time was spent with the patient and / or family counseling and / or coordination of care  A description of the counseling / coordination of care  Patient's Rights, confidentiality and exceptions to confidentiality, use of automated medical record, [facility] staff access to medical record, and consent to treatment reviewed        Dulce Bermudez MD

## 2019-06-05 NOTE — ASSESSMENT & PLAN NOTE
Patient with untreated bipolar disorder  Reportedly lost lots of weight became very aggressive and combative while on hospice developed suicidal ideations and began drinking shampoo  Admitted on [phone] status   Was reportedly refusing all medications, agreeable to medications tonight  Continue plan per Psychiatry

## 2019-06-05 NOTE — PROGRESS NOTES
Patient states she is anxious and requesting ativan  22 on anxiety scale  Patient's mood is liable and agitated

## 2019-06-05 NOTE — PLAN OF CARE
Problem: OCCUPATIONAL THERAPY ADULT  Goal: Performs self-care activities at highest level of function for planned discharge setting  See evaluation for individualized goals  Description             See flowsheet documentation for full assessment, interventions and recommendations  Outcome: Progressing  Note:    Pt is a 75 y o  female seen for OT evaluation s/p admit to [facility] on 6/4/2019 w/ Bipolar 1 disorder ([address] )  Comorbidities affecting pt's functional performance at time of assessment include: HTN and anemia of chronic disease, cirrhosis of liver with ascites, ambulatory dysfunction, compression of fracture of L3 lumbar vertebra  Personal factors affecting pt at time of IE include:limited home support, behavioral pattern, difficulty performing ADLS, difficulty performing IADLS , limited insight into deficits, compliance, financial barriers, health management , environment and decreased coping skills, decreased life management, impaired reality focus  Prior to admission, pt was residing at [facility]  Upon evaluation: Pt requires treatment with consideration of the following deficits impacting occupational performance: impaired memory, impaired problem solving, decreased safety awareness, impaired interpersonal skills, decreased coping skills and decreased life management, impaired reality focus  Pt to benefit from continued skilled OT tx while in the hospital to address deficits as defined above and maximize level of functional independence w ADL's and functional mobility  Occupational Performance areas to address include: socialization, health maintenance, social participation and coping skills instruction, life management, reality focus  From OT standpoint, recommendation at time of d/c would be to return to SNF when stable

## 2019-06-05 NOTE — CONSULTS
[address] [facility]  Consult- Asa Bolus [birth year], 75 y o  female MRN: [id number]    Unit/Bed#: [facility] 972-44 Encounter: [id number]    Primary Care Provider: Saturnino Ding MD   Date and time admitted to hospital: 6/4/2019  2:35 PM      Inpatient consult to Internal Medicine  Consult performed by: Cassandra Isaacs PA-C  Consult ordered by: Cassandra Isaacs PA-C          * Bipolar 1 disorder [facility])  Assessment & Plan  Patient with untreated bipolar disorder  Reportedly lost lots of weight became very aggressive and combative while on hospice developed suicidal ideations and began drinking shampoo  Admitted on 36 status   Was reportedly refusing all medications, agreeable to medications tonight  Continue plan per Psychiatry    Discharged from Hospice for Suicidal Ideation, Medically Complex  Assessment & Plan  Initially brought to hospital for suicide attempt drinking she improved while on hospice  Will continue DNR DNI status as this was done prior to initiation of hospice  Reported estranged from family  Continue 3 to status    Type 2 diabetes mellitus, with long-term current use of insulin [facility])  Assessment & Plan  Lab Results   Component Value Date    HGBA1C 10 1 (H) 02/13/2019       Recent Labs     06/04/19  0524 06/04/19  1117 06/04/19  1642 06/04/19  1954   POCGLU 112 273* 231* 261*       Blood Sugar Average: Last 72 hrs:  (P) 246   Controlled on home dose Levemir with sliding scale insulin coverage  Continue q i d  Accu-Cheks    Compression fracture of L3 lumbar vertebra  Assessment & Plan  Chronic back pain   Evidence of severe spondylosis and osteoarthritis at L3-L4 with significant stenosis   Continue intranasal calcitonin    Ambulatory dysfunction  Assessment & Plan  Recent fall resulting in compression fracture of spine  PT/OT consults   Fall precautions   Ambulation with assistance and walker     Essential hypertension  Assessment & Plan  Controlled off medication    Cirrhosis of liver with ascites ([facility] [facility] )  Assessment & Plan  Decompensated cirrhosis with ascites and lower extremity edema  Continue Lasix and Aldactone, increases needed  Patient reportedly refused paracentesis while on medical, now reporting she was just scared, consider paracentesis once behaviorally were stable  Compression stockings and encourage leg elevation    Anemia of chronic disease  Assessment & Plan  Hemoglobin stable at 11 0  Continued monitoring        VTE Prophylaxis: RX contraindicated due to: thrombocytopenia  / compression stockings    Recommendations for Discharge:  · PCP follow up     Counseling / Coordination of Care Time: 1 hour  Greater than 50% of total time spent on patient counseling and coordination of care  Collaboration of Care: Were Recommendations Directly Discussed with Primary Treatment Team? - No     History of Present Illness:    Hans Green is a 75 y o  female who is originally admitted to the psychiatry service due to suicidal attempt  We are consulted for medical clearance  Patient reports she has been feeling more depressed and suicidal recently as she believes he body is shutting down on her and she would just like to die  Patient is pleasant at time of encounter explaining that she is very happy with how well her nurse is treating her on the floor  Patient reports she is feeling well now and reports she was upset earlier and refusing interventions because she was scared, reporting she gets scared easily  Patient reports she refused paracentesis while on medical also because she was scared and that she would reconsider the procedure at a later date  Patient reports increased abdominal distention and lower extremity edema bilaterally  Patient also reports back pain following a fall a few weeks ago  Patient denies any respiratory conditions and denies known seizure history  Patient reporting she has not been sleeping well and feels positive about getting some sleep tonight   Patient denies any acute complaints at time of exam      Review of Systems:    Review of Systems   Constitutional: Positive for unexpected weight change  Negative for activity change, diaphoresis and fever  HENT: Negative for congestion, ear pain and sneezing  Eyes: Negative for photophobia and pain  Respiratory: Negative for cough, shortness of breath and wheezing  Cardiovascular: Positive for leg swelling  Negative for chest pain and palpitations  Gastrointestinal: Positive for abdominal distention  Negative for abdominal pain, diarrhea, nausea and vomiting  Endocrine: Negative  Genitourinary: Negative for difficulty urinating and dysuria  Musculoskeletal: Positive for back pain and gait problem  Skin: Negative for rash  Allergic/Immunologic: Negative for environmental allergies  Neurological: Negative for syncope, speech difficulty and light-headedness  Hematological: Negative  Psychiatric/Behavioral: Positive for behavioral problems, dysphoric mood, sleep disturbance and suicidal ideas  Past Medical and Surgical History:     Past Medical History:   Diagnosis Date    Asthma     Bipolar 2 disorder ([facility] )     Chronic kidney disease     Cirrhosis of liver ([facility] )     Diabetes mellitus ([place] [address] )     Elevated troponin 9/2/2018    GERD (gastroesophageal reflux disease)     Hyperlipidemia     Hypertension     Neuropathy     Renal disorder     Restless leg syndrome        Past Surgical History:   Procedure Laterality Date    BACK SURGERY      CHOLECYSTECTOMY      HERNIA REPAIR      REPLACEMENT TOTAL KNEE BILATERAL      TUMOR REMOVAL         Meds/Allergies:    PTA meds:   Prior to Admission Medications   Prescriptions Last Dose Informant Patient Reported? Taking?    LORazepam (ATIVAN) 0 5 mg tablet Past Month at Unknown time  No Yes   Sig: Take 1 tablet (0 5 mg total) by mouth every 2 (two) hours as needed for anxiety or seizures for up to 10 days   buPROPion (WELLBUTRIN XL) 300 mg 24 hr tablet Past Month at Unknown time Self Yes Yes   Sig: Take 300 mg by mouth every morning      Facility-Administered Medications: None       Allergies: Allergies   Allergen Reactions    Abilify [Aripiprazole] Hallucinations    Celebrex [Celecoxib] Hallucinations    Codeine Hallucinations    Darvon [Propoxyphene] Hallucinations    Ibuprofen Hallucinations    Latuda [Lurasidone] Hallucinations    Nitrofurantoin Hallucinations    Penicillins Hallucinations    Ziprasidone Hallucinations    Levaquin [Levofloxacin] Itching and Rash     From IV levaquin         Social History:     Marital Status: Single    Substance Use History:   Social History     Substance and Sexual Activity   Alcohol Use Never    Frequency: Never     Social History     Tobacco Use   Smoking Status Passive Smoke Exposure - Never Smoker   Smokeless Tobacco Never Used     Social History     Substance and Sexual Activity   Drug Use Never       Family History:    History reviewed  No pertinent family history  Physical Exam:     Vitals:   Blood Pressure: 139/63 (06/04/19 2100)  Pulse: 86 (06/04/19 2100)  Temperature: 97 8 °F (36 6 °C) (06/04/19 2100)  Temp Source: Tympanic (06/04/19 2100)  Respirations: 16 (06/04/19 2100)  Height: 4' 9" (144 8 cm) (06/04/19 1544)  Weight - Scale: 72 4 kg (159 lb 9 8 oz) (06/04/19 1544)  SpO2: 99 % (06/04/19 2100)    Physical Exam   Constitutional: She is oriented to person, place, and time  No distress  HENT:   Head: Normocephalic and atraumatic  Mouth/Throat: Oropharynx is clear and moist    Eyes: Conjunctivae and EOM are normal  No scleral icterus  Neck: Normal range of motion  Cardiovascular: Normal rate and regular rhythm  Pulmonary/Chest: Effort normal and breath sounds normal  No respiratory distress  She has no wheezes  She has no rales  Abdominal: Soft  Bowel sounds are normal  She exhibits distension  There is no tenderness  There is no guarding  Musculoskeletal: Normal range of motion   She exhibits edema (2+ pitting edema bilaterally)  Neurological: She is alert and oriented to person, place, and time  No cranial nerve deficit (CN II-XII grossly intact, no facial droop appreciated, able to move all extremities equally, EOMI)  Skin: Skin is warm and dry  Psychiatric: Her affect is blunt  She is withdrawn  She expresses suicidal ideation  Calm and cooperative at time of exam    Vitals reviewed  Additional Data:     Lab Results: I have personally reviewed pertinent reports  Results from last 7 days   Lab Units 06/04/19  0523 06/01/19  1325   WBC Thousand/uL 3 40* 3 00*   HEMOGLOBIN g/dL 11 0* 11 5*   HEMATOCRIT % 33 6* 34 5*   PLATELETS Thousands/uL 76* 71*   BANDS PCT % 1  --    NEUTROS PCT %  --  69*   LYMPHS PCT %  --  18*   LYMPHO PCT % 28  --    MONOS PCT %  --  10   MONO PCT % 11*  --    EOS PCT % 9* 2     Results from last 7 days   Lab Units 06/04/19  0523   SODIUM mmol/L 138   POTASSIUM mmol/L 3 6   CHLORIDE mmol/L 107   CO2 mmol/L 27   BUN mg/dL 9   CREATININE mg/dL 0 47*   ANION GAP mmol/L 4*   CALCIUM mg/dL 8 6   ALBUMIN g/dL 2 5*   TOTAL BILIRUBIN mg/dL 1 20   ALK PHOS U/L 101   ALT U/L 25   AST U/L 44*   GLUCOSE RANDOM mg/dL 102*     Results from last 7 days   Lab Units 06/04/19  1141   INR  1 26*     Results from last 7 days   Lab Units 06/01/19  1325   TROPONIN I ng/mL 0 03     Lab Results   Component Value Date/Time    HGBA1C 10 1 (H) 02/13/2019 04:34 AM    HGBA1C 6 8 (H) 10/19/2017 05:44 AM     Results from last 7 days   Lab Units 06/04/19  1954 06/04/19  1642 06/04/19  1117 06/04/19  0524 06/03/19 2012 06/03/19  1821 06/03/19  1543 06/03/19  1329 06/03/19  0556 06/02/19 2000 06/02/19  1550 06/02/19  1124   POC GLUCOSE mg/dl 261* 231* 273* 112 269* 294* 178* 173* 180* 341* 242* 197*           Imaging: I have personally reviewed pertinent reports        No orders to display       EKG, Pathology, and Other Studies Reviewed on Admission:   · EKG: NSR with PACs, no acute ST or T wave changes    ** Please Note: This note has been constructed using a voice recognition system   **

## 2019-06-05 NOTE — PLAN OF CARE
Problem: Alteration in Thoughts and Perception  Goal: Treatment Goal: Gain control of psychotic behaviors/thinking, reduce/eliminate presenting symptoms and demonstrate improved reality functioning upon discharge  Outcome: Progressing  Goal: Verbalize thoughts and feelings  Description  Interventions:  - Promote a nonjudgmental and trusting relationship with the patient through active listening and therapeutic communication  - Assess patient's level of functioning, behavior and potential for risk  - Engage patient in 1 on 1 interactions for a minimum of 15 minutes each session  - Encourage patient to express fears, feelings, frustrations, and discuss symptoms    - [place] patient to reality, help patient recognize reality-based thinking   - Administer medications as ordered and assess for potential side effects  - Provide the patient education related to the signs and symptoms of the illness and desired effects of prescribed medications  Outcome: Progressing  Goal: Refrain from acting on delusional thinking/internal stimuli  Description  Interventions:  - Monitor patient closely, per order   - Utilize least restrictive measures   - Set reasonable limits, give positive feedback for acceptable   - Administer medications as ordered and monitor of potential side effects  Outcome: Progressing  Goal: Agree to be compliant with medication regime, as prescribed and report medication side effects  Description  Interventions:  - Offer appropriate PRN medication and supervise ingestion; conduct aims, as needed   Outcome: Progressing  Goal: Recognize dysfunctional thoughts, communicate reality-based thoughts at the time of discharge  Description  Interventions:  - Provide medication and psycho-education to assist patient in compliance and developing insight into his/her illness   Outcome: Progressing  Goal: Complete daily ADLs, including personal hygiene independently, as able  Description  Interventions:  - Observe, teach, and assist patient with ADLS  - Monitor and promote a balance of rest/activity, with adequate nutrition and elimination   Outcome: Progressing     Problem: Risk for Self Injury/Neglect  Goal: Treatment Goal: Remain safe during length of stay, learn and adopt new coping skills, and be free of self-injurious ideation, impulses and acts at the time of discharge  Outcome: Progressing  Goal: Verbalize thoughts and feelings  Description  Interventions:  - Assess and re-assess patient's lethality and potential for self-injury  - Engage patient in 1:1 interactions, daily, for a minimum of 15 minutes  - Encourage patient to express feelings, fears, frustrations, hopes  - Establish rapport/trust with patient   Outcome: Progressing  Goal: Refrain from harming self  Description  Interventions:  - Monitor patient closely, per order  - Develop a trusting relationship  - Supervise medication ingestion, monitor effects and side effects   Outcome: Progressing  Goal: Recognize maladaptive responses and adopt new coping mechanisms  Outcome: Progressing  Goal: Complete daily ADLs, including personal hygiene independently, as able  Description  Interventions:  - Observe, teach, and assist patient with ADLS  - Monitor and promote a balance of rest/activity, with adequate nutrition and elimination  Outcome: Progressing     Problem: Prexisting or High Potential for Compromised Skin Integrity  Goal: Skin integrity is maintained or improved  Description  INTERVENTIONS:  - Identify patients at risk for skin breakdown  - Assess and monitor skin integrity  - Assess and monitor nutrition and hydration status  - Monitor labs (i e  albumin)  - Assess for incontinence   - Turn and reposition patient  - Assist with mobility/ambulation  - Relieve pressure over bony prominences  - Avoid friction and shearing  - Provide appropriate hygiene as needed including keeping skin clean and dry  - Evaluate need for skin moisturizer/barrier cream  - Collaborate with interdisciplinary team (i e  Nutrition, Rehabilitation, etc )   - Patient/family teaching  Outcome: Progressing

## 2019-06-05 NOTE — PROGRESS NOTES
Awake, alert, oriented to person and place, forgetful  Irritable, labile, yells out at times  Refused AM meds this morning then yelling at 1100 about not getting bipolar meds  PRN Ativan and scheduled Wellbutrin administered before lunch per request  Medical attending made aware of hypoglycemia this morning and to adjust insulin orders accordingly  Will continue to monitor

## 2019-06-05 NOTE — PROGRESS NOTES
Admission Note:   Patient was present at the inception of the 3-11 shift: It is noted that she is +for VRE so when assigned to a room Contact Precautions were initiated  Received patient sitting in chair waiting for admission process to begin  She was calm and cooperative She was not too clear on the process r/t when and why she came to [facility]  She stated several times throughout the evening and  night that she  Would like to feel better; that she will agree to a Parasentesis and she was cooperative throughout the evening  She ambulates slowly with RW at times seems to sway with momentart unsteady gait  Monitored for safety  She c/o "sciatica pain" on rtight side going down her leg  She appeared to very uncomfortable when she got into the bed  She stated that recently she "wanted to die" r/t medical problems, homelessness and previous trauma (of being "on the streets" or having mental illness  She received Ativan 1mg at 2000 with no noticeable effect

## 2019-06-05 NOTE — ASSESSMENT & PLAN NOTE
Decompensated cirrhosis with ascites and lower extremity edema  Continue Lasix and Aldactone, increases needed  Patient reportedly refused paracentesis while on medical, now reporting she was just scared, consider paracentesis once behaviorally were stable  Compression stockings and encourage leg elevation

## 2019-06-05 NOTE — ASSESSMENT & PLAN NOTE
Recent fall resulting in compression fracture of spine  PT/OT consults   Fall precautions   Ambulation with assistance and walker

## 2019-06-05 NOTE — TREATMENT PLAN
TREATMENT PLAN REVIEW - [address] Meenakshi Fonseca 75 y o  [birth year] female MRN: [id number]    [address] [place] [address] Maryanne Snowden 6B [facility] Room / Bed: Anne Reis [facility] 100-42 Encounter: [id number]          Admit Date/Time:  6/4/2019  2:35 PM    Treatment Team: Attending Provider: Andrew Contreras MD; Registered Nurse: Norman Ozuna RN; Consulting Physician: Lita Glass MD; Registered Nurse: Farhan Donato RN; Patient Care Assistant: Deisi Dowd;  : Gabi Moran OT    Diagnosis: Principal Problem:    Bipolar 1 disorder ([facility] )  Active Problems:    Anemia of chronic disease    Cirrhosis of liver with ascites ([facility] )    Essential hypertension    Ambulatory dysfunction    Compression fracture of L3 lumbar vertebra    Discharged from Hospice for Suicidal Ideation, Medically Complex      Patient Strengths/Assets: general fund of knowledge, negotiates basic needs    Patient Barriers/Limitations: difficulty adapting, lack of social/family support, noncompliant with medication, noncompliant with treatment, patient is on an involuntary commitment, patient is unwilling to work on problems, poor insight    Short Term Goals: decrease in depressive symptoms, decrease in paranoid thoughts, decrease in suicidal thoughts, ability to stay safe on the unit, improvement in insight, sleep improvement, improvement in appetite, mood stabilization    Long Term Goals: improvement in depression, improvement in anxiety, stabilization of mood, free of suicidal thoughts, improvement in reasoning ability, acceptance of need for psychiatric medications, acceptance of need for psychiatric treatment, acceptance of need for psychiatric follow up after discharge, acceptance of psychiatric diagnosis, adequate sleep, adequate appetite, appropriate interaction with peers    Progress Towards Goals: starting psychiatric medications as prescribed    Recommended Treatment: medication management, patient medication education, group therapy, milieu therapy, continued Behavioral Health psychiatric evaluation/assessment process    Treatment Frequency: daily medication monitoring, group and milieu therapy daily, monitoring through interdisciplinary rounds, monitoring through weekly patient care conferences    Expected Discharge Date: 1-2 weeks    Discharge Plan: return to previous living arrangement    Treatment Plan Created/Updated By: Tom Max PA-C

## 2019-06-05 NOTE — CASE MANAGEMENT
Pt's 303 is already arranged for Thursday @ 8:45 with [employer]  I have notified the Petitioner, Daniel Mccoy, RN  [phone] of the scheduled 303 on 6/6 at 8:45 and the requirement that she be available  She will not be able to come in but will be available at the above phone number    Kaycee Ortiz explained that she works for [employer] and has been seeing this pt at her home for past few months and on 5/27 pt was brought to the ER which subsequently landed in pt being admitted to [facility] on 5/31  Prior to that pt has been living on her own with home health aides coming into her home to provide a presence to ensure her safety  Pt had become increasingly more paranoid and not letting then in the home and was most recently missing from her home, having wandered off  The aide were funded by the Aging waiver  I am waiting on a call back to verify pt's family contacts and if she is welcome to return to that site once stable

## 2019-06-05 NOTE — PROGRESS NOTES
Initial Psychiatric Evaluation    Medical Record Number: [id number]  Encounter: [id number]      History:     Faheem Mendez is an 75 y o , female, admitted to the psychiatric unit under a 302 status to Dr Harry Dill' service with the chief complaint of drinking shampoo as a suicide attempt and being aggressive with a caregiver  She was sent from [place]  Patient was receiving 24 hour care  According to transfer papers she was not taking her medications consistently or eating  She was having bouts of nausea  She has lost approximately 100 lbs in the last year unintentionally  The patient has been more paranoid and believes she has been placed in detention  She drank a bottle of shampoo trying to kill herself  She was scratching nurses  She believes somebody was trying to kill her  She was also threatening to hang herself with a shoe lace at [place]  After medical stay once she was cleared she was transferred to [address] [place] [address] for further evaluation and treatment  Patient is oriented to person and place  She is very irritable during discussion and keeps repeating that she needs her butter scotch candies  When asked why she is in the hospital patient turned and refused to talk with me  She would not maintain eye contact  She told me to leave and "go play with yourself " The patient was stating last evening that she wanted to die and that she was going to stop taking medications and stop eating so she could go live under a bridge  Patient has untreated bipolar disorder  Patient has been on Wellbutrin for many years  Patient has very poor insight and judgment  Patient continues to be paranoid of others  Patient makes passive suicidal statements here in the hospital   She denies a plan      Past Medical History:   Diagnosis Date    Asthma     [facility]     Chronic kidney disease     Cirrhosis of liver (HCC)     Diabetes mellitus [facility] )     Elevated troponin 9/2/2018    GERD (gastroesophageal reflux disease)     Hyperlipidemia     Hypertension     Neuropathy     Renal disorder     Restless leg syndrome        Past surgical history:  Past Surgical History:   Procedure Laterality Date    BACK SURGERY      CHOLECYSTECTOMY      HERNIA REPAIR      REPLACEMENT TOTAL KNEE BILATERAL      TUMOR REMOVAL         Family history:  History reviewed  No pertinent family history      Current medications:    Current Facility-Administered Medications:     acetaminophen (TYLENOL) tablet 650 mg, 650 mg, Oral, Q6H PRN, Kenna Marino MD, 650 mg at 06/04/19 2003    buPROPion (WELLBUTRIN XL) 24 hr tablet 150 mg, 150 mg, Oral, Daily, Chuck Aguilera PA-C    calcitonin (salmon) (MIACALCIN) 200 units/act nasal spray 1 spray, 1 spray, Alternating Nares, Daily, Kenna Marino MD    docusate sodium (COLACE) capsule 100 mg, 100 mg, Oral, BID, Kenna Marino MD    furosemide (LASIX) tablet 20 mg, 20 mg, Oral, Daily, Kenna Marino MD    insulin detemir (LEVEMIR) subcutaneous injection 20 Units, 20 Units, Subcutaneous, Q12H [address], Kenna Marino MD, 20 Units at 06/04/19 2156    insulin lispro (HumaLOG) 100 units/mL subcutaneous injection 2-12 Units, 2-12 Units, Subcutaneous, TID AC, 6 Units at 06/04/19 1900 **AND** Fingerstick Glucose (POCT), , , TID AC, Kenna Marino MD    LORazepam (ATIVAN) 2 mg/mL injection 1 mg, 1 mg, Intramuscular, Q6H PRN, Chuck Aguilera PA-C    LORazepam (ATIVAN) tablet 1 mg, 1 mg, Oral, Q6H PRN, Chuck Aguilera PA-C, 1 mg at 06/05/19 0233    nicotine (NICODERM CQ) 14 mg/24hr TD 24 hr patch 1 patch, 1 patch, Transdermal, Daily, Kenna Marino MD    OLANZapine (ZyPREXA) IM injection 5 mg, 5 mg, Intramuscular, Q4H PRN, Chuck Aguilera PA-C    OLANZapine (ZyPREXA) tablet 5 mg, 5 mg, Oral, Q4H PRN, Chuck Aguilera PA-C    OLANZapine (ZyPREXA) tablet 5 mg, 5 mg, Oral, HS, Patricia Reich PA-C    ondansetron (ZOFRAN-ODT) dispersible tablet 4 mg, 4 mg, Oral, Q6H PRN, Sarah Jolly MD    spironolactone (ALDACTONE) tablet 25 mg, 25 mg, Oral, Daily, Sarah Jolly MD    Psychiatric Review Of Systems:  sleep: poor  appetite changes: yes  weight changes: yes  energy/anergy: poor  interest/pleasure/anhedonia: poor  somatic symptoms: no  anxiety/panic: yes  omar: no  guilty/hopeless: yes  self injurious behavior/risky behavior: no    Past Psychiatric History:   Past Suicide attempts: Yes, drinking shampoo  Past Violent behavior: Yes  Past Psychiatric medication trial: Abilify, Latuda, Geodon    Allergies:   Allergies   Allergen Reactions    Abilify [Aripiprazole] Hallucinations    Celebrex [Celecoxib] Hallucinations    Codeine Hallucinations    Darvon [Propoxyphene] Hallucinations    Ibuprofen Hallucinations    Latuda [Lurasidone] Hallucinations    Nitrofurantoin Hallucinations    Penicillins Hallucinations    Ziprasidone Hallucinations    Levaquin [Levofloxacin] Itching and Rash     From IV levaquin         Social History:  Social History     Socioeconomic History    Marital status: Single     Spouse name: Not on file    Number of children: Not on file    Years of education: Not on file    Highest education level: Not on file   Occupational History    Not on file   Social Needs    Financial resource strain: Not on file    Food insecurity:     Worry: Not on file     Inability: Not on file    Transportation needs:     Medical: Not on file     Non-medical: Not on file   Tobacco Use    Smoking status: Passive Smoke Exposure - Never Smoker    Smokeless tobacco: Never Used   Substance and Sexual Activity    Alcohol use: Never     Frequency: Never    Drug use: Never    Sexual activity: Not Currently   Lifestyle    Physical activity:     Days per week: Not on file     Minutes per session: Not on file    Stress: Not on file   Relationships    Social connections:     Talks on phone: Not on file     Gets together: Not on file     Attends Rastafari service: Not on file     Active member of club or organization: Not on file     Attends meetings of clubs or organizations: Not on file     Relationship status: Not on file    Intimate partner violence:     Fear of current or ex partner: Not on file     Emotionally abused: Not on file     Physically abused: Not on file     Forced sexual activity: Not on file   Other Topics Concern    Not on file   Social History Narrative    Not on file       Physical Examination:     Vital Signs:  Vitals:    06/04/19 1544 06/04/19 2100 06/05/19 0710   BP: 116/72 139/63 130/60   BP Location: Left arm Left arm Right arm   Pulse: 97 86 78   Resp: 18 16 16   Temp: (!) 97 4 °F (36 3 °C) 97 8 °F (36 6 °C) (!) 97 °F (36 1 °C)   TempSrc: Oral Tympanic Tympanic   SpO2: 98% 99% 98%   Weight: 72 4 kg (159 lb 9 8 oz)     Height: 4' 9" (1 448 m)           Appearance:  age appropriate, casually dressed and disheveled   Behavior:  evasive and guarded   Speech:  normal volume   Mood:  irritable and labile   Affect:  labile   Thought Process:  circumstantial   Thought Content:  delusions  persecutory   Perceptual Disturbances: None   Risk Potential: none   Sensorium:  person and place   Cognition:  grossly intact   Consciousness:  alert and awake    Attention: attention span and concentration were age appropriate   Intellect: within normal limits   Insight:  poor   Judgment: poor      Motor Activity: no abnormal movements           Diagnostic Studies:     Recent Labs:  Results Reviewed     None          I/O Past 24 hours:  I/O last 3 completed shifts: In: 300 [P O :300]  Out: -   No intake/output data recorded  Impression / Plan:     Bipolar 1 disorder ([facility] )    Recommended Treatment:      Medications  1) Decrease Wellbutrin to 150mg XL  Increase Zyprexa to 5mg HS  Non-pharmacological treatments  1) Continue with group therapy, milieu therapy and occupational therapy      2) Medical will be consulted to help manage comorbid conditions    Safety  1) Safety/communication plan established targeting dynamic risk factors above  Counseling / Coordination of Care    Total floor / unit time spent today 50 minutes  Greater than 50% of total time was spent with the patient and / or family counseling and / or coordination of care  A description of the counseling / coordination of care  Patient's Rights, confidentiality and exceptions to confidentiality, use of automated medical record, [facility] staff access to medical record, and consent to treatment reviewed        [place]iana Pollard PA-C

## 2019-06-05 NOTE — ASSESSMENT & PLAN NOTE
Initially brought to hospital for suicide attempt drinking she improved while on hospice  Will continue DNR DNI status as this was done prior to initiation of hospice  Reported estranged from family  Continue 3 to status

## 2019-06-05 NOTE — ASSESSMENT & PLAN NOTE
Lab Results   Component Value Date    HGBA1C 10 1 (H) 02/13/2019       Recent Labs     06/04/19  0524 06/04/19  1117 06/04/19  1642 06/04/19 1954   POCGLU 112 273* 231* 261*       Blood Sugar Average: Last 72 hrs:  (P) 246   Controlled on home dose Levemir with sliding scale insulin coverage  Continue q i d  Accu-Cheks

## 2019-06-05 NOTE — PLAN OF CARE
Problem: Alteration in Thoughts and Perception  Goal: Treatment Goal: Gain control of psychotic behaviors/thinking, reduce/eliminate presenting symptoms and demonstrate improved reality functioning upon discharge  Outcome: Progressing  Goal: Verbalize thoughts and feelings  Description  Interventions:  - Promote a nonjudgmental and trusting relationship with the patient through active listening and therapeutic communication  - Assess patient's level of functioning, behavior and potential for risk  - Engage patient in 1 on 1 interactions for a minimum of 15 minutes each session  - Encourage patient to express fears, feelings, frustrations, and discuss symptoms    - [place] patient to reality, help patient recognize reality-based thinking   - Administer medications as ordered and assess for potential side effects  - Provide the patient education related to the signs and symptoms of the illness and desired effects of prescribed medications  Outcome: Progressing  Goal: Refrain from acting on delusional thinking/internal stimuli  Description  Interventions:  - Monitor patient closely, per order   - Utilize least restrictive measures   - Set reasonable limits, give positive feedback for acceptable   - Administer medications as ordered and monitor of potential side effects  Outcome: Progressing  Goal: Agree to be compliant with medication regime, as prescribed and report medication side effects  Description  Interventions:  - Offer appropriate PRN medication and supervise ingestion; conduct aims, as needed   Outcome: Progressing  Goal: Attend and participate in unit activities, including therapeutic, recreational, and educational groups  Description  Interventions:  - Provide therapeutic and educational activities daily, encourage attendance and participation, and document same in the medical record   Outcome: Progressing  Goal: Recognize dysfunctional thoughts, communicate reality-based thoughts at the time of discharge  Description  Interventions:  - Provide medication and psycho-education to assist patient in compliance and developing insight into his/her illness   Outcome: Progressing  Goal: Complete daily ADLs, including personal hygiene independently, as able  Description  Interventions:  - Observe, teach, and assist patient with ADLS  - Monitor and promote a balance of rest/activity, with adequate nutrition and elimination   Outcome: Progressing     Problem: Risk for Self Injury/Neglect  Goal: Treatment Goal: Remain safe during length of stay, learn and adopt new coping skills, and be free of self-injurious ideation, impulses and acts at the time of discharge  Outcome: Progressing  Goal: Verbalize thoughts and feelings  Description  Interventions:  - Assess and re-assess patient's lethality and potential for self-injury  - Engage patient in 1:1 interactions, daily, for a minimum of 15 minutes  - Encourage patient to express feelings, fears, frustrations, hopes  - Establish rapport/trust with patient   Outcome: Progressing  Goal: Refrain from harming self  Description  Interventions:  - Monitor patient closely, per order  - Develop a trusting relationship  - Supervise medication ingestion, monitor effects and side effects   Outcome: Progressing  Goal: Attend and participate in unit activities, including therapeutic, recreational, and educational groups  Description  Interventions:  - Provide therapeutic and educational activities daily, encourage attendance and participation, and document same in the medical record  - Obtain collateral information, encourage visitation and family involvement in care   Outcome: Progressing  Goal: Recognize maladaptive responses and adopt new coping mechanisms  Outcome: Progressing  Goal: Complete daily ADLs, including personal hygiene independently, as able  Description  Interventions:  - Observe, teach, and assist patient with ADLS  - Monitor and promote a balance of rest/activity, with adequate nutrition and elimination  Outcome: Progressing     Problem: Prexisting or High Potential for Compromised Skin Integrity  Goal: Skin integrity is maintained or improved  Description  INTERVENTIONS:  - Identify patients at risk for skin breakdown  - Assess and monitor skin integrity  - Assess and monitor nutrition and hydration status  - Monitor labs (i e  albumin)  - Assess for incontinence   - Turn and reposition patient  - Assist with mobility/ambulation  - Relieve pressure over bony prominences  - Avoid friction and shearing  - Provide appropriate hygiene as needed including keeping skin clean and dry  - Evaluate need for skin moisturizer/barrier cream  - Collaborate with interdisciplinary team (i e  Nutrition, Rehabilitation, etc )   - Patient/family teaching  Outcome: Progressing

## 2019-06-05 NOTE — ASSESSMENT & PLAN NOTE
Chronic back pain   Evidence of severe spondylosis and osteoarthritis at L3-L4 with significant stenosis   Continue intranasal calcitonin

## 2019-06-05 NOTE — CMS CERTIFICATION NOTE
Certification: Based upon physical, mental and social evaluations, I certify that inpatient psychiatric services are medically necessary for this patient for a duration of 12 midnights for the treatment of bipolar  Available alternative community resources do not meet the patient's mental health care needs  I further attest that an established written individualized plan of care has been implemented and is outlined in the patient's medical records

## 2019-06-05 NOTE — CASE MANAGEMENT
Pt has been informed of her 303 rights and was offered a copy of the rights  She was also able to eventually convey that her emergency contact Christian Fritz is her ICM through [place] and she has signed an JACK for [facility]  [facility]  I have left a VM for Sheryl Ignacio at [phone] and will await his call back  Received a VM from [address] [place] [address] [phone] asking for a call back   I will discuss this with Tracee Cameron in the AM

## 2019-06-06 LAB
ALBUMIN SERPL BCP-MCNC: 2.4 G/DL (ref 3–5.2)
ALP SERPL-CCNC: 103 U/L (ref 43–122)
ALT SERPL W P-5'-P-CCNC: 31 U/L (ref 9–52)
ANION GAP SERPL CALCULATED.3IONS-SCNC: 3 MMOL/L (ref 5–14)
AST SERPL W P-5'-P-CCNC: 43 U/L (ref 14–36)
BILIRUB SERPL-MCNC: 1 MG/DL
BUN SERPL-MCNC: 12 MG/DL (ref 5–25)
CALCIUM SERPL-MCNC: 8.5 MG/DL (ref 8.4–10.2)
CHLORIDE SERPL-SCNC: 109 MMOL/L (ref 97–108)
CHOLEST SERPL-MCNC: 144 MG/DL
CO2 SERPL-SCNC: 26 MMOL/L (ref 22–30)
CREAT SERPL-MCNC: 0.46 MG/DL (ref 0.6–1.2)
ERYTHROCYTE [DISTWIDTH] IN BLOOD BY AUTOMATED COUNT: 17.5 %
GFR SERPL CREATININE-BSD FRML MDRD: 98 ML/MIN/1.73SQ M
GLUCOSE P FAST SERPL-MCNC: 158 MG/DL (ref 70–99)
GLUCOSE SERPL-MCNC: 122 MG/DL (ref 65–140)
GLUCOSE SERPL-MCNC: 158 MG/DL (ref 70–99)
GLUCOSE SERPL-MCNC: 230 MG/DL (ref 65–140)
GLUCOSE SERPL-MCNC: 317 MG/DL (ref 65–140)
GLUCOSE SERPL-MCNC: 415 MG/DL (ref 65–140)
HCT VFR BLD AUTO: 32.7 % (ref 36–46)
HDLC SERPL-MCNC: 47 MG/DL (ref 40–59)
HGB BLD-MCNC: 10.6 G/DL (ref 12–16)
LDLC SERPL CALC-MCNC: 87 MG/DL
MCH RBC QN AUTO: 31.2 PG (ref 26–34)
MCHC RBC AUTO-ENTMCNC: 32.5 G/DL (ref 31–36)
MCV RBC AUTO: 96 FL (ref 80–100)
NONHDLC SERPL-MCNC: 97 MG/DL
PLATELET # BLD AUTO: 71 THOUSANDS/UL (ref 150–450)
PMV BLD AUTO: 9.6 FL (ref 8.9–12.7)
POTASSIUM SERPL-SCNC: 3.7 MMOL/L (ref 3.6–5)
PROT SERPL-MCNC: 5.6 G/DL (ref 5.9–8.4)
RBC # BLD AUTO: 3.41 MILLION/UL (ref 4–5.2)
SODIUM SERPL-SCNC: 138 MMOL/L (ref 137–147)
TRIGL SERPL-MCNC: 50 MG/DL
TSH SERPL DL<=0.05 MIU/L-ACNC: 1.56 UIU/ML (ref 0.47–4.68)
WBC # BLD AUTO: 3.3 THOUSAND/UL (ref 4.5–11)

## 2019-06-06 PROCEDURE — 82948 REAGENT STRIP/BLOOD GLUCOSE: CPT

## 2019-06-06 PROCEDURE — 99232 SBSQ HOSP IP/OBS MODERATE 35: CPT | Performed by: FAMILY MEDICINE

## 2019-06-06 PROCEDURE — 84443 ASSAY THYROID STIM HORMONE: CPT | Performed by: PHYSICIAN ASSISTANT

## 2019-06-06 PROCEDURE — 80053 COMPREHEN METABOLIC PANEL: CPT | Performed by: PHYSICIAN ASSISTANT

## 2019-06-06 PROCEDURE — 80061 LIPID PANEL: CPT | Performed by: PHYSICIAN ASSISTANT

## 2019-06-06 PROCEDURE — 85027 COMPLETE CBC AUTOMATED: CPT | Performed by: PHYSICIAN ASSISTANT

## 2019-06-06 RX ORDER — OLANZAPINE 5 MG/1
5 TABLET ORAL
Status: DISCONTINUED | OUTPATIENT
Start: 2019-06-06 | End: 2019-06-10

## 2019-06-06 RX ORDER — NICOTINE 21 MG/24HR
1 PATCH, TRANSDERMAL 24 HOURS TRANSDERMAL DAILY
Status: DISCONTINUED | OUTPATIENT
Start: 2019-06-06 | End: 2019-06-08

## 2019-06-06 RX ADMIN — LORAZEPAM 1 MG: 1 TABLET ORAL at 11:43

## 2019-06-06 RX ADMIN — OLANZAPINE 5 MG: 5 TABLET, FILM COATED ORAL at 18:02

## 2019-06-06 RX ADMIN — DOCUSATE SODIUM 100 MG: 100 CAPSULE, LIQUID FILLED ORAL at 18:02

## 2019-06-06 RX ADMIN — INSULIN LISPRO 4 UNITS: 100 INJECTION, SOLUTION INTRAVENOUS; SUBCUTANEOUS at 21:30

## 2019-06-06 RX ADMIN — INSULIN DETEMIR 17 UNITS: 100 INJECTION, SOLUTION SUBCUTANEOUS at 21:34

## 2019-06-06 NOTE — PROGRESS NOTES
Progress Note - José Miguel Client [birth year], 75 y o  female MRN: [id number]    Unit/Bed#: [facility] 200-18 Encounter: [id number]    Primary Care Provider: David Fish MD   Date and time admitted to hospital: 6/4/2019  2:35 PM        Cirrhosis of liver with ascites ([address] )  Assessment & Plan  Decompensated cirrhosis with ascites and lower extremity edema  Continue Lasix and Aldactone, increases  Patient reportedly refused paracentesis while on medical, now reporting she was just scared, consider paracentesis once behaviorally stable  Compression stockings and encourage leg elevation     patient probably was on hospice secondary to liver cirrhosis    Anemia of chronic disease  Assessment & Plan  Hemoglobin stable  Between 10-11    Type 2 diabetes mellitus, with long-term current use of insulin [facility])  Assessment & Plan  Lab Results   Component Value Date    HGBA1C 10 1 (H) 02/13/2019       Recent Labs     06/05/19  1559 06/05/19  2039 06/06/19  0704 06/06/19  1112   POCGLU 224* 176* 122 230*       Blood Sugar Average: Last 72 hrs:  (P) 45 [id number]    patient's appetite is variable  She frequently misses meals  Will continue current regimen along with insulin sliding scale  VTE Pharmacologic Prophylaxis:   Pharmacologic: Pharmacologic VTE Prophylaxis contraindicated due to Encourage ambulation  Mechanical VTE Prophylaxis in Place: Yes    Patient Centered Rounds: I have performed bedside rounds with nursing staff today  Discussions with Specialists or Other Care Team Provider:   None    Education and Discussions with Family / Patient:  Discussed with patient about hospital course    Time Spent for Care: 30 minutes  More than 50% of total time spent on counseling and coordination of care as described above      Current Length of Stay: 2 day(s)    Current Patient Status: Inpatient Psych   Certification Statement: The patient will continue to require additional inpatient hospital stay due to Behavioral health    Discharge Plan:  As per treatment team    Code Status: Level 3 - DNAR and DNI      Subjective:    patient was recently taken off hospice secondary to her uncontrolled bipolar disorder  She is starting to get a little calmer however her eating is still variable and she continues to refuse things easily  Hopefully once her bipolar disorder has been stabilized better she can resume hospice care    Objective:     Vitals:   Temp (24hrs), Av 8 °F (36 6 °C), Min:97 8 °F (36 6 °C), Max:97 8 °F (36 6 °C)    Temp:  [97 8 °F (36 6 °C)] 97 8 °F (36 6 °C)  HR:  [85] 85  Resp:  [17] 17  BP: (110)/(60) 110/60  SpO2:  [97 %] 97 %  Body mass index is 34 54 kg/m²  Input and Output Summary (last 24 hours): Intake/Output Summary (Last 24 hours) at 2019 1603  Last data filed at 2019 1231  Gross per 24 hour   Intake 1300 ml   Output    Net 1300 ml       Physical Exam:     Physical Exam   Constitutional: She appears well-developed and well-nourished  HENT:   Head: Normocephalic and atraumatic  Mouth/Throat: Oropharynx is clear and moist    Eyes: Conjunctivae and EOM are normal    Neck: Normal range of motion  Neck supple  Cardiovascular: Normal rate, regular rhythm and normal heart sounds  Pulmonary/Chest: Effort normal and breath sounds normal    Abdominal: Soft  Bowel sounds are normal  She exhibits distension  She exhibits no mass  There is no tenderness  There is no rebound and no guarding  Genitourinary:   Genitourinary Comments: deferred   Musculoskeletal: She exhibits edema  Neurological: She is alert  She has normal reflexes  Oriented to person and place   Skin: Skin is warm and dry  No rash noted  Psychiatric: She has a normal mood and affect  Nursing note and vitals reviewed          Additional Data:     Labs:    Results from last 7 days   Lab Units 19  0448 19  0523 19  1325   WBC Thousand/uL 3 30* 3 40* 3 00*   HEMOGLOBIN g/dL 10 6* 11 0* 11 5*   HEMATOCRIT % 32 7* 33 6* 34 5*   PLATELETS Thousands/uL 71* 76* 71*   BANDS PCT %  --  1  --    NEUTROS PCT %  --   --  69*   LYMPHS PCT %  --   --  18*   LYMPHO PCT %  --  28  --    MONOS PCT %  --   --  10   MONO PCT %  --  11*  --    EOS PCT %  --  9* 2     Results from last 7 days   Lab Units 06/06/19  0447   SODIUM mmol/L 138   POTASSIUM mmol/L 3 7   CHLORIDE mmol/L 109*   CO2 mmol/L 26   BUN mg/dL 12   CREATININE mg/dL 0 46*   ANION GAP mmol/L 3*   CALCIUM mg/dL 8 5   ALBUMIN g/dL 2 4*   TOTAL BILIRUBIN mg/dL 1 00   ALK PHOS U/L 103   ALT U/L 31   AST U/L 43*   GLUCOSE RANDOM mg/dL 158*     Results from last 7 days   Lab Units 06/04/19  1141   INR  1 26*     Results from last 7 days   Lab Units 06/06/19  1112 06/06/19  0704 06/05/19  2039 06/05/19  1559 06/05/19  1059 06/05/19  0756 06/05/19  0728 06/04/19  1954 06/04/19  1642 06/04/19  1117 06/04/19  0524 06/03/19 2012   POC GLUCOSE mg/dl 230* 122 176* 224* 207* 90 55* 261* 231* 273* 112 269*                   * I Have Reviewed All Lab Data Listed Above  * Additional Pertinent Lab Tests Reviewed:  [facility] Admission Reviewed    Imaging:    Imaging Reports Reviewed Today Include:  none  Imaging Personally Reviewed by Myself Includes:   none    Recent Cultures (last 7 days):           Last 24 Hours Medication List:     Current Facility-Administered Medications:  acetaminophen 650 mg Oral Q6H PRN Ezra Groves MD   buPROPion 150 mg Oral Daily Destiny Mota PA-C   docusate sodium 100 mg Oral BID Ezra Groves MD   furosemide 20 mg Oral Daily Ezra Groves MD   insulin detemir 17 Units Subcutaneous Q12H [facility] Cachorro Montana MD   LORazepam 1 mg Intramuscular Q6H PRN Destiny Mota PA-C   LORazepam 1 mg Oral Q6H PRN Destiny Mota PA-C   nicotine 1 patch Transdermal Daily Patricia Reich PA-C   OLANZapine 5 mg Intramuscular Q4H PRN Destiny Mota PA-C   OLANZapine 5 mg Oral Q4H PRN Destiny Mota PA-C   OLANZapine 5 mg Oral HS Patricia Luis Manuel Narayanan PA-C   ondansetron 4 mg Oral Q6H PRN Quintin Gilbert MD   spironolactone 25 mg Oral Daily Quintin Gilbert MD        Today, Patient Was Seen By: Christin Hinson MD    ** Please Note: Dictation voice to text software may have been used in the creation of this document   **

## 2019-06-06 NOTE — ASSESSMENT & PLAN NOTE
Decompensated cirrhosis with ascites and lower extremity edema  Continue Lasix and Aldactone, increases  Patient reportedly refused paracentesis while on medical, now reporting she was just scared, consider paracentesis once behaviorally stable  Compression stockings and encourage leg elevation     patient probably was on hospice secondary to liver cirrhosis

## 2019-06-06 NOTE — PROGRESS NOTES
Pt assisted to the bathroom, blood noted in the toilet after use  Pt stated it has been  An ongoing situation for over 2 years  Pt assessed and no opening or active bleeding noted  Pt reported that she usually bleeds annally or vaginally at times  SLIM made aware at this time

## 2019-06-06 NOTE — PROGRESS NOTES
Psychiatry Progress Note    Subjective: Interval History     Patient is lying in bed this morning  She is very irritable  Staff states she became aggressive yesterday and was swinging her walker  She was trying to break her window  She was unable to be redirected and had an IM of Zyprexa at 2033  Patient also had 2 po prns of ativan yesterday  Patient did sleep last night after prn Zyprexa  Patient refused her Wellbutrin yesterday morning but at lunchtime was asking for it which was given  Patient has a labile mood  She continues to have very poor insight  She is selective with her medications  She does not understand why she is in the hospital   She is not voicing suicidal or homicidal ideations    Patient states today that she is placed her "captive "    Behavior over the last 24 hours:  unchanged  Sleep: fair  Appetite: fair  Medication side effects: No  ROS: no complaints    Current medications:    Current Facility-Administered Medications:     acetaminophen (TYLENOL) tablet 650 mg, 650 mg, Oral, Q6H PRN, Orma Phoenix, MD, 650 mg at 06/04/19 2003    buPROPion (WELLBUTRIN XL) 24 hr tablet 150 mg, 150 mg, Oral, Daily, Gonzalo Naqvi PA-C, 150 mg at 06/05/19 1141    calcitonin (salmon) (MIACALCIN) 200 units/act nasal spray 1 spray, 1 spray, Alternating Nares, Daily, Orma Phoenix, MD    docusate sodium (COLACE) capsule 100 mg, 100 mg, Oral, BID, Orma Phoenix, MD, 100 mg at 06/05/19 1722    furosemide (LASIX) tablet 20 mg, 20 mg, Oral, Daily, Orma Phoenix, MD    insulin detemir (LEVEMIR) subcutaneous injection 17 Units, 17 Units, Subcutaneous, Q12H [address], Scotty Pineda MD, 17 Units at 06/05/19 2044    LORazepam (ATIVAN) 2 mg/mL injection 1 mg, 1 mg, Intramuscular, Q6H PRN, Gonzalo Naqvi PA-C    LORazepam (ATIVAN) tablet 1 mg, 1 mg, Oral, Q6H PRN, Gonzalo Naqvi PA-C, 1 mg at 06/05/19 1141    nicotine (NICODERM CQ) 14 mg/24hr TD 24 hr patch 1 patch, 1 patch, Transdermal, Daily, Andreas Schaeffer Aram Hagen MD    OLANZapine (ZyPREXA) IM injection 5 mg, 5 mg, Intramuscular, Q4H PRN, Merryl HANANE Gerber, 5 mg at 06/05/19 2033    OLANZapine (ZyPREXA) tablet 5 mg, 5 mg, Oral, Q4H PRN, Merryl FortHANANE mojica    OLANZapine (ZyPREXA) tablet 5 mg, 5 mg, Oral, HS, Patricia Reich PA-C    ondansetron (ZOFRAN-ODT) dispersible tablet 4 mg, 4 mg, Oral, Q6H PRN, Esperanza Loera MD    spironolactone (ALDACTONE) tablet 25 mg, 25 mg, Oral, Daily, Esperanza Loera MD    Current Problem List:    Patient Active Problem List   Diagnosis    Type 2 diabetes mellitus, with long-term current use of insulin ([facility]    Hyperlipidemia    Asthma    Anemia of chronic disease    Pancytopenia ([facility]    Bipolar 1 disorder ([facility]    Cirrhosis of liver with ascites ([facility]    Essential hypertension    GERD (gastroesophageal reflux disease)    Venous stasis    Ambulatory dysfunction    Closed compression fracture of L3 lumbar vertebra    Compression fracture of L3 lumbar vertebra    Closed compression fracture of third lumbar vertebra (HCC)    Fall    Diabetic polyneuropathy associated with type 2 diabetes mellitus ([facility]    Iron deficiency anemia    Status post fall - Ambulatory dysfunction    Discharged from Hospice for Suicidal Ideation, Medically Complex    ESRD (end stage renal disease) ([facility] )       Problem list reviewed 06/06/19     Objective:     Vital Signs:  Vitals:    06/04/19 2100 06/05/19 0710 06/05/19 1539 06/06/19 0705   BP: 139/63 130/60 144/62 110/60   BP Location: Left arm Right arm Left arm Left arm   Pulse: 86 78 86 85   Resp: 16 16 16 17   Temp: 97 8 °F (36 6 °C) (!) 97 °F (36 1 °C) 97 8 °F (36 6 °C) 97 8 °F (36 6 °C)   TempSrc: Tympanic Tympanic Tympanic Tympanic   SpO2: 99% 98% 100% 97%   Weight:       Height:             Appearance:  age appropriate, casually dressed and disheveled   Behavior:  evasive and guarded   Speech:  normal volume   Mood:  irritable and labile   Affect:  labile   Thought Process:  circumstantial   Thought Content:  delusions  persecutory   Perceptual Disturbances: None   Risk Potential: Potential for Aggression Yes yes   Sensorium:  person and place   Cognition:  grossly intact   Consciousness:  alert and awake    Attention: attention span and concentration were age appropriate   Intellect: average   Insight:  poor   Judgment: poor      Motor Activity: no abnormal movements       Labs:  Reviewed 06/06/19      Assessment / Plan:     Bipolar 1 disorder ([facility] )    Recommended Treatment:      Medication changes:  1) Change Zyprexa to 1900  Increase to 5mg HS  Non-pharmacological treatments  1) Continue with group therapy, milieu therapy and occupational therapy  Safety  1) Safety/communication plan established targeting dynamic risk factors above  2) Risks, benefits, and possible side effects of medications explained to patient and patient verbalizes understanding  Counseling / Coordination of Care    Total floor / unit time spent today 20 minutes  Greater than 50% of total time was spent with the patient and / or family counseling and / or coordination of care  A description of the counseling / coordination of care  Patient's Rights, confidentiality and exceptions to confidentiality, use of automated medical record, [facility] staff access to medical record, and consent to treatment reviewed      Seng Romo PA-C

## 2019-06-06 NOTE — CASE MANAGEMENT
Met with pt o  6/5 and she was mostly angry and minimally cooperative  Thsi AM she met with her  who represented her at her 17 with Tiffany Mcfarlane, [address]  Atty  Marbin Avelar excused pt from attending after he met with her  Dr  Kain Woodward testified and pt is now her for up to 20 days with [address] 6/26  Pt came to us from [address] [place] [address] after only being there a few hours  Her Hospice RN testified that pt had been living in her own apartment but was no longer able to live alone so she was admitted to [address] [place] [address]  Today, I have spoken to Brianne Fuentes at [address] [place] [address] [phone], and he reports pt is welcome to return at time of discharge  He will call back to let us know if she must be optioned prior to return  Pt is much more pleasant today  She requested reading glasses and was very appreciative when she was provided same  She was very excited that I was able to contact Yulisa Lino, but he explained on my return call that though he was her CM when he worked with [employer] he has since left there and now that he is working for [employer] he is no longer professionally involved  He then went on to say that since she has no family involvement he sometime will check in to help her out  I have spoken briefly to Yulisa Lino to l et him know that pt would love him to visit, and he says pt has a daughter, but the only topic of conversation between pt and daughter that occurs is insisting pt go to a nursing home and pt refuses

## 2019-06-06 NOTE — PROGRESS NOTES
Awake, alert, oriented, forgetful  Irritable edge at times, labile  Refused all AM medications including insulin  No aggressive behaviors observed at this time  Medicated with PRN Ativan as requested and ordered for anxiety at this time  Denies any SI at this time  Will continue to monitor

## 2019-06-06 NOTE — PROGRESS NOTES
Pt noted with increased agitation while in the room  Pt kept yelling, using inappropriate words towards staff  Pt climbing out bed, incompliant with bed/chair  While attempting to assist Pt with ambulation back to bed, Pt started swinging at staff, moving rapidly towards window and threatening to throw walker at staff while becoming unsteady with gait  Pt observed attempting to throw walker against the window and refuse sitting on the chair or going back to bed  Pt stated she will be heading to elevator to leave the building, continues to yelled and scream, calling out for police and rambling out names not understood  Pt given Zyprexa 5mg IM on R Deltoid  Pt asked for crackers and now in bed

## 2019-06-06 NOTE — ASSESSMENT & PLAN NOTE
Lab Results   Component Value Date    HGBA1C 10 1 (H) 02/13/2019       Recent Labs     06/05/19  1559 06/05/19 2039 06/06/19  0704 06/06/19  1112   POCGLU 224* 176* 122 230*       Blood Sugar Average: Last 72 hrs:  (P) 234 [id number]    patient's appetite is variable  She frequently misses meals  Will continue current regimen along with insulin sliding scale

## 2019-06-07 LAB
GLUCOSE SERPL-MCNC: 105 MG/DL (ref 65–140)
GLUCOSE SERPL-MCNC: 178 MG/DL (ref 65–140)
GLUCOSE SERPL-MCNC: 195 MG/DL (ref 65–140)
GLUCOSE SERPL-MCNC: 313 MG/DL (ref 65–140)

## 2019-06-07 PROCEDURE — 82948 REAGENT STRIP/BLOOD GLUCOSE: CPT

## 2019-06-07 RX ADMIN — OLANZAPINE 5 MG: 5 TABLET, FILM COATED ORAL at 20:42

## 2019-06-07 RX ADMIN — FUROSEMIDE 20 MG: 20 TABLET ORAL at 09:13

## 2019-06-07 RX ADMIN — DOCUSATE SODIUM 100 MG: 100 CAPSULE, LIQUID FILLED ORAL at 17:13

## 2019-06-07 RX ADMIN — NICOTINE 1 PATCH: 14 PATCH, EXTENDED RELEASE TRANSDERMAL at 09:13

## 2019-06-07 RX ADMIN — ACETAMINOPHEN 650 MG: 325 TABLET ORAL at 20:56

## 2019-06-07 RX ADMIN — INSULIN DETEMIR 17 UNITS: 100 INJECTION, SOLUTION SUBCUTANEOUS at 20:43

## 2019-06-07 RX ADMIN — INSULIN DETEMIR 17 UNITS: 100 INJECTION, SOLUTION SUBCUTANEOUS at 09:12

## 2019-06-07 RX ADMIN — SPIRONOLACTONE 25 MG: 25 TABLET, FILM COATED ORAL at 09:13

## 2019-06-07 RX ADMIN — INSULIN LISPRO 8 UNITS: 100 INJECTION, SOLUTION INTRAVENOUS; SUBCUTANEOUS at 17:55

## 2019-06-07 RX ADMIN — LORAZEPAM 1 MG: 1 TABLET ORAL at 20:56

## 2019-06-07 RX ADMIN — DOCUSATE SODIUM 100 MG: 100 CAPSULE, LIQUID FILLED ORAL at 09:13

## 2019-06-07 RX ADMIN — BUPROPION HYDROCHLORIDE 150 MG: 150 TABLET, FILM COATED, EXTENDED RELEASE ORAL at 09:13

## 2019-06-07 NOTE — PROGRESS NOTES
Currently observed in bed with bed alarm in place for safety  Appears to be resting calmly  Monitored on Q 7 minute safety checks  No behavior issues noted  Not voicing any SI's

## 2019-06-07 NOTE — PROGRESS NOTES
Pt present on the unit  Pt continues on contact precautions  Pt pleasant and no behaviors observed today  Pt compliant with accu checks and medication this shift

## 2019-06-07 NOTE — CASE MANAGEMENT
Called the  at [address] [place] [address], Herminia Sudha to let her know that pt is here in the hospital  Delmis expressed relief that Esperanza Atkinson is safe and being cared for, then concern that if pt is at [place], will she be returning and who will pay the rent that was due yesterday, or empty the apartment if pt is not returning  Delmis then explained that pt is on the Aging waiver and Sotero Saldivar is her  through [employer] [employer] [phone]  I called Breana and she reports pt is not able to care for self enough to return to Independent living and must return to [facility] will contact Delmis and [place] to make arrangements for the apartment to be emptied  I also questioned [facility] about a gold bracelet that pt is missing  Breana is familiar with the bracelet and will see that is is taken to [place] waiting for pt on her return  Jacqueline Cain called from [place], to let me know that this pt is indeed welcome top return to [place] when stable and will not need to be optioned

## 2019-06-07 NOTE — PLAN OF CARE
Problem: Alteration in Thoughts and Perception  Goal: Treatment Goal: Gain control of psychotic behaviors/thinking, reduce/eliminate presenting symptoms and demonstrate improved reality functioning upon discharge  Outcome: Progressing  Goal: Verbalize thoughts and feelings  Description  Interventions:  - Promote a nonjudgmental and trusting relationship with the patient through active listening and therapeutic communication  - Assess patient's level of functioning, behavior and potential for risk  - Engage patient in 1 on 1 interactions for a minimum of 15 minutes each session  - Encourage patient to express fears, feelings, frustrations, and discuss symptoms    - [place] patient to reality, help patient recognize reality-based thinking   - Administer medications as ordered and assess for potential side effects  - Provide the patient education related to the signs and symptoms of the illness and desired effects of prescribed medications  Outcome: Progressing  Goal: Refrain from acting on delusional thinking/internal stimuli  Description  Interventions:  - Monitor patient closely, per order   - Utilize least restrictive measures   - Set reasonable limits, give positive feedback for acceptable   - Administer medications as ordered and monitor of potential side effects  Outcome: Progressing  Goal: Agree to be compliant with medication regime, as prescribed and report medication side effects  Description  Interventions:  - Offer appropriate PRN medication and supervise ingestion; conduct aims, as needed   Outcome: Progressing  Goal: Recognize dysfunctional thoughts, communicate reality-based thoughts at the time of discharge  Description  Interventions:  - Provide medication and psycho-education to assist patient in compliance and developing insight into his/her illness   Outcome: Progressing  Goal: Complete daily ADLs, including personal hygiene independently, as able  Description  Interventions:  - Observe, teach, and assist patient with ADLS  - Monitor and promote a balance of rest/activity, with adequate nutrition and elimination   Outcome: Progressing     Problem: Risk for Self Injury/Neglect  Goal: Treatment Goal: Remain safe during length of stay, learn and adopt new coping skills, and be free of self-injurious ideation, impulses and acts at the time of discharge  Outcome: Progressing  Goal: Verbalize thoughts and feelings  Description  Interventions:  - Assess and re-assess patient's lethality and potential for self-injury  - Engage patient in 1:1 interactions, daily, for a minimum of 15 minutes  - Encourage patient to express feelings, fears, frustrations, hopes  - Establish rapport/trust with patient   Outcome: Progressing  Goal: Refrain from harming self  Description  Interventions:  - Monitor patient closely, per order  - Develop a trusting relationship  - Supervise medication ingestion, monitor effects and side effects   Outcome: Progressing  Goal: Recognize maladaptive responses and adopt new coping mechanisms  Outcome: Progressing  Goal: Complete daily ADLs, including personal hygiene independently, as able  Description  Interventions:  - Observe, teach, and assist patient with ADLS  - Monitor and promote a balance of rest/activity, with adequate nutrition and elimination  Outcome: Progressing     Problem: Prexisting or High Potential for Compromised Skin Integrity  Goal: Skin integrity is maintained or improved  Description  INTERVENTIONS:  - Identify patients at risk for skin breakdown  - Assess and monitor skin integrity  - Assess and monitor nutrition and hydration status  - Monitor labs (i e  albumin)  - Assess for incontinence   - Turn and reposition patient  - Assist with mobility/ambulation  - Relieve pressure over bony prominences  - Avoid friction and shearing  - Provide appropriate hygiene as needed including keeping skin clean and dry  - Evaluate need for skin moisturizer/barrier cream  - Collaborate with interdisciplinary team (i e  Nutrition, Rehabilitation, etc )   - Patient/family teaching  Outcome: Progressing

## 2019-06-07 NOTE — PROGRESS NOTES
Psychiatry Progress Note    Subjective: Interval History     Patient seen resting comfortably during breakfast this morning  She appears less irritable and less agitated this morning compared to yesterday  She did require several p r n  Medications yesterday for increased agitation and mood lability  This morning she is more cooperative and pleasant    She has no new issues or complaints to offer me at this time    Behavior over the last 24 hours:  unchanged  Sleep: fair  Appetite: fair  Medication side effects: No  ROS: no complaints    Current medications:    Current Facility-Administered Medications:     acetaminophen (TYLENOL) tablet 650 mg, 650 mg, Oral, Q6H PRN, Penny Ruth MD, 650 mg at 06/04/19 2003    buPROPion (WELLBUTRIN XL) 24 hr tablet 150 mg, 150 mg, Oral, Daily, Misa Yin PA-C, 150 mg at 06/07/19 0913    docusate sodium (COLACE) capsule 100 mg, 100 mg, Oral, BID, Penny Ruth MD, 100 mg at 06/07/19 0913    furosemide (LASIX) tablet 20 mg, 20 mg, Oral, Daily, Penny Ruth MD, 20 mg at 06/07/19 0913    insulin detemir (LEVEMIR) subcutaneous injection 17 Units, 17 Units, Subcutaneous, Q12H [address], Todd Gamboa MD, 17 Units at 06/07/19 0912    insulin lispro (HumaLOG) 100 units/mL subcutaneous injection 1-5 Units, 1-5 Units, Subcutaneous, HS, Moni Thomson PA-C, 4 Units at 06/06/19 2130    LORazepam (ATIVAN) 2 mg/mL injection 1 mg, 1 mg, Intramuscular, Q6H PRN, Misa Yin PA-C    LORazepam (ATIVAN) tablet 1 mg, 1 mg, Oral, Q6H PRN, Misa Yin PA-C, 1 mg at 06/06/19 1143    nicotine (NICODERM CQ) 14 mg/24hr TD 24 hr patch 1 patch, 1 patch, Transdermal, Daily, Misa Yin PA-C, 1 patch at 06/07/19 0913    OLANZapine (ZyPREXA) IM injection 5 mg, 5 mg, Intramuscular, Q4H PRN, Misa Yin PA-C, 5 mg at 06/05/19 2033    OLANZapine (ZyPREXA) tablet 5 mg, 5 mg, Oral, Q4H PRN, Misa Yin PA-C    OLANZapine (ZyPREXA) tablet 5 mg, 5 mg, Oral, HS, Saima Melton PA-C, 5 mg at 06/06/19 1802    ondansetron (ZOFRAN-ODT) dispersible tablet 4 mg, 4 mg, Oral, Q6H PRN, Aaron Allison MD    spironolactone (ALDACTONE) tablet 25 mg, 25 mg, Oral, Daily, Aaron Allison MD, 25 mg at 06/07/19 0913    Current Problem List:    Patient Active Problem List   Diagnosis    Type 2 diabetes mellitus, with long-term current use of insulin (HCC)    Hyperlipidemia    Asthma    Anemia of chronic disease    [facility]    [facility]    Cirrhosis of liver with ascites ([facility] )    Essential hypertension    GERD (gastroesophageal reflux disease)    Venous stasis    Ambulatory dysfunction    Closed compression fracture of L3 lumbar vertebra    Compression fracture of L3 lumbar vertebra    Closed compression fracture of third lumbar vertebra (HCC)    Fall    Diabetic polyneuropathy associated with type 2 diabetes mellitus ([facility] )    Iron deficiency anemia    Status post fall - Ambulatory dysfunction    Discharged from Hospice for Suicidal Ideation, Medically Complex    ESRD (end stage renal disease) ([facility] )       Problem list reviewed 06/07/19     Objective:     Vital Signs:  Vitals:    06/05/19 1539 06/06/19 0705 06/06/19 2030 06/07/19 0730   BP: 144/62 110/60 124/56 110/60   BP Location: Left arm Left arm Left arm Right arm   Pulse: 86 85 81 78   Resp: 16 17 18 17   Temp: 97 8 °F (36 6 °C) 97 8 °F (36 6 °C) 98 7 °F (37 1 °C) 98 °F (36 7 °C)   TempSrc: Tympanic Tympanic Temporal Tympanic   SpO2: 100% 97% 97% 100%   Weight:       Height:             Appearance:  age appropriate, casually dressed and disheveled   Behavior:  evasive and guarded   Speech:  normal volume   Mood:  irritable and labile   Affect:  labile   Thought Process:  circumstantial   Thought Content:  delusions  persecutory   Perceptual Disturbances: None   Risk Potential: Potential for Aggression Yes yes   Sensorium:  person and place   Cognition:  grossly intact   Consciousness: alert and awake    Attention: attention span and concentration were age appropriate   Intellect: average   Insight:  poor   Judgment: poor      Motor Activity: no abnormal movements       Labs:  Reviewed 06/07/19      Assessment / Plan:     Bipolar 1 disorder ([facility] )    Recommended Treatment:      Medication changes:  1) monitor on recent medication adjustments    Non-pharmacological treatments  1) Continue with group therapy, milieu therapy and occupational therapy  Safety  1) Safety/communication plan established targeting dynamic risk factors above  2) Risks, benefits, and possible side effects of medications explained to patient and patient verbalizes understanding  Counseling / Coordination of Care    Total floor / unit time spent today 20 minutes  Greater than 50% of total time was spent with the patient and / or family counseling and / or coordination of care  A description of the counseling / coordination of care  Patient's Rights, confidentiality and exceptions to confidentiality, use of automated medical record, [facility] staff access to medical record, and consent to treatment reviewed      Tom Sharpe PA-C

## 2019-06-08 LAB
ALBUMIN SERPL BCP-MCNC: 2.4 G/DL (ref 3–5.2)
ALP SERPL-CCNC: 114 U/L (ref 43–122)
ALT SERPL W P-5'-P-CCNC: 37 U/L (ref 9–52)
AMMONIA PLAS-SCNC: 27 UMOL/L (ref 9–33)
ANION GAP SERPL CALCULATED.3IONS-SCNC: 3 MMOL/L (ref 5–14)
AST SERPL W P-5'-P-CCNC: 43 U/L (ref 14–36)
BASE EX.OXY STD BLDV CALC-SCNC: 93.8 %
BASE EXCESS BLDV CALC-SCNC: 0.8 MMOL/L (ref -2.1–2.1)
BASOPHILS # BLD AUTO: 0 THOUSANDS/ΜL (ref 0–0.1)
BASOPHILS NFR BLD AUTO: 1 % (ref 0–1)
BILIRUB SERPL-MCNC: 1.4 MG/DL
BUN SERPL-MCNC: 13 MG/DL (ref 5–25)
CALCIUM SERPL-MCNC: 8.4 MG/DL (ref 8.4–10.2)
CHLORIDE SERPL-SCNC: 107 MMOL/L (ref 97–108)
CO2 SERPL-SCNC: 26 MMOL/L (ref 22–30)
CREAT SERPL-MCNC: 0.42 MG/DL (ref 0.6–1.2)
EOSINOPHIL # BLD AUTO: 0 THOUSAND/ΜL (ref 0–0.4)
EOSINOPHIL NFR BLD AUTO: 1 % (ref 0–6)
ERYTHROCYTE [DISTWIDTH] IN BLOOD BY AUTOMATED COUNT: 17.1 %
ERYTHROCYTE [DISTWIDTH] IN BLOOD BY AUTOMATED COUNT: 17.5 %
GFR SERPL CREATININE-BSD FRML MDRD: 101 ML/MIN/1.73SQ M
GLUCOSE P FAST SERPL-MCNC: 192 MG/DL (ref 70–99)
GLUCOSE SERPL-MCNC: 105 MG/DL (ref 65–140)
GLUCOSE SERPL-MCNC: 117 MG/DL (ref 65–140)
GLUCOSE SERPL-MCNC: 175 MG/DL (ref 65–140)
GLUCOSE SERPL-MCNC: 192 MG/DL (ref 70–99)
GLUCOSE SERPL-MCNC: 218 MG/DL (ref 65–140)
GLUCOSE SERPL-MCNC: 248 MG/DL (ref 65–140)
GLUCOSE SERPL-MCNC: 290 MG/DL (ref 65–140)
GLUCOSE SERPL-MCNC: 293 MG/DL (ref 65–140)
GLUCOSE SERPL-MCNC: 42 MG/DL (ref 65–140)
HCO3 BLDV-SCNC: 26 MMOL/L (ref 23–28)
HCT VFR BLD AUTO: 32.3 % (ref 36–46)
HCT VFR BLD AUTO: 34.6 % (ref 36–46)
HGB BLD-MCNC: 10.9 G/DL (ref 12–16)
HGB BLD-MCNC: 11.5 G/DL (ref 12–16)
LYMPHOCYTES # BLD AUTO: 0.7 THOUSANDS/ΜL (ref 0.5–4)
LYMPHOCYTES NFR BLD AUTO: 14 % (ref 25–45)
MAGNESIUM SERPL-MCNC: 1.7 MG/DL (ref 1.6–2.3)
MCH RBC QN AUTO: 31.5 PG (ref 26–34)
MCH RBC QN AUTO: 31.6 PG (ref 26–34)
MCHC RBC AUTO-ENTMCNC: 33.2 G/DL (ref 31–36)
MCHC RBC AUTO-ENTMCNC: 33.6 G/DL (ref 31–36)
MCV RBC AUTO: 94 FL (ref 80–100)
MCV RBC AUTO: 95 FL (ref 80–100)
MONOCYTES # BLD AUTO: 0.4 THOUSAND/ΜL (ref 0.2–0.9)
MONOCYTES NFR BLD AUTO: 8 % (ref 1–10)
NEUTROPHILS # BLD AUTO: 3.8 THOUSANDS/ΜL (ref 1.8–7.8)
NEUTS SEG NFR BLD AUTO: 77 % (ref 45–65)
O2 CT BLDV-SCNC: 15 ML/DL
PCO2 BLDV: 43 MM HG (ref 41–51)
PH BLDV: 7.39 [PH] (ref 7.35–7.45)
PLATELET # BLD AUTO: 86 THOUSANDS/UL (ref 150–450)
PLATELET # BLD AUTO: 89 THOUSANDS/UL (ref 150–450)
PLATELET BLD QL SMEAR: ABNORMAL
PMV BLD AUTO: 9.2 FL (ref 8.9–12.7)
PMV BLD AUTO: 9.3 FL (ref 8.9–12.7)
PO2 BLDV: 112 MM HG
POTASSIUM SERPL-SCNC: 3.1 MMOL/L (ref 3.6–5)
PROT SERPL-MCNC: 5.4 G/DL (ref 5.9–8.4)
RBC # BLD AUTO: 3.43 MILLION/UL (ref 4–5.2)
RBC # BLD AUTO: 3.64 MILLION/UL (ref 4–5.2)
RBC MORPH BLD: NORMAL
SODIUM SERPL-SCNC: 136 MMOL/L (ref 137–147)
WBC # BLD AUTO: 4.9 THOUSAND/UL (ref 4.5–11)
WBC # BLD AUTO: 6 THOUSAND/UL (ref 4.5–11)

## 2019-06-08 PROCEDURE — 85027 COMPLETE CBC AUTOMATED: CPT | Performed by: FAMILY MEDICINE

## 2019-06-08 PROCEDURE — 83735 ASSAY OF MAGNESIUM: CPT | Performed by: PHYSICIAN ASSISTANT

## 2019-06-08 PROCEDURE — 85025 COMPLETE CBC W/AUTO DIFF WBC: CPT | Performed by: PHYSICIAN ASSISTANT

## 2019-06-08 PROCEDURE — 82140 ASSAY OF AMMONIA: CPT | Performed by: PHYSICIAN ASSISTANT

## 2019-06-08 PROCEDURE — 82805 BLOOD GASES W/O2 SATURATION: CPT | Performed by: PHYSICIAN ASSISTANT

## 2019-06-08 PROCEDURE — 82948 REAGENT STRIP/BLOOD GLUCOSE: CPT

## 2019-06-08 PROCEDURE — 80053 COMPREHEN METABOLIC PANEL: CPT | Performed by: PHYSICIAN ASSISTANT

## 2019-06-08 RX ORDER — POTASSIUM CHLORIDE 20 MEQ/1
40 TABLET, EXTENDED RELEASE ORAL ONCE
Status: COMPLETED | OUTPATIENT
Start: 2019-06-08 | End: 2019-06-08

## 2019-06-08 RX ORDER — DEXTROSE MONOHYDRATE 25 G/50ML
50 INJECTION, SOLUTION INTRAVENOUS ONCE
Status: DISCONTINUED | OUTPATIENT
Start: 2019-06-08 | End: 2019-06-23

## 2019-06-08 RX ORDER — DEXTROSE MONOHYDRATE 25 G/50ML
INJECTION, SOLUTION INTRAVENOUS
Status: COMPLETED
Start: 2019-06-08 | End: 2019-06-08

## 2019-06-08 RX ADMIN — OLANZAPINE 5 MG: 5 TABLET, FILM COATED ORAL at 18:14

## 2019-06-08 RX ADMIN — POTASSIUM CHLORIDE 40 MEQ: 20 TABLET, EXTENDED RELEASE ORAL at 06:19

## 2019-06-08 RX ADMIN — DOCUSATE SODIUM 100 MG: 100 CAPSULE, LIQUID FILLED ORAL at 17:09

## 2019-06-08 RX ADMIN — SPIRONOLACTONE 25 MG: 25 TABLET, FILM COATED ORAL at 08:44

## 2019-06-08 RX ADMIN — INSULIN LISPRO 4 UNITS: 100 INJECTION, SOLUTION INTRAVENOUS; SUBCUTANEOUS at 17:01

## 2019-06-08 RX ADMIN — INSULIN DETEMIR 17 UNITS: 100 INJECTION, SOLUTION SUBCUTANEOUS at 09:27

## 2019-06-08 RX ADMIN — LORAZEPAM 1 MG: 1 TABLET ORAL at 22:47

## 2019-06-08 RX ADMIN — BUPROPION HYDROCHLORIDE 150 MG: 150 TABLET, FILM COATED, EXTENDED RELEASE ORAL at 08:43

## 2019-06-08 RX ADMIN — DOCUSATE SODIUM 100 MG: 100 CAPSULE, LIQUID FILLED ORAL at 08:43

## 2019-06-08 RX ADMIN — FUROSEMIDE 20 MG: 20 TABLET ORAL at 08:44

## 2019-06-08 RX ADMIN — INSULIN DETEMIR 10 UNITS: 100 INJECTION, SOLUTION SUBCUTANEOUS at 21:55

## 2019-06-08 RX ADMIN — INSULIN LISPRO 6 UNITS: 100 INJECTION, SOLUTION INTRAVENOUS; SUBCUTANEOUS at 11:47

## 2019-06-08 NOTE — PROGRESS NOTES
Re-assessed patient s/p rapid response at bedside, also reviewed STAT labs obtained at that time  CBC with chronic but stable thrombocytopenia and anemia, CMP with hypokalemia (magnesium normal), ammonia normal, and VBG WNL  Repeat accuchek 117  Patient is awake and alert, answers questions appropriately  Requesting to eat  Will replete with 40 mEq PO potassium now, re-check in AM tomorrow  Discussed with nursing

## 2019-06-08 NOTE — PROGRESS NOTES
Psychiatry Progress Note    Subjective: Interval History     Patient this morning was a rapid response  When staff approached patient's room to have laboratory studies completed patient was unresponsive  Rapid response was called and patient's blood sugar was found to be 42  Patient was provided intervention and also supplementation for hypokalemia  Patient's blood pressure remained stable throughout her rapid response in her blood sugar appropriately increased  Patient remains on contact precautions this morning due to urine  Patient this morning is alert and oriented x3  Patient however reporting that she is confused about what had occurred this morning  Patient was provided Education about her hypoglycemia and lack of response needing medical intervention  Patient reporting that she is feeling well physically now and able to eat her breakfast with no difficulty  Patient denying any depression or suicidal ideations  Denying any auditory visual hallucinations      Behavior over the last 24 hours:  unchanged  Sleep: normal  Appetite: normal  Medication side effects: No  ROS: no complaints    Current medications:    Current Facility-Administered Medications:     acetaminophen (TYLENOL) tablet 650 mg, 650 mg, Oral, Q6H PRN, Cami Alejandre MD, 650 mg at 06/07/19 2056    buPROPion (WELLBUTRIN XL) 24 hr tablet 150 mg, 150 mg, Oral, Daily, Patricia Reich PA-C, 150 mg at 06/07/19 0913    dextrose 50 % IV solution 50 mL, 50 mL, Intravenous, Once, Lissy Caba PA-C    docusate sodium (COLACE) capsule 100 mg, 100 mg, Oral, BID, Cami Alejandre MD, 100 mg at 06/07/19 1713    furosemide (LASIX) tablet 20 mg, 20 mg, Oral, Daily, Cami Alejandre MD, 20 mg at 06/07/19 0913    insulin detemir (LEVEMIR) subcutaneous injection 17 Units, 17 Units, Subcutaneous, Q12H [address], Renny Salmeron MD, 17 Units at 06/07/19 2043    insulin lispro (HumaLOG) 100 units/mL subcutaneous injection 2-12 Units, 2-12 Units, Subcutaneous, TID AC, 8 Units at 06/07/19 1755 **AND** Fingerstick Glucose (POCT), , , TID AC, Holger James MD    LORazepam (ATIVAN) 2 mg/mL injection 1 mg, 1 mg, Intramuscular, Q6H PRN, Wanna Ates, PA-C    LORazepam (ATIVAN) tablet 1 mg, 1 mg, Oral, Q6H PRN, Wanna Ates, PA-C, 1 mg at 06/07/19 2056    nicotine (NICODERM CQ) 14 mg/24hr TD 24 hr patch 1 patch, 1 patch, Transdermal, Daily, Wanna Ates, PA-C, 1 patch at 06/07/19 0913    OLANZapine (ZyPREXA) IM injection 5 mg, 5 mg, Intramuscular, Q4H PRN, Wanna Ates, PA-C, 5 mg at 06/05/19 2033    OLANZapine (ZyPREXA) tablet 5 mg, 5 mg, Oral, Q4H PRN, Wanna Ates, PA-C    OLANZapine (ZyPREXA) tablet 5 mg, 5 mg, Oral, HS, Patricia Reich, PA-C, 5 mg at 06/07/19 2042    ondansetron (ZOFRAN-ODT) dispersible tablet 4 mg, 4 mg, Oral, Q6H PRN, Zully Roldan MD    spironolactone (ALDACTONE) tablet 25 mg, 25 mg, Oral, Daily, Zully Roldan MD, 25 mg at 06/07/19 0913    Current Problem List:    Patient Active Problem List   Diagnosis    Type 2 diabetes mellitus, with long-term current use of insulin ([facility] )    Hyperlipidemia    Asthma    Anemia of chronic disease    Pancytopenia ([facility] )    Bipolar 1 disorder ([facility]    Cirrhosis of liver with ascites ([facility] )    Essential hypertension    GERD (gastroesophageal reflux disease)    Venous stasis    Ambulatory dysfunction    Closed compression fracture of L3 lumbar vertebra    Compression fracture of L3 lumbar vertebra    Closed compression fracture of third lumbar vertebra ([address] )    Fall    Diabetic polyneuropathy associated with type 2 diabetes mellitus ([address] )    Iron deficiency anemia    Status post fall - Ambulatory dysfunction    Discharged from Hospice for Suicidal Ideation, Medically Complex    ESRD (end stage renal disease) ([facility] [address] )       Problem list reviewed 06/08/19     Objective:     Vital Signs:  Vitals:    06/08/19 0450 06/08/19 0500 06/08/19 0601 06/08/19 0647   BP: 125/58 115/56 143/65 140/61   BP Location: Right arm Right arm Left arm Right arm   Pulse: 68 62 65 72   Resp: 16 16 18 18   Temp: 98 4 °F (36 9 °C) 98 4 °F (36 9 °C) (!) 97 1 °F (36 2 °C) 98 °F (36 7 °C)   TempSrc: Temporal Temporal Temporal Temporal   SpO2: 98% 98% 99% 99%   Weight:       Height:             Appearance:  age appropriate and casually dressed   Behavior:  normal   Speech:  normal volume   Mood:  constricted   Affect:  constricted   Thought Process:  normal   Thought Content:  normal   Perceptual Disturbances: None   Risk Potential: none   Sensorium:  person, place and time   Cognition:  intact   Consciousness:  alert and awake    Attention: attention span and concentration were age appropriate   Intellect: average   Insight:  limited   Judgment: limited      Motor Activity: no abnormal movements       I/O Past 24 hours:  I/O last 3 completed shifts: In: 1440 [P O :1440]  Out: -   No intake/output data recorded  Labs:  Reviewed 06/08/19    Progress Toward Goals:  Unchanged    Assessment / Plan:     Bipolar 1 disorder ([facility]    Recommended Treatment:      Medication changes:  1) continue current medication regimen    Non-pharmacological treatments  1) Continue with group therapy, milieu therapy and occupational therapy  Safety  1) Safety/communication plan established targeting dynamic risk factors above  2) Risks, benefits, and possible side effects of medications explained to patient and patient verbalizes understanding  Counseling / Coordination of Care    Total floor / unit time spent today 20 minutes  Greater than 50% of total time was spent with the patient and / or family counseling and / or coordination of care  A description of the counseling / coordination of care       Patient's Rights, confidentiality and exceptions to confidentiality, use of automated medical record, [facility] staff access to medical record, and consent to treatment reviewed      Delana Gosselin, PA-C

## 2019-06-08 NOTE — PROGRESS NOTES
Patient is in her bed  She was pleasant and cooperative this evening  She was given ativan for anxiety 17 on diana scale and tylenol for back and hip pain at 2056  With relief  Patient's vitals are stable  Will  Continue to monitor on q 7 minute checks  Patient has bed alarm on for safety

## 2019-06-08 NOTE — PROGRESS NOTES
Patient was found minimal response by mht when blood work was going to be done  Rapid response called  Vitals stable  Blood sugar was 42  Iv started  Amp given  Blood sugar then 175  Blood pressure 136/71 heart rate 74 98% on room air  resp 20  All vitals are charted  Blood work done  Rapid sheet filled out  Lab results reviewed by hung  Patient remains on the unit  Will check blood sugar and vitals at 6 am  Will continue to monitor on q 7 minute  Bed alarm is on

## 2019-06-08 NOTE — QUICK NOTE
Rapid Response Note:      A rapid response was called at 0443  upon arrival to the room the pt was on the bed unresponsive to verbal or painful stimuli  Initial vitals: HR 98, /52, Pulse ox 96% room air, Accu-Chek 42  Physical exam:   HEENT: PERRLA, No JVD  Chest: CTA B/L   Heart: RRR, S1/S2, no murmur or gallop  Abdomen: Distended, soft, not tense  Ext:  LE edema, + pedal pulses  Neuro: Unresponsive to verbal       Accu-Chek was 42   D50 administered glucose 175 afterwards   Pt condition improved  Conversing, able to follow commands   Stat orders verbally placed CBC, CMP, Ammonia VBG     RRT was attended by Dr Ike Felix, myself, and other nursing staff

## 2019-06-08 NOTE — NURSING NOTE
Patient is compliant with medications and meals  Patient is on contact precautions and stays in her room due to same  No complaints of pain or discomfort  Patient's accu check at 0700 was 105 at at 1100 was 293  All insulin given as ordered with no s/s of hypo/hyperglycemia noted this shift  Patient denies suicidal ideation and reports she is aware of why she is in the hospital   Patient said she remembered drinking "shampoo" but was not really trying to hurt herself at the time  Patient's appetite is good with patient eating 100% of meals  No other issues noted at this time

## 2019-06-08 NOTE — PLAN OF CARE
Problem: Alteration in Thoughts and Perception  Goal: Treatment Goal: Gain control of psychotic behaviors/thinking, reduce/eliminate presenting symptoms and demonstrate improved reality functioning upon discharge  Outcome: Progressing  Goal: Verbalize thoughts and feelings  Description  Interventions:  - Promote a nonjudgmental and trusting relationship with the patient through active listening and therapeutic communication  - Assess patient's level of functioning, behavior and potential for risk  - Engage patient in 1 on 1 interactions for a minimum of 15 minutes each session  - Encourage patient to express fears, feelings, frustrations, and discuss symptoms    - Spearman patient to reality, help patient recognize reality-based thinking   - Administer medications as ordered and assess for potential side effects  - Provide the patient education related to the signs and symptoms of the illness and desired effects of prescribed medications  Outcome: Progressing  Goal: Refrain from acting on delusional thinking/internal stimuli  Description  Interventions:  - Monitor patient closely, per order   - Utilize least restrictive measures   - Set reasonable limits, give positive feedback for acceptable   - Administer medications as ordered and monitor of potential side effects  Outcome: Progressing  Goal: Agree to be compliant with medication regime, as prescribed and report medication side effects  Description  Interventions:  - Offer appropriate PRN medication and supervise ingestion; conduct aims, as needed   Outcome: Progressing  Goal: Attend and participate in unit activities, including therapeutic, recreational, and educational groups  Description  Interventions:  - Provide therapeutic and educational activities daily, encourage attendance and participation, and document same in the medical record   Outcome: Progressing  Goal: Recognize dysfunctional thoughts, communicate reality-based thoughts at the time of discharge  Description  Interventions:  - Provide medication and psycho-education to assist patient in compliance and developing insight into his/her illness   Outcome: Progressing  Goal: Complete daily ADLs, including personal hygiene independently, as able  Description  Interventions:  - Observe, teach, and assist patient with ADLS  - Monitor and promote a balance of rest/activity, with adequate nutrition and elimination   Outcome: Progressing     Problem: Risk for Self Injury/Neglect  Goal: Treatment Goal: Remain safe during length of stay, learn and adopt new coping skills, and be free of self-injurious ideation, impulses and acts at the time of discharge  Outcome: Progressing  Goal: Verbalize thoughts and feelings  Description  Interventions:  - Assess and re-assess patient's lethality and potential for self-injury  - Engage patient in 1:1 interactions, daily, for a minimum of 15 minutes  - Encourage patient to express feelings, fears, frustrations, hopes  - Establish rapport/trust with patient   Outcome: Progressing  Goal: Refrain from harming self  Description  Interventions:  - Monitor patient closely, per order  - Develop a trusting relationship  - Supervise medication ingestion, monitor effects and side effects   Outcome: Progressing  Goal: Attend and participate in unit activities, including therapeutic, recreational, and educational groups  Description  Interventions:  - Provide therapeutic and educational activities daily, encourage attendance and participation, and document same in the medical record  - Obtain collateral information, encourage visitation and family involvement in care   Outcome: Progressing  Goal: Recognize maladaptive responses and adopt new coping mechanisms  Outcome: Progressing  Goal: Complete daily ADLs, including personal hygiene independently, as able  Description  Interventions:  - Observe, teach, and assist patient with ADLS  - Monitor and promote a balance of rest/activity, with adequate nutrition and elimination  Outcome: Progressing     Problem: DISCHARGE PLANNING  Goal: Discharge to home or other facility with appropriate resources  Description  INTERVENTIONS:  - Identify barriers to discharge w/patient and caregiver  - Arrange for needed discharge resources and transportation as appropriate  - Identify discharge learning needs (meds, wound care, etc )  - Arrange for interpretive services to assist at discharge as needed  - Refer to Case Management Department for coordinating discharge planning if the patient needs post-hospital services based on physician/advanced practitioner order or complex needs related to functional status, cognitive ability, or social support system  Outcome: Progressing     Problem: Prexisting or High Potential for Compromised Skin Integrity  Goal: Skin integrity is maintained or improved  Description  INTERVENTIONS:  - Identify patients at risk for skin breakdown  - Assess and monitor skin integrity  - Assess and monitor nutrition and hydration status  - Monitor labs (i e  albumin)  - Assess for incontinence   - Turn and reposition patient  - Assist with mobility/ambulation  - Relieve pressure over bony prominences  - Avoid friction and shearing  - Provide appropriate hygiene as needed including keeping skin clean and dry  - Evaluate need for skin moisturizer/barrier cream  - Collaborate with interdisciplinary team (i e  Nutrition, Rehabilitation, etc )   - Patient/family teaching  Outcome: Progressing

## 2019-06-09 LAB
ANION GAP SERPL CALCULATED.3IONS-SCNC: 5 MMOL/L (ref 5–14)
BUN SERPL-MCNC: 11 MG/DL (ref 5–25)
CALCIUM SERPL-MCNC: 8.7 MG/DL (ref 8.4–10.2)
CHLORIDE SERPL-SCNC: 107 MMOL/L (ref 97–108)
CO2 SERPL-SCNC: 27 MMOL/L (ref 22–30)
CREAT SERPL-MCNC: 0.42 MG/DL (ref 0.6–1.2)
GFR SERPL CREATININE-BSD FRML MDRD: 101 ML/MIN/1.73SQ M
GLUCOSE P FAST SERPL-MCNC: 156 MG/DL (ref 70–99)
GLUCOSE SERPL-MCNC: 152 MG/DL (ref 65–140)
GLUCOSE SERPL-MCNC: 156 MG/DL (ref 65–140)
GLUCOSE SERPL-MCNC: 156 MG/DL (ref 70–99)
GLUCOSE SERPL-MCNC: 162 MG/DL (ref 65–140)
GLUCOSE SERPL-MCNC: 176 MG/DL (ref 65–140)
GLUCOSE SERPL-MCNC: 221 MG/DL (ref 65–140)
GLUCOSE SERPL-MCNC: 281 MG/DL (ref 65–140)
POTASSIUM SERPL-SCNC: 3.7 MMOL/L (ref 3.6–5)
SODIUM SERPL-SCNC: 139 MMOL/L (ref 137–147)

## 2019-06-09 PROCEDURE — 82948 REAGENT STRIP/BLOOD GLUCOSE: CPT

## 2019-06-09 PROCEDURE — 80048 BASIC METABOLIC PNL TOTAL CA: CPT | Performed by: PHYSICIAN ASSISTANT

## 2019-06-09 RX ADMIN — OLANZAPINE 5 MG: 5 TABLET, FILM COATED ORAL at 19:53

## 2019-06-09 RX ADMIN — ACETAMINOPHEN 650 MG: 325 TABLET ORAL at 10:37

## 2019-06-09 RX ADMIN — INSULIN LISPRO 2 UNITS: 100 INJECTION, SOLUTION INTRAVENOUS; SUBCUTANEOUS at 12:15

## 2019-06-09 RX ADMIN — INSULIN LISPRO 2 UNITS: 100 INJECTION, SOLUTION INTRAVENOUS; SUBCUTANEOUS at 08:19

## 2019-06-09 RX ADMIN — BUPROPION HYDROCHLORIDE 150 MG: 150 TABLET, FILM COATED, EXTENDED RELEASE ORAL at 08:52

## 2019-06-09 RX ADMIN — INSULIN DETEMIR 17 UNITS: 100 INJECTION, SOLUTION SUBCUTANEOUS at 08:52

## 2019-06-09 RX ADMIN — INSULIN DETEMIR 10 UNITS: 100 INJECTION, SOLUTION SUBCUTANEOUS at 22:03

## 2019-06-09 NOTE — NURSING NOTE
Patient is compliant with medications and meals  Appetite is good for breakfast with patient eating 100% of same  Patient came out of room and started to enter the dayroom when she was redirected by staff for contact precautions for VRE in the urine  Patient questioned why she had to stay in her room and patient was educated on VRE and contact precautions  Patient thanked this nurse for educating her and proceeded to go about her usual routine  About a half hour later, the patient came out of room and started yelling at the nursing station about how this Nurse gave her a "death sentence" during the education and how she wants everyone to know about it  Patient was angry and yelling at the top of her lungs about her displeasure knowing about her diagnosis and why she is on contact precautions  Charge nurse talked with her and she calmed down somewhat and then MD for SLIM also talked with her and she calmed down even more and was not yelling at this time  Due to patient's verbal outbursts, patient's assignment was changed and patient was assigned to another nurse

## 2019-06-09 NOTE — PROGRESS NOTES
Received pt at 1300 from other RN due to pt behavior  Pt behavior same with this RN  Pt yelling and setting off call bell, stating that she wants her IV removed  When RN entered room to remove IV, pt started yelling "I want the blonde one, not you!"  Pt referring to medical PA who was on the floor earlier  Pt informed PA not on the floor and pt attempted to throw walker at RN  RN left room  When RN came back in, IV was lying on the floor by the door  RN attempted to assess IV site and pt refused  Pt applying pressure to IV site and no blood was observed by RN  Pt refused to let staff check her blood sugar  Dr Francis Payment notified by phone  Will attempt to recheck later

## 2019-06-09 NOTE — QUICK NOTE
Called to notify that pt had a rapid response earlier this am for BS of 42 and currentlyrefusing FS check    Pt noted to still have had her levemir 17 units this am despite thaving an RRT for low BS as her FS improved  I reviewed the FS trend today and noted it was 42-->175-->117-->105-->218-->293-->248     Given her early morning hypoglycemia this AM, will reduce the dose of levemir to 10 units qhs and continue with 17 units AM

## 2019-06-09 NOTE — PLAN OF CARE
Problem: Alteration in Thoughts and Perception  Goal: Treatment Goal: Gain control of psychotic behaviors/thinking, reduce/eliminate presenting symptoms and demonstrate improved reality functioning upon discharge  Outcome: Progressing  Goal: Verbalize thoughts and feelings  Description  Interventions:  - Promote a nonjudgmental and trusting relationship with the patient through active listening and therapeutic communication  - Assess patient's level of functioning, behavior and potential for risk  - Engage patient in 1 on 1 interactions for a minimum of 15 minutes each session  - Encourage patient to express fears, feelings, frustrations, and discuss symptoms    - [place] patient to reality, help patient recognize reality-based thinking   - Administer medications as ordered and assess for potential side effects  - Provide the patient education related to the signs and symptoms of the illness and desired effects of prescribed medications  Outcome: Progressing  Goal: Refrain from acting on delusional thinking/internal stimuli  Description  Interventions:  - Monitor patient closely, per order   - Utilize least restrictive measures   - Set reasonable limits, give positive feedback for acceptable   - Administer medications as ordered and monitor of potential side effects  Outcome: Progressing  Goal: Agree to be compliant with medication regime, as prescribed and report medication side effects  Description  Interventions:  - Offer appropriate PRN medication and supervise ingestion; conduct aims, as needed   Outcome: Progressing  Goal: Attend and participate in unit activities, including therapeutic, recreational, and educational groups  Description  Interventions:  - Provide therapeutic and educational activities daily, encourage attendance and participation, and document same in the medical record   Outcome: Progressing  Goal: Recognize dysfunctional thoughts, communicate reality-based thoughts at the time of discharge  Description  Interventions:  - Provide medication and psycho-education to assist patient in compliance and developing insight into his/her illness   Outcome: Progressing  Goal: Complete daily ADLs, including personal hygiene independently, as able  Description  Interventions:  - Observe, teach, and assist patient with ADLS  - Monitor and promote a balance of rest/activity, with adequate nutrition and elimination   Outcome: Progressing

## 2019-06-09 NOTE — PROGRESS NOTES
Pt was in her room due to contact precoution, having snack, was pleasant on approach, denied symptoms, compliant with 1900 Zyprexa  Will be monitored

## 2019-06-09 NOTE — NURSING NOTE
Pt initially angry and hostile  Stated, " You Miss [place] you are going to find out what is going to happen to you on Monday    now get out of here"  Very difficult to redirect  Initially refused her 2000 blood sugar and notified Dr Monique Clayton of the same and orders noted  Pt later allowed blood sugar and received insulin as ordered  Pt  Pleasant and cooperative in the later evening  Reported that she used to weigh "450 pounds" but now can't eat much d/t the cirrhosis that she developed 35 years ago after 35 years of "hard drinking of 12 hours a day"  Remains on contact precautions  Maintained on q 7 minute checks  Requested and received ativan 1mg for c/o of "anxiety"  Stated, "I wake up and just feel really 'bad'"  Appetite good

## 2019-06-09 NOTE — PROGRESS NOTES
Pt let MHT check her blood sugar, which was 176  Pt refused dinner, but requested ice cream, which was provided  Will continue to encourage medication compliance for a healthy recovery

## 2019-06-09 NOTE — PROGRESS NOTES
Psychiatry Progress Note    Subjective: Interval History     Patient remains on contact precautions  Patient with mood lability yesterday  Patient was agitated and verbally assaultive to staff  Patient was then pleasant the remainder of the evening  Patient did request p r n  Ativan to help aid her sleep and anxiety prior to bedtime  Patient yesterday with stabilizing sugars  No further episodes of hypoglycemia  Pt calm and cooperative this morning  Patient enquiring about her discharge date  Patient educated on need for further medication management for her mental and physical health prior to discharge date being established  Patient was accepted of of this information  Has been medication and meal compliant      Behavior over the last 24 hours:  unchanged  Sleep: normal  Appetite: normal  Medication side effects: No  ROS: no complaints    Current medications:    Current Facility-Administered Medications:     acetaminophen (TYLENOL) tablet 650 mg, 650 mg, Oral, Q6H PRN, Jacque Carrizales MD, 650 mg at 06/07/19 2056    buPROPion (WELLBUTRIN XL) 24 hr tablet 150 mg, 150 mg, Oral, Daily, Patricia Reich PA-C, 150 mg at 06/08/19 0843    dextrose 50 % IV solution 50 mL, 50 mL, Intravenous, Once, Lissy Caba PA-C    docusate sodium (COLACE) capsule 100 mg, 100 mg, Oral, BID, Jacque Carrizales MD, 100 mg at 06/08/19 1709    furosemide (LASIX) tablet 20 mg, 20 mg, Oral, Daily, Jacque Carrizales MD, 20 mg at 06/08/19 0844    insulin detemir (LEVEMIR) subcutaneous injection 10 Units, 10 Units, Subcutaneous, HS, Geraldine Plummer MD, 10 Units at 06/08/19 2155    insulin detemir (LEVEMIR) subcutaneous injection 17 Units, 17 Units, Subcutaneous, QAM, Geraldine Plummer MD    insulin lispro (HumaLOG) 100 units/mL subcutaneous injection 2-12 Units, 2-12 Units, Subcutaneous, TID AC, 2 Units at 06/09/19 0819 **AND** Fingerstick Glucose (POCT), , , TID AC, Kellie Negro MD    LORazepam (ATIVAN) 2 mg/mL injection 1 mg, 1 mg, Intramuscular, Q6H PRN, Aron Matamoros PA-C    LORazepam (ATIVAN) tablet 1 mg, 1 mg, Oral, Q6H PRN, Aron Matamoros PA-C, 1 mg at 06/08/19 2247    OLANZapine (ZyPREXA) IM injection 5 mg, 5 mg, Intramuscular, Q4H PRN, Aron Matamoros PA-C, 5 mg at 06/05/19 2033    OLANZapine (ZyPREXA) tablet 5 mg, 5 mg, Oral, Q4H PRN, Aron Matamoros PA-C    OLANZapine (ZyPREXA) tablet 5 mg, 5 mg, Oral, HS, Patricia Reich PA-C, 5 mg at 06/08/19 1814    ondansetron (ZOFRAN-ODT) dispersible tablet 4 mg, 4 mg, Oral, Q6H PRN, Jeny Mejía MD    spironolactone (ALDACTONE) tablet 25 mg, 25 mg, Oral, Daily, Jeny Mejía MD, 25 mg at 06/08/19 1162    Current Problem List:    Patient Active Problem List   Diagnosis    Type 2 diabetes mellitus, with long-term current use of insulin (HCC)    Hyperlipidemia    Asthma    Anemia of chronic disease    [facility]    [facility] disorder [facility]    Cirrhosis of liver with ascites ([facility]    Essential hypertension    GERD (gastroesophageal reflux disease)    Venous stasis    Ambulatory dysfunction    Closed compression fracture of L3 lumbar vertebra    Compression fracture of L3 lumbar vertebra    Closed compression fracture of third lumbar vertebra ([facility]    Fall    Diabetic polyneuropathy associated with type 2 diabetes mellitus ([facility]    Iron deficiency anemia    Status post fall - Ambulatory dysfunction    Discharged from Hospice for Suicidal Ideation, Medically Complex    ESRD (end stage renal disease) ([facility] )       Problem list reviewed 06/09/19     Objective:     Vital Signs:  Vitals:    06/08/19 0647 06/08/19 1611 06/08/19 2201 06/09/19 0702   BP: 140/61  118/72 108/51   BP Location: Right arm  Left arm Right arm   Pulse: 72  97 86   Resp: 18  18 16   Temp: 98 °F (36 7 °C) (!) 97 3 °F (36 3 °C) (!) 95 5 °F (35 3 °C) (!) 97 3 °F (36 3 °C)   TempSrc: Temporal Tympanic Tympanic Temporal   SpO2: 99%  99% 96%   Weight:       Height: Appearance:  age appropriate and casually dressed   Behavior:  normal   Speech:  normal volume   Mood:  constricted   Affect:  constricted   Thought Process:  normal   Thought Content:  normal   Perceptual Disturbances: None   Risk Potential: none   Sensorium:  person, place and time   Cognition:  intact   Consciousness:  alert and awake    Attention: attention span and concentration were age appropriate   Intellect: average   Insight:  limited   Judgment: limited      Motor Activity: no abnormal movements       I/O Past 24 hours:  I/O last 3 completed shifts: In: 1560 [P O :1560]  Out: -   No intake/output data recorded  Labs:  Reviewed 06/09/19    Progress Toward Goals:  Unchanged    Assessment / Plan:     Bipolar 1 disorder ([facility] )    Recommended Treatment:      Medication changes:  1) continue current medication regimen    Non-pharmacological treatments  1) Continue with group therapy, milieu therapy and occupational therapy  Safety  1) Safety/communication plan established targeting dynamic risk factors above  2) Risks, benefits, and possible side effects of medications explained to patient and patient verbalizes understanding  Counseling / Coordination of Care    Total floor / unit time spent today 20 minutes  Greater than 50% of total time was spent with the patient and / or family counseling and / or coordination of care  A description of the counseling / coordination of care  Patient's Rights, confidentiality and exceptions to confidentiality, use of automated medical record, [facility] staff access to medical record, and consent to treatment reviewed      Kathleen Springer PA-C

## 2019-06-10 LAB
GLUCOSE SERPL-MCNC: 113 MG/DL (ref 65–140)
GLUCOSE SERPL-MCNC: 191 MG/DL (ref 65–140)
GLUCOSE SERPL-MCNC: 242 MG/DL (ref 65–140)
GLUCOSE SERPL-MCNC: 320 MG/DL (ref 65–140)
GLUCOSE SERPL-MCNC: 66 MG/DL (ref 65–140)

## 2019-06-10 PROCEDURE — 82948 REAGENT STRIP/BLOOD GLUCOSE: CPT

## 2019-06-10 RX ADMIN — INSULIN LISPRO 8 UNITS: 100 INJECTION, SOLUTION INTRAVENOUS; SUBCUTANEOUS at 11:55

## 2019-06-10 RX ADMIN — INSULIN DETEMIR 10 UNITS: 100 INJECTION, SOLUTION SUBCUTANEOUS at 21:24

## 2019-06-10 RX ADMIN — OLANZAPINE 7.5 MG: 5 TABLET, FILM COATED ORAL at 19:00

## 2019-06-10 RX ADMIN — SPIRONOLACTONE 25 MG: 25 TABLET, FILM COATED ORAL at 08:52

## 2019-06-10 RX ADMIN — INSULIN LISPRO 2 UNITS: 100 INJECTION, SOLUTION INTRAVENOUS; SUBCUTANEOUS at 17:00

## 2019-06-10 NOTE — PLAN OF CARE
Problem: Alteration in Thoughts and Perception  Goal: Treatment Goal: Gain control of psychotic behaviors/thinking, reduce/eliminate presenting symptoms and demonstrate improved reality functioning upon discharge  Outcome: Progressing  Goal: Verbalize thoughts and feelings  Description  Interventions:  - Promote a nonjudgmental and trusting relationship with the patient through active listening and therapeutic communication  - Assess patient's level of functioning, behavior and potential for risk  - Engage patient in 1 on 1 interactions for a minimum of 15 minutes each session  - Encourage patient to express fears, feelings, frustrations, and discuss symptoms    - [place] patient to reality, help patient recognize reality-based thinking   - Administer medications as ordered and assess for potential side effects  - Provide the patient education related to the signs and symptoms of the illness and desired effects of prescribed medications  Outcome: Progressing  Goal: Refrain from acting on delusional thinking/internal stimuli  Description  Interventions:  - Monitor patient closely, per order   - Utilize least restrictive measures   - Set reasonable limits, give positive feedback for acceptable   - Administer medications as ordered and monitor of potential side effects  Outcome: Progressing  Goal: Complete daily ADLs, including personal hygiene independently, as able  Description  Interventions:  - Observe, teach, and assist patient with ADLS  - Monitor and promote a balance of rest/activity, with adequate nutrition and elimination   Outcome: Progressing     Problem: Risk for Self Injury/Neglect  Goal: Treatment Goal: Remain safe during length of stay, learn and adopt new coping skills, and be free of self-injurious ideation, impulses and acts at the time of discharge  Outcome: Progressing  Goal: Verbalize thoughts and feelings  Description  Interventions:  - Assess and re-assess patient's lethality and potential for self-injury  - Engage patient in 1:1 interactions, daily, for a minimum of 15 minutes  - Encourage patient to express feelings, fears, frustrations, hopes  - Establish rapport/trust with patient   Outcome: Progressing  Goal: Refrain from harming self  Description  Interventions:  - Monitor patient closely, per order  - Develop a trusting relationship  - Supervise medication ingestion, monitor effects and side effects   Outcome: Progressing  Goal: Recognize maladaptive responses and adopt new coping mechanisms  Outcome: Progressing  Goal: Complete daily ADLs, including personal hygiene independently, as able  Description  Interventions:  - Observe, teach, and assist patient with ADLS  - Monitor and promote a balance of rest/activity, with adequate nutrition and elimination  Outcome: Progressing     Problem: DISCHARGE PLANNING  Goal: Discharge to home or other facility with appropriate resources  Description  INTERVENTIONS:  - Identify barriers to discharge w/patient and caregiver  - Arrange for needed discharge resources and transportation as appropriate  - Identify discharge learning needs (meds, wound care, etc )  - Arrange for interpretive services to assist at discharge as needed  - Refer to Case Management Department for coordinating discharge planning if the patient needs post-hospital services based on physician/advanced practitioner order or complex needs related to functional status, cognitive ability, or social support system  Outcome: Progressing     Problem: Prexisting or High Potential for Compromised Skin Integrity  Goal: Skin integrity is maintained or improved  Description  INTERVENTIONS:  - Identify patients at risk for skin breakdown  - Assess and monitor skin integrity  - Assess and monitor nutrition and hydration status  - Monitor labs (i e  albumin)  - Assess for incontinence   - Turn and reposition patient  - Assist with mobility/ambulation  - Relieve pressure over bony prominences  - Avoid friction and shearing  - Provide appropriate hygiene as needed including keeping skin clean and dry  - Evaluate need for skin moisturizer/barrier cream  - Collaborate with interdisciplinary team (i e  Nutrition, Rehabilitation, etc )   - Patient/family teaching  Outcome: Progressing     Problem: Alteration in Thoughts and Perception  Goal: Agree to be compliant with medication regime, as prescribed and report medication side effects  Description  Interventions:  - Offer appropriate PRN medication and supervise ingestion; conduct aims, as needed   Outcome: Not Progressing  Goal: Attend and participate in unit activities, including therapeutic, recreational, and educational groups  Description  Interventions:  - Provide therapeutic and educational activities daily, encourage attendance and participation, and document same in the medical record   Outcome: Not Progressing  Goal: Recognize dysfunctional thoughts, communicate reality-based thoughts at the time of discharge  Description  Interventions:  - Provide medication and psycho-education to assist patient in compliance and developing insight into his/her illness   Outcome: Not Progressing     Problem: Risk for Self Injury/Neglect  Goal: Attend and participate in unit activities, including therapeutic, recreational, and educational groups  Description  Interventions:  - Provide therapeutic and educational activities daily, encourage attendance and participation, and document same in the medical record  - Obtain collateral information, encourage visitation and family involvement in care   Outcome: Not Progressing

## 2019-06-10 NOTE — PROGRESS NOTES
Patient remains on contact  isolation due to VRE  Is alert and oriented with a poor insight   Is irritable with assessment  Blood sugar 66 this morning with patient not symptomatic  Snack provided and blood sugar rechecked was 113 at 0750  Patient refused all morning medication despite encouragement and  stated " I want to die in peace and leave me alone" Patient threatened RN " If you insist to give me medication I will heidi you as well"  Patient however denies suicidal thoughts and informs " God will take me anytime" Is meals compliant  Patient reassured and comfort measures provided  Will keep monitoring for safety and support

## 2019-06-10 NOTE — PROGRESS NOTES
Psychiatry Progress Note    Subjective: Interval History     The patient is in her room  She remains on contact precautions for VRE in the urine  Patient is very irritable during discussion  Her mood is labile  Patient states "I am in here until I die  I am talking to my   I am waiting for God to take me   The patient is angry and states she is also feeling depressed  She has very poor insight  She is eating her breakfast in her room this morning  Patient continued continues to be agitated and verbally assaultive to staff members  She is medication and meal compliant      Behavior over the last 24 hours:  unchanged  Sleep: fair  Appetite: fair  Medication side effects: No  ROS: no complaints    Current medications:    Current Facility-Administered Medications:     buPROPion (WELLBUTRIN XL) 24 hr tablet 150 mg, 150 mg, Oral, Daily, Patricia Reich PA-C, 150 mg at 06/09/19 0852    dextrose 50 % IV solution 50 mL, 50 mL, Intravenous, Once, Lissy Caba PA-C    docusate sodium (COLACE) capsule 100 mg, 100 mg, Oral, BID, Madhav Sanches MD, 100 mg at 06/08/19 1709    furosemide (LASIX) tablet 20 mg, 20 mg, Oral, Daily, Madhav Sanches MD, 20 mg at 06/08/19 0844    insulin detemir (LEVEMIR) subcutaneous injection 10 Units, 10 Units, Subcutaneous, HS, Geraldine Paredes MD, 10 Units at 06/09/19 2203    insulin detemir (LEVEMIR) subcutaneous injection 17 Units, 17 Units, Subcutaneous, QAM, Geraldine CABRAL MD, 17 Units at 06/09/19 0852    insulin lispro (HumaLOG) 100 units/mL subcutaneous injection 2-12 Units, 2-12 Units, Subcutaneous, TID AC, 2 Units at 06/09/19 1215 **AND** Fingerstick Glucose (POCT), , , TID AC, Abram Mcgregor MD    LORazepam (ATIVAN) 2 mg/mL injection 1 mg, 1 mg, Intramuscular, Q6H PRN, Rosemary Hurley PA-C    LORazepam (ATIVAN) tablet 1 mg, 1 mg, Oral, Q6H PRN, Rosemary Hurley PA-C, 1 mg at 06/08/19 2247    OLANZapine (ZyPREXA) IM injection 5 mg, 5 mg, Intramuscular, Q4H PRN, Cami Skinner PA-C, 5 mg at 06/05/19 2033    OLANZapine (ZyPREXA) tablet 5 mg, 5 mg, Oral, Q4H PRN, Cami Skinner PA-C    OLANZapine (ZyPREXA) tablet 7 5 mg, 7 5 mg, Oral, HS, Patricia Reich PA-C    ondansetron (ZOFRAN-ODT) dispersible tablet 4 mg, 4 mg, Oral, Q6H PRN, Lita Glass MD    spironolactone (ALDACTONE) tablet 25 mg, 25 mg, Oral, Daily, Lita Glass MD, 25 mg at 06/08/19 4004    Current Problem List:    Patient Active Problem List   Diagnosis    Type 2 diabetes mellitus, with long-term current use of insulin (HCC)    Hyperlipidemia    Asthma    Anemia of chronic disease    [facility]    [facility]ipolar 1 disorder ([facility]    Cirrhosis of liver with ascites ([facility]    Essential hypertension    GERD (gastroesophageal reflux disease)    Venous stasis    Ambulatory dysfunction    Closed compression fracture of L3 lumbar vertebra    Compression fracture of L3 lumbar vertebra    Closed compression fracture of third lumbar vertebra ([facility]    Fall    Diabetic polyneuropathy associated with type 2 diabetes mellitus ([facility]    Iron deficiency anemia    Status post fall - Ambulatory dysfunction    Discharged from Hospice for Suicidal Ideation, Medically Complex    ESRD (end stage renal disease) ([facility]       Problem list reviewed 06/10/19     Objective:     Vital Signs:  Vitals:    06/08/19 2201 06/09/19 0702 06/09/19 2100 06/10/19 0710   BP: 118/72 108/51 142/63 121/56   BP Location: Left arm Right arm Left arm Right arm   Pulse: 97 86 93 86   Resp: 18 16 18 17   Temp: (!) 95 5 °F (35 3 °C) (!) 97 3 °F (36 3 °C) 97 5 °F (36 4 °C) 98 8 °F (37 1 °C)   TempSrc: Tympanic Temporal Tympanic Temporal   SpO2: 99% 96% 96% 99%   Weight:       Height:             Appearance:  age appropriate, casually dressed and disheveled   Behavior:  evasive and guarded   Speech:  normal volume   Mood:  irritable and labile   Affect:  labile   Thought Process:  circumstantial   Thought Content: delusions  persecutory   Perceptual Disturbances: None   Risk Potential: Potential for Aggression Yes yes   Sensorium:  person and place   Cognition:  grossly intact   Consciousness:  alert and awake    Attention: attention span and concentration were age appropriate   Intellect: average   Insight:  poor   Judgment: poor      Motor Activity: no abnormal movements       Labs:  Reviewed 06/10/19      Assessment / Plan:     [facility]    Recommended Treatment:      Medication changes:  1) Increase Zyprexa to 7 5mg HS  Non-pharmacological treatments  1) Continue with group therapy, milieu therapy and occupational therapy  Safety  1) Safety/communication plan established targeting dynamic risk factors above  2) Risks, benefits, and possible side effects of medications explained to patient and patient verbalizes understanding  Counseling / Coordination of Care    Total floor / unit time spent today 20 minutes  Greater than 50% of total time was spent with the patient and / or family counseling and / or coordination of care  A description of the counseling / coordination of care  Patient's Rights, confidentiality and exceptions to confidentiality, use of automated medical record, [facility] staff access to medical record, and consent to treatment reviewed      Ioana Reeves PA-C

## 2019-06-10 NOTE — PROGRESS NOTES
Patient is in bed resting with eyes closed  Comfortably with eyes closed  Patient remains free from pain  Pt remains on contact precaution for VRE in the urine  No acute medical concern noted  Will continue to monitor pt

## 2019-06-11 LAB
GLUCOSE SERPL-MCNC: 231 MG/DL (ref 65–140)
GLUCOSE SERPL-MCNC: 310 MG/DL (ref 65–140)
GLUCOSE SERPL-MCNC: 374 MG/DL (ref 65–140)

## 2019-06-11 PROCEDURE — 82948 REAGENT STRIP/BLOOD GLUCOSE: CPT

## 2019-06-11 RX ORDER — ACETAMINOPHEN 325 MG/1
650 TABLET ORAL EVERY 4 HOURS PRN
Status: DISCONTINUED | OUTPATIENT
Start: 2019-06-11 | End: 2019-06-11

## 2019-06-11 RX ORDER — OXYCODONE HYDROCHLORIDE 5 MG/1
2.5 TABLET ORAL EVERY 6 HOURS PRN
Status: DISCONTINUED | OUTPATIENT
Start: 2019-06-11 | End: 2019-06-24

## 2019-06-11 RX ADMIN — OLANZAPINE 7.5 MG: 5 TABLET, FILM COATED ORAL at 20:31

## 2019-06-11 RX ADMIN — INSULIN DETEMIR 10 UNITS: 100 INJECTION, SOLUTION SUBCUTANEOUS at 20:31

## 2019-06-11 RX ADMIN — INSULIN DETEMIR 10 UNITS: 100 INJECTION, SOLUTION SUBCUTANEOUS at 08:45

## 2019-06-11 RX ADMIN — LORAZEPAM 1 MG: 1 TABLET ORAL at 13:36

## 2019-06-11 RX ADMIN — INSULIN LISPRO 10 UNITS: 100 INJECTION, SOLUTION INTRAVENOUS; SUBCUTANEOUS at 20:31

## 2019-06-11 RX ADMIN — INSULIN LISPRO 8 UNITS: 100 INJECTION, SOLUTION INTRAVENOUS; SUBCUTANEOUS at 11:53

## 2019-06-11 RX ADMIN — BUPROPION HYDROCHLORIDE 150 MG: 150 TABLET, FILM COATED, EXTENDED RELEASE ORAL at 08:44

## 2019-06-11 RX ADMIN — INSULIN LISPRO 4 UNITS: 100 INJECTION, SOLUTION INTRAVENOUS; SUBCUTANEOUS at 08:47

## 2019-06-11 NOTE — PLAN OF CARE
Problem: Alteration in Thoughts and Perception  Goal: Verbalize thoughts and feelings  Description  Interventions:  - Promote a nonjudgmental and trusting relationship with the patient through active listening and therapeutic communication  - Assess patient's level of functioning, behavior and potential for risk  - Engage patient in 1 on 1 interactions for a minimum of 15 minutes each session  - Encourage patient to express fears, feelings, frustrations, and discuss symptoms    - [place] patient to reality, help patient recognize reality-based thinking   - Administer medications as ordered and assess for potential side effects  - Provide the patient education related to the signs and symptoms of the illness and desired effects of prescribed medications  Outcome: Progressing  Goal: Agree to be compliant with medication regime, as prescribed and report medication side effects  Description  Interventions:  - Offer appropriate PRN medication and supervise ingestion; conduct aims, as needed   Outcome: Progressing  Goal: Complete daily ADLs, including personal hygiene independently, as able  Description  Interventions:  - Observe, teach, and assist patient with ADLS  - Monitor and promote a balance of rest/activity, with adequate nutrition and elimination   Outcome: Progressing     Problem: Risk for Self Injury/Neglect  Goal: Treatment Goal: Remain safe during length of stay, learn and adopt new coping skills, and be free of self-injurious ideation, impulses and acts at the time of discharge  Outcome: Progressing  Goal: Verbalize thoughts and feelings  Description  Interventions:  - Assess and re-assess patient's lethality and potential for self-injury  - Engage patient in 1:1 interactions, daily, for a minimum of 15 minutes  - Encourage patient to express feelings, fears, frustrations, hopes  - Establish rapport/trust with patient   Outcome: Progressing  Goal: Refrain from harming self  Description  Interventions:  - Monitor patient closely, per order  - Develop a trusting relationship  - Supervise medication ingestion, monitor effects and side effects   Outcome: Progressing  Goal: Complete daily ADLs, including personal hygiene independently, as able  Description  Interventions:  - Observe, teach, and assist patient with ADLS  - Monitor and promote a balance of rest/activity, with adequate nutrition and elimination  Outcome: Progressing

## 2019-06-11 NOTE — PROGRESS NOTES
Pt is extremely anxious and irritable,sitting on the toilet and refusing to get up  Pt had two small soft BM witnessed,stated that she has been having diarrhea for 10 days and she wants the MD to come here  MD was made aware of her complaints,no new orders given  Yelling at staff and trying to hit staff,verbally abusive  Ativan PRN given for increase anxiety  Pt is sitting on the chair at this time  Chair  alarm on  Will continue to monitor closely

## 2019-06-11 NOTE — PROGRESS NOTES
Awake and up for breakfast  Pt maintained on contact precautions for VRE in the urine  Very pleasant and cooperative this morning  Med compliant  Good appetite for breakfast  Pt denies any thoughts of self harm  No distress noted  Will continue to monitor closely

## 2019-06-11 NOTE — PROGRESS NOTES
Clinical Pharmacy Note: Antipsychotic Monitoring Requirements     Paolo Bound is a 75 y o  female currently on the following medications:    Current Facility-Administered Medications:     buPROPion (WELLBUTRIN XL) 24 hr tablet 150 mg, 150 mg, Oral, Daily, Patricia Reich PA-C, 150 mg at 06/11/19 0844    dextrose 50 % IV solution 50 mL, 50 mL, Intravenous, Once, Toshia Banda PA-C    docusate sodium (COLACE) capsule 100 mg, 100 mg, Oral, BID, Jeny Mejía MD, 100 mg at 06/08/19 1709    furosemide (LASIX) tablet 20 mg, 20 mg, Oral, Daily, Jeny Mejía MD, 20 mg at 06/08/19 0844    insulin detemir (LEVEMIR) subcutaneous injection 10 Units, 10 Units, Subcutaneous, Q12H [address], Irving Ley MD, 10 Units at 06/11/19 0845    insulin lispro (HumaLOG) 100 units/mL subcutaneous injection 2-12 Units, 2-12 Units, Subcutaneous, TID AC, 8 Units at 06/11/19 1153 **AND** Fingerstick Glucose (POCT), , , TID AC, Irving Ley MD    LORazepam (ATIVAN) 2 mg/mL injection 1 mg, 1 mg, Intramuscular, Q6H PRN, Aron Matamoros PA-C    LORazepam (ATIVAN) tablet 1 mg, 1 mg, Oral, Q6H PRN, Aron Matamoros PA-C, 1 mg at 06/08/19 2247    OLANZapine (ZyPREXA) IM injection 5 mg, 5 mg, Intramuscular, Q4H PRN, Aron Matamoros PA-C, 5 mg at 06/05/19 2033    OLANZapine (ZyPREXA) tablet 5 mg, 5 mg, Oral, Q4H PRN, Aron Matamoros PA-C    OLANZapine (ZyPREXA) tablet 7 5 mg, 7 5 mg, Oral, HS, Patricia Reich PA-C, Deepak 5 mg at 06/10/19 1900    ondansetron (ZOFRAN-ODT) dispersible tablet 4 mg, 4 mg, Oral, Q6H PRN, Jeny Mejía MD    oxyCODONE (ROXICODONE) IR tablet 2 5 mg, 2 5 mg, Oral, Q6H PRN, Irving Ley MD    spironolactone (ALDACTONE) tablet 25 mg, 25 mg, Oral, Daily, Jeny Mejía MD, 25 mg at 06/10/19 5300      Performing metabolic monitoring on patients receiving any antipsychotics is a [facility] and Medicaid Services (CMS) requirement       Antipsychotic treatment increases the risk of developing type 2 diabetes mellitus, hypertension, and hyperlipidemia  According to the [facility]  [facility] (NICE) guidelines, baseline weight, waist circumference, pulse, blood pressure, fasting blood glucose, hemoglobin A1c, and lipid profile should be collected  These guidelines coincide with Centers and Medicare & Medicaid Services (CMS) requirements including baseline Body Mass Index, blood pressure, fasting glucose or hemoglobin A1c, and fasting lipid panel  CMS states laboratory values collected 12 months from discharge date are acceptable for evaluation  CMS Checklist:     BMI: yes  BP: yes  Fasting glucose: yes       OR A1c: yes  Lipid panel within last 12 months: yes  Nicotine Replacement Therapy: no, patient does not smoke    The following values have been collected:  Value Status Result    BMI Body mass index is 34 54 kg/m²  Blood pressure BP 90/53 (BP Location: Right arm)   Pulse 77   Temp 98 6 °F (37 °C) (Temporal)   Resp 18   Ht 4' 9" (1 448 m)   Wt 72 4 kg (159 lb 9 8 oz)   LMP  (LMP Unknown)   SpO2 94%   Breastfeeding?  No   BMI 34 54 kg/m²    Fasting glucose 0   Lab Value Date/Time    GLUC 156 (H) 06/09/2019 0504        Hemoglobin A1c 0   Lab Value Date/Time    HGBA1C 10 1 (H) 02/13/2019 0434        Lipid panel 0   Lab Value Date/Time    CHOLESTEROL 144 06/06/2019 0447       0   Lab Value Date/Time    HDL 47 06/06/2019 0447       0   Lab Value Date/Time    LDLCALC 87 06/06/2019 0447        0   Lab Value Date/Time    TRIG 50 06/06/2019 0447          Recommendations:  CMS requirements have been completed    Pharmacy will continue to follow patient with team   Electronically signed by: Maximiano Galeazzi, Pharmacist

## 2019-06-11 NOTE — PROGRESS NOTES
Currently observed in bed with eyes closed and respirations noted, appears to be resting   On Contact precautions for VRE of urine  Monitored on Q 7 minute safety checks

## 2019-06-11 NOTE — PROGRESS NOTES
Pt continues to be irritable and loud at times  Pt redirectable at times  Pt remains on contact isolation due to VRE  Pt compliant with meal and medications this evening  VSS

## 2019-06-11 NOTE — PROGRESS NOTES
Psychiatry Progress Note    Subjective: Interval History     The patient is in her room this morning  She continues to be on contact precautions for VRE in the urine  Patient is more pleasant during discussion this morning but continues to be irritable and loud at times per staff  She is medication and meal compliant  She continues to have poor insight  She stated to nursing yesterday that she wanted to die in peace and be left alone  Patient denied any suicidal thoughts or plans  She was stating that God can take her at any time  Patient is tolerating her recent medication adjustments  Will continue to monitor her behaviors       Behavior over the last 24 hours:  unchanged  Sleep: fair  Appetite: fair  Medication side effects: No  ROS: no complaints    Current medications:    Current Facility-Administered Medications:     acetaminophen (TYLENOL) tablet 650 mg, 650 mg, Oral, Q4H PRN, Madhurit HANANE Kelly    buPROPion (WELLBUTRIN XL) 24 hr tablet 150 mg, 150 mg, Oral, Daily, Patricia Reich PA-C, 150 mg at 06/09/19 0852    dextrose 50 % IV solution 50 mL, 50 mL, Intravenous, Once, Lissy Caba PA-C    docusate sodium (COLACE) capsule 100 mg, 100 mg, Oral, BID, Deon Abernathy MD, 100 mg at 06/08/19 1709    furosemide (LASIX) tablet 20 mg, 20 mg, Oral, Daily, Deon Abernathy MD, 20 mg at 06/08/19 0844    insulin detemir (LEVEMIR) subcutaneous injection 10 Units, 10 Units, Subcutaneous, Q12H [address], Nahum Mak MD, 10 Units at 06/10/19 2124    insulin lispro (HumaLOG) 100 units/mL subcutaneous injection 2-12 Units, 2-12 Units, Subcutaneous, TID AC, 2 Units at 06/10/19 1700 **AND** Fingerstick Glucose (POCT), , , TID AC, Nahum Mak MD    LORazepam (ATIVAN) 2 mg/mL injection 1 mg, 1 mg, Intramuscular, Q6H PRN, Coit HANANE Kelly    LORazepam (ATIVAN) tablet 1 mg, 1 mg, Oral, Q6H PRN, Coit HANANE Kelly, 1 mg at 06/08/19 2247    OLANZapine (ZyPREXA) IM injection 5 mg, 5 mg, Intramuscular, Q4H PRN, Nedra Counter, PA-C, 5 mg at 06/05/19 2033    OLANZapine (ZyPREXA) tablet 5 mg, 5 mg, Oral, Q4H PRN, Nedra Counter, PA-C    OLANZapine (ZyPREXA) tablet 7 5 mg, 7 5 mg, Oral, HS, Patricia Reich, PA-C, 7 5 mg at 06/10/19 1900    ondansetron (ZOFRAN-ODT) dispersible tablet 4 mg, 4 mg, Oral, Q6H PRN, Zaire Philip MD    spironolactone (ALDACTONE) tablet 25 mg, 25 mg, Oral, Daily, Zaire Philip MD, 25 mg at 06/10/19 0629    Current Problem List:    Patient Active Problem List   Diagnosis    Type 2 diabetes mellitus, with long-term current use of insulin (HCC)    Hyperlipidemia    Asthma    Anemia of chronic disease    [facility] [facility]    Bipolar 1 disorder ([facility] [facility]    Cirrhosis of liver with ascites ([facility] [address] [facility]    Essential hypertension    GERD (gastroesophageal reflux disease)    Venous stasis    Ambulatory dysfunction    Closed compression fracture of L3 lumbar vertebra    Compression fracture of L3 lumbar vertebra    Closed compression fracture of third lumbar vertebra ([facility] [address] [facility]    Fall    Diabetic polyneuropathy associated with type 2 diabetes mellitus ([facility] [address] )    Iron deficiency anemia    Status post fall - Ambulatory dysfunction    Discharged from Hospice for Suicidal Ideation, Medically Complex    ESRD (end stage renal disease) ([facility] [address] )       Problem list reviewed 06/11/19     Objective:     Vital Signs:  Vitals:    06/10/19 0710 06/10/19 1531 06/10/19 2118 06/11/19 0703   BP: 121/56 131/70 118/58 90/53   BP Location: Right arm Right arm Left arm Right arm   Pulse: 86 84 86 77   Resp: 17 18 18 18   Temp: 98 8 °F (37 1 °C) 97 6 °F (36 4 °C) 98 6 °F (37 °C) 98 6 °F (37 °C)   TempSrc: Temporal Temporal Temporal Temporal   SpO2: 99% 99% 95% 94%   Weight:       Height:             Appearance:  age appropriate, casually dressed and disheveled   Behavior:  evasive and guarded   Speech:  normal volume   Mood:  irritable and labile   Affect:  labile   Thought Process: circumstantial   Thought Content:  delusions  persecutory   Perceptual Disturbances: None   Risk Potential: Potential for Aggression Yes yes   Sensorium:  person and place   Cognition:  grossly intact   Consciousness:  alert and awake    Attention: attention span and concentration were age appropriate   Intellect: average   Insight:  poor   Judgment: poor      Motor Activity: no abnormal movements       Labs:  Reviewed 06/11/19      Assessment / Plan:     Bipolar 1 disorder ([facility] [address] )    Recommended Treatment:      Medication changes:  1) continue current medication regimen  Zyprexa increased last night  Non-pharmacological treatments  1) Continue with group therapy, milieu therapy and occupational therapy  Safety  1) Safety/communication plan established targeting dynamic risk factors above  2) Risks, benefits, and possible side effects of medications explained to patient and patient verbalizes understanding  Counseling / Coordination of Care    Total floor / unit time spent today 20 minutes  Greater than 50% of total time was spent with the patient and / or family counseling and / or coordination of care  A description of the counseling / coordination of care  Patient's Rights, confidentiality and exceptions to confidentiality, use of automated medical record, [facility] staff access to medical record, and consent to treatment reviewed      Adelita Gallardo PA-C

## 2019-06-12 LAB
GLUCOSE SERPL-MCNC: 206 MG/DL (ref 65–140)
GLUCOSE SERPL-MCNC: 278 MG/DL (ref 65–140)
GLUCOSE SERPL-MCNC: 305 MG/DL (ref 65–140)
GLUCOSE SERPL-MCNC: 389 MG/DL (ref 65–140)
GLUCOSE SERPL-MCNC: 393 MG/DL (ref 65–140)

## 2019-06-12 PROCEDURE — 82948 REAGENT STRIP/BLOOD GLUCOSE: CPT

## 2019-06-12 RX ORDER — OLANZAPINE 10 MG/1
10 TABLET ORAL
Status: DISCONTINUED | OUTPATIENT
Start: 2019-06-12 | End: 2019-06-21

## 2019-06-12 RX ADMIN — SPIRONOLACTONE 25 MG: 25 TABLET, FILM COATED ORAL at 08:38

## 2019-06-12 RX ADMIN — INSULIN LISPRO 10 UNITS: 100 INJECTION, SOLUTION INTRAVENOUS; SUBCUTANEOUS at 17:53

## 2019-06-12 RX ADMIN — INSULIN LISPRO 8 UNITS: 100 INJECTION, SOLUTION INTRAVENOUS; SUBCUTANEOUS at 22:09

## 2019-06-12 RX ADMIN — DOCUSATE SODIUM 100 MG: 100 CAPSULE, LIQUID FILLED ORAL at 17:54

## 2019-06-12 RX ADMIN — OLANZAPINE 10 MG: 10 TABLET, FILM COATED ORAL at 18:29

## 2019-06-12 RX ADMIN — BUPROPION HYDROCHLORIDE 150 MG: 150 TABLET, FILM COATED, EXTENDED RELEASE ORAL at 08:38

## 2019-06-12 RX ADMIN — INSULIN LISPRO 4 UNITS: 100 INJECTION, SOLUTION INTRAVENOUS; SUBCUTANEOUS at 08:39

## 2019-06-12 RX ADMIN — INSULIN DETEMIR 10 UNITS: 100 INJECTION, SOLUTION SUBCUTANEOUS at 22:11

## 2019-06-12 RX ADMIN — FUROSEMIDE 20 MG: 20 TABLET ORAL at 08:38

## 2019-06-12 RX ADMIN — INSULIN DETEMIR 10 UNITS: 100 INJECTION, SOLUTION SUBCUTANEOUS at 08:38

## 2019-06-12 RX ADMIN — INSULIN LISPRO 10 UNITS: 100 INJECTION, SOLUTION INTRAVENOUS; SUBCUTANEOUS at 11:55

## 2019-06-12 NOTE — PROGRESS NOTES
Psychiatry Progress Note    Subjective: Interval History     The patient is irritable during discussion this morning  She was telling staff early in the morning that she wanted to die  She had no plans of suicide  She has been refusing her medical medications at times  She is taking her Zyprexa and missed one dose of Wellbutrin  She was also paranoid and suspicious of staff and felt that they were going to harm her  Earlier in the day patient was yelling at staff and trying to hit them  She was verbally abusive  She was given Ativan p r n     This morning the patient does not make eye contact  She continues to voice being paranoid of others  She is eating her meals  She has very poor insight      Behavior over the last 24 hours:  unchanged  Sleep: fair  Appetite: fair  Medication side effects: No  ROS: no complaints    Current medications:    Current Facility-Administered Medications:     buPROPion (WELLBUTRIN XL) 24 hr tablet 150 mg, 150 mg, Oral, Daily, Patricia Reich PA-C, 150 mg at 06/11/19 0844    dextrose 50 % IV solution 50 mL, 50 mL, Intravenous, Once, Lissy Caba PA-C    docusate sodium (COLACE) capsule 100 mg, 100 mg, Oral, BID, Jane Duron MD, 100 mg at 06/08/19 1709    furosemide (LASIX) tablet 20 mg, 20 mg, Oral, Daily, Jane Duron MD, 20 mg at 06/08/19 0844    insulin detemir (LEVEMIR) subcutaneous injection 10 Units, 10 Units, Subcutaneous, Q12H [facility], Tayler Harris MD, 10 Units at 06/11/19 2031    insulin lispro (HumaLOG) 100 units/mL subcutaneous injection 2-12 Units, 2-12 Units, Subcutaneous, 4x Daily (AC & HS), 10 Units at 06/11/19 2031 **AND** Fingerstick Glucose (POCT), , , 4x Daily AC and at bedtime, Tone Fernandez DO    LORazepam (ATIVAN) 2 mg/mL injection 1 mg, 1 mg, Intramuscular, Q6H PRN, Indu Aparicio PA-C    LORazepam (ATIVAN) tablet 1 mg, 1 mg, Oral, Q6H PRN, Indu Aparicio PA-C, 1 mg at 06/11/19 1336    OLANZapine (ZyPREXA) IM injection 5 mg, 5 mg, Intramuscular, Q4H PRN, Epi Whatley PA-C, 5 mg at 06/05/19 2033    OLANZapine (ZyPREXA) tablet 10 mg, 10 mg, Oral, HS, Patricia Reich PA-C    OLANZapine (ZyPREXA) tablet 5 mg, 5 mg, Oral, Q4H PRN, Epi Whatley PA-C    ondansetron (ZOFRAN-ODT) dispersible tablet 4 mg, 4 mg, Oral, Q6H PRN, Mychal Stone MD    oxyCODONE (ROXICODONE) IR tablet 2 5 mg, 2 5 mg, Oral, Q6H PRN, Radha Francois MD    spironolactone (ALDACTONE) tablet 25 mg, 25 mg, Oral, Daily, Mychal Stone MD, 25 mg at 06/10/19 1694    Current Problem List:    Patient Active Problem List   Diagnosis    Type 2 diabetes mellitus, with long-term current use of insulin (HCC)    Hyperlipidemia    Asthma    Anemia of chronic disease    [facility] [address] [facility]    Bipolar 1 disorder ([facility] [address] )    Cirrhosis of liver with ascites ([address]    Essential hypertension    GERD (gastroesophageal reflux disease)    Venous stasis    Ambulatory dysfunction    Closed compression fracture of L3 lumbar vertebra    Compression fracture of L3 lumbar vertebra    Closed compression fracture of third lumbar vertebra ([address] )    Fall    Diabetic polyneuropathy associated with type 2 diabetes mellitus ([address] )    Iron deficiency anemia    Status post fall - Ambulatory dysfunction    Discharged from Hospice for Suicidal Ideation, Medically Complex    ESRD (end stage renal disease) ([facility] [address] )       Problem list reviewed 06/12/19     Objective:     Vital Signs:  Vitals:    06/10/19 1531 06/10/19 2118 06/11/19 0703 06/12/19 0716   BP: 131/70 118/58 90/53 126/75   BP Location: Right arm Left arm Right arm Right arm   Pulse: 84 86 77 89   Resp: 18 18 18 17   Temp: 97 6 °F (36 4 °C) 98 6 °F (37 °C) 98 6 °F (37 °C) 97 8 °F (36 6 °C)   TempSrc: Temporal Temporal Temporal Temporal   SpO2: 99% 95% 94% 95%   Weight:       Height:             Appearance:  age appropriate, casually dressed and disheveled   Behavior:  evasive and guarded   Speech:  normal volume   Mood: irritable and labile   Affect:  labile   Thought Process:  circumstantial   Thought Content:  delusions  persecutory   Perceptual Disturbances: None   Risk Potential: Potential for Aggression Yes yes   Sensorium:  person and place   Cognition:  grossly intact   Consciousness:  alert and awake    Attention: attention span and concentration were age appropriate   Intellect: average   Insight:  poor   Judgment: poor      Motor Activity: no abnormal movements       Labs:  Reviewed 06/12/19      Assessment / Plan:     [facility]    Recommended Treatment:      Medication changes:  1) Increase Zyprexa to 10mg HS  Non-pharmacological treatments  1) Continue with group therapy, milieu therapy and occupational therapy  Safety  1) Safety/communication plan established targeting dynamic risk factors above  2) Risks, benefits, and possible side effects of medications explained to patient and patient verbalizes understanding  Counseling / Coordination of Care    Total floor / unit time spent today 20 minutes  Greater than 50% of total time was spent with the patient and / or family counseling and / or coordination of care  A description of the counseling / coordination of care  Patient's Rights, confidentiality and exceptions to confidentiality, use of automated medical record, [facility] staff access to medical record, and consent to treatment reviewed      Saima Melton PA-C

## 2019-06-12 NOTE — PROGRESS NOTES
Patient was irritable earlier this shift  She stated she wants to die  She has no plans of suicide but wants to die  She was also refusing medications and care at that time  Dr Chuck Mejia made aware and new orders were given  Patient is suspicious and paranoid of staff  She states she feels that staff will harm her  Patient reassured  With assurance patient allowed vitals, blood sugar and medications  Patient then was cooperative and pleasant  She currently is in her bed  Appears to be sleeping  Eyes are closed  No signs of distress or discomfort noted  Will continue to monitor on q 7minute

## 2019-06-12 NOTE — PROGRESS NOTES
Awake and up for breakfast,somewhat irritable on approach,liable mood  Pt is medication compliant,good appetite for breakfast Pt maintained on contact precautions for VRE in the urine  Pt denies any thoughts of self harm  pt is resting in bed at this time  No distress noted  Bed alarm on  Will continue to monitor closely

## 2019-06-13 PROBLEM — Z01.818 PREPROCEDURAL EXAMINATION: Status: ACTIVE | Noted: 2019-06-13

## 2019-06-13 LAB
ANION GAP SERPL CALCULATED.3IONS-SCNC: 2 MMOL/L (ref 5–14)
BUN SERPL-MCNC: 12 MG/DL (ref 5–25)
CALCIUM SERPL-MCNC: 8.7 MG/DL (ref 8.4–10.2)
CHLORIDE SERPL-SCNC: 103 MMOL/L (ref 97–108)
CO2 SERPL-SCNC: 30 MMOL/L (ref 22–30)
CREAT SERPL-MCNC: 0.5 MG/DL (ref 0.6–1.2)
ERYTHROCYTE [DISTWIDTH] IN BLOOD BY AUTOMATED COUNT: 18 %
GFR SERPL CREATININE-BSD FRML MDRD: 96 ML/MIN/1.73SQ M
GLUCOSE SERPL-MCNC: 214 MG/DL (ref 65–140)
GLUCOSE SERPL-MCNC: 249 MG/DL (ref 70–99)
GLUCOSE SERPL-MCNC: 263 MG/DL (ref 65–140)
GLUCOSE SERPL-MCNC: 272 MG/DL (ref 65–140)
GLUCOSE SERPL-MCNC: 288 MG/DL (ref 65–140)
GLUCOSE SERPL-MCNC: 90 MG/DL (ref 65–140)
HCT VFR BLD AUTO: 31.6 % (ref 36–46)
HGB BLD-MCNC: 10.6 G/DL (ref 12–16)
MCH RBC QN AUTO: 32.2 PG (ref 26–34)
MCHC RBC AUTO-ENTMCNC: 33.4 G/DL (ref 31–36)
MCV RBC AUTO: 97 FL (ref 80–100)
PLATELET # BLD AUTO: 78 THOUSANDS/UL (ref 150–450)
PMV BLD AUTO: 9.3 FL (ref 8.9–12.7)
POTASSIUM SERPL-SCNC: 3.7 MMOL/L (ref 3.6–5)
RBC # BLD AUTO: 3.28 MILLION/UL (ref 4–5.2)
SODIUM SERPL-SCNC: 135 MMOL/L (ref 137–147)
WBC # BLD AUTO: 3.1 THOUSAND/UL (ref 4.5–11)

## 2019-06-13 PROCEDURE — 80048 BASIC METABOLIC PNL TOTAL CA: CPT | Performed by: FAMILY MEDICINE

## 2019-06-13 PROCEDURE — 82948 REAGENT STRIP/BLOOD GLUCOSE: CPT

## 2019-06-13 PROCEDURE — 85027 COMPLETE CBC AUTOMATED: CPT | Performed by: FAMILY MEDICINE

## 2019-06-13 PROCEDURE — 99232 SBSQ HOSP IP/OBS MODERATE 35: CPT | Performed by: FAMILY MEDICINE

## 2019-06-13 RX ADMIN — DOCUSATE SODIUM 100 MG: 100 CAPSULE, LIQUID FILLED ORAL at 17:52

## 2019-06-13 RX ADMIN — INSULIN LISPRO 6 UNITS: 100 INJECTION, SOLUTION INTRAVENOUS; SUBCUTANEOUS at 21:47

## 2019-06-13 RX ADMIN — INSULIN LISPRO 6 UNITS: 100 INJECTION, SOLUTION INTRAVENOUS; SUBCUTANEOUS at 17:52

## 2019-06-13 RX ADMIN — INSULIN LISPRO 6 UNITS: 100 INJECTION, SOLUTION INTRAVENOUS; SUBCUTANEOUS at 11:50

## 2019-06-13 RX ADMIN — OLANZAPINE 10 MG: 10 TABLET, FILM COATED ORAL at 18:01

## 2019-06-13 RX ADMIN — OXYCODONE HYDROCHLORIDE 2.5 MG: 5 TABLET ORAL at 06:05

## 2019-06-13 RX ADMIN — BUPROPION HYDROCHLORIDE 150 MG: 150 TABLET, FILM COATED, EXTENDED RELEASE ORAL at 08:42

## 2019-06-13 RX ADMIN — INSULIN DETEMIR 10 UNITS: 100 INJECTION, SOLUTION SUBCUTANEOUS at 08:42

## 2019-06-13 RX ADMIN — INSULIN DETEMIR 14 UNITS: 100 INJECTION, SOLUTION SUBCUTANEOUS at 21:46

## 2019-06-13 RX ADMIN — LORAZEPAM 1 MG: 1 TABLET ORAL at 22:25

## 2019-06-13 NOTE — CASE MANAGEMENT
Met with pt today as she remains on isolation precautions in her room  She was pleasant and bright as I entered her room but soon stated she just wants to die and she does not want anyone to stop that  She states, "I am in Gods hands so let him just take me "     I offered positive statement to encourage her and help her to see the future  She did then comment that she hopes the ECT helps her  Pt is depressed and frustrated, but still has some hope

## 2019-06-13 NOTE — PROGRESS NOTES
Awake, alert, oriented to person and place  Irritable edge at times  Cooperative at this time  Pt with poor sleep overnight  Currently resting calmly in bed with eyes closed  Will continue to monitor

## 2019-06-13 NOTE — PROGRESS NOTES
Awake and pleasant with staff this morning,stated that she was very tired due to not sleeping last night  Lasix and Aldactone held due to parameters,complaint with her other medication  Colace held per request  Mouth checks completed  Pt did slept post breakfast,awake at this time and sitting on the chair in her room  Bed alarm on  ECT orders noted,dates faxed to PACU  Pending  Medical clearance  Will continue to monitor closely

## 2019-06-13 NOTE — ASSESSMENT & PLAN NOTE
Decompensated cirrhosis with ascites and lower extremity edema  Continue Lasix and Aldactone,   Patient reportedly refused paracentesis while on medical, now reporting she was just scared, consider paracentesis once behaviorally stable  Compression stockings and encourage leg elevation     patient probably was on hospice secondary to liver cirrhosis

## 2019-06-13 NOTE — PROGRESS NOTES
Pt present on the unit, stays in the room throughout this shift  Pt very suspicious and hyper verbal at times  Pt compliant with medication only after multiple attempts and education offered on medication regimen   Pt denies s/s and continues on contact precaution for VRE

## 2019-06-13 NOTE — PLAN OF CARE
Problem: Alteration in Thoughts and Perception  Goal: Treatment Goal: Gain control of psychotic behaviors/thinking, reduce/eliminate presenting symptoms and demonstrate improved reality functioning upon discharge  Outcome: Progressing  Goal: Verbalize thoughts and feelings  Description  Interventions:  - Promote a nonjudgmental and trusting relationship with the patient through active listening and therapeutic communication  - Assess patient's level of functioning, behavior and potential for risk  - Engage patient in 1 on 1 interactions for a minimum of 15 minutes each session  - Encourage patient to express fears, feelings, frustrations, and discuss symptoms    - Mascot patient to reality, help patient recognize reality-based thinking   - Administer medications as ordered and assess for potential side effects  - Provide the patient education related to the signs and symptoms of the illness and desired effects of prescribed medications  Outcome: Progressing  Goal: Refrain from acting on delusional thinking/internal stimuli  Description  Interventions:  - Monitor patient closely, per order   - Utilize least restrictive measures   - Set reasonable limits, give positive feedback for acceptable   - Administer medications as ordered and monitor of potential side effects  Outcome: Progressing  Goal: Agree to be compliant with medication regime, as prescribed and report medication side effects  Description  Interventions:  - Offer appropriate PRN medication and supervise ingestion; conduct aims, as needed   Outcome: Progressing  Goal: Recognize dysfunctional thoughts, communicate reality-based thoughts at the time of discharge  Description  Interventions:  - Provide medication and psycho-education to assist patient in compliance and developing insight into his/her illness   Outcome: Progressing  Goal: Complete daily ADLs, including personal hygiene independently, as able  Description  Interventions:  - Observe, teach, and assist patient with ADLS  - Monitor and promote a balance of rest/activity, with adequate nutrition and elimination   Outcome: Progressing     Problem: Risk for Self Injury/Neglect  Goal: Treatment Goal: Remain safe during length of stay, learn and adopt new coping skills, and be free of self-injurious ideation, impulses and acts at the time of discharge  Outcome: Progressing  Goal: Verbalize thoughts and feelings  Description  Interventions:  - Assess and re-assess patient's lethality and potential for self-injury  - Engage patient in 1:1 interactions, daily, for a minimum of 15 minutes  - Encourage patient to express feelings, fears, frustrations, hopes  - Establish rapport/trust with patient   Outcome: Progressing  Goal: Refrain from harming self  Description  Interventions:  - Monitor patient closely, per order  - Develop a trusting relationship  - Supervise medication ingestion, monitor effects and side effects   Outcome: Progressing  Goal: Attend and participate in unit activities, including therapeutic, recreational, and educational groups  Description  Interventions:  - Provide therapeutic and educational activities daily, encourage attendance and participation, and document same in the medical record  - Obtain collateral information, encourage visitation and family involvement in care   Outcome: Progressing  Goal: Recognize maladaptive responses and adopt new coping mechanisms  Outcome: Progressing  Goal: Complete daily ADLs, including personal hygiene independently, as able  Description  Interventions:  - Observe, teach, and assist patient with ADLS  - Monitor and promote a balance of rest/activity, with adequate nutrition and elimination  Outcome: Progressing     Problem: Prexisting or High Potential for Compromised Skin Integrity  Goal: Skin integrity is maintained or improved  Description  INTERVENTIONS:  - Identify patients at risk for skin breakdown  - Assess and monitor skin integrity  - Assess and monitor nutrition and hydration status  - Monitor labs (i e  albumin)  - Assess for incontinence   - Turn and reposition patient  - Assist with mobility/ambulation  - Relieve pressure over bony prominences  - Avoid friction and shearing  - Provide appropriate hygiene as needed including keeping skin clean and dry  - Evaluate need for skin moisturizer/barrier cream  - Collaborate with interdisciplinary team (i e  Nutrition, Rehabilitation, etc )   - Patient/family teaching  Outcome: Progressing     Problem: Nutrition/Hydration-ADULT  Goal: Nutrient/Hydration intake appropriate for improving, restoring or maintaining nutritional needs  Description  Monitor and assess patient's nutrition/hydration status for malnutrition (ex- brittle hair, bruises, dry skin, pale skin and conjunctiva, muscle wasting, smooth red tongue, and disorientation)  Collaborate with interdisciplinary team and initiate plan and interventions as ordered  Monitor patient's weight and dietary intake as ordered or per policy  Utilize nutrition screening tool and intervene per policy  Determine patient's food preferences and provide high-protein, high-caloric foods as appropriate       INTERVENTIONS:  - Monitor oral intake, urinary output, labs, and treatment plans  - Assess nutrition and hydration status and recommend course of action  - Evaluate amount of meals eaten  - Assist patient with eating if necessary   - Allow adequate time for meals  - Recommend/ encourage appropriate diets, oral nutritional supplements, and vitamin/mineral supplements  - Order, calculate, and assess calorie counts as needed  - Recommend, monitor, and adjust tube feedings and TPN/PPN based on assessed needs  - Assess need for intravenous fluids  - Provide specific nutrition/hydration education as appropriate  - Include patient/family/caregiver in decisions related to nutrition  Outcome: Progressing

## 2019-06-13 NOTE — PROGRESS NOTES
Medicated with PRN Oxycodone as requested and ordered for 10/10 back pain  Will continue to monitor

## 2019-06-13 NOTE — PROGRESS NOTES
Progress Note Judith Alexander [birth year], 75 y o  female MRN: [id number]    Unit/Bed#: Yulisa Borges 372-43 Encounter: [id number]    Primary Care Provider: Diann Lemons MD   Date and time admitted to hospital: 6/4/2019  2:35 PM        Cirrhosis of liver with ascites ([facility] [address] )  Assessment & Plan  Decompensated cirrhosis with ascites and lower extremity edema  Continue Lasix and Aldactone,   Patient reportedly refused paracentesis while on medical, now reporting she was just scared, consider paracentesis once behaviorally stable  Compression stockings and encourage leg elevation     patient probably was on hospice secondary to liver cirrhosis    Preprocedural examination  Assessment & Plan  Patient is medically cleared for ECT treatments   Will do CBC and BMP  EKG reviewed    Essential hypertension  Assessment & Plan  Controlled off medication    Type 2 diabetes mellitus, with long-term current use of insulin [facility])  Assessment & Plan  Lab Results   Component Value Date    HGBA1C 10 1 (H) 02/13/2019       Recent Labs     06/13/19  0048 06/13/19  0727 06/13/19  1116 06/13/19  1543   POCGLU 214* 90 272* 288*       Blood Sugar Average: Last 72 hrs:  (P) 251 [id number]    patient's appetite is variable  She frequently misses meals  Will continue current regimen along with insulin sliding scale  VTE Pharmacologic Prophylaxis:   Pharmacologic: Pharmacologic VTE Prophylaxis contraindicated due to Encourage ambulation  Mechanical VTE Prophylaxis in Place: No    Patient Centered Rounds: I have performed bedside rounds with nursing staff today  Discussions with Specialists or Other Care Team Provider:  None    Education and Discussions with Family / Patient:  Limited discussion with patient at bedside    Time Spent for Care: 30 minutes  More than 50% of total time spent on counseling and coordination of care as described above      Current Length of Stay: 9 day(s)    Current Patient Status: Inpatient Psych Certification Statement: The patient will continue to require additional inpatient hospital stay due to Behavioral health    Discharge Plan:  As per treatment team    Code Status: Level 3 - DNAR and DNI      Subjective:   Patient denies any chest pain or shortness of breath  No abdominal pain noted  She does not want to talk much    Objective:     Vitals:   Temp (24hrs), Av 5 °F (36 9 °C), Min:98 1 °F (36 7 °C), Max:99 °F (37 2 °C)    Temp:  [98 1 °F (36 7 °C)-99 °F (37 2 °C)] 98 1 °F (36 7 °C)  HR:  [85-90] 88  Resp:  [18-20] 20  BP: (102-142)/(48-69) 142/69  SpO2:  [97 %-100 %] 99 %  Body mass index is 34 54 kg/m²  Input and Output Summary (last 24 hours): Intake/Output Summary (Last 24 hours) at 2019 1709  Last data filed at 2019 1228  Gross per 24 hour   Intake 840 ml   Output    Net 840 ml       Physical Exam:     Physical Exam   Constitutional: She appears well-developed and well-nourished  HENT:   Head: Normocephalic and atraumatic  Mouth/Throat: Oropharynx is clear and moist    Eyes: Conjunctivae and EOM are normal    Neck: Normal range of motion  Neck supple  Cardiovascular: Normal rate, regular rhythm and normal heart sounds  Pulmonary/Chest: Effort normal and breath sounds normal    Abdominal: Soft  Bowel sounds are normal  She exhibits distension  She exhibits no mass  There is no tenderness  There is no rebound and no guarding  Genitourinary:   Genitourinary Comments: deferred   Musculoskeletal: Normal range of motion  Neurological: She is alert  She has normal reflexes  Skin: Skin is warm and dry  No rash noted  Psychiatric: She has a normal mood and affect  Nursing note and vitals reviewed           Additional Data:     Labs:    Results from last 7 days   Lab Units 19  0458   WBC Thousand/uL 4 90   HEMOGLOBIN g/dL 10 9*   HEMATOCRIT % 32 3*   PLATELETS Thousands/uL 86*   NEUTROS PCT % 77*   LYMPHS PCT % 14*   MONOS PCT % 8   EOS PCT % 1     Results from last 7 days   Lab Units 06/09/19  0504 06/08/19  0458   SODIUM mmol/L 139 136*   POTASSIUM mmol/L 3 7 3 1*   CHLORIDE mmol/L 107 107   CO2 mmol/L 27 26   BUN mg/dL 11 13   CREATININE mg/dL 0 42* 0 42*   ANION GAP mmol/L 5 3*   CALCIUM mg/dL 8 7 8 4   ALBUMIN g/dL  --  2 4*   TOTAL BILIRUBIN mg/dL  --  1 40*   ALK PHOS U/L  --  114   ALT U/L  --  37   AST U/L  --  43*   GLUCOSE RANDOM mg/dL 156* 192*         Results from last 7 days   Lab Units 06/13/19  1543 06/13/19  1116 06/13/19  0727 06/13/19  0048 06/12/19  1937 06/12/19  1557 06/12/19  1106 06/12/19  0726 06/12/19  0313 06/11/19  1943 06/11/19  1045 06/11/19  0716   POC GLUCOSE mg/dl 288* 272* 90 214* 305* 393* 389* 206* 278* 374* 310* 231*                   * I Have Reviewed All Lab Data Listed Above  * Additional Pertinent Lab Tests Reviewed:  [address] Admission Reviewed    Imaging:    Imaging Reports Reviewed Today Include: none  Imaging Personally Reviewed by Myself Includes:  none    Recent Cultures (last 7 days):           Last 24 Hours Medication List:     Current Facility-Administered Medications:  buPROPion 150 mg Oral Daily Destiny Nakul, PA-C   dextrose 50 mL Intravenous Once Marlon Guallpa PA-C   docusate sodium 100 mg Oral BID Ezra Groves MD   furosemide 20 mg Oral Daily Ezra Groves MD   insulin detemir 10 Units Subcutaneous Q12H [facility] [place] Cachorro Montana MD   insulin lispro 2-12 Units Subcutaneous 4x Daily (AC & HS) Tone Fernandez DO   LORazepam 1 mg Intramuscular Q6H PRN Destiny Alken, PA-C   LORazepam 1 mg Oral Q6H PRN Destiny Alken, PA-C   OLANZapine 5 mg Intramuscular Q4H PRN Destiny Alken, PA-C   OLANZapine 10 mg Oral HS SHAQ Billy-HOLLY   OLANZapine 5 mg Oral Q4H PRN Destiny Alken, PA-C   ondansetron 4 mg Oral Q6H PRN Ezra Groves MD   oxyCODONE 2 5 mg Oral Q6H PRN Cachorro Montana MD   spironolactone 25 mg Oral Daily Ezra Groves MD        Today, Patient Was Seen By: Cachorro Montana, MD    ** Please Note: Dictation voice to text software may have been used in the creation of this document   **

## 2019-06-13 NOTE — ASSESSMENT & PLAN NOTE
Lab Results   Component Value Date    HGBA1C 10 1 (H) 02/13/2019       Recent Labs     06/13/19  0048 06/13/19  0727 06/13/19  1116 06/13/19  1543   POCGLU 214* 90 272* 288*       Blood Sugar Average: Last 72 hrs:  (P) 112 [id number]    patient's appetite is variable  She frequently misses meals  Will continue current regimen along with insulin sliding scale

## 2019-06-13 NOTE — PROGRESS NOTES
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  Patient continues to be depressed and is making suicidal statements  Patient denies a plan and is able to contract for safety  She states she wants God to take her  She states she would not commit suicide because then she would not go to [facility]  Patient continues to be hopeless  She states that she thought the nurses were trying to kill her last night and therefore she put her medication and her cheek and spit it out in the toilet last evening  Patient states she knows this was not right  Patient is upset this morning stating she does not want to feel this way anymore  She has had ECT in the past many years ago and states that was effective  Discussed case with Dr Henrietta Tom and will start ECT treatment tomorrow pending clearances      Behavior over the last 24 hours:  unchanged  Sleep: fair  Appetite: fair  Medication side effects: No  ROS: no complaints    Current medications:    Current Facility-Administered Medications:     buPROPion (WELLBUTRIN XL) 24 hr tablet 150 mg, 150 mg, Oral, Daily, Patricia Reich PA-C, 150 mg at 06/12/19 0838    dextrose 50 % IV solution 50 mL, 50 mL, Intravenous, Once, Lissy Caba PA-C    docusate sodium (COLACE) capsule 100 mg, 100 mg, Oral, BID, Bam Montelongo MD, 100 mg at 06/12/19 1754    furosemide (LASIX) tablet 20 mg, 20 mg, Oral, Daily, Bam Montelongo MD, 20 mg at 06/12/19 0838    insulin detemir (LEVEMIR) subcutaneous injection 10 Units, 10 Units, Subcutaneous, Q12H [address], Nicki Velasquez MD, 10 Units at 06/12/19 2211    insulin lispro (HumaLOG) 100 units/mL subcutaneous injection 2-12 Units, 2-12 Units, Subcutaneous, 4x Daily (AC & HS), 8 Units at 06/12/19 2209 **AND** Fingerstick Glucose (POCT), , , 4x Daily AC and at bedtime, Tone Fernandez,     LORazepam (ATIVAN) 2 mg/mL injection 1 mg, 1 mg, Intramuscular, Q6H PRN, Heber Deshpande Darin Garcia PA-C    LORazepam (ATIVAN) tablet 1 mg, 1 mg, Oral, Q6H PRN, Cami Skinner PA-C, 1 mg at 06/11/19 1336    OLANZapine (ZyPREXA) IM injection 5 mg, 5 mg, Intramuscular, Q4H PRN, Cami Skinner PA-C, 5 mg at 06/05/19 2033    OLANZapine (ZyPREXA) tablet 10 mg, 10 mg, Oral, HS, Patricia Reich PA-C, 10 mg at 06/12/19 1829    OLANZapine (ZyPREXA) tablet 5 mg, 5 mg, Oral, Q4H PRN, Cami Skinner PA-C    ondansetron (ZOFRAN-ODT) dispersible tablet 4 mg, 4 mg, Oral, Q6H PRN, Ltia Glass MD    oxyCODONE (ROXICODONE) IR tablet 2 5 mg, 2 5 mg, Oral, Q6H PRN, Fely Lopez MD, 2 5 mg at 06/13/19 0605    spironolactone (ALDACTONE) tablet 25 mg, 25 mg, Oral, Daily, Lita Glass MD, 25 mg at 06/12/19 9989    Current Problem List:    Patient Active Problem List   Diagnosis    Type 2 diabetes mellitus, with long-term current use of insulin (HCC)    Hyperlipidemia    Asthma    Anemia of chronic disease    Pancytopenia ([facility] [address] )    Bipolar 1 disorder ([facility] [address] )    Cirrhosis of liver with ascites ([facility] [address] )    Essential hypertension    GERD (gastroesophageal reflux disease)    Venous stasis    Ambulatory dysfunction    Closed compression fracture of L3 lumbar vertebra    Compression fracture of L3 lumbar vertebra    Closed compression fracture of third lumbar vertebra ([facility] [address] )    Fall    Diabetic polyneuropathy associated with type 2 diabetes mellitus (HCC)    Iron deficiency anemia    Status post fall - Ambulatory dysfunction    Discharged from Hospice for Suicidal Ideation, Medically Complex    ESRD (end stage renal disease) ([facility] [address] )       Problem list reviewed 06/13/19     Objective:     Vital Signs:  Vitals:    06/12/19 0716 06/12/19 1531 06/12/19 2036 06/13/19 0719   BP: 126/75 112/55 123/60 (!) 102/48   BP Location: Right arm Right arm Right arm Left arm   Pulse: 89 89 90 85   Resp: 17 18 18 20   Temp: 97 8 °F (36 6 °C) 99 1 °F (37 3 °C) 99 °F (37 2 °C) 98 3 °F (36 8 °C)   TempSrc: Temporal Temporal Temporal Temporal   SpO2: 95% 96% 97% 100%   Weight:       Height:             Appearance:  age appropriate, casually dressed and disheveled   Behavior:  evasive and guarded   Speech:  normal volume   Mood:  irritable and labile   Affect:  labile   Thought Process:  circumstantial   Thought Content:  delusions  persecutory   Perceptual Disturbances: None   Risk Potential: Potential for Aggression Yes yes   Sensorium:  person and place   Cognition:  grossly intact   Consciousness:  alert and awake    Attention: attention span and concentration were age appropriate   Intellect: average   Insight:  poor   Judgment: poor      Motor Activity: no abnormal movements       Labs:  Reviewed 06/13/19      Assessment / Plan:     [facility]    Recommended Treatment:      Medication changes:  1) ECT 1 tomorrow pending clearances  Zyprexa was increased last night however patient stated she did not actually take the medication  Non-pharmacological treatments  1) Continue with group therapy, milieu therapy and occupational therapy  Safety  1) Safety/communication plan established targeting dynamic risk factors above  2) Risks, benefits, and possible side effects of medications explained to patient and patient verbalizes understanding  Counseling / Coordination of Care    Total floor / unit time spent today 20 minutes  Greater than 50% of total time was spent with the patient and / or family counseling and / or coordination of care  A description of the counseling / coordination of care  Patient's Rights, confidentiality and exceptions to confidentiality, use of automated medical record, Al[facility] Gu[facility] staff access to medical record, and consent to treatment reviewed      Seng Romo PA-C

## 2019-06-14 ENCOUNTER — APPOINTMENT (INPATIENT)
Dept: INTERVENTIONAL RADIOLOGY/VASCULAR | Facility: HOSPITAL | Age: 74
DRG: 885 | End: 2019-06-14
Payer: COMMERCIAL

## 2019-06-14 LAB
ALBUMIN FLD-MCNC: <1 G/DL
APPEARANCE FLD: NORMAL
COLOR FLD: NORMAL
GLUCOSE SERPL-MCNC: 118 MG/DL (ref 65–140)
GLUCOSE SERPL-MCNC: 283 MG/DL (ref 65–140)
GLUCOSE SERPL-MCNC: 309 MG/DL (ref 65–140)
GLUCOSE SERPL-MCNC: 72 MG/DL (ref 65–140)
HISTIOCYTES NFR FLD: 10 %
INR PPP: 1.24 (ref 0.89–1.1)
LYMPHOCYTES NFR BLD AUTO: 26 %
MONO+MESO NFR FLD MANUAL: 1 %
MONOCYTES NFR BLD AUTO: 63 %
PROT FLD-MCNC: <2 G/DL
PROTHROMBIN TIME: 13 SECONDS (ref 9.5–11.6)
SITE: NORMAL
TOTAL CELLS COUNTED SPEC: 100
WBC # FLD MANUAL: 94 /UL

## 2019-06-14 PROCEDURE — 49083 ABD PARACENTESIS W/IMAGING: CPT

## 2019-06-14 PROCEDURE — 82042 OTHER SOURCE ALBUMIN QUAN EA: CPT | Performed by: FAMILY MEDICINE

## 2019-06-14 PROCEDURE — 84157 ASSAY OF PROTEIN OTHER: CPT | Performed by: FAMILY MEDICINE

## 2019-06-14 PROCEDURE — 0W9G3ZZ DRAINAGE OF PERITONEAL CAVITY, PERCUTANEOUS APPROACH: ICD-10-PCS | Performed by: RADIOLOGY

## 2019-06-14 PROCEDURE — 49083 ABD PARACENTESIS W/IMAGING: CPT | Performed by: RADIOLOGY

## 2019-06-14 PROCEDURE — 89051 BODY FLUID CELL COUNT: CPT | Performed by: FAMILY MEDICINE

## 2019-06-14 PROCEDURE — 87205 SMEAR GRAM STAIN: CPT | Performed by: FAMILY MEDICINE

## 2019-06-14 PROCEDURE — 87070 CULTURE OTHR SPECIMN AEROBIC: CPT | Performed by: FAMILY MEDICINE

## 2019-06-14 PROCEDURE — 76937 US GUIDE VASCULAR ACCESS: CPT

## 2019-06-14 PROCEDURE — 82948 REAGENT STRIP/BLOOD GLUCOSE: CPT

## 2019-06-14 PROCEDURE — 85610 PROTHROMBIN TIME: CPT | Performed by: FAMILY MEDICINE

## 2019-06-14 PROCEDURE — 99232 SBSQ HOSP IP/OBS MODERATE 35: CPT | Performed by: FAMILY MEDICINE

## 2019-06-14 RX ORDER — LIDOCAINE HYDROCHLORIDE 10 MG/ML
INJECTION, SOLUTION INFILTRATION; PERINEURAL CODE/TRAUMA/SEDATION MEDICATION
Status: COMPLETED | OUTPATIENT
Start: 2019-06-14 | End: 2019-06-14

## 2019-06-14 RX ORDER — SODIUM CHLORIDE 9 MG/ML
50 INJECTION, SOLUTION INTRAVENOUS CONTINUOUS
Status: CANCELLED | OUTPATIENT
Start: 2019-06-14

## 2019-06-14 RX ORDER — MIDODRINE HYDROCHLORIDE 2.5 MG/1
2.5 TABLET ORAL
Status: DISCONTINUED | OUTPATIENT
Start: 2019-06-14 | End: 2019-06-19

## 2019-06-14 RX ORDER — FUROSEMIDE 20 MG/1
20 TABLET ORAL
Status: DISCONTINUED | OUTPATIENT
Start: 2019-06-15 | End: 2019-06-19

## 2019-06-14 RX ADMIN — LORAZEPAM 1 MG: 1 TABLET ORAL at 10:47

## 2019-06-14 RX ADMIN — INSULIN LISPRO 8 UNITS: 100 INJECTION, SOLUTION INTRAVENOUS; SUBCUTANEOUS at 12:21

## 2019-06-14 RX ADMIN — OLANZAPINE 10 MG: 10 TABLET, FILM COATED ORAL at 21:46

## 2019-06-14 RX ADMIN — LIDOCAINE HYDROCHLORIDE 9 ML: 10 INJECTION, SOLUTION INFILTRATION; PERINEURAL at 14:01

## 2019-06-14 RX ADMIN — MIDODRINE HYDROCHLORIDE 2.5 MG: 2.5 TABLET ORAL at 16:55

## 2019-06-14 RX ADMIN — INSULIN LISPRO 6 UNITS: 100 INJECTION, SOLUTION INTRAVENOUS; SUBCUTANEOUS at 17:52

## 2019-06-14 RX ADMIN — INSULIN DETEMIR 14 UNITS: 100 INJECTION, SOLUTION SUBCUTANEOUS at 08:59

## 2019-06-14 RX ADMIN — DOCUSATE SODIUM 100 MG: 100 CAPSULE, LIQUID FILLED ORAL at 17:54

## 2019-06-14 RX ADMIN — BUPROPION HYDROCHLORIDE 150 MG: 150 TABLET, FILM COATED, EXTENDED RELEASE ORAL at 08:22

## 2019-06-14 RX ADMIN — INSULIN LISPRO 8 UNITS: 100 INJECTION, SOLUTION INTRAVENOUS; SUBCUTANEOUS at 17:53

## 2019-06-14 NOTE — PROGRESS NOTES
Progress Note Adan Comer [birth year], 75 y o  female MRN: [id number]    Unit/Bed#: Jian Quiles 384-30 Encounter: [id number]    Primary Care Provider: Manfred Lefort, MD   Date and time admitted to hospital: 6/4/2019  2:35 PM        Cirrhosis of liver with ascites ([facility] [address] )  Assessment & Plan  Decompensated cirrhosis with ascites and lower extremity edema  Continue Lasix and Aldactone, increased dose of Lasix to 20 mg twice daily also placed on midodrine to help improve blood pressures so she may get her diuretics  Patient had paracentesis done today with 2 7 L removed  Her mood is improving and she is getting more cooperative with her treatment     patient probably was on hospice secondary to liver cirrhosis    Preprocedural examination  Assessment & Plan  Patient is medically cleared for ECT treatments   Will do CBC and BMP  EKG reviewed  Patient had paracentesis done and hence she is now medically cleared to proceed with ECT treatments    Anemia of chronic disease  Assessment & Plan  Hemoglobin stable  Between 10-11    Type 2 diabetes mellitus, with long-term current use of insulin [facility])  Assessment & Plan  Lab Results   Component Value Date    HGBA1C 10 1 (H) 02/13/2019       Recent Labs     06/13/19  1944/19  0739 06/14/19  1128 06/14/19  1603   POCGLU 263* 72 309* 283*       Blood Sugar Average: Last 72 hrs:  (P) 267 3125    patient's appetite is variable  She frequently misses meals  Will continue current regimen along with insulin sliding scale  Adjust insulin regimen today      VTE Pharmacologic Prophylaxis:   Pharmacologic: Pharmacologic VTE Prophylaxis contraindicated due to Liver cirrhosis  Mechanical VTE Prophylaxis in Place: No    Patient Centered Rounds: I have performed bedside rounds with nursing staff today      Discussions with Specialists or Other Care Team Provider:  None    Education and Discussions with Family / Patient:  Discussed with patient at bedside about hospital course    Time Spent for Care: 30 minutes  More than 50% of total time spent on counseling and coordination of care as described above  Current Length of Stay: 10 day(s)    Current Patient Status: Inpatient Psych   Certification Statement: The patient will continue to require additional inpatient hospital stay due to Behavioral health    Discharge Plan:  Pending progress    Code Status: Level 3 - DNAR and DNI      Subjective:   Patient is more cooperative now she agree to have paracentesis done today and 2 7 L were removed  She is now asking me to help decrease the swelling in her legs and make her feel better  Pleasant mood    Objective:     Vitals:   Temp (24hrs), Av 9 °F (36 6 °C), Min:97 5 °F (36 4 °C), Max:98 5 °F (36 9 °C)    Temp:  [97 5 °F (36 4 °C)-98 5 °F (36 9 °C)] 97 9 °F (36 6 °C)  HR:  [83-93] 84  Resp:  [16-20] 16  BP: (106-129)/(55-72) 129/63  SpO2:  [98 %-100 %] 100 %  Body mass index is 31 51 kg/m²  Input and Output Summary (last 24 hours): Intake/Output Summary (Last 24 hours) at 2019 1643  Last data filed at 2019 1253  Gross per 24 hour   Intake 1040 ml   Output    Net 1040 ml       Physical Exam:     Physical Exam   Constitutional: She appears well-developed  HENT:   Head: Normocephalic and atraumatic  Mouth/Throat: Oropharynx is clear and moist    Eyes: Conjunctivae and EOM are normal    Neck: Normal range of motion  Neck supple  Cardiovascular: Normal rate, regular rhythm and normal heart sounds  Pulmonary/Chest: Effort normal and breath sounds normal    Abdominal: Soft  Bowel sounds are normal  She exhibits distension  She exhibits no mass  There is no tenderness  There is no rebound and no guarding  Genitourinary:   Genitourinary Comments: deferred   Musculoskeletal: She exhibits edema  Neurological: She is alert  She has normal reflexes  Oriented to person and place   Skin: Skin is warm and dry  No rash noted  Psychiatric: She has a normal mood and affect  Nursing note and vitals reviewed  Additional Data:     Labs:    Results from last 7 days   Lab Units 06/13/19  1846 06/08/19  0458   WBC Thousand/uL 3 10* 4 90   HEMOGLOBIN g/dL 10 6* 10 9*   HEMATOCRIT % 31 6* 32 3*   PLATELETS Thousands/uL 78* 86*   NEUTROS PCT %  --  77*   LYMPHS PCT %  --  14*   MONOS PCT %  --  8   EOS PCT %  --  1     Results from last 7 days   Lab Units 06/13/19  1846  06/08/19  0458   SODIUM mmol/L 135*   < > 136*   POTASSIUM mmol/L 3 7   < > 3 1*   CHLORIDE mmol/L 103   < > 107   CO2 mmol/L 30   < > 26   BUN mg/dL 12   < > 13   CREATININE mg/dL 0 50*   < > 0 42*   ANION GAP mmol/L 2*   < > 3*   CALCIUM mg/dL 8 7   < > 8 4   ALBUMIN g/dL  --   --  2 4*   TOTAL BILIRUBIN mg/dL  --   --  1 40*   ALK PHOS U/L  --   --  114   ALT U/L  --   --  37   AST U/L  --   --  43*   GLUCOSE RANDOM mg/dL 249*   < > 192*    < > = values in this interval not displayed  Results from last 7 days   Lab Units 06/14/19  1144   INR  1 24*     Results from last 7 days   Lab Units 06/14/19  1603 06/14/19  1128 06/14/19  0739 06/13/19  1944/19  1543 06/13/19  1116 06/13/19  0727 06/13/19  0048 06/12/19  1937 06/12/19  1557 06/12/19  1106 06/12/19  0726   POC GLUCOSE mg/dl 283* 309* 72 263* 288* 272* 90 214* 305* 393* 389* 206*                   * I Have Reviewed All Lab Data Listed Above  * Additional Pertinent Lab Tests Reviewed:  [address] Admission Reviewed    Imaging:    Imaging Reports Reviewed Today Include:  None  Imaging Personally Reviewed by Myself Includes:  None    Recent Cultures (last 7 days):           Last 24 Hours Medication List:     Current Facility-Administered Medications:  buPROPion 150 mg Oral Daily Saima Melton PA-C   dextrose 50 mL Intravenous Once Nevaeh HANANE Sánchez   docusate sodium 100 mg Oral BID Aaron Allison MD   [START ON 6/15/2019] furosemide 20 mg Oral BID (diuretic) Rosalva Baker MD   insulin detemir 14 Units Subcutaneous Q12H [address] [place], DO   insulin lispro 2-12 Units Subcutaneous 4x Daily (AC & HS) [place], DO   lidocaine   Code/Trauma/Sedation Med José Gr MD   LORazepam 1 mg Intramuscular Q6H PRN Jose Spry, HANANE   LORazepam 1 mg Oral Q6H PRN Jose Spry, PA-C   midodrine 2 5 mg Oral TID AC Isabel Hoyt MD   OLANZapine 5 mg Intramuscular Q4H PRN Jose Spry, PA-C   OLANZapine 10 mg Oral HS Patricia Reich PA-C   OLANZapine 5 mg Oral Q4H PRN Jose Spry, PA-C   ondansetron 4 mg Oral Q6H PRN Fernanda Malik MD   oxyCODONE 2 5 mg Oral Q6H PRN Isabel Hoyt MD   spironolactone 25 mg Oral Daily Fernanda Malik MD        Today, Patient Was Seen By: Isabel Hoyt MD    ** Please Note: Dictation voice to text software may have been used in the creation of this document   **

## 2019-06-14 NOTE — QUICK NOTE
Called regarding new lab results  Noted to have hyperglycemia   Will increase levemir to 14 units bid otherwise appears stable from labs for ECT     Labs:  Results from last 7 days   Lab Units 06/13/19  1846 06/08/19  0458 06/08/19  0444   WBC Thousand/uL 3 10* 4 90 6 00   HEMOGLOBIN g/dL 10 6* 10 9* 11 5*   HEMATOCRIT % 31 6* 32 3* 34 6*   MCV fL 97 94 95   PLATELETS Thousands/uL 78* 86* 89*       Results from last 7 days   Lab Units 06/13/19  1846 06/09/19  0504 06/08/19  0458   SODIUM mmol/L 135* 139 136*   POTASSIUM mmol/L 3 7 3 7 3 1*   CHLORIDE mmol/L 103 107 107   CO2 mmol/L 30 27 26   ANION GAP mmol/L 2* 5 3*   BUN mg/dL 12 11 13   CREATININE mg/dL 0 50* 0 42* 0 42*   CALCIUM mg/dL 8 7 8 7 8 4   ALBUMIN g/dL  --   --  2 4*   TOTAL BILIRUBIN mg/dL  --   --  1 40*   ALK PHOS U/L  --   --  114   ALT U/L  --   --  37   AST U/L  --   --  43*   EGFR ml/min/1 73sq m 96 101 101   GLUCOSE RANDOM mg/dL 249* 156* 192*          Results from last 7 days   Lab Units 06/13/19  1944/19  1543 06/13/19  1116 06/13/19  0727 06/13/19  0048   POC GLUCOSE mg/dl 263* 288* 272* 90 214*

## 2019-06-14 NOTE — ASSESSMENT & PLAN NOTE
Lab Results   Component Value Date    HGBA1C 10 1 (H) 02/13/2019       Recent Labs     06/13/19  1944/19  0739 06/14/19  1128 06/14/19  1603   POCGLU 263* 72 309* 283*       Blood Sugar Average: Last 72 hrs:  (P) 926 7107    patient's appetite is variable  She frequently misses meals  Will continue current regimen along with insulin sliding scale    Adjust insulin regimen today

## 2019-06-14 NOTE — PROGRESS NOTES
Awake and up for breakfast,liable mood  Pt is agreeable for paracentesis today, IR was made aware,will be going this afternoon  PT/INR ordered,refusing at this time,will approach again  Pt complaining of increase anxiety and Ativan 1 mg po PRN given per pt request  Will continue to monitor closely for effect

## 2019-06-14 NOTE — PLAN OF CARE
Problem: Alteration in Thoughts and Perception  Goal: Treatment Goal: Gain control of psychotic behaviors/thinking, reduce/eliminate presenting symptoms and demonstrate improved reality functioning upon discharge  Outcome: Progressing  Goal: Verbalize thoughts and feelings  Description  Interventions:  - Promote a nonjudgmental and trusting relationship with the patient through active listening and therapeutic communication  - Assess patient's level of functioning, behavior and potential for risk  - Engage patient in 1 on 1 interactions for a minimum of 15 minutes each session  - Encourage patient to express fears, feelings, frustrations, and discuss symptoms    - [place] patient to reality, help patient recognize reality-based thinking   - Administer medications as ordered and assess for potential side effects  - Provide the patient education related to the signs and symptoms of the illness and desired effects of prescribed medications  Outcome: Progressing  Goal: Refrain from acting on delusional thinking/internal stimuli  Description  Interventions:  - Monitor patient closely, per order   - Utilize least restrictive measures   - Set reasonable limits, give positive feedback for acceptable   - Administer medications as ordered and monitor of potential side effects  Outcome: Progressing  Goal: Agree to be compliant with medication regime, as prescribed and report medication side effects  Description  Interventions:  - Offer appropriate PRN medication and supervise ingestion; conduct aims, as needed   Outcome: Progressing  Goal: Recognize dysfunctional thoughts, communicate reality-based thoughts at the time of discharge  Description  Interventions:  - Provide medication and psycho-education to assist patient in compliance and developing insight into his/her illness   Outcome: Progressing  Goal: Complete daily ADLs, including personal hygiene independently, as able  Description  Interventions:  - Observe, teach, and assist patient with ADLS  - Monitor and promote a balance of rest/activity, with adequate nutrition and elimination   Outcome: Progressing     Problem: Risk for Self Injury/Neglect  Goal: Treatment Goal: Remain safe during length of stay, learn and adopt new coping skills, and be free of self-injurious ideation, impulses and acts at the time of discharge  Outcome: Progressing  Goal: Verbalize thoughts and feelings  Description  Interventions:  - Assess and re-assess patient's lethality and potential for self-injury  - Engage patient in 1:1 interactions, daily, for a minimum of 15 minutes  - Encourage patient to express feelings, fears, frustrations, hopes  - Establish rapport/trust with patient   Outcome: Progressing  Goal: Refrain from harming self  Description  Interventions:  - Monitor patient closely, per order  - Develop a trusting relationship  - Supervise medication ingestion, monitor effects and side effects   Outcome: Progressing  Goal: Recognize maladaptive responses and adopt new coping mechanisms  Outcome: Progressing  Goal: Complete daily ADLs, including personal hygiene independently, as able  Description  Interventions:  - Observe, teach, and assist patient with ADLS  - Monitor and promote a balance of rest/activity, with adequate nutrition and elimination  Outcome: Progressing     Problem: Prexisting or High Potential for Compromised Skin Integrity  Goal: Skin integrity is maintained or improved  Description  INTERVENTIONS:  - Identify patients at risk for skin breakdown  - Assess and monitor skin integrity  - Assess and monitor nutrition and hydration status  - Monitor labs (i e  albumin)  - Assess for incontinence   - Turn and reposition patient  - Assist with mobility/ambulation  - Relieve pressure over bony prominences  - Avoid friction and shearing  - Provide appropriate hygiene as needed including keeping skin clean and dry  - Evaluate need for skin moisturizer/barrier cream  - Collaborate with interdisciplinary team (i e  Nutrition, Rehabilitation, etc )   - Patient/family teaching  Outcome: Progressing     Problem: Nutrition/Hydration-ADULT  Goal: Nutrient/Hydration intake appropriate for improving, restoring or maintaining nutritional needs  Description  Monitor and assess patient's nutrition/hydration status for malnutrition (ex- brittle hair, bruises, dry skin, pale skin and conjunctiva, muscle wasting, smooth red tongue, and disorientation)  Collaborate with interdisciplinary team and initiate plan and interventions as ordered  Monitor patient's weight and dietary intake as ordered or per policy  Utilize nutrition screening tool and intervene per policy  Determine patient's food preferences and provide high-protein, high-caloric foods as appropriate       INTERVENTIONS:  - Monitor oral intake, urinary output, labs, and treatment plans  - Assess nutrition and hydration status and recommend course of action  - Evaluate amount of meals eaten  - Assist patient with eating if necessary   - Allow adequate time for meals  - Recommend/ encourage appropriate diets, oral nutritional supplements, and vitamin/mineral supplements  - Order, calculate, and assess calorie counts as needed  - Recommend, monitor, and adjust tube feedings and TPN/PPN based on assessed needs  - Assess need for intravenous fluids  - Provide specific nutrition/hydration education as appropriate  - Include patient/family/caregiver in decisions related to nutrition  Outcome: Progressing

## 2019-06-14 NOTE — ASSESSMENT & PLAN NOTE
Decompensated cirrhosis with ascites and lower extremity edema  Continue Lasix and Aldactone, increased dose of Lasix to 20 mg twice daily also placed on midodrine to help improve blood pressures so she may get her diuretics  Patient had paracentesis done today with 2 7 L removed    Her mood is improving and she is getting more cooperative with her treatment     patient probably was on hospice secondary to liver cirrhosis

## 2019-06-14 NOTE — APP STUDENT NOTE
Patient awake now  According to PACU patient had ECT canceled for today  Patient is now resting quietly in bed  Will continue to monitor

## 2019-06-14 NOTE — PROGRESS NOTES
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  Patient was scheduled to go for ECT this morning however was rescheduled due to the fact that she may go for a paracentesis today  I are has been consulted  She continues to be depressed anxious and agitated at times  She did sleep well throughout the night    They are performing mouth checks on her as she reported to have cheeked medications in the past   Behavior over the last 24 hours:  unchanged  Sleep: fair  Appetite: fair  Medication side effects: No  ROS: no complaints    Current medications:    Current Facility-Administered Medications:     buPROPion (WELLBUTRIN XL) 24 hr tablet 150 mg, 150 mg, Oral, Daily, Patricia Reich PA-C, 150 mg at 06/14/19 1633    dextrose 50 % IV solution 50 mL, 50 mL, Intravenous, Once, Lissy Caba PA-C    docusate sodium (COLACE) capsule 100 mg, 100 mg, Oral, BID, Diane White MD, 100 mg at 06/13/19 1752    furosemide (LASIX) tablet 20 mg, 20 mg, Oral, Daily, Diane White MD, 20 mg at 06/12/19 0838    insulin detemir (LEVEMIR) subcutaneous injection 14 Units, 14 Units, Subcutaneous, Q12H [address], Tone Fernandez DO, 14 Units at 06/14/19 0859    insulin lispro (HumaLOG) 100 units/mL subcutaneous injection 2-12 Units, 2-12 Units, Subcutaneous, 4x Daily (AC & HS), 6 Units at 06/13/19 2147 **AND** Fingerstick Glucose (POCT), , , 4x Daily AC and at bedtime, Tone Fernandez DO    LORazepam (ATIVAN) 2 mg/mL injection 1 mg, 1 mg, Intramuscular, Q6H PRN, Norman Low PA-C    LORazepam (ATIVAN) tablet 1 mg, 1 mg, Oral, Q6H PRN, Norman Low PA-C, 1 mg at 06/13/19 2225    OLANZapine (ZyPREXA) IM injection 5 mg, 5 mg, Intramuscular, Q4H PRN, Norman Low PA-C, 5 mg at 06/05/19 2033    OLANZapine (ZyPREXA) tablet 10 mg, 10 mg, Oral, HS, Patricia Reich PA-C, 10 mg at 06/13/19 1801    OLANZapine (ZyPREXA) tablet 5 mg, 5 mg, Oral, Q4H PRN, Zahra Salguero PA-C    ondansetron (ZOFRAN-ODT) dispersible tablet 4 mg, 4 mg, Oral, Q6H PRN, Quintin Gilbert MD    oxyCODONE (ROXICODONE) IR tablet 2 5 mg, 2 5 mg, Oral, Q6H PRN, Christin Hinson MD, 2 5 mg at 06/13/19 0605    spironolactone (ALDACTONE) tablet 25 mg, 25 mg, Oral, Daily, Quintin Gilbert MD, 25 mg at 06/12/19 1254    Current Problem List:    Patient Active Problem List   Diagnosis    Type 2 diabetes mellitus, with long-term current use of insulin (HCC)    Hyperlipidemia    Asthma    Anemia of chronic disease    [facility]    Bipolar 1 disorder ([facility]    Cirrhosis of liver with ascites ([facility]    Essential hypertension    GERD (gastroesophageal reflux disease)    Venous stasis    Ambulatory dysfunction    Closed compression fracture of L3 lumbar vertebra    Compression fracture of L3 lumbar vertebra    Closed compression fracture of third lumbar vertebra (HCC)    Fall    Diabetic polyneuropathy associated with type 2 diabetes mellitus ([facility] )    Iron deficiency anemia    Status post fall - Ambulatory dysfunction    Discharged from Hospice for Suicidal Ideation, Medically Complex    ESRD (end stage renal disease) ([facility] )    Preprocedural examination       Problem list reviewed 06/14/19     Objective:     Vital Signs:  Vitals:    06/13/19 0719 06/13/19 1524 06/13/19 2113 06/14/19 0441   BP: (!) 102/48 142/69 119/58 106/72   BP Location: Left arm Left arm Left arm Left arm   Pulse: 85 88 89 93   Resp: 20 20 20 16   Temp: 98 3 °F (36 8 °C) 98 1 °F (36 7 °C) 98 5 °F (36 9 °C) 97 7 °F (36 5 °C)   TempSrc: Temporal Temporal Temporal Temporal   SpO2: 100% 99% 98% 99%   Weight:    66 kg (145 lb 9 6 oz)   Height:             Appearance:  age appropriate, casually dressed and disheveled   Behavior:  evasive and guarded   Speech:  normal volume   Mood:  irritable and labile   Affect:  labile   Thought Process:  circumstantial   Thought Content:  delusions  persecutory Perceptual Disturbances: None   Risk Potential: Potential for Aggression Yes yes   Sensorium:  person and place   Cognition:  grossly intact   Consciousness:  alert and awake    Attention: attention span and concentration were age appropriate   Intellect: average   Insight:  poor   Judgment: poor      Motor Activity: no abnormal movements       Labs:  Reviewed 06/14/19      Assessment / Plan:     [facility]    Recommended Treatment:      Medication changes:  1) monitor on recent medication adjustments  ECT once the patient is cleared    Non-pharmacological treatments  1) Continue with group therapy, milieu therapy and occupational therapy  Safety  1) Safety/communication plan established targeting dynamic risk factors above  2) Risks, benefits, and possible side effects of medications explained to patient and patient verbalizes understanding  Counseling / Coordination of Care    Total floor / unit time spent today 20 minutes  Greater than 50% of total time was spent with the patient and / or family counseling and / or coordination of care  A description of the counseling / coordination of care  Patient's Rights, confidentiality and exceptions to confidentiality, use of automated medical record, [facility] staff access to medical record, and consent to treatment reviewed      Tricia Khan PA-C

## 2019-06-14 NOTE — ASSESSMENT & PLAN NOTE
Patient is medically cleared for ECT treatments   Will do CBC and BMP    EKG reviewed  Patient had paracentesis done and hence she is now medically cleared to proceed with ECT treatments

## 2019-06-14 NOTE — PROGRESS NOTES
Pt observed on the unit  Pt seen in the room, ambulating with walker  Pt reported increased anxiety  Pt stated "I am worried about ECT tx' but do not want to elaborate on her concerns about ECT tx  Pt given PRN Ativan and was effective

## 2019-06-14 NOTE — PROGRESS NOTES
Pt on isolation in room R/T VRE in urine  Pt is pleasant and cooperative on approach, med and meal compliant  Pt asking for more chocolate cake with dinner

## 2019-06-14 NOTE — PROGRESS NOTES
Reported to dr Beth Armijo that Aldactone and Lasix held this morning due to parameters  Made her aware that after pt had BM this morning,small amount of bright red blood on the tissue noted  On coming RN aware  Will continue to monitor closely

## 2019-06-15 LAB
GLUCOSE SERPL-MCNC: 144 MG/DL (ref 65–140)
GLUCOSE SERPL-MCNC: 163 MG/DL (ref 65–140)
GLUCOSE SERPL-MCNC: 269 MG/DL (ref 65–140)
GLUCOSE SERPL-MCNC: 392 MG/DL (ref 65–140)

## 2019-06-15 PROCEDURE — 82948 REAGENT STRIP/BLOOD GLUCOSE: CPT

## 2019-06-15 PROCEDURE — 99232 SBSQ HOSP IP/OBS MODERATE 35: CPT | Performed by: PHYSICIAN ASSISTANT

## 2019-06-15 RX ADMIN — BUPROPION HYDROCHLORIDE 150 MG: 150 TABLET, FILM COATED, EXTENDED RELEASE ORAL at 08:46

## 2019-06-15 RX ADMIN — INSULIN LISPRO 8 UNITS: 100 INJECTION, SOLUTION INTRAVENOUS; SUBCUTANEOUS at 07:50

## 2019-06-15 RX ADMIN — MIDODRINE HYDROCHLORIDE 2.5 MG: 2.5 TABLET ORAL at 06:33

## 2019-06-15 RX ADMIN — LORAZEPAM 1 MG: 1 TABLET ORAL at 02:32

## 2019-06-15 RX ADMIN — INSULIN LISPRO 8 UNITS: 100 INJECTION, SOLUTION INTRAVENOUS; SUBCUTANEOUS at 11:45

## 2019-06-15 RX ADMIN — INSULIN LISPRO 6 UNITS: 100 INJECTION, SOLUTION INTRAVENOUS; SUBCUTANEOUS at 17:02

## 2019-06-15 RX ADMIN — INSULIN LISPRO 8 UNITS: 100 INJECTION, SOLUTION INTRAVENOUS; SUBCUTANEOUS at 12:45

## 2019-06-15 RX ADMIN — SPIRONOLACTONE 25 MG: 25 TABLET, FILM COATED ORAL at 08:48

## 2019-06-15 RX ADMIN — OXYCODONE HYDROCHLORIDE 2.5 MG: 5 TABLET ORAL at 23:30

## 2019-06-15 RX ADMIN — FUROSEMIDE 20 MG: 20 TABLET ORAL at 16:04

## 2019-06-15 RX ADMIN — MIDODRINE HYDROCHLORIDE 2.5 MG: 2.5 TABLET ORAL at 11:46

## 2019-06-15 RX ADMIN — MIDODRINE HYDROCHLORIDE 2.5 MG: 2.5 TABLET ORAL at 16:34

## 2019-06-15 RX ADMIN — OLANZAPINE 10 MG: 10 TABLET, FILM COATED ORAL at 20:30

## 2019-06-15 RX ADMIN — INSULIN LISPRO 2 UNITS: 100 INJECTION, SOLUTION INTRAVENOUS; SUBCUTANEOUS at 21:04

## 2019-06-15 RX ADMIN — INSULIN LISPRO 8 UNITS: 100 INJECTION, SOLUTION INTRAVENOUS; SUBCUTANEOUS at 17:03

## 2019-06-15 RX ADMIN — LORAZEPAM 1 MG: 1 TABLET ORAL at 20:30

## 2019-06-15 RX ADMIN — INSULIN DETEMIR 14 UNITS: 100 INJECTION, SOLUTION SUBCUTANEOUS at 08:53

## 2019-06-15 RX ADMIN — FUROSEMIDE 20 MG: 20 TABLET ORAL at 08:47

## 2019-06-15 NOTE — PLAN OF CARE
Problem: Alteration in Thoughts and Perception  Goal: Verbalize thoughts and feelings  Description  Interventions:  - Promote a nonjudgmental and trusting relationship with the patient through active listening and therapeutic communication  - Assess patient's level of functioning, behavior and potential for risk  - Engage patient in 1 on 1 interactions for a minimum of 15 minutes each session  - Encourage patient to express fears, feelings, frustrations, and discuss symptoms    - [place] patient to reality, help patient recognize reality-based thinking   - Administer medications as ordered and assess for potential side effects  - Provide the patient education related to the signs and symptoms of the illness and desired effects of prescribed medications  Outcome: Progressing  Goal: Agree to be compliant with medication regime, as prescribed and report medication side effects  Description  Interventions:  - Offer appropriate PRN medication and supervise ingestion; conduct aims, as needed   Outcome: Progressing     Problem: Risk for Self Injury/Neglect  Goal: Refrain from harming self  Description  Interventions:  - Monitor patient closely, per order  - Develop a trusting relationship  - Supervise medication ingestion, monitor effects and side effects   Outcome: Progressing

## 2019-06-15 NOTE — QUICK NOTE
Called to inform  and nurse reports pt with history of hypoglycemic episode last time she had her  She states pt was then in 170s at bedtime and was given her levemir but dropped to the 40s in AM  I trended her the FS check and noted that pt has mostly periods of hypoglycemia in the AM  She however has elevated FS daytime   I will therefore hold tonight's dose of insulin and change her levemir to AM dose for better glycemic control

## 2019-06-15 NOTE — PROGRESS NOTES
Progress Note - Shanna 75 y o  female MRN: [id number]  Unit/Bed#: [facility] Trev[facility] 979-81 Encounter: [id number]    Assessment  Active Problems:Bipolar 1 disorder [facility])  Patient Active Problem List   Diagnosis    Type 2 diabetes mellitus, with long-term current use of insulin ([facility] )    Hyperlipidemia    Asthma    Anemia of chronic disease    Pancytopenia ([place] [address] )    Bipolar 1 disorder (HCC)    Cirrhosis of liver with ascites ([place] [address] )    Essential hypertension    GERD (gastroesophageal reflux disease)    Venous stasis    Ambulatory dysfunction    Closed compression fracture of L3 lumbar vertebra    Compression fracture of L3 lumbar vertebra    Closed compression fracture of third lumbar vertebra (HCC)    Fall    Diabetic polyneuropathy associated with type 2 diabetes mellitus (HCC)    Iron deficiency anemia    Status post fall - Ambulatory dysfunction    Discharged from Hospice for Suicidal Ideation, Medically Complex    ESRD (end stage renal disease) ([place] [address] )    Preprocedural examination       Vitals:    06/15/19 1537   BP: 140/60   Pulse: 83   Resp: 18   Temp: 97 8 °F (36 6 °C)   SpO2: 100%       Behavior over the last 24 hours:  unchanged  Sleep: normal  Appetite: normal  Medication side effects: No  ROS: no complaints    Mental Status Evaluation:  Appearance:  disheveled   Behavior:  Cooperative this a m     Speech:  normal pitch and normal volume   Mood:  anxious, depressed   Affect:  mood-congruent   Thought Process:  circumstantial   Thought Content:  No overt delusions voiced when seen   Perceptual Disturbances: None   Potential for Aggression:    Suicidal/Homicidal Ideation: Yes     No SI or HI reported when seen   Sensorium:  person and place   Cognition:  grossly intact   Consciousness:  alert and awake    Attention: attention span appeared shorter than expected for age   Insight:  limited   Judgment: limited   Gait/Station: Ambulates with walker   Motor Activity: no abnormal movements     Progress Toward Goals and Additional Assessment:  Patient continues to remain on contact precautions due to VRE in her urine  Patient did have 2 7 L of fluid drained from her during paracentesis yesterday, following the procedure she was very cooperative with staff and did eat well in the evening  Patient did receive Levemir last evening which caused hypoglycemia prompting medical team to adjust her insulin  Very pleasant with me this a m  Still somewhat confused and anxious  Is aware that she may be receiving ECT in the upcoming days  Recommended Treatment:   1) Continue with group therapy, milieu therapy and individual therapy  2) Continue current medications and treatment    Risks, benefits and possible side effects of Medications:   Patient does not verbalize understanding at this time and will require further explanation        Medications:   all current active meds have been reviewed and current meds:   Current Facility-Administered Medications   Medication Dose Route Frequency    buPROPion (WELLBUTRIN XL) 24 hr tablet 150 mg  150 mg Oral Daily    dextrose 50 % IV solution 50 mL  50 mL Intravenous Once    docusate sodium (COLACE) capsule 100 mg  100 mg Oral BID    furosemide (LASIX) tablet 20 mg  20 mg Oral BID (diuretic)    insulin detemir (LEVEMIR) subcutaneous injection 14 Units  14 Units Subcutaneous QAM    insulin lispro (HumaLOG) 100 units/mL subcutaneous injection 2-12 Units  2-12 Units Subcutaneous 4x Daily (AC & HS)    insulin lispro (HumaLOG) 100 units/mL subcutaneous injection 8 Units  8 Units Subcutaneous TID With Meals    lidocaine (XYLOCAINE) 1 % injection    Code/Trauma/Sedation Med    LORazepam (ATIVAN) 2 mg/mL injection 1 mg  1 mg Intramuscular Q6H PRN    LORazepam (ATIVAN) tablet 1 mg  1 mg Oral Q6H PRN    midodrine (PROAMATINE) tablet 2 5 mg  2 5 mg Oral TID AC    OLANZapine (ZyPREXA) IM injection 5 mg  5 mg Intramuscular Q4H PRN    OLANZapine (ZyPREXA) tablet 10 mg  10 mg Oral HS    OLANZapine (ZyPREXA) tablet 5 mg  5 mg Oral Q4H PRN    ondansetron (ZOFRAN-ODT) dispersible tablet 4 mg  4 mg Oral Q6H PRN    oxyCODONE (ROXICODONE) IR tablet 2 5 mg  2 5 mg Oral Q6H PRN    spironolactone (ALDACTONE) tablet 25 mg  25 mg Oral Daily         Labs:   Admission on 06/04/2019   Component Date Value    POC Glucose 06/04/2019 231*    POC Glucose 06/04/2019 261*    POC Glucose 06/05/2019 55*    POC Glucose 06/05/2019 90     POC Glucose 06/05/2019 207*    POC Glucose 06/05/2019 224*    POC Glucose 06/05/2019 176*    Sodium 06/06/2019 138     Potassium 06/06/2019 3 7     Chloride 06/06/2019 109*    CO2 06/06/2019 26     ANION GAP 06/06/2019 3*    BUN 06/06/2019 12     Creatinine 06/06/2019 0 46*    Glucose 06/06/2019 158*    Glucose, Fasting 06/06/2019 158*    Calcium 06/06/2019 8 5     AST 06/06/2019 43*    ALT 06/06/2019 31     Alkaline Phosphatase 06/06/2019 103     Total Protein 06/06/2019 5 6*    Albumin 06/06/2019 2 4*    Total Bilirubin 06/06/2019 1 00     eGFR 06/06/2019 98     Cholesterol 06/06/2019 144     Triglycerides 06/06/2019 50     HDL, Direct 06/06/2019 47     LDL Calculated 06/06/2019 87     Non-HDL-Chol (CHOL-HDL) 06/06/2019 97     TSH 3RD GENERATON 06/06/2019 1 560     WBC 06/06/2019 3 30*    RBC 06/06/2019 3 41*    Hemoglobin 06/06/2019 10 6*    Hematocrit 06/06/2019 32 7*    MCV 06/06/2019 96     MCH 06/06/2019 31 2     MCHC 06/06/2019 32 5     RDW 06/06/2019 17 5*    Platelets 21/70/1538 71*    MPV 06/06/2019 9 6     POC Glucose 06/06/2019 122     POC Glucose 06/06/2019 230*    POC Glucose 06/06/2019 317*    POC Glucose 06/06/2019 415*    POC Glucose 06/07/2019 105     POC Glucose 06/07/2019 195*    POC Glucose 06/07/2019 313*    POC Glucose 06/07/2019 178*    WBC 06/08/2019 6 00     RBC 06/08/2019 3 64*    Hemoglobin 06/08/2019 11 5*    Hematocrit 06/08/2019 34 6*    MCV 06/08/2019 95     MCH 06/08/2019 31 5     MCHC 06/08/2019 33 2     RDW 06/08/2019 17 1*    Platelets 17/26/1237 89*    MPV 06/08/2019 9 3     POC Glucose 06/08/2019 42*    WBC 06/08/2019 4 90     RBC 06/08/2019 3 43*    Hemoglobin 06/08/2019 10 9*    Hematocrit 06/08/2019 32 3*    MCV 06/08/2019 94     MCH 06/08/2019 31 6     MCHC 06/08/2019 33 6     RDW 06/08/2019 17 5*    MPV 06/08/2019 9 2     Platelets 82/75/6429 86*    Neutrophils Relative 06/08/2019 77*    Lymphocytes Relative 06/08/2019 14*    Monocytes Relative 06/08/2019 8     Eosinophils Relative 06/08/2019 1     Basophils Relative 06/08/2019 1     Neutrophils Absolute 06/08/2019 3 80     Lymphocytes Absolute 06/08/2019 0 70     Monocytes Absolute 06/08/2019 0 40     Eosinophils Absolute 06/08/2019 0 00     Basophils Absolute 06/08/2019 0 00     Sodium 06/08/2019 136*    Potassium 06/08/2019 3 1*    Chloride 06/08/2019 107     CO2 06/08/2019 26     ANION GAP 06/08/2019 3*    BUN 06/08/2019 13     Creatinine 06/08/2019 0 42*    Glucose 06/08/2019 192*    Glucose, Fasting 06/08/2019 192*    Calcium 06/08/2019 8 4     AST 06/08/2019 43*    ALT 06/08/2019 37     Alkaline Phosphatase 06/08/2019 114     Total Protein 06/08/2019 5 4*    Albumin 06/08/2019 2 4*    Total Bilirubin 06/08/2019 1 40*    eGFR 06/08/2019 101     pH, Malcom 06/08/2019 7 390     pCO2, Malcom 06/08/2019 43 0     pO2, Malcom 06/08/2019 112 0     HCO3, Malcom 06/08/2019 26 0     Base Excess, Malcom 06/08/2019 0 8     O2 Content, Malcom 06/08/2019 15 0     O2 HGB, VENOUS 06/08/2019 93 8     Ammonia 06/08/2019 27     POC Glucose 06/08/2019 175*    RBC Morphology 06/08/2019 Normal     Platelet Estimate 72/13/1326 Decreased*    Magnesium 06/08/2019 1 7     POC Glucose 06/08/2019 117     POC Glucose 06/08/2019 105     POC Glucose 06/08/2019 218*    POC Glucose 06/08/2019 293*    POC Glucose 06/08/2019 248*    POC Glucose 06/08/2019 290*    POC Glucose 06/09/2019 281*    Sodium 06/09/2019 139     Potassium 06/09/2019 3 7     Chloride 06/09/2019 107     CO2 06/09/2019 27     ANION GAP 06/09/2019 5     BUN 06/09/2019 11     Creatinine 06/09/2019 0 42*    Glucose 06/09/2019 156*    Glucose, Fasting 06/09/2019 156*    Calcium 06/09/2019 8 7     eGFR 06/09/2019 101     POC Glucose 06/09/2019 162*    POC Glucose 06/09/2019 152*    POC Glucose 06/09/2019 156*    POC Glucose 06/09/2019 176*    POC Glucose 06/09/2019 221*    POC Glucose 06/10/2019 66     POC Glucose 06/10/2019 113     POC Glucose 06/10/2019 320*    POC Glucose 06/10/2019 191*    POC Glucose 06/10/2019 242*    POC Glucose 06/11/2019 231*    POC Glucose 06/11/2019 310*    POC Glucose 06/11/2019 374*    POC Glucose 06/12/2019 278*    POC Glucose 06/12/2019 206*    POC Glucose 06/12/2019 389*    POC Glucose 06/12/2019 393*    POC Glucose 06/12/2019 305*    POC Glucose 06/13/2019 214*    POC Glucose 06/13/2019 90     POC Glucose 06/13/2019 272*    POC Glucose 06/13/2019 288*    WBC 06/13/2019 3 10*    RBC 06/13/2019 3 28*    Hemoglobin 06/13/2019 10 6*    Hematocrit 06/13/2019 31 6*    MCV 06/13/2019 97     MCH 06/13/2019 32 2     MCHC 06/13/2019 33 4     RDW 06/13/2019 18 0*    Platelets 98/21/0220 78*    MPV 06/13/2019 9 3     Sodium 06/13/2019 135*    Potassium 06/13/2019 3 7     Chloride 06/13/2019 103     CO2 06/13/2019 30     ANION GAP 06/13/2019 2*    BUN 06/13/2019 12     Creatinine 06/13/2019 0 50*    Glucose 06/13/2019 249*    Calcium 06/13/2019 8 7     eGFR 06/13/2019 96     POC Glucose 06/13/2019 263*    POC Glucose 06/14/2019 72     Protime 06/14/2019 13 0*    INR 06/14/2019 1 24*    POC Glucose 06/14/2019 309*    Site 06/14/2019 paracentesis     Color, Fluid 06/14/2019 Straw     Clarity, Fluid 06/14/2019 Slightly Cloudy     WBC, Fluid 06/14/2019 94     Protein, Fluid 06/14/2019 <2 0     Albumin, Fluid 06/14/2019 <1 0     Body Fluid Culture, Ster* 06/14/2019 No growth     Gram Stain Result 06/14/2019 Rare Mononuclear Cells     Gram Stain Result 06/14/2019 No Polys or Bacteria seen     POC Glucose 06/14/2019 283*    Total Counted 06/14/2019 100     Lymphs % (Fluid) 06/14/2019 26     Mesothelial % (Fluid) 06/14/2019 1     Histiocyte % (Fluid) 06/14/2019 10     Monocytes % (Fluid) 06/14/2019 63     POC Glucose 06/14/2019 118     POC Glucose 06/15/2019 144*    POC Glucose 06/15/2019 392*       Is billing to be determined based on time spent on case? No       Mirta Arellano, PAInnaC

## 2019-06-15 NOTE — PROGRESS NOTES
Patient was pleasant and cooperative this evening  Her sugar was 118 due for leviemir  Discussed with dr Choco Marrufo made  Patient was compliant with medications  she currently is in her bed  No signs of distress or discomfort  She appears to be sleeping  Bed alarm is on for safety

## 2019-06-16 ENCOUNTER — ANESTHESIA EVENT (INPATIENT)
Dept: PREOP/PACU | Facility: HOSPITAL | Age: 74
DRG: 885 | End: 2019-06-16
Payer: COMMERCIAL

## 2019-06-16 LAB
GLUCOSE SERPL-MCNC: 150 MG/DL (ref 65–140)
GLUCOSE SERPL-MCNC: 190 MG/DL (ref 65–140)
GLUCOSE SERPL-MCNC: 196 MG/DL (ref 65–140)
GLUCOSE SERPL-MCNC: 241 MG/DL (ref 65–140)

## 2019-06-16 PROCEDURE — 99232 SBSQ HOSP IP/OBS MODERATE 35: CPT | Performed by: PHYSICIAN ASSISTANT

## 2019-06-16 PROCEDURE — 82948 REAGENT STRIP/BLOOD GLUCOSE: CPT

## 2019-06-16 RX ORDER — LANOLIN ALCOHOL/MO/W.PET/CERES
3 CREAM (GRAM) TOPICAL
Status: DISCONTINUED | OUTPATIENT
Start: 2019-06-16 | End: 2019-07-08 | Stop reason: HOSPADM

## 2019-06-16 RX ADMIN — FUROSEMIDE 20 MG: 20 TABLET ORAL at 08:15

## 2019-06-16 RX ADMIN — INSULIN LISPRO 4 UNITS: 100 INJECTION, SOLUTION INTRAVENOUS; SUBCUTANEOUS at 12:08

## 2019-06-16 RX ADMIN — INSULIN DETEMIR 14 UNITS: 100 INJECTION, SOLUTION SUBCUTANEOUS at 09:19

## 2019-06-16 RX ADMIN — INSULIN LISPRO 2 UNITS: 100 INJECTION, SOLUTION INTRAVENOUS; SUBCUTANEOUS at 21:52

## 2019-06-16 RX ADMIN — OLANZAPINE 10 MG: 10 TABLET, FILM COATED ORAL at 20:23

## 2019-06-16 RX ADMIN — MELATONIN TAB 3 MG 3 MG: 3 TAB at 21:50

## 2019-06-16 RX ADMIN — DOCUSATE SODIUM 100 MG: 100 CAPSULE, LIQUID FILLED ORAL at 17:13

## 2019-06-16 RX ADMIN — MIDODRINE HYDROCHLORIDE 2.5 MG: 2.5 TABLET ORAL at 06:49

## 2019-06-16 RX ADMIN — BUPROPION HYDROCHLORIDE 150 MG: 150 TABLET, FILM COATED, EXTENDED RELEASE ORAL at 09:15

## 2019-06-16 RX ADMIN — MIDODRINE HYDROCHLORIDE 2.5 MG: 2.5 TABLET ORAL at 16:16

## 2019-06-16 RX ADMIN — MIDODRINE HYDROCHLORIDE 2.5 MG: 2.5 TABLET ORAL at 12:11

## 2019-06-16 RX ADMIN — INSULIN LISPRO 8 UNITS: 100 INJECTION, SOLUTION INTRAVENOUS; SUBCUTANEOUS at 07:17

## 2019-06-16 RX ADMIN — FUROSEMIDE 20 MG: 20 TABLET ORAL at 16:13

## 2019-06-16 RX ADMIN — SPIRONOLACTONE 25 MG: 25 TABLET, FILM COATED ORAL at 09:15

## 2019-06-16 RX ADMIN — INSULIN LISPRO 8 UNITS: 100 INJECTION, SOLUTION INTRAVENOUS; SUBCUTANEOUS at 17:15

## 2019-06-16 RX ADMIN — INSULIN LISPRO 8 UNITS: 100 INJECTION, SOLUTION INTRAVENOUS; SUBCUTANEOUS at 12:08

## 2019-06-16 RX ADMIN — LORAZEPAM 1 MG: 1 TABLET ORAL at 21:57

## 2019-06-16 RX ADMIN — INSULIN LISPRO 2 UNITS: 100 INJECTION, SOLUTION INTRAVENOUS; SUBCUTANEOUS at 16:14

## 2019-06-16 RX ADMIN — INSULIN LISPRO 2 UNITS: 100 INJECTION, SOLUTION INTRAVENOUS; SUBCUTANEOUS at 07:15

## 2019-06-16 NOTE — PROGRESS NOTES
Patient stated she is becoming depressed being in her room  She is also verbalized that her abdomen is filling up with fluid again and so soon  Complaint with medications  Mouth check done  Still wishes god would take her but has no plans to hurt her self  Patient was alert however forgetful  Will continue to monitor on q 7 minute check

## 2019-06-16 NOTE — PROGRESS NOTES
Pt on isolation in room due to VRE in urine  Pt is pleasant and cooperative, brightens on approach  Med and meal compliant  Expresses concern about expanding abdomen, specifically because she had a paracentesis on Friday  VSS  Will continue to monitor

## 2019-06-16 NOTE — PROGRESS NOTES
Progress Note - Shanna 75 y o  female MRN: [id number]  Unit/Bed#: [facility] 448-07 Encounter: [id number]    Assessment/Plan   Principal Problem:    Bipolar 1 disorder ([address] )  Active Problems:    Type 2 diabetes mellitus, with long-term current use of insulin (HCC)    Anemia of chronic disease    Cirrhosis of liver with ascites (HCC)    Essential hypertension    Ambulatory dysfunction    Compression fracture of L3 lumbar vertebra    Discharged from Hospice for Suicidal Ideation, Medically Complex    Preprocedural examination      Subjective:  Apparently patient was on hospice but taken off due to suicidal thoughts  Is being evaluated closely by medical team at this time  Is being considered for ECT  Patient is secluded to her room because of contact precautions  States she feels caged up in a hole and is very tearful over this  Did say she has paranoid delusions against nighttime staff  States she feels like 1 of the employees is going to kill her  States she would not mind if she was killed making passive death wishes  Contracts for safety  Did say she has auditory hallucinations of voices calling out her name  Denied other forms of hallucinations  States she is very depressed with poor appetite, poor sleep, lack of energy, and lack of motivation  Nursing reports patient can be labile at times and will throw things        Current Medications:  Current Facility-Administered Medications   Medication Dose Route Frequency    buPROPion (WELLBUTRIN XL) 24 hr tablet 150 mg  150 mg Oral Daily    dextrose 50 % IV solution 50 mL  50 mL Intravenous Once    docusate sodium (COLACE) capsule 100 mg  100 mg Oral BID    furosemide (LASIX) tablet 20 mg  20 mg Oral BID (diuretic)    insulin detemir (LEVEMIR) subcutaneous injection 14 Units  14 Units Subcutaneous QAM    insulin lispro (HumaLOG) 100 units/mL subcutaneous injection 2-12 Units  2-12 Units Subcutaneous 4x Daily (AC & HS)    insulin lispro (HumaLOG) 100 units/mL subcutaneous injection 8 Units  8 Units Subcutaneous TID With Meals    lidocaine (XYLOCAINE) 1 % injection    Code/Trauma/Sedation Med    LORazepam (ATIVAN) 2 mg/mL injection 1 mg  1 mg Intramuscular Q6H PRN    LORazepam (ATIVAN) tablet 1 mg  1 mg Oral Q6H PRN    midodrine (PROAMATINE) tablet 2 5 mg  2 5 mg Oral TID AC    OLANZapine (ZyPREXA) IM injection 5 mg  5 mg Intramuscular Q4H PRN    OLANZapine (ZyPREXA) tablet 10 mg  10 mg Oral HS    OLANZapine (ZyPREXA) tablet 5 mg  5 mg Oral Q4H PRN    ondansetron (ZOFRAN-ODT) dispersible tablet 4 mg  4 mg Oral Q6H PRN    oxyCODONE (ROXICODONE) IR tablet 2 5 mg  2 5 mg Oral Q6H PRN    spironolactone (ALDACTONE) tablet 25 mg  25 mg Oral Daily       Behavioral Health Medications: all current active meds have been reviewed and continue current psychiatric medications  Vitals:  Vitals:    06/16/19 0710   BP: 128/56   Pulse: 92   Resp: 17   Temp: 97 5 °F (36 4 °C)   SpO2: 95%       Laboratory results:    I have personally reviewed all pertinent laboratory/tests results    Most Recent Labs:   Lab Results   Component Value Date    WBC 3 10 (L) 06/13/2019    RBC 3 28 (L) 06/13/2019    HGB 10 6 (L) 06/13/2019    HCT 31 6 (L) 06/13/2019    PLT 78 (L) 06/13/2019    RDW 18 0 (H) 06/13/2019    NEUTROABS 3 80 06/08/2019    SODIUM 135 (L) 06/13/2019    K 3 7 06/13/2019     06/13/2019    CO2 30 06/13/2019    BUN 12 06/13/2019    CREATININE 0 50 (L) 06/13/2019    GLUC 249 (H) 06/13/2019    GLUF 156 (H) 06/09/2019    CALCIUM 8 7 06/13/2019    AST 43 (H) 06/08/2019    ALT 37 06/08/2019    ALKPHOS 114 06/08/2019    TP 5 4 (L) 06/08/2019    ALB 2 4 (L) 06/08/2019    TBILI 1 40 (H) 06/08/2019    CHOLESTEROL 144 06/06/2019    HDL 47 06/06/2019    TRIG 50 06/06/2019    LDLCALC 87 06/06/2019    NONHDLC 97 06/06/2019    AMMONIA 27 06/08/2019    LSX9IDBJGGAX 1 560 06/06/2019    FREET4 1 22 11/04/2017    HGBA1C 10 1 (H) 02/13/2019     02/13/2019 Psychiatric Review of Systems:  Behavior over the last 24 hours:  unchanged  Sleep: insomnia  Appetite: poor  Medication side effects: No  ROS: no complaints    Mental Status Evaluation:  Appearance:  disheveled   Behavior:  guarded, tearful   Speech:  normal pitch and normal volume   Mood:  depressed   Affect:  mood-congruent   Language repeating phrases   Thought Process:  circumstantial   Thought Content:  paranoid delusions   Perceptual Disturbances: Auditory hallucinations without commands  Risk Potential: Passive death wishes  Denied HI  Potential for aggression: No   Sensorium:  person and place   Cognition:  grossly intact   Consciousness:  awake    Recent and Remote Memory fair   Attention: attention span appeared shorter than expected for age   Insight:  limited   Judgment: limited   Gait/Station: uses walker   Motor Activity: no abnormal movements     Progress Toward Goals: unchanged    Recommended Treatment: Continue with group therapy, milieu therapy and occupational therapy  1   Consider making Wellbutrin XL to SR due to reported poor sleep  2  Will add Melatonin 3mg HS  3  Continue other medications  4  Is being considered for ECT    Risks, benefits and possible side effects of Medications:   Risks, benefits, and possible side effects of medications explained to patient and patient verbalizes understanding        Jodi Maria PA-C

## 2019-06-17 ENCOUNTER — APPOINTMENT (INPATIENT)
Dept: PREOP/PACU | Facility: HOSPITAL | Age: 74
DRG: 885 | End: 2019-06-17
Payer: COMMERCIAL

## 2019-06-17 ENCOUNTER — ANESTHESIA (INPATIENT)
Dept: PREOP/PACU | Facility: HOSPITAL | Age: 74
DRG: 885 | End: 2019-06-17
Payer: COMMERCIAL

## 2019-06-17 LAB
BACTERIA SPEC BFLD CULT: NO GROWTH
GLUCOSE SERPL-MCNC: 164 MG/DL (ref 65–140)
GLUCOSE SERPL-MCNC: 178 MG/DL (ref 65–140)
GLUCOSE SERPL-MCNC: 256 MG/DL (ref 65–140)
GLUCOSE SERPL-MCNC: 342 MG/DL (ref 65–140)
GLUCOSE SERPL-MCNC: 84 MG/DL (ref 65–140)
GRAM STN SPEC: NORMAL
GRAM STN SPEC: NORMAL

## 2019-06-17 PROCEDURE — 82948 REAGENT STRIP/BLOOD GLUCOSE: CPT

## 2019-06-17 PROCEDURE — 97530 THERAPEUTIC ACTIVITIES: CPT

## 2019-06-17 PROCEDURE — GZB2ZZZ ELECTROCONVULSIVE THERAPY, BILATERAL-SINGLE SEIZURE: ICD-10-PCS | Performed by: PSYCHIATRY & NEUROLOGY

## 2019-06-17 PROCEDURE — 90870 ELECTROCONVULSIVE THERAPY: CPT

## 2019-06-17 RX ORDER — GLYCOPYRROLATE 0.2 MG/ML
INJECTION INTRAMUSCULAR; INTRAVENOUS AS NEEDED
Status: DISCONTINUED | OUTPATIENT
Start: 2019-06-17 | End: 2019-06-17 | Stop reason: SURG

## 2019-06-17 RX ORDER — SODIUM CHLORIDE 9 MG/ML
50 INJECTION, SOLUTION INTRAVENOUS CONTINUOUS
Status: DISCONTINUED | OUTPATIENT
Start: 2019-06-17 | End: 2019-07-06

## 2019-06-17 RX ORDER — ESMOLOL HYDROCHLORIDE 10 MG/ML
INJECTION INTRAVENOUS AS NEEDED
Status: DISCONTINUED | OUTPATIENT
Start: 2019-06-17 | End: 2019-06-17 | Stop reason: SURG

## 2019-06-17 RX ORDER — SUCCINYLCHOLINE/SOD CL,ISO/PF 100 MG/5ML
SYRINGE (ML) INTRAVENOUS AS NEEDED
Status: DISCONTINUED | OUTPATIENT
Start: 2019-06-17 | End: 2019-06-17 | Stop reason: SURG

## 2019-06-17 RX ADMIN — MELATONIN TAB 3 MG 3 MG: 3 TAB at 21:47

## 2019-06-17 RX ADMIN — INSULIN LISPRO 2 UNITS: 100 INJECTION, SOLUTION INTRAVENOUS; SUBCUTANEOUS at 08:03

## 2019-06-17 RX ADMIN — DOCUSATE SODIUM 100 MG: 100 CAPSULE, LIQUID FILLED ORAL at 17:14

## 2019-06-17 RX ADMIN — OLANZAPINE 10 MG: 10 TABLET, FILM COATED ORAL at 18:16

## 2019-06-17 RX ADMIN — INSULIN LISPRO 8 UNITS: 100 INJECTION, SOLUTION INTRAVENOUS; SUBCUTANEOUS at 08:03

## 2019-06-17 RX ADMIN — SPIRONOLACTONE 25 MG: 25 TABLET, FILM COATED ORAL at 08:05

## 2019-06-17 RX ADMIN — SODIUM CHLORIDE 50 ML/HR: 9 INJECTION, SOLUTION INTRAVENOUS at 05:50

## 2019-06-17 RX ADMIN — MIDODRINE HYDROCHLORIDE 2.5 MG: 2.5 TABLET ORAL at 16:09

## 2019-06-17 RX ADMIN — MIDODRINE HYDROCHLORIDE 2.5 MG: 2.5 TABLET ORAL at 08:14

## 2019-06-17 RX ADMIN — ESMOLOL HYDROCHLORIDE 30 MG: 10 INJECTION, SOLUTION INTRAVENOUS at 06:30

## 2019-06-17 RX ADMIN — INSULIN DETEMIR 14 UNITS: 100 INJECTION, SOLUTION SUBCUTANEOUS at 08:07

## 2019-06-17 RX ADMIN — BUPROPION HYDROCHLORIDE 150 MG: 150 TABLET, FILM COATED, EXTENDED RELEASE ORAL at 08:05

## 2019-06-17 RX ADMIN — FUROSEMIDE 20 MG: 20 TABLET ORAL at 16:09

## 2019-06-17 RX ADMIN — FUROSEMIDE 20 MG: 20 TABLET ORAL at 08:04

## 2019-06-17 RX ADMIN — Medication 80 MG: at 06:24

## 2019-06-17 RX ADMIN — GLYCOPYRROLATE 0.2 MG: 0.2 INJECTION, SOLUTION INTRAMUSCULAR; INTRAVENOUS at 06:17

## 2019-06-17 RX ADMIN — INSULIN LISPRO 8 UNITS: 100 INJECTION, SOLUTION INTRAVENOUS; SUBCUTANEOUS at 11:45

## 2019-06-17 RX ADMIN — INSULIN LISPRO 8 UNITS: 100 INJECTION, SOLUTION INTRAVENOUS; SUBCUTANEOUS at 16:47

## 2019-06-17 RX ADMIN — INSULIN LISPRO 8 UNITS: 100 INJECTION, SOLUTION INTRAVENOUS; SUBCUTANEOUS at 16:48

## 2019-06-17 RX ADMIN — DOCUSATE SODIUM 100 MG: 100 CAPSULE, LIQUID FILLED ORAL at 08:05

## 2019-06-17 RX ADMIN — MIDODRINE HYDROCHLORIDE 2.5 MG: 2.5 TABLET ORAL at 11:45

## 2019-06-17 RX ADMIN — INSULIN LISPRO 6 UNITS: 100 INJECTION, SOLUTION INTRAVENOUS; SUBCUTANEOUS at 11:45

## 2019-06-17 NOTE — NURSING NOTE
Patient is compliant with medications and meals  Appetite is variable with patient eating 100% of breakfast and 25% of lunch  No complaints of pain or discomfort  Patient has peripheral IV line in right forearm which flushed without difficulty  IV dressing is clean, dry and intact and no s/s of phlebitis or infiltration at the site  IV to be maintained for ECT treartment on Wednesday  Patient's accu check at 0700 was 164 and at 1100 was 256 with all insulin given as ordered  No s/s of hypo/hyperglycemia noted  Patient remains on contact precautions for VRE in the urine  Patient is compliant with staying in her room for same  Patient denies anxiety symptoms but reports still feeling depressed  Patient to continue with ECT treatments as ordered for chronic depression  No new issues noted at this time

## 2019-06-17 NOTE — CMS CERTIFICATION NOTE
Certification: Based upon physical, mental and social evaluations, I certify that inpatient psychiatric services are medically necessary for this patient for a duration of 12 midnights for the treatment of depression  Available alternative community resources do not meet the patient's mental health care needs  I further attest that an established written individualized plan of care has been implemented and is outlined in the patient's medical records

## 2019-06-17 NOTE — PROGRESS NOTES
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  The patient is more pleasant during conversation this morning  She had ECT 1 bilateral treatment today  She continues to have paranoid delusions at times  She does have passive death wishes at times  Patient states she did sleep well  Her mood can be labile  She had Ativan p r n  Last evening at 9:57 p m  She was very anxious about her treatment this morning  Patient continues to be depressed and states she is worried about her finances at home  She states she has lived alone for approximately 41 years  Patient denies suicidal ideation at this time  She is eating her breakfast   She will have ECT 2 on Wednesday      Behavior over the last 24 hours:  unchanged  Sleep: fair  Appetite: fair  Medication side effects: No  ROS: no complaints    Current medications:    Current Facility-Administered Medications:     buPROPion (WELLBUTRIN XL) 24 hr tablet 150 mg, 150 mg, Oral, Daily, Patricia Reich PA-C, 150 mg at 06/17/19 0805    dextrose 50 % IV solution 50 mL, 50 mL, Intravenous, Once, Lissy Caba PA-C    docusate sodium (COLACE) capsule 100 mg, 100 mg, Oral, BID, Bebeto Womack MD, 100 mg at 06/17/19 0805    furosemide (LASIX) tablet 20 mg, 20 mg, Oral, BID (diuretic), Ariane Guerrero MD, 20 mg at 06/17/19 0804    insulin detemir (LEVEMIR) subcutaneous injection 14 Units, 14 Units, Subcutaneous, QAM, Geraldine CABRAL MD, 14 Units at 06/17/19 0807    insulin lispro (HumaLOG) 100 units/mL subcutaneous injection 2-12 Units, 2-12 Units, Subcutaneous, 4x Daily (AC & HS), 2 Units at 06/17/19 0803 **AND** Fingerstick Glucose (POCT), , , 4x Daily AC and at bedtime, Tone Fernandez,     insulin lispro (HumaLOG) 100 units/mL subcutaneous injection 8 Units, 8 Units, Subcutaneous, TID With Meals, Ariane Guerrero MD, 8 Units at 06/17/19 0803    lidocaine (XYLOCAINE) 1 % injection, , , Code/Trauma/Sedation Med, Thomas Hess MD, 9 mL at 06/14/19 1401    LORazepam (ATIVAN) 2 mg/mL injection 1 mg, 1 mg, Intramuscular, Q6H PRN, Slime Buchanan PA-C    LORazepam (ATIVAN) tablet 1 mg, 1 mg, Oral, Q6H PRN, Slime Buchanan PA-C, 1 mg at 06/16/19 2157    melatonin tablet 3 mg, 3 mg, Oral, HS, Jodi Maria PA-C, 3 mg at 06/16/19 2150    midodrine (PROAMATINE) tablet 2 5 mg, 2 5 mg, Oral, TID AC, Dmitri Narayan MD, 2 5 mg at 06/17/19 0814    OLANZapine (ZyPREXA) IM injection 5 mg, 5 mg, Intramuscular, Q4H PRN, Slime Buchanan PA-C, 5 mg at 06/05/19 2033    OLANZapine (ZyPREXA) tablet 10 mg, 10 mg, Oral, HS, Patricia Reich PA-C, 10 mg at 06/16/19 2023    OLANZapine (ZyPREXA) tablet 5 mg, 5 mg, Oral, Q4H PRN, Slime Buchanan PA-C    ondansetron (ZOFRAN-ODT) dispersible tablet 4 mg, 4 mg, Oral, Q6H PRN, Hernandez Duvall MD    oxyCODONE (ROXICODONE) IR tablet 2 5 mg, 2 5 mg, Oral, Q6H PRN, Dmitri Narayan MD, 2 5 mg at 06/15/19 2330    sodium chloride 0 9 % infusion, 50 mL/hr, Intravenous, Continuous, Abby Giron MD, Stopped at 06/17/19 0658    spironolactone (ALDACTONE) tablet 25 mg, 25 mg, Oral, Daily, Hernandez Duvall MD, 25 mg at 06/17/19 0805    Current Problem List:    Patient Active Problem List   Diagnosis    Type 2 diabetes mellitus, with long-term current use of insulin ([facility]    Hyperlipidemia    Asthma    Anemia of chronic disease    Pancytopenia ([facility]    Bipolar 1 disorder ([facility]    [facility]osis of liver with ascites [facility]    Essential hypertension    GERD (gastroesophageal reflux disease)    Venous stasis    Ambulatory dysfunction    Closed compression fracture of L3 lumbar vertebra    Compression fracture of L3 lumbar vertebra    Closed compression fracture of third lumbar vertebra ([facility]    Fall    Diabetic polyneuropathy associated with type 2 diabetes mellitus ([facility]    Iron deficiency anemia    Status post fall - Ambulatory dysfunction    Discharged from Hospice for Suicidal Ideation, Medically Complex    ESRD (end stage renal disease) ([facility] )    Preprocedural examination       Problem list reviewed 06/17/19     Objective:     Vital Signs:  Vitals:    06/17/19 0735 06/17/19 0750 06/17/19 0805 06/17/19 0820   BP: 112/55 116/56 126/57 119/56   BP Location: Right arm Right arm Right arm Right arm   Pulse: 96 93 98 104   Resp: 18 18 18 18   Temp: (!) 97 4 °F (36 3 °C) (!) 97 4 °F (36 3 °C) (!) 97 3 °F (36 3 °C) (!) 97 4 °F (36 3 °C)   TempSrc: Temporal Temporal Temporal Temporal   SpO2: 99% 99% 100% 100%   Weight:       Height:             Appearance:  age appropriate, casually dressed and disheveled   Behavior:  guarded   Speech:  normal volume   Mood:  irritable and labile   Affect:  labile   Thought Process:  circumstantial   Thought Content:  delusions  persecutory   Perceptual Disturbances: None   Risk Potential: none   Sensorium:  person and place   Cognition:  grossly intact   Consciousness:  alert and awake    Attention: attention span and concentration were age appropriate   Intellect: average   Insight:  poor   Judgment: poor      Motor Activity: no abnormal movements       Labs:  Reviewed 06/17/19      Assessment / Plan:     Bipolar 1 disorder ([facility] )    Recommended Treatment:      Medication changes:  1) continue current medication regimen  ECT 2 bilateral treatment for Wednesday  Non-pharmacological treatments  1) Continue with group therapy, milieu therapy and occupational therapy  Safety  1) Safety/communication plan established targeting dynamic risk factors above  2) Risks, benefits, and possible side effects of medications explained to patient and patient verbalizes understanding  Counseling / Coordination of Care    Total floor / unit time spent today 20 minutes  Greater than 50% of total time was spent with the patient and / or family counseling and / or coordination of care   A description of the counseling / coordination of care  Patient's Rights, confidentiality and exceptions to confidentiality, use of automated medical record, [address] staff access to medical record, and consent to treatment reviewed      Cami Skinner PA-C

## 2019-06-17 NOTE — OCCUPATIONAL THERAPY NOTE
Occupational Therapy Activity Treatment Note      Jamila Rivers    [birth year]    Patient Active Problem List   Diagnosis    Type 2 diabetes mellitus, with long-term current use of insulin ([facility]    Hyperlipidemia    Asthma    Anemia of chronic disease    Pancytopenia ([facility]    Bipolar 1 disorder ([facility]    Cirrhosis of liver with ascites ([facility]    Essential hypertension    GERD (gastroesophageal reflux disease)    Venous stasis    Ambulatory dysfunction    Closed compression fracture of L3 lumbar vertebra    Compression fracture of L3 lumbar vertebra    Closed compression fracture of third lumbar vertebra ([address]    Fall    Diabetic polyneuropathy associated with type 2 diabetes mellitus ([address]    Iron deficiency anemia    Status post fall - Ambulatory dysfunction    Discharged from Hospice for Suicidal Ideation, Medically Complex    ESRD (end stage renal disease) ([facility]    Preprocedural examination       Past Medical History:   Diagnosis Date    Asthma     Bipolar 2 disorder ([facility]     Chronic kidney disease     Cirrhosis of liver ([facility]     Diabetes mellitus ([facility]     Elevated troponin 9/2/2018    GERD (gastroesophageal reflux disease)     Hyperlipidemia     Hypertension     Neuropathy     Renal disorder     Restless leg syndrome        Past Surgical History:   Procedure Laterality Date    BACK SURGERY      CHOLECYSTECTOMY      HERNIA REPAIR      IR PARACENTESIS  6/14/2019    REPLACEMENT TOTAL KNEE BILATERAL      TUMOR REMOVAL          06/17/19 1432   Assessment   Assessment Kacy Reveles was agreeable to this writer meeting with her  She was provided with TCHOs, picture fill-ins, she did state that she liked this  She did state that even if she were able to attend groups, she is not a group person, but she was agreeable to meeting with this writer from time to time   She talked about a recent visit from a friend, she stated that this was the only person from her building that visited with her to this point even though others had told her they would also visit  She was presented with select worksheets, but at this time she did not want to work on any  She did appear interested in exploring self esteem issues  She also talked about spiritual health and wellness  She also stated that she wants to work on her walking  She did share that she did like the visit and conversation, and she was accepting of meeting again with her  Continue to promote positive focus, explore positive coping strategies to promote a more positive outlook an improvement in dealing with day to day frustrations  Plan   Treatment Interventions ADL retraining; Endurance training;Continued evaluation; Activityengagement  (coping skills, life management skills, reality focus)   Goal Expiration Date 07/05/19   Treatment Day 13   OT Frequency 2-3x/wk   Radha Courser, OT

## 2019-06-17 NOTE — PLAN OF CARE
Problem: Alteration in Thoughts and Perception  Goal: Treatment Goal: Gain control of psychotic behaviors/thinking, reduce/eliminate presenting symptoms and demonstrate improved reality functioning upon discharge  Outcome: Progressing  Goal: Verbalize thoughts and feelings  Description  Interventions:  - Promote a nonjudgmental and trusting relationship with the patient through active listening and therapeutic communication  - Assess patient's level of functioning, behavior and potential for risk  - Engage patient in 1 on 1 interactions for a minimum of 15 minutes each session  - Encourage patient to express fears, feelings, frustrations, and discuss symptoms    - [place] patient to reality, help patient recognize reality-based thinking   - Administer medications as ordered and assess for potential side effects  - Provide the patient education related to the signs and symptoms of the illness and desired effects of prescribed medications  Outcome: Progressing  Goal: Refrain from acting on delusional thinking/internal stimuli  Description  Interventions:  - Monitor patient closely, per order   - Utilize least restrictive measures   - Set reasonable limits, give positive feedback for acceptable   - Administer medications as ordered and monitor of potential side effects  Outcome: Progressing  Goal: Agree to be compliant with medication regime, as prescribed and report medication side effects  Description  Interventions:  - Offer appropriate PRN medication and supervise ingestion; conduct aims, as needed   Outcome: Progressing  Goal: Attend and participate in unit activities, including therapeutic, recreational, and educational groups  Description  Interventions:  - Provide therapeutic and educational activities daily, encourage attendance and participation, and document same in the medical record   Outcome: Progressing  Goal: Recognize dysfunctional thoughts, communicate reality-based thoughts at the time of discharge  Description  Interventions:  - Provide medication and psycho-education to assist patient in compliance and developing insight into his/her illness   Outcome: Progressing  Goal: Complete daily ADLs, including personal hygiene independently, as able  Description  Interventions:  - Observe, teach, and assist patient with ADLS  - Monitor and promote a balance of rest/activity, with adequate nutrition and elimination   Outcome: Progressing     Problem: Risk for Self Injury/Neglect  Goal: Treatment Goal: Remain safe during length of stay, learn and adopt new coping skills, and be free of self-injurious ideation, impulses and acts at the time of discharge  Outcome: Progressing  Goal: Verbalize thoughts and feelings  Description  Interventions:  - Assess and re-assess patient's lethality and potential for self-injury  - Engage patient in 1:1 interactions, daily, for a minimum of 15 minutes  - Encourage patient to express feelings, fears, frustrations, hopes  - Establish rapport/trust with patient   Outcome: Progressing  Goal: Refrain from harming self  Description  Interventions:  - Monitor patient closely, per order  - Develop a trusting relationship  - Supervise medication ingestion, monitor effects and side effects   Outcome: Progressing  Goal: Attend and participate in unit activities, including therapeutic, recreational, and educational groups  Description  Interventions:  - Provide therapeutic and educational activities daily, encourage attendance and participation, and document same in the medical record  - Obtain collateral information, encourage visitation and family involvement in care   Outcome: Progressing  Goal: Recognize maladaptive responses and adopt new coping mechanisms  Outcome: Progressing  Goal: Complete daily ADLs, including personal hygiene independently, as able  Description  Interventions:  - Observe, teach, and assist patient with ADLS  - Monitor and promote a balance of rest/activity, with adequate nutrition and elimination  Outcome: Progressing     Problem: DISCHARGE PLANNING  Goal: Discharge to home or other facility with appropriate resources  Description  INTERVENTIONS:  - Identify barriers to discharge w/patient and caregiver  - Arrange for needed discharge resources and transportation as appropriate  - Identify discharge learning needs (meds, wound care, etc )  - Arrange for interpretive services to assist at discharge as needed  - Refer to Case Management Department for coordinating discharge planning if the patient needs post-hospital services based on physician/advanced practitioner order or complex needs related to functional status, cognitive ability, or social support system  Outcome: Progressing     Problem: Prexisting or High Potential for Compromised Skin Integrity  Goal: Skin integrity is maintained or improved  Description  INTERVENTIONS:  - Identify patients at risk for skin breakdown  - Assess and monitor skin integrity  - Assess and monitor nutrition and hydration status  - Monitor labs (i e  albumin)  - Assess for incontinence   - Turn and reposition patient  - Assist with mobility/ambulation  - Relieve pressure over bony prominences  - Avoid friction and shearing  - Provide appropriate hygiene as needed including keeping skin clean and dry  - Evaluate need for skin moisturizer/barrier cream  - Collaborate with interdisciplinary team (i e  Nutrition, Rehabilitation, etc )   - Patient/family teaching  Outcome: Progressing     Problem: Nutrition/Hydration-ADULT  Goal: Nutrient/Hydration intake appropriate for improving, restoring or maintaining nutritional needs  Description  Monitor and assess patient's nutrition/hydration status for malnutrition (ex- brittle hair, bruises, dry skin, pale skin and conjunctiva, muscle wasting, smooth red tongue, and disorientation)  Collaborate with interdisciplinary team and initiate plan and interventions as ordered    Monitor patient's weight and dietary intake as ordered or per policy  Utilize nutrition screening tool and intervene per policy  Determine patient's food preferences and provide high-protein, high-caloric foods as appropriate       INTERVENTIONS:  - Monitor oral intake, urinary output, labs, and treatment plans  - Assess nutrition and hydration status and recommend course of action  - Evaluate amount of meals eaten  - Assist patient with eating if necessary   - Allow adequate time for meals  - Recommend/ encourage appropriate diets, oral nutritional supplements, and vitamin/mineral supplements  - Order, calculate, and assess calorie counts as needed  - Recommend, monitor, and adjust tube feedings and TPN/PPN based on assessed needs  - Assess need for intravenous fluids  - Provide specific nutrition/hydration education as appropriate  - Include patient/family/caregiver in decisions related to nutrition  Outcome: Progressing     Problem: Ineffective Coping  Goal: Participates in unit activities  Description  Interventions:  - Provide therapeutic environment   - Provide required programming   - Redirect inappropriate behaviors   Outcome: Progressing

## 2019-06-17 NOTE — PLAN OF CARE
Problem: OCCUPATIONAL THERAPY ADULT  Goal: Performs self-care activities at highest level of function for planned discharge setting  See evaluation for individualized goals  Description  Treatment Interventions: ADL retraining, Endurance training, Continued evaluation, Activityengagement(coping skills, life management skills, reality focus)          See flowsheet documentation for full assessment, interventions and recommendations  Outcome: Progressing  Note:         Assessment: Karlie Botello was agreeable to this writer meeting with her  She was provided with crayons, picture fill-ins, she did state that she liked this  She did state that even if she were able to attend groups, she is not a group person, but she was agreeable to meeting with this writer from time to time  She talked about a recent visit from a friend, she stated that this was the only person from her building that visited with her to this point even though others had told her they would also visit  She was presented with select worksheets, but at this time she did not want to work on any  She did appear interested in exploring self esteem issues  She also talked about spiritual health and wellness  She also stated that she wants to work on her walking  She did share that she did like the visit and conversation, and she was accepting of meeting again with her  Continue to promote positive focus, explore positive coping strategies to promote a more positive outlook an improvement in dealing with day to day frustrations

## 2019-06-17 NOTE — PROGRESS NOTES
Pt remained in her room, pleasant, compliant with med  She reported having anxiety 5/10 and requested and was given ativan 1 mg at 2157  Med was mildly effective  Pt wanted to have oxycodone as well for back pain, but was not recommended to have within one hour with ativan  Pt was found to have ECT in the morning and was instructed NPO after midnight although she requested having a snack  Pt was still awake at 0230  No complains at the time  Will be monitored  Pt was up couple times using BR  Vitals stable,  in the morning  Pt is ready for ECT procedure

## 2019-06-17 NOTE — PROCEDURES
ECT Procedure    Patient Name:  Andreia Gr   Medical Record Number: [id number]   YOB: 1945  Date of Procedure: 6/17/2019    Time out was taken with staff to confirm correct patient and correct procedure to be performed      Diagnosis: Bipolar 1 disorder ([facility]    Treatment Number: 1    ECT Type: Inpatient    Electrode Placement: bilateral    Post Ictal Suppression Index:  61%    % Energy Set: 50    Seizure Duration (OBS): 46 sec    Additional Notes: uneventful    Mihai Pinzon MD

## 2019-06-17 NOTE — NURSING NOTE
Patient is medication and meal compliant  Patient's accu check at 1600 was 342 and sliding scale insulin given as ordered  No s/s of hypo/hyperlgycemia noted this evening  Patient is calm and cooperative with staff and remains on contact precautions at this time  Patient denies suicidal ideation or no attempts at self harm noted  No new issues noted at this time

## 2019-06-18 ENCOUNTER — APPOINTMENT (INPATIENT)
Dept: CT IMAGING | Facility: HOSPITAL | Age: 74
DRG: 885 | End: 2019-06-18
Payer: COMMERCIAL

## 2019-06-18 ENCOUNTER — ANESTHESIA EVENT (INPATIENT)
Dept: PREOP/PACU | Facility: HOSPITAL | Age: 74
DRG: 885 | End: 2019-06-18
Payer: COMMERCIAL

## 2019-06-18 LAB
GLUCOSE SERPL-MCNC: 224 MG/DL (ref 65–140)
GLUCOSE SERPL-MCNC: 261 MG/DL (ref 65–140)
GLUCOSE SERPL-MCNC: 274 MG/DL (ref 65–140)
GLUCOSE SERPL-MCNC: 332 MG/DL (ref 65–140)

## 2019-06-18 PROCEDURE — GZB2ZZZ ELECTROCONVULSIVE THERAPY, BILATERAL-SINGLE SEIZURE: ICD-10-PCS | Performed by: PSYCHIATRY & NEUROLOGY

## 2019-06-18 PROCEDURE — NC001 PR NO CHARGE: Performed by: INTERNAL MEDICINE

## 2019-06-18 PROCEDURE — 82948 REAGENT STRIP/BLOOD GLUCOSE: CPT

## 2019-06-18 PROCEDURE — 72125 CT NECK SPINE W/O DYE: CPT

## 2019-06-18 PROCEDURE — 70450 CT HEAD/BRAIN W/O DYE: CPT

## 2019-06-18 RX ADMIN — INSULIN LISPRO 4 UNITS: 100 INJECTION, SOLUTION INTRAVENOUS; SUBCUTANEOUS at 07:58

## 2019-06-18 RX ADMIN — INSULIN LISPRO 8 UNITS: 100 INJECTION, SOLUTION INTRAVENOUS; SUBCUTANEOUS at 12:29

## 2019-06-18 RX ADMIN — MIDODRINE HYDROCHLORIDE 2.5 MG: 2.5 TABLET ORAL at 17:14

## 2019-06-18 RX ADMIN — MIDODRINE HYDROCHLORIDE 2.5 MG: 2.5 TABLET ORAL at 06:09

## 2019-06-18 RX ADMIN — INSULIN LISPRO 8 UNITS: 100 INJECTION, SOLUTION INTRAVENOUS; SUBCUTANEOUS at 17:15

## 2019-06-18 RX ADMIN — LORAZEPAM 1 MG: 1 TABLET ORAL at 10:39

## 2019-06-18 RX ADMIN — DOCUSATE SODIUM 100 MG: 100 CAPSULE, LIQUID FILLED ORAL at 08:00

## 2019-06-18 RX ADMIN — FUROSEMIDE 20 MG: 20 TABLET ORAL at 07:59

## 2019-06-18 RX ADMIN — BUPROPION HYDROCHLORIDE 150 MG: 150 TABLET, FILM COATED, EXTENDED RELEASE ORAL at 08:00

## 2019-06-18 RX ADMIN — INSULIN LISPRO 8 UNITS: 100 INJECTION, SOLUTION INTRAVENOUS; SUBCUTANEOUS at 07:58

## 2019-06-18 RX ADMIN — INSULIN LISPRO 2 UNITS: 100 INJECTION, SOLUTION INTRAVENOUS; SUBCUTANEOUS at 17:15

## 2019-06-18 RX ADMIN — MELATONIN TAB 3 MG 3 MG: 3 TAB at 21:43

## 2019-06-18 RX ADMIN — SPIRONOLACTONE 25 MG: 25 TABLET, FILM COATED ORAL at 08:00

## 2019-06-18 RX ADMIN — INSULIN LISPRO 6 UNITS: 100 INJECTION, SOLUTION INTRAVENOUS; SUBCUTANEOUS at 21:43

## 2019-06-18 RX ADMIN — DOCUSATE SODIUM 100 MG: 100 CAPSULE, LIQUID FILLED ORAL at 17:14

## 2019-06-18 RX ADMIN — INSULIN DETEMIR 14 UNITS: 100 INJECTION, SOLUTION SUBCUTANEOUS at 08:00

## 2019-06-18 RX ADMIN — OLANZAPINE 10 MG: 10 TABLET, FILM COATED ORAL at 18:34

## 2019-06-18 RX ADMIN — LORAZEPAM 1 MG: 1 TABLET ORAL at 00:13

## 2019-06-18 RX ADMIN — FUROSEMIDE 20 MG: 20 TABLET ORAL at 17:14

## 2019-06-18 NOTE — NURSING NOTE
Patient remains irritable at time, labile but cooperative on approach  No concerns on assessment, denies suicidal thoughts  Showered assisted by staff without incidence  On routine checks, will continue to monitor

## 2019-06-18 NOTE — NURSING NOTE
Patient has been isolative to room, on contact isolation  Impulsive, anxious and labile behavior, irritable with redirection  Saline lock to left AC intact, flushed and patent  Labs, orders and vital signs have been reviewed  On routine checks, will continue to monitor

## 2019-06-18 NOTE — PROGRESS NOTES
Pt med compliant  Pt calm and cooperative  Pt denies all symptoms  Pt on contact precautions  Will continue to monitor

## 2019-06-18 NOTE — PROGRESS NOTES
Found on floor in BR at [facility] 82 on left side  Pt attempting to wipe self and fell  VS 97 3 /55 HR 88 Resp 18  % on room air  Dr Jonathan Kwon notified   New orders obtained for CT head and spine noted  Has 2cm abrasion to left knee and bump left forehead  Assisted to bed with 2 assist  Denies LOC

## 2019-06-18 NOTE — PROGRESS NOTES
06/18/19 0916   Team Meeting   Meeting Type Daily Rounds   Next Conference Date 06/19/19   Team Members Present   Team Members Present Physician;;Nurse   Physician Team Member Dr Bailey Vieira PA-C   Nursing Team Member [id number] Management Team Member Jhonatan Nunez   OT Team Member [facility] Room T   Patient/Family Present   Patient Present No   Patient's Family Present No   Had a fall last evening  Agreeable to ECT  Pt remains paranoid  Pt had family visitors

## 2019-06-18 NOTE — PROGRESS NOTES
Called to see pt who has sustained a fall in the bathroom with noted LT frontal hematoma  Pt denies any headache, blurry vision  No neck pain  Pt however noted a relatively large hematoma on the forehead  Also with small abrasion in both knees  Moving all extremities independently with no undue pain  She states she fell while trying to clean and turning slightly  She denies any loss of consciousness  No dizziness or lightheadedness  Pt not on blood thinners  Vitals noted  CT head w/o contrast showed no evidence of ICH  CT C spine showed no acute fracture but old odontoid fracture noted

## 2019-06-18 NOTE — PROGRESS NOTES
Attempted to call [facility] , contact to notify of incident and reached [facility] line  Office hours 8:30 to 4:30  Will pass on to 7-3 shift  Returned from [facility] , awaiting results  No c/o pain

## 2019-06-18 NOTE — PROGRESS NOTES
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  Patient had a fall in the bathroom yesterday with a frontal hematoma  She had CT of the head which was negative and spine which was negative  She did not lose consciousness  She did not feel dizzy or lightheaded  Patient states she lost her balance when trying to pull up her underwear and hit her head on the wall  The patient is medication and meal compliant  She is more pleasant during conversation  She does still continue to be paranoid  She states she feels safe in the hospital however if she was not here she is afraid people would hurt her  She states I have security here so I do not have to be afraid   Patient tolerated her first ECT treatment well yesterday  She will have her 2nd treatment tomorrow  She denies SI, HI, AH, VH      Behavior over the last 24 hours:  unchanged  Sleep: fair  Appetite: fair  Medication side effects: No  ROS: no complaints    Current medications:    Current Facility-Administered Medications:     buPROPion (WELLBUTRIN XL) 24 hr tablet 150 mg, 150 mg, Oral, Daily, Patricia Reich PA-C, 150 mg at 06/18/19 0800    dextrose 50 % IV solution 50 mL, 50 mL, Intravenous, Once, Lissy Caba PA-C    docusate sodium (COLACE) capsule 100 mg, 100 mg, Oral, BID, Ezra Groves MD, 100 mg at 06/18/19 0800    furosemide (LASIX) tablet 20 mg, 20 mg, Oral, BID (diuretic), Cachorro Montana MD, 20 mg at 06/18/19 0759    insulin detemir (LEVEMIR) subcutaneous injection 14 Units, 14 Units, Subcutaneous, QAM, Geraldine CABRAL MD, 14 Units at 06/18/19 0800    insulin lispro (HumaLOG) 100 units/mL subcutaneous injection 2-12 Units, 2-12 Units, Subcutaneous, 4x Daily (AC & HS), 4 Units at 06/18/19 0758 **AND** Fingerstick Glucose (POCT), , , 4x Daily AC and at bedtime, Tone Fernandez,     insulin lispro (HumaLOG) 100 units/mL subcutaneous injection 8 Units, 8 Units, Subcutaneous, TID With Meals, Tayler Harris MD, 8 Units at 06/18/19 0758    lidocaine (XYLOCAINE) 1 % injection, , , Code/Trauma/Sedation Med, Popeye Martins MD, 9 mL at 06/14/19 1401    LORazepam (ATIVAN) 2 mg/mL injection 1 mg, 1 mg, Intramuscular, Q6H PRN, Indu Aparicio PA-C    LORazepam (ATIVAN) tablet 1 mg, 1 mg, Oral, Q6H PRN, SHAQ Capone-C, 1 mg at 06/18/19 0013    melatonin tablet 3 mg, 3 mg, Oral, HS, BISI MartinezC, 3 mg at 06/17/19 2147    midodrine (PROAMATINE) tablet 2 5 mg, 2 5 mg, Oral, TID AC, Tayler Harris MD, 2 5 mg at 06/18/19 0609    OLANZapine (ZyPREXA) IM injection 5 mg, 5 mg, Intramuscular, Q4H PRN, Indu Aparicio PA-C, 5 mg at 06/05/19 2033    OLANZapine (ZyPREXA) tablet 10 mg, 10 mg, Oral, HS, Patricia Reich PA-C, 10 mg at 06/17/19 1816    OLANZapine (ZyPREXA) tablet 5 mg, 5 mg, Oral, Q4H PRN, SHAQ Capone-C    ondansetron (ZOFRAN-ODT) dispersible tablet 4 mg, 4 mg, Oral, Q6H PRN, Jane Duron MD    oxyCODONE (ROXICODONE) IR tablet 2 5 mg, 2 5 mg, Oral, Q6H PRN, Tayler Harris MD, 2 5 mg at 06/15/19 2330    sodium chloride 0 9 % infusion, 50 mL/hr, Intravenous, Continuous, Kayla Collier MD, Stopped at 06/17/19 0658    spironolactone (ALDACTONE) tablet 25 mg, 25 mg, Oral, Daily, Jane Duron MD, 25 mg at 06/18/19 0800    Current Problem List:    Patient Active Problem List   Diagnosis    Type 2 diabetes mellitus, with long-term current use of insulin ([facility] )    Hyperlipidemia    Asthma    Anemia of chronic disease    Pancytopenia ([address]    [facility]    Cirrhosis of liver with ascites ([facility]    Essential hypertension    GERD (gastroesophageal reflux disease)    Venous stasis    Ambulatory dysfunction    Closed compression fracture of L3 lumbar vertebra    Compression fracture of L3 lumbar vertebra    Closed compression fracture of third lumbar vertebra ([facility]    Fall    Diabetic polyneuropathy associated with type 2 diabetes mellitus (HCC)    Iron deficiency anemia    Status post fall - Ambulatory dysfunction    Discharged from Hospice for Suicidal Ideation, Medically Complex    ESRD (end stage renal disease) ([facility]    Preprocedural examination       Problem list reviewed 06/18/19     Objective:     Vital Signs:  Vitals:    06/17/19 0820 06/17/19 1549 06/18/19 0505 06/18/19 0656   BP: 119/56 116/58 115/55 131/66   BP Location: Right arm Left arm Right arm Left arm   Pulse: 104 87 88 99   Resp: 18 18 18 18   Temp: (!) 97 4 °F (36 3 °C) 97 8 °F (36 6 °C) (!) 97 3 °F (36 3 °C) (!) 97 4 °F (36 3 °C)   TempSrc: Temporal Tympanic Tympanic Temporal   SpO2: 100% 100% 100% 99%   Weight:       Height:             Appearance:  age appropriate, casually dressed and disheveled   Behavior:  guarded   Speech:  normal volume   Mood:  labile   Affect:  labile   Thought Process:  circumstantial   Thought Content:  delusions  persecutory   Perceptual Disturbances: None   Risk Potential: none   Sensorium:  person and place   Cognition:  grossly intact   Consciousness:  alert and awake    Attention: attention span and concentration were age appropriate   Intellect: average   Insight:  poor   Judgment: poor      Motor Activity: no abnormal movements       Labs:  Reviewed 06/18/19      Assessment / Plan:     Bipolar 1 disorder ([facility] )    Recommended Treatment:      Medication changes:  1) continue current medication regimen  ECT 2 bilateral treatment for Wednesday  Non-pharmacological treatments  1) Continue with group therapy, milieu therapy and occupational therapy  Safety  1) Safety/communication plan established targeting dynamic risk factors above  2) Risks, benefits, and possible side effects of medications explained to patient and patient verbalizes understanding  Counseling / Coordination of Care    Total floor / unit time spent today 20 minutes   Greater than 50% of total time was spent with the patient and / or family counseling and / or coordination of care  A description of the counseling / coordination of care  Patient's Rights, confidentiality and exceptions to confidentiality, use of automated medical record, [facility] staff access to medical record, and consent to treatment reviewed      Adelita Gallardo PA-C

## 2019-06-18 NOTE — PROGRESS NOTES
On contact precautions for VRE of urine  Awake to use BR , ambulates with roller walker  Requested and given ativan 1 mg for 3/4 anxiety, Mchugh Anxiety Scale 5   Returned to bed  Will continue to monitor on Q 7 minute safety checks  Denies SI's, no self harming actions noted on safety checks

## 2019-06-19 ENCOUNTER — APPOINTMENT (INPATIENT)
Dept: ULTRASOUND IMAGING | Facility: HOSPITAL | Age: 74
DRG: 885 | End: 2019-06-19
Payer: COMMERCIAL

## 2019-06-19 ENCOUNTER — ANESTHESIA (INPATIENT)
Dept: PREOP/PACU | Facility: HOSPITAL | Age: 74
DRG: 885 | End: 2019-06-19
Payer: COMMERCIAL

## 2019-06-19 ENCOUNTER — APPOINTMENT (INPATIENT)
Dept: NON INVASIVE DIAGNOSTICS | Facility: HOSPITAL | Age: 74
DRG: 885 | End: 2019-06-19
Payer: COMMERCIAL

## 2019-06-19 ENCOUNTER — APPOINTMENT (INPATIENT)
Dept: PREOP/PACU | Facility: HOSPITAL | Age: 74
DRG: 885 | End: 2019-06-19
Payer: COMMERCIAL

## 2019-06-19 LAB
GLUCOSE SERPL-MCNC: 110 MG/DL (ref 65–140)
GLUCOSE SERPL-MCNC: 136 MG/DL (ref 65–140)
GLUCOSE SERPL-MCNC: 150 MG/DL (ref 65–140)
GLUCOSE SERPL-MCNC: 215 MG/DL (ref 65–140)
GLUCOSE SERPL-MCNC: 305 MG/DL (ref 65–140)

## 2019-06-19 PROCEDURE — 90870 ELECTROCONVULSIVE THERAPY: CPT

## 2019-06-19 PROCEDURE — 82948 REAGENT STRIP/BLOOD GLUCOSE: CPT

## 2019-06-19 PROCEDURE — 93970 EXTREMITY STUDY: CPT | Performed by: SURGERY

## 2019-06-19 PROCEDURE — 76705 ECHO EXAM OF ABDOMEN: CPT

## 2019-06-19 PROCEDURE — 99233 SBSQ HOSP IP/OBS HIGH 50: CPT | Performed by: FAMILY MEDICINE

## 2019-06-19 PROCEDURE — 93970 EXTREMITY STUDY: CPT

## 2019-06-19 RX ORDER — FUROSEMIDE 20 MG/1
20 TABLET ORAL ONCE
Status: COMPLETED | OUTPATIENT
Start: 2019-06-19 | End: 2019-06-19

## 2019-06-19 RX ORDER — LORAZEPAM 2 MG/ML
1 INJECTION INTRAMUSCULAR EVERY 6 HOURS PRN
Status: DISCONTINUED | OUTPATIENT
Start: 2019-06-19 | End: 2019-07-08 | Stop reason: HOSPADM

## 2019-06-19 RX ORDER — SPIRONOLACTONE 25 MG/1
50 TABLET ORAL DAILY
Status: DISCONTINUED | OUTPATIENT
Start: 2019-06-20 | End: 2019-07-08 | Stop reason: HOSPADM

## 2019-06-19 RX ORDER — FUROSEMIDE 40 MG/1
40 TABLET ORAL
Status: DISCONTINUED | OUTPATIENT
Start: 2019-06-19 | End: 2019-07-02

## 2019-06-19 RX ORDER — GLYCOPYRROLATE 0.2 MG/ML
INJECTION INTRAMUSCULAR; INTRAVENOUS AS NEEDED
Status: DISCONTINUED | OUTPATIENT
Start: 2019-06-19 | End: 2019-06-19 | Stop reason: SURG

## 2019-06-19 RX ORDER — ESMOLOL HYDROCHLORIDE 10 MG/ML
INJECTION INTRAVENOUS AS NEEDED
Status: DISCONTINUED | OUTPATIENT
Start: 2019-06-19 | End: 2019-06-19 | Stop reason: SURG

## 2019-06-19 RX ORDER — MIDODRINE HYDROCHLORIDE 2.5 MG/1
5 TABLET ORAL
Status: DISCONTINUED | OUTPATIENT
Start: 2019-06-19 | End: 2019-07-08 | Stop reason: HOSPADM

## 2019-06-19 RX ORDER — OLANZAPINE 10 MG/1
5 INJECTION, POWDER, LYOPHILIZED, FOR SOLUTION INTRAMUSCULAR EVERY 4 HOURS PRN
Status: DISCONTINUED | OUTPATIENT
Start: 2019-06-19 | End: 2019-07-08 | Stop reason: HOSPADM

## 2019-06-19 RX ORDER — SUCCINYLCHOLINE/SOD CL,ISO/PF 100 MG/5ML
SYRINGE (ML) INTRAVENOUS AS NEEDED
Status: DISCONTINUED | OUTPATIENT
Start: 2019-06-19 | End: 2019-06-19 | Stop reason: SURG

## 2019-06-19 RX ORDER — SODIUM CHLORIDE 9 MG/ML
50 INJECTION, SOLUTION INTRAVENOUS CONTINUOUS
Status: DISCONTINUED | OUTPATIENT
Start: 2019-06-19 | End: 2019-06-26

## 2019-06-19 RX ADMIN — OXYCODONE HYDROCHLORIDE 2.5 MG: 5 TABLET ORAL at 09:27

## 2019-06-19 RX ADMIN — INSULIN LISPRO 2 UNITS: 100 INJECTION, SOLUTION INTRAVENOUS; SUBCUTANEOUS at 21:23

## 2019-06-19 RX ADMIN — Medication 80 MG: at 06:32

## 2019-06-19 RX ADMIN — Medication 60 MG: at 06:32

## 2019-06-19 RX ADMIN — OLANZAPINE 10 MG: 10 TABLET, FILM COATED ORAL at 18:01

## 2019-06-19 RX ADMIN — INSULIN LISPRO 4 UNITS: 100 INJECTION, SOLUTION INTRAVENOUS; SUBCUTANEOUS at 12:52

## 2019-06-19 RX ADMIN — INSULIN LISPRO 8 UNITS: 100 INJECTION, SOLUTION INTRAVENOUS; SUBCUTANEOUS at 16:42

## 2019-06-19 RX ADMIN — MELATONIN TAB 3 MG 3 MG: 3 TAB at 21:22

## 2019-06-19 RX ADMIN — SODIUM CHLORIDE: 9 INJECTION, SOLUTION INTRAVENOUS at 06:24

## 2019-06-19 RX ADMIN — SPIRONOLACTONE 25 MG: 25 TABLET, FILM COATED ORAL at 08:30

## 2019-06-19 RX ADMIN — FUROSEMIDE 40 MG: 40 TABLET ORAL at 16:47

## 2019-06-19 RX ADMIN — SODIUM CHLORIDE 50 ML/HR: 9 INJECTION, SOLUTION INTRAVENOUS at 06:31

## 2019-06-19 RX ADMIN — ESMOLOL HYDROCHLORIDE 50 MG: 10 INJECTION, SOLUTION INTRAVENOUS at 06:32

## 2019-06-19 RX ADMIN — INSULIN LISPRO 8 UNITS: 100 INJECTION, SOLUTION INTRAVENOUS; SUBCUTANEOUS at 12:51

## 2019-06-19 RX ADMIN — MIDODRINE HYDROCHLORIDE 2.5 MG: 2.5 TABLET ORAL at 05:02

## 2019-06-19 RX ADMIN — FUROSEMIDE 20 MG: 20 TABLET ORAL at 08:30

## 2019-06-19 RX ADMIN — FUROSEMIDE 20 MG: 20 TABLET ORAL at 13:10

## 2019-06-19 RX ADMIN — BUPROPION HYDROCHLORIDE 150 MG: 150 TABLET, FILM COATED, EXTENDED RELEASE ORAL at 08:30

## 2019-06-19 RX ADMIN — INSULIN LISPRO 8 UNITS: 100 INJECTION, SOLUTION INTRAVENOUS; SUBCUTANEOUS at 08:35

## 2019-06-19 RX ADMIN — INSULIN DETEMIR 14 UNITS: 100 INJECTION, SOLUTION SUBCUTANEOUS at 08:38

## 2019-06-19 RX ADMIN — GLYCOPYRROLATE 0.2 MG: 0.2 INJECTION, SOLUTION INTRAMUSCULAR; INTRAVENOUS at 06:28

## 2019-06-19 RX ADMIN — INSULIN LISPRO 8 UNITS: 100 INJECTION, SOLUTION INTRAVENOUS; SUBCUTANEOUS at 16:44

## 2019-06-19 NOTE — CASE MANAGEMENT
Met with pt to inform her of her 56 rights and the upcoming hearing scheduled for 6/25 with Tiffany  She did not appear to understand as I tried several times to explain  She did provide the phone number for her friend Mil, [phone] and signed an JACK for same  I will reach out to Maria Guadalupe Ding to inform her that pt is asking her to help in pursuit of POA and development of a will  I also spoke to pt about my receiving a VM from a woman named Ruth Dunbar ([phone])  who claimed to be pt's daughter and she asked to be notified if she or her brother could be of any help  Pt acknowledged that she has a daughter named Ruth Dunbar and also a son (name remained un-mentioned) She claims they are not involved and only come around when they want something  She refused to sign JACK for any contact to be initiated       304 has been faxed to [employer] and the hearing is scheduled for 6/25 at 8:45

## 2019-06-19 NOTE — ASSESSMENT & PLAN NOTE
Decompensated cirrhosis with ascites and lower extremity edema actually worsened her abdomen is distended her lower extremity edema +3 he she has been receiving all her medications  She did get some fluids postprocedural from ECT on Wednesday  She did get a therapeutic paracentesis on June 14  Continue Lasix and Aldactone, increased Lasix dose to 40 mg p o  B i d , increase Aldactone to 50 mg p o  Daily,  also placed on midodrine continue the midodrine but increased to 5 mg t i d  to help improve blood pressures so she may get her diuretics blood pressures have actually been stable on the midodrine on current diuresis  Previous paracentesis 2 7 L removed  Obtaining an ultrasound of the abdomen to see how much fluid she does have as the IR wants to confirm  I did speak to interventional Radiology the plan for now is to have the therapeutic paracentesis to be done tomorrow  Labs to be done tomorrow CBC BMP INR  If greater than 4 L will be removed will administer albumin  Patient will get a venous duplex to rule are DVT she is unable to be on any DVT prophylaxis in terms of pharmacotherapy secondary to thrombocytopenia    Also will obtain an ammonia level tomorrow she has been having BMs as she states

## 2019-06-19 NOTE — PROGRESS NOTES
NPO after midnight for ECT # 2 in AM  Saline lock flushed , intact right FA  Monitored on Q 7 minute safety checks  Voicing no complaints  Appears to be resting calmly, eyes closed and respirations noted

## 2019-06-19 NOTE — PROCEDURES
ECT Procedure    Patient Name:  Gregory Mike   Medical Record Number: [id number]   YOB: 1945  Date of Procedure: 6/19/2019    Time out was taken with staff to confirm correct patient and correct procedure to be performed      Diagnosis: B[facility] 1 disorder ([facility] )    Treatment Number: 2    ECT Type: Inpatient    Electrode Placement: bilateral    Post Ictal Suppression Index:  47%    % Energy Set: 45    Seizure Duration (OBS): 28 sec    Additional Notes: uneventful    Soren Jeffries MD

## 2019-06-19 NOTE — ASSESSMENT & PLAN NOTE
Patient with untreated bipolar disorder  Reportedly lost lots of weight became very aggressive and combative while on hospice developed suicidal ideations and began drinking shampoo  Admitted on 302 status   Patient has been doing well from a psychiatric standpoint in terms of she has been actually compliant with treatment medically that she was previously non  Continue psychiatric care

## 2019-06-19 NOTE — PROGRESS NOTES
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  The patient had ECT 2 bilateral treatment today  She continues to have a labile mood  Staff states she is irritable at times  She is pleasant during discussion this morning after ECT treatment  She is medication and meal compliant  She continues to be paranoid at times  She is also anxious and focused on her finances today      Behavior over the last 24 hours:  unchanged  Sleep: fair  Appetite: fair  Medication side effects: No  ROS: no complaints    Current medications:    Current Facility-Administered Medications:     buPROPion (WELLBUTRIN XL) 24 hr tablet 150 mg, 150 mg, Oral, Daily, Patricia Reich PA-C, 150 mg at 06/19/19 0830    dextrose 50 % IV solution 50 mL, 50 mL, Intravenous, Once, Lissy Caba PA-C    docusate sodium (COLACE) capsule 100 mg, 100 mg, Oral, BID, Antonio Mathew MD, 100 mg at 06/18/19 1714    furosemide (LASIX) tablet 20 mg, 20 mg, Oral, BID (diuretic), Andrea Nicole MD, 20 mg at 06/19/19 0830    insulin detemir (LEVEMIR) subcutaneous injection 14 Units, 14 Units, Subcutaneous, QAM, Geraldine CABRAL MD, 14 Units at 06/19/19 0838    insulin lispro (HumaLOG) 100 units/mL subcutaneous injection 2-12 Units, 2-12 Units, Subcutaneous, 4x Daily (AC & HS), 6 Units at 06/18/19 2143 **AND** Fingerstick Glucose (POCT), , , 4x Daily AC and at bedtime, Tone Fernandez DO    insulin lispro (HumaLOG) 100 units/mL subcutaneous injection 8 Units, 8 Units, Subcutaneous, TID With Meals, Andrea Nicole MD, 8 Units at 06/19/19 0835    LORazepam (ATIVAN) 2 mg/mL injection 1 mg, 1 mg, Intramuscular, Q6H PRN, Nathan Colorado PA-C    LORazepam (ATIVAN) tablet 1 mg, 1 mg, Oral, Q6H PRN, Nathan Colorado PA-C, 1 mg at 06/18/19 1039    melatonin tablet 3 mg, 3 mg, Oral, HS, Barrie Montejo PA-C, 3 mg at 06/18/19 2146    midodrine (PROAMATINE) tablet 2 5 mg, 2 5 mg, Oral, TID AC, Scotty Pineda MD, 2 5 mg at 06/19/19 0502    OLANZapine (ZyPREXA) IM injection 5 mg, 5 mg, Intramuscular, Q4H PRN, Gonzalo Naqvi PA-C, 5 mg at 06/05/19 2033    OLANZapine (ZyPREXA) tablet 10 mg, 10 mg, Oral, HS, Patricia Reich PA-C, 10 mg at 06/18/19 1834    OLANZapine (ZyPREXA) tablet 5 mg, 5 mg, Oral, Q4H PRN, Gonzalo Naqvi PA-C    ondansetron (ZOFRAN-ODT) dispersible tablet 4 mg, 4 mg, Oral, Q6H PRN, Orma Phoenix, MD    oxyCODONE (ROXICODONE) IR tablet 2 5 mg, 2 5 mg, Oral, Q6H PRN, Scotty Pineda MD, 2 5 mg at 06/15/19 2330    sodium chloride 0 9 % infusion, 50 mL/hr, Intravenous, Continuous, Lakeisha James MD, Last Rate: 0 mL/hr at 06/17/19 0658    sodium chloride 0 9 % infusion, 50 mL/hr, Intravenous, Continuous, Lakeisha James MD, Stopped at 06/19/19 0706    spironolactone (ALDACTONE) tablet 25 mg, 25 mg, Oral, Daily, Orma Phoenix, MD, 25 mg at 06/19/19 0830    Current Problem List:    Patient Active Problem List   Diagnosis    Type 2 diabetes mellitus, with long-term current use of insulin ([facility]    Hyperlipidemia    Asthma    Anemia of chronic disease    [facility]    Bipolar 1 disorder ([facility] )    Cirrhosis of liver with ascites ([address] )    Essential hypertension    GERD (gastroesophageal reflux disease)    Venous stasis    Ambulatory dysfunction    Closed compression fracture of L3 lumbar vertebra    Compression fracture of L3 lumbar vertebra    Closed compression fracture of third lumbar vertebra ([address] )    Fall    Diabetic polyneuropathy associated with type 2 diabetes mellitus ([address] )    Iron deficiency anemia    Status post fall - Ambulatory dysfunction    Discharged from Hospice for Suicidal Ideation, Medically Complex    ESRD (end stage renal disease) ([facility] )    Preprocedural examination       Problem list reviewed 06/19/19     Objective:     Vital Signs:  Vitals:    06/19/19 0657 06/19/19 0702 06/19/19 0715 06/19/19 0730   BP: 128/63 128/63 108/56 117/60   BP Location:   Left arm Right arm   Pulse: 96 97 95 102   Resp: 12 (!) 37 16 16   Temp:   98 °F (36 7 °C)    TempSrc:   Temporal    SpO2: 96% 97% 93% 96%   Weight:       Height:             Appearance:  age appropriate, casually dressed and disheveled   Behavior:  normal   Speech:  normal volume   Mood:  labile   Affect:  labile   Thought Process:  circumstantial   Thought Content:  delusions  persecutory   Perceptual Disturbances: None   Risk Potential: none   Sensorium:  person and place   Cognition:  grossly intact   Consciousness:  alert and awake    Attention: attention span and concentration were age appropriate   Intellect: average   Insight:  poor   Judgment: poor      Motor Activity: no abnormal movements       Labs:  Reviewed 06/19/19      Assessment / Plan:     B[facility] disorder ([facility]    Recommended Treatment:      Medication changes:  1) continue current medication regimen  ECT 3 bilateral treatment scheduled for Friday  Non-pharmacological treatments  1) Continue with group therapy, milieu therapy and occupational therapy  Safety  1) Safety/communication plan established targeting dynamic risk factors above  2) Risks, benefits, and possible side effects of medications explained to patient and patient verbalizes understanding  Counseling / Coordination of Care    Total floor / unit time spent today 20 minutes  Greater than 50% of total time was spent with the patient and / or family counseling and / or coordination of care  A description of the counseling / coordination of care  Patient's Rights, confidentiality and exceptions to confidentiality, use of automated medical record, [facility] staff access to medical record, and consent to treatment reviewed      Slime Buchanan PA-C

## 2019-06-19 NOTE — CASE MANAGEMENT
Met with pt and she reported she has had a second fall, this time while toileting  She reported she has  "a bump on my noggin "    She also asked that I contact her friend Duncan Rabago to discuss the procedure to pursue POA  I will get an JACK and follow up once I clarify with Dr Margaux Walker that she has capacity to sign for a POA

## 2019-06-19 NOTE — PROGRESS NOTES
Clinical Pharmacy Note: Antipsychotic Monitoring Requirements     Marina Barber is a 75 y o  female currently on the following medications:    Current Facility-Administered Medications:     buPROPion (WELLBUTRIN XL) 24 hr tablet 150 mg, 150 mg, Oral, Daily, Patricia Reich PA-C, 150 mg at 06/19/19 0830    dextrose 50 % IV solution 50 mL, 50 mL, Intravenous, Once, Paxton Casanova PA-C    docusate sodium (COLACE) capsule 100 mg, 100 mg, Oral, BID, Esperanza Loera MD, 100 mg at 06/18/19 1714    furosemide (LASIX) tablet 40 mg, 40 mg, Oral, BID (diuretic), Esperanza Loera MD    insulin detemir (LEVEMIR) subcutaneous injection 14 Units, 14 Units, Subcutaneous, QAM, Geraldine CABRAL MD, 14 Units at 06/19/19 0838    insulin lispro (HumaLOG) 100 units/mL subcutaneous injection 2-12 Units, 2-12 Units, Subcutaneous, 4x Daily (AC & HS), 4 Units at 06/19/19 1252 **AND** Fingerstick Glucose (POCT), , , 4x Daily AC and at bedtime, Tone Fernandez DO    insulin lispro (HumaLOG) 100 units/mL subcutaneous injection 8 Units, 8 Units, Subcutaneous, TID With Meals, Sudhakar Andrade MD, 8 Units at 06/19/19 1251    LORazepam (ATIVAN) 2 mg/mL injection 1 mg, 1 mg, Intramuscular, Q6H PRN, Dulce Bermudez MD    LORazepam (ATIVAN) tablet 1 mg, 1 mg, Oral, Q6H PRN, Everardo Gerber PA-C, 1 mg at 06/18/19 1039    melatonin tablet 3 mg, 3 mg, Oral, HS, Etta Holm PA-C, 3 mg at 06/18/19 2143    midodrine (PROAMATINE) tablet 5 mg, 5 mg, Oral, TID AC, Esperanza Loera MD    OLANZapine (ZyPREXA) IM injection 5 mg, 5 mg, Intramuscular, Q4H PRN, Dulce Bermudez MD    OLANZapine (ZyPREXA) tablet 10 mg, 10 mg, Oral, HS, Patricia Reich PA-C, 10 mg at 06/18/19 3267    OLANZapine (ZyPREXA) tablet 5 mg, 5 mg, Oral, Q4H PRN, Everardo Gerber PA-C    ondansetron (ZOFRAN-ODT) dispersible tablet 4 mg, 4 mg, Oral, Q6H PRN, Esperanza Loera MD    oxyCODONE (ROXICODONE) IR tablet 2 5 mg, 2 5 mg, Oral, Q6H PRN, Sudhakar Andrade MD, 2 5 mg at 06/19/19 0239 sodium chloride 0 9 % infusion, 50 mL/hr, Intravenous, Continuous, Hermelinda Tinajero MD, Last Rate: 0 mL/hr at 06/17/19 0658    sodium chloride 0 9 % infusion, 50 mL/hr, Intravenous, Continuous, Jose Juan Peterson MD, Stopped at 06/19/19 0706    [START ON 6/20/2019] spironolactone (ALDACTONE) tablet 50 mg, 50 mg, Oral, Daily, Diane White MD      Performing metabolic monitoring on patients receiving any antipsychotics is a Centers for [facility] and [employer] (CMS) requirement  Antipsychotic treatment increases the risk of developing type 2 diabetes mellitus, hypertension, and hyperlipidemia  According to the [facility] [place] [address] (NICE) guidelines, baseline weight, waist circumference, pulse, blood pressure, fasting blood glucose, hemoglobin A1c, and lipid profile should be collected  These guidelines coincide with Centers and Medicare & Medicaid Services (CMS) requirements including baseline Body Mass Index, blood pressure, fasting glucose or hemoglobin A1c, and fasting lipid panel  CMS states laboratory values collected 12 months from discharge date are acceptable for evaluation  CMS Checklist:     BMI: yes  BP: yes  Fasting glucose: yes       OR A1c: yes  Lipid panel within last 12 months: yes  Nicotine Replacement Therapy: no, patient does not smoke    The following values have been collected:  Value Status Result    BMI Body mass index is 30 39 kg/m²  Blood pressure /64 (BP Location: Right arm)   Pulse 98   Temp 97 6 °F (36 4 °C) (Tympanic)   Resp 18   Ht 4' 9" (1 448 m)   Wt 63 7 kg (140 lb 6 9 oz)   LMP  (LMP Unknown)   SpO2 98%   Breastfeeding?  No   BMI 30 39 kg/m²    Fasting glucose 0   Lab Value Date/Time    GLUC 249 (H) 06/13/2019 1846        Hemoglobin A1c 0   Lab Value Date/Time    HGBA1C 10 1 (H) 02/13/2019 0434        Lipid panel 0   Lab Value Date/Time    CHOLESTEROL 144 06/06/2019 0447       0   Lab Value Date/Time    HDL 47 06/06/2019 0447       0   Lab Value Date/Time    LDLCALC 87 06/06/2019 0447        0   Lab Value Date/Time    TRIG 50 06/06/2019 0447          Recommendations:  CMS requirements have been completed    Pharmacy will continue to follow patient with team   Electronically signed by: Ashley Yates Pharmacist

## 2019-06-19 NOTE — ASSESSMENT & PLAN NOTE
Lab Results   Component Value Date    HGBA1C 10 1 (H) 02/13/2019       Recent Labs     06/18/19  1947 06/19/19  0454 06/19/19  0722 06/19/19  1124   POCGLU 261* 136 110 215*       Blood Sugar Average: Last 72 hrs:  (P) 740 3454    patient's appetite is variable  She frequently misses meals  Will continue current regimen along with insulin sliding scale    Sugar stable

## 2019-06-19 NOTE — PROGRESS NOTES
Patient had ECT # 2 today   Tolerated well  labile behavior   apologize to this writer being bad behavior this am  possible paracentesis this  week   Not confirmed  patient requested oxycodone at 4638 for back pain  Denies suicidal ideation /AH/VH  Med and meal compliant  Will continue to monitor

## 2019-06-19 NOTE — ASSESSMENT & PLAN NOTE
Recent fall resulting in compression fracture of spine  PT/OT consults at psych  Fall precautions   Ambulation with assistance and walker

## 2019-06-19 NOTE — PROGRESS NOTES
Progress Note - Richard Gan [birth year], 75 y o  female MRN: [id number]    Unit/Bed#: [address] 013-59 Encounter: [id number]    Primary Care Provider: Kami Hdez MD   Date and time admitted to hospital: 6/4/2019  2:35 PM        Discharged from Hospice for Suicidal Ideation, Medically Complex  Assessment & Plan  Initially brought to hospital for suicide attempt drinking she improved while on hospice  Will continue DNR DNI status as this was done prior to initiation of hospice  Reported estranged from family  Continue 3 to status    Compression fracture of L3 lumbar vertebra  Assessment & Plan  Chronic back pain   Evidence of severe spondylosis and osteoarthritis at L3-L4 with significant stenosis   Continue intranasal calcitonin    Ambulatory dysfunction  Assessment & Plan  Recent fall resulting in compression fracture of spine  PT/OT consults at psych  Fall precautions   Ambulation with assistance and walker     Essential hypertension  Assessment & Plan  Controlled off medication    Cirrhosis of liver with ascites ([facility] [address] )  Assessment & Plan  Decompensated cirrhosis with ascites and lower extremity edema actually worsened her abdomen is distended her lower extremity edema +3 he she has been receiving all her medications  She did get some fluids postprocedural from ECT on Wednesday  She did get a therapeutic paracentesis on June 14  Continue Lasix and Aldactone, increased Lasix dose to 40 mg p o  B i d , increase Aldactone to 50 mg p o  Daily,  also placed on midodrine continue the midodrine but increased to 5 mg t i d  to help improve blood pressures so she may get her diuretics blood pressures have actually been stable on the midodrine on current diuresis  Previous paracentesis 2 7 L removed  Obtaining an ultrasound of the abdomen to see how much fluid she does have as the IR wants to confirm    I did speak to interventional Radiology the plan for now is to have the therapeutic paracentesis to be done tomorrow  Labs to be done tomorrow CBC BMP INR  If greater than 4 L will be removed will administer albumin  Patient will get a venous duplex to rule are DVT she is unable to be on any DVT prophylaxis in terms of pharmacotherapy secondary to thrombocytopenia  Also will obtain an ammonia level tomorrow she has been having BMs as she states    Anemia of chronic disease  Assessment & Plan  Hemoglobin stable  Between 10-11    Type 2 diabetes mellitus, with long-term current use of insulin [facility])  Assessment & Plan  Lab Results   Component Value Date    HGBA1C 10 1 (H) 02/13/2019       Recent Labs     06/18/19  1947 06/19/19  0454 06/19/19  0722 06/19/19  1124   POCGLU 261* 136 110 215*       Blood Sugar Average: Last 72 hrs:  (P) 209 5635    patient's appetite is variable  She frequently misses meals  Will continue current regimen along with insulin sliding scale  Sugar stable    * Bipolar 1 disorder [facility])  Assessment & Plan  Patient with untreated bipolar disorder  Reportedly lost lots of weight became very aggressive and combative while on hospice developed suicidal ideations and began drinking shampoo  Admitted on 302 status   Patient has been doing well from a psychiatric standpoint in terms of she has been actually compliant with treatment medically that she was previously non  Continue psychiatric care  VTE Pharmacologic Prophylaxis:   Pharmacologic: Pharmacologic VTE Prophylaxis contraindicated due to Thrombocytopenia significant  Mechanical VTE Prophylaxis in Place: No    Patient Centered Rounds: I have performed bedside rounds with nursing staff today  Discussions with Specialists or Other Care Team Provider:  Interventional Radiology    Education and Discussions with Family / Patient:  Patient    Time Spent for Care: 30 minutes  More than 50% of total time spent on counseling and coordination of care as described above      Current Length of Stay: 15 day(s)    Current Patient Status: Inpatient Psych   Certification Statement: The patient will continue to require additional inpatient hospital stay due to Psychiatric treatment    Discharge Plan: To be determined    Code Status: Level 3 - DNAR and DNI      Subjective:   Patient seen and examined lying in her bed  Has been eating and drinking, taking her medicines but she is complaining of abdominal distention and pain  Also leg pain  No nausea no vomiting no chest pain or shortness of breath    Objective:     Vitals:   Temp (24hrs), Av 5 °F (36 4 °C), Min:96 7 °F (35 9 °C), Max:98 °F (36 7 °C)    Temp:  [96 7 °F (35 9 °C)-98 °F (36 7 °C)] 97 8 °F (36 6 °C)  HR:  [] 96  Resp:  [12-37] 17  BP: ()/(50-69) 150/50  SpO2:  [93 %-100 %] 99 %  Body mass index is 30 39 kg/m²  Input and Output Summary (last 24 hours): Intake/Output Summary (Last 24 hours) at 2019 1147  Last data filed at 2019 0838  Gross per 24 hour   Intake 1310 ml   Output    Net 1310 ml       Physical Exam:     Physical Exam   Constitutional: She is oriented to person, place, and time  She appears well-developed and well-nourished  HENT:   Head: Normocephalic and atraumatic  Eyes: Pupils are equal, round, and reactive to light  EOM are normal    Neck: Normal range of motion  Cardiovascular: Normal rate, regular rhythm and normal heart sounds  Pulmonary/Chest: Effort normal and breath sounds normal  No stridor  No respiratory distress  She has no wheezes  Abdominal: Soft  Bowel sounds are normal  She exhibits distension  There is tenderness (right side)  Musculoskeletal: Normal range of motion  She exhibits edema (Plus three pitting edema bilateral lower extremities) and tenderness (Left calf)  Neurological: She is alert and oriented to person, place, and time  She has normal reflexes  Skin: Skin is warm  Psychiatric: She has a normal mood and affect           Additional Data:     Labs:    Results from last 7 days   Lab Units 19  9920 WBC Thousand/uL 3 10*   HEMOGLOBIN g/dL 10 6*   HEMATOCRIT % 31 6*   PLATELETS Thousands/uL 78*     Results from last 7 days   Lab Units 06/13/19  1846   SODIUM mmol/L 135*   POTASSIUM mmol/L 3 7   CHLORIDE mmol/L 103   CO2 mmol/L 30   BUN mg/dL 12   CREATININE mg/dL 0 50*   ANION GAP mmol/L 2*   CALCIUM mg/dL 8 7   GLUCOSE RANDOM mg/dL 249*     Results from last 7 days   Lab Units 06/14/19  1144   INR  1 24*     Results from last 7 days   Lab Units 06/19/19  1124 06/19/19  0722 06/19/19  0454 06/18/19  1947 06/18/19  1539 06/18/19  1113 06/18/19  0711 06/17/19  2016 06/17/19  1632 06/17/19  1111 06/17/19  0728 06/17/19  0448   POC GLUCOSE mg/dl 215* 110 136 261* 274* 332* 224* 84 342* 256* 164* 178*                   * I Have Reviewed All Lab Data Listed Above  * Additional Pertinent Lab Tests Reviewed:  [address] [facility] Admission Reviewed    Imaging:    Imaging Reports Reviewed Today Include:  Ultrasound of the abdomen is pending  Imaging Personally Reviewed by Myself Includes:      Recent Cultures (last 7 days):     Results from last 7 days   Lab Units 06/14/19  1411   GRAM STAIN RESULT  Rare Mononuclear Cells  No Polys or Bacteria seen   BODY FLUID CULTURE, STERILE  No growth       Last 24 Hours Medication List:     Current Facility-Administered Medications:  buPROPion 150 mg Oral Daily Saima Melton PA-C    dextrose 50 mL Intravenous Once Nevaeh Sánchez PA-C    docusate sodium 100 mg Oral BID Aaron Allison MD    furosemide 20 mg Oral Once Aaron Allison MD    furosemide 40 mg Oral BID (diuretic) Aaron Allison MD    insulin detemir 14 Units Subcutaneous QAM Geraldine Hill MD    insulin lispro 2-12 Units Subcutaneous 4x Daily (AC & HS) Tone Fernandez DO    insulin lispro 8 Units Subcutaneous TID With Meals Rosalva Baker MD    LORazepam 1 mg Intramuscular Q6H PRN Roger Howard MD    LORazepam 1 mg Oral Q6H PRN Saima Melton PA-C    melatonin 3 mg Oral HS Shirin Perez PA-C midodrine 5 mg Oral TID AC Vito Stauffer MD    OLANZapine 5 mg Intramuscular Q4H PRN Jaime Laird MD    OLANZapine 10 mg Oral HS Patricia Reich PA-C    OLANZapine 5 mg Oral Q4H PRN Luciana Pollard PA-C    ondansetron 4 mg Oral Q6H PRN Vito Stauffer MD    oxyCODONE 2 5 mg Oral Q6H PRN Fiona Akins MD    sodium chloride 50 mL/hr Intravenous Continuous Rufino Santiago MD Last Rate: 0 mL/hr (06/17/19 6393)   sodium chloride 50 mL/hr Intravenous Continuous Rufino Santiago MD Last Rate: Stopped (06/19/19 0706)   [START ON 6/20/2019] spironolactone 50 mg Oral Daily Vito Stauffer MD         Today, Patient Was Seen By: Vito Stauffer MD    ** Please Note: Dictation voice to text software may have been used in the creation of this document   **

## 2019-06-20 ENCOUNTER — APPOINTMENT (INPATIENT)
Dept: INTERVENTIONAL RADIOLOGY/VASCULAR | Facility: HOSPITAL | Age: 74
DRG: 885 | End: 2019-06-20
Attending: FAMILY MEDICINE
Payer: COMMERCIAL

## 2019-06-20 ENCOUNTER — ANESTHESIA EVENT (INPATIENT)
Dept: PREOP/PACU | Facility: HOSPITAL | Age: 74
DRG: 885 | End: 2019-06-20
Payer: COMMERCIAL

## 2019-06-20 ENCOUNTER — ANESTHESIA EVENT (OUTPATIENT)
Dept: ANESTHESIOLOGY | Facility: HOSPITAL | Age: 74
End: 2019-06-20

## 2019-06-20 ENCOUNTER — ANESTHESIA (OUTPATIENT)
Dept: ANESTHESIOLOGY | Facility: HOSPITAL | Age: 74
End: 2019-06-20

## 2019-06-20 LAB
AMMONIA PLAS-SCNC: 55 UMOL/L (ref 9–33)
ANION GAP SERPL CALCULATED.3IONS-SCNC: 4 MMOL/L (ref 5–14)
ANISOCYTOSIS BLD QL SMEAR: PRESENT
BASOPHILS # BLD AUTO: 0.03 THOUSAND/UL (ref 0–0.1)
BASOPHILS NFR MAR MANUAL: 1 % (ref 0–1)
BUN SERPL-MCNC: 14 MG/DL (ref 5–25)
CALCIUM SERPL-MCNC: 8.2 MG/DL (ref 8.4–10.2)
CHLORIDE SERPL-SCNC: 102 MMOL/L (ref 97–108)
CO2 SERPL-SCNC: 31 MMOL/L (ref 22–30)
CREAT SERPL-MCNC: 0.46 MG/DL (ref 0.6–1.2)
EOSINOPHIL # BLD AUTO: 0.16 THOUSAND/UL (ref 0–0.4)
EOSINOPHIL NFR BLD MANUAL: 5 % (ref 0–6)
ERYTHROCYTE [DISTWIDTH] IN BLOOD BY AUTOMATED COUNT: 19.8 %
GFR SERPL CREATININE-BSD FRML MDRD: 98 ML/MIN/1.73SQ M
GLUCOSE P FAST SERPL-MCNC: 209 MG/DL (ref 70–99)
GLUCOSE SERPL-MCNC: 129 MG/DL (ref 65–140)
GLUCOSE SERPL-MCNC: 209 MG/DL (ref 70–99)
GLUCOSE SERPL-MCNC: 215 MG/DL (ref 65–140)
GLUCOSE SERPL-MCNC: 269 MG/DL (ref 65–140)
GLUCOSE SERPL-MCNC: 384 MG/DL (ref 65–140)
HCT VFR BLD AUTO: 31.5 % (ref 36–46)
HGB BLD-MCNC: 10.4 G/DL (ref 12–16)
INR PPP: 1.27 (ref 0.89–1.1)
LYMPHOCYTES # BLD AUTO: 0.96 THOUSAND/UL (ref 0.5–4)
LYMPHOCYTES # BLD AUTO: 31 % (ref 25–45)
MCH RBC QN AUTO: 32.4 PG (ref 26–34)
MCHC RBC AUTO-ENTMCNC: 33.1 G/DL (ref 31–36)
MCV RBC AUTO: 98 FL (ref 80–100)
MONOCYTES # BLD AUTO: 0.5 THOUSAND/UL (ref 0.2–0.9)
MONOCYTES NFR BLD AUTO: 16 % (ref 1–10)
NEUTS SEG # BLD: 1.46 THOUSAND/UL (ref 1.8–7.8)
NEUTS SEG NFR BLD AUTO: 47 %
PLATELET # BLD AUTO: 65 THOUSANDS/UL (ref 150–450)
PLATELET BLD QL SMEAR: ABNORMAL
PMV BLD AUTO: 9.7 FL (ref 8.9–12.7)
POTASSIUM SERPL-SCNC: 3.3 MMOL/L (ref 3.6–5)
PROTHROMBIN TIME: 13.3 SECONDS (ref 9.5–11.6)
RBC # BLD AUTO: 3.22 MILLION/UL (ref 4–5.2)
RBC MORPH BLD: ABNORMAL
SODIUM SERPL-SCNC: 137 MMOL/L (ref 137–147)
TOTAL CELLS COUNTED SPEC: 100
WBC # BLD AUTO: 3.1 THOUSAND/UL (ref 4.5–11)

## 2019-06-20 PROCEDURE — 85610 PROTHROMBIN TIME: CPT | Performed by: FAMILY MEDICINE

## 2019-06-20 PROCEDURE — 85007 BL SMEAR W/DIFF WBC COUNT: CPT | Performed by: FAMILY MEDICINE

## 2019-06-20 PROCEDURE — 80048 BASIC METABOLIC PNL TOTAL CA: CPT | Performed by: FAMILY MEDICINE

## 2019-06-20 PROCEDURE — 82948 REAGENT STRIP/BLOOD GLUCOSE: CPT

## 2019-06-20 PROCEDURE — 85027 COMPLETE CBC AUTOMATED: CPT | Performed by: FAMILY MEDICINE

## 2019-06-20 PROCEDURE — 49083 ABD PARACENTESIS W/IMAGING: CPT

## 2019-06-20 PROCEDURE — 49083 ABD PARACENTESIS W/IMAGING: CPT | Performed by: RADIOLOGY

## 2019-06-20 PROCEDURE — 82140 ASSAY OF AMMONIA: CPT | Performed by: FAMILY MEDICINE

## 2019-06-20 RX ORDER — POTASSIUM CHLORIDE 20 MEQ/1
40 TABLET, EXTENDED RELEASE ORAL ONCE
Status: COMPLETED | OUTPATIENT
Start: 2019-06-20 | End: 2019-06-20

## 2019-06-20 RX ORDER — LIDOCAINE HYDROCHLORIDE 10 MG/ML
INJECTION, SOLUTION INFILTRATION; PERINEURAL CODE/TRAUMA/SEDATION MEDICATION
Status: COMPLETED | OUTPATIENT
Start: 2019-06-20 | End: 2019-06-20

## 2019-06-20 RX ORDER — LACTULOSE 20 G/30ML
20 SOLUTION ORAL DAILY
Status: DISCONTINUED | OUTPATIENT
Start: 2019-06-20 | End: 2019-07-03

## 2019-06-20 RX ADMIN — LACTULOSE 20 G: 10 SOLUTION ORAL at 09:07

## 2019-06-20 RX ADMIN — OLANZAPINE 10 MG: 10 TABLET, FILM COATED ORAL at 18:55

## 2019-06-20 RX ADMIN — INSULIN LISPRO 4 UNITS: 100 INJECTION, SOLUTION INTRAVENOUS; SUBCUTANEOUS at 09:02

## 2019-06-20 RX ADMIN — BUPROPION HYDROCHLORIDE 150 MG: 150 TABLET, FILM COATED, EXTENDED RELEASE ORAL at 09:00

## 2019-06-20 RX ADMIN — INSULIN DETEMIR 14 UNITS: 100 INJECTION, SOLUTION SUBCUTANEOUS at 09:04

## 2019-06-20 RX ADMIN — LIDOCAINE HYDROCHLORIDE 3 ML: 10 INJECTION, SOLUTION INFILTRATION; PERINEURAL at 09:53

## 2019-06-20 RX ADMIN — MELATONIN TAB 3 MG 3 MG: 3 TAB at 21:12

## 2019-06-20 RX ADMIN — INSULIN LISPRO 10 UNITS: 100 INJECTION, SOLUTION INTRAVENOUS; SUBCUTANEOUS at 12:07

## 2019-06-20 RX ADMIN — POTASSIUM CHLORIDE 40 MEQ: 20 TABLET, EXTENDED RELEASE ORAL at 09:07

## 2019-06-20 RX ADMIN — INSULIN LISPRO 10 UNITS: 100 INJECTION, SOLUTION INTRAVENOUS; SUBCUTANEOUS at 12:06

## 2019-06-20 RX ADMIN — INSULIN LISPRO 6 UNITS: 100 INJECTION, SOLUTION INTRAVENOUS; SUBCUTANEOUS at 21:13

## 2019-06-20 RX ADMIN — MIDODRINE HYDROCHLORIDE 5 MG: 2.5 TABLET ORAL at 12:09

## 2019-06-20 RX ADMIN — OXYCODONE HYDROCHLORIDE 2.5 MG: 5 TABLET ORAL at 11:30

## 2019-06-20 RX ADMIN — MIDODRINE HYDROCHLORIDE 5 MG: 2.5 TABLET ORAL at 17:23

## 2019-06-20 RX ADMIN — OXYCODONE HYDROCHLORIDE 2.5 MG: 5 TABLET ORAL at 04:23

## 2019-06-20 RX ADMIN — INSULIN LISPRO 8 UNITS: 100 INJECTION, SOLUTION INTRAVENOUS; SUBCUTANEOUS at 09:03

## 2019-06-20 NOTE — CASE MANAGEMENT
PLaced a call to Zac ROJAS to confirm he has all required paperwork for the upcoming 304 scheduled for 6/25 at 8:45  Await his call back

## 2019-06-20 NOTE — PROGRESS NOTES
Pt present on the unit  Pt continues on contact precaution  compliant with bed/chair alarm  Pt offers no concerns, appears pleasant with no behaviors this shift  Pt compliant with medication

## 2019-06-20 NOTE — PROGRESS NOTES
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  The patient states she was up last night because she had pain with her sciatica  The patient states she slept on and off  She states she has to get up frequently to use the restroom due to being on Lasix  The patient states her anxiety comes and goes  She denies feeling depressed at this time  She denies feeling paranoid at this time  Her thoughts are more organized during conversation  She is tolerating ECT treatment well and will have ECT 3 bilateral treatment tomorrow  She denies suicidal ideations  Prior to ECT treatment the patient would often voice wanting to die       Behavior over the last 24 hours:  unchanged  Sleep: fair  Appetite: fair  Medication side effects: No  ROS: no complaints    Current medications:    Current Facility-Administered Medications:     buPROPion (WELLBUTRIN XL) 24 hr tablet 150 mg, 150 mg, Oral, Daily, Patricia Reich PA-C, 150 mg at 06/19/19 0830    dextrose 50 % IV solution 50 mL, 50 mL, Intravenous, Once, Lissy Caba PA-C    docusate sodium (COLACE) capsule 100 mg, 100 mg, Oral, BID, Carrie Foley MD, 100 mg at 06/18/19 1714    furosemide (LASIX) tablet 40 mg, 40 mg, Oral, BID (diuretic), Carrie Foley MD, 40 mg at 06/19/19 1647    insulin detemir (LEVEMIR) subcutaneous injection 14 Units, 14 Units, Subcutaneous, QAM, Geraldine CABRAL MD, 14 Units at 06/19/19 0838    insulin lispro (HumaLOG) 100 units/mL subcutaneous injection 2-12 Units, 2-12 Units, Subcutaneous, 4x Daily (AC & HS), 2 Units at 06/19/19 2123 **AND** Fingerstick Glucose (POCT), , , 4x Daily AC and at bedtime, Tone Fernandez,     insulin lispro (HumaLOG) 100 units/mL subcutaneous injection 8 Units, 8 Units, Subcutaneous, TID With Meals, Leon Duong MD, 8 Units at 06/19/19 1644    LORazepam (ATIVAN) 2 mg/mL injection 1 mg, 1 mg, Intramuscular, Q6H PRN, Gamaliel Fields Gross, MD    LORazepam (ATIVAN) tablet 1 mg, 1 mg, Oral, Q6H PRN, Jose Gutierrez PA-C, 1 mg at 06/18/19 1039    melatonin tablet 3 mg, 3 mg, Oral, HS, Maria G Gilmore PA-C, 3 mg at 06/19/19 2122    midodrine (PROAMATINE) tablet 5 mg, 5 mg, Oral, TID AC, Fernanda Malik MD, Stopped at 06/19/19 1647    OLANZapine (ZyPREXA) IM injection 5 mg, 5 mg, Intramuscular, Q4H PRN, Merlin Cam MD    OLANZapine (ZyPREXA) tablet 10 mg, 10 mg, Oral, HS, Patricia Reich PA-C, 10 mg at 06/19/19 1801    OLANZapine (ZyPREXA) tablet 5 mg, 5 mg, Oral, Q4H PRN, Jose Gutierrez PA-C    ondansetron (ZOFRAN-ODT) dispersible tablet 4 mg, 4 mg, Oral, Q6H PRN, Fernanda Malik MD    oxyCODONE (ROXICODONE) IR tablet 2 5 mg, 2 5 mg, Oral, Q6H PRN, Isabel Hoyt MD, 2 5 mg at 06/20/19 0423    sodium chloride 0 9 % infusion, 50 mL/hr, Intravenous, Continuous, Arely Collazo MD, Last Rate: 0 mL/hr at 06/17/19 0658    sodium chloride 0 9 % infusion, 50 mL/hr, Intravenous, Continuous, Arely Collazo MD, Stopped at 06/19/19 0706    spironolactone (ALDACTONE) tablet 50 mg, 50 mg, Oral, Daily, Fernanda Malik MD    Current Problem List:    Patient Active Problem List   Diagnosis    Type 2 diabetes mellitus, with long-term current use of insulin ([facility] [address] )    Hyperlipidemia    Asthma    Anemia of chronic disease    Pancytopenia ([facility] [address] [facility]    Bipolar 1 disorder ([facility] [address] [facility]    Cirrhosis of liver with ascites ([facility] [address] )    Essential hypertension    GERD (gastroesophageal reflux disease)    Venous stasis    Ambulatory dysfunction    Closed compression fracture of L3 lumbar vertebra    Compression fracture of L3 lumbar vertebra    Closed compression fracture of third lumbar vertebra ([facility] [address] [facility]    Fall    Diabetic polyneuropathy associated with type 2 diabetes mellitus ([facility] [address] )    Iron deficiency anemia    Status post fall - Ambulatory dysfunction    Discharged from Hospice for Suicidal Ideation, Medically Complex    ESRD (end stage renal disease) ([facility] )    Preprocedural examination       Problem list reviewed 06/20/19     Objective:     Vital Signs:  Vitals:    06/19/19 1541 06/19/19 2055 06/20/19 0615 06/20/19 0652   BP: 134/64 128/68 132/66 105/58   BP Location: Right arm Right arm Left arm Right arm   Pulse: 98 92  100   Resp: 18 18  16   Temp: 97 6 °F (36 4 °C) 97 7 °F (36 5 °C)  97 8 °F (36 6 °C)   TempSrc: Tympanic Temporal  Temporal   SpO2: 98% 98%  98%   Weight:       Height:             Appearance:  age appropriate, casually dressed and disheveled   Behavior:  normal   Speech:  normal volume   Mood:  labile but improving   Affect:  labile but improving   Thought Process:  circumstantial   Thought Content:  normal   Perceptual Disturbances: None   Risk Potential: none   Sensorium:  person and place   Cognition:  grossly intact   Consciousness:  alert and awake    Attention: attention span and concentration were age appropriate   Intellect: average   Insight:  poor   Judgment: poor      Motor Activity: no abnormal movements       Labs:  Reviewed 06/20/19      Assessment / Plan:     Bipolar 1 disorder ([facility] )    Recommended Treatment:      Medication changes:  1) continue current medication regimen  ECT 3 bilateral treatment scheduled for Friday  Non-pharmacological treatments  1) Continue with group therapy, milieu therapy and occupational therapy  Safety  1) Safety/communication plan established targeting dynamic risk factors above  2) Risks, benefits, and possible side effects of medications explained to patient and patient verbalizes understanding  Counseling / Coordination of Care    Total floor / unit time spent today 20 minutes  Greater than 50% of total time was spent with the patient and / or family counseling and / or coordination of care  A description of the counseling / coordination of care       Patient's Rights, confidentiality and exceptions to confidentiality, use of automated medical record, [facility] [facility] access to [facility] record, and consent to treatment reviewed      Anthony Hein PA-C

## 2019-06-20 NOTE — PROGRESS NOTES
Patient continues to be irritable, manipulative   Disorganized   Patient requires frequent attention   poor safety awareness  Patient takes off alarm by self  paracentesis done today   States '' I went only for you   My fluids off now I had a good time with them I was chilling ''  K + 3 3   Potassium 40 MEQ received this am  AMMONIA level abnormal   Lactulose received today   patient  States '' if I have diarrhea its your fault I will do it here in the chair so you can clean ''  After few minutes patient apologize for behavior being bad ''  Bruises on face improving   B/L legs edema  improving   self inflicted skin abrasion on B/L legs   Patient made aware not to pick on legs   Labs tomorrow  Will continue to monitor

## 2019-06-20 NOTE — PLAN OF CARE
Problem: Alteration in Thoughts and Perception  Goal: Treatment Goal: Gain control of psychotic behaviors/thinking, reduce/eliminate presenting symptoms and demonstrate improved reality functioning upon discharge  Outcome: Progressing  Goal: Verbalize thoughts and feelings  Description  Interventions:  - Promote a nonjudgmental and trusting relationship with the patient through active listening and therapeutic communication  - Assess patient's level of functioning, behavior and potential for risk  - Engage patient in 1 on 1 interactions for a minimum of 15 minutes each session  - Encourage patient to express fears, feelings, frustrations, and discuss symptoms    - [place] patient to reality, help patient recognize reality-based thinking   - Administer medications as ordered and assess for potential side effects  - Provide the patient education related to the signs and symptoms of the illness and desired effects of prescribed medications  Outcome: Progressing  Goal: Refrain from acting on delusional thinking/internal stimuli  Description  Interventions:  - Monitor patient closely, per order   - Utilize least restrictive measures   - Set reasonable limits, give positive feedback for acceptable   - Administer medications as ordered and monitor of potential side effects  Outcome: Progressing  Goal: Agree to be compliant with medication regime, as prescribed and report medication side effects  Description  Interventions:  - Offer appropriate PRN medication and supervise ingestion; conduct aims, as needed   Outcome: Progressing  Goal: Recognize dysfunctional thoughts, communicate reality-based thoughts at the time of discharge  Description  Interventions:  - Provide medication and psycho-education to assist patient in compliance and developing insight into his/her illness   Outcome: Progressing  Goal: Complete daily ADLs, including personal hygiene independently, as able  Description  Interventions:  - Observe, teach, and assist patient with ADLS  - Monitor and promote a balance of rest/activity, with adequate nutrition and elimination   Outcome: Progressing     Problem: Risk for Self Injury/Neglect  Goal: Treatment Goal: Remain safe during length of stay, learn and adopt new coping skills, and be free of self-injurious ideation, impulses and acts at the time of discharge  Outcome: Progressing  Goal: Verbalize thoughts and feelings  Description  Interventions:  - Assess and re-assess patient's lethality and potential for self-injury  - Engage patient in 1:1 interactions, daily, for a minimum of 15 minutes  - Encourage patient to express feelings, fears, frustrations, hopes  - Establish rapport/trust with patient   Outcome: Progressing  Goal: Refrain from harming self  Description  Interventions:  - Monitor patient closely, per order  - Develop a trusting relationship  - Supervise medication ingestion, monitor effects and side effects   Outcome: Progressing  Goal: Recognize maladaptive responses and adopt new coping mechanisms  Outcome: Progressing  Goal: Complete daily ADLs, including personal hygiene independently, as able  Description  Interventions:  - Observe, teach, and assist patient with ADLS  - Monitor and promote a balance of rest/activity, with adequate nutrition and elimination  Outcome: Progressing     Problem: Prexisting or High Potential for Compromised Skin Integrity  Goal: Skin integrity is maintained or improved  Description  INTERVENTIONS:  - Identify patients at risk for skin breakdown  - Assess and monitor skin integrity  - Assess and monitor nutrition and hydration status  - Monitor labs (i e  albumin)  - Assess for incontinence   - Turn and reposition patient  - Assist with mobility/ambulation  - Relieve pressure over bony prominences  - Avoid friction and shearing  - Provide appropriate hygiene as needed including keeping skin clean and dry  - Evaluate need for skin moisturizer/barrier cream  - Collaborate with interdisciplinary team (i e  Nutrition, Rehabilitation, etc )   - Patient/family teaching  Outcome: Progressing     Problem: Nutrition/Hydration-ADULT  Goal: Nutrient/Hydration intake appropriate for improving, restoring or maintaining nutritional needs  Description  Monitor and assess patient's nutrition/hydration status for malnutrition (ex- brittle hair, bruises, dry skin, pale skin and conjunctiva, muscle wasting, smooth red tongue, and disorientation)  Collaborate with interdisciplinary team and initiate plan and interventions as ordered  Monitor patient's weight and dietary intake as ordered or per policy  Utilize nutrition screening tool and intervene per policy  Determine patient's food preferences and provide high-protein, high-caloric foods as appropriate       INTERVENTIONS:  - Monitor oral intake, urinary output, labs, and treatment plans  - Assess nutrition and hydration status and recommend course of action  - Evaluate amount of meals eaten  - Assist patient with eating if necessary   - Allow adequate time for meals  - Recommend/ encourage appropriate diets, oral nutritional supplements, and vitamin/mineral supplements  - Order, calculate, and assess calorie counts as needed  - Recommend, monitor, and adjust tube feedings and TPN/PPN based on assessed needs  - Assess need for intravenous fluids  - Provide specific nutrition/hydration education as appropriate  - Include patient/family/caregiver in decisions related to nutrition  Outcome: Progressing     Problem: Ineffective Coping  Goal: Participates in unit activities  Description  Interventions:  - Provide therapeutic environment   - Provide required programming   - Redirect inappropriate behaviors   Outcome: Progressing     Problem: Potential for Falls  Goal: Patient will remain free of falls  Description  INTERVENTIONS:  - Assess patient frequently for physical needs  -  Identify cognitive and physical deficits and behaviors that affect risk of falls   -  [place] fall precautions as indicated by assessment   - Educate patient/family on patient safety including physical limitations  - Instruct patient to call for assistance with activity based on assessment  - Modify environment to reduce risk of injury  - Consider OT/PT consult to assist with strengthening/mobility  Outcome: Progressing

## 2019-06-20 NOTE — PROGRESS NOTES
Monitored on safety checks  Sleeps in intervals  On contact precautions for VRE of urine  Bed alarm in place for safety

## 2019-06-20 NOTE — DISCHARGE INSTRUCTIONS
Abdominal Paracentesis     WHAT YOU NEED TO KNOW:   Abdominal paracentesis is a procedure to remove abnormal fluid buildup in your abdomen  Fluid builds up because of liver problems, such as swelling and scarring  Heart failure, kidney disease, a mass, or problems with your pancreas may also cause fluid buildup  DISCHARGE INSTRUCTIONS:     Follow up with your healthcare provider as directed: Write down your questions so you remember to ask them during your visits  Wound care: Remove dressing after 24 hours  Leave glue in place  Return to your normal activities    Contact Interventional Radiology at [phone] Felicia PATIENTS: Contact Interventional Radiology at [phone]) Alee David PATIENTS: Contact Interventional Radiology at [phone]) if:  · You have a fever and your wound is red and swollen  · You have yellow, green, or bad-smelling discharge coming from your wound  · You have pain or swelling in your abdomen  · You have an upset stomach or you vomit  · You have sudden, sharp pain in your abdomen  · You urinate very little or not at all  · You feel confused and more tired than usual    · Your arm or leg feels warm, tender, and painful  It may look swollen and red  · You suddenly feel lightheaded and have trouble breathing

## 2019-06-20 NOTE — PROGRESS NOTES
Leg edema significantly improved  Patient did have a therapeutic paracentesis of 1 5 L  Patient's ammonia is elevated although she is not encephalopathic I will start her on lactulose daily hold if greater than 3 stools  Supplement potassium her lower Lasix and Aldactone was not able to be given secondary to she is did not receive Mid the drain as her blood pressure did not me the parameters I will actually take with parameters she will get it as the blood pressure then dropped  Anticipate another day of Lasix 40 mg p o  B i d  And Aldactone 50, then I will decrease to 40 mg daily  Recheck BMP tomorrow  And ammonia tomorrow  Lower extremities have a lot of abrasions as patient picks her legs but there is no evidence of infection

## 2019-06-20 NOTE — PROGRESS NOTES
Rates back pain 6/10 , minimal effectiveness noted  /66 manually this AM , midodrine held, did not meet parameters  Bed alarm in place  Denies SI's

## 2019-06-21 ENCOUNTER — APPOINTMENT (INPATIENT)
Dept: PREOP/PACU | Facility: HOSPITAL | Age: 74
DRG: 885 | End: 2019-06-21
Payer: COMMERCIAL

## 2019-06-21 ENCOUNTER — ANESTHESIA EVENT (OUTPATIENT)
Dept: PREOP/PACU | Facility: HOSPITAL | Age: 74
End: 2019-06-21

## 2019-06-21 ENCOUNTER — ANESTHESIA (INPATIENT)
Dept: PREOP/PACU | Facility: HOSPITAL | Age: 74
DRG: 885 | End: 2019-06-21
Payer: COMMERCIAL

## 2019-06-21 LAB
AMMONIA PLAS-SCNC: 47 UMOL/L (ref 9–33)
ANION GAP SERPL CALCULATED.3IONS-SCNC: 2 MMOL/L (ref 5–14)
BUN SERPL-MCNC: 13 MG/DL (ref 5–25)
CALCIUM SERPL-MCNC: 8.4 MG/DL (ref 8.4–10.2)
CHLORIDE SERPL-SCNC: 105 MMOL/L (ref 97–108)
CO2 SERPL-SCNC: 28 MMOL/L (ref 22–30)
CREAT SERPL-MCNC: 0.44 MG/DL (ref 0.6–1.2)
GFR SERPL CREATININE-BSD FRML MDRD: 100 ML/MIN/1.73SQ M
GLUCOSE P FAST SERPL-MCNC: 221 MG/DL (ref 70–99)
GLUCOSE SERPL-MCNC: 130 MG/DL (ref 65–140)
GLUCOSE SERPL-MCNC: 221 MG/DL (ref 70–99)
GLUCOSE SERPL-MCNC: 235 MG/DL (ref 65–140)
GLUCOSE SERPL-MCNC: 270 MG/DL (ref 65–140)
GLUCOSE SERPL-MCNC: 290 MG/DL (ref 65–140)
GLUCOSE SERPL-MCNC: 315 MG/DL (ref 65–140)
MAGNESIUM SERPL-MCNC: 1.7 MG/DL (ref 1.6–2.3)
POTASSIUM SERPL-SCNC: 3.9 MMOL/L (ref 3.6–5)
SODIUM SERPL-SCNC: 135 MMOL/L (ref 137–147)

## 2019-06-21 PROCEDURE — 80048 BASIC METABOLIC PNL TOTAL CA: CPT | Performed by: FAMILY MEDICINE

## 2019-06-21 PROCEDURE — 90870 ELECTROCONVULSIVE THERAPY: CPT

## 2019-06-21 PROCEDURE — 82948 REAGENT STRIP/BLOOD GLUCOSE: CPT

## 2019-06-21 PROCEDURE — 83735 ASSAY OF MAGNESIUM: CPT | Performed by: FAMILY MEDICINE

## 2019-06-21 PROCEDURE — GZB2ZZZ ELECTROCONVULSIVE THERAPY, BILATERAL-SINGLE SEIZURE: ICD-10-PCS | Performed by: PSYCHIATRY & NEUROLOGY

## 2019-06-21 PROCEDURE — 82140 ASSAY OF AMMONIA: CPT | Performed by: FAMILY MEDICINE

## 2019-06-21 RX ORDER — SUCCINYLCHOLINE/SOD CL,ISO/PF 100 MG/5ML
SYRINGE (ML) INTRAVENOUS AS NEEDED
Status: DISCONTINUED | OUTPATIENT
Start: 2019-06-21 | End: 2019-06-21 | Stop reason: SURG

## 2019-06-21 RX ORDER — DIVALPROEX SODIUM 500 MG/1
500 TABLET, EXTENDED RELEASE ORAL DAILY
Status: DISCONTINUED | OUTPATIENT
Start: 2019-06-21 | End: 2019-06-21

## 2019-06-21 RX ORDER — GLYCOPYRROLATE 0.2 MG/ML
INJECTION INTRAMUSCULAR; INTRAVENOUS AS NEEDED
Status: DISCONTINUED | OUTPATIENT
Start: 2019-06-21 | End: 2019-06-21 | Stop reason: SURG

## 2019-06-21 RX ORDER — SODIUM CHLORIDE 9 MG/ML
50 INJECTION, SOLUTION INTRAVENOUS CONTINUOUS
Status: DISCONTINUED | OUTPATIENT
Start: 2019-06-21 | End: 2019-06-26

## 2019-06-21 RX ORDER — ESMOLOL HYDROCHLORIDE 10 MG/ML
INJECTION INTRAVENOUS AS NEEDED
Status: DISCONTINUED | OUTPATIENT
Start: 2019-06-21 | End: 2019-06-21 | Stop reason: SURG

## 2019-06-21 RX ADMIN — INSULIN LISPRO 8 UNITS: 100 INJECTION, SOLUTION INTRAVENOUS; SUBCUTANEOUS at 11:51

## 2019-06-21 RX ADMIN — DOCUSATE SODIUM 100 MG: 100 CAPSULE, LIQUID FILLED ORAL at 08:32

## 2019-06-21 RX ADMIN — INSULIN LISPRO 10 UNITS: 100 INJECTION, SOLUTION INTRAVENOUS; SUBCUTANEOUS at 11:51

## 2019-06-21 RX ADMIN — Medication 60 MG: at 06:15

## 2019-06-21 RX ADMIN — MIDODRINE HYDROCHLORIDE 5 MG: 2.5 TABLET ORAL at 17:23

## 2019-06-21 RX ADMIN — INSULIN DETEMIR 14 UNITS: 100 INJECTION, SOLUTION SUBCUTANEOUS at 21:35

## 2019-06-21 RX ADMIN — SPIRONOLACTONE 50 MG: 25 TABLET ORAL at 05:03

## 2019-06-21 RX ADMIN — SODIUM CHLORIDE 50 ML/HR: 9 INJECTION, SOLUTION INTRAVENOUS at 06:00

## 2019-06-21 RX ADMIN — BUPROPION HYDROCHLORIDE 150 MG: 150 TABLET, FILM COATED, EXTENDED RELEASE ORAL at 08:32

## 2019-06-21 RX ADMIN — FUROSEMIDE 40 MG: 40 TABLET ORAL at 17:22

## 2019-06-21 RX ADMIN — ESMOLOL HYDROCHLORIDE 30 MG: 10 INJECTION, SOLUTION INTRAVENOUS at 06:18

## 2019-06-21 RX ADMIN — MELATONIN TAB 3 MG 3 MG: 3 TAB at 21:34

## 2019-06-21 RX ADMIN — Medication 80 MG: at 06:15

## 2019-06-21 RX ADMIN — ESMOLOL HYDROCHLORIDE 20 MG: 10 INJECTION, SOLUTION INTRAVENOUS at 06:32

## 2019-06-21 RX ADMIN — FUROSEMIDE 40 MG: 40 TABLET ORAL at 05:03

## 2019-06-21 RX ADMIN — GLYCOPYRROLATE 0.2 MG: 0.2 INJECTION, SOLUTION INTRAMUSCULAR; INTRAVENOUS at 06:15

## 2019-06-21 RX ADMIN — INSULIN LISPRO 4 UNITS: 100 INJECTION, SOLUTION INTRAVENOUS; SUBCUTANEOUS at 08:37

## 2019-06-21 RX ADMIN — OLANZAPINE 15 MG: 5 TABLET, FILM COATED ORAL at 20:16

## 2019-06-21 RX ADMIN — LACTULOSE 20 G: 10 SOLUTION ORAL at 08:34

## 2019-06-21 RX ADMIN — INSULIN DETEMIR 14 UNITS: 100 INJECTION, SOLUTION SUBCUTANEOUS at 08:38

## 2019-06-21 RX ADMIN — MIDODRINE HYDROCHLORIDE 5 MG: 2.5 TABLET ORAL at 11:51

## 2019-06-21 RX ADMIN — INSULIN LISPRO 6 UNITS: 100 INJECTION, SOLUTION INTRAVENOUS; SUBCUTANEOUS at 21:35

## 2019-06-21 RX ADMIN — MIDODRINE HYDROCHLORIDE 5 MG: 2.5 TABLET ORAL at 08:32

## 2019-06-21 NOTE — PLAN OF CARE
Problem: Alteration in Thoughts and Perception  Goal: Treatment Goal: Gain control of psychotic behaviors/thinking, reduce/eliminate presenting symptoms and demonstrate improved reality functioning upon discharge  Outcome: Progressing  Goal: Verbalize thoughts and feelings  Description  Interventions:  - Promote a nonjudgmental and trusting relationship with the patient through active listening and therapeutic communication  - Assess patient's level of functioning, behavior and potential for risk  - Engage patient in 1 on 1 interactions for a minimum of 15 minutes each session  - Encourage patient to express fears, feelings, frustrations, and discuss symptoms    - [place] patient to reality, help patient recognize reality-based thinking   - Administer medications as ordered and assess for potential side effects  - Provide the patient education related to the signs and symptoms of the illness and desired effects of prescribed medications  Outcome: Progressing  Goal: Refrain from acting on delusional thinking/internal stimuli  Description  Interventions:  - Monitor patient closely, per order   - Utilize least restrictive measures   - Set reasonable limits, give positive feedback for acceptable   - Administer medications as ordered and monitor of potential side effects  Outcome: Progressing  Goal: Agree to be compliant with medication regime, as prescribed and report medication side effects  Description  Interventions:  - Offer appropriate PRN medication and supervise ingestion; conduct aims, as needed   Outcome: Progressing  Goal: Recognize dysfunctional thoughts, communicate reality-based thoughts at the time of discharge  Description  Interventions:  - Provide medication and psycho-education to assist patient in compliance and developing insight into his/her illness   Outcome: Progressing  Goal: Complete daily ADLs, including personal hygiene independently, as able  Description  Interventions:  - Observe, teach, and assist patient with ADLS  - Monitor and promote a balance of rest/activity, with adequate nutrition and elimination   Outcome: Progressing     Problem: Risk for Self Injury/Neglect  Goal: Treatment Goal: Remain safe during length of stay, learn and adopt new coping skills, and be free of self-injurious ideation, impulses and acts at the time of discharge  Outcome: Progressing  Goal: Verbalize thoughts and feelings  Description  Interventions:  - Assess and re-assess patient's lethality and potential for self-injury  - Engage patient in 1:1 interactions, daily, for a minimum of 15 minutes  - Encourage patient to express feelings, fears, frustrations, hopes  - Establish rapport/trust with patient   Outcome: Progressing  Goal: Refrain from harming self  Description  Interventions:  - Monitor patient closely, per order  - Develop a trusting relationship  - Supervise medication ingestion, monitor effects and side effects   Outcome: Progressing  Goal: Recognize maladaptive responses and adopt new coping mechanisms  Outcome: Progressing  Goal: Complete daily ADLs, including personal hygiene independently, as able  Description  Interventions:  - Observe, teach, and assist patient with ADLS  - Monitor and promote a balance of rest/activity, with adequate nutrition and elimination  Outcome: Progressing     Problem: Prexisting or High Potential for Compromised Skin Integrity  Goal: Skin integrity is maintained or improved  Description  INTERVENTIONS:  - Identify patients at risk for skin breakdown  - Assess and monitor skin integrity  - Assess and monitor nutrition and hydration status  - Monitor labs (i e  albumin)  - Assess for incontinence   - Turn and reposition patient  - Assist with mobility/ambulation  - Relieve pressure over bony prominences  - Avoid friction and shearing  - Provide appropriate hygiene as needed including keeping skin clean and dry  - Evaluate need for skin moisturizer/barrier cream  - Collaborate with interdisciplinary team (i e  Nutrition, Rehabilitation, etc )   - Patient/family teaching  Outcome: Progressing     Problem: Nutrition/Hydration-ADULT  Goal: Nutrient/Hydration intake appropriate for improving, restoring or maintaining nutritional needs  Description  Monitor and assess patient's nutrition/hydration status for malnutrition (ex- brittle hair, bruises, dry skin, pale skin and conjunctiva, muscle wasting, smooth red tongue, and disorientation)  Collaborate with interdisciplinary team and initiate plan and interventions as ordered  Monitor patient's weight and dietary intake as ordered or per policy  Utilize nutrition screening tool and intervene per policy  Determine patient's food preferences and provide high-protein, high-caloric foods as appropriate  INTERVENTIONS:  - Monitor oral intake, urinary output, labs, and treatment plans  - Assess nutrition and hydration status and recommend course of action  - Evaluate amount of meals eaten  - Assist patient with eating if necessary   - Allow adequate time for meals  - Recommend/ encourage appropriate diets, oral nutritional supplements, and vitamin/mineral supplements  - Order, calculate, and assess calorie counts as needed  - Recommend, monitor, and adjust tube feedings and TPN/PPN based on assessed needs  - Assess need for intravenous fluids  - Provide specific nutrition/hydration education as appropriate  - Include patient/family/caregiver in decisions related to nutrition  Outcome: Progressing     Problem: Potential for Falls  Goal: Patient will remain free of falls  Description  INTERVENTIONS:  - Assess patient frequently for physical needs  -  Identify cognitive and physical deficits and behaviors that affect risk of falls    -  [place] fall precautions as indicated by assessment   - Educate patient/family on patient safety including physical limitations  - Instruct patient to call for assistance with activity based on assessment  - Modify environment to reduce risk of injury  - Consider OT/PT consult to assist with strengthening/mobility  Outcome: Progressing

## 2019-06-21 NOTE — CASE MANAGEMENT
Placed a call to pt's friend Buffy Aubree [phone] and left a scant message requesting a call back so I can inform her that Maxxbrady Quick is wanting Manish Heath to look into becoming her POA  I will await that call back

## 2019-06-21 NOTE — PROGRESS NOTES
06/21/19 0900   Team Meeting   Meeting Type Daily Rounds   Next Conference Date 06/24/19   Team Members Present   Team Members Present Physician;Nurse;; Other (Discipline and Name)   Physician Team Member Gross   Nursing Team Member [facility] Management Team Member [facility]   Other (Discipline and Name) [place]   Pt seeking discharge  No more falls  Now agreeable to bed alarms  Pt had ECT #3 today    Pt will have up to 8 ECT tx's  On contact precautions

## 2019-06-21 NOTE — PROCEDURES
ECT Procedure    Patient Name:  Crow Rivers   Medical Record Number: [id number]   YOB: 1945  Date of Procedure: 6/21/2019    Time out was taken with staff to confirm correct patient and correct procedure to be performed      Diagnosis: [facility] ([facility]    Treatment Number: 3    ECT Type: Inpatient    Electrode Placement: bilateral    Post Ictal Suppression Index:  55 %    % Energy Set: 45    Seizure Duration (OBS): 51 sec    Additional Notes: unevntful    Merlin Cam MD

## 2019-06-21 NOTE — PROGRESS NOTES
Slept well  Takes off bed alarm by self  Reminded it is to keep her safe  NPO for ECT # 3 this AM  Sips of water with BP meds   /54  HR 88  Blood sugar 270

## 2019-06-21 NOTE — PROGRESS NOTES
Pt present on the unit, continues on contact precaution  Pt pleasant, offers no concerns   Pt cooperative and compliant with medication

## 2019-06-21 NOTE — PROGRESS NOTES
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  Patient continues to be irritable and agitated at times  Continues to have feelings of depression  Patient went for paracentesis yesterday which was successful in removing 1 5 L of fluid from her abdomen      Behavior over the last 24 hours:  unchanged  Sleep: fair  Appetite: fair  Medication side effects: No  ROS: no complaints    Current medications:    Current Facility-Administered Medications:     buPROPion (WELLBUTRIN XL) 24 hr tablet 150 mg, 150 mg, Oral, Daily, Patricia Reich PA-C, 150 mg at 06/21/19 0832    dextrose 50 % IV solution 50 mL, 50 mL, Intravenous, Once, Lissy Cbaa PA-C    docusate sodium (COLACE) capsule 100 mg, 100 mg, Oral, BID, Ciera Ayon MD, 100 mg at 06/21/19 1604    furosemide (LASIX) tablet 40 mg, 40 mg, Oral, BID (diuretic), Ciera Ayon MD, 40 mg at 06/21/19 0503    insulin detemir (LEVEMIR) subcutaneous injection 14 Units, 14 Units, Subcutaneous, HS, Ciera Ayon MD    insulin lispro (HumaLOG) 100 units/mL subcutaneous injection 10 Units, 10 Units, Subcutaneous, Daily Before Lunch, Ciera Ayon MD, 10 Units at 06/20/19 1207    insulin lispro (HumaLOG) 100 units/mL subcutaneous injection 2-12 Units, 2-12 Units, Subcutaneous, 4x Daily (AC & HS), 4 Units at 06/21/19 0837 **AND** Fingerstick Glucose (POCT), , , 4x Daily AC and at bedtime, Tone Fernandez DO    insulin lispro (HumaLOG) 100 units/mL subcutaneous injection 5 Units, 5 Units, Subcutaneous, BID Ciera CARD MD    insulin lispro (HumaLOG) 100 units/mL subcutaneous injection 8 Units, 8 Units, Subcutaneous, BID AC, Ciera Ayon MD, 8 Units at 06/21/19 0837    lactulose 20 g/30 mL oral solution 20 g, 20 g, Oral, Daily, Deon Abernathy MD, 20 g at 06/21/19 0834    LORazepam (ATIVAN) 2 mg/mL injection 1 mg, 1 mg, Intramuscular, Q6H PRN, Renate Moreno MD    LORazepam (ATIVAN) tablet 1 mg, 1 mg, Oral, Q6H PRN, Oksana Kelly PA-C, 1 mg at 06/18/19 1039    melatonin tablet 3 mg, 3 mg, Oral, HS, Kevin Orellana PA-C, 3 mg at 06/20/19 2112    midodrine (PROAMATINE) tablet 5 mg, 5 mg, Oral, TID AC, Deon Abernathy MD, 5 mg at 06/21/19 5298    OLANZapine (ZyPREXA) IM injection 5 mg, 5 mg, Intramuscular, Q4H PRN, Renate Moreno MD    OLANZapine (ZyPREXA) tablet 15 mg, 15 mg, Oral, HS, Renate Moreno MD    OLANZapine (ZyPREXA) tablet 5 mg, 5 mg, Oral, Q4H PRN, Oksana Kelly PA-C    ondansetron (ZOFRAN-ODT) dispersible tablet 4 mg, 4 mg, Oral, Q6H PRN, Deon Abernathy MD    oxyCODONE (ROXICODONE) IR tablet 2 5 mg, 2 5 mg, Oral, Q6H PRN, Nahum Mak MD, 2 5 mg at 06/20/19 1130    sodium chloride 0 9 % infusion, 50 mL/hr, Intravenous, Continuous, Modesta Forrest MD, Stopped at 06/21/19 0644    sodium chloride 0 9 % infusion, 50 mL/hr, Intravenous, Continuous, Hermelinda Tinajero MD, Last Rate: 50 mL/hr at 06/21/19 0600, 50 mL/hr at 06/21/19 0600    sodium chloride 0 9 % infusion, 50 mL/hr, Intravenous, Continuous, Modesta Forrest MD, Stopped at 06/21/19 6798    spironolactone (ALDACTONE) tablet 50 mg, 50 mg, Oral, Daily, Deon Abernathy MD, 50 mg at 06/21/19 0503    Current Problem List:    Patient Active Problem List   Diagnosis    Type 2 diabetes mellitus, with long-term current use of insulin ([address] )    Hyperlipidemia    Asthma    Anemia of chronic disease    [facility]    [facility] disorder [facility]    Cirrhosis of liver with ascites ([facility]    Essential hypertension    GERD (gastroesophageal reflux disease)    Venous stasis    Ambulatory dysfunction    Closed compression fracture of L3 lumbar vertebra    Compression fracture of L3 lumbar vertebra    Closed compression fracture of third lumbar vertebra ([facility] )    Fall    Diabetic polyneuropathy associated with type 2 diabetes mellitus ([facility] )    Iron deficiency anemia    Status post fall - Ambulatory dysfunction    Discharged from Hospice for Suicidal Ideation, Medically Complex    ESRD (end stage renal disease) ([facility] )    Preprocedural examination       Problem list reviewed 06/21/19     Objective:     Vital Signs:  Vitals:    06/21/19 0715 06/21/19 0730 06/21/19 0748 06/21/19 0839   BP: 130/74 138/98 140/98 130/88   BP Location: Right arm Left arm  Right arm   Pulse: (!) 118 (!) 114 (!) 111 (!) 110   Resp: 20 18     Temp: 97 8 °F (36 6 °C) 97 8 °F (36 6 °C)  98 °F (36 7 °C)   TempSrc: Temporal Temporal  Temporal   SpO2: 94% 97%  98%   Weight:       Height:             Appearance:  age appropriate, casually dressed and disheveled   Behavior:  normal   Speech:  normal volume   Mood:  labile but improving   Affect:  labile but improving   Thought Process:  circumstantial   Thought Content:  normal   Perceptual Disturbances: None   Risk Potential: none   Sensorium:  person and place   Cognition:  grossly intact   Consciousness:  alert and awake    Attention: attention span and concentration were age appropriate   Intellect: average   Insight:  poor   Judgment: poor      Motor Activity: no abnormal movements       Labs:  Reviewed 06/21/19      Assessment / Plan:     Bipolar 1 disorder ([place] [address] )    Recommended Treatment:      Medication changes:  1) plan for ECT 4 on Monday    Non-pharmacological treatments  1) Continue with group therapy, milieu therapy and occupational therapy  Safety  1) Safety/communication plan established targeting dynamic risk factors above  2) Risks, benefits, and possible side effects of medications explained to patient and patient verbalizes understanding  Counseling / Coordination of Care    Total floor / unit time spent today 20 minutes   Greater than 50% of total time was spent with the patient and / or family counseling and / or coordination of care  A description of the counseling / coordination of care  Patient's Rights, confidentiality and exceptions to confidentiality, use of automated medical record, [employer] staff access to medical record, and consent to treatment reviewed      Ryan Bernardo PA-C

## 2019-06-21 NOTE — NURSING NOTE
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

## 2019-06-21 NOTE — PLAN OF CARE
Problem: Alteration in Thoughts and Perception  Goal: Treatment Goal: Gain control of psychotic behaviors/thinking, reduce/eliminate presenting symptoms and demonstrate improved reality functioning upon discharge  Outcome: Progressing  Goal: Verbalize thoughts and feelings  Description  Interventions:  - Promote a nonjudgmental and trusting relationship with the patient through active listening and therapeutic communication  - Assess patient's level of functioning, behavior and potential for risk  - Engage patient in 1 on 1 interactions for a minimum of 15 minutes each session  - Encourage patient to express fears, feelings, frustrations, and discuss symptoms    - [place] patient to reality, help patient recognize reality-based thinking   - Administer medications as ordered and assess for potential side effects  - Provide the patient education related to the signs and symptoms of the illness and desired effects of prescribed medications  Outcome: Progressing  Goal: Refrain from acting on delusional thinking/internal stimuli  Description  Interventions:  - Monitor patient closely, per order   - Utilize least restrictive measures   - Set reasonable limits, give positive feedback for acceptable   - Administer medications as ordered and monitor of potential side effects  Outcome: Progressing  Goal: Agree to be compliant with medication regime, as prescribed and report medication side effects  Description  Interventions:  - Offer appropriate PRN medication and supervise ingestion; conduct aims, as needed   Outcome: Progressing  Goal: Attend and participate in unit activities, including therapeutic, recreational, and educational groups  Description  Interventions:  - Provide therapeutic and educational activities daily, encourage attendance and participation, and document same in the medical record   Outcome: Progressing  Goal: Recognize dysfunctional thoughts, communicate reality-based thoughts at the time of discharge  Description  Interventions:  - Provide medication and psycho-education to assist patient in compliance and developing insight into his/her illness   Outcome: Progressing  Goal: Complete daily ADLs, including personal hygiene independently, as able  Description  Interventions:  - Observe, teach, and assist patient with ADLS  - Monitor and promote a balance of rest/activity, with adequate nutrition and elimination   Outcome: Progressing     Problem: Risk for Self Injury/Neglect  Goal: Treatment Goal: Remain safe during length of stay, learn and adopt new coping skills, and be free of self-injurious ideation, impulses and acts at the time of discharge  Outcome: Progressing  Goal: Verbalize thoughts and feelings  Description  Interventions:  - Assess and re-assess patient's lethality and potential for self-injury  - Engage patient in 1:1 interactions, daily, for a minimum of 15 minutes  - Encourage patient to express feelings, fears, frustrations, hopes  - Establish rapport/trust with patient   Outcome: Progressing  Goal: Refrain from harming self  Description  Interventions:  - Monitor patient closely, per order  - Develop a trusting relationship  - Supervise medication ingestion, monitor effects and side effects   Outcome: Progressing  Goal: Attend and participate in unit activities, including therapeutic, recreational, and educational groups  Description  Interventions:  - Provide therapeutic and educational activities daily, encourage attendance and participation, and document same in the medical record  - Obtain collateral information, encourage visitation and family involvement in care   Outcome: Progressing  Goal: Recognize maladaptive responses and adopt new coping mechanisms  Outcome: Progressing  Goal: Complete daily ADLs, including personal hygiene independently, as able  Description  Interventions:  - Observe, teach, and assist patient with ADLS  - Monitor and promote a balance of rest/activity, with adequate nutrition and elimination  Outcome: Progressing     Problem: DISCHARGE PLANNING  Goal: Discharge to home or other facility with appropriate resources  Description  INTERVENTIONS:  - Identify barriers to discharge w/patient and caregiver  - Arrange for needed discharge resources and transportation as appropriate  - Identify discharge learning needs (meds, wound care, etc )  - Arrange for interpretive services to assist at discharge as needed  - Refer to Case Management Department for coordinating discharge planning if the patient needs post-hospital services based on physician/advanced practitioner order or complex needs related to functional status, cognitive ability, or social support system  Outcome: Progressing     Problem: Prexisting or High Potential for Compromised Skin Integrity  Goal: Skin integrity is maintained or improved  Description  INTERVENTIONS:  - Identify patients at risk for skin breakdown  - Assess and monitor skin integrity  - Assess and monitor nutrition and hydration status  - Monitor labs (i e  albumin)  - Assess for incontinence   - Turn and reposition patient  - Assist with mobility/ambulation  - Relieve pressure over bony prominences  - Avoid friction and shearing  - Provide appropriate hygiene as needed including keeping skin clean and dry  - Evaluate need for skin moisturizer/barrier cream  - Collaborate with interdisciplinary team (i e  Nutrition, Rehabilitation, etc )   - Patient/family teaching  Outcome: Progressing     Problem: Nutrition/Hydration-ADULT  Goal: Nutrient/Hydration intake appropriate for improving, restoring or maintaining nutritional needs  Description  Monitor and assess patient's nutrition/hydration status for malnutrition (ex- brittle hair, bruises, dry skin, pale skin and conjunctiva, muscle wasting, smooth red tongue, and disorientation)  Collaborate with interdisciplinary team and initiate plan and interventions as ordered    Monitor patient's weight and dietary intake as ordered or per policy  Utilize nutrition screening tool and intervene per policy  Determine patient's food preferences and provide high-protein, high-caloric foods as appropriate  INTERVENTIONS:  - Monitor oral intake, urinary output, labs, and treatment plans  - Assess nutrition and hydration status and recommend course of action  - Evaluate amount of meals eaten  - Assist patient with eating if necessary   - Allow adequate time for meals  - Recommend/ encourage appropriate diets, oral nutritional supplements, and vitamin/mineral supplements  - Order, calculate, and assess calorie counts as needed  - Recommend, monitor, and adjust tube feedings and TPN/PPN based on assessed needs  - Assess need for intravenous fluids  - Provide specific nutrition/hydration education as appropriate  - Include patient/family/caregiver in decisions related to nutrition  Outcome: Progressing     Problem: Ineffective Coping  Goal: Participates in unit activities  Description  Interventions:  - Provide therapeutic environment   - Provide required programming   - Redirect inappropriate behaviors   Outcome: Progressing     Problem: Potential for Falls  Goal: Patient will remain free of falls  Description  INTERVENTIONS:  - Assess patient frequently for physical needs  -  Identify cognitive and physical deficits and behaviors that affect risk of falls    -  [place] fall precautions as indicated by assessment   - Educate patient/family on patient safety including physical limitations  - Instruct patient to call for assistance with activity based on assessment  - Modify environment to reduce risk of injury  - Consider OT/PT consult to assist with strengthening/mobility  Outcome: Progressing

## 2019-06-21 NOTE — PROGRESS NOTES
Needed encouragement to go to ECT  "i'm tired of all this stuff, I came to treat my depression and they keep poking and doing other stuff  "

## 2019-06-21 NOTE — PLAN OF CARE
Problem: Alteration in Thoughts and Perception  Goal: Treatment Goal: Gain control of psychotic behaviors/thinking, reduce/eliminate presenting symptoms and demonstrate improved reality functioning upon discharge  Outcome: Progressing  Goal: Verbalize thoughts and feelings  Description  Interventions:  - Promote a nonjudgmental and trusting relationship with the patient through active listening and therapeutic communication  - Assess patient's level of functioning, behavior and potential for risk  - Engage patient in 1 on 1 interactions for a minimum of 15 minutes each session  - Encourage patient to express fears, feelings, frustrations, and discuss symptoms    - [place] patient to reality, help patient recognize reality-based thinking   - Administer medications as ordered and assess for potential side effects  - Provide the patient education related to the signs and symptoms of the illness and desired effects of prescribed medications  Outcome: Progressing  Goal: Refrain from acting on delusional thinking/internal stimuli  Description  Interventions:  - Monitor patient closely, per order   - Utilize least restrictive measures   - Set reasonable limits, give positive feedback for acceptable   - Administer medications as ordered and monitor of potential side effects  Outcome: Progressing  Goal: Agree to be compliant with medication regime, as prescribed and report medication side effects  Description  Interventions:  - Offer appropriate PRN medication and supervise ingestion; conduct aims, as needed   Outcome: Progressing  Goal: Recognize dysfunctional thoughts, communicate reality-based thoughts at the time of discharge  Description  Interventions:  - Provide medication and psycho-education to assist patient in compliance and developing insight into his/her illness   Outcome: Progressing  Goal: Complete daily ADLs, including personal hygiene independently, as able  Description  Interventions:  - Observe, teach, and assist patient with ADLS  - Monitor and promote a balance of rest/activity, with adequate nutrition and elimination   Outcome: Progressing     Problem: Risk for Self Injury/Neglect  Goal: Treatment Goal: Remain safe during length of stay, learn and adopt new coping skills, and be free of self-injurious ideation, impulses and acts at the time of discharge  Outcome: Progressing  Goal: Verbalize thoughts and feelings  Description  Interventions:  - Assess and re-assess patient's lethality and potential for self-injury  - Engage patient in 1:1 interactions, daily, for a minimum of 15 minutes  - Encourage patient to express feelings, fears, frustrations, hopes  - Establish rapport/trust with patient   Outcome: Progressing  Goal: Refrain from harming self  Description  Interventions:  - Monitor patient closely, per order  - Develop a trusting relationship  - Supervise medication ingestion, monitor effects and side effects   Outcome: Progressing  Goal: Recognize maladaptive responses and adopt new coping mechanisms  Outcome: Progressing  Goal: Complete daily ADLs, including personal hygiene independently, as able  Description  Interventions:  - Observe, teach, and assist patient with ADLS  - Monitor and promote a balance of rest/activity, with adequate nutrition and elimination  Outcome: Progressing     Problem: Prexisting or High Potential for Compromised Skin Integrity  Goal: Skin integrity is maintained or improved  Description  INTERVENTIONS:  - Identify patients at risk for skin breakdown  - Assess and monitor skin integrity  - Assess and monitor nutrition and hydration status  - Monitor labs (i e  albumin)  - Assess for incontinence   - Turn and reposition patient  - Assist with mobility/ambulation  - Relieve pressure over bony prominences  - Avoid friction and shearing  - Provide appropriate hygiene as needed including keeping skin clean and dry  - Evaluate need for skin moisturizer/barrier cream  - Collaborate with interdisciplinary team (i e  Nutrition, Rehabilitation, etc )   - Patient/family teaching  Outcome: Progressing     Problem: Nutrition/Hydration-ADULT  Goal: Nutrient/Hydration intake appropriate for improving, restoring or maintaining nutritional needs  Description  Monitor and assess patient's nutrition/hydration status for malnutrition (ex- brittle hair, bruises, dry skin, pale skin and conjunctiva, muscle wasting, smooth red tongue, and disorientation)  Collaborate with interdisciplinary team and initiate plan and interventions as ordered  Monitor patient's weight and dietary intake as ordered or per policy  Utilize nutrition screening tool and intervene per policy  Determine patient's food preferences and provide high-protein, high-caloric foods as appropriate  INTERVENTIONS:  - Monitor oral intake, urinary output, labs, and treatment plans  - Assess nutrition and hydration status and recommend course of action  - Evaluate amount of meals eaten  - Assist patient with eating if necessary   - Allow adequate time for meals  - Recommend/ encourage appropriate diets, oral nutritional supplements, and vitamin/mineral supplements  - Order, calculate, and assess calorie counts as needed  - Recommend, monitor, and adjust tube feedings and TPN/PPN based on assessed needs  - Assess need for intravenous fluids  - Provide specific nutrition/hydration education as appropriate  - Include patient/family/caregiver in decisions related to nutrition  Outcome: Progressing     Problem: Potential for Falls  Goal: Patient will remain free of falls  Description  INTERVENTIONS:  - Assess patient frequently for physical needs  -  Identify cognitive and physical deficits and behaviors that affect risk of falls    -  [place] fall precautions as indicated by assessment   - Educate patient/family on patient safety including physical limitations  - Instruct patient to call for assistance with activity based on assessment  - Modify environment to reduce risk of injury  - Consider OT/PT consult to assist with strengthening/mobility  Outcome: Progressing

## 2019-06-22 LAB
GLUCOSE SERPL-MCNC: 247 MG/DL (ref 65–140)
GLUCOSE SERPL-MCNC: 323 MG/DL (ref 65–140)
GLUCOSE SERPL-MCNC: 374 MG/DL (ref 65–140)
GLUCOSE SERPL-MCNC: 81 MG/DL (ref 65–140)

## 2019-06-22 PROCEDURE — 82948 REAGENT STRIP/BLOOD GLUCOSE: CPT

## 2019-06-22 PROCEDURE — 99231 SBSQ HOSP IP/OBS SF/LOW 25: CPT | Performed by: PSYCHIATRY & NEUROLOGY

## 2019-06-22 RX ADMIN — FUROSEMIDE 40 MG: 40 TABLET ORAL at 09:00

## 2019-06-22 RX ADMIN — MIDODRINE HYDROCHLORIDE 5 MG: 2.5 TABLET ORAL at 11:33

## 2019-06-22 RX ADMIN — OXYCODONE HYDROCHLORIDE 2.5 MG: 5 TABLET ORAL at 04:24

## 2019-06-22 RX ADMIN — INSULIN LISPRO 4 UNITS: 100 INJECTION, SOLUTION INTRAVENOUS; SUBCUTANEOUS at 07:56

## 2019-06-22 RX ADMIN — OLANZAPINE 15 MG: 5 TABLET, FILM COATED ORAL at 18:02

## 2019-06-22 RX ADMIN — ONDANSETRON 4 MG: 4 TABLET, ORALLY DISINTEGRATING ORAL at 13:54

## 2019-06-22 RX ADMIN — SPIRONOLACTONE 50 MG: 25 TABLET ORAL at 09:01

## 2019-06-22 RX ADMIN — MELATONIN TAB 3 MG 3 MG: 3 TAB at 21:37

## 2019-06-22 RX ADMIN — MIDODRINE HYDROCHLORIDE 5 MG: 2.5 TABLET ORAL at 06:16

## 2019-06-22 RX ADMIN — INSULIN LISPRO 10 UNITS: 100 INJECTION, SOLUTION INTRAVENOUS; SUBCUTANEOUS at 11:26

## 2019-06-22 RX ADMIN — FUROSEMIDE 40 MG: 40 TABLET ORAL at 15:57

## 2019-06-22 RX ADMIN — INSULIN LISPRO 8 UNITS: 100 INJECTION, SOLUTION INTRAVENOUS; SUBCUTANEOUS at 11:26

## 2019-06-22 RX ADMIN — INSULIN LISPRO 10 UNITS: 100 INJECTION, SOLUTION INTRAVENOUS; SUBCUTANEOUS at 21:38

## 2019-06-22 RX ADMIN — OXYCODONE HYDROCHLORIDE 2.5 MG: 5 TABLET ORAL at 21:36

## 2019-06-22 RX ADMIN — MIDODRINE HYDROCHLORIDE 5 MG: 2.5 TABLET ORAL at 15:57

## 2019-06-22 RX ADMIN — INSULIN DETEMIR 16 UNITS: 100 INJECTION, SOLUTION SUBCUTANEOUS at 21:38

## 2019-06-22 RX ADMIN — BUPROPION HYDROCHLORIDE 150 MG: 150 TABLET, FILM COATED, EXTENDED RELEASE ORAL at 09:02

## 2019-06-22 NOTE — NURSING NOTE
Patient is compliant with medications and meals  Patient was reporting depressive symptoms but no anxiety symptoms  Patient denies pain or discomfort  Patient's accu check at 0700 was 247 and at 1100 was 323 with all insulin given as ordered  No s/s of hypo/hyperglycemia noted this shift  Patient remains on contact precautions and is compliant with staying in her room  Patient allowed to voice her feelings about her situation  Patient to continue with ECT treatments for chronic depression on Monday  No new issues noted at this time

## 2019-06-22 NOTE — PROGRESS NOTES
Patient was in bed sleeping when the shift started  Breathing pattern even and unlabored  Patient used the bathroom twice and requested for more pull ups during the night and were made available to her  Q 7 minutes safety checks ongoing  Will continue to monitor

## 2019-06-22 NOTE — PROGRESS NOTES
Pt observed on the unit  Pt resent in the room, continues on contact precaution  Pt pleasant and negotiate needs appropriately  Pt expresses mixed feelings about continuing with ECT treatment  Pt denies s/s and med compliant

## 2019-06-22 NOTE — NURSING NOTE
Patient complaint of nausea after lunch meal and requested PRN medication for same  No emesis noted  PRN Zofran given at  with good results reported by patient and no further complaint of nausea  No other issues noted at this time

## 2019-06-22 NOTE — PLAN OF CARE
Problem: Alteration in Thoughts and Perception  Goal: Treatment Goal: Gain control of psychotic behaviors/thinking, reduce/eliminate presenting symptoms and demonstrate improved reality functioning upon discharge  Outcome: Progressing  Goal: Verbalize thoughts and feelings  Description  Interventions:  - Promote a nonjudgmental and trusting relationship with the patient through active listening and therapeutic communication  - Assess patient's level of functioning, behavior and potential for risk  - Engage patient in 1 on 1 interactions for a minimum of 15 minutes each session  - Encourage patient to express fears, feelings, frustrations, and discuss symptoms    - [place] patient to reality, help patient recognize reality-based thinking   - Administer medications as ordered and assess for potential side effects  - Provide the patient education related to the signs and symptoms of the illness and desired effects of prescribed medications  Outcome: Progressing  Goal: Refrain from acting on delusional thinking/internal stimuli  Description  Interventions:  - Monitor patient closely, per order   - Utilize least restrictive measures   - Set reasonable limits, give positive feedback for acceptable   - Administer medications as ordered and monitor of potential side effects  Outcome: Progressing  Goal: Agree to be compliant with medication regime, as prescribed and report medication side effects  Description  Interventions:  - Offer appropriate PRN medication and supervise ingestion; conduct aims, as needed   Outcome: Progressing  Goal: Attend and participate in unit activities, including therapeutic, recreational, and educational groups  Description  Interventions:  - Provide therapeutic and educational activities daily, encourage attendance and participation, and document same in the medical record   Outcome: Progressing  Goal: Recognize dysfunctional thoughts, communicate reality-based thoughts at the time of discharge  Description  Interventions:  - Provide medication and psycho-education to assist patient in compliance and developing insight into his/her illness   Outcome: Progressing  Goal: Complete daily ADLs, including personal hygiene independently, as able  Description  Interventions:  - Observe, teach, and assist patient with ADLS  - Monitor and promote a balance of rest/activity, with adequate nutrition and elimination   Outcome: Progressing     Problem: Risk for Self Injury/Neglect  Goal: Treatment Goal: Remain safe during length of stay, learn and adopt new coping skills, and be free of self-injurious ideation, impulses and acts at the time of discharge  Outcome: Progressing  Goal: Verbalize thoughts and feelings  Description  Interventions:  - Assess and re-assess patient's lethality and potential for self-injury  - Engage patient in 1:1 interactions, daily, for a minimum of 15 minutes  - Encourage patient to express feelings, fears, frustrations, hopes  - Establish rapport/trust with patient   Outcome: Progressing  Goal: Refrain from harming self  Description  Interventions:  - Monitor patient closely, per order  - Develop a trusting relationship  - Supervise medication ingestion, monitor effects and side effects   Outcome: Progressing  Goal: Attend and participate in unit activities, including therapeutic, recreational, and educational groups  Description  Interventions:  - Provide therapeutic and educational activities daily, encourage attendance and participation, and document same in the medical record  - Obtain collateral information, encourage visitation and family involvement in care   Outcome: Progressing  Goal: Recognize maladaptive responses and adopt new coping mechanisms  Outcome: Progressing  Goal: Complete daily ADLs, including personal hygiene independently, as able  Description  Interventions:  - Observe, teach, and assist patient with ADLS  - Monitor and promote a balance of rest/activity, with adequate nutrition and elimination  Outcome: Progressing     Problem: DISCHARGE PLANNING  Goal: Discharge to home or other facility with appropriate resources  Description  INTERVENTIONS:  - Identify barriers to discharge w/patient and caregiver  - Arrange for needed discharge resources and transportation as appropriate  - Identify discharge learning needs (meds, wound care, etc )  - Arrange for interpretive services to assist at discharge as needed  - Refer to Case Management Department for coordinating discharge planning if the patient needs post-hospital services based on physician/advanced practitioner order or complex needs related to functional status, cognitive ability, or social support system  Outcome: Progressing     Problem: Prexisting or High Potential for Compromised Skin Integrity  Goal: Skin integrity is maintained or improved  Description  INTERVENTIONS:  - Identify patients at risk for skin breakdown  - Assess and monitor skin integrity  - Assess and monitor nutrition and hydration status  - Monitor labs (i e  albumin)  - Assess for incontinence   - Turn and reposition patient  - Assist with mobility/ambulation  - Relieve pressure over bony prominences  - Avoid friction and shearing  - Provide appropriate hygiene as needed including keeping skin clean and dry  - Evaluate need for skin moisturizer/barrier cream  - Collaborate with interdisciplinary team (i e  Nutrition, Rehabilitation, etc )   - Patient/family teaching  Outcome: Progressing     Problem: Nutrition/Hydration-ADULT  Goal: Nutrient/Hydration intake appropriate for improving, restoring or maintaining nutritional needs  Description  Monitor and assess patient's nutrition/hydration status for malnutrition (ex- brittle hair, bruises, dry skin, pale skin and conjunctiva, muscle wasting, smooth red tongue, and disorientation)  Collaborate with interdisciplinary team and initiate plan and interventions as ordered    Monitor patient's weight and dietary intake as ordered or per policy  Utilize nutrition screening tool and intervene per policy  Determine patient's food preferences and provide high-protein, high-caloric foods as appropriate  INTERVENTIONS:  - Monitor oral intake, urinary output, labs, and treatment plans  - Assess nutrition and hydration status and recommend course of action  - Evaluate amount of meals eaten  - Assist patient with eating if necessary   - Allow adequate time for meals  - Recommend/ encourage appropriate diets, oral nutritional supplements, and vitamin/mineral supplements  - Order, calculate, and assess calorie counts as needed  - Recommend, monitor, and adjust tube feedings and TPN/PPN based on assessed needs  - Assess need for intravenous fluids  - Provide specific nutrition/hydration education as appropriate  - Include patient/family/caregiver in decisions related to nutrition  Outcome: Progressing     Problem: Ineffective Coping  Goal: Participates in unit activities  Description  Interventions:  - Provide therapeutic environment   - Provide required programming   - Redirect inappropriate behaviors   Outcome: Progressing     Problem: Potential for Falls  Goal: Patient will remain free of falls  Description  INTERVENTIONS:  - Assess patient frequently for physical needs  -  Identify cognitive and physical deficits and behaviors that affect risk of falls    -  [place] fall precautions as indicated by assessment   - Educate patient/family on patient safety including physical limitations  - Instruct patient to call for assistance with activity based on assessment  - Modify environment to reduce risk of injury  - Consider OT/PT consult to assist with strengthening/mobility  Outcome: Progressing

## 2019-06-23 ENCOUNTER — APPOINTMENT (INPATIENT)
Dept: CT IMAGING | Facility: HOSPITAL | Age: 74
DRG: 885 | End: 2019-06-23
Payer: COMMERCIAL

## 2019-06-23 ENCOUNTER — APPOINTMENT (INPATIENT)
Dept: RADIOLOGY | Facility: HOSPITAL | Age: 74
DRG: 885 | End: 2019-06-23
Payer: COMMERCIAL

## 2019-06-23 LAB
BACTERIA UR QL AUTO: ABNORMAL /HPF
BILIRUB UR QL STRIP: NEGATIVE
CLARITY UR: ABNORMAL
COLOR UR: YELLOW
GLUCOSE SERPL-MCNC: 219 MG/DL (ref 65–140)
GLUCOSE SERPL-MCNC: 241 MG/DL (ref 65–140)
GLUCOSE SERPL-MCNC: 285 MG/DL (ref 65–140)
GLUCOSE SERPL-MCNC: 311 MG/DL (ref 65–140)
GLUCOSE SERPL-MCNC: 343 MG/DL (ref 65–140)
GLUCOSE UR STRIP-MCNC: ABNORMAL MG/DL
HGB UR QL STRIP.AUTO: 250
KETONES UR STRIP-MCNC: NEGATIVE MG/DL
LEUKOCYTE ESTERASE UR QL STRIP: 500
NITRITE UR QL STRIP: NEGATIVE
NON-SQ EPI CELLS URNS QL MICRO: ABNORMAL /HPF
PH UR STRIP.AUTO: 6 [PH]
PROT UR STRIP-MCNC: ABNORMAL MG/DL
RBC #/AREA URNS AUTO: ABNORMAL /HPF
SP GR UR STRIP.AUTO: 1.01 (ref 1–1.04)
UROBILINOGEN UA: NEGATIVE MG/DL
WBC #/AREA URNS AUTO: ABNORMAL /HPF

## 2019-06-23 PROCEDURE — 73610 X-RAY EXAM OF ANKLE: CPT

## 2019-06-23 PROCEDURE — 72125 CT NECK SPINE W/O DYE: CPT

## 2019-06-23 PROCEDURE — 70450 CT HEAD/BRAIN W/O DYE: CPT

## 2019-06-23 PROCEDURE — 81001 URINALYSIS AUTO W/SCOPE: CPT | Performed by: PSYCHIATRY & NEUROLOGY

## 2019-06-23 PROCEDURE — 82948 REAGENT STRIP/BLOOD GLUCOSE: CPT

## 2019-06-23 PROCEDURE — 71100 X-RAY EXAM RIBS UNI 2 VIEWS: CPT

## 2019-06-23 PROCEDURE — 99231 SBSQ HOSP IP/OBS SF/LOW 25: CPT | Performed by: PSYCHIATRY & NEUROLOGY

## 2019-06-23 PROCEDURE — 99232 SBSQ HOSP IP/OBS MODERATE 35: CPT | Performed by: PHYSICIAN ASSISTANT

## 2019-06-23 PROCEDURE — 73080 X-RAY EXAM OF ELBOW: CPT

## 2019-06-23 RX ORDER — TRAMADOL HYDROCHLORIDE 50 MG/1
50 TABLET ORAL EVERY 6 HOURS PRN
Status: DISCONTINUED | OUTPATIENT
Start: 2019-06-23 | End: 2019-06-24

## 2019-06-23 RX ORDER — GINSENG 100 MG
1 CAPSULE ORAL 2 TIMES DAILY PRN
Status: DISCONTINUED | OUTPATIENT
Start: 2019-06-23 | End: 2019-07-08 | Stop reason: HOSPADM

## 2019-06-23 RX ORDER — OXYCODONE HYDROCHLORIDE 5 MG/1
5 TABLET ORAL ONCE
Status: COMPLETED | OUTPATIENT
Start: 2019-06-23 | End: 2019-06-23

## 2019-06-23 RX ORDER — FUROSEMIDE 20 MG/1
20 TABLET ORAL ONCE
Status: COMPLETED | OUTPATIENT
Start: 2019-06-23 | End: 2019-06-23

## 2019-06-23 RX ORDER — LIDOCAINE 50 MG/G
3 PATCH TOPICAL DAILY
Status: DISCONTINUED | OUTPATIENT
Start: 2019-06-23 | End: 2019-07-08 | Stop reason: HOSPADM

## 2019-06-23 RX ADMIN — OXYCODONE HYDROCHLORIDE 5 MG: 5 TABLET ORAL at 02:07

## 2019-06-23 RX ADMIN — FUROSEMIDE 20 MG: 20 TABLET ORAL at 17:11

## 2019-06-23 RX ADMIN — INSULIN LISPRO 8 UNITS: 100 INJECTION, SOLUTION INTRAVENOUS; SUBCUTANEOUS at 11:28

## 2019-06-23 RX ADMIN — INSULIN LISPRO 4 UNITS: 100 INJECTION, SOLUTION INTRAVENOUS; SUBCUTANEOUS at 16:23

## 2019-06-23 RX ADMIN — SPIRONOLACTONE 50 MG: 25 TABLET ORAL at 08:29

## 2019-06-23 RX ADMIN — LORAZEPAM 1 MG: 1 TABLET ORAL at 23:16

## 2019-06-23 RX ADMIN — INSULIN DETEMIR 16 UNITS: 100 INJECTION, SOLUTION SUBCUTANEOUS at 21:25

## 2019-06-23 RX ADMIN — FUROSEMIDE 40 MG: 40 TABLET ORAL at 08:29

## 2019-06-23 RX ADMIN — MIDODRINE HYDROCHLORIDE 5 MG: 2.5 TABLET ORAL at 15:38

## 2019-06-23 RX ADMIN — MIDODRINE HYDROCHLORIDE 5 MG: 2.5 TABLET ORAL at 11:46

## 2019-06-23 RX ADMIN — FUROSEMIDE 40 MG: 40 TABLET ORAL at 15:45

## 2019-06-23 RX ADMIN — LACTULOSE 20 G: 10 SOLUTION ORAL at 08:30

## 2019-06-23 RX ADMIN — BACITRACIN ZINC 1 LARGE APPLICATION: 500 OINTMENT TOPICAL at 11:11

## 2019-06-23 RX ADMIN — MIDODRINE HYDROCHLORIDE 5 MG: 2.5 TABLET ORAL at 06:08

## 2019-06-23 RX ADMIN — INSULIN LISPRO 10 UNITS: 100 INJECTION, SOLUTION INTRAVENOUS; SUBCUTANEOUS at 11:28

## 2019-06-23 RX ADMIN — INSULIN LISPRO 4 UNITS: 100 INJECTION, SOLUTION INTRAVENOUS; SUBCUTANEOUS at 07:50

## 2019-06-23 RX ADMIN — MELATONIN TAB 3 MG 3 MG: 3 TAB at 21:29

## 2019-06-23 RX ADMIN — LIDOCAINE 3 PATCH: 50 PATCH TOPICAL at 11:09

## 2019-06-23 RX ADMIN — TRAMADOL HYDROCHLORIDE 50 MG: 50 TABLET, FILM COATED ORAL at 21:25

## 2019-06-23 RX ADMIN — TRAMADOL HYDROCHLORIDE 50 MG: 50 TABLET, FILM COATED ORAL at 21:29

## 2019-06-23 RX ADMIN — OLANZAPINE 15 MG: 5 TABLET, FILM COATED ORAL at 18:03

## 2019-06-23 RX ADMIN — BUPROPION HYDROCHLORIDE 150 MG: 150 TABLET, FILM COATED, EXTENDED RELEASE ORAL at 08:29

## 2019-06-23 RX ADMIN — INSULIN LISPRO 6 UNITS: 100 INJECTION, SOLUTION INTRAVENOUS; SUBCUTANEOUS at 21:27

## 2019-06-23 NOTE — PROGRESS NOTES
RRT called around 1am that patient fell and hit her head  On evaluation,VSS on RA  She has a laceration on L side of her head and c/o pain on her head, L elbow and L ankle  Denies any dizziness, denies syncope  L periorbital ecchymosis from fall on Sunday noted  No cervical spine tenderness  She was placed in a [facility] [place] and moved to the bed  Given 1 x dose of 5mg oxycodone  CT brain, neck, plain film of ankles and L elbow ordered

## 2019-06-23 NOTE — PROGRESS NOTES
Patient had a fall at [place] [address] in her room while going to the bathroom in the presence of two staff who went to attend to her bed alarm and she loose her balance at the bathroom door and hit her head to the bathroom door frame and sustained a laceration on the left side of her head and three skin tears on her left upper hand  RRT was called at 0045 due to bleeding and MRI of the head and neck was done and the result were negative  X-rays of B/L ankles and left elbow result are pending  The skin tear was dressed with xeroform, abdominal pad and gauze bandage while the head laceration surgical lubricant was applied there  Patient was agitated post MRI and X-rays and music therapy was used to calmed the patient  Patient was appreciative with staff for all the care given to her  Q 7 minutes safety checks ongoing  Will continue to monitor

## 2019-06-23 NOTE — PROGRESS NOTES
Progress Note - [address] [address][birth year] 75 y o  female MRN: [id number]   Unit/Bed#: [facility] 650-01 Encounter: [id number]    Behavior over the last 24 hours: ajnum Herring   was seen and evaluated on the unit  Patient was seen in her room as she was sleeping during the day  Per staff patient was admitted for trying to drink shampoo to kill herself  She also was resistant to restarting ECT but is scheduled to start again on Monday  At this time the patient reports that I am not going to do ECT    She also reports being homeless and when asked about her mood she reports that her mood sucks    Patient is noted to be irritable and minimally cooperative with assessment  Does not interact with peers  Stays in the room during the day  Seclusive to the room      Sleep: normal  Appetite: normal  Medication side effects: No   ROS: no complaints    Mental Status Evaluation:    Appearance:  disheveled   Behavior:  demanding, guarded   Speech:  normal rate, normal volume, normal pitch   Mood:  irritable   Affect:  labile, mood-congruent   Thought Process:  concrete, poverty of thought   Associations: concrete associations   Thought Content:  no overt delusions   Perceptual Disturbances: no auditory hallucinations, no visual hallucinations   Risk Potential: Suicidal ideation - None  Homicidal ideation - None  Potential for aggression - No   Sensorium:  oriented to person and place   Memory:  recent and remote memory: unable to assess due to lack of cooperation   Consciousness:  alert and awake   Attention: attention span and concentration appear shorter than expected for age   Insight:  poor   Judgment: poor   Gait/Station: unable to assess   Motor Activity: no abnormal movements     Vital signs in last 24 hours:    Temp:  [98 °F (36 7 °C)-99 °F (37 2 °C)] 98 °F (36 7 °C)  HR:  [86-99] 86  Resp:  [18-20] 18  BP: (109-132)/(53-62) 132/62    Laboratory results: I have personally reviewed all pertinent laboratory/tests results  Progress Toward Goals: insight remains poor, poor motivation    Assessment/Plan   Principal Problem:    Bipolar 1 disorder (HCC)  Active Problems:    Type 2 diabetes mellitus, with long-term current use of insulin (HCC)    Anemia of chronic disease    Cirrhosis of liver with ascites (HCC)    Essential hypertension    Ambulatory dysfunction    Compression fracture of L3 lumbar vertebra    Discharged from Hospice for Suicidal Ideation, Medically Complex    Preprocedural examination    Recommended Treatment:     Planned medication and treatment changes: All current active medications have been reviewed    Encourage group therapy, milieu therapy and occupational therapy  [address] checks every 7 minutes  Continue current psychiatric medications as listed below    Current Facility-Administered Medications:  buPROPion 150 mg Oral Daily Coit HANANE Kelly    dextrose 50 mL Intravenous Once Carito Nick PA-C    docusate sodium 100 mg Oral BID Deon Abernathy MD    furosemide 40 mg Oral BID (diuretic) Deon Abernathy MD    insulin detemir 16 Units Subcutaneous HS Deon Abernathy MD    insulin lispro 10 Units Subcutaneous Daily Before Lunch Deon Abernathy MD    insulin lispro 10 Units Subcutaneous BID AC Deon Abernathy MD    insulin lispro 2-12 Units Subcutaneous 4x Daily (AC & HS) Tone Fernandez DO    lactulose 20 g Oral Daily Deon Abernathy MD    LORazepam 1 mg Intramuscular Q6H PRN Renate Moreno MD    LORazepam 1 mg Oral Q6H PRN Oksana Kelly PA-C    melatonin 3 mg Oral HS Kevin Orellana PA-C    midodrine 5 mg Oral TID AC Deon Abernathy MD    OLANZapine 5 mg Intramuscular Q4H PRN Renate Moreno MD    OLANZapine 15 mg Oral HS Renate Moreno MD    OLANZapine 5 mg Oral Q4H PRN Oksana Kelly PA-C    ondansetron 4 mg Oral Q6H PRN Deon Abernathy MD    oxyCODONE 2 5 mg Oral Q6H PRN Nahum Mak MD    sodium chloride 50 mL/hr Intravenous Continuous Hermelinda Kayce Constantino MD Last Rate: Stopped (06/21/19 0644)   sodium chloride 50 mL/hr Intravenous Continuous Hermelinda Tinajero MD Last Rate: 50 mL/hr (06/21/19 0600)   sodium chloride 50 mL/hr Intravenous Continuous Desiree Black MD Last Rate: Stopped (06/21/19 5097)   spironolactone 50 mg Oral Daily Charles Conde MD        Risks / Benefits of Treatment:    Risks, benefits, and possible side effects of medications explained to patient and patient verbalizes understanding and agreement for treatment  Counseling / Coordination of Care:    Patient's progress reviewed with nursing staff  Medications, treatment progress and treatment plan reviewed with patient      Veronique Aponte MD 06/22/19

## 2019-06-23 NOTE — CODE DOCUMENTATION
Patient was ambulating to the bathroom with two techs present  Bed alarm was also in use  Valdene Hertford was in use by patient  As reported by  [facility] patient was ambulating to the bath room with walker  Patient lost balance hit her head on the door frame and then fell to the floor

## 2019-06-23 NOTE — PROGRESS NOTES
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

## 2019-06-23 NOTE — PROGRESS NOTES
Progress Note - Karen Collins [birth year], 75 y o  female MRN: [id number]  Unit/Bed#: [facility] 570-02 Encounter: [id number]  Primary Care Provider: Luda Cheng MD   Date and time admitted to hospital: 6/4/2019  2:35 PM    Ambulatory dysfunction  Assessment & Plan  · Last night (06/22/2019) patient was rapid response due to fall  · This is her 2nd fall since here, and also had falls before admission resulting in compression fracture  · Re-consult PT/OT  · Check orthostatics Q shift  · Fall precautions   · Ambulation with assistance and walker   · Will give extra dose of Lasix tonight as patient is appearing fluid overload; noted history of liver cirrhosis requiring paracentesis  · Pain control with Lidoderm patches; careful with pain control medications as patient has many interactions/allergies     Discharged from Hospice for Suicidal Ideation, Medically Complex  Assessment & Plan  Initially brought to hospital for suicide attempt drinking she improved while on hospice  Will continue DNR DNI status as this was done prior to initiation of hospice  Reported estranged from family  Continue 3 to status    Cirrhosis of liver with ascites ([facility] [address] )  Assessment & Plan  Decompensated cirrhosis with ascites and lower extremity edema actually worsened her abdomen is distended her lower extremity edema +3 he she has been receiving all her medications  She did get some fluids postprocedural from ECT on Wednesday  She did get a therapeutic paracentesis on June 14  Continue Lasix and Aldactone, increased Lasix dose to 40 mg p o  B i d , increase Aldactone to 50 mg p o  Daily,  also placed on midodrine continue the midodrine but increased to 5 mg t i d  to help improve blood pressures so she may get her diuretics blood pressures have actually been stable on the midodrine on current diuresis  Previous paracentesis 2 7 L removed  Obtaining an ultrasound of the abdomen to see how much fluid she does have as the IR wants to confirm    I did speak to interventional Radiology the plan for now is to have the therapeutic paracentesis to be done tomorrow  Labs to be done tomorrow CBC BMP INR  If greater than 4 L will be removed will administer albumin  Patient will get a venous duplex to rule are DVT she is unable to be on any DVT prophylaxis in terms of pharmacotherapy secondary to thrombocytopenia  Also will obtain an ammonia level tomorrow she has been having BMs as she states    Type 2 diabetes mellitus, with long-term current use of insulin [facility])  Assessment & Plan  Lab Results   Component Value Date    HGBA1C 10 1 (H) 02/13/2019       Recent Labs     06/22/19  2030 06/23/19  0049 06/23/19  0726 06/23/19  1102   POCGLU 374* 311* 219* 343*       Blood Sugar Average: Last 72 hrs:  (P) 258 4375    patient's appetite is variable  She frequently misses meals  Will continue current regimen along with insulin sliding scale  Sugar stable    * Bipolar 1 disorder [facility])  Assessment & Plan  Patient with untreated bipolar disorder  Reportedly lost lots of weight became very aggressive and combative while on hospice developed suicidal ideations and began drinking shampoo  Admitted on 302 status   Patient has been doing well from a psychiatric standpoint in terms of she has been actually compliant with treatment medically that she was previously non  Continue psychiatric care  Compression fracture of L3 lumbar vertebra  Assessment & Plan  Chronic back pain   Evidence of severe spondylosis and osteoarthritis at L3-L4 with significant stenosis   Continue intranasal calcitonin    Anemia of chronic disease  Assessment & Plan  Hemoglobin stable  Between 10-11    Preprocedural examination  Assessment & Plan  Patient is medically cleared for ECT treatments   Will do CBC and BMP    EKG reviewed  Patient had paracentesis done and hence she is now medically cleared to proceed with ECT treatments    Essential hypertension  Assessment & Plan  Controlled off medication       Patient Centered Rounds: I have performed bedside rounds with nursing staff today  Discussions with Specialists or Other Care Team Provider: nursing    Education and Discussions with Family / Patient: d/w patient     Time Spent for Care: 30 minutes  More than 50% of total time spent on counseling and coordination of care as described above  Current Length of Stay: 19 day(s)    Current Patient Status: Inpatient Psych   Certification Statement: The patient will continue to require additional inpatient hospital stay due to per primary    Discharge Plan: per primary    Code Status: Level 3 - DNAR and DNI      Subjective:   Patient generally feels well  No shortness of breath  States she has had a lot of falls  Does have sciatica pain on the right side, chronic low back pain, now right rib pain pain  Objective:     Vitals:   Temp (24hrs), Av 8 °F (36 6 °C), Min:97 6 °F (36 4 °C), Max:98 °F (36 7 °C)    Temp:  [97 6 °F (36 4 °C)-98 °F (36 7 °C)] 97 9 °F (36 6 °C)  HR:  [81-96] 91  Resp:  [16-20] 16  BP: (126-166)/(60-68) 166/68  SpO2:  [97 %-99 %] 99 %  Body mass index is 30 1 kg/m²  Input and Output Summary (last 24 hours): Intake/Output Summary (Last 24 hours) at 2019 1322  Last data filed at 2019 1236  Gross per 24 hour   Intake 780 ml   Output    Net 780 ml       Physical Exam:     Physical Exam   Constitutional: She is oriented to person, place, and time  She appears well-developed and well-nourished  No distress  Pleasant; fully conversational   HENT:   Head: Normocephalic and atraumatic  Eyes: Right eye exhibits no discharge  Left eye exhibits no discharge  No scleral icterus  Cardiovascular: Normal rate and regular rhythm  Exam reveals no gallop and no friction rub  No murmur heard  Pulmonary/Chest: Effort normal and breath sounds normal  No respiratory distress  She has no wheezes  She has no rales   She exhibits tenderness (right sided tenderness of chest beneath axillae/breast; no bruises or obvious deformity)  Abdominal: Soft  Bowel sounds are normal  She exhibits no distension  There is no tenderness  Small-moderate ascites    Musculoskeletal: She exhibits edema (3+ LE edema b/l)  Neurological: She is alert and oriented to person, place, and time  Skin: Skin is warm and dry  ecchymosis left temple from prior fall  Ecchymosis surrounding left eye  Nursing note and vitals reviewed  Additional Data:     Labs:    Results from last 7 days   Lab Units 06/20/19  0522   WBC Thousand/uL 3 10*   HEMOGLOBIN g/dL 10 4*   HEMATOCRIT % 31 5*   PLATELETS Thousands/uL 65*   LYMPHO PCT % 31   MONO PCT % 16*   EOS PCT % 5     Results from last 7 days   Lab Units 06/21/19  0455   SODIUM mmol/L 135*   POTASSIUM mmol/L 3 9   CHLORIDE mmol/L 105   CO2 mmol/L 28   BUN mg/dL 13   CREATININE mg/dL 0 44*   ANION GAP mmol/L 2*   CALCIUM mg/dL 8 4   GLUCOSE RANDOM mg/dL 221*     Results from last 7 days   Lab Units 06/20/19  0522   INR  1 27*     Results from last 7 days   Lab Units 06/23/19  1102 06/23/19  0726 06/23/19  0049 06/22/19  2030 06/22/19  1556 06/22/19  1112 06/22/19  0732 06/21/19  1957 06/21/19  1702 06/21/19  1107 06/21/19  0714 06/21/19  0501   POC GLUCOSE mg/dl 343* 219* 311* 374* 81 323* 247* 290* 130 315* 235* 270*                   * I Have Reviewed All Lab Data Listed Above  * Additional Pertinent Lab Tests Reviewed:  [address] [address] Admission Reviewed    Imaging:    Imaging Reports Reviewed Today Include: Ct head      Recent Cultures (last 7 days):           Last 24 Hours Medication List:     Current Facility-Administered Medications:  bacitracin 1 large application Topical BID PRN Divya Dee PA-C    buPROPion 150 mg Oral Daily Rosemary Hurley PA-C    docusate sodium 100 mg Oral BID Madhav Sanches MD    furosemide 40 mg Oral BID (diuretic) Madhav Sanches MD    insulin detemir 16 Units Subcutaneous HS Madhav Sanches MD    insulin lispro 10 Units Subcutaneous Daily Before Lunch Chuy Hill MD    insulin lispro 10 Units Subcutaneous BID AC Chuy Hill MD    insulin lispro 2-12 Units Subcutaneous 4x Daily (AC & HS) Tone Fernandez DO    lactulose 20 g Oral Daily Chuy Hill MD    lidocaine 3 patch Topical Daily Divya Willis PA-C    LORazepam 1 mg Intramuscular Q6H PRN Carlene Cardoza MD    LORazepam 1 mg Oral Q6H PRN Musa Lee PA-C    melatonin 3 mg Oral HS [facility]    midodrine 5 mg Oral TID AC Chuy Hill MD    OLANZapine 5 mg Intramuscular Q4H PRN Carlene Cardoza MD    OLANZapine 15 mg Oral HS Carlene Cardoza MD    OLANZapine 5 mg Oral Q4H PRN Musa Lee PA-C    ondansetron 4 mg Oral Q6H PRN Chuy Hill MD    oxyCODONE 2 5 mg Oral Q6H PRN Stefan Barcenas MD    sodium chloride 50 mL/hr Intravenous Continuous Lopez Pichardo MD Last Rate: Stopped (06/21/19 0644)   sodium chloride 50 mL/hr Intravenous Continuous Hermelinda Tinajero MD Last Rate: 50 mL/hr (06/21/19 0600)   sodium chloride 50 mL/hr Intravenous Continuous Lopez Pichardo MD Last Rate: Stopped (06/21/19 0629)   spironolactone 50 mg Oral Daily Chuy Hill MD    traMADol 50 mg Oral Q6H  [facility] HANANE Willis         Today, Patient Was Seen By: Sneha Guzman PA-C    ** Please Note: Dictation voice to text software may have been used in the creation of this document   **

## 2019-06-23 NOTE — ASSESSMENT & PLAN NOTE
· Last night (06/22/2019) patient was rapid response due to fall  · This is her 2nd fall since here, and also had falls before admission resulting in compression fracture  · CT negative for intracranial bleed; awaiting knee and ankle Xray; will also obtain right rib Xray  · Re-consult PT/OT  · Check orthostatics Q shift  · Fall precautions   · Ambulation with assistance and walker   · Will give extra dose of Lasix tonight as patient is appearing fluid overload; noted history of liver cirrhosis requiring paracentesis  · Pain control with Lidoderm patches; careful with pain control medications as patient has many interactions/allergies

## 2019-06-23 NOTE — NURSING NOTE
Patient is compliant with almost all medications  Patient did refuse Colace stating that she is having regular BM's daily with the Lactulose that is ordered  MD will be notified on next medical rounds  Patient with new orders for post fall intervention for her second fall during this hospital admission  Patient is on 1:1 staff monitoring (close observation close proximity) and room change to observation room  Patient reports chronic pain in sciatica area of the right hip, back pain and acute right rib pain from the fall on 11-7  SHAQ Willis notified and new order for x-ray of the right rib area, patient had previous x-rays of bilateral ankles and left elbow  All x-ray results still pending at this time  New orders from SHAQ Willis for an additional 20 mg of Lasix x one dose, BMP, CBC in AM, Tramadol PRN, and three Lidoderm patches  Lidoderm patches applied to right rib area, back and right hip  Patient also has PRN bacitracin order for scabs to bilateral shins from self inflicted scratches and for skin tears to left arm from recent fall  Bacitracin applied as ordered  Patient to continue on 1:1 staff supervision at this time for prevention of falls  Patient denies anxiety symptoms but remains depressed and somewhat sarcastic in her replies to staff  No other issues noted at this time

## 2019-06-23 NOTE — PLAN OF CARE
Problem: Alteration in Thoughts and Perception  Goal: Treatment Goal: Gain control of psychotic behaviors/thinking, reduce/eliminate presenting symptoms and demonstrate improved reality functioning upon discharge  Outcome: Progressing  Goal: Verbalize thoughts and feelings  Description  Interventions:  - Promote a nonjudgmental and trusting relationship with the patient through active listening and therapeutic communication  - Assess patient's level of functioning, behavior and potential for risk  - Engage patient in 1 on 1 interactions for a minimum of 15 minutes each session  - Encourage patient to express fears, feelings, frustrations, and discuss symptoms    - Elizabeth patient to reality, help patient recognize reality-based thinking   - Administer medications as ordered and assess for potential side effects  - Provide the patient education related to the signs and symptoms of the illness and desired effects of prescribed medications  Outcome: Progressing  Goal: Refrain from acting on delusional thinking/internal stimuli  Description  Interventions:  - Monitor patient closely, per order   - Utilize least restrictive measures   - Set reasonable limits, give positive feedback for acceptable   - Administer medications as ordered and monitor of potential side effects  Outcome: Progressing  Goal: Agree to be compliant with medication regime, as prescribed and report medication side effects  Description  Interventions:  - Offer appropriate PRN medication and supervise ingestion; conduct aims, as needed   Outcome: Progressing  Goal: Attend and participate in unit activities, including therapeutic, recreational, and educational groups  Description  Interventions:  - Provide therapeutic and educational activities daily, encourage attendance and participation, and document same in the medical record   Outcome: Progressing  Goal: Recognize dysfunctional thoughts, communicate reality-based thoughts at the time of discharge  Description  Interventions:  - Provide medication and psycho-education to assist patient in compliance and developing insight into his/her illness   Outcome: Progressing  Goal: Complete daily ADLs, including personal hygiene independently, as able  Description  Interventions:  - Observe, teach, and assist patient with ADLS  - Monitor and promote a balance of rest/activity, with adequate nutrition and elimination   Outcome: Progressing     Problem: Risk for Self Injury/Neglect  Goal: Treatment Goal: Remain safe during length of stay, learn and adopt new coping skills, and be free of self-injurious ideation, impulses and acts at the time of discharge  Outcome: Progressing  Goal: Verbalize thoughts and feelings  Description  Interventions:  - Assess and re-assess patient's lethality and potential for self-injury  - Engage patient in 1:1 interactions, daily, for a minimum of 15 minutes  - Encourage patient to express feelings, fears, frustrations, hopes  - Establish rapport/trust with patient   Outcome: Progressing  Goal: Refrain from harming self  Description  Interventions:  - Monitor patient closely, per order  - Develop a trusting relationship  - Supervise medication ingestion, monitor effects and side effects   Outcome: Progressing  Goal: Attend and participate in unit activities, including therapeutic, recreational, and educational groups  Description  Interventions:  - Provide therapeutic and educational activities daily, encourage attendance and participation, and document same in the medical record  - Obtain collateral information, encourage visitation and family involvement in care   Outcome: Progressing  Goal: Recognize maladaptive responses and adopt new coping mechanisms  Outcome: Progressing  Goal: Complete daily ADLs, including personal hygiene independently, as able  Description  Interventions:  - Observe, teach, and assist patient with ADLS  - Monitor and promote a balance of rest/activity, with adequate nutrition and elimination  Outcome: Progressing     Problem: DISCHARGE PLANNING  Goal: Discharge to home or other facility with appropriate resources  Description  INTERVENTIONS:  - Identify barriers to discharge w/patient and caregiver  - Arrange for needed discharge resources and transportation as appropriate  - Identify discharge learning needs (meds, wound care, etc )  - Arrange for interpretive services to assist at discharge as needed  - Refer to Case Management Department for coordinating discharge planning if the patient needs post-hospital services based on physician/advanced practitioner order or complex needs related to functional status, cognitive ability, or social support system  Outcome: Progressing     Problem: Prexisting or High Potential for Compromised Skin Integrity  Goal: Skin integrity is maintained or improved  Description  INTERVENTIONS:  - Identify patients at risk for skin breakdown  - Assess and monitor skin integrity  - Assess and monitor nutrition and hydration status  - Monitor labs (i e  albumin)  - Assess for incontinence   - Turn and reposition patient  - Assist with mobility/ambulation  - Relieve pressure over bony prominences  - Avoid friction and shearing  - Provide appropriate hygiene as needed including keeping skin clean and dry  - Evaluate need for skin moisturizer/barrier cream  - Collaborate with interdisciplinary team (i e  Nutrition, Rehabilitation, etc )   - Patient/family teaching  Outcome: Progressing     Problem: Nutrition/Hydration-ADULT  Goal: Nutrient/Hydration intake appropriate for improving, restoring or maintaining nutritional needs  Description  Monitor and assess patient's nutrition/hydration status for malnutrition (ex- brittle hair, bruises, dry skin, pale skin and conjunctiva, muscle wasting, smooth red tongue, and disorientation)  Collaborate with interdisciplinary team and initiate plan and interventions as ordered    Monitor patient's weight and dietary intake as ordered or per policy  Utilize nutrition screening tool and intervene per policy  Determine patient's food preferences and provide high-protein, high-caloric foods as appropriate  INTERVENTIONS:  - Monitor oral intake, urinary output, labs, and treatment plans  - Assess nutrition and hydration status and recommend course of action  - Evaluate amount of meals eaten  - Assist patient with eating if necessary   - Allow adequate time for meals  - Recommend/ encourage appropriate diets, oral nutritional supplements, and vitamin/mineral supplements  - Order, calculate, and assess calorie counts as needed  - Recommend, monitor, and adjust tube feedings and TPN/PPN based on assessed needs  - Assess need for intravenous fluids  - Provide specific nutrition/hydration education as appropriate  - Include patient/family/caregiver in decisions related to nutrition  Outcome: Progressing     Problem: Ineffective Coping  Goal: Participates in unit activities  Description  Interventions:  - Provide therapeutic environment   - Provide required programming   - Redirect inappropriate behaviors   Outcome: Progressing     Problem: Potential for Falls  Goal: Patient will remain free of falls  Description  INTERVENTIONS:  - Assess patient frequently for physical needs  -  Identify cognitive and physical deficits and behaviors that affect risk of falls    -  [place] fall precautions as indicated by assessment   - Educate patient/family on patient safety including physical limitations  - Instruct patient to call for assistance with activity based on assessment  - Modify environment to reduce risk of injury  - Consider OT/PT consult to assist with strengthening/mobility  Outcome: Progressing

## 2019-06-23 NOTE — ASSESSMENT & PLAN NOTE
Lab Results   Component Value Date    HGBA1C 10 1 (H) 02/13/2019       Recent Labs     06/22/19  2030 06/23/19  0049 06/23/19  0726 06/23/19  1102   POCGLU 374* 311* 219* 343*       Blood Sugar Average: Last 72 hrs:  (P) 258 0794    patient's appetite is variable  She frequently misses meals  Will continue current regimen along with insulin sliding scale    Sugar stable

## 2019-06-24 ENCOUNTER — ANESTHESIA (OUTPATIENT)
Dept: PREOP/PACU | Facility: HOSPITAL | Age: 74
End: 2019-06-24

## 2019-06-24 LAB
ANION GAP SERPL CALCULATED.3IONS-SCNC: 2 MMOL/L (ref 5–14)
ANISOCYTOSIS BLD QL SMEAR: PRESENT
BASOPHILS # BLD AUTO: 0.07 THOUSAND/UL (ref 0–0.1)
BASOPHILS NFR MAR MANUAL: 2 % (ref 0–1)
BUN SERPL-MCNC: 17 MG/DL (ref 5–25)
CALCIUM SERPL-MCNC: 8.1 MG/DL (ref 8.4–10.2)
CHLORIDE SERPL-SCNC: 103 MMOL/L (ref 97–108)
CO2 SERPL-SCNC: 32 MMOL/L (ref 22–30)
CREAT SERPL-MCNC: 0.59 MG/DL (ref 0.6–1.2)
EOSINOPHIL # BLD AUTO: 0.23 THOUSAND/UL (ref 0–0.4)
EOSINOPHIL NFR BLD MANUAL: 7 % (ref 0–6)
ERYTHROCYTE [DISTWIDTH] IN BLOOD BY AUTOMATED COUNT: 20.1 %
GFR SERPL CREATININE-BSD FRML MDRD: 91 ML/MIN/1.73SQ M
GLUCOSE P FAST SERPL-MCNC: 86 MG/DL (ref 70–99)
GLUCOSE SERPL-MCNC: 166 MG/DL (ref 65–140)
GLUCOSE SERPL-MCNC: 290 MG/DL (ref 65–140)
GLUCOSE SERPL-MCNC: 298 MG/DL (ref 65–140)
GLUCOSE SERPL-MCNC: 75 MG/DL (ref 65–140)
GLUCOSE SERPL-MCNC: 86 MG/DL (ref 70–99)
HCT VFR BLD AUTO: 28.5 % (ref 36–46)
HGB BLD-MCNC: 9.7 G/DL (ref 12–16)
HYPERCHROMIA BLD QL SMEAR: PRESENT
LYMPHOCYTES # BLD AUTO: 0.73 THOUSAND/UL (ref 0.5–4)
LYMPHOCYTES # BLD AUTO: 22 % (ref 25–45)
MCH RBC QN AUTO: 33 PG (ref 26–34)
MCHC RBC AUTO-ENTMCNC: 34.1 G/DL (ref 31–36)
MCV RBC AUTO: 97 FL (ref 80–100)
MONOCYTES # BLD AUTO: 0.46 THOUSAND/UL (ref 0.2–0.9)
MONOCYTES NFR BLD AUTO: 14 % (ref 1–10)
NEUTS SEG # BLD: 1.82 THOUSAND/UL (ref 1.8–7.8)
NEUTS SEG NFR BLD AUTO: 55 %
PLATELET # BLD AUTO: 71 THOUSANDS/UL (ref 150–450)
PLATELET BLD QL SMEAR: ABNORMAL
PMV BLD AUTO: 9.1 FL (ref 8.9–12.7)
POTASSIUM SERPL-SCNC: 3.3 MMOL/L (ref 3.6–5)
RBC # BLD AUTO: 2.95 MILLION/UL (ref 4–5.2)
RBC MORPH BLD: PRESENT
SODIUM SERPL-SCNC: 137 MMOL/L (ref 137–147)
TOTAL CELLS COUNTED SPEC: 100
WBC # BLD AUTO: 3.3 THOUSAND/UL (ref 4.5–11)

## 2019-06-24 PROCEDURE — 85027 COMPLETE CBC AUTOMATED: CPT | Performed by: PHYSICIAN ASSISTANT

## 2019-06-24 PROCEDURE — 82948 REAGENT STRIP/BLOOD GLUCOSE: CPT

## 2019-06-24 PROCEDURE — 85007 BL SMEAR W/DIFF WBC COUNT: CPT | Performed by: PHYSICIAN ASSISTANT

## 2019-06-24 PROCEDURE — 80048 BASIC METABOLIC PNL TOTAL CA: CPT | Performed by: PHYSICIAN ASSISTANT

## 2019-06-24 RX ORDER — POTASSIUM CHLORIDE 20 MEQ/1
40 TABLET, EXTENDED RELEASE ORAL ONCE
Status: COMPLETED | OUTPATIENT
Start: 2019-06-24 | End: 2019-06-24

## 2019-06-24 RX ORDER — TRAMADOL HYDROCHLORIDE 50 MG/1
25 TABLET ORAL EVERY 6 HOURS PRN
Status: DISCONTINUED | OUTPATIENT
Start: 2019-06-24 | End: 2019-07-08 | Stop reason: HOSPADM

## 2019-06-24 RX ADMIN — INSULIN LISPRO 6 UNITS: 100 INJECTION, SOLUTION INTRAVENOUS; SUBCUTANEOUS at 21:24

## 2019-06-24 RX ADMIN — SPIRONOLACTONE 50 MG: 25 TABLET ORAL at 08:17

## 2019-06-24 RX ADMIN — POTASSIUM CHLORIDE 40 MEQ: 20 TABLET, EXTENDED RELEASE ORAL at 08:19

## 2019-06-24 RX ADMIN — INSULIN LISPRO 10 UNITS: 100 INJECTION, SOLUTION INTRAVENOUS; SUBCUTANEOUS at 11:50

## 2019-06-24 RX ADMIN — MIDODRINE HYDROCHLORIDE 5 MG: 2.5 TABLET ORAL at 11:51

## 2019-06-24 RX ADMIN — LITHIUM CARBONATE 450 MG: 300 CAPSULE, GELATIN COATED ORAL at 21:23

## 2019-06-24 RX ADMIN — MIDODRINE HYDROCHLORIDE 5 MG: 2.5 TABLET ORAL at 16:50

## 2019-06-24 RX ADMIN — INSULIN LISPRO 6 UNITS: 100 INJECTION, SOLUTION INTRAVENOUS; SUBCUTANEOUS at 16:51

## 2019-06-24 RX ADMIN — DOCUSATE SODIUM 100 MG: 100 CAPSULE, LIQUID FILLED ORAL at 08:16

## 2019-06-24 RX ADMIN — OLANZAPINE 15 MG: 5 TABLET, FILM COATED ORAL at 18:33

## 2019-06-24 RX ADMIN — LACTULOSE 20 G: 10 SOLUTION ORAL at 08:17

## 2019-06-24 RX ADMIN — FUROSEMIDE 40 MG: 40 TABLET ORAL at 16:50

## 2019-06-24 RX ADMIN — INSULIN LISPRO 2 UNITS: 100 INJECTION, SOLUTION INTRAVENOUS; SUBCUTANEOUS at 11:49

## 2019-06-24 RX ADMIN — LIDOCAINE 3 PATCH: 50 PATCH TOPICAL at 08:17

## 2019-06-24 RX ADMIN — MIDODRINE HYDROCHLORIDE 5 MG: 2.5 TABLET ORAL at 06:32

## 2019-06-24 RX ADMIN — INSULIN DETEMIR 14 UNITS: 100 INJECTION, SOLUTION SUBCUTANEOUS at 21:23

## 2019-06-24 RX ADMIN — MELATONIN TAB 3 MG 3 MG: 3 TAB at 21:23

## 2019-06-24 RX ADMIN — FUROSEMIDE 40 MG: 40 TABLET ORAL at 08:17

## 2019-06-24 NOTE — PROGRESS NOTES
06/24/19 0900   Team Meeting   Meeting Type Daily Rounds   Team Members Present   Team Members Present Physician;Nurse;   Physician Team Member [id number] [facility] Team Member [facility] Management Team Member [id number]    OT Team Member Jose J Trinh      Pt discussed on treatment rounds today, Wellbutrin discontinued and lithium 450 mg added and level will be checked on Thursday

## 2019-06-24 NOTE — PROGRESS NOTES
Patient now in the "observation room" due to fall last night  Patient is compliant with almost all medications  But she resisted shower despite 1:1 staff and ability to assist as determined to refuse all ECT from now on   MD will be notified on next medical rounds  Patient is on 1:1 and cooperative with staff at bedside; all other staff monitoring (close observation close proximity)   Patient reports chronic pain in sciatica area of the right hip, back pain and acute right rib pain from the fall on 11-7  Tramadol (prn) given at HS  Will monitor for effect       All x-ray results negative  C/O anxiety causing her insomnia  Ativan 1 mg po given as prn  Will monitor for results   1:1 maintained and monitoring for safety

## 2019-06-24 NOTE — CASE MANAGEMENT
Patient made aware of hearing for tomorrow, patient will decide in morning if she will attending  Writer provided update to Catie at [facility] regarding patient  CM will continue to follow and provide services as needed

## 2019-06-24 NOTE — PROGRESS NOTES
Clinical Pharmacy Note: [facility] Therapeutic Monitoring     Keiry Baird is a 75 y o  female who presents with Bipolar Disorder  Assessment/Plan:    Lithium indication: mood disorder  Germán Mari is currently taking 450 mg lithium carbonate capsule once daily  Lithium concentration has not been drawn yet for this hospital stay would recommend drawing a lithium level after 5 full days of therapy or close to that on Friday 6/28/19 in evening before bedtime dose for a steady state level  The pharmacist has checked for drug interactions, lithium levels, kidney function, thyroid function  YES Patient is on furosemide and spironolactone, therefore Lithium levels can increase due to kidney excretion  However the Lithium dose is still relativel low for this patient  Nonetheless, as a precaution monitor for signs and symptoms of toxicity and reduced kidney function  Pharmacy Recommendations:     Would recommend drawing a lithium level after 5 full days of therapy or close to that on Friday 6/28/19 in evening before bedtime dose for a steady state level  Patient is on furosemide and spironolactone, therefore Lithium levels can increase due to kidney excretion  However the Lithium dose is still relatively low for this patient  Nonetheless, as a precaution monitor for signs and symptoms of toxicity and reduced kidney function  Monitoring:    Monitor for renal and thyroid dysfunction, skin reactions (acne), weight gain, and diabetes insipidus  Certain medications increase lithium levels and should be avoided such as diuretics, NSAIDS (excluding sulindac), and ACE-I/ARBs  Medications such as theophylline and caffeine may decrease lithium levels  Patient should maintain consistent adequate hydration and consistent caffeine and sodium intake        Lithium:  No results found for: LITHIUM    Renal:  0   Lab Value Date/Time    BUN 17 06/24/2019 0522    CREATININE 0 59 (L) 06/24/2019 0522       Thyroid:  0   Lab Value Date/Time    [id number] 1 560 06/06/2019 0447    FREET4 1 22 11/04/2017 0522       Weight:  Wt Readings from Last 5 Encounters:   06/21/19 63 1 kg (139 lb 1 8 oz)   06/01/19 68 kg (149 lb 14 6 oz)   05/27/19 59 4 kg (130 lb 15 3 oz)   04/06/19 59 4 kg (130 lb 15 3 oz)   03/27/19 61 5 kg (135 lb 9 3 oz)       Lithium Levels    Therapeutic lithium levels are 0 6-1 2 mmol/L, but target range may be 0 8-1 2 mmol/L for acute omar  Levels above 1 5 mmol/L indicate toxicity, although toxicity may be associated with levels within therapeutic ranges based on symptoms  Mild GI discomfort and slight tremor are typical when initiating lithium, but if these symptoms do not resolve or any other signs of toxicity occur, assess lithium levels immediately  Toxicity    Signs of toxicity include: confusion, difficulty concentrating, vomiting, diarrhea, poor coordination, tremor, abnormal heart rhythm, seizures and coma  Pharmacy will continue to follow patient with team, thank you      Electronically signed by: Akosua Alicea, PharmD, Clinical Pharmacist - Psychiatry

## 2019-06-24 NOTE — PLAN OF CARE
Problem: Alteration in Thoughts and Perception  Goal: Treatment Goal: Gain control of psychotic behaviors/thinking, reduce/eliminate presenting symptoms and demonstrate improved reality functioning upon discharge  Outcome: Progressing  Goal: Verbalize thoughts and feelings  Description  Interventions:  - Promote a nonjudgmental and trusting relationship with the patient through active listening and therapeutic communication  - Assess patient's level of functioning, behavior and potential for risk  - Engage patient in 1 on 1 interactions for a minimum of 15 minutes each session  - Encourage patient to express fears, feelings, frustrations, and discuss symptoms    - [place] patient to reality, help patient recognize reality-based thinking   - Administer medications as ordered and assess for potential side effects  - Provide the patient education related to the signs and symptoms of the illness and desired effects of prescribed medications  Outcome: Progressing  Goal: Refrain from acting on delusional thinking/internal stimuli  Description  Interventions:  - Monitor patient closely, per order   - Utilize least restrictive measures   - Set reasonable limits, give positive feedback for acceptable   - Administer medications as ordered and monitor of potential side effects  Outcome: Progressing  Goal: Agree to be compliant with medication regime, as prescribed and report medication side effects  Description  Interventions:  - Offer appropriate PRN medication and supervise ingestion; conduct aims, as needed   Outcome: Progressing  Goal: Attend and participate in unit activities, including therapeutic, recreational, and educational groups  Description  Interventions:  - Provide therapeutic and educational activities daily, encourage attendance and participation, and document same in the medical record   Outcome: Progressing  Goal: Recognize dysfunctional thoughts, communicate reality-based thoughts at the time of discharge  Description  Interventions:  - Provide medication and psycho-education to assist patient in compliance and developing insight into his/her illness   Outcome: Progressing  Goal: Complete daily ADLs, including personal hygiene independently, as able  Description  Interventions:  - Observe, teach, and assist patient with ADLS  - Monitor and promote a balance of rest/activity, with adequate nutrition and elimination   Outcome: Progressing     Problem: Risk for Self Injury/Neglect  Goal: Treatment Goal: Remain safe during length of stay, learn and adopt new coping skills, and be free of self-injurious ideation, impulses and acts at the time of discharge  Outcome: Progressing  Goal: Verbalize thoughts and feelings  Description  Interventions:  - Assess and re-assess patient's lethality and potential for self-injury  - Engage patient in 1:1 interactions, daily, for a minimum of 15 minutes  - Encourage patient to express feelings, fears, frustrations, hopes  - Establish rapport/trust with patient   Outcome: Progressing  Goal: Refrain from harming self  Description  Interventions:  - Monitor patient closely, per order  - Develop a trusting relationship  - Supervise medication ingestion, monitor effects and side effects   Outcome: Progressing  Goal: Attend and participate in unit activities, including therapeutic, recreational, and educational groups  Description  Interventions:  - Provide therapeutic and educational activities daily, encourage attendance and participation, and document same in the medical record  - Obtain collateral information, encourage visitation and family involvement in care   Outcome: Progressing  Goal: Recognize maladaptive responses and adopt new coping mechanisms  Outcome: Progressing  Goal: Complete daily ADLs, including personal hygiene independently, as able  Description  Interventions:  - Observe, teach, and assist patient with ADLS  - Monitor and promote a balance of rest/activity, with adequate nutrition and elimination  Outcome: Progressing     Problem: DISCHARGE PLANNING  Goal: Discharge to home or other facility with appropriate resources  Description  INTERVENTIONS:  - Identify barriers to discharge w/patient and caregiver  - Arrange for needed discharge resources and transportation as appropriate  - Identify discharge learning needs (meds, wound care, etc )  - Arrange for interpretive services to assist at discharge as needed  - Refer to Case Management Department for coordinating discharge planning if the patient needs post-hospital services based on physician/advanced practitioner order or complex needs related to functional status, cognitive ability, or social support system  Outcome: Progressing     Problem: Prexisting or High Potential for Compromised Skin Integrity  Goal: Skin integrity is maintained or improved  Description  INTERVENTIONS:  - Identify patients at risk for skin breakdown  - Assess and monitor skin integrity  - Assess and monitor nutrition and hydration status  - Monitor labs (i e  albumin)  - Assess for incontinence   - Turn and reposition patient  - Assist with mobility/ambulation  - Relieve pressure over bony prominences  - Avoid friction and shearing  - Provide appropriate hygiene as needed including keeping skin clean and dry  - Evaluate need for skin moisturizer/barrier cream  - Collaborate with interdisciplinary team (i e  Nutrition, Rehabilitation, etc )   - Patient/family teaching  Outcome: Progressing     Problem: Nutrition/Hydration-ADULT  Goal: Nutrient/Hydration intake appropriate for improving, restoring or maintaining nutritional needs  Description  Monitor and assess patient's nutrition/hydration status for malnutrition (ex- brittle hair, bruises, dry skin, pale skin and conjunctiva, muscle wasting, smooth red tongue, and disorientation)  Collaborate with interdisciplinary team and initiate plan and interventions as ordered    Monitor patient's weight and dietary intake as ordered or per policy  Utilize nutrition screening tool and intervene per policy  Determine patient's food preferences and provide high-protein, high-caloric foods as appropriate  INTERVENTIONS:  - Monitor oral intake, urinary output, labs, and treatment plans  - Assess nutrition and hydration status and recommend course of action  - Evaluate amount of meals eaten  - Assist patient with eating if necessary   - Allow adequate time for meals  - Recommend/ encourage appropriate diets, oral nutritional supplements, and vitamin/mineral supplements  - Order, calculate, and assess calorie counts as needed  - Recommend, monitor, and adjust tube feedings and TPN/PPN based on assessed needs  - Assess need for intravenous fluids  - Provide specific nutrition/hydration education as appropriate  - Include patient/family/caregiver in decisions related to nutrition  Outcome: Progressing     Problem: Ineffective Coping  Goal: Participates in unit activities  Description  Interventions:  - Provide therapeutic environment   - Provide required programming   - Redirect inappropriate behaviors   Outcome: Progressing     Problem: Potential for Falls  Goal: Patient will remain free of falls  Description  INTERVENTIONS:  - Assess patient frequently for physical needs  -  Identify cognitive and physical deficits and behaviors that affect risk of falls    -  Porter fall precautions as indicated by assessment   - Educate patient/family on patient safety including physical limitations  - Instruct patient to call for assistance with activity based on assessment  - Modify environment to reduce risk of injury  - Consider OT/PT consult to assist with strengthening/mobility  Outcome: Progressing

## 2019-06-24 NOTE — PROGRESS NOTES
Patient remains 1:1 for safety  Late weakening today and seems sedated and unsteady  Reports good sleep last night  Is alert and oriented to self and place  Is irritable on approach  Compliant with medication after encouragement  Patient denies pain or any discomfort this time  Blood sugar 75 this morning with no sliding scale needed  Care plan reinforced and patient encouraged for medication compliance  Will continue to monitor

## 2019-06-24 NOTE — NURSING NOTE
Patient remains on 1:1 observation for safety, asleep in observation room at this time  Received awake sitting in chair, irritable edge on assessment  Given ativan 1 mg po prn at 2300 which has been effective  Labs, orders and vital signs have been reviewed  On routine checks, will continue to monitor

## 2019-06-24 NOTE — NURSING NOTE
Patient remains on 1:1 observation, unsteady, irritable edge and resistive to assistance  Slept through the night, awake to restroom often  Refused ECT treatment

## 2019-06-24 NOTE — PROGRESS NOTES
Progress Note - [address] 75 y o  female MRN: [id number]   Unit/Bed#: [facility] 650-01 Encounter: [id number]    Behavior over the last 24 hours: unchanged  Tom Menendez seen and evaluated in the quiet room next to the nursing station  Overnight staff reported that she had a rapid response in which she fell  CT head was negative and chest x-ray to evaluate if the patient has any rib fractures or other injuries  Today she is more pleasant and less irritable  She states that I apologize if I was mean to you last night    Staff also reports that patient is having unusual colored urine and urinalysis was ordered  Patient also placed on one-to-one for fall precautions  Seclusive to the room  Limited participation in milieu  Socializing with staff and is more friendly      Sleep: normal  Appetite: normal  Medication side effects: No   ROS: no complaints    Mental Status Evaluation:    Appearance:  disheveled, wearing hospital clothes, Bruising on face   Behavior:  cooperative, calm   Speech:  normal rate, normal volume, normal pitch   Mood:  euthymic   Affect:  brighter   Thought Process:  goal directed, linear, perseverative   Associations: circumstantial associations   Thought Content:  no overt delusions   Perceptual Disturbances: no auditory hallucinations, no visual hallucinations   Risk Potential: Suicidal ideation - None  Homicidal ideation - None  Potential for aggression - No   Sensorium:  oriented to person and place   Memory:  recent and remote memory: unable to assess due to lack of cooperation   Consciousness:  alert and awake   Attention: attention span and concentration appear shorter than expected for age   Insight:  impaired   Judgment: impaired   Gait/Station: normal gait/station, normal balance   Motor Activity: no abnormal movements     Vital signs in last 24 hours:    Temp:  [97 6 °F (36 4 °C)-98 °F (36 7 °C)] 98 °F (36 7 °C)  HR:  [81-96] 94  Resp:  [16-20] 18  BP: ()/57-06) 113/57    Laboratory results: I have personally reviewed all pertinent laboratory/tests results  Progress Toward Goals: insight remains poor    Assessment/Plan   Principal Problem:    Bipolar 1 disorder (HCC)  Active Problems:    Type 2 diabetes mellitus, with long-term current use of insulin (HCC)    Anemia of chronic disease    Cirrhosis of liver with ascites (HCC)    Essential hypertension    Ambulatory dysfunction    Compression fracture of L3 lumbar vertebra    Discharged from Hospice for Suicidal Ideation, Medically Complex    Preprocedural examination    Recommended Treatment:     Planned medication and treatment changes: All current active medications have been reviewed    Encourage group therapy, milieu therapy and occupational therapy  [facility] checks every 7 minutes  Continue current psychiatric medications as listed below    Current Facility-Administered Medications:  bacitracin 1 large application Topical BID PRN Divya Mendoza PA-C    buPROPion 150 mg Oral Daily Patricia Reich PA-C    docusate sodium 100 mg Oral BID Hernandez Duvall MD    furosemide 40 mg Oral BID (diuretic) Hernandez Duvall MD    insulin detemir 16 Units Subcutaneous HS Hernandez Duvall MD    insulin lispro 10 Units Subcutaneous Daily Before Lunch Hernandez Duvall MD    insulin lispro 10 Units Subcutaneous BID AC Hernandez Duvall MD    insulin lispro 2-12 Units Subcutaneous 4x Daily (AC & HS) Tone Fernandez DO    lactulose 20 g Oral Daily Hernandez Duvall MD    lidocaine 3 patch Topical Daily Divya Willis PA-C    LORazepam 1 mg Intramuscular Q6H PRN Malina Elizabeth MD    LORazepam 1 mg Oral Q6H PRN Slime Buchanan PA-C    melatonin 3 mg Oral HS Jodi Maria PA-C    midodrine 5 mg Oral TID AC Hernandez Duvall MD    OLANZapine 5 mg Intramuscular Q4H PRN Malina Elizabeth MD    OLANZapine 15 mg Oral HS Malina Elizabeth MD    OLANZapine 5 mg Oral Q4H PRN Slime Buchanan PA-C    ondansetron 4 mg Oral Q6H PRN Hernandez Duvall MD    oxyCODONE 2 5 mg Oral Q6H PRN Dmitri Narayan MD    sodium chloride 50 mL/hr Intravenous Continuous Abby Giron MD Last Rate: Stopped (06/21/19 0644)   sodium chloride 50 mL/hr Intravenous Continuous Hermelinda Tinajero MD Last Rate: 50 mL/hr (06/21/19 0600)   sodium chloride 50 mL/hr Intravenous Continuous Abby Giron MD Last Rate: Stopped (06/21/19 8076)   spironolactone 50 mg Oral Daily Hernandez Duvall MD    traMADol 50 mg Oral Q6H PRN Divya Willis PA-C        Risks / Benefits of Treatment:    Risks, benefits, and possible side effects of medications explained to patient and patient verbalizes understanding and agreement for treatment  Counseling / Coordination of Care:    Patient's progress reviewed with nursing staff  Medications, treatment progress and treatment plan reviewed with patient      Lacy Patton MD 06/23/19

## 2019-06-24 NOTE — PROGRESS NOTES
Psychiatry Progress Note    Subjective: Interval History     The patient is on a one-to-one continual observation due to her recent fall on 6/22  Medical evaluated patient and no injuries were noted  The patient continues to be unsteady  Patient has been more sedated per staff  She has been getting multiple p r n  Meds  Due to this her oxycodone was discontinued and tramadol dose decreased today  Staff states she did sleep through the night  She continues to have a labile mood and is irritable  The patient had her Wellbutrin decreased when she was admitted  Due to her labile mood we will discontinue this and add lithium at HS  Patient refused ECT this morning  Will encourage her to go to her 4th treatment on Wednesday      Behavior over the last 24 hours:  unchanged  Sleep: fair  Appetite: fair  Medication side effects: No  ROS: no complaints    Current medications:    Current Facility-Administered Medications:     bacitracin topical ointment 1 large application, 1 large application, Topical, BID PRN, Divya Willis PA-C, 1 large application at 47/01/49 1111    docusate sodium (COLACE) capsule 100 mg, 100 mg, Oral, BID, Orma Phoenix, MD, 100 mg at 06/24/19 0816    furosemide (LASIX) tablet 40 mg, 40 mg, Oral, BID (diuretic), Orma Phoenix, MD, 40 mg at 06/24/19 0817    insulin detemir (LEVEMIR) subcutaneous injection 14 Units, 14 Units, Subcutaneous, HS, Orma Phoenix, MD    insulin lispro (HumaLOG) 100 units/mL subcutaneous injection 10 Units, 10 Units, Subcutaneous, Daily Before Lunch, Orma Phoenix, MD, 10 Units at 06/23/19 1128    insulin lispro (HumaLOG) 100 units/mL subcutaneous injection 10 Units, 10 Units, Subcutaneous, BID AC, Orma Phoenix, MD, 10 Units at 06/24/19 0759    insulin lispro (HumaLOG) 100 units/mL subcutaneous injection 2-12 Units, 2-12 Units, Subcutaneous, 4x Daily (AC & HS), 6 Units at 06/23/19 2127 **AND** Fingerstick Glucose (POCT), , , 4x Daily AC and at bedtime, Tone Fernandez, DO    lactulose 20 g/30 mL oral solution 20 g, 20 g, Oral, Daily, Lita Glass MD, 20 g at 06/24/19 0817    lidocaine (LIDODERM) 5 % patch 3 patch, 3 patch, Topical, Daily, Divya Willis PA-C, 3 patch at 06/24/19 7318    lithium carbonate capsule 450 mg, 450 mg, Oral, HS, Andrew Contreras MD    LORazepam (ATIVAN) 2 mg/mL injection 1 mg, 1 mg, Intramuscular, Q6H PRN, Andrew Contreras MD    LORazepam (ATIVAN) tablet 1 mg, 1 mg, Oral, Q6H PRN, Cami Skinner PA-C, 1 mg at 06/23/19 2316    melatonin tablet 3 mg, 3 mg, Oral, HS, Nito Lopez PA-C, 3 mg at 06/23/19 2129    midodrine (PROAMATINE) tablet 5 mg, 5 mg, Oral, TID AC, Lita Glass MD, 5 mg at 06/24/19 0805    OLANZapine (ZyPREXA) IM injection 5 mg, 5 mg, Intramuscular, Q4H PRN, Andrew Contreras MD    OLANZapine (ZyPREXA) tablet 15 mg, 15 mg, Oral, HS, Andrew Contreras MD, 15 mg at 06/23/19 1803    OLANZapine (ZyPREXA) tablet 5 mg, 5 mg, Oral, Q4H PRN, Cami Skinner PA-C    ondansetron (ZOFRAN-ODT) dispersible tablet 4 mg, 4 mg, Oral, Q6H PRN, Lita Glass MD, 4 mg at 06/22/19 1354    sodium chloride 0 9 % infusion, 50 mL/hr, Intravenous, Continuous, Avelina Gomes MD, Stopped at 06/21/19 0644    sodium chloride 0 9 % infusion, 50 mL/hr, Intravenous, Continuous, Hermelinda Tinajero MD, Last Rate: 50 mL/hr at 06/21/19 0600, 50 mL/hr at 06/21/19 0600    sodium chloride 0 9 % infusion, 50 mL/hr, Intravenous, Continuous, Avelina Gomes MD, Stopped at 06/21/19 4602    spironolactone (ALDACTONE) tablet 50 mg, 50 mg, Oral, Daily, Lita Glass MD, 50 mg at 06/24/19 6594    traMADol (ULTRAM) tablet 25 mg, 25 mg, Oral, Q6H PRN, Cami Skinner PA-C    Current Problem List:    Patient Active Problem List   Diagnosis    Type 2 diabetes mellitus, with long-term current use of insulin ([facility]    Hyperlipidemia    Asthma    Anemia of chronic disease    Pancytopenia ([facility]    [facility] disorder ([facility] )  Cirrhosis of liver with ascites (HCC)    Essential hypertension    GERD (gastroesophageal reflux disease)    Venous stasis    Ambulatory dysfunction    Closed compression fracture of L3 lumbar vertebra    Compression fracture of L3 lumbar vertebra    Closed compression fracture of third lumbar vertebra (HCC)    Fall    Diabetic polyneuropathy associated with type 2 diabetes mellitus ([facility] )    Iron deficiency anemia    Status post fall - Ambulatory dysfunction    Discharged from Hospice for Suicidal Ideation, Medically Complex    ESRD (end stage renal disease) ([facility] )    Preprocedural examination       Problem list reviewed 06/24/19     Objective:     Vital Signs:  Vitals:    06/23/19 1711 06/24/19 0627 06/24/19 0628 06/24/19 0730   BP: 113/57 112/70 121/52 110/53   BP Location: Right arm Left arm Left arm Right arm   Pulse: 94 90 99 86   Resp:    17   Temp:    (!) 97 4 °F (36 3 °C)   TempSrc:    Tympanic   SpO2:    95%   Weight:       Height:             Appearance:  age appropriate, casually dressed and disheveled   Behavior:  normal   Speech:  normal volume   Mood:  labile but improving   Affect:  labile but improving   Thought Process:  circumstantial   Thought Content:  normal   Perceptual Disturbances: None   Risk Potential: none   Sensorium:  person and place   Cognition:  grossly intact   Consciousness:  alert and awake    Attention: attention span and concentration were age appropriate   Intellect: average   Insight:  poor   Judgment: poor      Motor Activity: no abnormal movements       Labs:  Reviewed 06/24/19      Assessment / Plan:     Bipolar 1 disorder (HCC)    Recommended Treatment:      Medication changes:  1) Discontinue Wellbutrin  Add lithium  Lithium level Thursday  ECT 4 scheduled for Wednesday  Non-pharmacological treatments  1) Continue with group therapy, milieu therapy and occupational therapy      Safety  1) Safety/communication plan established targeting dynamic risk factors above  2) Risks, benefits, and possible side effects of medications explained to patient and patient verbalizes understanding  Counseling / Coordination of Care    Total floor / unit time spent today 20 minutes  Greater than 50% of total time was spent with the patient and / or family counseling and / or coordination of care  A description of the counseling / coordination of care  Patient's Rights, confidentiality and exceptions to confidentiality, use of automated medical record, [facility] staff access to medical record, and consent to treatment reviewed      Saima Melton PA-C

## 2019-06-25 LAB
ANION GAP SERPL CALCULATED.3IONS-SCNC: 3 MMOL/L (ref 5–14)
BUN SERPL-MCNC: 14 MG/DL (ref 5–25)
CALCIUM SERPL-MCNC: 8.6 MG/DL (ref 8.4–10.2)
CHLORIDE SERPL-SCNC: 104 MMOL/L (ref 97–108)
CO2 SERPL-SCNC: 30 MMOL/L (ref 22–30)
CREAT SERPL-MCNC: 0.55 MG/DL (ref 0.6–1.2)
GFR SERPL CREATININE-BSD FRML MDRD: 93 ML/MIN/1.73SQ M
GLUCOSE P FAST SERPL-MCNC: 134 MG/DL (ref 70–99)
GLUCOSE SERPL-MCNC: 110 MG/DL (ref 65–140)
GLUCOSE SERPL-MCNC: 134 MG/DL (ref 70–99)
GLUCOSE SERPL-MCNC: 177 MG/DL (ref 65–140)
GLUCOSE SERPL-MCNC: 186 MG/DL (ref 65–140)
GLUCOSE SERPL-MCNC: 285 MG/DL (ref 65–140)
POTASSIUM SERPL-SCNC: 3.4 MMOL/L (ref 3.6–5)
SODIUM SERPL-SCNC: 137 MMOL/L (ref 137–147)

## 2019-06-25 PROCEDURE — 82948 REAGENT STRIP/BLOOD GLUCOSE: CPT

## 2019-06-25 PROCEDURE — 80048 BASIC METABOLIC PNL TOTAL CA: CPT | Performed by: FAMILY MEDICINE

## 2019-06-25 RX ORDER — POTASSIUM CHLORIDE 20 MEQ/1
20 TABLET, EXTENDED RELEASE ORAL DAILY
Status: DISCONTINUED | OUTPATIENT
Start: 2019-06-25 | End: 2019-07-08 | Stop reason: HOSPADM

## 2019-06-25 RX ADMIN — MELATONIN TAB 3 MG 3 MG: 3 TAB at 21:30

## 2019-06-25 RX ADMIN — LORAZEPAM 1 MG: 1 TABLET ORAL at 19:47

## 2019-06-25 RX ADMIN — INSULIN LISPRO 2 UNITS: 100 INJECTION, SOLUTION INTRAVENOUS; SUBCUTANEOUS at 11:53

## 2019-06-25 RX ADMIN — MIDODRINE HYDROCHLORIDE 5 MG: 2.5 TABLET ORAL at 16:35

## 2019-06-25 RX ADMIN — LITHIUM CARBONATE 450 MG: 300 CAPSULE, GELATIN COATED ORAL at 21:30

## 2019-06-25 RX ADMIN — FUROSEMIDE 40 MG: 40 TABLET ORAL at 08:44

## 2019-06-25 RX ADMIN — FUROSEMIDE 40 MG: 40 TABLET ORAL at 16:35

## 2019-06-25 RX ADMIN — SPIRONOLACTONE 50 MG: 25 TABLET ORAL at 08:44

## 2019-06-25 RX ADMIN — INSULIN LISPRO 10 UNITS: 100 INJECTION, SOLUTION INTRAVENOUS; SUBCUTANEOUS at 11:53

## 2019-06-25 RX ADMIN — INSULIN LISPRO 6 UNITS: 100 INJECTION, SOLUTION INTRAVENOUS; SUBCUTANEOUS at 16:37

## 2019-06-25 RX ADMIN — POTASSIUM CHLORIDE 20 MEQ: 20 TABLET, EXTENDED RELEASE ORAL at 11:52

## 2019-06-25 RX ADMIN — INSULIN DETEMIR 14 UNITS: 100 INJECTION, SOLUTION SUBCUTANEOUS at 21:31

## 2019-06-25 RX ADMIN — LACTULOSE 20 G: 10 SOLUTION ORAL at 08:44

## 2019-06-25 RX ADMIN — MIDODRINE HYDROCHLORIDE 5 MG: 2.5 TABLET ORAL at 11:52

## 2019-06-25 RX ADMIN — OLANZAPINE 15 MG: 5 TABLET, FILM COATED ORAL at 18:17

## 2019-06-25 RX ADMIN — MIDODRINE HYDROCHLORIDE 5 MG: 2.5 TABLET ORAL at 06:06

## 2019-06-25 RX ADMIN — INSULIN LISPRO 2 UNITS: 100 INJECTION, SOLUTION INTRAVENOUS; SUBCUTANEOUS at 21:30

## 2019-06-25 RX ADMIN — DOCUSATE SODIUM 100 MG: 100 CAPSULE, LIQUID FILLED ORAL at 08:44

## 2019-06-25 RX ADMIN — LIDOCAINE 3 PATCH: 50 PATCH TOPICAL at 08:51

## 2019-06-25 RX ADMIN — LORAZEPAM 1 MG: 1 TABLET ORAL at 03:31

## 2019-06-25 NOTE — CASE MANAGEMENT
304 hearing held today with [facility]  Per Marli Spencer, [place] is granted for 89 days instead of 90 due to 1 days extension being needed  Marli Spencer states he will faxed the full 304 to writer after the  signs it  CM will continue to follow and provide services as needed

## 2019-06-25 NOTE — PROGRESS NOTES
Psychiatry Progress Note    Subjective: Interval History     The patient is on a one-to-one continual observation for safety  The patient has a labile mood  She is more irritable today  The patient continues to be anxious  She has been compliant with her medication  She has rambling speech this morning and states that she wants to buy this building because it is very nice  Discussed ECT with the patient and she states she does not think she wants to have treatment again  Patient was improving with treatment  Encouraged her to go tomorrow  Patient has poor insight      Behavior over the last 24 hours:  unchanged  Sleep: fair  Appetite: fair  Medication side effects: No  ROS: no complaints    Current medications:    Current Facility-Administered Medications:     bacitracin topical ointment 1 large application, 1 large application, Topical, BID PRN, Bianca Willis PA-C, 1 large application at 73/73/06 1111    docusate sodium (COLACE) capsule 100 mg, 100 mg, Oral, BID, Vito Stauffer MD, 100 mg at 06/25/19 0844    furosemide (LASIX) tablet 40 mg, 40 mg, Oral, BID (diuretic), Vito Stauffer MD, 40 mg at 06/25/19 0844    insulin detemir (LEVEMIR) subcutaneous injection 14 Units, 14 Units, Subcutaneous, HS, Vito Stauffer MD, 14 Units at 06/24/19 2123    insulin lispro (HumaLOG) 100 units/mL subcutaneous injection 10 Units, 10 Units, Subcutaneous, Daily Before Lunch, Vito Stauffer MD, 10 Units at 06/24/19 1150    insulin lispro (HumaLOG) 100 units/mL subcutaneous injection 10 Units, 10 Units, Subcutaneous, BID AC, Vito Stauffer MD, 10 Units at 06/25/19 0850    insulin lispro (HumaLOG) 100 units/mL subcutaneous injection 2-12 Units, 2-12 Units, Subcutaneous, 4x Daily (AC & HS), 6 Units at 06/24/19 2124 **AND** Fingerstick Glucose (POCT), , , 4x Daily AC and at bedtime, Nataliya Stout,     lactulose 20 g/30 mL oral solution 20 g, 20 g, Oral, Daily, Vito Stauffer MD, 20 g at 06/25/19 0844   lidocaine (LIDODERM) 5 % patch 3 patch, 3 patch, Topical, Daily, Divya Willis PA-C, 3 patch at 06/25/19 0174    lithium carbonate capsule 450 mg, 450 mg, Oral, HS, Natty Yanez MD, 450 mg at 06/24/19 2123    LORazepam (ATIVAN) 2 mg/mL injection 1 mg, 1 mg, Intramuscular, Q6H PRN, Natty Ynaez MD    LORazepam (ATIVAN) tablet 1 mg, 1 mg, Oral, Q6H PRN, Anthony Hein PA-C, 1 mg at 06/25/19 0331    melatonin tablet 3 mg, 3 mg, Oral, HS, Ortiz Blake PA-C, 3 mg at 06/24/19 2123    midodrine (PROAMATINE) tablet 5 mg, 5 mg, Oral, TID AC, Ciera Ayon MD, 5 mg at 06/25/19 0606    OLANZapine (ZyPREXA) IM injection 5 mg, 5 mg, Intramuscular, Q4H PRN, Natty Yanez MD    OLANZapine (ZyPREXA) tablet 15 mg, 15 mg, Oral, HS, Natty Yanez MD, 15 mg at 06/24/19 1833    OLANZapine (ZyPREXA) tablet 5 mg, 5 mg, Oral, Q4H PRN, Anthony Hein PA-C    ondansetron (ZOFRAN-ODT) dispersible tablet 4 mg, 4 mg, Oral, Q6H PRN, Ciera Ayon MD, 4 mg at 06/22/19 1354    sodium chloride 0 9 % infusion, 50 mL/hr, Intravenous, Continuous, Amena Borges MD, Stopped at 06/21/19 0644    sodium chloride 0 9 % infusion, 50 mL/hr, Intravenous, Continuous, Hermelinda Tinajero MD, Last Rate: 50 mL/hr at 06/21/19 0600, 50 mL/hr at 06/21/19 0600    sodium chloride 0 9 % infusion, 50 mL/hr, Intravenous, Continuous, Amena Borges MD, Stopped at 06/21/19 0323    spironolactone (ALDACTONE) tablet 50 mg, 50 mg, Oral, Daily, Ciera Ayon MD, 50 mg at 06/25/19 0844    traMADol (ULTRAM) tablet 25 mg, 25 mg, Oral, Q6H PRN, Anthony Hein PA-C    Current Problem List:    Patient Active Problem List   Diagnosis    Type 2 diabetes mellitus, with long-term [facility] insulin [facility]    Hyperlipidemia    Asthma    Anemia of chronic disease    [facility]    [facility] disorder [facility]    Cirrhosis of liver with ascites ([facility]    Essential hypertension    GERD (gastroesophageal reflux disease)  Venous stasis    Ambulatory dysfunction    Closed compression fracture of L3 lumbar vertebra    Compression fracture of L3 lumbar vertebra    Closed compression fracture of third lumbar vertebra (HCC)    Fall    Diabetic polyneuropathy associated with type 2 diabetes mellitus ([place] [id number] )    Iron deficiency anemia    Status post fall - Ambulatory dysfunction    Discharged from Hospice for Suicidal Ideation, Medically Complex    ESRD (end stage renal disease) ([place] [address] )    Preprocedural examination       Problem list reviewed 06/25/19     Objective:     Vital Signs:  Vitals:    06/24/19 2044 06/24/19 2047 06/24/19 2048 06/25/19 0709   BP: 112/54 107/55 124/60 147/66   BP Location: Right arm Right arm Right arm Left arm   Pulse: 99   85   Resp: 18   16   Temp: 100 2 °F (37 9 °C)   97 7 °F (36 5 °C)   TempSrc: Temporal   Temporal   SpO2: 98%  98% 99%   Weight:       Height:             Appearance:  age appropriate, casually dressed and disheveled   Behavior:  normal   Speech:  normal volume   Mood:  labile   Affect:  labile   Thought Process:  circumstantial   Thought Content:  normal   Perceptual Disturbances: None   Risk Potential: none   Sensorium:  person and place   Cognition:  grossly intact   Consciousness:  alert and awake    Attention: attention span and concentration were age appropriate   Intellect: average   Insight:  poor   Judgment: poor      Motor Activity: no abnormal movements       Labs:  Reviewed 06/25/19      Assessment / Plan:     Bipolar 1 disorder ([place] [address] )    Recommended Treatment:      Medication changes:  1) continue current medication regimen  Lithium started yesterday and Wellbutrin discontinued  Lithium level Thursday  ECT 4 scheduled for Wednesday  Non-pharmacological treatments  1) Continue with group therapy, milieu therapy and occupational therapy  Safety  1) Safety/communication plan established targeting dynamic risk factors above      2) Risks, benefits, and possible side effects of medications explained to patient and patient verbalizes understanding  Counseling / Coordination of Care    Total floor / unit time spent today 20 minutes  Greater than 50% of total time was spent with the patient and / or family counseling and / or coordination of care  A description of the counseling / coordination of care  Patient's Rights, confidentiality and exceptions to confidentiality, use of automated medical record, [facility] staff access to medical record, and consent to treatment reviewed      Oksana Kelly PA-C

## 2019-06-25 NOTE — PROGRESS NOTES
Pt present on the unit  Pt continues on 1:1 for safety  Pt pleasant, spent most of the shift sitting up in the chair  Pt denies s/s, cooperative and compliant with medication regimen

## 2019-06-25 NOTE — PROGRESS NOTES
06/25/19 0900   Team Meeting   Meeting Type Daily Rounds   Team Members Present   Team Members Present Physician;Nurse;;Occupational Therapist   Physician Team Member Dr Margaux Walker, [id number] [facility] Team Member [id number] [facility] Management Team Member Di Bustamante    OT Team Member Randi MILLER        Pt discussed at treatment rounds today,  No medication changes made

## 2019-06-25 NOTE — PROGRESS NOTES
Patient is noted to be resting in the dayroom with  1:1 supervision  No s/s of acute respiratory distress present  No reports of increased maladaptive behavior observed  Will continue to monitor pt

## 2019-06-25 NOTE — PLAN OF CARE
Problem: Alteration in Thoughts and Perception  Goal: Treatment Goal: Gain control of psychotic behaviors/thinking, reduce/eliminate presenting symptoms and demonstrate improved reality functioning upon discharge  Outcome: Progressing  Goal: Verbalize thoughts and feelings  Description  Interventions:  - Promote a nonjudgmental and trusting relationship with the patient through active listening and therapeutic communication  - Assess patient's level of functioning, behavior and potential for risk  - Engage patient in 1 on 1 interactions for a minimum of 15 minutes each session  - Encourage patient to express fears, feelings, frustrations, and discuss symptoms    - [place] patient to reality, help patient recognize reality-based thinking   - Administer medications as ordered and assess for potential side effects  - Provide the patient education related to the signs and symptoms of the illness and desired effects of prescribed medications  Outcome: Progressing  Goal: Refrain from acting on delusional thinking/internal stimuli  Description  Interventions:  - Monitor patient closely, per order   - Utilize least restrictive measures   - Set reasonable limits, give positive feedback for acceptable   - Administer medications as ordered and monitor of potential side effects  Outcome: Progressing  Goal: Agree to be compliant with medication regime, as prescribed and report medication side effects  Description  Interventions:  - Offer appropriate PRN medication and supervise ingestion; conduct aims, as needed   Outcome: Progressing  Goal: Recognize dysfunctional thoughts, communicate reality-based thoughts at the time of discharge  Description  Interventions:  - Provide medication and psycho-education to assist patient in compliance and developing insight into his/her illness   Outcome: Progressing  Goal: Complete daily ADLs, including personal hygiene independently, as able  Description  Interventions:  - Observe, teach, and assist patient with ADLS  - Monitor and promote a balance of rest/activity, with adequate nutrition and elimination   Outcome: Progressing     Problem: Risk for Self Injury/Neglect  Goal: Treatment Goal: Remain safe during length of stay, learn and adopt new coping skills, and be free of self-injurious ideation, impulses and acts at the time of discharge  Outcome: Progressing  Goal: Verbalize thoughts and feelings  Description  Interventions:  - Assess and re-assess patient's lethality and potential for self-injury  - Engage patient in 1:1 interactions, daily, for a minimum of 15 minutes  - Encourage patient to express feelings, fears, frustrations, hopes  - Establish rapport/trust with patient   Outcome: Progressing  Goal: Refrain from harming self  Description  Interventions:  - Monitor patient closely, per order  - Develop a trusting relationship  - Supervise medication ingestion, monitor effects and side effects   Outcome: Progressing  Goal: Recognize maladaptive responses and adopt new coping mechanisms  Outcome: Progressing  Goal: Complete daily ADLs, including personal hygiene independently, as able  Description  Interventions:  - Observe, teach, and assist patient with ADLS  - Monitor and promote a balance of rest/activity, with adequate nutrition and elimination  Outcome: Progressing     Problem: Prexisting or High Potential for Compromised Skin Integrity  Goal: Skin integrity is maintained or improved  Description  INTERVENTIONS:  - Identify patients at risk for skin breakdown  - Assess and monitor skin integrity  - Assess and monitor nutrition and hydration status  - Monitor labs (i e  albumin)  - Assess for incontinence   - Turn and reposition patient  - Assist with mobility/ambulation  - Relieve pressure over bony prominences  - Avoid friction and shearing  - Provide appropriate hygiene as needed including keeping skin clean and dry  - Evaluate need for skin moisturizer/barrier cream  - Collaborate with interdisciplinary team (i e  Nutrition, Rehabilitation, etc )   - Patient/family teaching  Outcome: Progressing     Problem: Nutrition/Hydration-ADULT  Goal: Nutrient/Hydration intake appropriate for improving, restoring or maintaining nutritional needs  Description  Monitor and assess patient's nutrition/hydration status for malnutrition (ex- brittle hair, bruises, dry skin, pale skin and conjunctiva, muscle wasting, smooth red tongue, and disorientation)  Collaborate with interdisciplinary team and initiate plan and interventions as ordered  Monitor patient's weight and dietary intake as ordered or per policy  Utilize nutrition screening tool and intervene per policy  Determine patient's food preferences and provide high-protein, high-caloric foods as appropriate  INTERVENTIONS:  - Monitor oral intake, urinary output, labs, and treatment plans  - Assess nutrition and hydration status and recommend course of action  - Evaluate amount of meals eaten  - Assist patient with eating if necessary   - Allow adequate time for meals  - Recommend/ encourage appropriate diets, oral nutritional supplements, and vitamin/mineral supplements  - Order, calculate, and assess calorie counts as needed  - Recommend, monitor, and adjust tube feedings and TPN/PPN based on assessed needs  - Assess need for intravenous fluids  - Provide specific nutrition/hydration education as appropriate  - Include patient/family/caregiver in decisions related to nutrition  Outcome: Progressing     Problem: Potential for Falls  Goal: Patient will remain free of falls  Description  INTERVENTIONS:  - Assess patient frequently for physical needs  -  Identify cognitive and physical deficits and behaviors that affect risk of falls    -  [place] fall precautions as indicated by assessment   - Educate patient/family on patient safety including physical limitations  - Instruct patient to call for assistance with activity based on assessment  - Modify environment to reduce risk of injury  - Consider OT/PT consult to assist with strengthening/mobility  Outcome: Progressing

## 2019-06-25 NOTE — PROGRESS NOTES
Pt irritable today but was medication compliant and cooperative this morning  Denies SI/HI  Continues with 1:1 for safety because patient is unsteady    Will continue monitor

## 2019-06-25 NOTE — PLAN OF CARE
Problem: Alteration in Thoughts and Perception  Goal: Treatment Goal: Gain control of psychotic behaviors/thinking, reduce/eliminate presenting symptoms and demonstrate improved reality functioning upon discharge  Outcome: Progressing  Goal: Verbalize thoughts and feelings  Description  Interventions:  - Promote a nonjudgmental and trusting relationship with the patient through active listening and therapeutic communication  - Assess patient's level of functioning, behavior and potential for risk  - Engage patient in 1 on 1 interactions for a minimum of 15 minutes each session  - Encourage patient to express fears, feelings, frustrations, and discuss symptoms    - [place] patient to reality, help patient recognize reality-based thinking   - Administer medications as ordered and assess for potential side effects  - Provide the patient education related to the signs and symptoms of the illness and desired effects of prescribed medications  Outcome: Progressing  Goal: Refrain from acting on delusional thinking/internal stimuli  Description  Interventions:  - Monitor patient closely, per order   - Utilize least restrictive measures   - Set reasonable limits, give positive feedback for acceptable   - Administer medications as ordered and monitor of potential side effects  Outcome: Progressing  Goal: Agree to be compliant with medication regime, as prescribed and report medication side effects  Description  Interventions:  - Offer appropriate PRN medication and supervise ingestion; conduct aims, as needed   Outcome: Progressing  Goal: Recognize dysfunctional thoughts, communicate reality-based thoughts at the time of discharge  Description  Interventions:  - Provide medication and psycho-education to assist patient in compliance and developing insight into his/her illness   Outcome: Progressing  Goal: Complete daily ADLs, including personal hygiene independently, as able  Description  Interventions:  - Observe, teach, and assist patient with ADLS  - Monitor and promote a balance of rest/activity, with adequate nutrition and elimination   Outcome: Progressing     Problem: Risk for Self Injury/Neglect  Goal: Treatment Goal: Remain safe during length of stay, learn and adopt new coping skills, and be free of self-injurious ideation, impulses and acts at the time of discharge  Outcome: Progressing  Goal: Verbalize thoughts and feelings  Description  Interventions:  - Assess and re-assess patient's lethality and potential for self-injury  - Engage patient in 1:1 interactions, daily, for a minimum of 15 minutes  - Encourage patient to express feelings, fears, frustrations, hopes  - Establish rapport/trust with patient   Outcome: Progressing  Goal: Refrain from harming self  Description  Interventions:  - Monitor patient closely, per order  - Develop a trusting relationship  - Supervise medication ingestion, monitor effects and side effects   Outcome: Progressing  Goal: Recognize maladaptive responses and adopt new coping mechanisms  Outcome: Progressing  Goal: Complete daily ADLs, including personal hygiene independently, as able  Description  Interventions:  - Observe, teach, and assist patient with ADLS  - Monitor and promote a balance of rest/activity, with adequate nutrition and elimination  Outcome: Progressing     Problem: Prexisting or High Potential for Compromised Skin Integrity  Goal: Skin integrity is maintained or improved  Description  INTERVENTIONS:  - Identify patients at risk for skin breakdown  - Assess and monitor skin integrity  - Assess and monitor nutrition and hydration status  - Monitor labs (i e  albumin)  - Assess for incontinence   - Turn and reposition patient  - Assist with mobility/ambulation  - Relieve pressure over bony prominences  - Avoid friction and shearing  - Provide appropriate hygiene as needed including keeping skin clean and dry  - Evaluate need for skin moisturizer/barrier cream  - Collaborate with interdisciplinary team (i e  Nutrition, Rehabilitation, etc )   - Patient/family teaching  Outcome: Progressing     Problem: Nutrition/Hydration-ADULT  Goal: Nutrient/Hydration intake appropriate for improving, restoring or maintaining nutritional needs  Description  Monitor and assess patient's nutrition/hydration status for malnutrition (ex- brittle hair, bruises, dry skin, pale skin and conjunctiva, muscle wasting, smooth red tongue, and disorientation)  Collaborate with interdisciplinary team and initiate plan and interventions as ordered  Monitor patient's weight and dietary intake as ordered or per policy  Utilize nutrition screening tool and intervene per policy  Determine patient's food preferences and provide high-protein, high-caloric foods as appropriate       INTERVENTIONS:  - Monitor oral intake, urinary output, labs, and treatment plans  - Assess nutrition and hydration status and recommend course of action  - Evaluate amount of meals eaten  - Assist patient with eating if necessary   - Allow adequate time for meals  - Recommend/ encourage appropriate diets, oral nutritional supplements, and vitamin/mineral supplements  - Order, calculate, and assess calorie counts as needed  - Recommend, monitor, and adjust tube feedings and TPN/PPN based on assessed needs  - Assess need for intravenous fluids  - Provide specific nutrition/hydration education as appropriate  - Include patient/family/caregiver in decisions related to nutrition  Outcome: Progressing     Problem: Ineffective Coping  Goal: Participates in unit activities  Description  Interventions:  - Provide therapeutic environment   - Provide required programming   - Redirect inappropriate behaviors   Outcome: Progressing     Problem: Potential for Falls  Goal: Patient will remain free of falls  Description  INTERVENTIONS:  - Assess patient frequently for physical needs  -  Identify cognitive and physical deficits and behaviors that affect risk of falls   -  [place] fall precautions as indicated by assessment   - Educate patient/family on patient safety including physical limitations  - Instruct patient to call for assistance with activity based on assessment  - Modify environment to reduce risk of injury  - Consider OT/PT consult to assist with strengthening/mobility  Outcome: Progressing

## 2019-06-26 ENCOUNTER — ANESTHESIA EVENT (INPATIENT)
Dept: PREOP/PACU | Facility: HOSPITAL | Age: 74
DRG: 885 | End: 2019-06-26
Payer: COMMERCIAL

## 2019-06-26 ENCOUNTER — APPOINTMENT (INPATIENT)
Dept: PREOP/PACU | Facility: HOSPITAL | Age: 74
DRG: 885 | End: 2019-06-26
Payer: COMMERCIAL

## 2019-06-26 ENCOUNTER — ANESTHESIA (INPATIENT)
Dept: PREOP/PACU | Facility: HOSPITAL | Age: 74
DRG: 885 | End: 2019-06-26
Payer: COMMERCIAL

## 2019-06-26 LAB
GLUCOSE SERPL-MCNC: 245 MG/DL (ref 65–140)
GLUCOSE SERPL-MCNC: 260 MG/DL (ref 65–140)
GLUCOSE SERPL-MCNC: 268 MG/DL (ref 65–140)
GLUCOSE SERPL-MCNC: 296 MG/DL (ref 65–140)

## 2019-06-26 PROCEDURE — GZB2ZZZ ELECTROCONVULSIVE THERAPY, BILATERAL-SINGLE SEIZURE: ICD-10-PCS | Performed by: PSYCHIATRY & NEUROLOGY

## 2019-06-26 PROCEDURE — 97530 THERAPEUTIC ACTIVITIES: CPT

## 2019-06-26 PROCEDURE — 82948 REAGENT STRIP/BLOOD GLUCOSE: CPT

## 2019-06-26 PROCEDURE — 90870 ELECTROCONVULSIVE THERAPY: CPT

## 2019-06-26 RX ORDER — ESMOLOL HYDROCHLORIDE 10 MG/ML
INJECTION INTRAVENOUS AS NEEDED
Status: DISCONTINUED | OUTPATIENT
Start: 2019-06-26 | End: 2019-06-26 | Stop reason: SURG

## 2019-06-26 RX ORDER — SUCCINYLCHOLINE/SOD CL,ISO/PF 100 MG/5ML
SYRINGE (ML) INTRAVENOUS AS NEEDED
Status: DISCONTINUED | OUTPATIENT
Start: 2019-06-26 | End: 2019-06-26 | Stop reason: SURG

## 2019-06-26 RX ORDER — GLYCOPYRROLATE 0.2 MG/ML
INJECTION INTRAMUSCULAR; INTRAVENOUS AS NEEDED
Status: DISCONTINUED | OUTPATIENT
Start: 2019-06-26 | End: 2019-06-26 | Stop reason: SURG

## 2019-06-26 RX ADMIN — DOCUSATE SODIUM 100 MG: 100 CAPSULE, LIQUID FILLED ORAL at 08:33

## 2019-06-26 RX ADMIN — INSULIN LISPRO 6 UNITS: 100 INJECTION, SOLUTION INTRAVENOUS; SUBCUTANEOUS at 16:35

## 2019-06-26 RX ADMIN — SPIRONOLACTONE 50 MG: 25 TABLET ORAL at 05:10

## 2019-06-26 RX ADMIN — MIDODRINE HYDROCHLORIDE 5 MG: 2.5 TABLET ORAL at 12:40

## 2019-06-26 RX ADMIN — LITHIUM CARBONATE 450 MG: 300 CAPSULE, GELATIN COATED ORAL at 21:58

## 2019-06-26 RX ADMIN — MIDODRINE HYDROCHLORIDE 5 MG: 2.5 TABLET ORAL at 16:33

## 2019-06-26 RX ADMIN — MELATONIN TAB 3 MG 3 MG: 3 TAB at 21:58

## 2019-06-26 RX ADMIN — ESMOLOL HYDROCHLORIDE 30 MG: 10 INJECTION, SOLUTION INTRAVENOUS at 06:23

## 2019-06-26 RX ADMIN — Medication 60 MG: at 06:18

## 2019-06-26 RX ADMIN — LORAZEPAM 1 MG: 1 TABLET ORAL at 21:58

## 2019-06-26 RX ADMIN — INSULIN LISPRO 10 UNITS: 100 INJECTION, SOLUTION INTRAVENOUS; SUBCUTANEOUS at 12:09

## 2019-06-26 RX ADMIN — SODIUM CHLORIDE: 9 INJECTION, SOLUTION INTRAVENOUS at 06:26

## 2019-06-26 RX ADMIN — INSULIN LISPRO 6 UNITS: 100 INJECTION, SOLUTION INTRAVENOUS; SUBCUTANEOUS at 12:10

## 2019-06-26 RX ADMIN — FUROSEMIDE 40 MG: 40 TABLET ORAL at 05:10

## 2019-06-26 RX ADMIN — FUROSEMIDE 40 MG: 40 TABLET ORAL at 16:33

## 2019-06-26 RX ADMIN — POTASSIUM CHLORIDE 20 MEQ: 20 TABLET, EXTENDED RELEASE ORAL at 08:33

## 2019-06-26 RX ADMIN — OLANZAPINE 15 MG: 5 TABLET, FILM COATED ORAL at 18:45

## 2019-06-26 RX ADMIN — Medication 80 MG: at 06:18

## 2019-06-26 RX ADMIN — INSULIN LISPRO 4 UNITS: 100 INJECTION, SOLUTION INTRAVENOUS; SUBCUTANEOUS at 21:59

## 2019-06-26 RX ADMIN — LIDOCAINE 3 PATCH: 50 PATCH TOPICAL at 08:33

## 2019-06-26 RX ADMIN — LACTULOSE 20 G: 10 SOLUTION ORAL at 08:33

## 2019-06-26 RX ADMIN — GLYCOPYRROLATE 0.2 MG: 0.2 INJECTION, SOLUTION INTRAMUSCULAR; INTRAVENOUS at 06:16

## 2019-06-26 RX ADMIN — ESMOLOL HYDROCHLORIDE 20 MG: 10 INJECTION, SOLUTION INTRAVENOUS at 06:29

## 2019-06-26 RX ADMIN — INSULIN LISPRO 6 UNITS: 100 INJECTION, SOLUTION INTRAVENOUS; SUBCUTANEOUS at 08:34

## 2019-06-26 RX ADMIN — INSULIN DETEMIR 14 UNITS: 100 INJECTION, SOLUTION SUBCUTANEOUS at 21:58

## 2019-06-26 NOTE — PROGRESS NOTES
Pt present on the unit  Continues on 1:1 for safety  Pt reported anxiety , prn ativan given and was effective  Pt calm and pleasant, and compliant with medication

## 2019-06-26 NOTE — OCCUPATIONAL THERAPY NOTE
Occupational Therapy Activity Treatment Note      Nima Hiss    [birth year]    Patient Active Problem List   Diagnosis    Type 2 diabetes mellitus, with long-term current use of insulin ([facility]    Hyperlipidemia    Asthma    Anemia of chronic disease    [facility]ancytopenia [facility]    Bipolar 1 disorder ([facility]    Cirrhosis of liver with ascites ([facility]    Essential hypertension    GERD (gastroesophageal reflux disease)    Venous stasis    Ambulatory dysfunction    Closed compression fracture of L3 lumbar vertebra    Compression fracture of L3 lumbar vertebra    Closed compression fracture of third lumbar vertebra ([facility]    Fall    Diabetic polyneuropathy associated with type 2 diabetes mellitus ([facility]    Iron deficiency anemia    Status post fall - Ambulatory dysfunction    Discharged from Hospice for Suicidal Ideation, Medically Complex    ESRD (end stage renal disease) ([facility]    Preprocedural examination       Past Medical History:   Diagnosis Date    Asthma     Bipolar 2 disorder [facility]     Chronic kidney disease     Cirrhosis of liver [facility]     Diabetes mellitus [facility]     Elevated troponin 9/2/2018    GERD (gastroesophageal reflux disease)     Hyperlipidemia     Hypertension     Neuropathy     Renal disorder     Restless leg syndrome        Past Surgical History:   Procedure Laterality Date    BACK SURGERY      CHOLECYSTECTOMY      HERNIA REPAIR      IR PARACENTESIS  6/14/2019    IR PARACENTESIS  6/20/2019    REPLACEMENT TOTAL KNEE BILATERAL      TUMOR REMOVAL          06/26/19 1010   Assessment   Assessment Lisha Chaudhary was approached for OT involvement on this day  She was in her room, attempting to put on her shannon shirt  She did not want any staff assistance (nursing or this staff person), and she was quite irritable when offered assistance   However, at one point she threw her shirt, she then did permit this writer to assist her with getting this over her head (prior, she was putting her head thru a sleeve)  When she finished this task (with extra time and effort), she was encouraged to engage in some activity, I e , card game, worksheets  She declined this in a irritable manner, she did engage in some conversation, sharing that she was tired of being in the room she has been in  She was encouraged to consider different activities  She was asked how we can help her  She stated that we can help her by getting her a place  She also made a need request which was addressed as able  She declined further involvement at this time  She was also provided with a blank journal, encouraged to write down thoughts, concerns  She declined at this time  I will attempt to meet with her again next week, encourage her as able to more actively engage in her treatment, explore positive focus and interactions  Plan   Treatment Interventions ADL retraining;Continued evaluation; Activityengagement  (coping skills, life management skills, reality focus)   Goal Expiration Date 07/05/19   Treatment Day 21   OT Frequency 1-2x/wk   Jose Saldivar, OT

## 2019-06-26 NOTE — PROGRESS NOTES
Psychiatry Progress Note    Subjective: Interval History     The patient is on a one-to-one continual observation for safety  The patient had ECT 4 today  She was hesitant to go down for ECT treatment and needed much encouragement by staff  She continues to have a labile mood and is irritable  Patient states that she will always be depressed and has been her entire life  She continues to have passive suicidal ideation  She denies a plan in the hospital   She is medication and meal compliant  She states she slept well  ECT 5 on Friday      Behavior over the last 24 hours:  unchanged  Sleep: fair  Appetite: fair  Medication side effects: No  ROS: no complaints    Current medications:    Current Facility-Administered Medications:     bacitracin topical ointment 1 large application, 1 large application, Topical, BID PRN, Rocco Willis PA-C, 1 large application at 16/89/33 1111    docusate sodium (COLACE) capsule 100 mg, 100 mg, Oral, BID, Jane Duron MD, 100 mg at 06/26/19 3293    furosemide (LASIX) tablet 40 mg, 40 mg, Oral, BID (diuretic), Jane Duron MD, 40 mg at 06/26/19 0510    insulin detemir (LEVEMIR) subcutaneous injection 14 Units, 14 Units, Subcutaneous, HS, Jane Duron MD, 14 Units at 06/25/19 2131    insulin lispro (HumaLOG) 100 units/mL subcutaneous injection 10 Units, 10 Units, Subcutaneous, Daily Before Lunch, Jane Duron MD, 10 Units at 06/25/19 1153    insulin lispro (HumaLOG) 100 units/mL subcutaneous injection 10 Units, 10 Units, Subcutaneous, BID AC, Jane Duron MD, 10 Units at 06/26/19 0834    insulin lispro (HumaLOG) 100 units/mL subcutaneous injection 2-12 Units, 2-12 Units, Subcutaneous, 4x Daily (AC & HS), 6 Units at 06/26/19 0834 **AND** Fingerstick Glucose (POCT), , , 4x Daily AC and at bedtime, Aminta Martin,     lactulose 20 g/30 mL oral solution 20 g, 20 g, Oral, Daily, Jane Duron MD, 20 g at 06/26/19 0833    lidocaine (LIDODERM) 5 % patch 3 patch, 3 patch, Topical, Daily, Divya Willis PA-C, 3 patch at 06/26/19 6888    lithium carbonate capsule 450 mg, 450 mg, Oral, HS, Germán Chu MD, 450 mg at 06/25/19 2130    LORazepam (ATIVAN) 2 mg/mL injection 1 mg, 1 mg, Intramuscular, Q6H PRN, Germán Chu MD    LORazepam (ATIVAN) tablet 1 mg, 1 mg, Oral, Q6H PRN, Chuck Aguilera PA-C, 1 mg at 06/25/19 1947    melatonin tablet 3 mg, 3 mg, Oral, HS, Delta Blas PA-C, 3 mg at 06/25/19 2130    midodrine (PROAMATINE) tablet 5 mg, 5 mg, Oral, TID AC, Kenna Marino MD, 5 mg at 06/25/19 1635    OLANZapine (ZyPREXA) IM injection 5 mg, 5 mg, Intramuscular, Q4H PRN, Germán Chu MD    OLANZapine (ZyPREXA) tablet 15 mg, 15 mg, Oral, HS, Germán Chu MD, 15 mg at 06/25/19 1817    OLANZapine (ZyPREXA) tablet 5 mg, 5 mg, Oral, Q4H PRN, Chuck Aguilera PA-C    ondansetron (ZOFRAN-ODT) dispersible tablet 4 mg, 4 mg, Oral, Q6H PRN, Kenna Marino MD, 4 mg at 06/22/19 1354    potassium chloride (K-DUR,KLOR-CON) CR tablet 20 mEq, 20 mEq, Oral, Daily, Kenna Marino MD, 20 mEq at 06/26/19 7080    sodium chloride 0 9 % infusion, 50 mL/hr, Intravenous, Continuous, Zuri Haddad MD, Stopped at 06/26/19 0654    spironolactone (ALDACTONE) tablet 50 mg, 50 mg, Oral, Daily, Kenna Marino MD, 50 mg at 06/26/19 0510    traMADol (ULTRAM) tablet 25 mg, 25 mg, Oral, Q6H PRN, Chuck Aguilera PA-C    Current Problem List:    Patient Active Problem List   Diagnosis    Type 2 diabetes mellitus, with long-term current use of insulin ([address] )    Hyperlipidemia    Asthma    Anemia of chronic disease    [facility]    Bipolar 1 disorder [facility]    Cirrhosis of liver with ascites ([facility] )    Essential hypertension    GERD (gastroesophageal reflux disease)    Venous stasis    Ambulatory dysfunction    Closed compression fracture of L3 lumbar vertebra    Compression fracture of L3 lumbar vertebra    Closed compression fracture of third lumbar vertebra [facility])    Fall    Diabetic polyneuropathy associated with type 2 diabetes mellitus (HCC)    Iron deficiency anemia    Status post fall - Ambulatory dysfunction    Discharged from Hospice for Suicidal Ideation, Medically Complex    ESRD (end stage renal disease) ([facility] )    Preprocedural examination       Problem list reviewed 06/26/19     Objective:     Vital Signs:  Vitals:    06/26/19 0539 06/26/19 5094 06/26/19 0643 06/26/19 0648   BP: 136/65 145/73 134/69 143/67   BP Location:       Pulse: 88 96 97 98   Resp: 18 (!) 23 18 (!) 24   Temp:       TempSrc:       SpO2: 98% 97% 98% 97%   Weight:       Height:             Appearance:  age appropriate, casually dressed and disheveled   Behavior:  normal   Speech:  normal volume   Mood:  labile   Affect:  labile   Thought Process:  circumstantial   Thought Content:  normal   Perceptual Disturbances: None   Risk Potential: none   Sensorium:  person and place   Cognition:  grossly intact   Consciousness:  alert and awake    Attention: attention span and concentration were age appropriate   Intellect: average   Insight:  poor   Judgment: poor      Motor Activity: no abnormal movements       Labs:  Reviewed 06/26/19      Assessment / Plan:     Bipolar 1 disorder ([facility] )    Recommended Treatment:      Medication changes:  1) continue current medication regimen  Lithium level Thursday  ECT 5 Friday  Non-pharmacological treatments  1) Continue with group therapy, milieu therapy and occupational therapy  Safety  1) Safety/communication plan established targeting dynamic risk factors above  2) Risks, benefits, and possible side effects of medications explained to patient and patient verbalizes understanding  Counseling / Coordination of Care    Total floor / unit time spent today 20 minutes  Greater than 50% of total time was spent with the patient and / or family counseling and / or coordination of care   A description of the counseling / coordination of care  Patient's Rights, confidentiality and exceptions to confidentiality, use of automated medical record, [facility] staff access to medical record, and consent to treatment reviewed      Zahra Salguero PA-C

## 2019-06-26 NOTE — PROGRESS NOTES
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  Tolerating medications whole with water, PM midodrine administered accordingly  IV to RFA patent and flushing accordingly with positive blood return  Meal compliant with fair appetite noted, complained that she was unable to chew the cheeseburger at lunchtime  Pt is also looking for more clothing, nursing staff aware and will attempt to find her more items in donations  1:1 observation discontinued this shift and pt placed on full visual field for safety  Appears steady on her feet but still requires some assistance with ADLs and toileting  Refusing help this AM from staff who was trying to help pt put shirt on stating she wanted to do it by herself  Pleasant with interaction this shift  Pt blood glucose was 268 at breakfast and 296 at lunchtime, insulin administered accordingly

## 2019-06-26 NOTE — PROGRESS NOTES
06/26/19 0800   Team Meeting   Meeting Type Daily Rounds   Team Members Present   Team Members Present Physician;Nurse;; Other (Discipline and Name)  (Pharmacist )   Physician Team Member Dr Scar Daniels Team Member [id number] [facility]    OT Team Member Sandra Alcaraz    Other (Discipline and Name) Laci Maddox      Pt discussed in treatment rounds today, no medication changes made today

## 2019-06-26 NOTE — PLAN OF CARE
Problem: Alteration in Thoughts and Perception  Goal: Treatment Goal: Gain control of psychotic behaviors/thinking, reduce/eliminate presenting symptoms and demonstrate improved reality functioning upon discharge  Outcome: Progressing  Goal: Verbalize thoughts and feelings  Description  Interventions:  - Promote a nonjudgmental and trusting relationship with the patient through active listening and therapeutic communication  - Assess patient's level of functioning, behavior and potential for risk  - Engage patient in 1 on 1 interactions for a minimum of 15 minutes each session  - Encourage patient to express fears, feelings, frustrations, and discuss symptoms    - [place] patient to reality, help patient recognize reality-based thinking   - Administer medications as ordered and assess for potential side effects  - Provide the patient education related to the signs and symptoms of the illness and desired effects of prescribed medications  Outcome: Progressing  Goal: Refrain from acting on delusional thinking/internal stimuli  Description  Interventions:  - Monitor patient closely, per order   - Utilize least restrictive measures   - Set reasonable limits, give positive feedback for acceptable   - Administer medications as ordered and monitor of potential side effects  Outcome: Progressing  Goal: Agree to be compliant with medication regime, as prescribed and report medication side effects  Description  Interventions:  - Offer appropriate PRN medication and supervise ingestion; conduct aims, as needed   Outcome: Progressing  Goal: Recognize dysfunctional thoughts, communicate reality-based thoughts at the time of discharge  Description  Interventions:  - Provide medication and psycho-education to assist patient in compliance and developing insight into his/her illness   Outcome: Progressing  Goal: Complete daily ADLs, including personal hygiene independently, as able  Description  Interventions:  - Observe, teach, and assist patient with ADLS  - Monitor and promote a balance of rest/activity, with adequate nutrition and elimination   Outcome: Progressing     Problem: Risk for Self Injury/Neglect  Goal: Treatment Goal: Remain safe during length of stay, learn and adopt new coping skills, and be free of self-injurious ideation, impulses and acts at the time of discharge  Outcome: Progressing  Goal: Verbalize thoughts and feelings  Description  Interventions:  - Assess and re-assess patient's lethality and potential for self-injury  - Engage patient in 1:1 interactions, daily, for a minimum of 15 minutes  - Encourage patient to express feelings, fears, frustrations, hopes  - Establish rapport/trust with patient   Outcome: Progressing  Goal: Refrain from harming self  Description  Interventions:  - Monitor patient closely, per order  - Develop a trusting relationship  - Supervise medication ingestion, monitor effects and side effects   Outcome: Progressing  Goal: Recognize maladaptive responses and adopt new coping mechanisms  Outcome: Progressing  Goal: Complete daily ADLs, including personal hygiene independently, as able  Description  Interventions:  - Observe, teach, and assist patient with ADLS  - Monitor and promote a balance of rest/activity, with adequate nutrition and elimination  Outcome: Progressing     Problem: Prexisting or High Potential for Compromised Skin Integrity  Goal: Skin integrity is maintained or improved  Description  INTERVENTIONS:  - Identify patients at risk for skin breakdown  - Assess and monitor skin integrity  - Assess and monitor nutrition and hydration status  - Monitor labs (i e  albumin)  - Assess for incontinence   - Turn and reposition patient  - Assist with mobility/ambulation  - Relieve pressure over bony prominences  - Avoid friction and shearing  - Provide appropriate hygiene as needed including keeping skin clean and dry  - Evaluate need for skin moisturizer/barrier cream  - Collaborate with interdisciplinary team (i e  Nutrition, Rehabilitation, etc )   - Patient/family teaching  Outcome: Progressing     Problem: Nutrition/Hydration-ADULT  Goal: Nutrient/Hydration intake appropriate for improving, restoring or maintaining nutritional needs  Description  Monitor and assess patient's nutrition/hydration status for malnutrition (ex- brittle hair, bruises, dry skin, pale skin and conjunctiva, muscle wasting, smooth red tongue, and disorientation)  Collaborate with interdisciplinary team and initiate plan and interventions as ordered  Monitor patient's weight and dietary intake as ordered or per policy  Utilize nutrition screening tool and intervene per policy  Determine patient's food preferences and provide high-protein, high-caloric foods as appropriate  INTERVENTIONS:  - Monitor oral intake, urinary output, labs, and treatment plans  - Assess nutrition and hydration status and recommend course of action  - Evaluate amount of meals eaten  - Assist patient with eating if necessary   - Allow adequate time for meals  - Recommend/ encourage appropriate diets, oral nutritional supplements, and vitamin/mineral supplements  - Order, calculate, and assess calorie counts as needed  - Recommend, monitor, and adjust tube feedings and TPN/PPN based on assessed needs  - Assess need for intravenous fluids  - Provide specific nutrition/hydration education as appropriate  - Include patient/family/caregiver in decisions related to nutrition  Outcome: Progressing     Problem: Potential for Falls  Goal: Patient will remain free of falls  Description  INTERVENTIONS:  - Assess patient frequently for physical needs  -  Identify cognitive and physical deficits and behaviors that affect risk of falls    -  [place] fall precautions as indicated by assessment   - Educate patient/family on patient safety including physical limitations  - Instruct patient to call for assistance with activity based on assessment  - Modify environment to reduce risk of injury  - Consider OT/PT consult to assist with strengthening/mobility  Outcome: Progressing

## 2019-06-26 NOTE — PROCEDURES
ECT Procedure    Patient Name:  Jose Manuel Fajardo   Medical Record Number: [id number]   YOB: 1945  Date of Procedure: 6/26/2019    Time out was taken with staff to confirm correct patient and correct procedure to be performed      Diagnosis: Bipolar 1 disorder ([facility] )    Treatment Number: 4    ECT Type: Inpatient    Electrode Placement: bilateral    Post Ictal Suppression Index:  33 %    % Energy Set: 40    Seizure Duration (OBS): 32 sec    Additional Notes: uneventful    Enrique Miller MD

## 2019-06-26 NOTE — PLAN OF CARE
Problem: OCCUPATIONAL THERAPY ADULT  Goal: Performs self-care activities at highest level of function for planned discharge setting  See evaluation for individualized goals  Description  Treatment Interventions: ADL retraining, Continued evaluation, Activityengagement(coping skills, life management skills, reality focus)          See flowsheet documentation for full assessment, interventions and recommendations  Outcome: Not Progressing  Note:         Assessment: Ros Ross was approached for OT involvement on this day  She was in her room, attempting to put on her shannon shirt  She did not want any staff assistance (nursing or this staff person), and she was quite irritable when offered assistance  However, at one point she threw her shirt, she then did permit this writer to assist her with getting this over her head (prior, she was putting her head thru a sleeve)  When she finished this task (with extra time and effort), she was encouraged to engage in some activity, I e , card game, worksheets  She declined this in a irritable manner, she did engage in some conversation, sharing that she was tired of being in the room she has been in  She was encouraged to consider different activities  She was asked how we can help her  She stated that we can help her by getting her a place  She also made a need request which was addressed as able  She declined further involvement at this time  She was also provided with a blank journal, encouraged to write down thoughts, concerns  She declined at this time  I will attempt to meet with her again next week, encourage her as able to more actively engage in her treatment, explore positive focus and interactions

## 2019-06-26 NOTE — CASE MANAGEMENT
Patient continues with ECT treatments, no discharge planned as now  CM will continue to follow and provide services as needed

## 2019-06-26 NOTE — PROGRESS NOTES
NPO after midnight for ECT in AM  On 1:1 observation for safety , unsteady on feet  Sleeping in long intervals  Irritable when awake

## 2019-06-27 LAB
GLUCOSE SERPL-MCNC: 180 MG/DL (ref 65–140)
GLUCOSE SERPL-MCNC: 181 MG/DL (ref 65–140)
GLUCOSE SERPL-MCNC: 214 MG/DL (ref 65–140)
GLUCOSE SERPL-MCNC: 353 MG/DL (ref 65–140)
LITHIUM SERPL-SCNC: 0.5 MMOL/L (ref 0.6–1.2)

## 2019-06-27 PROCEDURE — 82948 REAGENT STRIP/BLOOD GLUCOSE: CPT

## 2019-06-27 PROCEDURE — 80178 ASSAY OF LITHIUM: CPT | Performed by: PHYSICIAN ASSISTANT

## 2019-06-27 PROCEDURE — 97163 PT EVAL HIGH COMPLEX 45 MIN: CPT | Performed by: PHYSICAL THERAPIST

## 2019-06-27 PROCEDURE — G8978 MOBILITY CURRENT STATUS: HCPCS | Performed by: PHYSICAL THERAPIST

## 2019-06-27 PROCEDURE — G8979 MOBILITY GOAL STATUS: HCPCS | Performed by: PHYSICAL THERAPIST

## 2019-06-27 PROCEDURE — G8980 MOBILITY D/C STATUS: HCPCS | Performed by: PHYSICAL THERAPIST

## 2019-06-27 RX ADMIN — INSULIN LISPRO 2 UNITS: 100 INJECTION, SOLUTION INTRAVENOUS; SUBCUTANEOUS at 11:58

## 2019-06-27 RX ADMIN — FUROSEMIDE 40 MG: 40 TABLET ORAL at 17:22

## 2019-06-27 RX ADMIN — INSULIN LISPRO 4 UNITS: 100 INJECTION, SOLUTION INTRAVENOUS; SUBCUTANEOUS at 17:24

## 2019-06-27 RX ADMIN — INSULIN LISPRO 10 UNITS: 100 INJECTION, SOLUTION INTRAVENOUS; SUBCUTANEOUS at 21:51

## 2019-06-27 RX ADMIN — OLANZAPINE 15 MG: 5 TABLET, FILM COATED ORAL at 18:41

## 2019-06-27 RX ADMIN — INSULIN DETEMIR 14 UNITS: 100 INJECTION, SOLUTION SUBCUTANEOUS at 21:50

## 2019-06-27 RX ADMIN — POTASSIUM CHLORIDE 20 MEQ: 20 TABLET, EXTENDED RELEASE ORAL at 08:42

## 2019-06-27 RX ADMIN — MELATONIN TAB 3 MG 3 MG: 3 TAB at 21:50

## 2019-06-27 RX ADMIN — LITHIUM CARBONATE 450 MG: 300 CAPSULE, GELATIN COATED ORAL at 10:19

## 2019-06-27 RX ADMIN — INSULIN LISPRO 10 UNITS: 100 INJECTION, SOLUTION INTRAVENOUS; SUBCUTANEOUS at 11:58

## 2019-06-27 RX ADMIN — MIDODRINE HYDROCHLORIDE 5 MG: 2.5 TABLET ORAL at 11:54

## 2019-06-27 RX ADMIN — MIDODRINE HYDROCHLORIDE 5 MG: 2.5 TABLET ORAL at 06:12

## 2019-06-27 RX ADMIN — LACTULOSE 20 G: 10 SOLUTION ORAL at 08:42

## 2019-06-27 RX ADMIN — INSULIN LISPRO 2 UNITS: 100 INJECTION, SOLUTION INTRAVENOUS; SUBCUTANEOUS at 08:46

## 2019-06-27 NOTE — PHYSICAL THERAPY NOTE
Physical Therapy Evaluation      Patient Active Problem List   Diagnosis    Type 2 diabetes mellitus, with long-term current use of insulin (HCC)    Hyperlipidemia    Asthma    Anemia of chronic disease    Pancytopenia (HCC)    Bipolar 1 disorder (HCC)    Cirrhosis of [facility] with ascites [facility]    Essential hypertension    GERD (gastroesophageal reflux disease)    Venous stasis    Ambulatory dysfunction    Closed compression fracture of L3 lumbar vertebra    Compression fracture of L3 lumbar vertebra    Closed compression fracture of third lumbar vertebra (HCC)    Fall    Diabetic polyneuropathy associated with type 2 diabetes mellitus ([facility] )    Iron deficiency anemia    Status post fall - Ambulatory dysfunction    Discharged from Hospice for Suicidal Ideation, Medically Complex    ESRD (end stage renal disease) ([facility] )    Preprocedural examination       Past Medical History:   Diagnosis Date    Asthma     Bipolar 2 disorder ([facility]     Chronic kidney disease     Cirrhosis of liver ([facility]     Diabetes mellitus ([facility]     Elevated troponin 9/2/2018    GERD (gastroesophageal reflux disease)     Hyperlipidemia     Hypertension     Neuropathy     Renal disorder     Restless leg syndrome        Past Surgical History:   Procedure Laterality Date    BACK SURGERY      CHOLECYSTECTOMY      HERNIA REPAIR      IR PARACENTESIS  6/14/2019    IR PARACENTESIS  6/20/2019    REPLACEMENT TOTAL KNEE BILATERAL      TUMOR REMOVAL        06/27/19 1130   Note Type   Note type Eval only   Pain Assessment   Pain Assessment 0-10   Pain Score   (would not give number,  appears controlled)   Pain Type Chronic pain   Pain Location Back   Pain Orientation Left   Hospital Pain Intervention(s) Repositioned; Ambulation/increased activity; Emotional support   Response to Interventions toelrated well    Home Living   Type of Home SNF  ([facility], was on hospice)   Home Layout One level;Ramped entrance;Elevator   Home Equipment Walker   Prior Function   Level of [place] Needs assistance with IADLs; Needs assistance with ADLs and functional mobility   Lives With Facility staff   Receives Help From Personal care attendant   ADL Assistance Independent   IADLs Needs assistance   Falls in the last 6 months 1 to 4   Comments pt   lives in snf but is mobile using rw and needed assist with iadl's   admitted for agitation  now discharged from hospice  bed hold noted   Restrictions/Precautions   Other Precautions Agitated; Impulsive;Cognitive; Chair Alarm; Bed Alarm; Fall Risk   General   Family/Caregiver Present No   Cognition   Orientation Level Oriented to person;Oriented to place;Oriented to time   RUE Assessment   RUE Assessment   (strength 4-/5, limited elevation)   LUE Assessment   LUE Assessment   (str 4/5 , limited elevation)   RLE Assessment   RLE Assessment X  (reports prior tkr with " broken parts" refused testing)   LLE Assessment   LLE Assessment X  (reports prior tkr with " broken parts" refused testing)   Coordination   Movements are Fluid and Coordinated 1   Sensation WFL   Bed Mobility   Rolling R 5  Supervision   Rolling L 5  Supervision   Supine to Sit 4  Minimal assistance   Transfers   Sit to Stand 5  Supervision   Stand to Sit 5  Supervision   Stand pivot 5  Supervision   Toilet transfer 5  Supervision   Ambulation/Elevation   Gait pattern Decreased foot clearance; Excessively slow  (severe thoracic kyphosis)   Gait Assistance 5  Supervision   Additional items Verbal cues   Assistive Device Rolling walker   Distance 20' x2   Balance   Static Sitting Fair +   Dynamic Sitting Fair   Static Standing Fair -   Dynamic Standing Poor +   Ambulatory Poor +   Endurance Deficit   Endurance Deficit Yes   Endurance Deficit Description deconditioning   Activity Tolerance   Activity Tolerance Treatment limited secondary to medical complications (Comment)   Nurse Made Aware yes   Assessment   Assessment pt admitted from snf where she is long term resident with agression and aditation, dx with bipolar disorder, referred to PT  pt was at [facility] and on hospice  pt was able to amb using rw indep  pt dmeonstrated mobility at superivion to min assist level using rw  pt is insisting on doing things by herself  pt did accomplish mobility at supervision level needing only min assist for bed mobility  pt has moderate functional limitations, has been mobile with [facility] staff using rw but has cognitive deficits  pt does not need skilled PT at this time   she cane return to snf, mobilize with facility staff  d/c PT   Barriers to Discharge None   Recommendation   Recommendation Long-term skilled nursing home placement  (return to [facility])   PT - OK to Discharge Yes   Modified [place] Scale   Modified [place] Scale 4   Barthel Index   Feeding 5   Bathing 0   Grooming Score 0   Dressing Score 5   Bladder Score 5   Bowels Score 5   Toilet Use Score 10   Transfers (Bed/Chair) Score 10   Mobility (Level Surface) Score 0   Stairs Score 0   Barthel Index Score 40   History: co - morbidities, fall risk, use of assistive device, assist for adl's, cognition,   Exam: impairments in locomotion, musculoskeletal, balance,posture, joint integrity,  cognition   Clinical: unstable/unpredictable  Complexity:high      Tim Mcgheete, PT

## 2019-06-27 NOTE — PROGRESS NOTES
Pt calm cooperative and medication compliant  Denies SI/HI and stated that she is feeling less anxious today  Will continue to monitor

## 2019-06-27 NOTE — PROGRESS NOTES
Pt visible on the unit  Pt continues on full visual field for safety  Pt reported increased anxiety, prn ativan given and was effective   Pt appears pleasant, offers no concerns and was compliant with medication

## 2019-06-27 NOTE — PROGRESS NOTES
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  The patient reported increased anxiety yesterday and had Ativan p r n  which was effective  She continues to be medication and meal compliant  Her mood continues to be labile  The patient will have ECT 5 tomorrow  Lithium level 0 5 today  Discussed with Dr Fatimah Vasquez and will change lithium to AM due to possible SE of polyuria       Behavior over the last 24 hours:  unchanged  Sleep: fair  Appetite: fair  Medication side effects: No  ROS: no complaints    Current medications:    Current Facility-Administered Medications:     bacitracin topical ointment 1 large application, 1 large application, Topical, BID PRN, Divya Willis PA-C, 1 large application at 50/90/55 1111    docusate sodium (COLACE) capsule 100 mg, 100 mg, Oral, BID, Diane White MD, 100 mg at 06/26/19 5407    furosemide (LASIX) tablet 40 mg, 40 mg, Oral, BID (diuretic), Diane White MD, 40 mg at 06/26/19 1633    insulin detemir (LEVEMIR) subcutaneous injection 14 Units, 14 Units, Subcutaneous, HS, Diane White MD, 14 Units at 06/26/19 2158    insulin lispro (HumaLOG) 100 units/mL subcutaneous injection 10 Units, 10 Units, Subcutaneous, Daily Before Lunch, Diane White MD, 10 Units at 06/26/19 1209    insulin lispro (HumaLOG) 100 units/mL subcutaneous injection 10 Units, 10 Units, Subcutaneous, BID AC, Diane White MD, 10 Units at 06/26/19 1634    insulin lispro (HumaLOG) 100 units/mL subcutaneous injection 2-12 Units, 2-12 Units, Subcutaneous, 4x Daily (AC & HS), 4 Units at 06/26/19 2159 **AND** Fingerstick Glucose (POCT), , , 4x Daily AC and at bedtime, Toneizabela Fernandez, DO    lactulose 20 g/30 mL oral solution 20 g, 20 g, Oral, Daily, Mayra Olmstead MD, 20 g at 06/26/19 5807    lidocaine (LIDODERM) 5 % patch 3 patch, 3 patch, Topical, Daily, Divya Willis PA-C, 3 patch at 06/26/19 8952    lithium carbonate capsule 450 mg, 450 mg, Oral, HS, Soren Jeffries MD, 450 mg at 06/26/19 2158    LORazepam (ATIVAN) 2 mg/mL injection 1 mg, 1 mg, Intramuscular, Q6H PRN, Soren Jeffries MD    LORazepam (ATIVAN) tablet 1 mg, 1 mg, Oral, Q6H PRN, Tom Max PA-C, 1 mg at 06/26/19 2158    melatonin tablet 3 mg, 3 mg, Oral, HS, Fatemeh Urrutia PA-C, 3 mg at 06/26/19 2158    midodrine (PROAMATINE) tablet 5 mg, 5 mg, Oral, TID AC, Mayra Olmstead MD, 5 mg at 06/27/19 0612    OLANZapine (ZyPREXA) IM injection 5 mg, 5 mg, Intramuscular, Q4H PRN, Soren Jeffries MD    OLANZapine (ZyPREXA) tablet 15 mg, 15 mg, Oral, HS, Soren Jeffries MD, 15 mg at 06/26/19 1845    OLANZapine (ZyPREXA) tablet 5 mg, 5 mg, Oral, Q4H PRN, Tom Max PA-C    ondansetron (ZOFRAN-ODT) dispersible tablet 4 mg, 4 mg, Oral, Q6H PRN, Mayra Olmstead MD, 4 mg at 06/22/19 1354    potassium chloride (K-DUR,KLOR-CON) CR tablet 20 mEq, 20 mEq, Oral, Daily, Mayra Olmstead MD, 20 mEq at 06/26/19 0285    sodium chloride 0 9 % infusion, 50 mL/hr, Intravenous, Continuous, Ori Reed MD, Stopped at 06/26/19 0654    spironolactone (ALDACTONE) tablet 50 mg, 50 mg, Oral, Daily, Mayra Olmstead MD, 50 mg at 06/26/19 0510    traMADol (ULTRAM) tablet 25 mg, 25 mg, Oral, Q6H PRN, Tom Max PA-C    Current Problem List:    Patient Active Problem List   Diagnosis    Type 2 diabetes mellitus, with long-term current use of insulin [facility]    Hyperlipidemia    Asthma    Anemia of chronic disease    [facility]ancytopenia ([facility]    [facility]ipolar 1 disorder ([facility]    Cirrhosis of liver with ascites ([facility]    Essential hypertension    GERD (gastroesophageal reflux disease)    Venous stasis    Ambulatory dysfunction    Closed compression fracture of L3 lumbar vertebra    Compression fracture of L3 lumbar vertebra    Closed compression fracture of third lumbar vertebra (HCC)    Fall    Diabetic polyneuropathy associated with type 2 diabetes mellitus ([facility]    Iron deficiency anemia    Status post fall - Ambulatory dysfunction    Discharged from Hospice for Suicidal Ideation, Medically Complex    ESRD (end stage renal disease) ([facility] )    Preprocedural examination       Problem list reviewed 06/27/19     Objective:     Vital Signs:  Vitals:    06/26/19 1500 06/26/19 2038 06/27/19 0648 06/27/19 0650   BP: 123/63 132/60 107/56 107/58   BP Location: Left arm Left arm Right arm Right arm   Pulse: 62 98 88 82   Resp: 20 17 17    Temp: 98 °F (36 7 °C) 98 2 °F (36 8 °C) 99 4 °F (37 4 °C)    TempSrc: Temporal Temporal Temporal    SpO2: 99% 97% 96%    Weight:       Height:             Appearance:  age appropriate, casually dressed and disheveled   Behavior:  normal   Speech:  normal volume   Mood:  labile   Affect:  labile   Thought Process:  circumstantial   Thought Content:  normal   Perceptual Disturbances: None   Risk Potential: none   Sensorium:  person and place   Cognition:  grossly intact   Consciousness:  alert and awake    Attention: attention span and concentration were age appropriate   Intellect: average   Insight:  poor   Judgment: poor      Motor Activity: no abnormal movements       Labs:  Reviewed 06/27/19      Assessment / Plan:     Bipolar 1 disorder (HCC)    Recommended Treatment:      Medication changes:  1) Change lithium to morning dose  ECT 5 Friday  Non-pharmacological treatments  1) Continue with group therapy, milieu therapy and occupational therapy  Safety  1) Safety/communication plan established targeting dynamic risk factors above  2) Risks, benefits, and possible side effects of medications explained to patient and patient verbalizes understanding        Counseling / Coordination of Care    Total floor / unit time spent today 20 minutes  Greater than 50% of total time was spent with the patient and / or family counseling and / or coordination of care  A description of the counseling / coordination of care  Patient's Rights, confidentiality and exceptions to confidentiality, use of automated medical record, [facility] staff access to medical record, and consent to treatment reviewed      Kory Torres PA-C

## 2019-06-27 NOTE — PROGRESS NOTES
Patient slept intermittently throughout the night as she kept using the bathroom and attributed her urine frequency on the water pill they gave her  She also complained about her bed not been comfortable for to sleep  Heplock on her right forearm was flushed and patent  Compliant with her AM medication  Q 7 minutes safety checks ongoing  Will continue to monitor

## 2019-06-27 NOTE — PROGRESS NOTES
06/27/19 1000   Team Meeting   Meeting Type Daily Rounds   Team Members Present   Team Members Present Physician;Nurse;   Physician Team Member [id number] [facility] Team Member [id number] [facility] Team Member Imelda CORDERO Pt discussed at treatment rounds today, lithium 450 mg change to be given with morning medications

## 2019-06-28 ENCOUNTER — ANESTHESIA EVENT (INPATIENT)
Dept: PREOP/PACU | Facility: HOSPITAL | Age: 74
DRG: 885 | End: 2019-06-28
Payer: COMMERCIAL

## 2019-06-28 ENCOUNTER — ANESTHESIA (INPATIENT)
Dept: PREOP/PACU | Facility: HOSPITAL | Age: 74
DRG: 885 | End: 2019-06-28
Payer: COMMERCIAL

## 2019-06-28 ENCOUNTER — APPOINTMENT (INPATIENT)
Dept: PREOP/PACU | Facility: HOSPITAL | Age: 74
DRG: 885 | End: 2019-06-28
Payer: COMMERCIAL

## 2019-06-28 LAB
ANION GAP SERPL CALCULATED.3IONS-SCNC: 5 MMOL/L (ref 5–14)
BUN SERPL-MCNC: 16 MG/DL (ref 5–25)
CALCIUM SERPL-MCNC: 9.8 MG/DL (ref 8.4–10.2)
CHLORIDE SERPL-SCNC: 105 MMOL/L (ref 97–108)
CO2 SERPL-SCNC: 28 MMOL/L (ref 22–30)
CREAT SERPL-MCNC: 0.59 MG/DL (ref 0.6–1.2)
GFR SERPL CREATININE-BSD FRML MDRD: 91 ML/MIN/1.73SQ M
GLUCOSE P FAST SERPL-MCNC: 189 MG/DL (ref 70–99)
GLUCOSE SERPL-MCNC: 184 MG/DL (ref 65–140)
GLUCOSE SERPL-MCNC: 189 MG/DL (ref 70–99)
GLUCOSE SERPL-MCNC: 241 MG/DL (ref 65–140)
GLUCOSE SERPL-MCNC: 389 MG/DL (ref 65–140)
GLUCOSE SERPL-MCNC: 398 MG/DL (ref 65–140)
POTASSIUM SERPL-SCNC: 3.5 MMOL/L (ref 3.6–5)
SODIUM SERPL-SCNC: 138 MMOL/L (ref 137–147)

## 2019-06-28 PROCEDURE — 80048 BASIC METABOLIC PNL TOTAL CA: CPT | Performed by: FAMILY MEDICINE

## 2019-06-28 PROCEDURE — 82948 REAGENT STRIP/BLOOD GLUCOSE: CPT

## 2019-06-28 PROCEDURE — GZB2ZZZ ELECTROCONVULSIVE THERAPY, BILATERAL-SINGLE SEIZURE: ICD-10-PCS | Performed by: PSYCHIATRY & NEUROLOGY

## 2019-06-28 PROCEDURE — 90870 ELECTROCONVULSIVE THERAPY: CPT

## 2019-06-28 RX ORDER — GLYCOPYRROLATE 0.2 MG/ML
INJECTION INTRAMUSCULAR; INTRAVENOUS AS NEEDED
Status: DISCONTINUED | OUTPATIENT
Start: 2019-06-28 | End: 2019-06-28 | Stop reason: SURG

## 2019-06-28 RX ORDER — SUCCINYLCHOLINE/SOD CL,ISO/PF 100 MG/5ML
SYRINGE (ML) INTRAVENOUS AS NEEDED
Status: DISCONTINUED | OUTPATIENT
Start: 2019-06-28 | End: 2019-06-28 | Stop reason: SURG

## 2019-06-28 RX ORDER — SODIUM CHLORIDE 9 MG/ML
50 INJECTION, SOLUTION INTRAVENOUS CONTINUOUS
Status: DISCONTINUED | OUTPATIENT
Start: 2019-06-28 | End: 2019-06-28

## 2019-06-28 RX ORDER — ESMOLOL HYDROCHLORIDE 10 MG/ML
INJECTION INTRAVENOUS AS NEEDED
Status: DISCONTINUED | OUTPATIENT
Start: 2019-06-28 | End: 2019-06-28 | Stop reason: SURG

## 2019-06-28 RX ADMIN — DOCUSATE SODIUM 100 MG: 100 CAPSULE, LIQUID FILLED ORAL at 09:03

## 2019-06-28 RX ADMIN — MELATONIN TAB 3 MG 3 MG: 3 TAB at 21:15

## 2019-06-28 RX ADMIN — SPIRONOLACTONE 50 MG: 25 TABLET ORAL at 05:21

## 2019-06-28 RX ADMIN — OLANZAPINE 15 MG: 5 TABLET, FILM COATED ORAL at 21:15

## 2019-06-28 RX ADMIN — INSULIN LISPRO 10 UNITS: 100 INJECTION, SOLUTION INTRAVENOUS; SUBCUTANEOUS at 11:38

## 2019-06-28 RX ADMIN — INSULIN DETEMIR 14 UNITS: 100 INJECTION, SOLUTION SUBCUTANEOUS at 21:16

## 2019-06-28 RX ADMIN — LORAZEPAM 1 MG: 1 TABLET ORAL at 21:15

## 2019-06-28 RX ADMIN — FUROSEMIDE 40 MG: 40 TABLET ORAL at 05:21

## 2019-06-28 RX ADMIN — SODIUM CHLORIDE: 9 INJECTION, SOLUTION INTRAVENOUS at 06:14

## 2019-06-28 RX ADMIN — LIDOCAINE 3 PATCH: 50 PATCH TOPICAL at 09:04

## 2019-06-28 RX ADMIN — Medication 60 MG: at 06:16

## 2019-06-28 RX ADMIN — MIDODRINE HYDROCHLORIDE 5 MG: 2.5 TABLET ORAL at 11:39

## 2019-06-28 RX ADMIN — INSULIN LISPRO 2 UNITS: 100 INJECTION, SOLUTION INTRAVENOUS; SUBCUTANEOUS at 08:00

## 2019-06-28 RX ADMIN — INSULIN LISPRO 10 UNITS: 100 INJECTION, SOLUTION INTRAVENOUS; SUBCUTANEOUS at 21:15

## 2019-06-28 RX ADMIN — ESMOLOL HYDROCHLORIDE 30 MG: 10 INJECTION, SOLUTION INTRAVENOUS at 06:18

## 2019-06-28 RX ADMIN — GLYCOPYRROLATE 0.2 MG: 0.2 INJECTION, SOLUTION INTRAMUSCULAR; INTRAVENOUS at 06:14

## 2019-06-28 RX ADMIN — INSULIN LISPRO 10 UNITS: 100 INJECTION, SOLUTION INTRAVENOUS; SUBCUTANEOUS at 11:39

## 2019-06-28 RX ADMIN — MIDODRINE HYDROCHLORIDE 5 MG: 2.5 TABLET ORAL at 08:00

## 2019-06-28 RX ADMIN — ESMOLOL HYDROCHLORIDE 20 MG: 10 INJECTION, SOLUTION INTRAVENOUS at 06:21

## 2019-06-28 RX ADMIN — Medication 80 MG: at 06:16

## 2019-06-28 RX ADMIN — LITHIUM CARBONATE 450 MG: 300 CAPSULE, GELATIN COATED ORAL at 08:03

## 2019-06-28 NOTE — PROGRESS NOTES
Psychiatry Progress Note    Subjective: Interval History     The patient had ECT 5 bilateral treatment today  Patient continues to have a labile mood  She is irritable during discussion  She continues to feel very depressed however states she is signing herself out today  Patient continues to have poor insight  She is medication and meal compliant  She is tolerating the recent addition of lithium well  She will have ECT 6 on Monday      Behavior over the last 24 hours:  unchanged  Sleep: fair  Appetite: fair  Medication side effects: No  ROS: no complaints    Current medications:    Current Facility-Administered Medications:     bacitracin topical ointment 1 large application, 1 large application, Topical, BID PRN, Zo Willis PA-C, 1 large application at 49/46/87 1111    docusate sodium (COLACE) capsule 100 mg, 100 mg, Oral, BID, Jeny Mejía MD, 100 mg at 06/26/19 5585    furosemide (LASIX) tablet 40 mg, 40 mg, Oral, BID (diuretic), Jeny Mejía MD, 40 mg at 06/28/19 0521    insulin detemir (LEVEMIR) subcutaneous injection 14 Units, 14 Units, Subcutaneous, HS, Jeny Mejía MD, 14 Units at 06/27/19 2150    insulin lispro (HumaLOG) 100 units/mL subcutaneous injection 10 Units, 10 Units, Subcutaneous, Daily Before Lunch, Jeny Mejía MD, 10 Units at 06/27/19 1158    insulin lispro (HumaLOG) 100 units/mL subcutaneous injection 10 Units, 10 Units, Subcutaneous, BID AC, Jeny Mejía MD, 10 Units at 06/27/19 1725    insulin lispro (HumaLOG) 100 units/mL subcutaneous injection 2-12 Units, 2-12 Units, Subcutaneous, 4x Daily (AC & HS), 10 Units at 06/27/19 2151 **AND** Fingerstick Glucose (POCT), , , 4x Daily AC and at bedtime, Lani Bile, DO    lactulose 20 g/30 mL oral solution 20 g, 20 g, Oral, Daily, Jeny Mejía MD, 20 g at 06/27/19 0842    lidocaine (LIDODERM) 5 % patch 3 patch, 3 patch, Topical, Daily, Divya Willis PA-C, Stopped at 06/27/19 0845    lithium carbonate capsule 450 mg, 450 mg, Oral, After Breakfast, Patricia Reich PA-C, 450 mg at 06/27/19 1019    LORazepam (ATIVAN) 2 mg/mL injection 1 mg, 1 mg, Intramuscular, Q6H PRN, Enrique Miller MD    LORazepam (ATIVAN) tablet 1 mg, 1 mg, Oral, Q6H PRN, Tom Forbes PA-C, 1 mg at 06/26/19 2158    melatonin tablet 3 mg, 3 mg, Oral, HS, Silverio Garcia PA-C, 3 mg at 06/27/19 2150    midodrine (PROAMATINE) tablet 5 mg, 5 mg, Oral, TID AC, Carmelita Fields MD, 5 mg at 06/27/19 1154    OLANZapine (ZyPREXA) IM injection 5 mg, 5 mg, Intramuscular, Q4H PRN, Enrique Miller MD    OLANZapine (ZyPREXA) tablet 15 mg, 15 mg, Oral, HS, Enrique Miller MD, 15 mg at 06/27/19 1841    OLANZapine (ZyPREXA) tablet 5 mg, 5 mg, Oral, Q4H PRN, Tom Forbes PA-C    ondansetron (ZOFRAN-ODT) dispersible tablet 4 mg, 4 mg, Oral, Q6H PRN, Carmelita Fields MD, 4 mg at 06/22/19 1354    potassium chloride (K-DUR,KLOR-CON) CR tablet 20 mEq, 20 mEq, Oral, Daily, Carmelita Fields MD, 20 mEq at 06/27/19 6168    sodium chloride 0 9 % infusion, 50 mL/hr, Intravenous, Continuous, Jennifer Trevino MD, Last Rate: 0 mL/hr at 06/26/19 0654    spironolactone (ALDACTONE) tablet 50 mg, 50 mg, Oral, Daily, Carmelita Fields MD, 50 mg at 06/28/19 7355    traMADol (ULTRAM) tablet 25 mg, 25 mg, Oral, Q6H PRN, Tom Forbes PA-C    Current Problem List:    Patient Active Problem List   Diagnosis    Type 2 diabetes mellitus, with long-term current use of insulin ([facility] )    Hyperlipidemia    Asthma    Anemia of chronic disease    Pancytopenia ([facility] )    Bipolar 1 disorder ([address]    [facility] of [facility] with ascites [facility]    Essential hypertension    GERD (gastroesophageal reflux disease)    Venous stasis    Ambulatory dysfunction    Closed compression fracture of L3 lumbar vertebra    Compression fracture of L3 lumbar vertebra    Closed compression fracture of third lumbar vertebra ([facility]    Fall    Diabetic polyneuropathy associated with type 2 diabetes mellitus (HCC)    Iron deficiency anemia    Status post fall - Ambulatory dysfunction    Discharged from Hospice for Suicidal Ideation, Medically Complex    [facility] disease) [facility]    Preprocedural examination       Problem list reviewed 06/28/19     Objective:     Vital Signs:  Vitals:    06/28/19 0645 06/28/19 0700 06/28/19 0715 06/28/19 0730   BP: 166/99 110/75 147/76 109/55   BP Location:  Right arm Right arm    Pulse: (!) 110 86 (!) 110 98   Resp: (!) 24 16 16 16   Temp:  97 7 °F (36 5 °C) 97 8 °F (36 6 °C) 98 °F (36 7 °C)   TempSrc:  Temporal Temporal Temporal   SpO2: 93% 93%  98%   Weight:       Height:             Appearance:  age appropriate, casually dressed and disheveled   Behavior:  normal   Speech:  normal volume   Mood:  labile   Affect:  labile   Thought Process:  circumstantial   Thought Content:  normal   Perceptual Disturbances: None   Risk Potential: none   Sensorium:  person and place   Cognition:  grossly intact   Consciousness:  alert and awake    Attention: attention span and concentration were age appropriate   Intellect: average   Insight:  poor   Judgment: poor      Motor Activity: no abnormal movements       Labs:  Reviewed 06/28/19      Assessment / Plan:     Bipolar 1 disorder ([facility] )    Recommended Treatment:      Medication changes:  1) ECT 6 Monday  Non-pharmacological treatments  1) Continue with group therapy, milieu therapy and occupational therapy  Safety  1) Safety/communication plan established targeting dynamic risk factors above  2) Risks, benefits, and possible side effects of medications explained to patient and patient verbalizes understanding  Counseling / Coordination of Care    Total floor / unit time spent today 20 minutes  Greater than 50% of total time was spent with the patient and / or family counseling and / or coordination of care  A description of the counseling / coordination of care       Patient's Rights, confidentiality and exceptions to confidentiality, use of automated medical record, [facility] staff access to medical record, and consent to treatment reviewed      Tom Max PA-C

## 2019-06-28 NOTE — PROGRESS NOTES
Pt continues in the observation room for safety  Pt calm and cooperative  Offers no concerns this shift  VSS, Pt denies s/s and was compliant with mediation

## 2019-06-28 NOTE — PLAN OF CARE
Problem: Alteration in Thoughts and Perception  Goal: Treatment Goal: Gain control of psychotic behaviors/thinking, reduce/eliminate presenting symptoms and demonstrate improved reality functioning upon discharge  Outcome: Progressing  Goal: Verbalize thoughts and feelings  Description  Interventions:  - Promote a nonjudgmental and trusting relationship with the patient through active listening and therapeutic communication  - Assess patient's level of functioning, behavior and potential for risk  - Engage patient in 1 on 1 interactions for a minimum of 15 minutes each session  - Encourage patient to express fears, feelings, frustrations, and discuss symptoms    - [place] patient to reality, help patient recognize reality-based thinking   - Administer medications as ordered and assess for potential side effects  - Provide the patient education related to the signs and symptoms of the illness and desired effects of prescribed medications  Outcome: Progressing  Goal: Refrain from acting on delusional thinking/internal stimuli  Description  Interventions:  - Monitor patient closely, per order   - Utilize least restrictive measures   - Set reasonable limits, give positive feedback for acceptable   - Administer medications as ordered and monitor of potential side effects  Outcome: Progressing  Goal: Agree to be compliant with medication regime, as prescribed and report medication side effects  Description  Interventions:  - Offer appropriate PRN medication and supervise ingestion; conduct aims, as needed   Outcome: Progressing  Goal: Recognize dysfunctional thoughts, communicate reality-based thoughts at the time of discharge  Description  Interventions:  - Provide medication and psycho-education to assist patient in compliance and developing insight into his/her illness   Outcome: Progressing  Goal: Complete daily ADLs, including personal hygiene independently, as able  Description  Interventions:  - Observe, teach, and assist patient with ADLS  - Monitor and promote a balance of rest/activity, with adequate nutrition and elimination   Outcome: Progressing     Problem: Risk for Self Injury/Neglect  Goal: Treatment Goal: Remain safe during length of stay, learn and adopt new coping skills, and be free of self-injurious ideation, impulses and acts at the time of discharge  Outcome: Progressing  Goal: Verbalize thoughts and feelings  Description  Interventions:  - Assess and re-assess patient's lethality and potential for self-injury  - Engage patient in 1:1 interactions, daily, for a minimum of 15 minutes  - Encourage patient to express feelings, fears, frustrations, hopes  - Establish rapport/trust with patient   Outcome: Progressing  Goal: Refrain from harming self  Description  Interventions:  - Monitor patient closely, per order  - Develop a trusting relationship  - Supervise medication ingestion, monitor effects and side effects   Outcome: Progressing  Goal: Recognize maladaptive responses and adopt new coping mechanisms  Outcome: Progressing  Goal: Complete daily ADLs, including personal hygiene independently, as able  Description  Interventions:  - Observe, teach, and assist patient with ADLS  - Monitor and promote a balance of rest/activity, with adequate nutrition and elimination  Outcome: Progressing     Problem: Prexisting or High Potential for Compromised Skin Integrity  Goal: Skin integrity is maintained or improved  Description  INTERVENTIONS:  - Identify patients at risk for skin breakdown  - Assess and monitor skin integrity  - Assess and monitor nutrition and hydration status  - Monitor labs (i e  albumin)  - Assess for incontinence   - Turn and reposition patient  - Assist with mobility/ambulation  - Relieve pressure over bony prominences  - Avoid friction and shearing  - Provide appropriate hygiene as needed including keeping skin clean and dry  - Evaluate need for skin moisturizer/barrier cream  - Collaborate with interdisciplinary team (i e  Nutrition, Rehabilitation, etc )   - Patient/family teaching  Outcome: Progressing     Problem: Nutrition/Hydration-ADULT  Goal: Nutrient/Hydration intake appropriate for improving, restoring or maintaining nutritional needs  Description  Monitor and assess patient's nutrition/hydration status for malnutrition (ex- brittle hair, bruises, dry skin, pale skin and conjunctiva, muscle wasting, smooth red tongue, and disorientation)  Collaborate with interdisciplinary team and initiate plan and interventions as ordered  Monitor patient's weight and dietary intake as ordered or per policy  Utilize nutrition screening tool and intervene per policy  Determine patient's food preferences and provide high-protein, high-caloric foods as appropriate  INTERVENTIONS:  - Monitor oral intake, urinary output, labs, and treatment plans  - Assess nutrition and hydration status and recommend course of action  - Evaluate amount of meals eaten  - Assist patient with eating if necessary   - Allow adequate time for meals  - Recommend/ encourage appropriate diets, oral nutritional supplements, and vitamin/mineral supplements  - Order, calculate, and assess calorie counts as needed  - Recommend, monitor, and adjust tube feedings and TPN/PPN based on assessed needs  - Assess need for intravenous fluids  - Provide specific nutrition/hydration education as appropriate  - Include patient/family/caregiver in decisions related to nutrition  Outcome: Progressing     Problem: Potential for Falls  Goal: Patient will remain free of falls  Description  INTERVENTIONS:  - Assess patient frequently for physical needs  -  Identify cognitive and physical deficits and behaviors that affect risk of falls    -  [place] fall precautions as indicated by assessment   - Educate patient/family on patient safety including physical limitations  - Instruct patient to call for assistance with activity based on assessment  - Modify environment to reduce risk of injury  - Consider OT/PT consult to assist with strengthening/mobility  Outcome: Progressing

## 2019-06-28 NOTE — CASE MANAGEMENT
Writer met with patient and patient's friends to discuss patient's care  Writer was presented with information that patient has a hearing on 7/2/2019 at 1:30pm for eviction  Writer called [facility] and made them aware of her admission to the hospital since 6/04/2019  Writer faxed letter to [facility] as directed asking for a 60 days postponement due the hospitalization  Writer also informed staff at [facility] that the eviction was filed against the patient while she was in the hospital and the landlord knew of the hospitalization  Writer was informed that there will be contact by the [facility] if there any issues  CM will continue to follow and provide service as needed

## 2019-06-28 NOTE — PROGRESS NOTES
Pt noted to wake to use bathroom frequently during the night  Pt has irritable edge, but cooperative with care  No other issues during the night

## 2019-06-28 NOTE — ANESTHESIA PREPROCEDURE EVALUATION
Review of Systems/Medical History  Patient summary reviewed  Chart reviewed      Cardiovascular  Exercise tolerance (METS): <4,  Hyperlipidemia, Hypertension ,    Pulmonary  Asthma ,        GI/Hepatic    GERD , Liver disease , cirrhosis,        Negative  ROS        Endo/Other  Diabetes ,      GYN  Negative gynecology ROS          Hematology  Anemia ,     Musculoskeletal  Negative musculoskeletal ROS        Neurology  Negative neurology ROS      Psychology   Depression ,              Physical Exam    Airway    Mallampati score: II  TM Distance: >3 FB  Neck ROM: full     Dental       Cardiovascular  Cardiovascular exam normal    Pulmonary  Pulmonary exam normal     Other Findings        Anesthesia Plan  ASA Score- 3     Anesthesia Type- general with ASA Monitors  Additional Monitors:   Airway Plan:         Plan Factors-    Induction-     Postoperative Plan-     Informed Consent- Anesthetic plan and risks discussed with patient  I personally reviewed this patient with the CRNA  Discussed and agreed on the Anesthesia Plan with the CRNA  Afsaneh Medrano

## 2019-06-28 NOTE — PROCEDURES
ECT Procedure    Patient Name:  Gregory Elenaarita   Medical Record Number: [id number]   YOB: 1945  Date of Procedure: 6/28/2019    Time out was taken with staff to confirm correct patient and correct procedure to be performed      Diagnosis: Bipolar 1 disorder ([facility]    Treatment Number: 5    ECT Type: Inpatient    Electrode Placement: bilateral    Post Ictal Suppression Index:  19&    % Energy Set: 40    Seizure Duration (OBS): 33 sec    Additional Notes: uneventful    Soren Jeffries MD

## 2019-06-29 LAB
GLUCOSE SERPL-MCNC: 148 MG/DL (ref 65–140)
GLUCOSE SERPL-MCNC: 241 MG/DL (ref 65–140)
GLUCOSE SERPL-MCNC: 246 MG/DL (ref 65–140)
GLUCOSE SERPL-MCNC: 340 MG/DL (ref 65–140)

## 2019-06-29 PROCEDURE — 82948 REAGENT STRIP/BLOOD GLUCOSE: CPT

## 2019-06-29 RX ADMIN — MIDODRINE HYDROCHLORIDE 5 MG: 2.5 TABLET ORAL at 12:19

## 2019-06-29 RX ADMIN — MIDODRINE HYDROCHLORIDE 5 MG: 2.5 TABLET ORAL at 06:02

## 2019-06-29 RX ADMIN — INSULIN LISPRO 8 UNITS: 100 INJECTION, SOLUTION INTRAVENOUS; SUBCUTANEOUS at 16:30

## 2019-06-29 RX ADMIN — INSULIN LISPRO 4 UNITS: 100 INJECTION, SOLUTION INTRAVENOUS; SUBCUTANEOUS at 21:00

## 2019-06-29 RX ADMIN — MELATONIN TAB 3 MG 3 MG: 3 TAB at 21:00

## 2019-06-29 RX ADMIN — FUROSEMIDE 40 MG: 40 TABLET ORAL at 16:33

## 2019-06-29 RX ADMIN — SPIRONOLACTONE 50 MG: 25 TABLET ORAL at 08:33

## 2019-06-29 RX ADMIN — INSULIN LISPRO 4 UNITS: 100 INJECTION, SOLUTION INTRAVENOUS; SUBCUTANEOUS at 08:34

## 2019-06-29 RX ADMIN — POTASSIUM CHLORIDE 20 MEQ: 20 TABLET, EXTENDED RELEASE ORAL at 08:33

## 2019-06-29 RX ADMIN — OLANZAPINE 15 MG: 5 TABLET, FILM COATED ORAL at 20:59

## 2019-06-29 RX ADMIN — LIDOCAINE 3 PATCH: 50 PATCH TOPICAL at 08:33

## 2019-06-29 RX ADMIN — LITHIUM CARBONATE 450 MG: 300 CAPSULE, GELATIN COATED ORAL at 08:33

## 2019-06-29 RX ADMIN — DOCUSATE SODIUM 100 MG: 100 CAPSULE, LIQUID FILLED ORAL at 17:34

## 2019-06-29 RX ADMIN — INSULIN DETEMIR 14 UNITS: 100 INJECTION, SOLUTION SUBCUTANEOUS at 21:02

## 2019-06-29 RX ADMIN — FUROSEMIDE 40 MG: 40 TABLET ORAL at 08:33

## 2019-06-29 RX ADMIN — LACTULOSE 20 G: 10 SOLUTION ORAL at 08:33

## 2019-06-29 RX ADMIN — INSULIN LISPRO 10 UNITS: 100 INJECTION, SOLUTION INTRAVENOUS; SUBCUTANEOUS at 12:18

## 2019-06-29 RX ADMIN — LORAZEPAM 1 MG: 1 TABLET ORAL at 21:00

## 2019-06-29 RX ADMIN — MIDODRINE HYDROCHLORIDE 5 MG: 2.5 TABLET ORAL at 16:33

## 2019-06-29 NOTE — PLAN OF CARE
Problem: Risk for Self Injury/Neglect  Goal: Verbalize thoughts and feelings  Description  Interventions:  - Assess and re-assess patient's lethality and potential for self-injury  - Engage patient in 1:1 interactions, daily, for a minimum of 15 minutes  - Encourage patient to express feelings, fears, frustrations, hopes  - Establish rapport/trust with patient   Outcome: Progressing  Goal: Refrain from harming self  Description  Interventions:  - Monitor patient closely, per order  - Develop a trusting relationship  - Supervise medication ingestion, monitor effects and side effects   Outcome: Progressing     Problem: Prexisting or High Potential for Compromised Skin Integrity  Goal: Skin integrity is maintained or improved  Description  INTERVENTIONS:  - Identify patients at risk for skin breakdown  - Assess and monitor skin integrity  - Assess and monitor nutrition and hydration status  - Monitor labs (i e  albumin)  - Assess for incontinence   - Turn and reposition patient  - Assist with mobility/ambulation  - Relieve pressure over bony prominences  - Avoid friction and shearing  - Provide appropriate hygiene as needed including keeping skin clean and dry  - Evaluate need for skin moisturizer/barrier cream  - Collaborate with interdisciplinary team (i e  Nutrition, Rehabilitation, etc )   - Patient/family teaching  Outcome: Progressing     Problem: Potential for Falls  Goal: Patient will remain free of falls  Description  INTERVENTIONS:  - Assess patient frequently for physical needs  -  Identify cognitive and physical deficits and behaviors that affect risk of falls    -  [place] fall precautions as indicated by assessment   - Educate patient/family on patient safety including physical limitations  - Instruct patient to call for assistance with activity based on assessment  - Modify environment to reduce risk of injury  - Consider OT/PT consult to assist with strengthening/mobility  Outcome: Progressing

## 2019-06-29 NOTE — PROGRESS NOTES
Patient was compliant with medications and care this evening  She was pleasant and cooperative  Complaint with waiting for assistance with ambulation  Bed/chair alarm active for safety  Patient continues to state she wants to die however has no plans of hurting herself  Vitals were stable  Patient was given ativan at 2115 for anxiety  score of 9 on diana scale  Patient is currently in bed  Patient sleeps intermittently  Wakes to use the bathroom  Bed alarm is on for safety   Will continue to monitor on q 7 minute checks

## 2019-06-29 NOTE — PROGRESS NOTES
Pt continues to stay in her room due to being on contact precautions  Pt is pleasant at this moment and cooperative with meds  Will continue to monitor

## 2019-06-29 NOTE — PLAN OF CARE
Problem: Alteration in Thoughts and Perception  Goal: Treatment Goal: Gain control of psychotic behaviors/thinking, reduce/eliminate presenting symptoms and demonstrate improved reality functioning upon discharge  Outcome: Progressing  Goal: Verbalize thoughts and feelings  Description  Interventions:  - Promote a nonjudgmental and trusting relationship with the patient through active listening and therapeutic communication  - Assess patient's level of functioning, behavior and potential for risk  - Engage patient in 1 on 1 interactions for a minimum of 15 minutes each session  - Encourage patient to express fears, feelings, frustrations, and discuss symptoms    - [place] patient to reality, help patient recognize reality-based thinking   - Administer medications as ordered and assess for potential side effects  - Provide the patient education related to the signs and symptoms of the illness and desired effects of prescribed medications  Outcome: Progressing  Goal: Refrain from acting on delusional thinking/internal stimuli  Description  Interventions:  - Monitor patient closely, per order   - Utilize least restrictive measures   - Set reasonable limits, give positive feedback for acceptable   - Administer medications as ordered and monitor of potential side effects  Outcome: Progressing  Goal: Agree to be compliant with medication regime, as prescribed and report medication side effects  Description  Interventions:  - Offer appropriate PRN medication and supervise ingestion; conduct aims, as needed   Outcome: Progressing  Goal: Attend and participate in unit activities, including therapeutic, recreational, and educational groups  Description  Interventions:  - Provide therapeutic and educational activities daily, encourage attendance and participation, and document same in the medical record   Outcome: Progressing  Goal: Recognize dysfunctional thoughts, communicate reality-based thoughts at the time of discharge  Description  Interventions:  - Provide medication and psycho-education to assist patient in compliance and developing insight into his/her illness   Outcome: Progressing  Goal: Complete daily ADLs, including personal hygiene independently, as able  Description  Interventions:  - Observe, teach, and assist patient with ADLS  - Monitor and promote a balance of rest/activity, with adequate nutrition and elimination   Outcome: Progressing

## 2019-06-29 NOTE — PROGRESS NOTES
Psychiatry Progress Note    Subjective: Interval History     The patient is lying in bed this morning  She has an irritable edge  Patient was medication and meal compliant yesterday  She continues to have passive suicidal ideation with no plan  Staff states she was pleasant and cooperative last evening  She sleeps intermittently and wakes to use the bathroom  She will have ECT on Monday      Behavior over the last 24 hours:  unchanged  Sleep: fair  Appetite: fair  Medication side effects: No  ROS: no complaints    Current medications:    Current Facility-Administered Medications:     bacitracin topical ointment 1 large application, 1 large application, Topical, BID PRN, Divya Willis PA-C, 1 large application at 80/95/72 1111    docusate sodium (COLACE) capsule 100 mg, 100 mg, Oral, BID, Quintin Gilbert MD, 100 mg at 06/28/19 0229    furosemide (LASIX) tablet 40 mg, 40 mg, Oral, BID (diuretic), Quintin Gilbert MD, 40 mg at 06/28/19 0521    insulin detemir (LEVEMIR) subcutaneous injection 14 Units, 14 Units, Subcutaneous, HS, Quintin Gilbert MD, 14 Units at 06/28/19 2116    insulin lispro (HumaLOG) 100 units/mL subcutaneous injection 10 Units, 10 Units, Subcutaneous, Daily Before Lunch, Quintin Gilbert MD, 10 Units at 06/28/19 1138    insulin lispro (HumaLOG) 100 units/mL subcutaneous injection 10 Units, 10 Units, Subcutaneous, BID AC, Quintin Gilbert MD, 10 Units at 06/28/19 0806    insulin lispro (HumaLOG) 100 units/mL subcutaneous injection 2-12 Units, 2-12 Units, Subcutaneous, 4x Daily (AC & HS), 10 Units at 06/28/19 2115 **AND** Fingerstick Glucose (POCT), , , 4x Daily AC and at bedtime, Cecil Singh,     lactulose 20 g/30 mL oral solution 20 g, 20 g, Oral, Daily, Quintin Gilbert MD, 20 g at 06/27/19 0842    lidocaine (LIDODERM) 5 % patch 3 patch, 3 patch, Topical, Daily, Divya Willis PA-C, 3 patch at 06/28/19 5090    lithium carbonate capsule 450 mg, 450 mg, Oral, After Breakfast, Cami Skinner PA-C, 450 mg at 06/28/19 0803    LORazepam (ATIVAN) 2 mg/mL injection 1 mg, 1 mg, Intramuscular, Q6H PRN, Andrew Contreras MD    LORazepam (ATIVAN) tablet 1 mg, 1 mg, Oral, Q6H PRN, Cami Skinner PA-C, 1 mg at 06/28/19 2115    melatonin tablet 3 mg, 3 mg, Oral, HS, Nito Lopez PA-C, 3 mg at 06/28/19 2115    midodrine (PROAMATINE) tablet 5 mg, 5 mg, Oral, TID AC, Lita Glass MD, 5 mg at 06/29/19 0602    OLANZapine (ZyPREXA) IM injection 5 mg, 5 mg, Intramuscular, Q4H PRN, Andrew Contreras MD    OLANZapine (ZyPREXA) tablet 15 mg, 15 mg, Oral, HS, Andrew Contreras MD, 15 mg at 06/28/19 2115    OLANZapine (ZyPREXA) tablet 5 mg, 5 mg, Oral, Q4H PRN, Cami Skinner PA-C    ondansetron (ZOFRAN-ODT) dispersible tablet 4 mg, 4 mg, Oral, Q6H PRN, Lita Glass MD, 4 mg at 06/22/19 1354    potassium chloride (K-DUR,KLOR-CON) CR tablet 20 mEq, 20 mEq, Oral, Daily, Lita Glass MD, 20 mEq at 06/27/19 5724    sodium chloride 0 9 % infusion, 50 mL/hr, Intravenous, Continuous, Avelina Gomes MD, Last Rate: 0 mL/hr at 06/26/19 0654    spironolactone (ALDACTONE) tablet 50 mg, 50 mg, Oral, Daily, Lita Glass MD, 50 mg at 06/28/19 3303    traMADol (ULTRAM) tablet 25 mg, 25 mg, Oral, Q6H PRN, Cami Skinner PA-C    Current Problem List:    Patient Active Problem List   Diagnosis    Type 2 diabetes mellitus, with long-term current use of insulin ([facility]    Hyperlipidemia    Asthma    Anemia of chronic disease    Pancytopenia ([facility] )    Bipolar 1 disorder ([facility]    Cirrhosis of liver with ascites ([address] )    Essential hypertension    GERD (gastroesophageal reflux disease)    Venous stasis    Ambulatory dysfunction    Closed compression fracture of L3 lumbar vertebra    Compression fracture of L3 lumbar vertebra    Closed compression fracture of third lumbar vertebra ([facility] )    Fall    Diabetic polyneuropathy associated with type 2 diabetes mellitus (HCC)    Iron deficiency anemia    Status post fall - Ambulatory dysfunction    Discharged from Hospice for Suicidal Ideation, Medically Complex    ESRD (end stage renal disease) ([facility] )    Preprocedural examination       Problem list reviewed 06/29/19     Objective:     Vital Signs:  Vitals:    06/28/19 1527 06/28/19 2100 06/29/19 0601 06/29/19 0714   BP: 135/60 129/60 94/55 139/64   BP Location: Left arm Left arm  Right arm   Pulse:  85 82 81   Resp: 16 16  16   Temp:  98 4 °F (36 9 °C)  98 3 °F (36 8 °C)   TempSrc:  Temporal  Temporal   SpO2: 94% 96%  97%   Weight:       Height:             Appearance:  age appropriate, casually dressed and disheveled   Behavior:  normal   Speech:  normal volume   Mood:  labile   Affect:  labile   Thought Process:  circumstantial   Thought Content:  normal   Perceptual Disturbances: None   Risk Potential: none   Sensorium:  person and place   Cognition:  grossly intact   Consciousness:  alert and awake    Attention: attention span and concentration were age appropriate   Intellect: average   Insight:  poor   Judgment: poor      Motor Activity: no abnormal movements       Labs:  Reviewed 06/29/19      Assessment / Plan:     Bipolar 1 disorder ([facility] )    Recommended Treatment:      Medication changes:  1) ECT 6 Monday  Non-pharmacological treatments  1) Continue with group therapy, milieu therapy and occupational therapy  Safety  1) Safety/communication plan established targeting dynamic risk factors above  2) Risks, benefits, and possible side effects of medications explained to patient and patient verbalizes understanding  Counseling / Coordination of Care    Total floor / unit time spent today 20 minutes  Greater than 50% of total time was spent with the patient and / or family counseling and / or coordination of care  A description of the counseling / coordination of care       Patient's Rights, confidentiality and exceptions to confidentiality, use of automated medical record, [facility] staff access to medical record, and consent to treatment reviewed      Saima Melton PA-C

## 2019-06-30 LAB
GLUCOSE SERPL-MCNC: 158 MG/DL (ref 65–140)
GLUCOSE SERPL-MCNC: 164 MG/DL (ref 65–140)
GLUCOSE SERPL-MCNC: 246 MG/DL (ref 65–140)
GLUCOSE SERPL-MCNC: 344 MG/DL (ref 65–140)

## 2019-06-30 PROCEDURE — 82948 REAGENT STRIP/BLOOD GLUCOSE: CPT

## 2019-06-30 RX ADMIN — INSULIN LISPRO 8 UNITS: 100 INJECTION, SOLUTION INTRAVENOUS; SUBCUTANEOUS at 11:46

## 2019-06-30 RX ADMIN — MIDODRINE HYDROCHLORIDE 5 MG: 2.5 TABLET ORAL at 11:49

## 2019-06-30 RX ADMIN — MIDODRINE HYDROCHLORIDE 5 MG: 2.5 TABLET ORAL at 07:30

## 2019-06-30 RX ADMIN — DOCUSATE SODIUM 100 MG: 100 CAPSULE, LIQUID FILLED ORAL at 17:19

## 2019-06-30 RX ADMIN — MIDODRINE HYDROCHLORIDE 5 MG: 2.5 TABLET ORAL at 16:20

## 2019-06-30 RX ADMIN — FUROSEMIDE 40 MG: 40 TABLET ORAL at 16:21

## 2019-06-30 RX ADMIN — LITHIUM CARBONATE 450 MG: 300 CAPSULE, GELATIN COATED ORAL at 08:39

## 2019-06-30 RX ADMIN — INSULIN LISPRO 10 UNITS: 100 INJECTION, SOLUTION INTRAVENOUS; SUBCUTANEOUS at 11:46

## 2019-06-30 RX ADMIN — OLANZAPINE 15 MG: 5 TABLET, FILM COATED ORAL at 21:19

## 2019-06-30 RX ADMIN — INSULIN LISPRO 2 UNITS: 100 INJECTION, SOLUTION INTRAVENOUS; SUBCUTANEOUS at 16:22

## 2019-06-30 RX ADMIN — LACTULOSE 20 G: 10 SOLUTION ORAL at 08:40

## 2019-06-30 RX ADMIN — INSULIN LISPRO 6 UNITS: 100 INJECTION, SOLUTION INTRAVENOUS; SUBCUTANEOUS at 21:20

## 2019-06-30 RX ADMIN — DOCUSATE SODIUM 100 MG: 100 CAPSULE, LIQUID FILLED ORAL at 08:42

## 2019-06-30 RX ADMIN — POTASSIUM CHLORIDE 20 MEQ: 20 TABLET, EXTENDED RELEASE ORAL at 08:40

## 2019-06-30 RX ADMIN — LIDOCAINE 3 PATCH: 50 PATCH TOPICAL at 08:40

## 2019-06-30 RX ADMIN — INSULIN DETEMIR 14 UNITS: 100 INJECTION, SOLUTION SUBCUTANEOUS at 21:20

## 2019-06-30 RX ADMIN — MELATONIN TAB 3 MG 3 MG: 3 TAB at 21:19

## 2019-06-30 RX ADMIN — INSULIN LISPRO 2 UNITS: 100 INJECTION, SOLUTION INTRAVENOUS; SUBCUTANEOUS at 07:41

## 2019-06-30 NOTE — PROGRESS NOTES
Psychiatry Progress Note    Subjective: Interval History     The patient is lying in bed this morning  When asked how she feels patient states I feel irritable   Patient feels that there is nothing to live for  She continues to be depressed  She states she will never feel happy  Patient has been medication and meal compliant  Her sleep has been intermittent due to waking up to use the bathroom  Patient is on Lasix  Pt will have ECT 6 tomorrow      Behavior over the last 24 hours:  unchanged  Sleep: fair  Appetite: fair  Medication side effects: No  ROS: no complaints    Current medications:    Current Facility-Administered Medications:     bacitracin topical ointment 1 large application, 1 large application, Topical, BID PRN, Mara Willis PA-C, 1 large application at 18/57/54 1111    docusate sodium (COLACE) capsule 100 mg, 100 mg, Oral, BID, Mayra Olmstead MD, 100 mg at 06/30/19 4527    furosemide (LASIX) tablet 40 mg, 40 mg, Oral, BID (diuretic), Mayra Olmstead MD, 40 mg at 06/29/19 1633    insulin detemir (LEVEMIR) subcutaneous injection 14 Units, 14 Units, Subcutaneous, HS, Mayra Olmstead MD, 14 Units at 06/29/19 2102    insulin lispro (HumaLOG) 100 units/mL subcutaneous injection 10 Units, 10 Units, Subcutaneous, Daily Before Lunch, Mayra Olmstead MD, 10 Units at 06/29/19 1218    insulin lispro (HumaLOG) 100 units/mL subcutaneous injection 10 Units, 10 Units, Subcutaneous, BID AC, Mayra Olmstead MD, 10 Units at 06/30/19 0741    insulin lispro (HumaLOG) 100 units/mL subcutaneous injection 2-12 Units, 2-12 Units, Subcutaneous, 4x Daily (AC & HS), 2 Units at 06/30/19 0741 **AND** Fingerstick Glucose (POCT), , , 4x Daily AC and at bedtime, Richmond Flattery, DO    lactulose 20 g/30 mL oral solution 20 g, 20 g, Oral, Daily, Mayra Olmstead MD, 20 g at 06/30/19 0840    lidocaine (LIDODERM) 5 % patch 3 patch, 3 patch, Topical, Daily, Divya Willis PA-C, 3 patch at 06/30/19 200    lithium carbonate capsule 450 mg, 450 mg, Oral, After Breakfast, Anthony Hein PA-C, 450 mg at 06/30/19 0839    LORazepam (ATIVAN) 2 mg/mL injection 1 mg, 1 mg, Intramuscular, Q6H PRN, Natty Yanez MD    LORazepam (ATIVAN) tablet 1 mg, 1 mg, Oral, Q6H PRN, Anthony Hein PA-C, 1 mg at 06/29/19 2100    melatonin tablet 3 mg, 3 mg, Oral, HS, Crereji Blake PA-C, 3 mg at 06/29/19 2100    midodrine (PROAMATINE) tablet 5 mg, 5 mg, Oral, TID AC, Ciera Ayon MD, 5 mg at 06/30/19 0730    OLANZapine (ZyPREXA) IM injection 5 mg, 5 mg, Intramuscular, Q4H PRN, Natty Yanez MD    OLANZapine (ZyPREXA) tablet 15 mg, 15 mg, Oral, HS, Natty Yanez MD, 15 mg at 06/29/19 2059    OLANZapine (ZyPREXA) tablet 5 mg, 5 mg, Oral, Q4H PRN, Anthony Hein PA-C    ondansetron (ZOFRAN-ODT) dispersible tablet 4 mg, 4 mg, Oral, Q6H PRN, Ciera Ayon MD, 4 mg at 06/22/19 1354    potassium chloride (K-DUR,KLOR-CON) CR tablet 20 mEq, 20 mEq, Oral, Daily, Ciera Ayon MD, 20 mEq at 06/30/19 0840    sodium chloride 0 9 % infusion, 50 mL/hr, Intravenous, Continuous, Amena Borges MD, Last Rate: 0 mL/hr at 06/26/19 0654    spironolactone (ALDACTONE) tablet 50 mg, 50 mg, Oral, Daily, Ciera Ayon MD, 50 mg at 06/29/19 5918    traMADol (ULTRAM) tablet 25 mg, 25 mg, Oral, Q6H PRN, Anthony Hein PA-C    Current Problem List:    Patient Active Problem List   Diagnosis    Type 2 diabetes mellitus, with long-term current use of insulin [facility]    Hyperlipidemia    Asthma    Anemia of chronic disease    [facility]ancytopenia ([facility]    Bipolar 1 disorder ([facility]    Cirrhosis of liver with ascites ([facility] )    Essential hypertension    GERD (gastroesophageal reflux disease)    Venous stasis    Ambulatory dysfunction    Closed compression fracture of L3 lumbar vertebra    Compression fracture of L3 lumbar vertebra    Closed compression fracture of third lumbar vertebra ([facility]    Fall    Diabetic polyneuropathy associated with type 2 diabetes mellitus (HCC)    Iron deficiency anemia    Status post fall - Ambulatory dysfunction    Discharged from Hospice for Suicidal Ideation, Medically Complex    ESRD (end stage renal disease) ([facility] )    Preprocedural examination       Problem list reviewed 06/30/19     Objective:     Vital Signs:  Vitals:    06/29/19 0714 06/29/19 1513 06/29/19 2113 06/30/19 0723   BP: 139/64 123/60 112/54 (!) 91/46   BP Location: Right arm Right arm Right arm Right arm   Pulse: 81 85 85 86   Resp: 16 17 18 17   Temp: 98 3 °F (36 8 °C) 97 7 °F (36 5 °C) 98 4 °F (36 9 °C) 97 9 °F (36 6 °C)   TempSrc: Temporal Temporal Temporal Temporal   SpO2: 97% 97% 98% 97%   Weight:       Height:             Appearance:  age appropriate, casually dressed and disheveled   Behavior:  normal   Speech:  normal volume   Mood:  labile   Affect:  labile   Thought Process:  circumstantial   Thought Content:  normal   Perceptual Disturbances: None   Risk Potential: none   Sensorium:  person and place   Cognition:  grossly intact   Consciousness:  alert and awake    Attention: attention span and concentration were age appropriate   Intellect: average   Insight:  poor   Judgment: poor      Motor Activity: no abnormal movements       Labs:  Reviewed 06/30/19      Assessment / Plan:     Bipolar 1 disorder ([facility] )    Recommended Treatment:      Medication changes:  1) ECT 6 Monday  Non-pharmacological treatments  1) Continue with group therapy, milieu therapy and occupational therapy  Safety  1) Safety/communication plan established targeting dynamic risk factors above  2) Risks, benefits, and possible side effects of medications explained to patient and patient verbalizes understanding  Counseling / Coordination of Care    Total floor / unit time spent today 20 minutes  Greater than 50% of total time was spent with the patient and / or family counseling and / or coordination of care   A description of the counseling / coordination of care  Patient's Rights, confidentiality and exceptions to confidentiality, use of automated medical record, [facility] staff access to medical record, and consent to treatment reviewed      Epi Whatley PA-C

## 2019-06-30 NOTE — PLAN OF CARE
Problem: Alteration in Thoughts and Perception  Goal: Treatment Goal: Gain control of psychotic behaviors/thinking, reduce/eliminate presenting symptoms and demonstrate improved reality functioning upon discharge  Outcome: Progressing  Goal: Verbalize thoughts and feelings  Description  Interventions:  - Promote a nonjudgmental and trusting relationship with the patient through active listening and therapeutic communication  - Assess patient's level of functioning, behavior and potential for risk  - Engage patient in 1 on 1 interactions for a minimum of 15 minutes each session  - Encourage patient to express fears, feelings, frustrations, and discuss symptoms    - [place] patient to reality, help patient recognize reality-based thinking   - Administer medications as ordered and assess for potential side effects  - Provide the patient education related to the signs and symptoms of the illness and desired effects of prescribed medications  Outcome: Progressing  Goal: Refrain from acting on delusional thinking/internal stimuli  Description  Interventions:  - Monitor patient closely, per order   - Utilize least restrictive measures   - Set reasonable limits, give positive feedback for acceptable   - Administer medications as ordered and monitor of potential side effects  Outcome: Progressing  Goal: Agree to be compliant with medication regime, as prescribed and report medication side effects  Description  Interventions:  - Offer appropriate PRN medication and supervise ingestion; conduct aims, as needed   Outcome: Progressing  Goal: Attend and participate in unit activities, including therapeutic, recreational, and educational groups  Description  Interventions:  - Provide therapeutic and educational activities daily, encourage attendance and participation, and document same in the medical record   Outcome: Progressing  Goal: Recognize dysfunctional thoughts, communicate reality-based thoughts at the time of discharge  Description  Interventions:  - Provide medication and psycho-education to assist patient in compliance and developing insight into his/her illness   Outcome: Progressing  Goal: Complete daily ADLs, including personal hygiene independently, as able  Description  Interventions:  - Observe, teach, and assist patient with ADLS  - Monitor and promote a balance of rest/activity, with adequate nutrition and elimination   Outcome: Progressing

## 2019-06-30 NOTE — PROGRESS NOTES
Patient was pleasant and cooperative  this evening  Medication compliant  Patient also waits for assistance with ambulation  Bed alarm is on for safety  Her sleep is intermittent because she keeps waking for the bathroom  Vitals stable  Will continue on q 7 minute checks

## 2019-07-01 ENCOUNTER — APPOINTMENT (INPATIENT)
Dept: PREOP/PACU | Facility: HOSPITAL | Age: 74
DRG: 885 | End: 2019-07-01
Payer: COMMERCIAL

## 2019-07-01 ENCOUNTER — ANESTHESIA (INPATIENT)
Dept: PREOP/PACU | Facility: HOSPITAL | Age: 74
DRG: 885 | End: 2019-07-01
Payer: COMMERCIAL

## 2019-07-01 LAB
GLUCOSE SERPL-MCNC: 198 MG/DL (ref 65–140)
GLUCOSE SERPL-MCNC: 225 MG/DL (ref 65–140)
GLUCOSE SERPL-MCNC: 260 MG/DL (ref 65–140)
GLUCOSE SERPL-MCNC: 321 MG/DL (ref 65–140)

## 2019-07-01 PROCEDURE — 82948 REAGENT STRIP/BLOOD GLUCOSE: CPT

## 2019-07-01 PROCEDURE — 90870 ELECTROCONVULSIVE THERAPY: CPT

## 2019-07-01 PROCEDURE — GZB2ZZZ ELECTROCONVULSIVE THERAPY, BILATERAL-SINGLE SEIZURE: ICD-10-PCS | Performed by: PSYCHIATRY & NEUROLOGY

## 2019-07-01 RX ORDER — SODIUM CHLORIDE 9 MG/ML
50 INJECTION, SOLUTION INTRAVENOUS CONTINUOUS
Status: DISCONTINUED | OUTPATIENT
Start: 2019-07-01 | End: 2019-07-06

## 2019-07-01 RX ORDER — SUCCINYLCHOLINE/SOD CL,ISO/PF 100 MG/5ML
SYRINGE (ML) INTRAVENOUS AS NEEDED
Status: DISCONTINUED | OUTPATIENT
Start: 2019-07-01 | End: 2019-07-01 | Stop reason: SURG

## 2019-07-01 RX ORDER — ESMOLOL HYDROCHLORIDE 10 MG/ML
INJECTION INTRAVENOUS AS NEEDED
Status: DISCONTINUED | OUTPATIENT
Start: 2019-07-01 | End: 2019-07-01 | Stop reason: SURG

## 2019-07-01 RX ORDER — GLYCOPYRROLATE 0.2 MG/ML
INJECTION INTRAMUSCULAR; INTRAVENOUS AS NEEDED
Status: DISCONTINUED | OUTPATIENT
Start: 2019-07-01 | End: 2019-07-01 | Stop reason: SURG

## 2019-07-01 RX ADMIN — SPIRONOLACTONE 50 MG: 25 TABLET ORAL at 05:10

## 2019-07-01 RX ADMIN — OLANZAPINE 15 MG: 5 TABLET, FILM COATED ORAL at 21:38

## 2019-07-01 RX ADMIN — INSULIN LISPRO 4 UNITS: 100 INJECTION, SOLUTION INTRAVENOUS; SUBCUTANEOUS at 08:31

## 2019-07-01 RX ADMIN — FUROSEMIDE 40 MG: 40 TABLET ORAL at 08:30

## 2019-07-01 RX ADMIN — MELATONIN TAB 3 MG 3 MG: 3 TAB at 21:38

## 2019-07-01 RX ADMIN — MIDODRINE HYDROCHLORIDE 5 MG: 2.5 TABLET ORAL at 05:16

## 2019-07-01 RX ADMIN — ESMOLOL HYDROCHLORIDE 50 MG: 10 INJECTION, SOLUTION INTRAVENOUS at 06:26

## 2019-07-01 RX ADMIN — INSULIN DETEMIR 14 UNITS: 100 INJECTION, SOLUTION SUBCUTANEOUS at 21:34

## 2019-07-01 RX ADMIN — Medication 60 MG: at 06:19

## 2019-07-01 RX ADMIN — INSULIN LISPRO 10 UNITS: 100 INJECTION, SOLUTION INTRAVENOUS; SUBCUTANEOUS at 12:22

## 2019-07-01 RX ADMIN — DOCUSATE SODIUM 100 MG: 100 CAPSULE, LIQUID FILLED ORAL at 17:53

## 2019-07-01 RX ADMIN — INSULIN LISPRO 2 UNITS: 100 INJECTION, SOLUTION INTRAVENOUS; SUBCUTANEOUS at 18:02

## 2019-07-01 RX ADMIN — Medication 80 MG: at 06:20

## 2019-07-01 RX ADMIN — MIDODRINE HYDROCHLORIDE 5 MG: 2.5 TABLET ORAL at 17:53

## 2019-07-01 RX ADMIN — LACTULOSE 20 G: 10 SOLUTION ORAL at 08:30

## 2019-07-01 RX ADMIN — MIDODRINE HYDROCHLORIDE 5 MG: 2.5 TABLET ORAL at 12:21

## 2019-07-01 RX ADMIN — POTASSIUM CHLORIDE 20 MEQ: 20 TABLET, EXTENDED RELEASE ORAL at 08:30

## 2019-07-01 RX ADMIN — LITHIUM CARBONATE 450 MG: 300 CAPSULE, GELATIN COATED ORAL at 08:30

## 2019-07-01 RX ADMIN — INSULIN LISPRO 6 UNITS: 100 INJECTION, SOLUTION INTRAVENOUS; SUBCUTANEOUS at 12:22

## 2019-07-01 RX ADMIN — SODIUM CHLORIDE 50 ML/HR: 0.9 INJECTION, SOLUTION INTRAVENOUS at 06:00

## 2019-07-01 RX ADMIN — INSULIN LISPRO 8 UNITS: 100 INJECTION, SOLUTION INTRAVENOUS; SUBCUTANEOUS at 21:40

## 2019-07-01 RX ADMIN — GLYCOPYRROLATE 0.2 MG: 0.2 INJECTION, SOLUTION INTRAMUSCULAR; INTRAVENOUS at 06:15

## 2019-07-01 NOTE — PROGRESS NOTES
Clinical Pharmacy Note: [place] [facility]     Gaby Biswas is a 75 y o  female who presents with increased depression, threats of suicide noncompliance with her treatment including not taking medications    Assessment/Plan:    Lithium indication: bipolar disorder maintenance  Enzo Reveles is currently taking 450 mg lithium carbonate capsule once daily  Lithium concentration is 0 5 mmol/L drawn at steady state  Continue current-dose/current dosing      The pharmacist has checked for drug interactions, lithium levels, kidney function, thyroid function  YES    Pharmacy Recommendations:   Continue current regimen  May increase total daily dose by 150 mg if therapeutically necessary  Monitoring:    Monitor for renal and thyroid dysfunction, skin reactions (acne), weight gain, and diabetes insipidus  Certain medications increase lithium levels and should be avoided such as diuretics, NSAIDS (excluding sulindac), and ACE-I/ARBs  Medications such as theophylline and caffeine may decrease lithium levels  Patient should maintain consistent adequate hydration and consistent caffeine and sodium intake  Lithium:  0   Lab Value Date/Time    LITHIUM 0 5 (L) 06/27/2019 0441       Renal:  0   Lab Value Date/Time    BUN 16 06/28/2019 0501    CREATININE 0 59 (L) 06/28/2019 0501       Thyroid:  0   Lab Value Date/Time    [id number] 1 560 06/06/2019 0447    FREET4 1 22 11/04/2017 0522       Weight:  Wt Readings from Last 5 Encounters:   06/28/19 59 5 kg (131 lb 3 2 oz)   06/01/19 68 kg (149 lb 14 6 oz)   05/27/19 59 4 kg (130 lb 15 3 oz)   04/06/19 59 4 kg (130 lb 15 3 oz)   03/27/19 61 5 kg (135 lb 9 3 oz)       Lithium Levels    Therapeutic lithium levels are 0 6-1 2 mmol/L, but target range may be 0 8-1 2 mmol/L for acute omar  Levels above 1 5 mmol/L indicate toxicity, although toxicity may be associated with levels within therapeutic ranges based on symptoms         Mild GI discomfort and slight tremor are typical when initiating lithium, but if these symptoms do not resolve or any other signs of toxicity occur, assess lithium levels immediately  Toxicity    Signs of toxicity include: confusion, difficulty concentrating, vomiting, diarrhea, poor coordination, tremor, abnormal heart rhythm, seizures and coma  Pharmacy will continue to follow patient with team, thank you      Electronically signed by: Latoya Shahid, Pharmacist

## 2019-07-01 NOTE — PROGRESS NOTES
Psychiatry Progress Note    Subjective: Interval History     The patient is more pleasant this morning  Her mood continues to be labile  She had ECT 6 today with 8 treatments scheduled  She continues to be medication and meal compliant  She continues to have disrupted sleep due to getting up to use the restroom  She is on Lasix b i d  No behavioral issues noted at this time  Patient is not expressing suicidal ideations at this time      Behavior over the last 24 hours:  unchanged  Sleep: fair  Appetite: fair  Medication side effects: No  ROS: no complaints    Current medications:    Current Facility-Administered Medications:     bacitracin topical ointment 1 large application, 1 large application, Topical, BID PRN, Zo Willis PA-C, 1 large application at 66/63/44 1111    docusate sodium (COLACE) capsule 100 mg, 100 mg, Oral, BID, Jeny Mejía MD, 100 mg at 06/30/19 1719    furosemide (LASIX) tablet 40 mg, 40 mg, Oral, BID (diuretic), Jeny Mejía MD, 40 mg at 07/01/19 0830    insulin detemir (LEVEMIR) subcutaneous injection 14 Units, 14 Units, Subcutaneous, HS, Jeny Mejía MD, 14 Units at 06/30/19 2120    insulin lispro (HumaLOG) 100 units/mL subcutaneous injection 10 Units, 10 Units, Subcutaneous, Daily Before Lunch, Jeny Mejía MD, 10 Units at 06/30/19 1146    insulin lispro (HumaLOG) 100 units/mL subcutaneous injection 10 Units, 10 Units, Subcutaneous, BID AC, Jeny Mejía MD, 10 Units at 07/01/19 0830    insulin lispro (HumaLOG) 100 units/mL subcutaneous injection 2-12 Units, 2-12 Units, Subcutaneous, 4x Daily (AC & HS), 4 Units at 07/01/19 0831 **AND** Fingerstick Glucose (POCT), , , 4x Daily AC and at bedtime, Lani Bile, DO    lactulose 20 g/30 mL oral solution 20 g, 20 g, Oral, Daily, Jeny Mejía MD, 20 g at 07/01/19 0830    lidocaine (LIDODERM) 5 % patch 3 patch, 3 patch, Topical, Daily, Divya Willis PA-C, 3 patch at 06/30/19 0840    lithium carbonate capsule 450 mg, 450 mg, Oral, After Breakfast, Patricia Reich PA-C, 450 mg at 07/01/19 0830    LORazepam (ATIVAN) 2 mg/mL injection 1 mg, 1 mg, Intramuscular, Q6H PRN, Amara Bright MD    LORazepam (ATIVAN) tablet 1 mg, 1 mg, Oral, Q6H PRN, Valeri Osborn PA-C, 1 mg at 06/29/19 2100    melatonin tablet 3 mg, 3 mg, Oral, HS, Sudhakar Shirley PA-C, 3 mg at 06/30/19 2119    midodrine (PROAMATINE) tablet 5 mg, 5 mg, Oral, TID AC, Samia Swenson MD, 5 mg at 07/01/19 0516    OLANZapine (ZyPREXA) IM injection 5 mg, 5 mg, Intramuscular, Q4H PRN, Amara Bright MD    OLANZapine (ZyPREXA) tablet 15 mg, 15 mg, Oral, HS, Amara Bright MD, 15 mg at 06/30/19 2119    OLANZapine (ZyPREXA) tablet 5 mg, 5 mg, Oral, Q4H PRN, Valeri Osborn PA-C    ondansetron (ZOFRAN-ODT) dispersible tablet 4 mg, 4 mg, Oral, Q6H PRN, Samia Swenson MD, 4 mg at 06/22/19 1354    potassium chloride (K-DUR,KLOR-CON) CR tablet 20 mEq, 20 mEq, Oral, Daily, Samia Swenson MD, 20 mEq at 07/01/19 0830    sodium chloride 0 9 % infusion, 50 mL/hr, Intravenous, Continuous, Good Harding MD, Last Rate: 0 mL/hr at 06/26/19 0654    sodium chloride 0 9 % infusion, 50 mL/hr, Intravenous, Continuous, Mindy Mcallister MD, Stopped at 07/01/19 2763    spironolactone (ALDACTONE) tablet 50 mg, 50 mg, Oral, Daily, Samia Swenson MD, 50 mg at 07/01/19 0510    traMADol (ULTRAM) tablet 25 mg, 25 mg, Oral, Q6H PRN, Valeri Osborn PA-C    Current Problem List:    Patient Active Problem List   Diagnosis    Type 2 diabetes mellitus, with long-term current use of insulin ([facility] )    Hyperlipidemia    Asthma    Anemia of chronic disease    [facility]    [facility]    Cirrhosis of liver with ascites ([facility]    Essential hypertension    GERD (gastroesophageal reflux disease)    Venous stasis    Ambulatory dysfunction    Closed compression fracture of L3 lumbar vertebra    Compression fracture of L3 lumbar vertebra    Closed compression fracture of third lumbar vertebra (HCC)    Fall    Diabetic polyneuropathy associated with type 2 diabetes mellitus ([facility] )    Iron deficiency anemia    Status post fall - Ambulatory dysfunction    Discharged from Hospice for Suicidal Ideation, Medically Complex    ESRD (end stage renal disease) ([facility] )    Preprocedural examination       Problem list reviewed 07/01/19     Objective:     Vital Signs:  Vitals:    07/01/19 0430 07/01/19 0545 07/01/19 0636 07/01/19 0647   BP: 123/57 135/60 (!) 196/96 128/76   BP Location: Right arm      Pulse: 79 76 85    Resp: 16 20 20    Temp: 97 9 °F (36 6 °C)      TempSrc: Temporal      SpO2: 97% 98% 95%    Weight:       Height:             Appearance:  age appropriate, casually dressed and disheveled   Behavior:  normal   Speech:  normal volume   Mood:  labile   Affect:  labile   Thought Process:  circumstantial   Thought Content:  normal   Perceptual Disturbances: None   Risk Potential: none   Sensorium:  person and place   Cognition:  grossly intact   Consciousness:  alert and awake    Attention: attention span and concentration were age appropriate   Intellect: average   Insight:  poor   Judgment: poor      Motor Activity: no abnormal movements       Labs:  Reviewed 07/01/19      Assessment / Plan:     Bipolar 1 disorder ([place] [address] )    Recommended Treatment:      Medication changes:  1) ECT 7 Wednesday  Continue current medication regimen  Non-pharmacological treatments  1) Continue with group therapy, milieu therapy and occupational therapy  Safety  1) Safety/communication plan established targeting dynamic risk factors above  2) Risks, benefits, and possible side effects of medications explained to patient and patient verbalizes understanding  Counseling / Coordination of Care    Total floor / unit time spent today 20 minutes   Greater than 50% of total time was spent with the patient and / or family counseling and / or coordination of care  A description of the counseling / coordination of care  Patient's Rights, confidentiality and exceptions to confidentiality, use of automated medical record, [facility] staff access to medical record, and consent to treatment reviewed      Everardo Gerber PA-C

## 2019-07-01 NOTE — ANESTHESIA POSTPROCEDURE EVALUATION
Post-Op Assessment Note    CV Status:  Stable       Mental Status:  Somnolent   Hydration Status:  Stable   PONV Controlled:  Controlled   Airway Patency:  Patent   Post Op Vitals Reviewed: Yes      Staff: CRNA           BP      Temp      Pulse     Resp      SpO2

## 2019-07-01 NOTE — PROGRESS NOTES
07/01/19 0900   Team Meeting   Meeting Type Daily Rounds   Team Members Present   Team Members Present Physician;Nurse;;Occupational Therapist   Physician Team Member Dr Stephanie Del Valle, [address] [facility]    Nursing Team Member [facility], [address] [facility] Management Team Member Juan Gale    OT Team Member Guillermina Orlando      Pt discussed at treatment rounds today, no medication changes made

## 2019-07-01 NOTE — PROGRESS NOTES
Patient was pleasant and cooperative this evening  She appears to be depressed  She denied any needs  Vitals are stable  Bed/chair alarm for safety  Will continue on q 7 minute checks

## 2019-07-01 NOTE — PROGRESS NOTES
Pt calm, cooperative, and medication compliant  Denies SI/HI and brighten with approach  Will continue to monitor

## 2019-07-01 NOTE — PROGRESS NOTES
07/01/19 1000   Activity/Group Checklist   Group Other (Comment)  ("Greet the Day" group)   Attendance Did not attend

## 2019-07-01 NOTE — PROGRESS NOTES
Beginning of this shift patient was anxiety, and made several trips to bathroom  Patient continues to acusse every body of wrongdoing  Currently, patient is resting in bed comfortably with eyes closed  No s/s of respiratory distress noted  No acute medical concern reported  No sign of pain or discomfort observed  Will continue to monitor patient

## 2019-07-01 NOTE — PROCEDURES
ECT Procedure    Patient Name:  Bradly Stewart   Medical Record Number: [id number]   YOB: 1945  Date of Procedure: 7/1/2019    Time out was taken with staff to confirm correct patient and correct procedure to be performed      Diagnosis: Bipolar 1 disorder ([facility] )    Treatment Number: 6    ECT Type: Inpatient    Electrode Placement: bilateral    Post Ictal Suppression Index:  41 %    % Energy Set: 40    Seizure Duration (OBS): 49 sec    Additional Notes: uneventful    Magalys Benavidez MD

## 2019-07-02 ENCOUNTER — ANESTHESIA EVENT (INPATIENT)
Dept: PREOP/PACU | Facility: HOSPITAL | Age: 74
DRG: 885 | End: 2019-07-02
Payer: COMMERCIAL

## 2019-07-02 LAB
GLUCOSE SERPL-MCNC: 107 MG/DL (ref 65–140)
GLUCOSE SERPL-MCNC: 161 MG/DL (ref 65–140)
GLUCOSE SERPL-MCNC: 304 MG/DL (ref 65–140)
GLUCOSE SERPL-MCNC: 358 MG/DL (ref 65–140)

## 2019-07-02 PROCEDURE — 82948 REAGENT STRIP/BLOOD GLUCOSE: CPT

## 2019-07-02 PROCEDURE — 99232 SBSQ HOSP IP/OBS MODERATE 35: CPT | Performed by: FAMILY MEDICINE

## 2019-07-02 RX ORDER — FUROSEMIDE 40 MG/1
40 TABLET ORAL DAILY
Status: DISCONTINUED | OUTPATIENT
Start: 2019-07-03 | End: 2019-07-08 | Stop reason: HOSPADM

## 2019-07-02 RX ADMIN — MIDODRINE HYDROCHLORIDE 5 MG: 2.5 TABLET ORAL at 16:38

## 2019-07-02 RX ADMIN — INSULIN LISPRO 10 UNITS: 100 INJECTION, SOLUTION INTRAVENOUS; SUBCUTANEOUS at 11:51

## 2019-07-02 RX ADMIN — SPIRONOLACTONE 50 MG: 25 TABLET ORAL at 09:04

## 2019-07-02 RX ADMIN — DOCUSATE SODIUM 100 MG: 100 CAPSULE, LIQUID FILLED ORAL at 09:03

## 2019-07-02 RX ADMIN — FUROSEMIDE 40 MG: 40 TABLET ORAL at 09:03

## 2019-07-02 RX ADMIN — INSULIN DETEMIR 18 UNITS: 100 INJECTION, SOLUTION SUBCUTANEOUS at 21:10

## 2019-07-02 RX ADMIN — FUROSEMIDE 40 MG: 40 TABLET ORAL at 16:38

## 2019-07-02 RX ADMIN — INSULIN LISPRO 10 UNITS: 100 INJECTION, SOLUTION INTRAVENOUS; SUBCUTANEOUS at 21:09

## 2019-07-02 RX ADMIN — LIDOCAINE 3 PATCH: 50 PATCH TOPICAL at 09:06

## 2019-07-02 RX ADMIN — POTASSIUM CHLORIDE 20 MEQ: 20 TABLET, EXTENDED RELEASE ORAL at 09:03

## 2019-07-02 RX ADMIN — INSULIN LISPRO 8 UNITS: 100 INJECTION, SOLUTION INTRAVENOUS; SUBCUTANEOUS at 11:51

## 2019-07-02 RX ADMIN — LITHIUM CARBONATE 450 MG: 300 CAPSULE, GELATIN COATED ORAL at 09:03

## 2019-07-02 RX ADMIN — MIDODRINE HYDROCHLORIDE 5 MG: 2.5 TABLET ORAL at 06:15

## 2019-07-02 RX ADMIN — MELATONIN TAB 3 MG 3 MG: 3 TAB at 21:07

## 2019-07-02 RX ADMIN — INSULIN LISPRO 2 UNITS: 100 INJECTION, SOLUTION INTRAVENOUS; SUBCUTANEOUS at 09:05

## 2019-07-02 RX ADMIN — OLANZAPINE 15 MG: 5 TABLET, FILM COATED ORAL at 20:43

## 2019-07-02 RX ADMIN — LACTULOSE 20 G: 10 SOLUTION ORAL at 09:03

## 2019-07-02 NOTE — ASSESSMENT & PLAN NOTE
Lab Results   Component Value Date    HGBA1C 10 1 (H) 02/13/2019       Recent Labs     07/01/19 2010 07/02/19  0749 07/02/19  1119 07/02/19  1519   POCGLU 321* 161* 304* 107       Blood Sugar Average: Last 72 hrs:  (P) 835 [id number]    patient's appetite is variable  She frequently misses meals  Will increase insulin doses slowly and continue insulin sliding scale as well for now

## 2019-07-02 NOTE — CASE MANAGEMENT
Spoke with Cindy from [employer] for [facility], [phone]  Cindy stated that court/ dismissed the case due to the alloted time not provided  However, Cindy informed writer that the landlord was coming back tomorrow to file paperwork to begin the eviction process  Writer explained that regardless, an extension would need to be given as the patient is still in the hospital and is unable to be given the paperwork properly and/or be able to attend any court hearings due to her hospitalization and unknown discharge date  Cindy asked for another letter with the doctors name on it confirming that she is still there  Writer successfully completed and faxed letter back to [employer] to the fax number of [phone]  Per Cindy she will be faxing over the paperwork stating the dismissal and any other information for the patient  CM made assigned  aware and will continue to follow and provide services as needed

## 2019-07-02 NOTE — PROGRESS NOTES
Psychiatry Progress Note    Subjective: Interval History     The patient is lying in bed this morning  Staff states she sleeps at intervals and gets up frequently to urinate  Will consult medical today to see if they can change Lasix timing due to frequency of urination at night  Patient continues to be medication and meal compliant  Her mood is labile but slowly improving  She is pleasant during discussion this morning  She does continue to be paranoid at times regarding certain staff members  She will have ECT 7 tomorrow with 8 scheduled      Behavior over the last 24 hours:  unchanged  Sleep: fair  Appetite: fair  Medication side effects: No  ROS: no complaints    Current medications:    Current Facility-Administered Medications:     bacitracin topical ointment 1 large application, 1 large application, Topical, BID PRN, Violette Willis PA-C, 1 large application at 74/35/67 1111    docusate sodium (COLACE) capsule 100 mg, 100 mg, Oral, BID, Aaron Allison MD, 100 mg at 07/01/19 1753    furosemide (LASIX) tablet 40 mg, 40 mg, Oral, BID (diuretic), Aaron Allison MD, 40 mg at 07/01/19 0830    insulin detemir (LEVEMIR) subcutaneous injection 14 Units, 14 Units, Subcutaneous, HS, Aaron Allison MD, 14 Units at 07/01/19 2134    insulin lispro (HumaLOG) 100 units/mL subcutaneous injection 10 Units, 10 Units, Subcutaneous, Daily Before Lunch, Aaron Allison MD, 10 Units at 07/01/19 1222    insulin lispro (HumaLOG) 100 units/mL subcutaneous injection 10 Units, 10 Units, Subcutaneous, BID AC, Aaron Allison MD, 10 Units at 07/01/19 1801    insulin lispro (HumaLOG) 100 units/mL subcutaneous injection 2-12 Units, 2-12 Units, Subcutaneous, 4x Daily (AC & HS), 8 Units at 07/01/19 2140 **AND** Fingerstick Glucose (POCT), , , 4x Daily AC and at bedtime, Zachariah Martinez DO    lactulose 20 g/30 mL oral solution 20 g, 20 g, Oral, Daily, Aaron Allison MD, 20 g at 07/01/19 0830    lidocaine (LIDODERM) 5 % patch 3 patch, 3 patch, Topical, Daily, Divya Willis PA-C, Stopped at 07/01/19 1137    lithium carbonate capsule 450 mg, 450 mg, Oral, After Breakfast, Jose Gutierrez PA-C, 450 mg at 07/01/19 0830    LORazepam (ATIVAN) 2 mg/mL injection 1 mg, 1 mg, Intramuscular, Q6H PRN, Merlin Cam MD    LORazepam (ATIVAN) tablet 1 mg, 1 mg, Oral, Q6H PRN, Jose Gutierrez PA-C, 1 mg at 06/29/19 2100    melatonin tablet 3 mg, 3 mg, Oral, HS, Maria G Gilmore PA-C, 3 mg at 07/01/19 2138    midodrine (PROAMATINE) tablet 5 mg, 5 mg, Oral, TID AC, Fernanda Malik MD, 5 mg at 07/02/19 0615    OLANZapine (ZyPREXA) IM injection 5 mg, 5 mg, Intramuscular, Q4H PRN, Merlin Cam MD    OLANZapine (ZyPREXA) tablet 15 mg, 15 mg, Oral, HS, Merlin Cam MD, 15 mg at 07/01/19 2138    OLANZapine (ZyPREXA) tablet 5 mg, 5 mg, Oral, Q4H PRN, Jose Gutierrez PA-C    ondansetron (ZOFRAN-ODT) dispersible tablet 4 mg, 4 mg, Oral, Q6H PRN, Fernanda Malik MD, 4 mg at 06/22/19 1354    potassium chloride (K-DUR,KLOR-CON) CR tablet 20 mEq, 20 mEq, Oral, Daily, Fernanda Malik MD, 20 mEq at 07/01/19 0830    sodium chloride 0 9 % infusion, 50 mL/hr, Intravenous, Continuous, Arely Collazo MD, Last Rate: 0 mL/hr at 06/26/19 0654    sodium chloride 0 9 % infusion, 50 mL/hr, Intravenous, Continuous, Keiko Gonsalves MD, Stopped at 07/01/19 7607    spironolactone (ALDACTONE) tablet 50 mg, 50 mg, Oral, Daily, Fernanda Malik MD, 50 mg at 07/01/19 0510    traMADol (ULTRAM) tablet 25 mg, 25 mg, Oral, Q6H PRN, Jose Gutierrez PA-C    Current Problem List:    Patient Active Problem List   Diagnosis    Type 2 diabetes mellitus, with long-term current use of insulin [facility]    Hyperlipidemia    Asthma    Anemia of chronic disease    [facility]ancytopenia ([facility]    Bipolar 1 disorder ([facility]    Cirrhosis of liver with ascites ([facility] )    Essential hypertension    GERD (gastroesophageal reflux disease)    Venous stasis    Ambulatory dysfunction    Closed compression fracture of L3 lumbar vertebra    Compression fracture of L3 lumbar vertebra    Closed compression fracture of third lumbar vertebra (HCC)    Fall    Diabetic polyneuropathy associated with type 2 diabetes mellitus (HCC)    Iron deficiency anemia    Status post fall - Ambulatory dysfunction    Discharged from Hospice for Suicidal Ideation, Medically Complex    ESRD (end stage renal disease) ([facility] )    Preprocedural examination       Problem list reviewed 07/02/19     Objective:     Vital Signs:  Vitals:    07/01/19 1134 07/01/19 1522 07/01/19 1528 07/02/19 0734   BP: 117/53 106/59 104/52 119/58   BP Location: Right arm Right arm Right arm Right arm   Pulse: 84 85 83 84   Resp:  16 16 16   Temp:  97 7 °F (36 5 °C)  97 9 °F (36 6 °C)   TempSrc:  Tympanic  Tympanic   SpO2:  100% 98% 96%   Weight:       Height:             Appearance:  age appropriate, casually dressed and disheveled   Behavior:  normal   Speech:  normal volume   Mood:  labile   Affect:  labile   Thought Process:  circumstantial   Thought Content:  normal   Perceptual Disturbances: None   Risk Potential: none   Sensorium:  person and place   Cognition:  grossly intact   Consciousness:  alert and awake    Attention: attention span and concentration were age appropriate   Intellect: average   Insight:  poor   Judgment: poor      Motor Activity: no abnormal movements       Labs:  Reviewed 07/02/19      Assessment / Plan:     Bipolar 1 disorder ([facility] )    Recommended Treatment:      Medication changes:  1) ECT 7 Wednesday  Continue current medication regimen  Non-pharmacological treatments  1) Continue with group therapy, milieu therapy and occupational therapy  Safety  1) Safety/communication plan established targeting dynamic risk factors above  2) Risks, benefits, and possible side effects of medications explained to patient and patient verbalizes understanding        Counseling / Coordination of Care    Total floor / unit time spent today 20 minutes  Greater than 50% of total time was spent with the patient and / or family counseling and / or coordination of care  A description of the counseling / coordination of care  Patient's Rights, confidentiality and exceptions to confidentiality, use of automated medical record, [facility] staff access to medical record, and consent to treatment reviewed      Valeri Osborn PA-C

## 2019-07-02 NOTE — ANESTHESIA PREPROCEDURE EVALUATION
Review of Systems/Medical History  Patient summary reviewed  Chart reviewed      Cardiovascular  Exercise tolerance (METS): <4,  Hyperlipidemia, Hypertension ,    Pulmonary  Asthma ,        GI/Hepatic    GERD , Liver disease , cirrhosis,        Negative  ROS        Endo/Other  Diabetes ,      GYN  Negative gynecology ROS          Hematology  Anemia ,     Musculoskeletal  Negative musculoskeletal ROS        Neurology  Negative neurology ROS      Psychology   Depression ,              Physical Exam    Airway    Mallampati score: II  TM Distance: >3 FB  Neck ROM: full     Dental       Cardiovascular  Cardiovascular exam normal    Pulmonary  Pulmonary exam normal     Other Findings        Anesthesia Plan  ASA Score- 3     Anesthesia Type- general with ASA Monitors  Additional Monitors:   Airway Plan:         Plan Factors-    Induction-     Postoperative Plan-     Informed Consent- Anesthetic plan and risks discussed with patient  I personally reviewed this patient with the CRNA  Discussed and agreed on the Anesthesia Plan with the CRNA  Francisca Arreola

## 2019-07-02 NOTE — NURSING NOTE
Patient remains in observation room, on contact isolation  Awake sitting in chair this afternoon,  cooperative on approach without irritability, less impulsive and denies any concerns  Scheduled for ECT in am, to wash up this evening  Heplock to left FA intact, flushed and patent  Compliant with meals and medications except refused colace  Labs, orders and vital signs have been reviewed  On routine checks, will continue to monitor

## 2019-07-02 NOTE — PROGRESS NOTES
Pt cooperative with care after encouragement  Pt med compliant  Contact isolation for  VRE in urine maintained per order  Pt noted to be labile, irritable, and slapped staff member's hand at one point in the shift  Pt was agitated, then immediately after was apologetic  Pt reported wishing to be dead but stated she will not act on this / reports no HI AH or VH  IV in left forearm intact with no issues, infection or phlebitis  IV flushed with no resistance  Poor safety awareness noted  Pt educated on importance of safety while on unit  Bed alarm in place and bed in lowest position with side rails up x2

## 2019-07-02 NOTE — NURSING NOTE
Pt maintained on Contact Precautions  Mood is labile  Pt paranoid at times regarding select staff  Ambulating around room with walker  Very poor safety awareness and moves about without assistance  When reminded to ask become angry  Lasix held this evening d/t  Not meeting BP parameters  Frequently asking for snacks and needing redirection  Will continue and maintain q 7 min checks  Bed alarm place

## 2019-07-02 NOTE — ASSESSMENT & PLAN NOTE
Decompensated cirrhosis with ascites and lower extremity edema actually worsened her abdomen is distended her lower extremity edema +3 he she has been receiving all her medications  She did get some fluids postprocedural from ECT on Wednesday  She did get a therapeutic paracentesis on June 14  Continue Lasix and Aldactone,   also placed on midodrine continue the midodrine but increased to 5 mg t i d  to help improve blood pressures so she may get her diuretics blood pressures have actually been stable on the midodrine on current diuresis  Previous paracentesis 2 7 L removed  Will decrease Lasix to 40 mg once daily as the patient is having too much diuresis and is having trouble sleeping due to waking up at night to pee repeatedly    Will also check an ammonia level tomorrow along with BMP

## 2019-07-02 NOTE — CASE MANAGEMENT
Have placed a call to [facility] to give update and they have discharged this pt by policy, but according to Jany Miranda  They will accept her back and will have a bed for her  I will call Sharla Back at [phone] on Friday with further update

## 2019-07-02 NOTE — PROGRESS NOTES
Progress Note Viviana June [birth year], 75 y o  female MRN: [id number]    Unit/Bed#: Aleks Luis 046-18 Encounter: [id number]    Primary Care Provider: Ade Cartwright MD   Date and time admitted to hospital: 6/4/2019  2:35 PM        Cirrhosis of liver with ascites ([address] )  Assessment & Plan  Decompensated cirrhosis with ascites and lower extremity edema actually worsened her abdomen is distended her lower extremity edema +3 he she has been receiving all her medications  She did get some fluids postprocedural from ECT on Wednesday  She did get a therapeutic paracentesis on June 14  Continue Lasix and Aldactone,   also placed on midodrine continue the midodrine but increased to 5 mg t i d  to help improve blood pressures so she may get her diuretics blood pressures have actually been stable on the midodrine on current diuresis  Previous paracentesis 2 7 L removed  Will decrease Lasix to 40 mg once daily as the patient is having too much diuresis and is having trouble sleeping due to waking up at night to pee repeatedly  Will also check an ammonia level tomorrow along with BMP    Essential hypertension  Assessment & Plan  Controlled off medication    Type 2 diabetes mellitus, with long-term current use of insulin [facility])  Assessment & Plan  Lab Results   Component Value Date    HGBA1C 10 1 (H) 02/13/2019       Recent Labs     07/01/19 2010 07/02/19  0749 07/02/19  1119 07/02/19  1519   POCGLU 321* 161* 304* 107       Blood Sugar Average: Last 72 hrs:  (P) 021 [id number]    patient's appetite is variable  She frequently misses meals  Will increase insulin doses slowly and continue insulin sliding scale as well for now  VTE Pharmacologic Prophylaxis:   Pharmacologic: Pharmacologic VTE Prophylaxis contraindicated due to Liver cirrhosis  Mechanical VTE Prophylaxis in Place: No    Patient Centered Rounds: I have performed bedside rounds with nursing staff today      Discussions with Specialists or Other Care Team Provider:  None    Education and Discussions with Family / Patient:  Discussed with patient at bedside    Time Spent for Care: 30 minutes  More than 50% of total time spent on counseling and coordination of care as described above  Current Length of Stay: 28 day(s)    Current Patient Status: Inpatient Psych   Certification Statement: The patient will continue to require additional inpatient hospital stay due to Behavioral health    Discharge Plan:  As per treatment team    Code Status: Level 3 - DNAR and DNI      Subjective:   Patient is somnolent but arousable  States that she is troubled by having to wake up all night to go Pee repeatedly    Objective:     Vitals:   Temp (24hrs), Av °F (36 7 °C), Min:97 9 °F (36 6 °C), Max:98 °F (36 7 °C)    Temp:  [97 9 °F (36 6 °C)-98 °F (36 7 °C)] 98 °F (36 7 °C)  HR:  [84-96] 96  Resp:  [16-17] 17  BP: (117-120)/(55-58) 117/58  SpO2:  [96 %] 96 %  Body mass index is 28 39 kg/m²  Input and Output Summary (last 24 hours): Intake/Output Summary (Last 24 hours) at 2019 1701  Last data filed at 2019 1220  Gross per 24 hour   Intake 970 ml   Output    Net 970 ml       Physical Exam:     Physical Exam   Constitutional: She appears well-developed  She appears distressed  HENT:   Head: Normocephalic and atraumatic  Mouth/Throat: Oropharynx is clear and moist    Eyes: Conjunctivae and EOM are normal    Neck: Normal range of motion  Neck supple  Cardiovascular: Normal rate, regular rhythm and normal heart sounds  Pulmonary/Chest: Effort normal and breath sounds normal    Abdominal: Soft  Bowel sounds are normal  She exhibits no mass  There is no tenderness  There is no rebound and no guarding  Genitourinary:   Genitourinary Comments: deferred   Musculoskeletal: She exhibits edema  Neurological: She has normal reflexes  Somnolent but arousable   Skin: Skin is warm and dry  No rash noted     Nursing note and vitals reviewed  Additional Data:     Labs:        Results from last 7 days   Lab Units 06/28/19  0501   SODIUM mmol/L 138   POTASSIUM mmol/L 3 5*   CHLORIDE mmol/L 105   CO2 mmol/L 28   BUN mg/dL 16   CREATININE mg/dL 0 59*   ANION GAP mmol/L 5   CALCIUM mg/dL 9 8   GLUCOSE RANDOM mg/dL 189*         Results from last 7 days   Lab Units 07/02/19  1519 07/02/19  1119 07/02/19  0749 07/01/19 2010 07/01/19  1634 07/01/19  1123 07/01/19  0702 06/30/19  1958 06/30/19  1609 06/30/19  1107 06/30/19  0757 06/29/19  1945   POC GLUCOSE mg/dl 107 304* 161* 321* 198* 260* 225* 246* 158* 344* 164* 246*                   * I Have Reviewed All Lab Data Listed Above  * Additional Pertinent Lab Tests Reviewed:  [facility]    Imaging:    Imaging Reports Reviewed Today Include:  none  Imaging Personally Reviewed by Myself Includes:  None    Recent Cultures (last 7 days):           Last 24 Hours Medication List:     Current Facility-Administered Medications:  bacitracin 1 large application Topical BID PRN Divya Willis PA-C    docusate sodium 100 mg Oral BID Jane Duron MD    [START ON 7/3/2019] furosemide 40 mg Oral Daily Tayler Harris MD    insulin detemir 18 Units Subcutaneous HS Tayler Harris MD    [START ON 7/3/2019] insulin lispro 12 Units Subcutaneous TID With Meals Tayler Harris MD    insulin lispro 2-12 Units Subcutaneous 4x Daily (AC & HS) Tone Fernandez DO    lactulose 20 g Oral Daily Jane Duron MD    lidocaine 3 patch Topical Daily Divya Willis PA-C    lithium carbonate 450 mg Oral After Breakfast Indu Aparicio PA-C    LORazepam 1 mg Intramuscular Q6H PRN Darian Greene MD    LORazepam 1 mg Oral Q6H PRN Indu Aparicio PA-C    melatonin 3 mg Oral HS Inez Patten PA-C    midodrine 5 mg Oral TID AC Jane Duron MD    OLANZapine 5 mg Intramuscular Q4H PRN Darian Greene MD    OLANZapine 15 mg Oral HS aDrian Greene MD    OLANZapine 5 mg Oral Q4H PRN Carlos Magana HANANE Reich    ondansetron 4 mg Oral Q6H PRN Chavez Alan MD    potassium chloride 20 mEq Oral Daily Chavez Alan MD    sodium chloride 50 mL/hr Intravenous Continuous Abel Johnosn MD Last Rate: 0 mL/hr (06/26/19 0654)   sodium chloride 50 mL/hr Intravenous Continuous Bobo Castaneda MD Last Rate: Stopped (07/01/19 3332)   spironolactone 50 mg Oral Daily Chavez Alan MD    traMADol 25 mg Oral Q6H PRN Rolo Mosquera PA-C         Today, Patient Was Seen By: Maria Victoria Ross MD    ** Please Note: Dictation voice to text software may have been used in the creation of this document   **

## 2019-07-02 NOTE — PROGRESS NOTES
Sleeping in bed with bed alarm in place in observation room  Appears to be resting calmly at present  Awakes to use BR with walker and SBA, does not like assistance  No SI/HI verbalized  Contact precautions for VRE of urine  Saline lock intact left FA and flushed  Poor safety awareness

## 2019-07-02 NOTE — PROGRESS NOTES
Med compliant this AM Slept in intervals  Awake to use BR and returns to bed  Not voicing any SI's  On contact precautions

## 2019-07-02 NOTE — PLAN OF CARE
Problem: Alteration in Thoughts and Perception  Goal: Treatment Goal: Gain control of psychotic behaviors/thinking, reduce/eliminate presenting symptoms and demonstrate improved reality functioning upon discharge  Outcome: Progressing  Goal: Verbalize thoughts and feelings  Description  Interventions:  - Promote a nonjudgmental and trusting relationship with the patient through active listening and therapeutic communication  - Assess patient's level of functioning, behavior and potential for risk  - Engage patient in 1 on 1 interactions for a minimum of 15 minutes each session  - Encourage patient to express fears, feelings, frustrations, and discuss symptoms    - [place] patient to reality, help patient recognize reality-based thinking   - Administer medications as ordered and assess for potential side effects  - Provide the patient education related to the signs and symptoms of the illness and desired effects of prescribed medications  Outcome: Progressing  Goal: Refrain from acting on delusional thinking/internal stimuli  Description  Interventions:  - Monitor patient closely, per order   - Utilize least restrictive measures   - Set reasonable limits, give positive feedback for acceptable   - Administer medications as ordered and monitor of potential side effects  Outcome: Progressing  Goal: Agree to be compliant with medication regime, as prescribed and report medication side effects  Description  Interventions:  - Offer appropriate PRN medication and supervise ingestion; conduct aims, as needed   Outcome: Progressing  Goal: Attend and participate in unit activities, including therapeutic, recreational, and educational groups  Description  Interventions:  - Provide therapeutic and educational activities daily, encourage attendance and participation, and document same in the medical record   Outcome: Progressing  Goal: Recognize dysfunctional thoughts, communicate reality-based thoughts at the time of discharge  Description  Interventions:  - Provide medication and psycho-education to assist patient in compliance and developing insight into his/her illness   Outcome: Progressing  Goal: Complete daily ADLs, including personal hygiene independently, as able  Description  Interventions:  - Observe, teach, and assist patient with ADLS  - Monitor and promote a balance of rest/activity, with adequate nutrition and elimination   Outcome: Progressing     Problem: Risk for Self Injury/Neglect  Goal: Treatment Goal: Remain safe during length of stay, learn and adopt new coping skills, and be free of self-injurious ideation, impulses and acts at the time of discharge  Outcome: Progressing  Goal: Verbalize thoughts and feelings  Description  Interventions:  - Assess and re-assess patient's lethality and potential for self-injury  - Engage patient in 1:1 interactions, daily, for a minimum of 15 minutes  - Encourage patient to express feelings, fears, frustrations, hopes  - Establish rapport/trust with patient   Outcome: Progressing  Goal: Refrain from harming self  Description  Interventions:  - Monitor patient closely, per order  - Develop a trusting relationship  - Supervise medication ingestion, monitor effects and side effects   Outcome: Progressing  Goal: Attend and participate in unit activities, including therapeutic, recreational, and educational groups  Description  Interventions:  - Provide therapeutic and educational activities daily, encourage attendance and participation, and document same in the medical record  - Obtain collateral information, encourage visitation and family involvement in care   Outcome: Progressing  Goal: Recognize maladaptive responses and adopt new coping mechanisms  Outcome: Progressing  Goal: Complete daily ADLs, including personal hygiene independently, as able  Description  Interventions:  - Observe, teach, and assist patient with ADLS  - Monitor and promote a balance of rest/activity, with adequate nutrition and elimination  Outcome: Progressing     Problem: Prexisting or High Potential for Compromised Skin Integrity  Goal: Skin integrity is maintained or improved  Description  INTERVENTIONS:  - Identify patients at risk for skin breakdown  - Assess and monitor skin integrity  - Assess and monitor nutrition and hydration status  - Monitor labs (i e  albumin)  - Assess for incontinence   - Turn and reposition patient  - Assist with mobility/ambulation  - Relieve pressure over bony prominences  - Avoid friction and shearing  - Provide appropriate hygiene as needed including keeping skin clean and dry  - Evaluate need for skin moisturizer/barrier cream  - Collaborate with interdisciplinary team (i e  Nutrition, Rehabilitation, etc )   - Patient/family teaching  Outcome: Progressing     Problem: Nutrition/Hydration-ADULT  Goal: Nutrient/Hydration intake appropriate for improving, restoring or maintaining nutritional needs  Description  Monitor and assess patient's nutrition/hydration status for malnutrition (ex- brittle hair, bruises, dry skin, pale skin and conjunctiva, muscle wasting, smooth red tongue, and disorientation)  Collaborate with interdisciplinary team and initiate plan and interventions as ordered  Monitor patient's weight and dietary intake as ordered or per policy  Utilize nutrition screening tool and intervene per policy  Determine patient's food preferences and provide high-protein, high-caloric foods as appropriate       INTERVENTIONS:  - Monitor oral intake, urinary output, labs, and treatment plans  - Assess nutrition and hydration status and recommend course of action  - Evaluate amount of meals eaten  - Assist patient with eating if necessary   - Allow adequate time for meals  - Recommend/ encourage appropriate diets, oral nutritional supplements, and vitamin/mineral supplements  - Order, calculate, and assess calorie counts as needed  - Recommend, monitor, and adjust tube feedings and TPN/PPN based on assessed needs  - Assess need for intravenous fluids  - Provide specific nutrition/hydration education as appropriate  - Include patient/family/caregiver in decisions related to nutrition  Outcome: Progressing     Problem: Potential for Falls  Goal: Patient will remain free of falls  Description  INTERVENTIONS:  - Assess patient frequently for physical needs  -  Identify cognitive and physical deficits and behaviors that affect risk of falls    -  [place] fall precautions as indicated by assessment   - Educate patient/family on patient safety including physical limitations  - Instruct patient to call for assistance with activity based on assessment  - Modify environment to reduce risk of injury  - Consider OT/PT consult to assist with strengthening/mobility  Outcome: Progressing

## 2019-07-03 ENCOUNTER — ANESTHESIA (INPATIENT)
Dept: PREOP/PACU | Facility: HOSPITAL | Age: 74
DRG: 885 | End: 2019-07-03
Payer: COMMERCIAL

## 2019-07-03 ENCOUNTER — APPOINTMENT (INPATIENT)
Dept: PREOP/PACU | Facility: HOSPITAL | Age: 74
DRG: 885 | End: 2019-07-03
Payer: COMMERCIAL

## 2019-07-03 PROBLEM — K76.82 ACUTE HEPATIC ENCEPHALOPATHY (HCC): Status: ACTIVE | Noted: 2019-07-03

## 2019-07-03 PROBLEM — K72.00 ACUTE HEPATIC ENCEPHALOPATHY: Status: ACTIVE | Noted: 2019-07-03

## 2019-07-03 LAB
AMMONIA PLAS-SCNC: 73 UMOL/L (ref 9–33)
AMMONIA PLAS-SCNC: 97 UMOL/L (ref 9–33)
ANION GAP SERPL CALCULATED.3IONS-SCNC: 3 MMOL/L (ref 5–14)
BUN SERPL-MCNC: 13 MG/DL (ref 5–25)
CALCIUM SERPL-MCNC: 9.6 MG/DL (ref 8.4–10.2)
CHLORIDE SERPL-SCNC: 106 MMOL/L (ref 97–108)
CO2 SERPL-SCNC: 29 MMOL/L (ref 22–30)
CREAT SERPL-MCNC: 0.57 MG/DL (ref 0.6–1.2)
ERYTHROCYTE [DISTWIDTH] IN BLOOD BY AUTOMATED COUNT: 20.1 %
GFR SERPL CREATININE-BSD FRML MDRD: 92 ML/MIN/1.73SQ M
GLUCOSE P FAST SERPL-MCNC: 161 MG/DL (ref 70–99)
GLUCOSE SERPL-MCNC: 132 MG/DL (ref 65–140)
GLUCOSE SERPL-MCNC: 161 MG/DL (ref 70–99)
GLUCOSE SERPL-MCNC: 177 MG/DL (ref 65–140)
GLUCOSE SERPL-MCNC: 281 MG/DL (ref 65–140)
GLUCOSE SERPL-MCNC: 294 MG/DL (ref 65–140)
GLUCOSE SERPL-MCNC: 325 MG/DL (ref 65–140)
HCT VFR BLD AUTO: 32.3 % (ref 36–46)
HGB BLD-MCNC: 10.9 G/DL (ref 12–16)
MCH RBC QN AUTO: 33.2 PG (ref 26–34)
MCHC RBC AUTO-ENTMCNC: 33.8 G/DL (ref 31–36)
MCV RBC AUTO: 98 FL (ref 80–100)
PLATELET # BLD AUTO: 102 THOUSANDS/UL (ref 150–450)
PMV BLD AUTO: 9.1 FL (ref 8.9–12.7)
POTASSIUM SERPL-SCNC: 3.4 MMOL/L (ref 3.6–5)
RBC # BLD AUTO: 3.29 MILLION/UL (ref 4–5.2)
SODIUM SERPL-SCNC: 138 MMOL/L (ref 137–147)
WBC # BLD AUTO: 4 THOUSAND/UL (ref 4.5–11)

## 2019-07-03 PROCEDURE — 82140 ASSAY OF AMMONIA: CPT | Performed by: FAMILY MEDICINE

## 2019-07-03 PROCEDURE — 90870 ELECTROCONVULSIVE THERAPY: CPT

## 2019-07-03 PROCEDURE — 82948 REAGENT STRIP/BLOOD GLUCOSE: CPT

## 2019-07-03 PROCEDURE — 99232 SBSQ HOSP IP/OBS MODERATE 35: CPT | Performed by: FAMILY MEDICINE

## 2019-07-03 PROCEDURE — 85027 COMPLETE CBC AUTOMATED: CPT | Performed by: FAMILY MEDICINE

## 2019-07-03 PROCEDURE — GZB2ZZZ ELECTROCONVULSIVE THERAPY, BILATERAL-SINGLE SEIZURE: ICD-10-PCS | Performed by: PSYCHIATRY & NEUROLOGY

## 2019-07-03 PROCEDURE — 80048 BASIC METABOLIC PNL TOTAL CA: CPT | Performed by: FAMILY MEDICINE

## 2019-07-03 RX ORDER — POTASSIUM CHLORIDE 20 MEQ/1
40 TABLET, EXTENDED RELEASE ORAL ONCE
Status: COMPLETED | OUTPATIENT
Start: 2019-07-03 | End: 2019-07-03

## 2019-07-03 RX ORDER — SUCCINYLCHOLINE/SOD CL,ISO/PF 100 MG/5ML
SYRINGE (ML) INTRAVENOUS AS NEEDED
Status: DISCONTINUED | OUTPATIENT
Start: 2019-07-03 | End: 2019-07-03 | Stop reason: SURG

## 2019-07-03 RX ORDER — SODIUM CHLORIDE 9 MG/ML
50 INJECTION, SOLUTION INTRAVENOUS CONTINUOUS
Status: CANCELLED | OUTPATIENT
Start: 2019-07-03

## 2019-07-03 RX ORDER — SODIUM CHLORIDE 9 MG/ML
125 INJECTION, SOLUTION INTRAVENOUS CONTINUOUS
Status: DISCONTINUED | OUTPATIENT
Start: 2019-07-04 | End: 2019-07-06

## 2019-07-03 RX ORDER — ESMOLOL HYDROCHLORIDE 10 MG/ML
INJECTION INTRAVENOUS AS NEEDED
Status: DISCONTINUED | OUTPATIENT
Start: 2019-07-03 | End: 2019-07-03 | Stop reason: SURG

## 2019-07-03 RX ORDER — LACTULOSE 20 G/30ML
20 SOLUTION ORAL
Status: DISCONTINUED | OUTPATIENT
Start: 2019-07-03 | End: 2019-07-05

## 2019-07-03 RX ORDER — GLYCOPYRROLATE 0.2 MG/ML
INJECTION INTRAMUSCULAR; INTRAVENOUS AS NEEDED
Status: DISCONTINUED | OUTPATIENT
Start: 2019-07-03 | End: 2019-07-03 | Stop reason: SURG

## 2019-07-03 RX ADMIN — LACTULOSE 20 G: 10 SOLUTION ORAL at 18:45

## 2019-07-03 RX ADMIN — LACTULOSE 20 G: 10 SOLUTION ORAL at 20:45

## 2019-07-03 RX ADMIN — LORAZEPAM 1 MG: 1 TABLET ORAL at 14:41

## 2019-07-03 RX ADMIN — LACTULOSE 20 G: 10 SOLUTION ORAL at 22:46

## 2019-07-03 RX ADMIN — POTASSIUM CHLORIDE 40 MEQ: 20 TABLET, EXTENDED RELEASE ORAL at 14:41

## 2019-07-03 RX ADMIN — MELATONIN TAB 3 MG 3 MG: 3 TAB at 21:03

## 2019-07-03 RX ADMIN — LIDOCAINE 3 PATCH: 50 PATCH TOPICAL at 09:12

## 2019-07-03 RX ADMIN — INSULIN DETEMIR 18 UNITS: 100 INJECTION, SOLUTION SUBCUTANEOUS at 20:50

## 2019-07-03 RX ADMIN — SODIUM CHLORIDE: 0.9 INJECTION, SOLUTION INTRAVENOUS at 06:09

## 2019-07-03 RX ADMIN — LITHIUM CARBONATE 450 MG: 300 CAPSULE, GELATIN COATED ORAL at 09:08

## 2019-07-03 RX ADMIN — LACTULOSE 20 G: 10 SOLUTION ORAL at 16:43

## 2019-07-03 RX ADMIN — LACTULOSE 20 G: 10 SOLUTION ORAL at 09:09

## 2019-07-03 RX ADMIN — GLYCOPYRROLATE 0.2 MG: 0.2 INJECTION INTRAMUSCULAR; INTRAVENOUS at 06:10

## 2019-07-03 RX ADMIN — RIFAXIMIN 550 MG: 550 TABLET ORAL at 16:00

## 2019-07-03 RX ADMIN — ESMOLOL HYDROCHLORIDE 30 MG: 10 INJECTION INTRAVENOUS at 06:20

## 2019-07-03 RX ADMIN — MIDODRINE HYDROCHLORIDE 5 MG: 2.5 TABLET ORAL at 16:00

## 2019-07-03 RX ADMIN — Medication 80 MG: at 06:14

## 2019-07-03 RX ADMIN — MIDODRINE HYDROCHLORIDE 5 MG: 2.5 TABLET ORAL at 12:55

## 2019-07-03 RX ADMIN — FUROSEMIDE 40 MG: 40 TABLET ORAL at 09:09

## 2019-07-03 RX ADMIN — ESMOLOL HYDROCHLORIDE 20 MG: 10 INJECTION INTRAVENOUS at 06:22

## 2019-07-03 RX ADMIN — MIDODRINE HYDROCHLORIDE 5 MG: 2.5 TABLET ORAL at 09:08

## 2019-07-03 RX ADMIN — OLANZAPINE 15 MG: 5 TABLET, FILM COATED ORAL at 19:29

## 2019-07-03 RX ADMIN — POTASSIUM CHLORIDE 20 MEQ: 20 TABLET, EXTENDED RELEASE ORAL at 09:09

## 2019-07-03 RX ADMIN — DOCUSATE SODIUM 100 MG: 100 CAPSULE, LIQUID FILLED ORAL at 19:29

## 2019-07-03 RX ADMIN — INSULIN LISPRO 6 UNITS: 100 INJECTION, SOLUTION INTRAVENOUS; SUBCUTANEOUS at 12:55

## 2019-07-03 RX ADMIN — LACTULOSE 20 G: 10 SOLUTION ORAL at 14:41

## 2019-07-03 RX ADMIN — Medication 60 MG: at 06:14

## 2019-07-03 RX ADMIN — SPIRONOLACTONE 50 MG: 25 TABLET ORAL at 09:09

## 2019-07-03 RX ADMIN — INSULIN LISPRO 8 UNITS: 100 INJECTION, SOLUTION INTRAVENOUS; SUBCUTANEOUS at 20:50

## 2019-07-03 RX ADMIN — INSULIN LISPRO 6 UNITS: 100 INJECTION, SOLUTION INTRAVENOUS; SUBCUTANEOUS at 15:59

## 2019-07-03 NOTE — NURSING NOTE
Patient in bed asleep at this time, on contact isolation  Remain NPO for ECT in a m, heplock to left FA flushed and patent  Compliant with medications, new orders noted and behaviors controlled  On routine checks, will continue to monitor

## 2019-07-03 NOTE — ANESTHESIA POSTPROCEDURE EVALUATION
Post-Op Assessment Note    CV Status:  Stable    Pain management: adequate     Mental Status:  Sleepy   Hydration Status:  Stable   PONV Controlled:  Controlled   Airway Patency:  Patent   Post Op Vitals Reviewed: Yes      Staff: CRNA           BP      Temp      Pulse     Resp      SpO2

## 2019-07-03 NOTE — PROGRESS NOTES
Psychiatry Progress Note    Subjective: Interval History     The patient had ECT 7 bilateral treatment today  She is more pleasant during conversation  She does continue to express feeling depressed  Patient is medication and meal compliant  Medical decreased patient's Lasix to once daily due to her urinating frequently at night and causing disrupted sleep  Patient did sleep last evening      Behavior over the last 24 hours:  unchanged  Sleep: fair  Appetite: fair  Medication side effects: No  ROS: no complaints    Current medications:    Current Facility-Administered Medications:     bacitracin topical ointment 1 large application, 1 large application, Topical, BID PRN, Ambrosio Willis PA-C, 1 large application at 58/43/53 1111    docusate sodium (COLACE) capsule 100 mg, 100 mg, Oral, BID, Selwyn Cristina MD, 100 mg at 07/02/19 1770    furosemide (LASIX) tablet 40 mg, 40 mg, Oral, Daily, Estephania Chowdhury MD    insulin detemir (LEVEMIR) subcutaneous injection 18 Units, 18 Units, Subcutaneous, HS, Estephania Chowdhury MD, 18 Units at 07/02/19 2110    insulin lispro (HumaLOG) 100 units/mL subcutaneous injection 12 Units, 12 Units, Subcutaneous, TID With Meals, Estephania Chowdhury MD    insulin lispro (HumaLOG) 100 units/mL subcutaneous injection 2-12 Units, 2-12 Units, Subcutaneous, 4x Daily (AC & HS), Stopped at 07/03/19 0713 **AND** Fingerstick Glucose (POCT), , , 4x Daily AC and at bedtime, Lacretia Rocker, DO    lactulose 20 g/30 mL oral solution 20 g, 20 g, Oral, Daily, Selwyn Cristina MD, 20 g at 07/02/19 0903    lidocaine (LIDODERM) 5 % patch 3 patch, 3 patch, Topical, Daily, Divya Willis PA-C, 3 patch at 07/02/19 0906    lithium carbonate capsule 450 mg, 450 mg, Oral, After Breakfast, Pedro Luis Shepherd PA-C, 450 mg at 07/02/19 0903    LORazepam (ATIVAN) 2 mg/mL injection 1 mg, 1 mg, Intramuscular, Q6H PRN, Nunu Baker MD    LORazepam (ATIVAN) tablet 1 mg, 1 mg, Oral, Q6H PRN, Pedro Luis Shepherd PA-C, 1 mg at 06/29/19 2100    melatonin tablet 3 mg, 3 mg, Oral, HS, Sole Johnson PA-C, 3 mg at 07/02/19 2107    midodrine (PROAMATINE) tablet 5 mg, 5 mg, Oral, TID AC, Selwyn Cristina MD, 5 mg at 07/02/19 1638    OLANZapine (ZyPREXA) IM injection 5 mg, 5 mg, Intramuscular, Q4H PRN, Nunu Baker MD    OLANZapine (ZyPREXA) tablet 15 mg, 15 mg, Oral, HS, Nunu Baker MD, 15 mg at 07/02/19 2043    OLANZapine (ZyPREXA) tablet 5 mg, 5 mg, Oral, Q4H PRN, Pedro Luis Shepherd PA-C    ondansetron (ZOFRAN-ODT) dispersible tablet 4 mg, 4 mg, Oral, Q6H PRN, Selwyn Cristina MD, 4 mg at 06/22/19 1354    potassium chloride (K-DUR,KLOR-CON) CR tablet 20 mEq, 20 mEq, Oral, Daily, Selwyn Cristina MD, 20 mEq at 07/02/19 8998    sodium chloride 0 9 % infusion, 50 mL/hr, Intravenous, Continuous, Ady Yates MD, Last Rate: 0 mL/hr at 06/26/19 0654    sodium chloride 0 9 % infusion, 50 mL/hr, Intravenous, Continuous, Sharla Rob MD, Stopped at 07/01/19 0649    [START ON 7/4/2019] sodium chloride 0 9 % infusion, 125 mL/hr, Intravenous, Continuous, Sharla Rob MD, Stopped at 07/03/19 4335    spironolactone (ALDACTONE) tablet 50 mg, 50 mg, Oral, Daily, Selwyn Cristina MD, 50 mg at 07/02/19 1887    traMADol (ULTRAM) tablet 25 mg, 25 mg, Oral, Q6H PRN, Pedro Luis Shepherd PA-C    Current Problem List:    Patient Active Problem List   Diagnosis    Type 2 diabetes mellitus, with long-term current [facility]    Hyperlipidemia    Asthma    Anemia of chronic disease    [facility]    Bipolar 1 disorder ([facility]    Cirrhosis of liver with ascites ([address] )    Essential hypertension    GERD (gastroesophageal reflux disease)    Venous stasis    Ambulatory dysfunction    Closed compression fracture of L3 lumbar vertebra    Compression fracture of L3 lumbar vertebra    Closed compression fracture of third lumbar vertebra ([address] )    Fall    Diabetic polyneuropathy associated with type 2 diabetes mellitus ([facility] )    Iron deficiency anemia    Status post fall - Ambulatory dysfunction    Discharged from Hospice for Suicidal Ideation, Medically Complex    ESRD (end stage renal disease) ([facility] )    Preprocedural examination       Problem list reviewed 07/03/19     Objective:     Vital Signs:  Vitals:    07/03/19 0700 07/03/19 0715 07/03/19 0730 07/03/19 0745   BP: 110/54 95/50 108/58 122/58   BP Location: Right arm Right arm Right arm Right arm   Pulse: 87 83 73 80   Resp: 16 17 16 17   Temp: 97 8 °F (36 6 °C) 97 8 °F (36 6 °C) 97 8 °F (36 6 °C) 97 7 °F (36 5 °C)   TempSrc: Tympanic Tympanic Tympanic Tympanic   SpO2: 95% 97% 98% 95%   Weight:       Height:             Appearance:  age appropriate, casually dressed and disheveled   Behavior:  normal   Speech:  normal volume   Mood:  labile but improving   Affect:  labile but improving   Thought Process:  circumstantial   Thought Content:  normal   Perceptual Disturbances: None   Risk Potential: none   Sensorium:  person and place   Cognition:  grossly intact   Consciousness:  alert and awake    Attention: attention span and concentration were age appropriate   Intellect: average   Insight:  poor   Judgment: poor      Motor Activity: no abnormal movements       Labs:  Reviewed 07/03/19      Assessment / Plan:     Bipolar 1 disorder ([facility] )    Recommended Treatment:      Medication changes:  1) ECT 8 Friday with 10 scheduled  Continue current medication regimen  Non-pharmacological treatments  1) Continue with group therapy, milieu therapy and occupational therapy  Safety  1) Safety/communication plan established targeting dynamic risk factors above  2) Risks, benefits, and possible side effects of medications explained to patient and patient verbalizes understanding  Counseling / Coordination of Care    Total floor / unit time spent today 20 minutes   Greater than 50% of total time was spent with the patient and / or family counseling and / or coordination of care  A description of the counseling / coordination of care  Patient's Rights, confidentiality and exceptions to confidentiality, use of automated medical record, [facility] staff access to medical record, and consent to treatment reviewed      Everardo Gerber PA-C

## 2019-07-03 NOTE — ASSESSMENT & PLAN NOTE
· Fall precautions   · Ambulation with assistance and walker   · Pain control with Lidoderm patches; careful with pain control medications as patient has many interactions/allergies

## 2019-07-03 NOTE — PROGRESS NOTES
07/03/19 0900   Team Meeting   Meeting Type Daily Rounds   Team Members Present   Team Members Present Physician;Nurse;   Physician Team Member Dr Margaux Walker, [address] [facility] Team Member [id number] [facility] Team Member Ferdinand Ford      Pt discussed at treatment rounds today, no medication changes made, two more ECT treatment added

## 2019-07-03 NOTE — PROGRESS NOTES
Awake, alert, oriented  Irritable, labile  Refusing Colace this AM, otherwise medication compliant  Denies any SI/HI at this time  Denies any AH/VH at this time  Maintained on FVF observation as ordered  Currently resting calmly in observation room with personal alarm in place  Will continue to monitor

## 2019-07-03 NOTE — PROGRESS NOTES
Progress Note Kehinde Longoria [birth year], 75 y o  female MRN: [id number]    Unit/Bed#: [address] Encounter: [id number]    Primary Care Provider: Tam Reina MD   Date and time admitted to hospital: 6/4/2019  2:35 PM        Acute hepatic encephalopathy  Assessment & Plan  Patient found to have ammonia level of 97 today  Will place on lactulose 20 g every 2 hours until she has at least three loose stools  Also placed on Xifaxan 550 mg twice daily  Recheck ammonia level at 10:00 p m  Today   Fall precautions  Once acute hyperammonemia has resolved will place on lactulose 20 g twice daily   Cirrhosis of liver with ascites (HCC)  Assessment & Plan  Decompensated cirrhosis with ascites and lower extremity edema   Her fluid overload is improved compared to admission  Continue Lasix and Aldactone,   also placed on midodrine   Previous paracentesis 2 7 L removed  Will decrease Lasix to 40 mg once daily as the patient is having too much diuresis and is having trouble sleeping due to waking up at night to pee repeatedly  Ambulatory dysfunction  Assessment & Plan    · Fall precautions   · Ambulation with assistance and walker   · Pain control with Lidoderm patches; careful with pain control medications as patient has many interactions/allergies     Essential hypertension  Assessment & Plan  Controlled off medication  Patient on midodrine in case of hypotension    Anemia of chronic disease  Assessment & Plan  Hemoglobin stable  Between 10-11    Type 2 diabetes mellitus with hyperglycemia, with long-term current use of insulin [facility])  Assessment & Plan  Lab Results   Component Value Date    HGBA1C 10 1 (H) 02/13/2019       Recent Labs     07/02/19  2027 07/03/19  0503 07/03/19  0707 07/03/19  1113   POCGLU 358* 177* 132 294*       Blood Sugar Average: Last 72 hrs:  (P) 361 [id number]    patient's appetite is variable  She frequently misses meals    Will increase insulin doses slowly and continue insulin sliding scale as well for now  Acute hypokalemia:  Replace oral potassium  VTE Pharmacologic Prophylaxis:   Pharmacologic: Pharmacologic VTE Prophylaxis contraindicated due to Liver cirrhosis  Mechanical VTE Prophylaxis in Place: No    Patient Centered Rounds: I have performed bedside rounds with nursing staff today  Discussions with Specialists or Other Care Team Provider:  None    Education and Discussions with Family / Patient:  Discussed with patient at bedside about hospital course    Time Spent for Care: 30 minutes  More than 50% of total time spent on counseling and coordination of care as described above  Current Length of Stay: 29 day(s)    Current Patient Status: Inpatient Psych   Certification Statement: The patient will continue to require additional inpatient hospital stay due to Acute hepatic encephalopathy    Discharge Plan:  As per treatment team    Code Status: Level 3 - DNAR and DNI      Subjective:   Patient states she is very depressed today  She states she does not feel well  States he feels a little sluggish  Denies any chest pain or shortness of breath or abdominal pain or diarrhea or swelling  Objective:     Vitals:   Temp (24hrs), Av 9 °F (36 6 °C), Min:97 7 °F (36 5 °C), Max:98 1 °F (36 7 °C)    Temp:  [97 7 °F (36 5 °C)-98 1 °F (36 7 °C)] 97 7 °F (36 5 °C)  HR:  [] 118  Resp:  [14-22] 17  BP: ()/(50-71) 115/55  SpO2:  [94 %-98 %] 95 %  Body mass index is 27 27 kg/m²  Input and Output Summary (last 24 hours): Intake/Output Summary (Last 24 hours) at 7/3/2019 1441  Last data filed at 7/3/2019 0467  Gross per 24 hour   Intake 270 ml   Output    Net 270 ml       Physical Exam:     Physical Exam   Constitutional: She appears well-developed and well-nourished  HENT:   Head: Normocephalic and atraumatic  Mouth/Throat: Oropharynx is clear and moist    Eyes: Conjunctivae and EOM are normal  Scleral icterus is present     Neck: Normal range of motion  Neck supple  Cardiovascular: Normal rate, regular rhythm and normal heart sounds  Pulmonary/Chest: Effort normal and breath sounds normal    Abdominal: Soft  Bowel sounds are normal  She exhibits distension  She exhibits no mass  There is no tenderness  There is no rebound and no guarding  Mild abdominal fluid thrill noted   Genitourinary:   Genitourinary Comments: deferred   Musculoskeletal: She exhibits edema  Asterixis   Neurological: She is alert  She has normal reflexes  Patient is pleasantly confused  Slow to answer to questions  Skin: Skin is warm and dry  No rash noted  Psychiatric: She has a normal mood and affect  Nursing note and vitals reviewed  Additional Data:     Labs:    Results from last 7 days   Lab Units 07/03/19  0504   WBC Thousand/uL 4 00*   HEMOGLOBIN g/dL 10 9*   HEMATOCRIT % 32 3*   PLATELETS Thousands/uL 102*     Results from last 7 days   Lab Units 07/03/19  0505   SODIUM mmol/L 138   POTASSIUM mmol/L 3 4*   CHLORIDE mmol/L 106   CO2 mmol/L 29   BUN mg/dL 13   CREATININE mg/dL 0 57*   ANION GAP mmol/L 3*   CALCIUM mg/dL 9 6   GLUCOSE RANDOM mg/dL 161*         Results from last 7 days   Lab Units 07/03/19  1113 07/03/19  0707 07/03/19  0503 07/02/19  2027 07/02/19  1519 07/02/19  1119 07/02/19  0749 07/01/19 2010 07/01/19  1634 07/01/19  1123 07/01/19  0702 06/30/19  1958   POC GLUCOSE mg/dl 294* 132 177* 358* 107 304* 161* 321* 198* 260* 225* 246*                   * I Have Reviewed All Lab Data Listed Above  * Additional Pertinent Lab Tests Reviewed:  [address] Admission Reviewed    Imaging:    Imaging Reports Reviewed Today Include:  None  Imaging Personally Reviewed by Myself Includes:  None    Recent Cultures (last 7 days):           Last 24 Hours Medication List:     Current Facility-Administered Medications:  bacitracin 1 large application Topical BID PRN Divya Willis PA-C    docusate sodium 100 mg Oral BID Fernanda Malik MD    furosemide 40 mg Oral Daily Michelene Kawasaki, MD    insulin detemir 18 Units Subcutaneous HS Michelene Kawasaki, MD    insulin lispro 14 Units Subcutaneous TID With Meals Michelene Kawasaki, MD    insulin lispro 2-12 Units Subcutaneous 4x Daily (AC & HS) Tone Fernandez,     lactulose 20 g Oral Q2H Michelene Kawasaki, MD    lidocaine 3 patch Topical Daily Divya Willis PA-C    lithium carbonate 450 mg Oral After Breakfast Chuck Augilera PA-C    LORazepam 1 mg Intramuscular Q6H PRN Germán Chu MD    LORazepam 1 mg Oral Q6H PRN Chuck Aguilera PA-C    melatonin 3 mg Oral HS Delta Blas PA-C    midodrine 5 mg Oral TID AC Kenna Marino MD    OLANZapine 5 mg Intramuscular Q4H PRN Germán Chu MD    OLANZapine 15 mg Oral HS Germán Chu MD    OLANZapine 5 mg Oral Q4H PRN Chuck Aguilera PA-C    ondansetron 4 mg Oral Q6H PRN Kenna Marino MD    potassium chloride 20 mEq Oral Daily Kenna Marino MD    potassium chloride 40 mEq Oral Once Michelene Kawasaki, MD    rifaximin 550 mg Oral Q12H [address] Michelene Kawasaki, MD    sodium chloride 50 mL/hr Intravenous Continuous Zuri Haddad MD Last Rate: 0 mL/hr (06/26/19 0654)   sodium chloride 50 mL/hr Intravenous Continuous Macrina Prajapati MD Last Rate: Stopped (07/01/19 0649)   [START ON 7/4/2019] sodium chloride 125 mL/hr Intravenous Continuous Macrina Prajapati MD Last Rate: Stopped (07/03/19 0573)   spironolactone 50 mg Oral Daily Kenna Marino MD    traMADol 25 mg Oral Q6H PRN Chuck Aguilera PA-C         Today, Patient Was Seen By: Michelene Kawasaki, MD    ** Please Note: Dictation voice to text software may have been used in the creation of this document   **

## 2019-07-03 NOTE — PROCEDURES
ECT Procedure    Patient Name:  Bridgett Garcia   Medical Record Number: [id number]   YOB: 1945  Date of Procedure: 7/3/2019    Time out was taken with staff to confirm correct patient and correct procedure to be performed      Diagnosis: Bipolar 1 disorder ([facility] )    Treatment Number: 7    ECT Type: Inpatient    Electrode Placement: bilateral    Post Ictal Suppression Index:  na    % Energy Set: 40    Seizure Duration (OBS): 25 sec    Additional Notes: uneventful    Amara Bright MD

## 2019-07-03 NOTE — PROGRESS NOTES
Psychiatry Progress Note    Subjective: Interval History     The patient had ECT 7 bilateral treatment today  She is more pleasant during conversation  She does continue to express feeling depressed  Patient is medication and meal compliant  Medical decreased patient's Lasix to once daily due to her urinating frequently at night and causing disrupted sleep  Patient did sleep last evening      Behavior over the last 24 hours:  unchanged  Sleep: fair  Appetite: fair  Medication side effects: No  ROS: no complaints    Current medications:    Current Facility-Administered Medications:     bacitracin topical ointment 1 large application, 1 large application, Topical, BID PRN, Rocco Willis PA-C, 1 large application at 19/38/28 1111    docusate sodium (COLACE) capsule 100 mg, 100 mg, Oral, BID, James Rodriguez MD, 100 mg at 07/02/19 4402    furosemide (LASIX) tablet 40 mg, 40 mg, Oral, Daily, Carolina Huerta MD    insulin detemir (LEVEMIR) subcutaneous injection 18 Units, 18 Units, Subcutaneous, HS, Carolina Huerta MD, 18 Units at 07/02/19 2110    insulin lispro (HumaLOG) 100 units/mL subcutaneous injection 12 Units, 12 Units, Subcutaneous, TID With Meals, Carolina Huerta MD    insulin lispro (HumaLOG) 100 units/mL subcutaneous injection 2-12 Units, 2-12 Units, Subcutaneous, 4x Daily (AC & HS), Stopped at 07/03/19 0713 **AND** Fingerstick Glucose (POCT), , , 4x Daily AC and at bedtime, Claudette Colunga DO    lactulose 20 g/30 mL oral solution 20 g, 20 g, Oral, Daily, James Rodriguez MD, 20 g at 07/02/19 0903    lidocaine (LIDODERM) 5 % patch 3 patch, 3 patch, Topical, Daily, Divya Willis PA-C, 3 patch at 07/02/19 0906    lithium carbonate capsule 450 mg, 450 mg, Oral, After Breakfast, Matty Blanco PA-C, 450 mg at 07/02/19 0903    LORazepam (ATIVAN) 2 mg/mL injection 1 mg, 1 mg, Intramuscular, Q6H PRN, Luiz Brody MD    LORazepam (ATIVAN) tablet 1 mg, 1 mg, Oral, Q6H PRN, Matty Blanco PA-C, 1 mg at 06/29/19 2100    melatonin tablet 3 mg, 3 mg, Oral, HS, Ortiz Blake PA-C, 3 mg at 07/02/19 2107    midodrine (PROAMATINE) tablet 5 mg, 5 mg, Oral, TID AC, Ciera Ayon MD, 5 mg at 07/02/19 1638    OLANZapine (ZyPREXA) IM injection 5 mg, 5 mg, Intramuscular, Q4H PRN, Natty Yanez MD    OLANZapine (ZyPREXA) tablet 15 mg, 15 mg, Oral, HS, Natty Yanez MD, 15 mg at 07/02/19 2043    OLANZapine (ZyPREXA) tablet 5 mg, 5 mg, Oral, Q4H PRN, Anthony Hein PA-C    ondansetron (ZOFRAN-ODT) dispersible tablet 4 mg, 4 mg, Oral, Q6H PRN, Ciera Ayon MD, 4 mg at 06/22/19 1354    potassium chloride (K-DUR,KLOR-CON) CR tablet 20 mEq, 20 mEq, Oral, Daily, Ciera Ayon MD, 20 mEq at 07/02/19 8640    sodium chloride 0 9 % infusion, 50 mL/hr, Intravenous, Continuous, Amena Borges MD, Last Rate: 0 mL/hr at 06/26/19 0654    sodium chloride 0 9 % infusion, 50 mL/hr, Intravenous, Continuous, Leonela Jarrett MD, Stopped at 07/01/19 0649    [START ON 7/4/2019] sodium chloride 0 9 % infusion, 125 mL/hr, Intravenous, Continuous, Leonela Jarrett MD, Stopped at 07/03/19 1622    spironolactone (ALDACTONE) tablet 50 mg, 50 mg, Oral, Daily, Ciera Ayon MD, 50 mg at 07/02/19 4213    traMADol (ULTRAM) tablet 25 mg, 25 mg, Oral, Q6H PRN, Anthony Hein PA-C    Current Problem List:    Patient Active Problem List   Diagnosis    Type 2 diabetes mellitus, with long-term current use of insulin ([facility] )    Hyperlipidemia    Asthma    Anemia of chronic disease    Pancytopenia ([facility] )    [facility]    Cirrhosis of liver with ascites ([facility]    Essential hypertension    GERD (gastroesophageal reflux disease)    Venous stasis    Ambulatory dysfunction    Closed compression fracture of L3 lumbar vertebra    Compression fracture of L3 lumbar vertebra    Closed compression fracture of third lumbar vertebra ([facility]    Fall    Diabetic polyneuropathy associated with type 2 diabetes mellitus ([facility] )    Iron deficiency anemia    Status post fall - Ambulatory dysfunction    Discharged from Hospice for Suicidal Ideation, Medically Complex    ESRD (end stage renal disease) ([facility] )    Preprocedural examination       Problem list reviewed 07/03/19     Objective:     Vital Signs:  Vitals:    07/03/19 0700 07/03/19 0715 07/03/19 0730 07/03/19 0745   BP: 110/54 95/50 108/58 122/58   BP Location: Right arm Right arm Right arm Right arm   Pulse: 87 83 73 80   Resp: 16 17 16 17   Temp: 97 8 °F (36 6 °C) 97 8 °F (36 6 °C) 97 8 °F (36 6 °C) 97 7 °F (36 5 °C)   TempSrc: Tympanic Tympanic Tympanic Tympanic   SpO2: 95% 97% 98% 95%   Weight:       Height:             Appearance:  age appropriate, casually dressed and disheveled   Behavior:  normal   Speech:  normal volume   Mood:  labile but improving   Affect:  labile but improving   Thought Process:  circumstantial   Thought Content:  normal   Perceptual Disturbances: None   Risk Potential: none   Sensorium:  person and place   Cognition:  grossly intact   Consciousness:  alert and awake    Attention: attention span and concentration were age appropriate   Intellect: average   Insight:  poor   Judgment: poor      Motor Activity: no abnormal movements       Labs:  Reviewed 07/03/19      Assessment / Plan:     Bipolar 1 disorder ([facility] )    Recommended Treatment:      Medication changes:  1) ECT 8 Friday  Continue current medication regimen  Non-pharmacological treatments  1) Continue with group therapy, milieu therapy and occupational therapy  Safety  1) Safety/communication plan established targeting dynamic risk factors above  2) Risks, benefits, and possible side effects of medications explained to patient and patient verbalizes understanding  Counseling / Coordination of Care    Total floor / unit time spent today 20 minutes  Greater than 50% of total time was spent with the patient and / or family counseling and / or coordination of care   A description of the counseling / coordination of care  Patient's Rights, confidentiality and exceptions to confidentiality, use of automated medical record, [facility] staff access to medical record, and consent to treatment reviewed      Bhargav Castillo PA-C

## 2019-07-03 NOTE — ASSESSMENT & PLAN NOTE
Lab Results   Component Value Date    HGBA1C 10 1 (H) 02/13/2019       Recent Labs     07/02/19 2027 07/03/19  0503 07/03/19  0707 07/03/19  1113   POCGLU 358* 177* 132 294*       Blood Sugar Average: Last 72 hrs:  (P) 532 [id number]    patient's appetite is variable  She frequently misses meals  Will increase insulin doses slowly and continue insulin sliding scale as well for now

## 2019-07-03 NOTE — PLAN OF CARE
Problem: Alteration in Thoughts and Perception  Goal: Treatment Goal: Gain control of psychotic behaviors/thinking, reduce/eliminate presenting symptoms and demonstrate improved reality functioning upon discharge  Outcome: Progressing  Goal: Verbalize thoughts and feelings  Description  Interventions:  - Promote a nonjudgmental and trusting relationship with the patient through active listening and therapeutic communication  - Assess patient's level of functioning, behavior and potential for risk  - Engage patient in 1 on 1 interactions for a minimum of 15 minutes each session  - Encourage patient to express fears, feelings, frustrations, and discuss symptoms    - Otho patient to reality, help patient recognize reality-based thinking   - Administer medications as ordered and assess for potential side effects  - Provide the patient education related to the signs and symptoms of the illness and desired effects of prescribed medications  Outcome: Progressing  Goal: Refrain from acting on delusional thinking/internal stimuli  Description  Interventions:  - Monitor patient closely, per order   - Utilize least restrictive measures   - Set reasonable limits, give positive feedback for acceptable   - Administer medications as ordered and monitor of potential side effects  Outcome: Progressing  Goal: Agree to be compliant with medication regime, as prescribed and report medication side effects  Description  Interventions:  - Offer appropriate PRN medication and supervise ingestion; conduct aims, as needed   Outcome: Progressing  Goal: Attend and participate in unit activities, including therapeutic, recreational, and educational groups  Description  Interventions:  - Provide therapeutic and educational activities daily, encourage attendance and participation, and document same in the medical record   Outcome: Progressing  Goal: Recognize dysfunctional thoughts, communicate reality-based thoughts at the time of discharge  Description  Interventions:  - Provide medication and psycho-education to assist patient in compliance and developing insight into his/her illness   Outcome: Progressing  Goal: Complete daily ADLs, including personal hygiene independently, as able  Description  Interventions:  - Observe, teach, and assist patient with ADLS  - Monitor and promote a balance of rest/activity, with adequate nutrition and elimination   Outcome: Progressing     Problem: Risk for Self Injury/Neglect  Goal: Treatment Goal: Remain safe during length of stay, learn and adopt new coping skills, and be free of self-injurious ideation, impulses and acts at the time of discharge  Outcome: Progressing  Goal: Verbalize thoughts and feelings  Description  Interventions:  - Assess and re-assess patient's lethality and potential for self-injury  - Engage patient in 1:1 interactions, daily, for a minimum of 15 minutes  - Encourage patient to express feelings, fears, frustrations, hopes  - Establish rapport/trust with patient   Outcome: Progressing  Goal: Refrain from harming self  Description  Interventions:  - Monitor patient closely, per order  - Develop a trusting relationship  - Supervise medication ingestion, monitor effects and side effects   Outcome: Progressing  Goal: Attend and participate in unit activities, including therapeutic, recreational, and educational groups  Description  Interventions:  - Provide therapeutic and educational activities daily, encourage attendance and participation, and document same in the medical record  - Obtain collateral information, encourage visitation and family involvement in care   Outcome: Progressing  Goal: Recognize maladaptive responses and adopt new coping mechanisms  Outcome: Progressing  Goal: Complete daily ADLs, including personal hygiene independently, as able  Description  Interventions:  - Observe, teach, and assist patient with ADLS  - Monitor and promote a balance of rest/activity, with adequate nutrition and elimination  Outcome: Progressing     Problem: Prexisting or High Potential for Compromised Skin Integrity  Goal: Skin integrity is maintained or improved  Description  INTERVENTIONS:  - Identify patients at risk for skin breakdown  - Assess and monitor skin integrity  - Assess and monitor nutrition and hydration status  - Monitor labs (i e  albumin)  - Assess for incontinence   - Turn and reposition patient  - Assist with mobility/ambulation  - Relieve pressure over bony prominences  - Avoid friction and shearing  - Provide appropriate hygiene as needed including keeping skin clean and dry  - Evaluate need for skin moisturizer/barrier cream  - Collaborate with interdisciplinary team (i e  Nutrition, Rehabilitation, etc )   - Patient/family teaching  Outcome: Progressing     Problem: Nutrition/Hydration-ADULT  Goal: Nutrient/Hydration intake appropriate for improving, restoring or maintaining nutritional needs  Description  Monitor and assess patient's nutrition/hydration status for malnutrition (ex- brittle hair, bruises, dry skin, pale skin and conjunctiva, muscle wasting, smooth red tongue, and disorientation)  Collaborate with interdisciplinary team and initiate plan and interventions as ordered  Monitor patient's weight and dietary intake as ordered or per policy  Utilize nutrition screening tool and intervene per policy  Determine patient's food preferences and provide high-protein, high-caloric foods as appropriate       INTERVENTIONS:  - Monitor oral intake, urinary output, labs, and treatment plans  - Assess nutrition and hydration status and recommend course of action  - Evaluate amount of meals eaten  - Assist patient with eating if necessary   - Allow adequate time for meals  - Recommend/ encourage appropriate diets, oral nutritional supplements, and vitamin/mineral supplements  - Order, calculate, and assess calorie counts as needed  - Recommend, monitor, and adjust tube feedings and TPN/PPN based on assessed needs  - Assess need for intravenous fluids  - Provide specific nutrition/hydration education as appropriate  - Include patient/family/caregiver in decisions related to nutrition  Outcome: Progressing     Problem: Ineffective Coping  Goal: Participates in unit activities  Description  Interventions:  - Provide therapeutic environment   - Provide required programming   - Redirect inappropriate behaviors   Outcome: Progressing     Problem: Potential for Falls  Goal: Patient will remain free of falls  Description  INTERVENTIONS:  - Assess patient frequently for physical needs  -  Identify cognitive and physical deficits and behaviors that affect risk of falls    -  [place] fall precautions as indicated by assessment   - Educate patient/family on patient safety including physical limitations  - Instruct patient to call for assistance with activity based on assessment  - Modify environment to reduce risk of injury  - Consider OT/PT consult to assist with strengthening/mobility  Outcome: Not Progressing

## 2019-07-03 NOTE — ASSESSMENT & PLAN NOTE
Decompensated cirrhosis with ascites and lower extremity edema   Her fluid overload is improved compared to admission  Continue Lasix and Aldactone,   also placed on midodrine   Previous paracentesis 2 7 L removed  Will decrease Lasix to 40 mg once daily as the patient is having too much diuresis and is having trouble sleeping due to waking up at night to pee repeatedly

## 2019-07-03 NOTE — TREATMENT PLAN
TREATMENT PLAN REVIEW - Rosey 75 y o  [birth year] female MRN: [id number]    [address] [address] Maryanne Snowden 6B [facility] Room / Bed: Rose Sanchez [facility]/-76 Encounter: [id number]          Admit Date/Time:  6/4/2019  2:35 PM    Treatment Team: Attending Provider: David Reed MD; Consulting Physician: Jeny Mejía MD; Dietitian: Colleen Merritt RD; Consulting Physician: Phillip Schmidt MD; Consulting Physician: Irving Ley MD; : Mehul Minor OT;  Registered Nurse: Charmaine Win, RN; Registered Nurse: Sammie Melgar, RN; Patient Care Assistant: Luciana Curry    Diagnosis: Principal Problem:    Bipolar 1 disorder [facility])  Active Problems:    Type 2 diabetes mellitus, with long-term current use of insulin (HCC)    Anemia of chronic disease    Cirrhosis of liver with ascites ([facility] [address] )    Essential hypertension    Ambulatory dysfunction    Compression fracture of L3 lumbar vertebra    Discharged from Hospice for Suicidal Ideation, Medically Complex    Preprocedural examination      Patient Strengths/Assets: cooperative, motivated    Patient Barriers/Limitations: lack of social/family support    Short Term Goals: decrease in depressive symptoms, decrease in anxiety symptoms    Long Term Goals: resolution of depressive symptoms, stabilization of mood    Progress Towards Goals: mood is stabilizing    Recommended Treatment: medication management, patient medication education, group therapy, milieu therapy, continued Behavioral Health psychiatric evaluation/assessment process, ECT    Treatment Frequency: daily medication monitoring, group and milieu therapy daily, monitoring through interdisciplinary rounds, monitoring through weekly patient care conferences, 3 times per week    Expected Discharge Date:  7/11    Discharge Plan: referral for outpatient medication management with a psychiatrist    Treatment Plan Created/Updated By: David Reed MD

## 2019-07-03 NOTE — ASSESSMENT & PLAN NOTE
Patient found to have ammonia level of 97 today  Will place on lactulose 20 g every 2 hours until she has at least three loose stools  Also placed on Xifaxan 550 mg twice daily  Recheck ammonia level at 10:00 p m  Today   Fall precautions  Once acute hyperammonemia has resolved will place on lactulose 20 g twice daily

## 2019-07-04 LAB
ANION GAP SERPL CALCULATED.3IONS-SCNC: 7 MMOL/L (ref 5–14)
BUN SERPL-MCNC: 16 MG/DL (ref 5–25)
CALCIUM SERPL-MCNC: 10.3 MG/DL (ref 8.4–10.2)
CHLORIDE SERPL-SCNC: 106 MMOL/L (ref 97–108)
CO2 SERPL-SCNC: 28 MMOL/L (ref 22–30)
CREAT SERPL-MCNC: 0.68 MG/DL (ref 0.6–1.2)
GFR SERPL CREATININE-BSD FRML MDRD: 86 ML/MIN/1.73SQ M
GLUCOSE P FAST SERPL-MCNC: 196 MG/DL (ref 70–99)
GLUCOSE SERPL-MCNC: 184 MG/DL (ref 65–140)
GLUCOSE SERPL-MCNC: 196 MG/DL (ref 70–99)
GLUCOSE SERPL-MCNC: 247 MG/DL (ref 65–140)
GLUCOSE SERPL-MCNC: 260 MG/DL (ref 65–140)
GLUCOSE SERPL-MCNC: 310 MG/DL (ref 65–140)
POTASSIUM SERPL-SCNC: 3.6 MMOL/L (ref 3.6–5)
SODIUM SERPL-SCNC: 141 MMOL/L (ref 137–147)

## 2019-07-04 PROCEDURE — 80048 BASIC METABOLIC PNL TOTAL CA: CPT | Performed by: FAMILY MEDICINE

## 2019-07-04 PROCEDURE — 99232 SBSQ HOSP IP/OBS MODERATE 35: CPT | Performed by: PHYSICIAN ASSISTANT

## 2019-07-04 PROCEDURE — 82948 REAGENT STRIP/BLOOD GLUCOSE: CPT

## 2019-07-04 RX ADMIN — RIFAXIMIN 550 MG: 550 TABLET ORAL at 08:28

## 2019-07-04 RX ADMIN — POTASSIUM CHLORIDE 20 MEQ: 20 TABLET, EXTENDED RELEASE ORAL at 08:24

## 2019-07-04 RX ADMIN — LACTULOSE 20 G: 10 SOLUTION ORAL at 04:50

## 2019-07-04 RX ADMIN — DOCUSATE SODIUM 100 MG: 100 CAPSULE, LIQUID FILLED ORAL at 08:24

## 2019-07-04 RX ADMIN — MIDODRINE HYDROCHLORIDE 5 MG: 2.5 TABLET ORAL at 17:00

## 2019-07-04 RX ADMIN — MELATONIN TAB 3 MG 3 MG: 3 TAB at 21:09

## 2019-07-04 RX ADMIN — FUROSEMIDE 40 MG: 40 TABLET ORAL at 08:25

## 2019-07-04 RX ADMIN — INSULIN LISPRO 8 UNITS: 100 INJECTION, SOLUTION INTRAVENOUS; SUBCUTANEOUS at 21:14

## 2019-07-04 RX ADMIN — INSULIN LISPRO 2 UNITS: 100 INJECTION, SOLUTION INTRAVENOUS; SUBCUTANEOUS at 11:09

## 2019-07-04 RX ADMIN — INSULIN DETEMIR 18 UNITS: 100 INJECTION, SOLUTION SUBCUTANEOUS at 21:09

## 2019-07-04 RX ADMIN — INSULIN LISPRO 6 UNITS: 100 INJECTION, SOLUTION INTRAVENOUS; SUBCUTANEOUS at 07:41

## 2019-07-04 RX ADMIN — LIDOCAINE 3 PATCH: 50 PATCH TOPICAL at 08:25

## 2019-07-04 RX ADMIN — MIDODRINE HYDROCHLORIDE 2.5 MG: 2.5 TABLET ORAL at 06:26

## 2019-07-04 RX ADMIN — OLANZAPINE 15 MG: 5 TABLET, FILM COATED ORAL at 19:49

## 2019-07-04 RX ADMIN — LACTULOSE 20 G: 10 SOLUTION ORAL at 00:48

## 2019-07-04 RX ADMIN — RIFAXIMIN 550 MG: 550 TABLET ORAL at 21:09

## 2019-07-04 RX ADMIN — LITHIUM CARBONATE 450 MG: 300 CAPSULE, GELATIN COATED ORAL at 07:43

## 2019-07-04 RX ADMIN — SPIRONOLACTONE 50 MG: 25 TABLET ORAL at 08:25

## 2019-07-04 RX ADMIN — LACTULOSE 20 G: 10 SOLUTION ORAL at 06:33

## 2019-07-04 RX ADMIN — LACTULOSE 20 G: 10 SOLUTION ORAL at 02:51

## 2019-07-04 RX ADMIN — INSULIN LISPRO 4 UNITS: 100 INJECTION, SOLUTION INTRAVENOUS; SUBCUTANEOUS at 17:00

## 2019-07-04 RX ADMIN — MIDODRINE HYDROCHLORIDE 5 MG: 2.5 TABLET ORAL at 11:14

## 2019-07-04 NOTE — PROGRESS NOTES
Patient was noted to be resting  with supervision in the observation room  Patient remains calm and cooperative throughout this shift  Pt continues to be compliant with medications/Lactulose every 2 hours  Will continue  To monitor patient

## 2019-07-04 NOTE — PROGRESS NOTES
Patient is alert and oriented to self and place  Is FVF observation for safety with bed and chair alarm on  Is  irritable and labile this morning  Repots bad sleep with nightmares last night  Denies SI/HI or hallucinations  Is medication and meals compliant after encouragement  On lactulose, having already 2 BM today  Reinforced care plan this morning  PA for psych made aware about ammonia level  Placed order for ammonia level tomorrow morning  Will keep monitoring for safety and support

## 2019-07-04 NOTE — NURSING NOTE
Patient remains supervised in observation room, calm and cooperative this shift  Awake and sitting in chair with alarm in place, less impulsive with ambulation but continues to be resistant with assistance from staff  Saline lock to left arm flushed and patent  Compliant with medications and taking lactulose every 2 hours this shift  Given 1st dose of xifaxan 550mg q 12 hours given at 1600, 2100 dose held  Has been compliant with meals, given a turkey sandwich due to difficulty with food consistency  Labs, orders and vital signs reviewed  Blood work for ammonia level sent to lab at 2200 and results pending

## 2019-07-04 NOTE — PROGRESS NOTES
Progress Note - Shanna 75 y o  female MRN: [id number]  Unit/Bed#: [facility] 253-08 Encounter: [id number]    Assessment  Active Problems:Bipolar 1 disorder [facility])  Patient Active Problem List   Diagnosis    Type 2 diabetes mellitus with hyperglycemia, with long-term current use of insulin ([facility] )    Hyperlipidemia    Asthma    Anemia of chronic disease    Pancytopenia ([address] [address] )    Bipolar 1 disorder (HCC)    Cirrhosis of liver with ascites ([address] [address] )    Essential hypertension    GERD (gastroesophageal reflux disease)    Venous stasis    Ambulatory dysfunction    Closed compression fracture of L3 lumbar vertebra    Compression fracture of L3 lumbar vertebra    Closed compression fracture of third lumbar vertebra (HCC)    Fall    Diabetic polyneuropathy associated with type 2 diabetes mellitus (HCC)    Iron deficiency anemia    Status post fall - Ambulatory dysfunction    Discharged from Hospice for Suicidal Ideation, Medically Complex    ESRD (end stage renal disease) ([place] [address] )    Preprocedural examination    Acute hepatic encephalopathy       Vitals:    07/04/19 0824   BP: 112/63   Pulse:    Resp:    Temp:    SpO2:        Behavior over the last 24 hours:  unchanged  Sleep:  Appears somewhat hypersomnolent  Appetite:  Unchanged  Medication side effects: No  ROS: sedation    Mental Status Evaluation:  Appearance:  age appropriate and Somewhat disheveled   Behavior:  Isolative this a m     Speech:  soft   Mood:  depressed   Affect:  flat   Thought Process:  blocked   Thought Content:  No overt delusions   Perceptual Disturbances: None   Potential for Aggression:    Suicidal/Homicidal Ideation: None when seen    No SI or HI   Sensorium:  person and place   Cognition:  impaired due to Mood symptoms/disorder   Consciousness:  alert and awake    Attention: attention span appeared shorter than expected for age   Insight:  limited   Judgment: limited   Gait/Station: Patient was seen lying in bed, gait not assessed   Motor Activity: no abnormal movements     Progress Toward Goals and Additional Assessment:  Nursing staff reports the patient had 3 loose stools in the past 24 hours, so they will be holding her dose of lactulose today  Patient's most recent ammonia level on 07/03/2019 was 73  Will be having ECT 8 tomorrow  Patient reports that she still feels very depressed and tired    Denies overt psychosis as well as SI and HI  Patient was seen sleeping in the observation room as she has been a fall risk  Not observed ambulating but is using a walker when ambulating per nursing report  Recommended Treatment:   1) Continue with group therapy, milieu therapy and individual therapy  2) Will continue current medications and treatment  Will repeat ammonia level tomorrow  Risks, benefits and possible side effects of Medications:   Risks, benefits, and possible side effects of medications explained to patient and patient verbalizes understanding        Medications:   all current active meds have been reviewed and current meds:   Current Facility-Administered Medications   Medication Dose Route Frequency    bacitracin topical ointment 1 large application  1 large application Topical BID PRN    docusate sodium (COLACE) capsule 100 mg  100 mg Oral BID    furosemide (LASIX) tablet 40 mg  40 mg Oral Daily    insulin detemir (LEVEMIR) subcutaneous injection 18 Units  18 Units Subcutaneous HS    insulin lispro (HumaLOG) 100 units/mL subcutaneous injection 14 Units  14 Units Subcutaneous TID With Meals    insulin lispro (HumaLOG) 100 units/mL subcutaneous injection 2-12 Units  2-12 Units Subcutaneous 4x Daily (AC & HS)    lactulose 20 g/30 mL oral solution 20 g  20 g Oral Q2H    lidocaine (LIDODERM) 5 % patch 3 patch  3 patch Topical Daily    lithium carbonate capsule 450 mg  450 mg Oral After Breakfast    LORazepam (ATIVAN) 2 mg/mL injection 1 mg  1 mg Intramuscular Q6H PRN    LORazepam (ATIVAN) tablet 1 mg  1 mg Oral Q6H PRN    melatonin tablet 3 mg  3 mg Oral HS    midodrine (PROAMATINE) tablet 5 mg  5 mg Oral TID AC    OLANZapine (ZyPREXA) IM injection 5 mg  5 mg Intramuscular Q4H PRN    OLANZapine (ZyPREXA) tablet 15 mg  15 mg Oral HS    OLANZapine (ZyPREXA) tablet 5 mg  5 mg Oral Q4H PRN    ondansetron (ZOFRAN-ODT) dispersible tablet 4 mg  4 mg Oral Q6H PRN    potassium chloride (K-DUR,KLOR-CON) CR tablet 20 mEq  20 mEq Oral Daily    rifaximin (XIFAXAN) tablet 550 mg  550 mg Oral Q12H [address]    sodium chloride 0 9 % infusion  50 mL/hr Intravenous Continuous    sodium chloride 0 9 % infusion  50 mL/hr Intravenous Continuous    sodium chloride 0 9 % infusion  125 mL/hr Intravenous Continuous    spironolactone (ALDACTONE) tablet 50 mg  50 mg Oral Daily    traMADol (ULTRAM) tablet 25 mg  25 mg Oral Q6H PRN     Labs:   No results displayed because visit has over 200 results  Is billing to be determined based on time spent on case? No       Andreas Gonsalves PA-C

## 2019-07-04 NOTE — PLAN OF CARE
Problem: Alteration in Thoughts and Perception  Goal: Treatment Goal: Gain control of psychotic behaviors/thinking, reduce/eliminate presenting symptoms and demonstrate improved reality functioning upon discharge  Outcome: Progressing  Goal: Verbalize thoughts and feelings  Description  Interventions:  - Promote a nonjudgmental and trusting relationship with the patient through active listening and therapeutic communication  - Assess patient's level of functioning, behavior and potential for risk  - Engage patient in 1 on 1 interactions for a minimum of 15 minutes each session  - Encourage patient to express fears, feelings, frustrations, and discuss symptoms    - [place] patient to reality, help patient recognize reality-based thinking   - Administer medications as ordered and assess for potential side effects  - Provide the patient education related to the signs and symptoms of the illness and desired effects of prescribed medications  Outcome: Progressing  Goal: Refrain from acting on delusional thinking/internal stimuli  Description  Interventions:  - Monitor patient closely, per order   - Utilize least restrictive measures   - Set reasonable limits, give positive feedback for acceptable   - Administer medications as ordered and monitor of potential side effects  Outcome: Progressing  Goal: Agree to be compliant with medication regime, as prescribed and report medication side effects  Description  Interventions:  - Offer appropriate PRN medication and supervise ingestion; conduct aims, as needed   Outcome: Progressing  Goal: Attend and participate in unit activities, including therapeutic, recreational, and educational groups  Description  Interventions:  - Provide therapeutic and educational activities daily, encourage attendance and participation, and document same in the medical record   Outcome: Progressing  Goal: Recognize dysfunctional thoughts, communicate reality-based thoughts at the time of discharge  Description  Interventions:  - Provide medication and psycho-education to assist patient in compliance and developing insight into his/her illness   Outcome: Progressing  Goal: Complete daily ADLs, including personal hygiene independently, as able  Description  Interventions:  - Observe, teach, and assist patient with ADLS  - Monitor and promote a balance of rest/activity, with adequate nutrition and elimination   Outcome: Progressing     Problem: Risk for Self Injury/Neglect  Goal: Treatment Goal: Remain safe during length of stay, learn and adopt new coping skills, and be free of self-injurious ideation, impulses and acts at the time of discharge  Outcome: Progressing  Goal: Verbalize thoughts and feelings  Description  Interventions:  - Assess and re-assess patient's lethality and potential for self-injury  - Engage patient in 1:1 interactions, daily, for a minimum of 15 minutes  - Encourage patient to express feelings, fears, frustrations, hopes  - Establish rapport/trust with patient   Outcome: Progressing  Goal: Refrain from harming self  Description  Interventions:  - Monitor patient closely, per order  - Develop a trusting relationship  - Supervise medication ingestion, monitor effects and side effects   Outcome: Progressing  Goal: Attend and participate in unit activities, including therapeutic, recreational, and educational groups  Description  Interventions:  - Provide therapeutic and educational activities daily, encourage attendance and participation, and document same in the medical record  - Obtain collateral information, encourage visitation and family involvement in care   Outcome: Progressing  Goal: Recognize maladaptive responses and adopt new coping mechanisms  Outcome: Progressing  Goal: Complete daily ADLs, including personal hygiene independently, as able  Description  Interventions:  - Observe, teach, and assist patient with ADLS  - Monitor and promote a balance of rest/activity, with adequate nutrition and elimination  Outcome: Progressing     Problem: DISCHARGE PLANNING  Goal: Discharge to home or other facility with appropriate resources  Description  INTERVENTIONS:  - Identify barriers to discharge w/patient and caregiver  - Arrange for needed discharge resources and transportation as appropriate  - Identify discharge learning needs (meds, wound care, etc )  - Arrange for interpretive services to assist at discharge as needed  - Refer to Case Management Department for coordinating discharge planning if the patient needs post-hospital services based on physician/advanced practitioner order or complex needs related to functional status, cognitive ability, or social support system  Outcome: Progressing     Problem: Prexisting or High Potential for Compromised Skin Integrity  Goal: Skin integrity is maintained or improved  Description  INTERVENTIONS:  - Identify patients at risk for skin breakdown  - Assess and monitor skin integrity  - Assess and monitor nutrition and hydration status  - Monitor labs (i e  albumin)  - Assess for incontinence   - Turn and reposition patient  - Assist with mobility/ambulation  - Relieve pressure over bony prominences  - Avoid friction and shearing  - Provide appropriate hygiene as needed including keeping skin clean and dry  - Evaluate need for skin moisturizer/barrier cream  - Collaborate with interdisciplinary team (i e  Nutrition, Rehabilitation, etc )   - Patient/family teaching  Outcome: Progressing     Problem: Nutrition/Hydration-ADULT  Goal: Nutrient/Hydration intake appropriate for improving, restoring or maintaining nutritional needs  Description  Monitor and assess patient's nutrition/hydration status for malnutrition (ex- brittle hair, bruises, dry skin, pale skin and conjunctiva, muscle wasting, smooth red tongue, and disorientation)  Collaborate with interdisciplinary team and initiate plan and interventions as ordered    Monitor patient's weight and dietary intake as ordered or per policy  Utilize nutrition screening tool and intervene per policy  Determine patient's food preferences and provide high-protein, high-caloric foods as appropriate  INTERVENTIONS:  - Monitor oral intake, urinary output, labs, and treatment plans  - Assess nutrition and hydration status and recommend course of action  - Evaluate amount of meals eaten  - Assist patient with eating if necessary   - Allow adequate time for meals  - Recommend/ encourage appropriate diets, oral nutritional supplements, and vitamin/mineral supplements  - Order, calculate, and assess calorie counts as needed  - Recommend, monitor, and adjust tube feedings and TPN/PPN based on assessed needs  - Assess need for intravenous fluids  - Provide specific nutrition/hydration education as appropriate  - Include patient/family/caregiver in decisions related to nutrition  Outcome: Progressing     Problem: Ineffective Coping  Goal: Participates in unit activities  Description  Interventions:  - Provide therapeutic environment   - Provide required programming   - Redirect inappropriate behaviors   Outcome: Progressing     Problem: Potential for Falls  Goal: Patient will remain free of falls  Description  INTERVENTIONS:  - Assess patient frequently for physical needs  -  Identify cognitive and physical deficits and behaviors that affect risk of falls    -  [place] fall precautions as indicated by assessment   - Educate patient/family on patient safety including physical limitations  - Instruct patient to call for assistance with activity based on assessment  - Modify environment to reduce risk of injury  - Consider OT/PT consult to assist with strengthening/mobility  Outcome: Progressing

## 2019-07-05 ENCOUNTER — ANESTHESIA (INPATIENT)
Dept: PREOP/PACU | Facility: HOSPITAL | Age: 74
DRG: 885 | End: 2019-07-05
Payer: COMMERCIAL

## 2019-07-05 ENCOUNTER — ANESTHESIA EVENT (INPATIENT)
Dept: PREOP/PACU | Facility: HOSPITAL | Age: 74
DRG: 885 | End: 2019-07-05
Payer: COMMERCIAL

## 2019-07-05 ENCOUNTER — APPOINTMENT (INPATIENT)
Dept: PREOP/PACU | Facility: HOSPITAL | Age: 74
DRG: 885 | End: 2019-07-05
Payer: COMMERCIAL

## 2019-07-05 LAB
AMMONIA PLAS-SCNC: 46 UMOL/L (ref 9–33)
GLUCOSE SERPL-MCNC: 136 MG/DL (ref 65–140)
GLUCOSE SERPL-MCNC: 178 MG/DL (ref 65–140)
GLUCOSE SERPL-MCNC: 257 MG/DL (ref 65–140)
GLUCOSE SERPL-MCNC: 331 MG/DL (ref 65–140)
GLUCOSE SERPL-MCNC: 87 MG/DL (ref 65–140)

## 2019-07-05 PROCEDURE — 82948 REAGENT STRIP/BLOOD GLUCOSE: CPT

## 2019-07-05 PROCEDURE — GZB2ZZZ ELECTROCONVULSIVE THERAPY, BILATERAL-SINGLE SEIZURE: ICD-10-PCS | Performed by: PSYCHIATRY & NEUROLOGY

## 2019-07-05 PROCEDURE — 90870 ELECTROCONVULSIVE THERAPY: CPT

## 2019-07-05 PROCEDURE — 82140 ASSAY OF AMMONIA: CPT | Performed by: PHYSICIAN ASSISTANT

## 2019-07-05 RX ORDER — LABETALOL 20 MG/4 ML (5 MG/ML) INTRAVENOUS SYRINGE
5
Status: DISCONTINUED | OUTPATIENT
Start: 2019-07-05 | End: 2019-07-08 | Stop reason: HOSPADM

## 2019-07-05 RX ORDER — ESMOLOL HYDROCHLORIDE 10 MG/ML
INJECTION INTRAVENOUS AS NEEDED
Status: DISCONTINUED | OUTPATIENT
Start: 2019-07-05 | End: 2019-07-05 | Stop reason: SURG

## 2019-07-05 RX ORDER — GLYCOPYRROLATE 0.2 MG/ML
INJECTION INTRAMUSCULAR; INTRAVENOUS AS NEEDED
Status: DISCONTINUED | OUTPATIENT
Start: 2019-07-05 | End: 2019-07-05 | Stop reason: SURG

## 2019-07-05 RX ORDER — SODIUM CHLORIDE 9 MG/ML
50 INJECTION, SOLUTION INTRAVENOUS CONTINUOUS
Status: DISCONTINUED | OUTPATIENT
Start: 2019-07-05 | End: 2019-07-06

## 2019-07-05 RX ORDER — SUCCINYLCHOLINE/SOD CL,ISO/PF 100 MG/5ML
SYRINGE (ML) INTRAVENOUS AS NEEDED
Status: DISCONTINUED | OUTPATIENT
Start: 2019-07-05 | End: 2019-07-05 | Stop reason: SURG

## 2019-07-05 RX ORDER — LACTULOSE 20 G/30ML
20 SOLUTION ORAL 3 TIMES DAILY
Status: DISCONTINUED | OUTPATIENT
Start: 2019-07-06 | End: 2019-07-08 | Stop reason: HOSPADM

## 2019-07-05 RX ORDER — LABETALOL 20 MG/4 ML (5 MG/ML) INTRAVENOUS SYRINGE
AS NEEDED
Status: DISCONTINUED | OUTPATIENT
Start: 2019-07-05 | End: 2019-07-05 | Stop reason: SURG

## 2019-07-05 RX ADMIN — LACTULOSE 20 G: 10 SOLUTION ORAL at 08:17

## 2019-07-05 RX ADMIN — LABETALOL 20 MG/4 ML (5 MG/ML) INTRAVENOUS SYRINGE 10 MG: at 06:28

## 2019-07-05 RX ADMIN — SPIRONOLACTONE 50 MG: 25 TABLET ORAL at 05:16

## 2019-07-05 RX ADMIN — INSULIN LISPRO 6 UNITS: 100 INJECTION, SOLUTION INTRAVENOUS; SUBCUTANEOUS at 16:13

## 2019-07-05 RX ADMIN — INSULIN LISPRO 2 UNITS: 100 INJECTION, SOLUTION INTRAVENOUS; SUBCUTANEOUS at 11:54

## 2019-07-05 RX ADMIN — LACTULOSE 20 G: 10 SOLUTION ORAL at 16:45

## 2019-07-05 RX ADMIN — ESMOLOL HYDROCHLORIDE 50 MG: 10 INJECTION, SOLUTION INTRAVENOUS at 06:14

## 2019-07-05 RX ADMIN — LABETALOL 20 MG/4 ML (5 MG/ML) INTRAVENOUS SYRINGE 5 MG: at 06:48

## 2019-07-05 RX ADMIN — LIDOCAINE 3 PATCH: 50 PATCH TOPICAL at 08:16

## 2019-07-05 RX ADMIN — FUROSEMIDE 40 MG: 40 TABLET ORAL at 05:16

## 2019-07-05 RX ADMIN — OLANZAPINE 15 MG: 5 TABLET, FILM COATED ORAL at 21:25

## 2019-07-05 RX ADMIN — Medication 60 MG: at 06:14

## 2019-07-05 RX ADMIN — LORAZEPAM 1 MG: 1 TABLET ORAL at 17:20

## 2019-07-05 RX ADMIN — LACTULOSE 20 G: 10 SOLUTION ORAL at 10:45

## 2019-07-05 RX ADMIN — GLYCOPYRROLATE 0.2 MG: 0.2 INJECTION, SOLUTION INTRAMUSCULAR; INTRAVENOUS at 06:17

## 2019-07-05 RX ADMIN — INSULIN LISPRO 8 UNITS: 100 INJECTION, SOLUTION INTRAVENOUS; SUBCUTANEOUS at 21:26

## 2019-07-05 RX ADMIN — MIDODRINE HYDROCHLORIDE 5 MG: 2.5 TABLET ORAL at 08:17

## 2019-07-05 RX ADMIN — MELATONIN TAB 3 MG 3 MG: 3 TAB at 21:25

## 2019-07-05 RX ADMIN — LITHIUM CARBONATE 450 MG: 300 CAPSULE, GELATIN COATED ORAL at 08:17

## 2019-07-05 RX ADMIN — MIDODRINE HYDROCHLORIDE 5 MG: 2.5 TABLET ORAL at 11:55

## 2019-07-05 RX ADMIN — POTASSIUM CHLORIDE 20 MEQ: 20 TABLET, EXTENDED RELEASE ORAL at 08:17

## 2019-07-05 RX ADMIN — INSULIN DETEMIR 18 UNITS: 100 INJECTION, SOLUTION SUBCUTANEOUS at 21:26

## 2019-07-05 RX ADMIN — RIFAXIMIN 550 MG: 550 TABLET ORAL at 08:26

## 2019-07-05 RX ADMIN — LACTULOSE 20 G: 10 SOLUTION ORAL at 11:54

## 2019-07-05 RX ADMIN — Medication 80 MG: at 06:14

## 2019-07-05 RX ADMIN — MIDODRINE HYDROCHLORIDE 5 MG: 2.5 TABLET ORAL at 16:14

## 2019-07-05 RX ADMIN — LACTULOSE 20 G: 10 SOLUTION ORAL at 14:14

## 2019-07-05 NOTE — ANESTHESIA POSTPROCEDURE EVALUATION
Post-Op Assessment Note    CV Status:  Stable    Pain management: adequate     Mental Status:  Alert and awake   Hydration Status:  Euvolemic   PONV Controlled:  Controlled   Airway Patency:  Patent   Post Op Vitals Reviewed: Yes      Staff: CRNA           BP   187/86   Temp     Pulse 84   Resp 16   SpO2 95

## 2019-07-05 NOTE — PROGRESS NOTES
Ready for ECT  BP meds given  Ammonia level obtained  Saline lock intact left hand  Denies SI's  Blood sugar 87 at 0449

## 2019-07-05 NOTE — PROGRESS NOTES
Awake at start of shift  "Don't give me anything else to make me go, I refuse  " NPO after midnight for ECT # 8  Lactulose will be held  For ammonia level in AM  Currently appears to be resting, eyes closed and respirations noted  Bed alarm in place for safety  Saline lock intact to left hand and flushed  Denies SI's

## 2019-07-05 NOTE — CASE MANAGEMENT
Met with Brii Chilel this AM to discuss her progress  She states she is feeling better and wants to go home  I explained that [place] is getting excited knowing that she will soon be returning and she smiled broadly stating she loves it there and she is glad to feel wanted  Brii Chilel explained that the food here is difficult for her to eat, "They petrify everything here  It's too hard for me to chew " She reports she has not had dentures in past 4 years   CM offered orientation to date, but Brii Chilel was able to state she was at [facility]

## 2019-07-05 NOTE — PROGRESS NOTES
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  Patient was having several episodes of loose stool and had her lactulose held  Patient continues to have an irritable edge at times  Overall she is still reporting feelings of depression  There is no ney psychosis noted        Behavior over the last 24 hours:  unchanged  Sleep: fair  Appetite: fair  Medication side effects: No  ROS: no complaints    Current medications:    Current Facility-Administered Medications:     bacitracin topical ointment 1 large application, 1 large application, Topical, BID PRN, Milan Willis PA-C, 1 large application at 34/12/36 1111    docusate sodium (COLACE) capsule 100 mg, 100 mg, Oral, BID, Orma Phoenix, MD, 100 mg at 07/04/19 0824    furosemide (LASIX) tablet 40 mg, 40 mg, Oral, Daily, Scotty Pineda MD, 40 mg at 07/05/19 0516    insulin detemir (LEVEMIR) subcutaneous injection 18 Units, 18 Units, Subcutaneous, HS, Scotty Pineda MD, 18 Units at 07/04/19 2109    insulin lispro (HumaLOG) 100 units/mL subcutaneous injection 14 Units, 14 Units, Subcutaneous, TID With Meals, Scotty Pineda MD, 14 Units at 07/04/19 1713    insulin lispro (HumaLOG) 100 units/mL subcutaneous injection 2-12 Units, 2-12 Units, Subcutaneous, 4x Daily (AC & HS), 8 Units at 07/04/19 2114 **AND** Fingerstick Glucose (POCT), , , 4x Daily AC and at bedtime, Narendra Flores,     Labetalol HCl (NORMODYNE) injection 5 mg, 5 mg, Intravenous, Q10 Min PRN, Hermelinda Tinajero MD, 5 mg at 07/05/19 0648    lactulose 20 g/30 mL oral solution 20 g, 20 g, Oral, Q2H, Scotty Pineda MD, Stopped at 07/04/19 1019    lidocaine (LIDODERM) 5 % patch 3 patch, 3 patch, Topical, Daily, Divya Willis PA-C, 3 patch at 07/04/19 0825    lithium carbonate capsule 450 mg, 450 mg, Oral, After Breakfast, Bhargav Castillo PA-C, 450 mg at 07/04/19 0743    LORazepam (ATIVAN) 2 mg/mL injection 1 mg, 1 mg, Intramuscular, Q6H PRN, Ne Howell MD    LORazepam (ATIVAN) tablet 1 mg, 1 mg, Oral, Q6H PRN, Bhargav Castillo PA-C, 1 mg at 07/03/19 1441    melatonin tablet 3 mg, 3 mg, Oral, HS, Christina Latham PA-C, 3 mg at 07/04/19 2109    midodrine (PROAMATINE) tablet 5 mg, 5 mg, Oral, TID AC, Zully Roldan MD, 5 mg at 07/04/19 1700    OLANZapine (ZyPREXA) IM injection 5 mg, 5 mg, Intramuscular, Q4H PRN, Ne Howell MD    OLANZapine (ZyPREXA) tablet 15 mg, 15 mg, Oral, HS, Ne Howell MD, 15 mg at 07/04/19 1949    OLANZapine (ZyPREXA) tablet 5 mg, 5 mg, Oral, Q4H PRN, Bhargav Castillo PA-C    ondansetron (ZOFRAN-ODT) dispersible tablet 4 mg, 4 mg, Oral, Q6H PRN, Zully Roldan MD, 4 mg at 06/22/19 1354    potassium chloride (K-DUR,KLOR-CON) CR tablet 20 mEq, 20 mEq, Oral, Daily, Zully Roldan MD, 20 mEq at 07/04/19 0824    rifaximin (XIFAXAN) tablet 550 mg, 550 mg, Oral, Q12H [facility], Holger Jamse MD, 550 mg at 07/04/19 2109    sodium chloride 0 9 % infusion, 50 mL/hr, Intravenous, Continuous, Hermelinda Tinajero MD, Last Rate: 0 mL/hr at 06/26/19 0654    sodium chloride 0 9 % infusion, 50 mL/hr, Intravenous, Continuous, Rosina Bowers MD, Stopped at 07/01/19 8358    sodium chloride 0 9 % infusion, 125 mL/hr, Intravenous, Continuous, Rosina Bowers MD, Stopped at 07/03/19 9459    sodium chloride 0 9 % infusion, 50 mL/hr, Intravenous, Continuous, Hermelinda Tinajero MD    spironolactone (ALDACTONE) tablet 50 mg, 50 mg, Oral, Daily, Zully Roldan MD, 50 mg at 07/05/19 0516    traMADol (ULTRAM) tablet 25 mg, 25 mg, Oral, Q6H PRN, Bhargav Castillo PA-C    Current Problem List:    Patient Active Problem List   Diagnosis    Type 2 diabetes mellitus with hyperglycemia, with long-term current use of insulin (HCC)    Hyperlipidemia    Asthma    Anemia of chronic disease    Pancytopenia (HCC)    Bipolar 1 disorder (HCC)    Cirrhosis of liver with ascites (HCC)    Essential hypertension    GERD (gastroesophageal reflux disease)    Venous stasis    Ambulatory dysfunction    Closed compression fracture of L3 lumbar vertebra    Compression fracture of L3 lumbar vertebra    Closed compression fracture of third lumbar vertebra (HCC)    Fall    Diabetic polyneuropathy associated with type 2 diabetes mellitus (HCC)    Iron deficiency anemia    Status post fall - Ambulatory dysfunction    Discharged from Hospice for Suicidal Ideation, Medically Complex    ESRD (end stage renal disease) ([facility] )    Preprocedural examination    Acute hepatic encephalopathy       Problem list reviewed 07/05/19     Objective:     Vital Signs:  Vitals:    07/05/19 0638 07/05/19 0648 07/05/19 0653 07/05/19 0658   BP: (!) 184/86 152/99 (!) 176/84 158/74   BP Location:       Pulse: 88 88 87 89   Resp: (!) 26 (!) 26 (!) 32 (!) 24   Temp:       TempSrc:       SpO2: 94% 93% 96% 97%   Weight:       Height:             Appearance:  age appropriate, casually dressed and disheveled   Behavior:  normal   Speech:  normal volume   Mood:  labile but improving   Affect:  labile but improving   Thought Process:  circumstantial   Thought Content:  normal   Perceptual Disturbances: None   Risk Potential: none   Sensorium:  person and place   Cognition:  grossly intact   Consciousness:  alert and awake    Attention: attention span and concentration were age appropriate   Intellect: average   Insight:  poor   Judgment: poor      Motor Activity: no abnormal movements       Labs:  Reviewed 07/05/19      Assessment / Plan:     Bipolar 1 disorder ([place] [address] )    Recommended Treatment:      Medication changes:  1) patient underwent ECT 8 this morning  Plan to continue current medications at current doses  Patient is scheduled for a total of 10 ECT treatments in total     Non-pharmacological treatments  1) Continue with group therapy, milieu therapy and occupational therapy  Safety  1) Safety/communication plan established targeting dynamic risk factors above  2) Risks, benefits, and possible side effects of medications explained to patient and patient verbalizes understanding  Counseling / Coordination of Care    Total floor / unit time spent today 20 minutes  Greater than 50% of total time was spent with the patient and / or family counseling and / or coordination of care  A description of the counseling / coordination of care  Patient's Rights, confidentiality and exceptions to confidentiality, use of automated medical record, [facility] staff access to medical record, and consent to treatment reviewed      Bubba Zaman PA-C

## 2019-07-05 NOTE — CASE MANAGEMENT
Stopped by pt's room during visiting hours this afternoon and her friend Eyal Chaudharyshanell was there  I was able to introduce self and Eyal Ding asked if we can help Ros Ross make a call to the Social Security office on Monday at 1 [phone] to request a "[phone] for 2018"  Pt and Ann Sun are asking that the form be mailed to [facility] at [address]  This is in continued effort top help pt apply for medical assistance

## 2019-07-05 NOTE — PROGRESS NOTES
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

## 2019-07-05 NOTE — PROGRESS NOTES
07/05/19 0900   Team Meeting   Meeting Type Daily Rounds   Team Members Present   Team Members Present Physician;Nurse;;Occupational Therapist;Other (Discipline and Name)  (Pharmacist )   Physician Team Member Bonnie Lee 77 Team Member Fuentes WOLF, [facility] Management Team Member Mary Grace Cardoso    OT Team Member Caridad Womack   Other (Discipline and Name) Danial Pappas      Pt discussed at treatment rounds today, no medication changes made  Possible discharge next week

## 2019-07-05 NOTE — PROGRESS NOTES
07/05/19 1100   Activity/Group Checklist   Group Exercise  (seated patriotic exercise and trivia  [employer])   Attendance Did not attend

## 2019-07-05 NOTE — ANESTHESIA PREPROCEDURE EVALUATION
Review of Systems/Medical History      No history of anesthetic complications     Cardiovascular  Exercise tolerance (METS): <4,  Hyperlipidemia, Hypertension ,    Pulmonary  Asthma ,        GI/Hepatic    GERD poorly controlled, Liver disease , cirrhosis,             Endo/Other  Diabetes poorly controlled type 2 Insulin,      GYN       Hematology  Anemia ,     Musculoskeletal    Comment: L 3 fracture recent      Neurology   Psychology   Anxiety, Depression ,              Physical Exam    Airway    Mallampati score: I  TM Distance: >3 FB  Neck ROM: full     Dental       Cardiovascular  Cardiovascular exam normal    Pulmonary  Pulmonary exam normal     Other Findings        Anesthesia Plan  ASA Score- 3     Anesthesia Type- general with ASA Monitors  Additional Monitors:   Airway Plan: ETT  Comment: Pt is moderate to high risk for procedure sec to decompensated liver cirrhosis  Medically cleared for the procedure  Risk of aspiration significant  Will consider ETT  Talked to Dr to who says her depression is severe and wants to go ahead with the procedure 7/5  Plan Factors-Patient not instructed to abstain from smoking on day of procedure  Patient did not smoke on day of surgery  Induction- intravenous  Postoperative Plan-     Informed Consent- Anesthetic plan and risks discussed with patient  I personally reviewed this patient with the CRNA  Discussed and agreed on the Anesthesia Plan with the CRNA               Lab Results   Component Value Date    HGBA1C 10 1 (H) 02/13/2019       Lab Results   Component Value Date    K 3 6 07/04/2019     07/04/2019    CO2 28 07/04/2019    BUN 16 07/04/2019    CREATININE 0 68 07/04/2019    GLUF 196 (H) 07/04/2019    CALCIUM 10 3 (H) 07/04/2019    AST 43 (H) 06/08/2019    ALT 37 06/08/2019    ALKPHOS 114 06/08/2019    EGFR 86 07/04/2019       Lab Results   Component Value Date    WBC 4 00 (L) 07/03/2019    HGB 10 9 (L) 07/03/2019    HCT 32 3 (L) 07/03/2019 MCV 98 07/03/2019     (L) 07/03/2019

## 2019-07-05 NOTE — PROCEDURES
ECT Procedure    Patient Name:  Faheem Mendez   Medical Record Number: [id number]   YOB: 1945  Date of Procedure: 7/5/2019    Time out was taken with staff to confirm correct patient and correct procedure to be performed      Diagnosis: B[facility] 1 disorder ([facility] )    Treatment Number: 8    ECT Type: Inpatient    Electrode Placement: bilateral    Post Ictal Suppression Index:  39%    % Energy Set: 40    Seizure Duration (OBS): 61 sec    Additional Notes: dorina Howard MD

## 2019-07-05 NOTE — ANESTHESIA PREPROCEDURE EVALUATION
Review of Systems/Medical History  Patient summary reviewed  Chart reviewed  No history of anesthetic complications     Cardiovascular  Hyperlipidemia, Hypertension controlled,    Pulmonary  Asthma , well controlled/ stable , No sleep apnea ,        GI/Hepatic    GERD well controlled, Liver disease ,             Endo/Other  Diabetes poorly controlled type 2 Insulin,      GYN      Comment: postmenopausal     Hematology  Anemia anemia of chronic disease,     Musculoskeletal  Back pain (compression fx hx thoraco lumbar) ,   Arthritis     Neurology    Diabetic neuropathy,    Psychology   Depression , bipolar disorder and being treated for depression,              Physical Exam    Airway    Mallampati score: II  TM Distance: >3 FB       Dental       Cardiovascular  Cardiovascular exam normal    Pulmonary  Pulmonary exam normal     Other Findings        Anesthesia Plan  ASA Score- 3     Anesthesia Type- general with ASA Monitors  Additional Monitors:   Airway Plan:     Comment: Chart reviewed and pt states no new changes in health  No prior ECT issues  No questions voiced  Pt re consents to ECT   No interval change in health  Old charts reviewed  Pt known to me  Plan Factors-Patient not instructed to abstain from smoking on day of procedure  Patient did not smoke on day of surgery  Induction- intravenous  Postoperative Plan-     Informed Consent- Anesthetic plan and risks discussed with patient  I personally reviewed this patient with the CRNA  Discussed and agreed on the Anesthesia Plan with the CRNA  Livier Callahan

## 2019-07-05 NOTE — PROGRESS NOTES
Pt continues to stay in her room due to being on contact precautions   Pt is pleasant at this moment and cooperative with meds  Compliant with Q2H Lactulose  Pt is lookiing forward to discharge  Will continue to monitor

## 2019-07-06 LAB
GLUCOSE SERPL-MCNC: 131 MG/DL (ref 65–140)
GLUCOSE SERPL-MCNC: 230 MG/DL (ref 65–140)
GLUCOSE SERPL-MCNC: 234 MG/DL (ref 65–140)
GLUCOSE SERPL-MCNC: 285 MG/DL (ref 65–140)

## 2019-07-06 PROCEDURE — 82948 REAGENT STRIP/BLOOD GLUCOSE: CPT

## 2019-07-06 RX ORDER — LORAZEPAM 0.5 MG/1
0.5 TABLET ORAL EVERY 8 HOURS PRN
Status: DISCONTINUED | OUTPATIENT
Start: 2019-07-06 | End: 2019-07-08 | Stop reason: HOSPADM

## 2019-07-06 RX ADMIN — DOCUSATE SODIUM 100 MG: 100 CAPSULE, LIQUID FILLED ORAL at 17:06

## 2019-07-06 RX ADMIN — SPIRONOLACTONE 50 MG: 25 TABLET ORAL at 09:18

## 2019-07-06 RX ADMIN — MIDODRINE HYDROCHLORIDE 5 MG: 2.5 TABLET ORAL at 12:08

## 2019-07-06 RX ADMIN — OLANZAPINE 15 MG: 5 TABLET, FILM COATED ORAL at 21:09

## 2019-07-06 RX ADMIN — LACTULOSE 20 G: 10 SOLUTION ORAL at 21:08

## 2019-07-06 RX ADMIN — LITHIUM CARBONATE 450 MG: 300 CAPSULE, GELATIN COATED ORAL at 08:17

## 2019-07-06 RX ADMIN — INSULIN LISPRO 4 UNITS: 100 INJECTION, SOLUTION INTRAVENOUS; SUBCUTANEOUS at 21:09

## 2019-07-06 RX ADMIN — POTASSIUM CHLORIDE 20 MEQ: 20 TABLET, EXTENDED RELEASE ORAL at 09:18

## 2019-07-06 RX ADMIN — INSULIN DETEMIR 18 UNITS: 100 INJECTION, SOLUTION SUBCUTANEOUS at 21:09

## 2019-07-06 RX ADMIN — LACTULOSE 20 G: 10 SOLUTION ORAL at 09:19

## 2019-07-06 RX ADMIN — MIDODRINE HYDROCHLORIDE 5 MG: 2.5 TABLET ORAL at 06:00

## 2019-07-06 RX ADMIN — FUROSEMIDE 40 MG: 40 TABLET ORAL at 09:17

## 2019-07-06 RX ADMIN — LACTULOSE 20 G: 10 SOLUTION ORAL at 16:07

## 2019-07-06 RX ADMIN — RIFAXIMIN 550 MG: 550 TABLET ORAL at 09:18

## 2019-07-06 RX ADMIN — INSULIN LISPRO 4 UNITS: 100 INJECTION, SOLUTION INTRAVENOUS; SUBCUTANEOUS at 07:19

## 2019-07-06 RX ADMIN — MELATONIN TAB 3 MG 3 MG: 3 TAB at 21:09

## 2019-07-06 RX ADMIN — LIDOCAINE 3 PATCH: 50 PATCH TOPICAL at 09:18

## 2019-07-06 RX ADMIN — INSULIN LISPRO 4 UNITS: 100 INJECTION, SOLUTION INTRAVENOUS; SUBCUTANEOUS at 12:04

## 2019-07-06 RX ADMIN — MIDODRINE HYDROCHLORIDE 5 MG: 2.5 TABLET ORAL at 16:06

## 2019-07-06 NOTE — QUICK NOTE
Pt noted to be having multiple BM due to been on lactulose q2hr  Pts ammonia level now improved to 46   I will decrease the dose to tid dosing for now

## 2019-07-06 NOTE — PROGRESS NOTES
Patient is currently in bed  She appears to be sleeping  No signs of distress or discomfort noted  Bed alarm is on  Will continue to minute on q 7 minute checks

## 2019-07-06 NOTE — PROGRESS NOTES
Pt continues to stay in her room due to being on contact precautions   Cooperative with meds  Pt is cooperative, compliant with meals  Will continue to monitor

## 2019-07-06 NOTE — CODE DOCUMENTATION
Patient had witnessed fall  Patient was on her way to the bathroom  Nurse was in the room turning off the chair alarm  Patient was using walker and lost balance  She fell on her bottom then hit head on wall  No visible injury  Rapid response called

## 2019-07-06 NOTE — PROGRESS NOTES
Psychiatry Progress Note    Subjective: Interval History     Patient remains on contact precautions  Patient with minimal verbal communication during assessment  Reports that she feels that she is doing okay today  Offers no complaints or concerns  Patient yesterday was a rapid response last evening after receiving 1 mg Ativan dosing  Patient was witnessed to be utilizing her walker on the way the bathroom when she slid down and landed on her back  No seizure activity noted  No imaging required      Behavior over the last 24 hours:  unchanged  Sleep: normal  Appetite: normal  Medication side effects: No  ROS: no complaints    Current medications:    Current Facility-Administered Medications:     bacitracin topical ointment 1 large application, 1 large application, Topical, BID PRN, Elías Willis PA-C, 1 large application at 99/67/53 1111    docusate sodium (COLACE) capsule 100 mg, 100 mg, Oral, BID, Antonio Mathew MD, 100 mg at 07/04/19 0824    furosemide (LASIX) tablet 40 mg, 40 mg, Oral, Daily, Andrea Nicole MD, 40 mg at 07/05/19 0516    insulin detemir (LEVEMIR) subcutaneous injection 18 Units, 18 Units, Subcutaneous, HS, Andrea Nicole MD, 18 Units at 07/05/19 2126    insulin lispro (HumaLOG) 100 units/mL subcutaneous injection 14 Units, 14 Units, Subcutaneous, TID With Meals, Andrea Nicole MD, 14 Units at 07/05/19 1613    insulin lispro (HumaLOG) 100 units/mL subcutaneous injection 2-12 Units, 2-12 Units, Subcutaneous, 4x Daily (AC & HS), 8 Units at 07/05/19 2126 **AND** Fingerstick Glucose (POCT), , , 4x Daily AC and at bedtime, Alana Carbajal DO    Labetalol HCl (NORMODYNE) injection 5 mg, 5 mg, Intravenous, Q10 Min PRN, Hermelinda Tinajero MD, 5 mg at 07/05/19 0648    lactulose 20 g/30 mL oral solution 20 g, 20 g, Oral, TID, Geraldine Cr MD    lidocaine (LIDODERM) 5 % patch 3 patch, 3 patch, Topical, Daily, Divya Willis PA-C, 3 patch at 07/05/19 0816    lithium carbonate capsule 450 mg, 450 mg, Oral, After Breakfast, Luciana Pollard PA-C, 450 mg at 07/05/19 0817    LORazepam (ATIVAN) 2 mg/mL injection 1 mg, 1 mg, Intramuscular, Q6H PRN, Jaime Laird MD    LORazepam (ATIVAN) tablet 0 5 mg, 0 5 mg, Oral, Q8H PRN, Zach Corea PA-C    melatonin tablet 3 mg, 3 mg, Oral, HS, Marcial Barnett PA-C, 3 mg at 07/05/19 2125    midodrine (PROAMATINE) tablet 5 mg, 5 mg, Oral, TID AC, Vito Stauffer MD, 5 mg at 07/06/19 0600    OLANZapine (ZyPREXA) IM injection 5 mg, 5 mg, Intramuscular, Q4H PRN, Jaime Laird MD    OLANZapine (ZyPREXA) tablet 15 mg, 15 mg, Oral, HS, Jaime Laird MD, 15 mg at 07/05/19 2125    OLANZapine (ZyPREXA) tablet 5 mg, 5 mg, Oral, Q4H PRN, Luciana Pollard PA-C    ondansetron (ZOFRAN-ODT) dispersible tablet 4 mg, 4 mg, Oral, Q6H PRN, Vito Stauffer MD, 4 mg at 06/22/19 1354    potassium chloride (K-DUR,KLOR-CON) CR tablet 20 mEq, 20 mEq, Oral, Daily, Vito Stauffer MD, 20 mEq at 07/05/19 0817    rifaximin (XIFAXAN) tablet 550 mg, 550 mg, Oral, Q12H [address], Fiona Akins MD, 550 mg at 07/05/19 2056    spironolactone (ALDACTONE) tablet 50 mg, 50 mg, Oral, Daily, Vito Stauffer MD, 50 mg at 07/05/19 0516    traMADol (ULTRAM) tablet 25 mg, 25 mg, Oral, Q6H PRN, Luciana Pollard PA-C    Current Problem List:    Patient Active Problem List   Diagnosis    Type 2 diabetes mellitus with hyperglycemia, with long-term current use of insulin ([facility]    Hyperlipidemia    Asthma    Anemia of chronic disease    [facility]    [facility]    Cirrhosis of liver with ascites ([facility] )    Essential hypertension    GERD (gastroesophageal reflux disease)    Venous stasis    Ambulatory dysfunction    Closed compression fracture of L3 lumbar vertebra    Compression fracture of L3 lumbar vertebra    Closed compression fracture of third lumbar vertebra ([facility]    Fall    Diabetic polyneuropathy associated with type 2 diabetes mellitus ([facility] )    Iron deficiency anemia    Status post fall - Ambulatory dysfunction    Discharged from Hospice for Suicidal Ideation, Medically Complex    ESRD (end stage renal disease) ([facility] )    Preprocedural examination    Acute hepatic encephalopathy       Problem list reviewed 07/06/19     Objective:     Vital Signs:  Vitals:    07/05/19 2102 07/05/19 2133 07/06/19 0523 07/06/19 0715   BP:  98/51 115/54 118/56   BP Location:  Left arm  Left arm   Pulse:  82 75 73   Resp:  18  18   Temp: 98 5 °F (36 9 °C) 98 5 °F (36 9 °C)  97 5 °F (36 4 °C)   TempSrc: Tympanic Tympanic  Temporal   SpO2:  96%  99%   Weight:       Height:             Appearance:  age appropriate, casually dressed and disheveled   Behavior:  normal   Speech:  soft   Mood:  constricted   Affect:  constricted   Thought Process:  logical   Thought Content:  normal   Perceptual Disturbances: None   Risk Potential: none   Sensorium:  person, place and time   Cognition:  intact   Consciousness:  alert and awake    Attention: attention span and concentration were age appropriate   Intellect: average   Insight:  limited   Judgment: limited      Motor Activity: no abnormal movements       I/O Past 24 hours:  I/O last 3 completed shifts: In: 750 [P O :640; I V :110]  Out: -   I/O this shift:  In: 240 [P O :240]  Out: -         Labs:  Reviewed 07/06/19    Progress Toward Goals:  unchanged    Assessment / Plan:     Bipolar 1 disorder ([facility] )    Recommended Treatment:      Medication changes:  1) decrease Ativan dosing to 0 5 mg every 8 hours p r n  Non-pharmacological treatments  1) Continue with group therapy, milieu therapy and occupational therapy  Safety  1) Safety/communication plan established targeting dynamic risk factors above  2) Risks, benefits, and possible side effects of medications explained to patient and patient verbalizes understanding  Counseling / Coordination of Care    Total floor / unit time spent today 20 minutes   Greater than 50% of total time was spent with the patient and / or family counseling and / or coordination of care  A description of the counseling / coordination of care  Patient's Rights, confidentiality and exceptions to confidentiality, use of automated medical record, [facility] staff access to medical record, and consent to treatment reviewed      Everardo López PA-C

## 2019-07-06 NOTE — QUICK NOTE
Rapid response called at 20 30  Patient had witnessed fall in room  Per RN, aide patient was using walker on way to bathroom in her room when she appeared to slide down and land on her back  Concern with right side of patient's head having touched the wall after she landed  No seizure like activity  No loss of bladder/ bowel  On arrival, patient had been walked to commode  Was seated on the commode  Alert and answering questions  Able to tell me place, year and president  Denies any LOC/ chest pain/ palpitation  States she feels weak from not moving around much and hence fell down  Thinks she might've hit the R side of head to the wall after landing  Denies any vision change/ nausea/ vomiting/ neck pain/ B/L UE pain  States she has chronic back pain and denies any new pain in back/ hip/ LE  Patient not on any anticoagulants  PMH notable for DCLD/ Bipolar disorder  Patient is post ECT today  O/E    / 63 P 82 Blood sugar 331 at 1949 able to speak full sentences on RA    HEENT - NEPTALI  No visible bruises/ contusions of head  No TTP of cranium  No TTP C spine  CVS - S1S1+ no murmurs  RS - CTABL  P/A - Non tender  CNS- Alert, communicative  Oriented to place  Able to tell me year, not day of the week and name current president  B/L UE symmetric strength with no focal deficit  Able to ambulate back to bed with walker and assistance   MSK - No tenderness to palpation of B/L shoulders/ elbow/ wrist/ knee/ hips/ ankle  TTP L spine which is chronic per patient    A/P     Mechanical fall  - In setting of physical deconditioning   - Not on any AC  - No visible injury  No focal deficits on neuro exam  Hold off on any imaging at this point    - Place on fall precautions    SLIM attending was present throughout rapid response  Suggested holding further doses of Lactulose tonight  Patient had 2 BM today and serum ammonia today am was 46  RN aware

## 2019-07-07 LAB
BACTERIA UR QL AUTO: ABNORMAL /HPF
BILIRUB UR QL STRIP: NEGATIVE
CLARITY UR: ABNORMAL
COLOR UR: ABNORMAL
GLUCOSE SERPL-MCNC: 139 MG/DL (ref 65–140)
GLUCOSE SERPL-MCNC: 214 MG/DL (ref 65–140)
GLUCOSE SERPL-MCNC: 218 MG/DL (ref 65–140)
GLUCOSE UR STRIP-MCNC: ABNORMAL MG/DL
HGB UR QL STRIP.AUTO: 250
KETONES UR STRIP-MCNC: ABNORMAL MG/DL
LEUKOCYTE ESTERASE UR QL STRIP: 100
NITRITE UR QL STRIP: NEGATIVE
NON-SQ EPI CELLS URNS QL MICRO: ABNORMAL /HPF
PH UR STRIP.AUTO: 5 [PH]
PROT UR STRIP-MCNC: ABNORMAL MG/DL
RBC #/AREA URNS AUTO: ABNORMAL /HPF
SP GR UR STRIP.AUTO: 1.02 (ref 1–1.04)
UROBILINOGEN UA: 1 MG/DL
WBC #/AREA URNS AUTO: ABNORMAL /HPF

## 2019-07-07 PROCEDURE — 81003 URINALYSIS AUTO W/O SCOPE: CPT | Performed by: PHYSICIAN ASSISTANT

## 2019-07-07 PROCEDURE — 87077 CULTURE AEROBIC IDENTIFY: CPT | Performed by: PHYSICIAN ASSISTANT

## 2019-07-07 PROCEDURE — 87086 URINE CULTURE/COLONY COUNT: CPT | Performed by: PHYSICIAN ASSISTANT

## 2019-07-07 PROCEDURE — 81001 URINALYSIS AUTO W/SCOPE: CPT | Performed by: PHYSICIAN ASSISTANT

## 2019-07-07 PROCEDURE — 87186 SC STD MICRODIL/AGAR DIL: CPT | Performed by: PHYSICIAN ASSISTANT

## 2019-07-07 PROCEDURE — 82948 REAGENT STRIP/BLOOD GLUCOSE: CPT

## 2019-07-07 RX ORDER — SODIUM CHLORIDE 9 MG/ML
50 INJECTION, SOLUTION INTRAVENOUS CONTINUOUS
Status: CANCELLED | OUTPATIENT
Start: 2019-07-08

## 2019-07-07 RX ADMIN — RIFAXIMIN 550 MG: 550 TABLET ORAL at 21:58

## 2019-07-07 RX ADMIN — INSULIN LISPRO 4 UNITS: 100 INJECTION, SOLUTION INTRAVENOUS; SUBCUTANEOUS at 07:08

## 2019-07-07 RX ADMIN — MELATONIN TAB 3 MG 3 MG: 3 TAB at 21:59

## 2019-07-07 RX ADMIN — LIDOCAINE 3 PATCH: 50 PATCH TOPICAL at 09:07

## 2019-07-07 RX ADMIN — OLANZAPINE 15 MG: 5 TABLET, FILM COATED ORAL at 19:15

## 2019-07-07 RX ADMIN — MIDODRINE HYDROCHLORIDE 5 MG: 2.5 TABLET ORAL at 11:45

## 2019-07-07 RX ADMIN — MIDODRINE HYDROCHLORIDE 5 MG: 2.5 TABLET ORAL at 07:06

## 2019-07-07 RX ADMIN — LACTULOSE 20 G: 10 SOLUTION ORAL at 21:59

## 2019-07-07 RX ADMIN — INSULIN LISPRO 4 UNITS: 100 INJECTION, SOLUTION INTRAVENOUS; SUBCUTANEOUS at 22:02

## 2019-07-07 RX ADMIN — DOCUSATE SODIUM 100 MG: 100 CAPSULE, LIQUID FILLED ORAL at 17:06

## 2019-07-07 RX ADMIN — DOCUSATE SODIUM 100 MG: 100 CAPSULE, LIQUID FILLED ORAL at 09:07

## 2019-07-07 RX ADMIN — LITHIUM CARBONATE 450 MG: 300 CAPSULE, GELATIN COATED ORAL at 08:06

## 2019-07-07 RX ADMIN — RIFAXIMIN 550 MG: 550 TABLET ORAL at 09:06

## 2019-07-07 RX ADMIN — MIDODRINE HYDROCHLORIDE 5 MG: 2.5 TABLET ORAL at 05:45

## 2019-07-07 RX ADMIN — INSULIN DETEMIR 18 UNITS: 100 INJECTION, SOLUTION SUBCUTANEOUS at 22:02

## 2019-07-07 RX ADMIN — LACTULOSE 20 G: 10 SOLUTION ORAL at 17:06

## 2019-07-07 RX ADMIN — MIDODRINE HYDROCHLORIDE 5 MG: 2.5 TABLET ORAL at 16:06

## 2019-07-07 RX ADMIN — POTASSIUM CHLORIDE 20 MEQ: 20 TABLET, EXTENDED RELEASE ORAL at 09:06

## 2019-07-07 RX ADMIN — LACTULOSE 20 G: 10 SOLUTION ORAL at 09:07

## 2019-07-07 RX ADMIN — INSULIN LISPRO 2 UNITS: 100 INJECTION, SOLUTION INTRAVENOUS; SUBCUTANEOUS at 11:45

## 2019-07-07 NOTE — PLAN OF CARE
Problem: Risk for Self Injury/Neglect  Goal: Refrain from harming self  Description  Interventions:  - Monitor patient closely, per order  - Develop a trusting relationship  - Supervise medication ingestion, monitor effects and side effects   Outcome: Progressing  Goal: Complete daily ADLs, including personal hygiene independently, as able  Description  Interventions:  - Observe, teach, and assist patient with ADLS  - Monitor and promote a balance of rest/activity, with adequate nutrition and elimination  Outcome: Progressing     Problem: Prexisting or High Potential for Compromised Skin Integrity  Goal: Skin integrity is maintained or improved  Description  INTERVENTIONS:  - Identify patients at risk for skin breakdown  - Assess and monitor skin integrity  - Assess and monitor nutrition and hydration status  - Monitor labs (i e  albumin)  - Assess for incontinence   - Turn and reposition patient  - Assist with mobility/ambulation  - Relieve pressure over bony prominences  - Avoid friction and shearing  - Provide appropriate hygiene as needed including keeping skin clean and dry  - Evaluate need for skin moisturizer/barrier cream  - Collaborate with interdisciplinary team (i e  Nutrition, Rehabilitation, etc )   - Patient/family teaching  Outcome: Progressing

## 2019-07-07 NOTE — PROGRESS NOTES
Psychiatry Progress Note    Subjective: Interval History     Patient remains on contact precautions  Patient asleep in bed this morning  Staff reports that patient's behaviors were better yesterday as she was less labile and was cooperative with care  Patient was also more pleasant  Patient with no falls in the past 24 hours  Patient did sleep well last evening without p r n  Ativan  Has been medication compliant  Still not eating breakfast but consuming 100% of lunch and dinner      Behavior over the last 24 hours:  unchanged  Sleep: normal  Appetite: normal  Medication side effects: No  ROS: no complaints    Current medications:    Current Facility-Administered Medications:     bacitracin topical ointment 1 large application, 1 large application, Topical, BID PRN, Hannah Willis PA-C, 1 large application at 26/00/38 1111    docusate sodium (COLACE) capsule 100 mg, 100 mg, Oral, BID, Fernanda Malik MD, 100 mg at 07/06/19 1706    furosemide (LASIX) tablet 40 mg, 40 mg, Oral, Daily, Isabel Hoyt MD, 40 mg at 07/06/19 0917    insulin detemir (LEVEMIR) subcutaneous injection 18 Units, 18 Units, Subcutaneous, HS, Isabel Hoyt MD, 18 Units at 07/06/19 2109    insulin lispro (HumaLOG) 100 units/mL subcutaneous injection 14 Units, 14 Units, Subcutaneous, TID With Meals, Isabel Hoyt MD, 14 Units at 07/06/19 1707    insulin lispro (HumaLOG) 100 units/mL subcutaneous injection 2-12 Units, 2-12 Units, Subcutaneous, 4x Daily (AC & HS), 4 Units at 07/06/19 2109 **AND** Fingerstick Glucose (POCT), , , 4x Daily AC and at bedtime, Sun Alcantara, DO    Labetalol HCl (NORMODYNE) injection 5 mg, 5 mg, Intravenous, Q10 Min PRN, Hermelinda Tinajero MD, 5 mg at 07/05/19 0648    lactulose 20 g/30 mL oral solution 20 g, 20 g, Oral, TID, Geraldine CABRAL MD, 20 g at 07/06/19 2108    lidocaine (LIDODERM) 5 % patch 3 patch, 3 patch, Topical, Daily, Divya Willis PA-C, 3 patch at 07/06/19 8839    lithium carbonate capsule 450 mg, 450 mg, Oral, After Breakfast, Jose Gutierrez PA-C, 450 mg at 07/06/19 0817    LORazepam (ATIVAN) 2 mg/mL injection 1 mg, 1 mg, Intramuscular, Q6H PRN, Merlin Cam MD    LORazepam (ATIVAN) tablet 0 5 mg, 0 5 mg, Oral, Q8H PRN, Carlos Alberto Maria PA-C    melatonin tablet 3 mg, 3 mg, Oral, HS, Maria G Gilmore PA-C, 3 mg at 07/06/19 2109    midodrine (PROAMATINE) tablet 5 mg, 5 mg, Oral, TID AC, Fernanda Malik MD, 5 mg at 07/07/19 0545    OLANZapine (ZyPREXA) IM injection 5 mg, 5 mg, Intramuscular, Q4H PRN, Merlin Cam MD    OLANZapine (ZyPREXA) tablet 15 mg, 15 mg, Oral, HS, Merlin Cam MD, 15 mg at 07/06/19 2109    OLANZapine (ZyPREXA) tablet 5 mg, 5 mg, Oral, Q4H PRN, Jose Gutierrez PA-C    ondansetron (ZOFRAN-ODT) dispersible tablet 4 mg, 4 mg, Oral, Q6H PRN, Fernanda Malik MD, 4 mg at 06/22/19 1354    potassium chloride (K-DUR,KLOR-CON) CR tablet 20 mEq, 20 mEq, Oral, Daily, Fernanda Malik MD, 20 mEq at 07/06/19 5984    rifaximin (XIFAXAN) tablet 550 mg, 550 mg, Oral, Q12H [address], Isabel Hoyt MD, 550 mg at 07/06/19 9194    spironolactone (ALDACTONE) tablet 50 mg, 50 mg, Oral, Daily, Fernanda Malik MD, 50 mg at 07/06/19 3931    traMADol (ULTRAM) tablet 25 mg, 25 mg, Oral, Q6H PRN, Jose Gutierrez PA-C    Current Problem List:    Patient Active Problem List   Diagnosis    Type 2 diabetes mellitus with hyperglycemia, with long-term current use of insulin (HCC)    Hyperlipidemia    Asthma    Anemia of chronic disease    Pancytopenia ([address]    [facility]    Cirrhosis of liver with ascites ([facility]    Essential hypertension    GERD (gastroesophageal reflux disease)    Venous stasis    Ambulatory dysfunction    Closed compression fracture of L3 lumbar vertebra    Compression fracture of L3 lumbar vertebra    Closed compression fracture of third lumbar vertebra [facility]    Fall    Diabetic polyneuropathy associated with type 2 diabetes mellitus ([facility] )    Iron deficiency anemia    Status post fall - Ambulatory dysfunction    Discharged from Hospice for Suicidal Ideation, Medically Complex    ESRD (end stage renal disease) ([facility] )    Preprocedural examination    Acute hepatic encephalopathy       Problem list reviewed 07/07/19     Objective:     Vital Signs:  Vitals:    07/06/19 0715 07/06/19 1459 07/06/19 2100 07/07/19 0714   BP: 118/56 120/58 121/54 (!) 91/44   BP Location: Left arm Right arm Left arm Left arm   Pulse: 73 74 78 76   Resp: 18 18 16 20   Temp: 97 5 °F (36 4 °C) 97 8 °F (36 6 °C) 98 6 °F (37 °C) (!) 97 4 °F (36 3 °C)   TempSrc: Temporal Tympanic Tympanic Tympanic   SpO2: 99% 98% 95% 96%   Weight:       Height:             Appearance:  age appropriate, casually dressed and disheveled   Behavior:  normal   Speech:  soft   Mood:  constricted   Affect:  constricted   Thought Process:  logical   Thought Content:  normal   Perceptual Disturbances: None   Risk Potential: none   Sensorium:  person, place and time   Cognition:  intact   Consciousness:  alert and awake    Attention: attention span and concentration were age appropriate   Intellect: average   Insight:  limited   Judgment: limited      Motor Activity: no abnormal movements       I/O Past 24 hours:  I/O last 3 completed shifts: In: 780 [P O :780]  Out: -   No intake/output data recorded  Labs:  Reviewed 07/07/19    Progress Toward Goals:  unchanged    Assessment / Plan:     Bipolar 1 disorder ([facility] )    Recommended Treatment:      Medication changes:  1) continue treatment plan    Non-pharmacological treatments  1) Continue with group therapy, milieu therapy and occupational therapy  Safety  1) Safety/communication plan established targeting dynamic risk factors above  2) Risks, benefits, and possible side effects of medications explained to patient and patient verbalizes understanding        Counseling / Coordination of Care    Total floor / unit time spent today 20 minutes  Greater than 50% of total time was spent with the patient and / or family counseling and / or coordination of care  A description of the counseling / coordination of care  Patient's Rights, confidentiality and exceptions to confidentiality, use of automated medical record, [facility] staff access to medical record, and consent to treatment reviewed      Bambi Juárez PA-C

## 2019-07-07 NOTE — PLAN OF CARE
Problem: Alteration in Thoughts and Perception  Goal: Treatment Goal: Gain control of psychotic behaviors/thinking, reduce/eliminate presenting symptoms and demonstrate improved reality functioning upon discharge  Outcome: Progressing  Goal: Verbalize thoughts and feelings  Description  Interventions:  - Promote a nonjudgmental and trusting relationship with the patient through active listening and therapeutic communication  - Assess patient's level of functioning, behavior and potential for risk  - Engage patient in 1 on 1 interactions for a minimum of 15 minutes each session  - Encourage patient to express fears, feelings, frustrations, and discuss symptoms    - [place] patient to reality, help patient recognize reality-based thinking   - Administer medications as ordered and assess for potential side effects  - Provide the patient education related to the signs and symptoms of the illness and desired effects of prescribed medications  Outcome: Progressing  Goal: Refrain from acting on delusional thinking/internal stimuli  Description  Interventions:  - Monitor patient closely, per order   - Utilize least restrictive measures   - Set reasonable limits, give positive feedback for acceptable   - Administer medications as ordered and monitor of potential side effects  Outcome: Progressing  Goal: Agree to be compliant with medication regime, as prescribed and report medication side effects  Description  Interventions:  - Offer appropriate PRN medication and supervise ingestion; conduct aims, as needed   Outcome: Progressing  Goal: Recognize dysfunctional thoughts, communicate reality-based thoughts at the time of discharge  Description  Interventions:  - Provide medication and psycho-education to assist patient in compliance and developing insight into his/her illness   Outcome: Progressing  Goal: Complete daily ADLs, including personal hygiene independently, as able  Description  Interventions:  - Observe, teach, and assist patient with ADLS  - Monitor and promote a balance of rest/activity, with adequate nutrition and elimination   Outcome: Progressing     Problem: Risk for Self Injury/Neglect  Goal: Treatment Goal: Remain safe during length of stay, learn and adopt new coping skills, and be free of self-injurious ideation, impulses and acts at the time of discharge  Outcome: Progressing  Goal: Verbalize thoughts and feelings  Description  Interventions:  - Assess and re-assess patient's lethality and potential for self-injury  - Engage patient in 1:1 interactions, daily, for a minimum of 15 minutes  - Encourage patient to express feelings, fears, frustrations, hopes  - Establish rapport/trust with patient   Outcome: Progressing  Goal: Refrain from harming self  Description  Interventions:  - Monitor patient closely, per order  - Develop a trusting relationship  - Supervise medication ingestion, monitor effects and side effects   Outcome: Progressing  Goal: Recognize maladaptive responses and adopt new coping mechanisms  Outcome: Progressing  Goal: Complete daily ADLs, including personal hygiene independently, as able  Description  Interventions:  - Observe, teach, and assist patient with ADLS  - Monitor and promote a balance of rest/activity, with adequate nutrition and elimination  Outcome: Progressing     Problem: Prexisting or High Potential for Compromised Skin Integrity  Goal: Skin integrity is maintained or improved  Description  INTERVENTIONS:  - Identify patients at risk for skin breakdown  - Assess and monitor skin integrity  - Assess and monitor nutrition and hydration status  - Monitor labs (i e  albumin)  - Assess for incontinence   - Turn and reposition patient  - Assist with mobility/ambulation  - Relieve pressure over bony prominences  - Avoid friction and shearing  - Provide appropriate hygiene as needed including keeping skin clean and dry  - Evaluate need for skin moisturizer/barrier cream  - Collaborate with interdisciplinary team (i e  Nutrition, Rehabilitation, etc )   - Patient/family teaching  Outcome: Progressing     Problem: Nutrition/Hydration-ADULT  Goal: Nutrient/Hydration intake appropriate for improving, restoring or maintaining nutritional needs  Description  Monitor and assess patient's nutrition/hydration status for malnutrition (ex- brittle hair, bruises, dry skin, pale skin and conjunctiva, muscle wasting, smooth red tongue, and disorientation)  Collaborate with interdisciplinary team and initiate plan and interventions as ordered  Monitor patient's weight and dietary intake as ordered or per policy  Utilize nutrition screening tool and intervene per policy  Determine patient's food preferences and provide high-protein, high-caloric foods as appropriate       INTERVENTIONS:  - Monitor oral intake, urinary output, labs, and treatment plans  - Assess nutrition and hydration status and recommend course of action  - Evaluate amount of meals eaten  - Assist patient with eating if necessary   - Allow adequate time for meals  - Recommend/ encourage appropriate diets, oral nutritional supplements, and vitamin/mineral supplements  - Order, calculate, and assess calorie counts as needed  - Recommend, monitor, and adjust tube feedings and TPN/PPN based on assessed needs  - Assess need for intravenous fluids  - Provide specific nutrition/hydration education as appropriate  - Include patient/family/caregiver in decisions related to nutrition  Outcome: Progressing     Problem: Ineffective Coping  Goal: Participates in unit activities  Description  Interventions:  - Provide therapeutic environment   - Provide required programming   - Redirect inappropriate behaviors   Outcome: Progressing     Problem: Potential for Falls  Goal: Patient will remain free of falls  Description  INTERVENTIONS:  - Assess patient frequently for physical needs  -  Identify cognitive and physical deficits and behaviors that affect risk of falls   -  [place] fall precautions as indicated by assessment   - Educate patient/family on patient safety including physical limitations  - Instruct patient to call for assistance with activity based on assessment  - Modify environment to reduce risk of injury  - Consider OT/PT consult to assist with strengthening/mobility  Outcome: Progressing

## 2019-07-07 NOTE — PROGRESS NOTES
Patient has been pleasant and cooperative  She has been waiting for assistance to her bathroom  She denied any needs  Vitals stable  Bed/chair alarm for safety  Will continue to monitor on q 7 minute checks

## 2019-07-07 NOTE — PROGRESS NOTES
Pt continues to stay in her room due to being on contact precautions   Pt is cooperative, compliant with meals and meds  RN assisted pt with shower, pt stated "I'm not going to ECT tomorrow "  Will continue to monitor

## 2019-07-08 ENCOUNTER — ANESTHESIA (INPATIENT)
Dept: PREOP/PACU | Facility: HOSPITAL | Age: 74
DRG: 885 | End: 2019-07-08
Payer: COMMERCIAL

## 2019-07-08 VITALS
HEART RATE: 87 BPM | OXYGEN SATURATION: 97 % | WEIGHT: 126.5 LBS | RESPIRATION RATE: 16 BRPM | HEIGHT: 57 IN | DIASTOLIC BLOOD PRESSURE: 57 MMHG | SYSTOLIC BLOOD PRESSURE: 117 MMHG | TEMPERATURE: 97.8 F | BODY MASS INDEX: 27.29 KG/M2

## 2019-07-08 LAB
GLUCOSE SERPL-MCNC: 167 MG/DL (ref 65–140)
GLUCOSE SERPL-MCNC: 185 MG/DL (ref 65–140)
GLUCOSE SERPL-MCNC: 267 MG/DL (ref 65–140)

## 2019-07-08 PROCEDURE — 82948 REAGENT STRIP/BLOOD GLUCOSE: CPT

## 2019-07-08 RX ORDER — FUROSEMIDE 40 MG/1
40 TABLET ORAL DAILY
Refills: 0
Start: 2019-07-09

## 2019-07-08 RX ORDER — DOCUSATE SODIUM 100 MG/1
100 CAPSULE, LIQUID FILLED ORAL 2 TIMES DAILY
Qty: 60 CAPSULE | Refills: 0
Start: 2019-07-08

## 2019-07-08 RX ORDER — LITHIUM CARBONATE 150 MG/1
450 CAPSULE ORAL
Qty: 90 CAPSULE | Refills: 0
Start: 2019-07-09

## 2019-07-08 RX ORDER — LIDOCAINE 50 MG/G
3 PATCH TOPICAL DAILY
Qty: 90 PATCH | Refills: 0
Start: 2019-07-09

## 2019-07-08 RX ORDER — POTASSIUM CHLORIDE 20 MEQ/1
20 TABLET, EXTENDED RELEASE ORAL DAILY
Qty: 30 TABLET | Refills: 0
Start: 2019-07-09

## 2019-07-08 RX ORDER — LANOLIN ALCOHOL/MO/W.PET/CERES
3 CREAM (GRAM) TOPICAL
Qty: 30 TABLET | Refills: 0
Start: 2019-07-08

## 2019-07-08 RX ORDER — MIDODRINE HYDROCHLORIDE 5 MG/1
5 TABLET ORAL
Qty: 90 TABLET | Refills: 0
Start: 2019-07-08

## 2019-07-08 RX ORDER — SPIRONOLACTONE 50 MG/1
50 TABLET, FILM COATED ORAL DAILY
Qty: 30 TABLET | Refills: 0
Start: 2019-07-08

## 2019-07-08 RX ORDER — LACTULOSE 20 G/30ML
20 SOLUTION ORAL 3 TIMES DAILY
Qty: 1 BOTTLE | Refills: 0 | Status: SHIPPED | OUTPATIENT
Start: 2019-07-08

## 2019-07-08 RX ORDER — OLANZAPINE 15 MG/1
15 TABLET ORAL
Qty: 30 TABLET | Refills: 0
Start: 2019-07-08

## 2019-07-08 RX ADMIN — MIDODRINE HYDROCHLORIDE 5 MG: 2.5 TABLET ORAL at 08:21

## 2019-07-08 RX ADMIN — LIDOCAINE 3 PATCH: 50 PATCH TOPICAL at 08:18

## 2019-07-08 RX ADMIN — INSULIN LISPRO 2 UNITS: 100 INJECTION, SOLUTION INTRAVENOUS; SUBCUTANEOUS at 08:21

## 2019-07-08 RX ADMIN — MIDODRINE HYDROCHLORIDE 5 MG: 2.5 TABLET ORAL at 12:22

## 2019-07-08 RX ADMIN — LACTULOSE 20 G: 10 SOLUTION ORAL at 08:16

## 2019-07-08 RX ADMIN — INSULIN LISPRO 6 UNITS: 100 INJECTION, SOLUTION INTRAVENOUS; SUBCUTANEOUS at 11:52

## 2019-07-08 RX ADMIN — POTASSIUM CHLORIDE 20 MEQ: 20 TABLET, EXTENDED RELEASE ORAL at 08:16

## 2019-07-08 RX ADMIN — SPIRONOLACTONE 50 MG: 25 TABLET ORAL at 05:09

## 2019-07-08 RX ADMIN — RIFAXIMIN 550 MG: 550 TABLET ORAL at 08:17

## 2019-07-08 RX ADMIN — FUROSEMIDE 40 MG: 40 TABLET ORAL at 08:16

## 2019-07-08 RX ADMIN — LITHIUM CARBONATE 450 MG: 300 CAPSULE, GELATIN COATED ORAL at 08:16

## 2019-07-08 RX ADMIN — DOCUSATE SODIUM 100 MG: 100 CAPSULE, LIQUID FILLED ORAL at 08:17

## 2019-07-08 NOTE — CASE MANAGEMENT
Spoke to Sofi the charge RN at [facility]  Both are in agreement o have pt return today  I have faxed past 3 days of Progress notes to [phone] and after calling Yesi Lainez& Fernando Singh [facility] is agreeing to transport today at 2:00  [facility] is aware

## 2019-07-08 NOTE — DISCHARGE SUMMARY
Psychiatric Discharge Summary    Medical Record Number: [id number]  Encounter: [id number]    Discharge diagnosis:  Bipolar 1 disorder ([place] [address] )    Secondary diagnoses:  Problem List     * (Principal) Bipolar 1 disorder ([place] [address] ) (Chronic)    Type 2 diabetes mellitus with hyperglycemia, with long-term current use of insulin (HCC)    Lab Results   Component Value Date    HGBA1C 10 1 (H) 02/13/2019       Recent Labs     07/07/19  1525 07/07/19  2040 07/08/19  0502 07/08/19  0703   POCGLU 139 218* 185* 167*       Blood Sugar Average: Last 72 hrs:  (P) 062 [id number]        Hyperlipidemia    Asthma (Chronic)    Anemia of chronic disease    Pancytopenia (HCC) (Chronic)    Cirrhosis of liver with ascites (HCC) (Chronic)    Essential hypertension    GERD (gastroesophageal reflux disease)    Venous stasis (Chronic)    Ambulatory dysfunction    Closed compression fracture of L3 lumbar vertebra    Compression fracture of L3 lumbar vertebra    Closed compression fracture of third lumbar vertebra ([place] [address] )    Fall    Diabetic polyneuropathy associated with type 2 diabetes mellitus ([place] [address] )    Overview Signed 5/27/2019  4:00 PM by Sterling Pleitez MD     Last Assessment & Plan:   Patient has long standing DM with poor control, last A1C was around 8  On exam, she has decreased knee and ankle DTR    She has LT, PP sensation decreased in fingers and toes  She has wide based gait and can't tandem  She has (+) Romberg's  I thoroughly reviewed her MRI brain and found only very mild WM ischemia, but no evidence of hydrocephalus  Explained to patient that her balance problem was from DM neuropathy, but no normal pressure hydrocephalus  She also has some fear of falling  Will refer her to PT, use the cane or walker  Continue walking exercises  Control DM well  RTC 1 year         Lab Results   Component Value Date    HGBA1C 10 1 (H) 02/13/2019       Recent Labs     07/07/19  1525 07/07/19  2040 07/08/19  0502 07/08/19  0703   POCGLU 139 218* 185* 167*       Blood Sugar Average: Last 72 hrs:  (P) 481 [id number]        Iron deficiency anemia    Status post fall - Ambulatory dysfunction    Discharged from Hospice for Suicidal Ideation, Medically Complex    ESRD (end stage renal disease) ([facility] )    Preprocedural examination    Acute hepatic encephalopathy          HPI:     Windy Dixon is an 75 y o , female, admitted to the psychiatric unit under a 302 status to Dr Doug Lantigua' service with the chief complaint of drinking shampoo as a suicide attempt and being aggressive with a caregiver  She was sent from [place]  Patient was receiving 24 hour care  According to transfer papers she was not taking her medications consistently or eating  She was having bouts of nausea  She has lost approximately 100 lbs in the last year unintentionally  The patient has been more paranoid and believes she has been placed in shelter  She drank a bottle of shampoo trying to kill herself  She was scratching nurses  She believes somebody was trying to kill her  She was also threatening to hang herself with a shoe lace at [place]  After medical stay once she was cleared she was transferred to [address] [place] [address] for further evaluation and treatment      Patient is oriented to person and place  She is very irritable during discussion and keeps repeating that she needs her butter scotch candies  When asked why she is in the hospital patient turned and refused to talk with me  She would not maintain eye contact  She told me to leave and "go play with yourself " The patient was stating last evening that she wanted to die and that she was going to stop taking medications and stop eating so she could go live under a bridge  Patient has untreated bipolar disorder  Patient has been on Wellbutrin for many years  Patient has very poor insight and judgment  Patient continues to be paranoid of others   Patient makes passive suicidal statements here in the hospital   She denies a plan     Brief Hospital Course:     After the patient was admitted to the psychiatric unit  the patient had several psychotropic medication adjustments made to help stabilize their mood  She also underwent 8 ECT treatments  Following these medication changes and ECT, the patient started to stabilize  Finally on 7/8/2019, the patient was found to be stable for discharge  On the day of discharge the patient reported their symptoms as significantly improved  All psychiatric medications were being tolerated without side effect or adverse event  No suicidal or homicidal ideations were present  The patient expressed their readiness and willingness to be discharged and referral to outpatient treatment was made  The case was discussed with Dr Aviva Salazar and he has deemed the patient stable for discharge        Condition on discharge:     Improved    Medications Upon Discharge:       Current Facility-Administered Medications:     bacitracin topical ointment 1 large application, 1 large application, Topical, BID PRN, Conception Tao Willis PA-C, 1 large application at 29/91/95 1111    docusate sodium (COLACE) capsule 100 mg, 100 mg, Oral, BID, Fernanda Malik MD, 100 mg at 07/08/19 0817    furosemide (LASIX) tablet 40 mg, 40 mg, Oral, Daily, Isabel Hoyt MD, 40 mg at 07/08/19 0816    insulin detemir (LEVEMIR) subcutaneous injection 18 Units, 18 Units, Subcutaneous, HS, Isabel Hoyt MD, 18 Units at 07/07/19 2202    insulin lispro (HumaLOG) 100 units/mL subcutaneous injection 14 Units, 14 Units, Subcutaneous, TID With Meals, Isabel Hoyt MD, 14 Units at 07/08/19 0821    insulin lispro (HumaLOG) 100 units/mL subcutaneous injection 2-12 Units, 2-12 Units, Subcutaneous, 4x Daily (AC & HS), 2 Units at 07/08/19 0821 **AND** Fingerstick Glucose (POCT), , , 4x Daily AC and at bedtime, Sun Alcantara,     Labetalol HCl (NORMODYNE) injection 5 mg, 5 mg, Intravenous, Q10 Min PRN, Arely Collazo MD, 5 mg at 07/05/19 7875   lactulose 20 g/30 mL oral solution 20 g, 20 g, Oral, TID, Geraldine CABRAL MD, 20 g at 07/08/19 0816    lidocaine (LIDODERM) 5 % patch 3 patch, 3 patch, Topical, Daily, Divya Willis PA-C, 3 patch at 07/08/19 0818    lithium carbonate capsule 450 mg, 450 mg, Oral, After Breakfast, Jenness Homans, PA-C, 450 mg at 07/08/19 0816    LORazepam (ATIVAN) 2 mg/mL injection 1 mg, 1 mg, Intramuscular, Q6H PRN, Helene Angel MD    LORazepam (ATIVAN) tablet 0 5 mg, 0 5 mg, Oral, Q8H PRN, Cindy Pina PA-C    melatonin tablet 3 mg, 3 mg, Oral, HS, Chepe Grullon PA-C, 3 mg at 07/07/19 2159    midodrine (PROAMATINE) tablet 5 mg, 5 mg, Oral, TID AC, Mary Grace Vee MD, 5 mg at 07/08/19 0821    OLANZapine (ZyPREXA) IM injection 5 mg, 5 mg, Intramuscular, Q4H PRN, Helene Angel MD    OLANZapine (ZyPREXA) tablet 15 mg, 15 mg, Oral, HS, Helene Angel MD, 15 mg at 07/07/19 1915    OLANZapine (ZyPREXA) tablet 5 mg, 5 mg, Oral, Q4H PRN, Jenness Homans, PA-C    ondansetron (ZOFRAN-ODT) dispersible tablet 4 mg, 4 mg, Oral, Q6H PRN, Mary Grace Vee MD, 4 mg at 06/22/19 1354    potassium chloride (K-DUR,KLOR-CON) CR tablet 20 mEq, 20 mEq, Oral, Daily, Mary Grace Vee MD, 20 mEq at 07/08/19 0816    rifaximin (XIFAXAN) tablet 550 mg, 550 mg, Oral, Q12H [facility], Nayana Meraz MD, 550 mg at 07/08/19 0817    spironolactone (ALDACTONE) tablet 50 mg, 50 mg, Oral, Daily, Mary Grace Vee MD, 50 mg at 07/08/19 0509    traMADol (ULTRAM) tablet 25 mg, 25 mg, Oral, Q6H PRN, Jenness Homans, PA-C Jenness Homans, [place]

## 2019-07-08 NOTE — CASE MANAGEMENT
Met with pt this AM to let her know she would be leaving and returning to [place] today  She was elated and began crying with noemi  She had no preference of ambulance transport  I also informed her that I have notified her friend Jade Nguyen of the discharge

## 2019-07-08 NOTE — PROGRESS NOTES
Pt refused to go for ECT  Today  Pt refused to be changed into gown, stated that she won't be going fopr any ECT from now on   [facility] notified

## 2019-07-08 NOTE — PROGRESS NOTES
Pt relatively calm and cooperative  Pt continues to state that she won't be attending scheduled ECT tx in the morning  Pt offered education on ECT therapy and Pt declined  OOB to bathroom multiples times  Will frequently monitor for safety and support

## 2019-07-08 NOTE — PROGRESS NOTES
07/08/19 0857   Team Meeting   Meeting Type Daily Rounds   Team Members Present   Team Members Present Physician;Nurse;   Physician Team Member Dr Ramin Rodney [facility]    Nursing Team Member Jon Roberson [facility]    Care Management Team Member Leo Biswas    OT Team Member Jasson Lorenzo      Pt discussed at treatment rounds, planned to discharge back to [facility]

## 2019-07-08 NOTE — PROGRESS NOTES
Psychiatry Progress Note    Subjective: Interval History     The patient refused to go for ECT treatment this morning  She had 2 more ECT treatments scheduled  Patient states that she has been feeling better and does not think she needs more treatments  She is medication and meal compliant  Staff states she has been cooperative with them  Patient denies suicidal ideations  She states this morning that she does not feel depressed and would like to go home  Discussed case with staff and Dr Fatimah Vasquez and patient was deemed appropriate for discharge  Social Work contacted [facility] and is waiting for response as to when they can take her back      Behavior over the last 24 hours:  unchanged  Sleep: fair  Appetite: fair  Medication side effects: No  ROS: no complaints    Current medications:    Current Facility-Administered Medications:     bacitracin topical ointment 1 large application, 1 large application, Topical, BID PRN, Juan Willis PA-C, 1 large application at 34/65/17 1111    docusate sodium (COLACE) capsule 100 mg, 100 mg, Oral, BID, Diane White MD, 100 mg at 07/08/19 0817    furosemide (LASIX) tablet 40 mg, 40 mg, Oral, Daily, Paz Mendes MD, 40 mg at 07/08/19 0816    insulin detemir (LEVEMIR) subcutaneous injection 18 Units, 18 Units, Subcutaneous, HS, Paz Mendes MD, 18 Units at 07/07/19 2202    insulin lispro (HumaLOG) 100 units/mL subcutaneous injection 14 Units, 14 Units, Subcutaneous, TID With Meals, Paz Mendes MD, 14 Units at 07/08/19 0821    insulin lispro (HumaLOG) 100 units/mL subcutaneous injection 2-12 Units, 2-12 Units, Subcutaneous, 4x Daily (AC & HS), 2 Units at 07/08/19 0821 **AND** Fingerstick Glucose (POCT), , , 4x Daily AC and at bedtime, Maury Seals DO    Labetalol HCl (NORMODYNE) injection 5 mg, 5 mg, Intravenous, Q10 Min PRN, Hermelinda Tinajero MD, 5 mg at 07/05/19 0648    lactulose 20 g/30 mL oral solution 20 g, 20 g, Oral, TID, Geraldine Moreno MD, 20 g at 07/08/19 0816    lidocaine (LIDODERM) 5 % patch 3 patch, 3 patch, Topical, Daily, Divya Willis PA-C, 3 patch at 07/08/19 0818    lithium carbonate capsule 450 mg, 450 mg, Oral, After Breakfast, Slime Buchanan PA-C, 450 mg at 07/08/19 0816    LORazepam (ATIVAN) 2 mg/mL injection 1 mg, 1 mg, Intramuscular, Q6H PRN, Malina Elizabeth MD    LORazepam (ATIVAN) tablet 0 5 mg, 0 5 mg, Oral, Q8H PRN, Jones Alejandre PA-C    melatonin tablet 3 mg, 3 mg, Oral, HS, Jodi Maria PA-C, 3 mg at 07/07/19 2159    midodrine (PROAMATINE) tablet 5 mg, 5 mg, Oral, TID AC, Hernandez Duvall MD, 5 mg at 07/08/19 9364    OLANZapine (ZyPREXA) IM injection 5 mg, 5 mg, Intramuscular, Q4H PRN, Malina Elizabeth MD    OLANZapine (ZyPREXA) tablet 15 mg, 15 mg, Oral, HS, Malina Elizabeth MD, 15 mg at 07/07/19 1915    OLANZapine (ZyPREXA) tablet 5 mg, 5 mg, Oral, Q4H PRN, Slime Buchanan PA-C    ondansetron (ZOFRAN-ODT) dispersible tablet 4 mg, 4 mg, Oral, Q6H PRN, Hernandez Duvall MD, 4 mg at 06/22/19 1354    potassium chloride (K-DUR,KLOR-CON) CR tablet 20 mEq, 20 mEq, Oral, Daily, Hernandez Duvall MD, 20 mEq at 07/08/19 0816    rifaximin (XIFAXAN) tablet 550 mg, 550 mg, Oral, Q12H [address], Dmitri Narayan MD, 550 mg at 07/08/19 0817    spironolactone (ALDACTONE) tablet 50 mg, 50 mg, Oral, Daily, Hernandez Duvall MD, 50 mg at 07/08/19 0509    traMADol (ULTRAM) tablet 25 mg, 25 mg, Oral, Q6H PRN, Slime Buchanan PA-C    Current Problem List:    Patient Active Problem List   Diagnosis    Type 2 diabetes mellitus with hyperglycemia, with long-term current use of insulin ([facility] )    Hyperlipidemia    Asthma    Anemia of chronic disease    [facility]topenia ([facility]    Bipolar 1 disorder ([facility] )    Cirrhosis of liver with ascites ([facility] )    Essential hypertension    GERD (gastroesophageal reflux disease)    Venous stasis    Ambulatory dysfunction    Closed compression fracture of L3 lumbar vertebra    Compression fracture of L3 lumbar vertebra    Closed compression fracture of third lumbar vertebra (HCC)    Fall    Diabetic polyneuropathy associated with type 2 diabetes mellitus ([facility] )    Iron deficiency anemia    Status post fall - Ambulatory dysfunction    Discharged from Hospice for Suicidal Ideation, Medically Complex    ESRD (end stage renal disease) ([facility] )    Preprocedural examination    Acute hepatic encephalopathy       Problem list reviewed 07/08/19     Objective:     Vital Signs:  Vitals:    07/08/19 0435 07/08/19 0500 07/08/19 0743 07/08/19 0815   BP: 127/76 124/66 114/52 117/57   BP Location: Right arm Right arm Right arm Left arm   Pulse: 84  75 87   Resp: 16  16    Temp: (!) 96 5 °F (35 8 °C)  97 8 °F (36 6 °C)    TempSrc: Tympanic  Tympanic    SpO2: 98%  97%    Weight: 57 4 kg (126 lb 8 oz)      Height:             Appearance:  age appropriate, casually dressed and disheveled   Behavior:  normal   Speech:  normal volume   Mood:  normal    Affect:  normal   Thought Process:  circumstantial   Thought Content:  normal   Perceptual Disturbances: None   Risk Potential: none   Sensorium:  person and place   Cognition:  grossly intact   Consciousness:  alert and awake    Attention: attention span and concentration were age appropriate   Intellect: average   Insight:  limited   Judgment: limited      Motor Activity: no abnormal movements       Labs:  Reviewed 07/08/19      Assessment / Plan:     Bipolar 1 disorder ([facility] )    Recommended Treatment:      Medication changes:  1) Continue current medication regimen  Plan for discharge  Non-pharmacological treatments  1) Continue with group therapy, milieu therapy and occupational therapy  Safety  1) Safety/communication plan established targeting dynamic risk factors above  2) Risks, benefits, and possible side effects of medications explained to patient and patient verbalizes understanding  Counseling / Coordination of Care    Total floor / unit time spent today 20 minutes  Greater than 50% of total time was spent with the patient and / or family counseling and / or coordination of care  A description of the counseling / coordination of care  Patient's Rights, confidentiality and exceptions to confidentiality, use of automated medical record, [facility] staff access to medical record, and consent to treatment reviewed      Pedro Luis Shepherd PA-C

## 2019-07-10 LAB — BACTERIA UR CULT: ABNORMAL

## 2020-01-14 NOTE — SEDATION DOCUMENTATION
Approximately 2700ml of cloudy yellow fluid removed     Vital signs at compleation: SO2- 100% on room air                                                 HR- 78bpm                                                  BP-123/58
Catheter inserted in LLQ draining cloudy, yellow fluid 
Yonas Davey)  Cardiovascular Disease; Internal Medicine  [address] [place] [address]  Phone: [phone]  Fax: [phone]  Follow Up Time: 1-3 Days

## 2020-07-30 NOTE — PLAN OF CARE
Problem: Risk for Self Injury/Neglect  Goal: Treatment Goal: Remain safe during length of stay, learn and adopt new coping skills, and be free of self-injurious ideation, impulses and acts at the time of discharge  Outcome: Progressing  Goal: Verbalize thoughts and feelings  Description  Interventions:  - Assess and re-assess patient's lethality and potential for self-injury  - Engage patient in 1:1 interactions, daily, for a minimum of 15 minutes  - Encourage patient to express feelings, fears, frustrations, hopes  - Establish rapport/trust with patient   Outcome: Progressing  Goal: Refrain from harming self  Description  Interventions:  - Monitor patient closely, per order  - Develop a trusting relationship  - Supervise medication ingestion, monitor effects and side effects   Outcome: Progressing  Goal: Attend and participate in unit activities, including therapeutic, recreational, and educational groups  Description  Interventions:  - Provide therapeutic and educational activities daily, encourage attendance and participation, and document same in the medical record  - Obtain collateral information, encourage visitation and family involvement in care   Outcome: Progressing  Goal: Recognize maladaptive responses and adopt new coping mechanisms  Outcome: Progressing  Goal: Complete daily ADLs, including personal hygiene independently, as able  Description  Interventions:  - Observe, teach, and assist patient with ADLS  - Monitor and promote a balance of rest/activity, with adequate nutrition and elimination  Outcome: Progressing No